# Patient Record
Sex: FEMALE | Race: BLACK OR AFRICAN AMERICAN | NOT HISPANIC OR LATINO | Employment: OTHER | URBAN - METROPOLITAN AREA
[De-identification: names, ages, dates, MRNs, and addresses within clinical notes are randomized per-mention and may not be internally consistent; named-entity substitution may affect disease eponyms.]

---

## 2017-01-16 DIAGNOSIS — M54.50 CHRONIC BILATERAL LOW BACK PAIN WITHOUT SCIATICA: ICD-10-CM

## 2017-01-16 DIAGNOSIS — G89.29 CHRONIC BILATERAL LOW BACK PAIN WITHOUT SCIATICA: ICD-10-CM

## 2017-01-16 RX ORDER — HYDROCODONE BITARTRATE AND ACETAMINOPHEN 7.5; 325 MG/1; MG/1
1 TABLET ORAL 2 TIMES DAILY PRN
Qty: 30 TABLET | Refills: 0 | Status: SHIPPED | OUTPATIENT
Start: 2017-01-16 | End: 2017-02-09 | Stop reason: SDUPTHER

## 2017-01-16 NOTE — TELEPHONE ENCOUNTER
----- Message from Melia Jara sent at 1/16/2017 11:17 AM CST -----  Contact: self/[phone]  Refill:  hydrocodone-acetaminophen 7.5-325mg (NORCO) 7.5-325 mg per tablet    Thank you.

## 2017-02-06 DIAGNOSIS — L25.9 CONTACT DERMATITIS, UNSPECIFIED CONTACT DERMATITIS TYPE, UNSPECIFIED TRIGGER: ICD-10-CM

## 2017-02-07 RX ORDER — HYDROXYZINE HYDROCHLORIDE 25 MG/1
TABLET, FILM COATED ORAL
Qty: 30 TABLET | Refills: 1 | Status: SHIPPED | OUTPATIENT
Start: 2017-02-07 | End: 2017-04-03 | Stop reason: SDUPTHER

## 2017-02-07 RX ORDER — LOSARTAN POTASSIUM 100 MG/1
TABLET ORAL
Qty: 90 TABLET | Refills: 0 | Status: SHIPPED | OUTPATIENT
Start: 2017-02-07 | End: 2017-05-26 | Stop reason: SDUPTHER

## 2017-02-09 ENCOUNTER — OFFICE VISIT (OUTPATIENT)
Dept: FAMILY MEDICINE | Facility: CLINIC | Age: 72
End: 2017-02-09
Payer: MEDICARE

## 2017-02-09 VITALS
WEIGHT: 215.38 LBS | TEMPERATURE: 98 F | BODY MASS INDEX: 35.88 KG/M2 | SYSTOLIC BLOOD PRESSURE: 138 MMHG | HEIGHT: 65 IN | HEART RATE: 68 BPM | DIASTOLIC BLOOD PRESSURE: 86 MMHG | RESPIRATION RATE: 17 BRPM | OXYGEN SATURATION: 97 %

## 2017-02-09 DIAGNOSIS — H60.90 OTITIS EXTERNA, UNSPECIFIED CHRONICITY, UNSPECIFIED LATERALITY, UNSPECIFIED TYPE: Primary | ICD-10-CM

## 2017-02-09 DIAGNOSIS — M25.552 LEFT HIP PAIN: ICD-10-CM

## 2017-02-09 DIAGNOSIS — G89.29 CHRONIC BILATERAL LOW BACK PAIN WITHOUT SCIATICA: ICD-10-CM

## 2017-02-09 DIAGNOSIS — K21.9 GASTROESOPHAGEAL REFLUX DISEASE, ESOPHAGITIS PRESENCE NOT SPECIFIED: ICD-10-CM

## 2017-02-09 DIAGNOSIS — I10 ESSENTIAL HYPERTENSION: ICD-10-CM

## 2017-02-09 DIAGNOSIS — M54.50 CHRONIC BILATERAL LOW BACK PAIN WITHOUT SCIATICA: ICD-10-CM

## 2017-02-09 PROCEDURE — 1157F ADVNC CARE PLAN IN RCRD: CPT | Mod: S$GLB,,, | Performed by: NURSE PRACTITIONER

## 2017-02-09 PROCEDURE — 1125F AMNT PAIN NOTED PAIN PRSNT: CPT | Mod: S$GLB,,, | Performed by: NURSE PRACTITIONER

## 2017-02-09 PROCEDURE — 3075F SYST BP GE 130 - 139MM HG: CPT | Mod: S$GLB,,, | Performed by: NURSE PRACTITIONER

## 2017-02-09 PROCEDURE — 99999 PR PBB SHADOW E&M-EST. PATIENT-LVL III: CPT | Mod: PBBFAC,,, | Performed by: NURSE PRACTITIONER

## 2017-02-09 PROCEDURE — 3079F DIAST BP 80-89 MM HG: CPT | Mod: S$GLB,,, | Performed by: NURSE PRACTITIONER

## 2017-02-09 PROCEDURE — 99214 OFFICE O/P EST MOD 30 MIN: CPT | Mod: S$GLB,,, | Performed by: NURSE PRACTITIONER

## 2017-02-09 PROCEDURE — 1160F RVW MEDS BY RX/DR IN RCRD: CPT | Mod: S$GLB,,, | Performed by: NURSE PRACTITIONER

## 2017-02-09 PROCEDURE — 99499 UNLISTED E&M SERVICE: CPT | Mod: S$PBB,,, | Performed by: NURSE PRACTITIONER

## 2017-02-09 PROCEDURE — 1159F MED LIST DOCD IN RCRD: CPT | Mod: S$GLB,,, | Performed by: NURSE PRACTITIONER

## 2017-02-09 RX ORDER — PANTOPRAZOLE SODIUM 40 MG/1
40 TABLET, DELAYED RELEASE ORAL DAILY
Qty: 90 TABLET | Refills: 1 | Status: SHIPPED | OUTPATIENT
Start: 2017-02-09 | End: 2017-09-27 | Stop reason: SDUPTHER

## 2017-02-09 RX ORDER — HYDROCODONE BITARTRATE AND ACETAMINOPHEN 7.5; 325 MG/1; MG/1
1 TABLET ORAL 2 TIMES DAILY PRN
Qty: 30 TABLET | Refills: 0 | Status: SHIPPED | OUTPATIENT
Start: 2017-02-09 | End: 2017-03-03 | Stop reason: SDUPTHER

## 2017-02-09 RX ORDER — NEOMYCIN SULFATE, POLYMYXIN B SULFATE AND HYDROCORTISONE 10; 3.5; 1 MG/ML; MG/ML; [USP'U]/ML
3 SUSPENSION/ DROPS AURICULAR (OTIC) 4 TIMES DAILY
Qty: 8 ML | Refills: 0 | Status: SHIPPED | OUTPATIENT
Start: 2017-02-09 | End: 2017-02-13

## 2017-02-09 RX ORDER — NIFEDIPINE 90 MG/1
TABLET, EXTENDED RELEASE ORAL
COMMUNITY
Start: 2017-01-31 | End: 2017-02-09 | Stop reason: CLARIF

## 2017-02-09 NOTE — PATIENT INSTRUCTIONS
Follow up in 6 months, sooner if necessary  ER for new worse or concerning symptoms    Living with Rheumatoid Arthritis    Rheumatoid arthritis (RA) is a chronic disease, but it doesn't have to keep you from being active. It is an autoimmune disease and your immune system attacks the lining of your joints. You can help control RA with exercise and a healthy lifestyle. Be sure to see your healthcare provider for scheduled checkups and lab work. At some point, you may be referred to a rheumatologist (a healthcare provider who specializes in arthritis and related diseases).  Make exercise part of your life  Gentle exercise can help lessen your pain. Keep the following in mind:  · Choose exercises that improve joint motion and make your muscles stronger. Your healthcare provider or a physical therapist may suggest a few.  · Most people should exercise for at least 30 minutes a day on most days of the week. This can be broken up into shorter periods throughout the day.  · Try walking, riding a bike, or doing exercises in a warm pool. Look for programs in your community for people with arthritis.   · Dont push yourself too hard at first. Slowly build up over time.  · Make sure you warm up for 5 to 10 minutes each time you exercise. Stretching and flexibility exercises are often helpful.   · If pain and stiffness increase, don't exercise as hard or as long.  Watch your weight  If you weigh more than you should, your weight-bearing joints are under extra pressure. This makes your symptoms worse. To reduce pain and stiffness, try shedding a few of those extra pounds. The tips below may help:  · Start a weight-loss program with the help of your healthcare provider.  · Ask your friends and family for support.  · Join a weight-loss group.  Learn ways to cope  Most people with long-term conditions find it a challenge to deal with the emotions that often go along with their conditions. With rheumatoid arthritis, there is also  pain.   · Work with your healthcare provider on ways to lessen pain. Medicines, use of heat and cold, and other methods are available.  · Learn to relax. Although it may not be easy, it does help lessen stress, anxiety, and pain. Simple deep-breathing exercises, meditation, and yoga are examples of relaxation techniques.  · Depression is common with long-term conditions. If you feel depressed, make sure you talk with your healthcare provider. Again, treatments, like medicine and counseling, are available.  Try to make your day easier  There are things you can do every day to protect your joints:  · Learn to balance rest with activity. Even on days when you have few symptoms, rest is still important.  · Ask friends and family members for help. Help with simple things can make a big difference for you. For example, you might ask someone to change a light bulb, or take out your weekly garbage.  · Use assistive devices, which are special tools that reduce strain and protect joints. For example:  ¨ Long-handled reachers or grabbers for reaching high and low  ¨ Jar-openers, two-handled cups, and button --all of these devices help to protect your fingers, hands, and wrists  ¨ Large  for pencils, pens, kitchen and garden tools  The Arthritis Foundation has many additional suggestions about protecting your joints. Go to their website at http://www.arthritis.org.  Use mobility and other aids  People with arthritis and other problems affecting the joints often use mobility aids, to help with walking. For example, they may use canes or walkers. They may also use splints or braces to support joints. Talk with your healthcare provider or therapist about these aids. For instance, you might benefit from:  · Use a cane to ease knee or hip pain and help prevent falls  · Splints for your wrists or other joints  · A brace to support a weak knee joint  Date Last Reviewed: 2/14/2016  © 9426-6322 [employer]  [address] [place] [address]. All rights reserved. This information is not intended as a substitute for professional medical care. Always follow your healthcare professional's instructions.

## 2017-02-09 NOTE — MR AVS SNAPSHOT
[facility]  [address]  [place] [address]  Phone: [phone]                  Silvia Capellan   2017 3:40 PM   Office Visit    Description:  Female : 1945   Provider:  Hilda Saleem NPArmandoC   Department:  [facility]           Reason for Visit     Hypertension     Follow-up           Diagnoses this Visit        Comments    Otitis externa, unspecified chronicity, unspecified laterality, unspecified type    -  Primary     Essential hypertension         Chronic bilateral low back pain without sciatica         Gastroesophageal reflux disease, esophagitis presence not specified         Left hip pain                To Do List           Future Appointments        Provider Department Dept Phone    2017 3:40 PM Hilda Saleem NP-C [facility] [phone]      Goals (5 Years of Data)     None       These Medications        Disp Refills Start End    neomycin-polymyxin-hydrocortisone (CORTISPORIN) 3.5-10,000-1 mg/mL-unit/mL-% otic suspension 8 mL 0 2017    Place 3 drops into the right ear 4 (four) times daily. - Right Ear    Pharmacy: [facility]/pharmacy #[id number] - [place], [place] - [address] [facility] Ph #: [phone]       pantoprazole (PROTONIX) 40 MG tablet 90 tablet 1 2017    Take 1 tablet (40 mg total) by mouth once daily. - Oral    Pharmacy: [facility]/pharmacy #[id number] - [place] - [address] [facility] Ph #: [phone]       hydrocodone-acetaminophen 7.5-325mg (NORCO) 7.5-325 mg per tablet 30 tablet 0 2017     Take 1 tablet by mouth 2 (two) times daily as needed for Pain. - Oral    Pharmacy: [facility]/pharmacy #[id number] - [place], [place] - [address] [facility] Ph #: [phone]         [facility]     [facility] On Call Nurse Care Line -  Assistance  Registered nurses in the [facility] provide clinical advisement, health education, appointment booking, and other advisory services.  Call for this free service  at [phone].             Medications           Message regarding Medications     Verify the changes and/or additions to your medication regime listed below are the same as discussed with your clinician today.  If any of these changes or additions are incorrect, please notify your healthcare provider.        START taking these NEW medications        Refills    neomycin-polymyxin-hydrocortisone (CORTISPORIN) 3.5-10,000-1 mg/mL-unit/mL-% otic suspension 0    Sig: Place 3 drops into the right ear 4 (four) times daily.    Class: Normal    Route: Right Ear    pantoprazole (PROTONIX) 40 MG tablet 1    Sig: Take 1 tablet (40 mg total) by mouth once daily.    Class: Normal    Route: Oral      STOP taking these medications     nifedipine (PROCARDIA-XL) 90 MG (OSM) TR24     omeprazole (PRILOSEC) 20 MG capsule Take 40 mg by mouth once daily.           Verify that the below list of medications is an accurate representation of the medications you are currently taking.  If none reported, the list may be blank. If incorrect, please contact your healthcare provider. Carry this list with you in case of emergency.           Current Medications     escitalopram oxalate (LEXAPRO) 10 MG tablet Take 1 tablet (10 mg total) by mouth once daily.    hydrochlorothiazide (HYDRODIURIL) 25 MG tablet Take 1 tablet (25 mg total) by mouth once daily.    hydrocodone-acetaminophen 7.5-325mg (NORCO) 7.5-325 mg per tablet Take 1 tablet by mouth 2 (two) times daily as needed for Pain.    hydroxychloroquine (PLAQUENIL) 200 mg tablet Take by mouth once daily.     hydrOXYzine HCl (ATARAX) 25 MG tablet TAKE 1 TABLET (25 MG TOTAL) BY MOUTH 3 (THREE) TIMES DAILY AS NEEDED FOR ITCHING.    losartan (COZAAR) 100 MG tablet TAKE 1 TABLET DAILY    methotrexate 2.5 MG Tab     tizanidine (ZANAFLEX) 4 MG tablet TAKE 1/2 TO 1 TABLET BY MOUTH EVERY 8 HOURS    levocetirizine (XYZAL) 5 MG tablet Take 1 tablet (5 mg total) by mouth every evening.     "neomycin-polymyxin-hydrocortisone (CORTISPORIN) 3.5-10,000-1 mg/mL-unit/mL-% otic suspension Place 3 drops into the right ear 4 (four) times daily.    pantoprazole (PROTONIX) 40 MG tablet Take 1 tablet (40 mg total) by mouth once daily.    polyethylene glycol (COLYTE) 240-22.72-6.72 -5.84 gram SolR Take 1,000 mLs (1 L total) by mouth as directed.    predniSONE (DELTASONE) 20 MG tablet 2 tabs daily for 2 days then 1 tab daily for 2 days then one half tab for 1 days    triamcinolone acetonide 0.1% (KENALOG) 0.1 % ointment Apply topically 2 (two) times daily.           Clinical Reference Information           Your Vitals Were     BP Pulse Temp Resp Height Weight    138/86 (BP Location: Right arm, Patient Position: Sitting, BP Method: Manual) 68 97.6 °F (36.4 °C) (Oral) 17 5' 4.5" (1.638 m) 97.7 kg (215 lb 6.2 oz)    SpO2 BMI             97% 36.4 kg/m2         Blood Pressure          Most Recent Value    BP  138/86      Allergies as of 2/9/2017     Latex, Natural Rubber    Codeine      Immunizations Administered on Date of Encounter - 2/9/2017     None      [facility] Sign-Up     Activating your [facility] account is as easy as 1-2-3!     1) Visit [email or web address], select Sign Up Now, enter this activation code and your date of birth, then select Next.  [email or web address]  Expires: 3/26/2017 10:25 AM      2) Create a username and password to use when you visit [facility] in the future and select a security question in case you lose your password and select Next.    3) Enter your e-mail address and click Sign Up!    Additional Information  If you have questions, please e-mail [email or web address] or call [phone] to talk to our [facility] staff. Remember, [facility] is NOT to be used for urgent needs. For medical emergencies, dial 911.         Instructions    Follow up in 6 months, sooner if necessary  ER for new worse or concerning symptoms    Living with Rheumatoid Arthritis    Rheumatoid arthritis (RA) is a chronic " disease, but it doesn't have to keep you from being active. It is an autoimmune disease and your immune system attacks the lining of your joints. You can help control RA with exercise and a healthy lifestyle. Be sure to see your healthcare provider for scheduled checkups and lab work. At some point, you may be referred to a rheumatologist (a healthcare provider who specializes in arthritis and related diseases).  Make exercise part of your life  Gentle exercise can help lessen your pain. Keep the following in mind:  · Choose exercises that improve joint motion and make your muscles stronger. Your healthcare provider or a physical therapist may suggest a few.  · Most people should exercise for at least 30 minutes a day on most days of the week. This can be broken up into shorter periods throughout the day.  · Try walking, riding a bike, or doing exercises in a warm pool. Look for programs in your community for people with arthritis.   · Dont push yourself too hard at first. Slowly build up over time.  · Make sure you warm up for 5 to 10 minutes each time you exercise. Stretching and flexibility exercises are often helpful.   · If pain and stiffness increase, don't exercise as hard or as long.  Watch your weight  If you weigh more than you should, your weight-bearing joints are under extra pressure. This makes your symptoms worse. To reduce pain and stiffness, try shedding a few of those extra pounds. The tips below may help:  · Start a weight-loss program with the help of your healthcare provider.  · Ask your friends and family for support.  · Join a weight-loss group.  Learn ways to cope  Most people with long-term conditions find it a challenge to deal with the emotions that often go along with their conditions. With rheumatoid arthritis, there is also pain.   · Work with your healthcare provider on ways to lessen pain. Medicines, use of heat and cold, and other methods are available.  · Learn to relax. Although  it may not be easy, it does help lessen stress, anxiety, and pain. Simple deep-breathing exercises, meditation, and yoga are examples of relaxation techniques.  · Depression is common with long-term conditions. If you feel depressed, make sure you talk with your healthcare provider. Again, treatments, like medicine and counseling, are available.  Try to make your day easier  There are things you can do every day to protect your joints:  · Learn to balance rest with activity. Even on days when you have few symptoms, rest is still important.  · Ask friends and family members for help. Help with simple things can make a big difference for you. For example, you might ask someone to change a light bulb, or take out your weekly garbage.  · Use assistive devices, which are special tools that reduce strain and protect joints. For example:  ¨ Long-handled reachers or grabbers for reaching high and low  ¨ Jar-openers, two-handled cups, and button --all of these devices help to protect your fingers, hands, and wrists  ¨ Large  for pencils, pens, kitchen and garden tools  The Arthritis Foundation has many additional suggestions about protecting your joints. Go to their website at http://www.arthritis.org.  Use mobility and other aids  People with arthritis and other problems affecting the joints often use mobility aids, to help with walking. For example, they may use canes or walkers. They may also use splints or braces to support joints. Talk with your healthcare provider or therapist about these aids. For instance, you might benefit from:  · Use a cane to ease knee or hip pain and help prevent falls  · Splints for your wrists or other joints  · A brace to support a weak knee joint  Date Last Reviewed: 2/14/2016  © 6511-3580 [employer]. [address] [place] [address], [place] [address]. All rights reserved. This information is not intended as a substitute for professional medical care. Always follow your  healthcare professional's instructions.             Language Assistance Services     ATTENTION: Language assistance services are available, free of charge. Please call [phone].      ATENCIÓN: Si habla vicky, tiene a vallejo disposición servicios gratuitos de asistencia lingüística. Llame al [phone].     CHÚ Ý: N?u b?n nói Ti?ng Vi?t, có các d?ch v? h? tr? ngôn ng? mi?n phí dành cho b?n. G?i s? [phone].         [facility] complies with applicable Federal civil rights laws and does not discriminate on the basis of race, color, national origin, age, disability, or sex.

## 2017-02-09 NOTE — PROGRESS NOTES
Subjective:       Patient ID: Silvia Capellan is a 71 y.o. female.    Chief Complaint: Hypertension and Follow-up    HPI Ms Capellan is here for a f/u for her chronic medical conditions, she c/o L hip and ankle pain, it began about 2 months ago, no specific incident.  She's taken hydrocodone with relief.  She reports that NSAIDs and gabapentin irritates her stomach.    She also reports that her L ear itches, she also has a h/o allergic rhinitis.  She also reports that her Nexium is not working as well as in the past  She stays active with 3 grandkids under the age of 10  Review of Systems   Constitutional: Negative for fever.   Respiratory: Negative.    Cardiovascular: Negative.    Musculoskeletal: Positive for arthralgias.       Objective:      Physical Exam   Constitutional: She is oriented to person, place, and time. She appears well-developed and well-nourished. She does not appear ill. No distress.   HENT:   Right Ear: A middle ear effusion is present.   Left Ear: A middle ear effusion is present.   R ear canal reddened after removing moderate amount of cerumen   Cardiovascular: Normal rate, regular rhythm and normal heart sounds.  Exam reveals no friction rub.    No murmur heard.  Pulmonary/Chest: Effort normal and breath sounds normal. No respiratory distress. She has no decreased breath sounds. She has no wheezes. She has no rhonchi. She has no rales.   Musculoskeletal: Normal range of motion. She exhibits no edema.   Neurological: She is alert and oriented to person, place, and time.   Skin: Skin is warm and dry. No erythema.   Psychiatric: She has a normal mood and affect. Her behavior is normal.   Vitals reviewed.      Assessment:       1. Otitis externa, unspecified chronicity, unspecified laterality, unspecified type    2. Essential hypertension    3. Chronic bilateral low back pain without sciatica    4. Gastroesophageal reflux disease, esophagitis presence not specified    5. Left hip pain         Plan:       Otitis externa, unspecified chronicity, unspecified laterality, unspecified type  -     neomycin-polymyxin-hydrocortisone (CORTISPORIN) 3.5-10,000-1 mg/mL-unit/mL-% otic suspension; Place 3 drops into the right ear 4 (four) times daily.  Dispense: 8 mL; Refill: 0    Essential hypertension    Chronic bilateral low back pain without sciatica  -     hydrocodone-acetaminophen 7.5-325mg (NORCO) 7.5-325 mg per tablet; Take 1 tablet by mouth 2 (two) times daily as needed for Pain.  Dispense: 30 tablet; Refill: 0    Gastroesophageal reflux disease, esophagitis presence not specified  -     pantoprazole (PROTONIX) 40 MG tablet; Take 1 tablet (40 mg total) by mouth once daily.  Dispense: 90 tablet; Refill: 1    Left hip pain  -     hydrocodone-acetaminophen 7.5-325mg (NORCO) 7.5-325 mg per tablet; Take 1 tablet by mouth 2 (two) times daily as needed for Pain.  Dispense: 30 tablet; Refill: 0    We have discussed that I will refill her hydrocodone today, she should discuss this further with her rheumatology as hydrocodone is not a long term medication.  She has signed a release so that I can get her labs from her [facility] rhematologist.  F/u in 6 months, sooner if necessary    Verbalized understanding

## 2017-02-13 ENCOUNTER — TELEPHONE (OUTPATIENT)
Dept: FAMILY MEDICINE | Facility: CLINIC | Age: 72
End: 2017-02-13

## 2017-02-13 ENCOUNTER — OFFICE VISIT (OUTPATIENT)
Dept: FAMILY MEDICINE | Facility: CLINIC | Age: 72
End: 2017-02-13
Payer: MEDICARE

## 2017-02-13 VITALS
TEMPERATURE: 99 F | WEIGHT: 217.63 LBS | HEIGHT: 65 IN | SYSTOLIC BLOOD PRESSURE: 136 MMHG | BODY MASS INDEX: 36.26 KG/M2 | DIASTOLIC BLOOD PRESSURE: 82 MMHG | HEART RATE: 81 BPM | RESPIRATION RATE: 17 BRPM | OXYGEN SATURATION: 95 %

## 2017-02-13 DIAGNOSIS — H60.90 OTITIS EXTERNA, UNSPECIFIED CHRONICITY, UNSPECIFIED LATERALITY, UNSPECIFIED TYPE: Primary | ICD-10-CM

## 2017-02-13 DIAGNOSIS — L03.811 CELLULITIS OF HEAD EXCEPT FACE: ICD-10-CM

## 2017-02-13 PROCEDURE — 99999 PR PBB SHADOW E&M-EST. PATIENT-LVL III: CPT | Mod: PBBFAC,,, | Performed by: NURSE PRACTITIONER

## 2017-02-13 PROCEDURE — 99213 OFFICE O/P EST LOW 20 MIN: CPT | Mod: S$GLB,,, | Performed by: NURSE PRACTITIONER

## 2017-02-13 PROCEDURE — 1159F MED LIST DOCD IN RCRD: CPT | Mod: S$GLB,,, | Performed by: NURSE PRACTITIONER

## 2017-02-13 PROCEDURE — 3079F DIAST BP 80-89 MM HG: CPT | Mod: S$GLB,,, | Performed by: NURSE PRACTITIONER

## 2017-02-13 PROCEDURE — 1160F RVW MEDS BY RX/DR IN RCRD: CPT | Mod: S$GLB,,, | Performed by: NURSE PRACTITIONER

## 2017-02-13 PROCEDURE — 1157F ADVNC CARE PLAN IN RCRD: CPT | Mod: S$GLB,,, | Performed by: NURSE PRACTITIONER

## 2017-02-13 PROCEDURE — 1125F AMNT PAIN NOTED PAIN PRSNT: CPT | Mod: S$GLB,,, | Performed by: NURSE PRACTITIONER

## 2017-02-13 PROCEDURE — 3075F SYST BP GE 130 - 139MM HG: CPT | Mod: S$GLB,,, | Performed by: NURSE PRACTITIONER

## 2017-02-13 RX ORDER — CIPROFLOXACIN AND DEXAMETHASONE 3; 1 MG/ML; MG/ML
4 SUSPENSION/ DROPS AURICULAR (OTIC) 2 TIMES DAILY
Qty: 7.5 ML | Refills: 0 | Status: SHIPPED | OUTPATIENT
Start: 2017-02-13 | End: 2017-02-23

## 2017-02-13 RX ORDER — SULFAMETHOXAZOLE AND TRIMETHOPRIM 800; 160 MG/1; MG/1
1 TABLET ORAL 2 TIMES DAILY
Qty: 20 TABLET | Refills: 0 | Status: SHIPPED | OUTPATIENT
Start: 2017-02-13 | End: 2017-02-23

## 2017-02-13 NOTE — TELEPHONE ENCOUNTER
----- Message from Marielle Garcia sent at 2/13/2017  8:39 AM CST -----  Contact: self  Patient calling to report an allergic reaction to allergineomycin-polymyxin-hydrocortisone HC , her ear is swollen, she has ear pain, also itching .       [phone]      LL

## 2017-02-13 NOTE — MR AVS SNAPSHOT
[facility]  [address] [facility]  [place] [address]  Phone: [phone]                  Silvia Capellan   2017 11:20 AM   Office Visit    Description:  Female : 1945   Provider:  Hilda Saleem, NP-C   Department:  [facility]           Reason for Visit     Otalgia           Diagnoses this Visit        Comments    Otitis externa, unspecified chronicity, unspecified laterality, unspecified type    -  Primary     Cellulitis of head except face                To Do List           Goals (5 Years of Data)     None       These Medications        Disp Refills Start End    ciprofloxacin-dexamethasone 0.3-0.1% (CIPRODEX) 0.3-0.1 % DrpS 7.5 mL 0 2017    Place 4 drops into the right ear 2 (two) times daily. - Right Ear    Pharmacy: [facility]/pharmacy #[id number] - [place] - [address] Ph #: [phone]       sulfamethoxazole-trimethoprim 800-160mg (BACTRIM DS) 800-160 mg Tab 20 tablet 0 2017    Take 1 tablet by mouth 2 (two) times daily. - Oral    Pharmacy: [facility]/pharmacy #[id number] - [place], [place] - [address] Ph #: [phone]         [facility] On Call     [facility] On Call Nurse Care Line -  Assistance  Registered nurses in the [facility] provide clinical advisement, health education, appointment booking, and other advisory services.  Call for this free service at [phone].             Medications           Message regarding Medications     Verify the changes and/or additions to your medication regime listed below are the same as discussed with your clinician today.  If any of these changes or additions are incorrect, please notify your healthcare provider.        START taking these NEW medications        Refills    ciprofloxacin-dexamethasone 0.3-0.1% (CIPRODEX) 0.3-0.1 % DrpS 0    Sig: Place 4 drops into the right ear 2 (two) times daily.    Class: Normal    Route: Right Ear    sulfamethoxazole-trimethoprim  800-160mg (BACTRIM DS) 800-160 mg Tab 0    Sig: Take 1 tablet by mouth 2 (two) times daily.    Class: Normal    Route: Oral      STOP taking these medications     neomycin-polymyxin-hydrocortisone (CORTISPORIN) 3.5-10,000-1 mg/mL-unit/mL-% otic suspension Place 3 drops into the right ear 4 (four) times daily.           Verify that the below list of medications is an accurate representation of the medications you are currently taking.  If none reported, the list may be blank. If incorrect, please contact your healthcare provider. Carry this list with you in case of emergency.           Current Medications     escitalopram oxalate (LEXAPRO) 10 MG tablet Take 1 tablet (10 mg total) by mouth once daily.    hydrochlorothiazide (HYDRODIURIL) 25 MG tablet Take 1 tablet (25 mg total) by mouth once daily.    hydroxychloroquine (PLAQUENIL) 200 mg tablet Take by mouth once daily.     hydrOXYzine HCl (ATARAX) 25 MG tablet TAKE 1 TABLET (25 MG TOTAL) BY MOUTH 3 (THREE) TIMES DAILY AS NEEDED FOR ITCHING.    losartan (COZAAR) 100 MG tablet TAKE 1 TABLET DAILY    methotrexate 2.5 MG Tab     pantoprazole (PROTONIX) 40 MG tablet Take 1 tablet (40 mg total) by mouth once daily.    predniSONE (DELTASONE) 20 MG tablet 2 tabs daily for 2 days then 1 tab daily for 2 days then one half tab for 1 days    tizanidine (ZANAFLEX) 4 MG tablet TAKE 1/2 TO 1 TABLET BY MOUTH EVERY 8 HOURS    ciprofloxacin-dexamethasone 0.3-0.1% (CIPRODEX) 0.3-0.1 % DrpS Place 4 drops into the right ear 2 (two) times daily.    hydrocodone-acetaminophen 7.5-325mg (NORCO) 7.5-325 mg per tablet Take 1 tablet by mouth 2 (two) times daily as needed for Pain.    levocetirizine (XYZAL) 5 MG tablet Take 1 tablet (5 mg total) by mouth every evening.    polyethylene glycol (COLYTE) 240-22.72-6.72 -5.84 gram SolR Take 1,000 mLs (1 L total) by mouth as directed.    sulfamethoxazole-trimethoprim 800-160mg (BACTRIM DS) 800-160 mg Tab Take 1 tablet by mouth 2 (two) times daily.     "triamcinolone acetonide 0.1% (KENALOG) 0.1 % ointment Apply topically 2 (two) times daily.           Clinical Reference Information           Your Vitals Were     BP Pulse Temp Resp Height Weight    136/82 (BP Location: Left arm, Patient Position: Sitting, BP Method: Manual) 81 98.9 °F (37.2 °C) (Oral) 17 5' 4.5" (1.638 m) 98.7 kg (217 lb 9.5 oz)    SpO2 BMI             95% 36.77 kg/m2         Blood Pressure          Most Recent Value    BP  136/82      Allergies as of 2/13/2017     Latex, Natural Rubber    Codeine      Immunizations Administered on Date of Encounter - 2/13/2017     None      [facility] Sign-Up     Activating your [facility] account is as easy as 1-2-3!     1) Visit [email or web address], select Sign Up Now, enter this activation code and your date of birth, then select Next.  [email or web address]  Expires: 3/26/2017 10:25 AM      2) Create a username and password to use when you visit [facility] in the future and select a security question in case you lose your password and select Next.    3) Enter your e-mail address and click Sign Up!    Additional Information  If you have questions, please e-mail [email or web address] or call [phone] to talk to our [facility] staff. Remember, [facility] is NOT to be used for urgent needs. For medical emergencies, dial 911.         Instructions    Followup if not improved  Go to ER for new worse or concerning symptoms    Discharge Instructions for Cellulitis  You have been diagnosed with cellulitis. This is an infection in the deepest layer of the skin. In some cases, the infection also affects the muscle. Cellulitis is caused by bacteria. The bacteria can enter the body through broken skin. This can happen with a cut, scratch, animal bite, or an insect bite that has been scratched. You may have been treated in the hospital with antibiotics and fluids. You will likely be given a prescription for antibiotics to take at home. This sheet will help you take care of yourself at " home.  Home care  When you are home:  · Take the prescribed antibiotic medicine you are given as directed until it is gone. Take it even if you feel better. It treats the infection and stops it from returning. Not taking all the medicine can make future infections hard to treat.  · Keep the infected area clean.  · When possible, raise the infected area above the level of your heart. This helps keep swelling down.  · Talk with your healthcare provider if you are in pain. Ask what kind of over-the-counter medicine you can take for pain.  · Apply clean bandages as advised.  · Take your temperature once a day for a week.  · Wash your hands often to prevent spreading the infection.  In the future, wash your hands before and after you touch cuts, scratches, or bandages. This will help prevent infection.   When to call your healthcare provider  Call your healthcare provider immediately if you have any of the following:  · Difficulty or pain when moving the joints above or below the infected area  · Discharge or pus draining from the area  · Fever of 100.4°F (38°C) or higher, or as directed by your healthcare provider  · Pain that gets worse in or around the infected   · Redness that gets worse in or around the infected area, particularly if the area of redness expands to a wider area  · Shaking chills  · Swelling of the infected area  · Vomiting   Date Last Reviewed: 8/1/2016  © 0456-0469 [employer]. [address] [place] [address]. All rights reserved. This information is not intended as a substitute for professional medical care. Always follow your healthcare professional's instructions.             Language Assistance Services     ATTENTION: Language assistance services are available, free of charge. Please call [phone].      ATENCIÓN: Si habla español, tiene a vallejo disposición servicios gratuitos de asistencia lingüística. Llame al [phone].     GAYATHRI Ý: N?u b?n nói Ti?ng Vi?t, có các  d?ch v? h? tr? ngôn ng? mi?n phí zanah cho b?n. G?i s? [phone].         [facility] complies with applicable Federal civil rights laws and does not discriminate on the basis of race, color, national origin, age, disability, or sex.

## 2017-02-13 NOTE — PATIENT INSTRUCTIONS
Followup if not improved  Go to ER for new worse or concerning symptoms    Discharge Instructions for Cellulitis  You have been diagnosed with cellulitis. This is an infection in the deepest layer of the skin. In some cases, the infection also affects the muscle. Cellulitis is caused by bacteria. The bacteria can enter the body through broken skin. This can happen with a cut, scratch, animal bite, or an insect bite that has been scratched. You may have been treated in the hospital with antibiotics and fluids. You will likely be given a prescription for antibiotics to take at home. This sheet will help you take care of yourself at home.  Home care  When you are home:  · Take the prescribed antibiotic medicine you are given as directed until it is gone. Take it even if you feel better. It treats the infection and stops it from returning. Not taking all the medicine can make future infections hard to treat.  · Keep the infected area clean.  · When possible, raise the infected area above the level of your heart. This helps keep swelling down.  · Talk with your healthcare provider if you are in pain. Ask what kind of over-the-counter medicine you can take for pain.  · Apply clean bandages as advised.  · Take your temperature once a day for a week.  · Wash your hands often to prevent spreading the infection.  In the future, wash your hands before and after you touch cuts, scratches, or bandages. This will help prevent infection.   When to call your healthcare provider  Call your healthcare provider immediately if you have any of the following:  · Difficulty or pain when moving the joints above or below the infected area  · Discharge or pus draining from the area  · Fever of 100.4°F (38°C) or higher, or as directed by your healthcare provider  · Pain that gets worse in or around the infected   · Redness that gets worse in or around the infected area, particularly if the area of redness expands to a wider area  · Shaking  chills  · Swelling of the infected area  · Vomiting   Date Last Reviewed: 8/1/2016  © 7997-0982 [employer]. [address] [place] [address], [place] [address]. All rights reserved. This information is not intended as a substitute for professional medical care. Always follow your healthcare professional's instructions.

## 2017-02-13 NOTE — PROGRESS NOTES
Subjective:       Patient ID: Silvia Capellan is a 71 y.o. female.    Chief Complaint: Otalgia (right with itching, redness and swelling )    HPI Ms Capellan is here for R ear pain, it is a 10/10, she was seen last week for otitis externa and prescribed cortisporin, she reports the pain began about 1 day after using drops, she takes hydrocodone for ra, but it has not helped her ear pain.  Denies any fevers  Review of Systems   Constitutional: Negative for fever.   HENT: Positive for ear pain.    Respiratory: Negative.    Cardiovascular: Negative.        Objective:      Physical Exam   Constitutional: She is oriented to person, place, and time. She appears well-developed and well-nourished. No distress.   HENT:   Right Ear: There is drainage, swelling and tenderness. No foreign bodies. A middle ear effusion is present. No decreased hearing is noted.   Cardiovascular: Normal rate.    Pulmonary/Chest: Effort normal.   Musculoskeletal: Normal range of motion. She exhibits no edema.   Neurological: She is alert and oriented to person, place, and time.   Skin: Skin is warm and dry.   Psychiatric: She has a normal mood and affect. Her behavior is normal.   Vitals reviewed.      Assessment:       1. Otitis externa, unspecified chronicity, unspecified laterality, unspecified type    2. Cellulitis of head except face        Plan:       Otitis externa, unspecified chronicity, unspecified laterality, unspecified type  -     ciprofloxacin-dexamethasone 0.3-0.1% (CIPRODEX) 0.3-0.1 % DrpS; Place 4 drops into the right ear 2 (two) times daily.  Dispense: 7.5 mL; Refill: 0    Cellulitis of head except face  -     sulfamethoxazole-trimethoprim 800-160mg (BACTRIM DS) 800-160 mg Tab; Take 1 tablet by mouth 2 (two) times daily.  Dispense: 20 tablet; Refill: 0    Looks like cellulitis, will treat with Bactrim p.o and switch to Ciprodex otic for otitis.  F/u if not improved    Verbalized understanding

## 2017-02-14 ENCOUNTER — TELEPHONE (OUTPATIENT)
Dept: FAMILY MEDICINE | Facility: CLINIC | Age: 72
End: 2017-02-14

## 2017-02-14 DIAGNOSIS — L03.811 CELLULITIS OF HEAD EXCEPT FACE: ICD-10-CM

## 2017-02-14 DIAGNOSIS — I10 HYPERTENSION, ESSENTIAL: Primary | ICD-10-CM

## 2017-02-14 DIAGNOSIS — H92.09 OTALGIA, UNSPECIFIED LATERALITY: ICD-10-CM

## 2017-02-14 NOTE — TELEPHONE ENCOUNTER
I have reviewed her labs from [facility], they are from September, I've ordered more labs for her to have drawn

## 2017-02-15 NOTE — TELEPHONE ENCOUNTER
Informed pt labs were reviewed by the NP but new labs are needed. Pt scheduled fasting labs for tomorrow.   ]  Pt states her ear is still painful and would like something for it she states the meds are not working.

## 2017-03-03 ENCOUNTER — OFFICE VISIT (OUTPATIENT)
Dept: FAMILY MEDICINE | Facility: CLINIC | Age: 72
End: 2017-03-03
Payer: MEDICARE

## 2017-03-03 VITALS
BODY MASS INDEX: 35.63 KG/M2 | DIASTOLIC BLOOD PRESSURE: 82 MMHG | OXYGEN SATURATION: 98 % | SYSTOLIC BLOOD PRESSURE: 138 MMHG | HEIGHT: 65 IN | RESPIRATION RATE: 16 BRPM | TEMPERATURE: 98 F | WEIGHT: 213.88 LBS | HEART RATE: 80 BPM

## 2017-03-03 DIAGNOSIS — M54.50 CHRONIC BILATERAL LOW BACK PAIN WITHOUT SCIATICA: ICD-10-CM

## 2017-03-03 DIAGNOSIS — M06.9 RHEUMATOID ARTHRITIS OF HAND, UNSPECIFIED LATERALITY, UNSPECIFIED RHEUMATOID FACTOR PRESENCE: Primary | ICD-10-CM

## 2017-03-03 DIAGNOSIS — G89.29 CHRONIC BILATERAL LOW BACK PAIN WITHOUT SCIATICA: ICD-10-CM

## 2017-03-03 DIAGNOSIS — M25.552 LEFT HIP PAIN: ICD-10-CM

## 2017-03-03 DIAGNOSIS — Z12.39 SCREENING FOR BREAST CANCER: ICD-10-CM

## 2017-03-03 DIAGNOSIS — F41.9 ANXIETY: ICD-10-CM

## 2017-03-03 DIAGNOSIS — G47.00 INSOMNIA, UNSPECIFIED TYPE: Primary | ICD-10-CM

## 2017-03-03 PROCEDURE — 1157F ADVNC CARE PLAN IN RCRD: CPT | Mod: S$GLB,,, | Performed by: NURSE PRACTITIONER

## 2017-03-03 PROCEDURE — 1125F AMNT PAIN NOTED PAIN PRSNT: CPT | Mod: S$GLB,,, | Performed by: NURSE PRACTITIONER

## 2017-03-03 PROCEDURE — 1159F MED LIST DOCD IN RCRD: CPT | Mod: S$GLB,,, | Performed by: NURSE PRACTITIONER

## 2017-03-03 PROCEDURE — 99214 OFFICE O/P EST MOD 30 MIN: CPT | Mod: 25,S$GLB,, | Performed by: NURSE PRACTITIONER

## 2017-03-03 PROCEDURE — 99999 PR PBB SHADOW E&M-EST. PATIENT-LVL V: CPT | Mod: PBBFAC,,, | Performed by: NURSE PRACTITIONER

## 2017-03-03 PROCEDURE — 96372 THER/PROPH/DIAG INJ SC/IM: CPT | Mod: S$GLB,,, | Performed by: NURSE PRACTITIONER

## 2017-03-03 PROCEDURE — 3079F DIAST BP 80-89 MM HG: CPT | Mod: S$GLB,,, | Performed by: NURSE PRACTITIONER

## 2017-03-03 PROCEDURE — 1160F RVW MEDS BY RX/DR IN RCRD: CPT | Mod: S$GLB,,, | Performed by: NURSE PRACTITIONER

## 2017-03-03 PROCEDURE — 3075F SYST BP GE 130 - 139MM HG: CPT | Mod: S$GLB,,, | Performed by: NURSE PRACTITIONER

## 2017-03-03 RX ORDER — TEMAZEPAM 30 MG/1
CAPSULE ORAL
Refills: 1 | COMMUNITY
Start: 2017-02-05 | End: 2017-03-03

## 2017-03-03 RX ORDER — KETOROLAC TROMETHAMINE 30 MG/ML
30 INJECTION, SOLUTION INTRAMUSCULAR; INTRAVENOUS ONCE
Status: COMPLETED | OUTPATIENT
Start: 2017-03-03 | End: 2017-03-03

## 2017-03-03 RX ORDER — HYDROCODONE BITARTRATE AND ACETAMINOPHEN 7.5; 325 MG/1; MG/1
1 TABLET ORAL 2 TIMES DAILY PRN
Qty: 30 TABLET | Refills: 0 | Status: SHIPPED | OUTPATIENT
Start: 2017-03-03 | End: 2017-04-03 | Stop reason: SDUPTHER

## 2017-03-03 RX ORDER — DEXAMETHASONE SODIUM PHOSPHATE 4 MG/ML
8 INJECTION, SOLUTION INTRA-ARTICULAR; INTRALESIONAL; INTRAMUSCULAR; INTRAVENOUS; SOFT TISSUE
Status: COMPLETED | OUTPATIENT
Start: 2017-03-03 | End: 2017-03-03

## 2017-03-03 RX ORDER — ESCITALOPRAM OXALATE 10 MG/1
10 TABLET ORAL DAILY
Qty: 90 TABLET | Refills: 1 | Status: SHIPPED | OUTPATIENT
Start: 2017-03-03 | End: 2017-09-27 | Stop reason: SDUPTHER

## 2017-03-03 RX ORDER — TEMAZEPAM 30 MG/1
30 CAPSULE ORAL NIGHTLY PRN
Qty: 90 CAPSULE | Refills: 0 | Status: SHIPPED | OUTPATIENT
Start: 2017-03-03 | End: 2017-04-03

## 2017-03-03 RX ORDER — TEMAZEPAM 30 MG/1
CAPSULE ORAL
Refills: 1 | Status: CANCELLED | OUTPATIENT
Start: 2017-03-03

## 2017-03-03 RX ORDER — HYDROCODONE BITARTRATE AND ACETAMINOPHEN 7.5; 325 MG/1; MG/1
1 TABLET ORAL 2 TIMES DAILY PRN
Qty: 30 TABLET | Refills: 0 | Status: CANCELLED | OUTPATIENT
Start: 2017-03-03

## 2017-03-03 RX ADMIN — KETOROLAC TROMETHAMINE 30 MG: 30 INJECTION, SOLUTION INTRAMUSCULAR; INTRAVENOUS at 08:03

## 2017-03-03 RX ADMIN — DEXAMETHASONE SODIUM PHOSPHATE 8 MG: 4 INJECTION, SOLUTION INTRA-ARTICULAR; INTRALESIONAL; INTRAMUSCULAR; INTRAVENOUS; SOFT TISSUE at 08:03

## 2017-03-03 NOTE — PROGRESS NOTES
"Subjective:       Patient ID: Silvia Capellan is a 71 y.o. female.    Chief Complaint: Medication Refill and Otalgia (right)    HPI Ms Capellan is here for a refill of her pain medication and R ear pain.  She has been seen for r ear pain about 2 weeks ago, she did not follow up with ENT.  She reports an "itching" in her ear, denies any hearing loss or fever.   She also has RA, which causes LBP, it radiates to her L buttock, she 's taken hydrocodone with good relief.  She has previously followed with rheum.  Review of Systems   Constitutional: Negative for fever.   HENT: Positive for ear pain.    Musculoskeletal: Positive for back pain.       Objective:      Physical Exam   Constitutional: She is oriented to person, place, and time. She appears well-developed and well-nourished. She does not appear ill. No distress.   HENT:   Right Ear: A middle ear effusion is present.   Left Ear: A middle ear effusion is present.   Nose: No mucosal edema or rhinorrhea. Right sinus exhibits no maxillary sinus tenderness and no frontal sinus tenderness. Left sinus exhibits no maxillary sinus tenderness and no frontal sinus tenderness.   Mouth/Throat: Uvula is midline, oropharynx is clear and moist and mucous membranes are normal.   Cardiovascular: Normal rate, regular rhythm and normal heart sounds.  Exam reveals no friction rub.    No murmur heard.  Pulmonary/Chest: Effort normal and breath sounds normal. No respiratory distress. She has no decreased breath sounds. She has no wheezes. She has no rhonchi. She has no rales.   Musculoskeletal: Normal range of motion. She exhibits no edema.        Lumbar back: She exhibits normal range of motion and no tenderness.   Neurological: She is alert and oriented to person, place, and time.   Skin: Skin is warm and dry. No erythema.   Psychiatric: She has a normal mood and affect. Her behavior is normal.   Vitals reviewed.      Assessment:       1. Insomnia, unspecified type    2. Anxiety  "   3. Chronic bilateral low back pain without sciatica    4. Left hip pain    5. Screening for breast cancer        Plan:       Insomnia, unspecified type  -     temazepam (RESTORIL) 30 mg capsule; Take 1 capsule (30 mg total) by mouth nightly as needed for Insomnia.  Dispense: 90 capsule; Refill: 0    Anxiety  -     escitalopram oxalate (LEXAPRO) 10 MG tablet; Take 1 tablet (10 mg total) by mouth once daily.  Dispense: 90 tablet; Refill: 1    Chronic bilateral low back pain without sciatica  -     ketorolac injection 30 mg; Inject 1 mL (30 mg total) into the muscle once.  -     dexamethasone injection 8 mg; Inject 2 mLs (8 mg total) into the muscle one time.    Left hip pain    Screening for breast cancer  -     Cancel: Mammo Digital Screening Bilat with CAD; Future; Expected date: 3/3/17  -     Mammo Digital Screening Bilat with Tomosynthesis_CAD; Future; Expected date: 3/3/17    Her ears look like allergies, she has xyzal, she was instructed to take daily.    I have also informed her that her hydrocodone will be filled today, however; once her PCP returns, they may want her to continue to f/u with rheumatology or pain management.  She verbalized understanding

## 2017-03-03 NOTE — PATIENT INSTRUCTIONS
Follow up in 3 months, sooner if necessary  Go to ER for new worse or concerning symptoms  Take flonase and levocitirizine every day    Causes of Nasal Allergies  Nasal allergies are most commonly caused by one or more of 4 kinds of allergens: pollen (which causes seasonal allergies), house-dust mites, mold, and animals. Other substances, called irritants, can bother the nose and make allergy symptoms worse.    Pollen  Plants reproduce by moving tiny grains of pollen from plant to plant. Some pollen is carried by bees, and some is blown by the wind. Its the wind-blown pollen that causes nasal allergies. The amount of pollen in the air varies from season to season.  House-dust mites  House-dust mites are tiny bugs too small to see. They can live in mattresses, blankets, stuffed toys, carpets, and curtains. The droppings of these mites are a common indoor cause of nasal allergies.  Mold  Mold loves dark, damp areas. It tends to grow in bathrooms, basements, refrigerators, and in the soil of houseplants. Mold reproduces by sending tiny grains called spores into the air. If these spores are breathed in, they can cause a nasal allergic reaction.  Animals  Pets, such as cats, dogs, birds, horses, and rabbits, are common causes of nasal allergies. Flakes of skin (dander), saliva left on fur when an animal cleans itself, urine in litter boxes and cages, and feathers can all cause nasal allergies.  Irritants make allergies worse  Although irritants dont cause nasal allergies, they can make allergy symptoms worse. Cigarette smoke, perfume, aerosol sprays, smoke from wood stoves or fireplaces, car exhaust, and strong odors are examples of irritants.   Date Last Reviewed: 9/1/2016  © 2845-9375 [employer]. [address] [place] [address], [place] [address]. All rights reserved. This information is not intended as a substitute for professional medical care. Always follow your healthcare professional's  instructions.

## 2017-03-03 NOTE — MR AVS SNAPSHOT
[facility]  [address]  [place] [address]  Phone: [phone]                  Silvia Capellan   3/3/2017 8:00 AM   Office Visit    Description:  Female : 1945   Provider:  Hilda Saleem, NP-C   Department:  [facility]           Reason for Visit     Medication Refill     Otalgia           Diagnoses this Visit        Comments    Insomnia, unspecified type    -  Primary     Anxiety         Chronic bilateral low back pain without sciatica         Left hip pain                To Do List           Goals (5 Years of Data)     None       These Medications        Disp Refills Start End    escitalopram oxalate (LEXAPRO) 10 MG tablet 90 tablet 1 3/3/2017 3/3/2018    Take 1 tablet (10 mg total) by mouth once daily. - Oral    Pharmacy: [facility]/pharmacy #[id number] - [place], [place] - [address] [facility] Ph #: [phone]       temazepam (RESTORIL) 30 mg capsule 90 capsule 0 3/3/2017 2017    Take 1 capsule (30 mg total) by mouth nightly as needed for Insomnia. - Oral    Pharmacy: [facility]/pharmacy #[id number] - [place], [place] - [address] [facility] Ph #: [phone]         [facility]     [facility] Care [facility] -  Assistance  Registered nurses in the [facility] provide clinical advisement, health education, appointment booking, and other advisory services.  Call for this free service at [phone].             Medications           Message regarding Medications     Verify the changes and/or additions to your medication regime listed below are the same as discussed with your clinician today.  If any of these changes or additions are incorrect, please notify your healthcare provider.        START taking these NEW medications        Refills    temazepam (RESTORIL) 30 mg capsule 0    Sig: Take 1 capsule (30 mg total) by mouth nightly as needed for Insomnia.    Class: Normal    Route: Oral      These medications were administered today        Dose Freq     ketorolac injection 30 mg 30 mg Once    Sig: Inject 1 mL (30 mg total) into the muscle once.    Class: Normal    Route: Intramuscular    dexamethasone injection 8 mg 8 mg Clinic/HOD 1 time    Sig: Inject 2 mLs (8 mg total) into the muscle one time.    Class: Normal    Route: Intramuscular           Verify that the below list of medications is an accurate representation of the medications you are currently taking.  If none reported, the list may be blank. If incorrect, please contact your healthcare provider. Carry this list with you in case of emergency.           Current Medications     escitalopram oxalate (LEXAPRO) 10 MG tablet Take 1 tablet (10 mg total) by mouth once daily.    hydrochlorothiazide (HYDRODIURIL) 25 MG tablet Take 1 tablet (25 mg total) by mouth once daily.    hydrocodone-acetaminophen 7.5-325mg (NORCO) 7.5-325 mg per tablet Take 1 tablet by mouth 2 (two) times daily as needed for Pain.    hydroxychloroquine (PLAQUENIL) 200 mg tablet Take by mouth once daily.     hydrOXYzine HCl (ATARAX) 25 MG tablet TAKE 1 TABLET (25 MG TOTAL) BY MOUTH 3 (THREE) TIMES DAILY AS NEEDED FOR ITCHING.    losartan (COZAAR) 100 MG tablet TAKE 1 TABLET DAILY    methotrexate 2.5 MG Tab     pantoprazole (PROTONIX) 40 MG tablet Take 1 tablet (40 mg total) by mouth once daily.    polyethylene glycol (COLYTE) 240-22.72-6.72 -5.84 gram SolR Take 1,000 mLs (1 L total) by mouth as directed.    predniSONE (DELTASONE) 20 MG tablet 2 tabs daily for 2 days then 1 tab daily for 2 days then one half tab for 1 days    tizanidine (ZANAFLEX) 4 MG tablet TAKE 1/2 TO 1 TABLET BY MOUTH EVERY 8 HOURS    levocetirizine (XYZAL) 5 MG tablet Take 1 tablet (5 mg total) by mouth every evening.    temazepam (RESTORIL) 30 mg capsule Take 1 capsule (30 mg total) by mouth nightly as needed for Insomnia.    triamcinolone acetonide 0.1% (KENALOG) 0.1 % ointment Apply topically 2 (two) times daily.           Clinical Reference Information          "  Your Vitals Were     BP Pulse Temp Resp Height Weight    138/82 (BP Location: Right arm, Patient Position: Sitting, BP Method: Manual) 80 97.5 °F (36.4 °C) (Oral) 16 5' 4.5" (1.638 m) 97 kg (213 lb 13.5 oz)    SpO2 BMI             98% 36.14 kg/m2         Blood Pressure          Most Recent Value    BP  138/82      Allergies as of 3/3/2017     Latex, Natural Rubber    Codeine    Cortisporin [Neomycin-bacitracin-poly-hc]      Immunizations Administered on Date of Encounter - 3/3/2017     None      [facility] Sign-Up     Activating your [facility] account is as easy as 1-2-3!     1) Visit [email or web address], select Sign Up Now, enter this activation code and your date of birth, then select Next.  [email or web address]  Expires: 3/26/2017 10:25 AM      2) Create a username and password to use when you visit [facility] in the future and select a security question in case you lose your password and select Next.    3) Enter your e-mail address and click Sign Up!    Additional Information  If you have questions, please e-mail [email or web address] or call [phone] to talk to our [facility] staff. Remember, [facility] is NOT to be used for urgent needs. For medical emergencies, dial 911.         Instructions    Follow up in 3 months, sooner if necessary  Go to ER for new worse or concerning symptoms  Take flonase and levocitirizine every day    Causes of Nasal Allergies  Nasal allergies are most commonly caused by one or more of 4 kinds of allergens: pollen (which causes seasonal allergies), house-dust mites, mold, and animals. Other substances, called irritants, can bother the nose and make allergy symptoms worse.    Pollen  Plants reproduce by moving tiny grains of pollen from plant to plant. Some pollen is carried by bees, and some is blown by the wind. Its the wind-blown pollen that causes nasal allergies. The amount of pollen in the air varies from season to season.  House-dust mites  House-dust mites are tiny bugs too small " to see. They can live in mattresses, blankets, stuffed toys, carpets, and curtains. The droppings of these mites are a common indoor cause of nasal allergies.  Mold  Mold loves dark, damp areas. It tends to grow in bathrooms, basements, refrigerators, and in the soil of houseplants. Mold reproduces by sending tiny grains called spores into the air. If these spores are breathed in, they can cause a nasal allergic reaction.  Animals  Pets, such as cats, dogs, birds, horses, and rabbits, are common causes of nasal allergies. Flakes of skin (dander), saliva left on fur when an animal cleans itself, urine in litter boxes and cages, and feathers can all cause nasal allergies.  Irritants make allergies worse  Although irritants dont cause nasal allergies, they can make allergy symptoms worse. Cigarette smoke, perfume, aerosol sprays, smoke from wood stoves or fireplaces, car exhaust, and strong odors are examples of irritants.   Date Last Reviewed: 9/1/2016  © 5088-6327 "[employer]". [address] [place] [address]. All rights reserved. This information is not intended as a substitute for professional medical care. Always follow your healthcare professional's instructions.             Language Assistance Services     ATTENTION: Language assistance services are available, free of charge. Please call [phone].      ATENCIÓN: Si habla vicky, tiene a vallejo disposición servicios gratuitos de asistencia lingüística. Llame al [phone].     CHÚ Ý: N?u b?n nói Ti?ng Vi?t, có các d?ch v? h? tr? ngôn ng? mi?n phí dành cho b?n. G?i s? [phone].         [facility] complies with applicable Federal civil rights laws and does not discriminate on the basis of race, color, national origin, age, disability, or sex.

## 2017-04-03 ENCOUNTER — HOSPITAL ENCOUNTER (OUTPATIENT)
Dept: RADIOLOGY | Facility: HOSPITAL | Age: 72
Discharge: HOME | End: 2017-04-03
Attending: NURSE PRACTITIONER
Payer: MEDICARE

## 2017-04-03 ENCOUNTER — OFFICE VISIT (OUTPATIENT)
Dept: FAMILY MEDICINE | Facility: CLINIC | Age: 72
End: 2017-04-03
Payer: MEDICARE

## 2017-04-03 VITALS
WEIGHT: 211.63 LBS | SYSTOLIC BLOOD PRESSURE: 154 MMHG | HEART RATE: 89 BPM | OXYGEN SATURATION: 98 % | HEIGHT: 65 IN | DIASTOLIC BLOOD PRESSURE: 88 MMHG | RESPIRATION RATE: 18 BRPM | TEMPERATURE: 98 F | BODY MASS INDEX: 35.26 KG/M2

## 2017-04-03 DIAGNOSIS — G89.29 CHRONIC BILATERAL LOW BACK PAIN WITHOUT SCIATICA: ICD-10-CM

## 2017-04-03 DIAGNOSIS — Z12.39 SCREENING FOR BREAST CANCER: ICD-10-CM

## 2017-04-03 DIAGNOSIS — M06.9 RHEUMATOID ARTHRITIS OF HAND, UNSPECIFIED LATERALITY, UNSPECIFIED RHEUMATOID FACTOR PRESENCE: ICD-10-CM

## 2017-04-03 DIAGNOSIS — M25.552 LEFT HIP PAIN: ICD-10-CM

## 2017-04-03 DIAGNOSIS — M06.9 RHEUMATOID ARTHRITIS, INVOLVING UNSPECIFIED SITE, UNSPECIFIED RHEUMATOID FACTOR PRESENCE: ICD-10-CM

## 2017-04-03 DIAGNOSIS — M54.50 CHRONIC BILATERAL LOW BACK PAIN WITHOUT SCIATICA: ICD-10-CM

## 2017-04-03 DIAGNOSIS — L25.9 CONTACT DERMATITIS, UNSPECIFIED CONTACT DERMATITIS TYPE, UNSPECIFIED TRIGGER: ICD-10-CM

## 2017-04-03 DIAGNOSIS — G47.00 INSOMNIA, UNSPECIFIED TYPE: ICD-10-CM

## 2017-04-03 DIAGNOSIS — B35.9 TINEA: Primary | ICD-10-CM

## 2017-04-03 DIAGNOSIS — L50.9 URTICARIA: ICD-10-CM

## 2017-04-03 PROCEDURE — 3079F DIAST BP 80-89 MM HG: CPT | Mod: S$GLB,,, | Performed by: NURSE PRACTITIONER

## 2017-04-03 PROCEDURE — 1125F AMNT PAIN NOTED PAIN PRSNT: CPT | Mod: S$GLB,,, | Performed by: NURSE PRACTITIONER

## 2017-04-03 PROCEDURE — 99999 PR PBB SHADOW E&M-EST. PATIENT-LVL IV: CPT | Mod: PBBFAC,,, | Performed by: NURSE PRACTITIONER

## 2017-04-03 PROCEDURE — 99499 UNLISTED E&M SERVICE: CPT | Mod: S$PBB,,, | Performed by: NURSE PRACTITIONER

## 2017-04-03 PROCEDURE — 99214 OFFICE O/P EST MOD 30 MIN: CPT | Mod: S$GLB,,, | Performed by: NURSE PRACTITIONER

## 2017-04-03 PROCEDURE — 77067 SCR MAMMO BI INCL CAD: CPT | Mod: 26,,, | Performed by: RADIOLOGY

## 2017-04-03 PROCEDURE — 1159F MED LIST DOCD IN RCRD: CPT | Mod: S$GLB,,, | Performed by: NURSE PRACTITIONER

## 2017-04-03 PROCEDURE — 1157F ADVNC CARE PLAN IN RCRD: CPT | Mod: S$GLB,,, | Performed by: NURSE PRACTITIONER

## 2017-04-03 PROCEDURE — 3077F SYST BP >= 140 MM HG: CPT | Mod: S$GLB,,, | Performed by: NURSE PRACTITIONER

## 2017-04-03 PROCEDURE — 1160F RVW MEDS BY RX/DR IN RCRD: CPT | Mod: S$GLB,,, | Performed by: NURSE PRACTITIONER

## 2017-04-03 PROCEDURE — 77063 BREAST TOMOSYNTHESIS BI: CPT | Mod: 26,,, | Performed by: RADIOLOGY

## 2017-04-03 RX ORDER — HYDROXYZINE HYDROCHLORIDE 25 MG/1
TABLET, FILM COATED ORAL
Qty: 30 TABLET | Refills: 1 | Status: SHIPPED | OUTPATIENT
Start: 2017-04-03 | End: 2017-06-09 | Stop reason: SDUPTHER

## 2017-04-03 RX ORDER — KETOCONAZOLE 20 MG/ML
SHAMPOO, SUSPENSION TOPICAL DAILY
Qty: 120 ML | Refills: 1 | Status: ON HOLD | OUTPATIENT
Start: 2017-04-03 | End: 2017-11-14 | Stop reason: CLARIF

## 2017-04-03 RX ORDER — CLOTRIMAZOLE 1 %
CREAM (GRAM) TOPICAL 2 TIMES DAILY
Qty: 15 G | Refills: 1 | Status: SHIPPED | OUTPATIENT
Start: 2017-04-03 | End: 2018-01-24

## 2017-04-03 RX ORDER — TRAZODONE HYDROCHLORIDE 100 MG/1
100 TABLET ORAL NIGHTLY
Qty: 30 TABLET | Refills: 11 | Status: SHIPPED | OUTPATIENT
Start: 2017-04-03 | End: 2017-06-09 | Stop reason: SDUPTHER

## 2017-04-03 RX ORDER — HYDROCODONE BITARTRATE AND ACETAMINOPHEN 7.5; 325 MG/1; MG/1
1 TABLET ORAL
Qty: 30 TABLET | Refills: 0 | Status: SHIPPED | OUTPATIENT
Start: 2017-04-03 | End: 2017-05-04 | Stop reason: SDUPTHER

## 2017-04-03 RX ORDER — LEVOCETIRIZINE DIHYDROCHLORIDE 5 MG/1
5 TABLET, FILM COATED ORAL NIGHTLY
Qty: 30 TABLET | Refills: 1 | Status: SHIPPED | OUTPATIENT
Start: 2017-04-03 | End: 2017-06-01 | Stop reason: SDUPTHER

## 2017-04-03 NOTE — PROGRESS NOTES
Subjective:       Patient ID: Silvia Capellan is a 71 y.o. female.    Chief Complaint: Rash    HPI  Ms Capellan is here for a 1 month h/o rash to her back, legs and arms.  It itches, she denies any new soaps, detergents or other products.  She has not attempted any treatment.  Denies any fever.  She also reports the Restoril does not work.  She would like a referral to rheum, she currently sees one at [facility] and is unable to get in.  Review of Systems   Constitutional: Negative for fever.   Cardiovascular: Negative.    Gastrointestinal: Negative.    Skin: Positive for rash.       Objective:      Physical Exam   Constitutional: She is oriented to person, place, and time. She appears well-developed and well-nourished. She does not appear ill. No distress.   Cardiovascular: Normal rate.    Pulmonary/Chest: Effort normal.   Musculoskeletal: Normal range of motion. She exhibits no edema.   Neurological: She is alert and oriented to person, place, and time.   Skin: Skin is warm and dry. No erythema.        Psychiatric: She has a normal mood and affect. Her behavior is normal.   Vitals reviewed.      Assessment:       1. Tinea    2. Urticaria    3. Contact dermatitis, unspecified contact dermatitis type, unspecified trigger    4. Insomnia, unspecified type    5. Rheumatoid arthritis, involving unspecified site, unspecified rheumatoid factor presence        Plan:       Tinea  -     ketoconazole (NIZORAL) 2 % shampoo; Apply topically once daily.  Dispense: 120 mL; Refill: 1  -     clotrimazole (LOTRIMIN) 1 % cream; Apply topically 2 (two) times daily.  Dispense: 15 g; Refill: 1    Urticaria  -     levocetirizine (XYZAL) 5 MG tablet; Take 1 tablet (5 mg total) by mouth every evening.  Dispense: 30 tablet; Refill: 1  -     hydrOXYzine HCl (ATARAX) 25 MG tablet; TAKE 1 TABLET (25 MG TOTAL) BY MOUTH 3 (THREE) TIMES DAILY AS NEEDED FOR ITCHING.  Dispense: 30 tablet; Refill: 1  Looks like ringworm, treat with ketoconazole and  cream.    Insomnia, unspecified type  -     trazodone (DESYREL) 100 MG tablet; Take 1 tablet (100 mg total) by mouth every evening.  Dispense: 30 tablet; Refill: 11  Instructed her to take a half tablet at first, if this does not work, take the whole tablet.    Rheumatoid arthritis, involving unspecified site, unspecified rheumatoid factor presence  -     Ambulatory referral to Rheumatology      F/u if not improved  ER for new worse or concernign symptoms

## 2017-04-03 NOTE — MR AVS SNAPSHOT
[facility]  [address]  [place] [address]  Phone: [phone]                  Silvia Capellan   4/3/2017 2:20 PM   Office Visit    Description:  Female : 1945   Provider:  Hilda Saleem, NP-C   Department:  [facility]           Reason for Visit     Rash           Diagnoses this Visit        Comments    Tinea    -  Primary     Urticaria         Contact dermatitis, unspecified contact dermatitis type, unspecified trigger         Insomnia, unspecified type         Rheumatoid arthritis, involving unspecified site, unspecified rheumatoid factor presence                To Do List           Goals (5 Years of Data)     None       These Medications        Disp Refills Start End    trazodone (DESYREL) 100 MG tablet 30 tablet 11 4/3/2017 4/3/2018    Take 1 tablet (100 mg total) by mouth every evening. - Oral    Pharmacy: [facility]/pharmacy #[address] [place], [place] [address] [address] Ph #: [phone]       ketoconazole (NIZORAL) 2 % shampoo 120 mL 1 4/3/2017     Apply topically once daily. - Topical (Top)    Pharmacy: [facility]/pharmacy [address] [place], [place] [address] [address] Ph #: [phone]       clotrimazole (LOTRIMIN) 1 % cream 15 g 1 4/3/2017     Apply topically 2 (two) times daily. - Topical (Top)    Pharmacy: [facility]/pharmacy [address] [place], [place] [address] [address] Ph #: [phone]       levocetirizine (XYZAL) 5 MG tablet 30 tablet 1 4/3/2017 4/3/2018    Take 1 tablet (5 mg total) by mouth every evening. - Oral    Pharmacy: [facility]/pharmacy [address] [place], [place] [address] [address] Ph #: [phone]       hydrOXYzine HCl (ATARAX) 25 MG tablet 30 tablet 1 4/3/2017     TAKE 1 TABLET (25 MG TOTAL) BY MOUTH 3 (THREE) TIMES DAILY AS NEEDED FOR ITCHING.    Pharmacy: [facility]/pharmacy #[id number] - [place], [address] Ph #: [phone]         [facility]     [facility] Care [facility] -  Assistance  Unless otherwise directed by your provider, please  contact [facility], our nurse care line that is available for 24/7 assistance.     Registered nurses in the [facility] provide: appointment scheduling, clinical advisement, health education, and other advisory services.  Call: [phone] (toll free)               Medications           Message regarding Medications     Verify the changes and/or additions to your medication regime listed below are the same as discussed with your clinician today.  If any of these changes or additions are incorrect, please notify your healthcare provider.        START taking these NEW medications        Refills    trazodone (DESYREL) 100 MG tablet 11    Sig: Take 1 tablet (100 mg total) by mouth every evening.    Class: Normal    Route: Oral    ketoconazole (NIZORAL) 2 % shampoo 1    Sig: Apply topically once daily.    Class: Normal    Route: Topical (Top)    clotrimazole (LOTRIMIN) 1 % cream 1    Sig: Apply topically 2 (two) times daily.    Class: Normal    Route: Topical (Top)      STOP taking these medications     predniSONE (DELTASONE) 20 MG tablet 2 tabs daily for 2 days then 1 tab daily for 2 days then one half tab for 1 days           Verify that the below list of medications is an accurate representation of the medications you are currently taking.  If none reported, the list may be blank. If incorrect, please contact your healthcare provider. Carry this list with you in case of emergency.           Current Medications     clotrimazole (LOTRIMIN) 1 % cream Apply topically 2 (two) times daily.    escitalopram oxalate (LEXAPRO) 10 MG tablet Take 1 tablet (10 mg total) by mouth once daily.    hydrochlorothiazide (HYDRODIURIL) 25 MG tablet Take 1 tablet (25 mg total) by mouth once daily.    hydrocodone-acetaminophen 7.5-325mg (NORCO) 7.5-325 mg per tablet Take 1 tablet by mouth every 24 hours as needed for Pain.    hydroxychloroquine (PLAQUENIL) 200 mg tablet Take by mouth once daily.     hydrOXYzine HCl  "(ATARAX) 25 MG tablet TAKE 1 TABLET (25 MG TOTAL) BY MOUTH 3 (THREE) TIMES DAILY AS NEEDED FOR ITCHING.    ketoconazole (NIZORAL) 2 % shampoo Apply topically once daily.    levocetirizine (XYZAL) 5 MG tablet Take 1 tablet (5 mg total) by mouth every evening.    losartan (COZAAR) 100 MG tablet TAKE 1 TABLET DAILY    methotrexate 2.5 MG Tab     pantoprazole (PROTONIX) 40 MG tablet Take 1 tablet (40 mg total) by mouth once daily.    polyethylene glycol (COLYTE) 240-22.72-6.72 -5.84 gram SolR Take 1,000 mLs (1 L total) by mouth as directed.    temazepam (RESTORIL) 30 mg capsule Take 1 capsule (30 mg total) by mouth nightly as needed for Insomnia.    tizanidine (ZANAFLEX) 4 MG tablet TAKE 1/2 TO 1 TABLET BY MOUTH EVERY 8 HOURS    trazodone (DESYREL) 100 MG tablet Take 1 tablet (100 mg total) by mouth every evening.    triamcinolone acetonide 0.1% (KENALOG) 0.1 % ointment Apply topically 2 (two) times daily.           Clinical Reference Information           Your Vitals Were     BP Pulse Temp Resp Height Weight    154/88 (BP Location: Left arm, Patient Position: Sitting, BP Method: Manual) 89 97.9 °F (36.6 °C) (Oral) 18 5' 4.5" (1.638 m) 96 kg (211 lb 10.3 oz)    SpO2 BMI             98% 35.77 kg/m2         Blood Pressure          Most Recent Value    BP  (!)  154/88      Allergies as of 4/3/2017     Latex, Natural Rubber    Codeine    Cortisporin [Neomycin-bacitracin-poly-hc]      Immunizations Administered on Date of Encounter - 4/3/2017     None      Orders Placed During Today's Visit      Normal Orders This Visit    Ambulatory referral to Rheumatology       [facility] Sign-Up     Activating your [facility] account is as easy as 1-2-3!     1) Visit [email or web address], select Sign Up Now, enter this activation code and your date of birth, then select Next.  [email or web address]  Expires: 5/18/2017  3:01 PM      2) Create a username and password to use when you visit [facility] in the future and select a security question in case " you lose your password and select Next.    3) Enter your e-mail address and click Sign Up!    Additional Information  If you have questions, please e-mail [email or web address] or call [phone] to talk to our [facility] staff. Remember, [facility] is NOT to be used for urgent needs. For medical emergencies, dial 911.         Instructions    Follow up with rheumatology  Go to ER for new worse or concerning symptoms    Fungal Skin Infection (Tinea)  A fungal infection is when too much fungus grows on or in the body. Fungus normally lives on the skin in small amounts and does not cause harm. But when too much grows on the skin, it causes an infection. This is also known as tinea. Fungal skin infections are common and not often serious.  The infection often starts as a small red area the size of a pea. The skin may turn dry and flaky. The area may itch. As the fungus grows, it spreads out in a red [place]. Because of how it looks, fungal skin infection is often called ringworm, but it is not caused by a worm. Fungal skin infections can occur on many parts of the body. They can grow on the head, chest, arms, or legs. They can occur on the buttocks. On the feet, fungal infection is known as athletes foot. It causes itchy, sometimes painful sores between the toes and the bottom or sides of the feet. In the groin, the rash is called jock itch.  People with weakened immune systems can get a fungal infection more easily. This includes people with diabetes or HIV, or who are being treated for cancer. In these cases, the fungal infection can spread and cause severe illness. Fungal infections are also more common in people who are obese.  In most cases, treatment is done with antifungal cream or ointment. If the infection is on your scalp, you may take oral medication. In some cases, a tiny piece of the skin (biopsy) may be taken. This is so it can be tested in a lab.  Common fungal infections are treated with creams on  the skin or oral medicine.  Home care  Follow all instructions when using antifungal cream or ointment on your skin. The health care provider may advise using cornstarch powder to keep your skin dry or petroleum jelly to provide a barrier.  General care:  · If you were prescribed an oral medicine, read the patient information. Talk with the health care provider about the risks and side effects.  · Let your skin dry completely after bathing. Carefully dry your feet and between your toes.  · Dress in loose cotton clothing.  · Dont scratch the affected area. This can delay healing and may spread the infection. It can also cause a bacterial infection.  · Keep your skin clean, but dont wash the skin too much. This can irritate your skin.  · Keep in mind that it may take a week before the fungus starts to go away. It can take 2 to 4 weeks to fully clear. To prevent it from coming back, use the medicine until the rash is all gone.  Follow-up care  Follow up with your health care provider if the rash does not get better after 10 days of treatment. Also follow up if the rash spreads to other parts of your body.  When to seek medical advice  Call your health care provider right away if any of these occur:  · Fever of 100.4°F (38°C) or higher  · Redness or swelling that gets worse  · Pain that gets worse  · Foul-smelling fluid leaking from the skin  Date Last Reviewed: 7/23/2014  © 3272-1615 [employer]. [address] [place] [address]. All rights reserved. This information is not intended as a substitute for professional medical care. Always follow your healthcare professional's instructions.             Language Assistance Services     ATTENTION: Language assistance services are available, free of charge. Please call [phone].      ATENCIÓN: Si aprilla vicky, tiene a vallejo disposición servicios gratuitos de asistencia lingüística. Llame al [phone].     [facility] Ý: N?u b?n nói Ti?ng Vi?t, có các  d?ch v? h? tr? ngôn ng? mi?n phí zanah cho b?n. G?i s? [phone].         [facility] complies with applicable Federal civil rights laws and does not discriminate on the basis of race, color, national origin, age, disability, or sex.

## 2017-04-03 NOTE — PATIENT INSTRUCTIONS
Follow up with rheumatology  Go to ER for new worse or concerning symptoms    Fungal Skin Infection (Tinea)  A fungal infection is when too much fungus grows on or in the body. Fungus normally lives on the skin in small amounts and does not cause harm. But when too much grows on the skin, it causes an infection. This is also known as tinea. Fungal skin infections are common and not often serious.  The infection often starts as a small red area the size of a pea. The skin may turn dry and flaky. The area may itch. As the fungus grows, it spreads out in a red [place]. Because of how it looks, fungal skin infection is often called ringworm, but it is not caused by a worm. Fungal skin infections can occur on many parts of the body. They can grow on the head, chest, arms, or legs. They can occur on the buttocks. On the feet, fungal infection is known as athletes foot. It causes itchy, sometimes painful sores between the toes and the bottom or sides of the feet. In the groin, the rash is called jock itch.  People with weakened immune systems can get a fungal infection more easily. This includes people with diabetes or HIV, or who are being treated for cancer. In these cases, the fungal infection can spread and cause severe illness. Fungal infections are also more common in people who are obese.  In most cases, treatment is done with antifungal cream or ointment. If the infection is on your scalp, you may take oral medication. In some cases, a tiny piece of the skin (biopsy) may be taken. This is so it can be tested in a lab.  Common fungal infections are treated with creams on the skin or oral medicine.  Home care  Follow all instructions when using antifungal cream or ointment on your skin. The health care provider may advise using cornstarch powder to keep your skin dry or petroleum jelly to provide a barrier.  General care:  · If you were prescribed an oral medicine, read the patient information. Talk with the  health care provider about the risks and side effects.  · Let your skin dry completely after bathing. Carefully dry your feet and between your toes.  · Dress in loose cotton clothing.  · Dont scratch the affected area. This can delay healing and may spread the infection. It can also cause a bacterial infection.  · Keep your skin clean, but dont wash the skin too much. This can irritate your skin.  · Keep in mind that it may take a week before the fungus starts to go away. It can take 2 to 4 weeks to fully clear. To prevent it from coming back, use the medicine until the rash is all gone.  Follow-up care  Follow up with your health care provider if the rash does not get better after 10 days of treatment. Also follow up if the rash spreads to other parts of your body.  When to seek medical advice  Call your health care provider right away if any of these occur:  · Fever of 100.4°F (38°C) or higher  · Redness or swelling that gets worse  · Pain that gets worse  · Foul-smelling fluid leaking from the skin  Date Last Reviewed: 7/23/2014  © 0562-1140 [employer]. [address] [place] [address]. All rights reserved. This information is not intended as a substitute for professional medical care. Always follow your healthcare professional's instructions.

## 2017-04-10 ENCOUNTER — OFFICE VISIT (OUTPATIENT)
Dept: FAMILY MEDICINE | Facility: CLINIC | Age: 72
End: 2017-04-10
Payer: MEDICARE

## 2017-04-10 VITALS
RESPIRATION RATE: 20 BRPM | BODY MASS INDEX: 36.43 KG/M2 | DIASTOLIC BLOOD PRESSURE: 88 MMHG | WEIGHT: 213.38 LBS | HEART RATE: 74 BPM | SYSTOLIC BLOOD PRESSURE: 138 MMHG | TEMPERATURE: 98 F | HEIGHT: 64 IN | OXYGEN SATURATION: 98 %

## 2017-04-10 DIAGNOSIS — L30.9 DERMATITIS: Primary | ICD-10-CM

## 2017-04-10 PROCEDURE — 1157F ADVNC CARE PLAN IN RCRD: CPT | Mod: S$GLB,,, | Performed by: FAMILY MEDICINE

## 2017-04-10 PROCEDURE — 1159F MED LIST DOCD IN RCRD: CPT | Mod: S$GLB,,, | Performed by: FAMILY MEDICINE

## 2017-04-10 PROCEDURE — 3079F DIAST BP 80-89 MM HG: CPT | Mod: S$GLB,,, | Performed by: FAMILY MEDICINE

## 2017-04-10 PROCEDURE — 3075F SYST BP GE 130 - 139MM HG: CPT | Mod: S$GLB,,, | Performed by: FAMILY MEDICINE

## 2017-04-10 PROCEDURE — 99214 OFFICE O/P EST MOD 30 MIN: CPT | Mod: 25,S$GLB,, | Performed by: FAMILY MEDICINE

## 2017-04-10 PROCEDURE — 99999 PR PBB SHADOW E&M-EST. PATIENT-LVL III: CPT | Mod: PBBFAC,,, | Performed by: FAMILY MEDICINE

## 2017-04-10 PROCEDURE — 1160F RVW MEDS BY RX/DR IN RCRD: CPT | Mod: S$GLB,,, | Performed by: FAMILY MEDICINE

## 2017-04-10 PROCEDURE — 1125F AMNT PAIN NOTED PAIN PRSNT: CPT | Mod: S$GLB,,, | Performed by: FAMILY MEDICINE

## 2017-04-10 PROCEDURE — 96372 THER/PROPH/DIAG INJ SC/IM: CPT | Mod: S$GLB,,, | Performed by: FAMILY MEDICINE

## 2017-04-10 PROCEDURE — 99499 UNLISTED E&M SERVICE: CPT | Mod: S$PBB,,, | Performed by: FAMILY MEDICINE

## 2017-04-10 RX ORDER — MOMETASONE FUROATE 1 MG/G
CREAM TOPICAL DAILY
Qty: 50 G | Refills: 0 | Status: SHIPPED | OUTPATIENT
Start: 2017-04-10 | End: 2017-04-20

## 2017-04-10 RX ORDER — DEXAMETHASONE SODIUM PHOSPHATE 4 MG/ML
8 INJECTION, SOLUTION INTRA-ARTICULAR; INTRALESIONAL; INTRAMUSCULAR; INTRAVENOUS; SOFT TISSUE
Status: COMPLETED | OUTPATIENT
Start: 2017-04-10 | End: 2017-04-10

## 2017-04-10 RX ORDER — PREDNISONE 10 MG/1
40 TABLET ORAL DAILY
Qty: 20 TABLET | Refills: 0 | Status: SHIPPED | OUTPATIENT
Start: 2017-04-10 | End: 2017-04-15

## 2017-04-10 RX ADMIN — DEXAMETHASONE SODIUM PHOSPHATE 8 MG: 4 INJECTION, SOLUTION INTRA-ARTICULAR; INTRALESIONAL; INTRAMUSCULAR; INTRAVENOUS; SOFT TISSUE at 10:04

## 2017-04-10 NOTE — MR AVS SNAPSHOT
[facility]  [address]  [place] [address]  Phone: [phone]                  Silvia Capellan   4/10/2017 9:00 AM   Office Visit    Description:  Female : 1945   Provider:  Lydia Butler MD   Department:  [facility]           Reason for Visit     Rash           Diagnoses this Visit        Comments    Dermatitis    -  Primary            To Do List           Future Appointments        Provider Department Dept Phone    2017 11:00 AM Irma Cho MD [facility] - Rheumatology [phone]      Goals (5 Years of Data)     None       These Medications        Disp Refills Start End    mometasone 0.1% (ELOCON) 0.1 % cream 50 g 0 4/10/2017 2017    Apply topically once daily. - Topical (Top)    Pharmacy: [facility] #[id number] - [place], [place] - [address] Ph #: [phone]       predniSONE (DELTASONE) 10 MG tablet 20 tablet 0 4/10/2017 4/15/2017    Take 4 tablets (40 mg total) by mouth once daily. - Oral    Pharmacy: [facility] #[id number] - [place], [place] - [address] Ph #: [phone]         [facility] On [facility]     [facility] -  Assistance  Unless otherwise directed by your provider, please contact [facility], our nurse care line that is available for 24/ assistance.     Registered nurses in the [facility] provide: appointment scheduling, clinical advisement, health education, and other advisory services.  Call: [phone] (toll free)               Medications           Message regarding Medications     Verify the changes and/or additions to your medication regime listed below are the same as discussed with your clinician today.  If any of these changes or additions are incorrect, please notify your healthcare provider.        START taking these NEW medications        Refills    mometasone 0.1% (ELOCON) 0.1 % cream 0    Sig: Apply topically once daily.    Class: Normal    Route:  Topical (Top)    predniSONE (DELTASONE) 10 MG tablet 0    Sig: Take 4 tablets (40 mg total) by mouth once daily.    Class: Normal    Route: Oral      These medications were administered today        Dose Freq    dexamethasone injection 8 mg 8 mg Clinic/HOD 1 time    Sig: Inject 2 mLs (8 mg total) into the muscle one time.    Class: Normal    Route: Intramuscular           Verify that the below list of medications is an accurate representation of the medications you are currently taking.  If none reported, the list may be blank. If incorrect, please contact your healthcare provider. Carry this list with you in case of emergency.           Current Medications     clotrimazole (LOTRIMIN) 1 % cream Apply topically 2 (two) times daily.    escitalopram oxalate (LEXAPRO) 10 MG tablet Take 1 tablet (10 mg total) by mouth once daily.    hydrochlorothiazide (HYDRODIURIL) 25 MG tablet Take 1 tablet (25 mg total) by mouth once daily.    hydrocodone-acetaminophen 7.5-325mg (NORCO) 7.5-325 mg per tablet Take 1 tablet by mouth every 24 hours as needed for Pain.    hydroxychloroquine (PLAQUENIL) 200 mg tablet Take by mouth once daily.     hydrOXYzine HCl (ATARAX) 25 MG tablet TAKE 1 TABLET (25 MG TOTAL) BY MOUTH 3 (THREE) TIMES DAILY AS NEEDED FOR ITCHING.    ketoconazole (NIZORAL) 2 % shampoo Apply topically once daily.    levocetirizine (XYZAL) 5 MG tablet Take 1 tablet (5 mg total) by mouth every evening.    losartan (COZAAR) 100 MG tablet TAKE 1 TABLET DAILY    methotrexate 2.5 MG Tab     pantoprazole (PROTONIX) 40 MG tablet Take 1 tablet (40 mg total) by mouth once daily.    polyethylene glycol (COLYTE) 240-22.72-6.72 -5.84 gram SolR Take 1,000 mLs (1 L total) by mouth as directed.    tizanidine (ZANAFLEX) 4 MG tablet TAKE 1/2 TO 1 TABLET BY MOUTH EVERY 8 HOURS    trazodone (DESYREL) 100 MG tablet Take 1 tablet (100 mg total) by mouth every evening.    mometasone 0.1% (ELOCON) 0.1 % cream Apply topically once daily.     "predniSONE (DELTASONE) 10 MG tablet Take 4 tablets (40 mg total) by mouth once daily.    triamcinolone acetonide 0.1% (KENALOG) 0.1 % ointment Apply topically 2 (two) times daily.           Clinical Reference Information           Your Vitals Were     BP Pulse Temp Resp Height Weight    138/88 (BP Location: Left arm, Patient Position: Sitting, BP Method: Manual) 74 98.1 °F (36.7 °C) (Oral) 20 5' 4" (1.626 m) 96.8 kg (213 lb 6.5 oz)    SpO2 BMI             98% 36.63 kg/m2         Blood Pressure          Most Recent Value    BP  138/88      Allergies as of 4/10/2017     Latex, Natural Rubber    Codeine    Cortisporin [Neomycin-bacitracin-poly-hc]      Immunizations Administered on Date of Encounter - 4/10/2017     None      [facility] Sign-Up     Activating your [facility] account is as easy as 1-2-3!     1) Visit [email or web address], select Sign Up Now, enter this activation code and your date of birth, then select Next.  [email or web address]  Expires: 5/18/2017  3:01 PM      2) Create a username and password to use when you visit [facility] in the future and select a security question in case you lose your password and select Next.    3) Enter your e-mail address and click Sign Up!    Additional Information  If you have questions, please e-mail [email or web address] or call [phone] to talk to our [facility] staff. Remember, [facility] is NOT to be used for urgent needs. For medical emergencies, dial 911.         Language Assistance Services     ATTENTION: Language assistance services are available, free of charge. Please call [phone].      ATENCIÓN: Si aprilla vicky, tiene a vallejo disposición servicios gratuitos de asistencia lingüística. Llame al [phone].     CHÚ Ý: N?u b?n nói Ti?ng Vi?t, có các d?ch v? h? tr? ngôn ng? mi?n phí dành cho b?n. G?i s? [phone].         [facility] complies with applicable Federal civil rights laws and does not discriminate on the basis of race, color, " national origin, age, disability, or sex.

## 2017-04-10 NOTE — PROGRESS NOTES
"Routine Office Visit    Patient Name: Silvia Capellan    : 1945  MRN: [id number]    Subjective:  Silvia is a 71 y.o. female who presents today for:    1.  Lesions - Itchy patches on legs and arms - "it is getting really bad."  She cleaned with peroxide yesterday.  She came into the doctor last week, and was given antifungal cream and shampoo. She states that her symptoms have gotten worse and that "the burning and itching is really bad."  She has RA and is on MTX and plaquenil.  She has not tried any steroid creams. She is not on any anti-histamines.  She hasn't had this happen to her before.  She doesn't have history of chronic skin conditions.  She denies f/c/n/v/d.     Past Medical History  Past Medical History:   Diagnosis Date    GERD (gastroesophageal reflux disease)     HTN (hypertension)     Rheumatoid arthritis     Rheumatoid arthritis        Past Surgical History  Past Surgical History:   Procedure Laterality Date    APPENDECTOMY      HYSTERECTOMY      SKIN BIOPSY      TOTAL KNEE ARTHROPLASTY      right       Family History  Family History   Problem Relation Age of Onset    Hypertension      Kidney disease         Social History  Social History     Social History    Marital status:      Spouse name: N/A    Number of children: N/A    Years of education: N/A     Occupational History    Not on file.     Social History Main Topics    Smoking status: Former Smoker    Smokeless tobacco: Not on file    Alcohol use Yes      Comment: Occasionally    Drug use: No    Sexual activity: Not Currently     Other Topics Concern    Not on file     Social History Narrative       Current Medications  Current Outpatient Prescriptions on File Prior to Visit   Medication Sig Dispense Refill    clotrimazole (LOTRIMIN) 1 % cream Apply topically 2 (two) times daily. 15 g 1    escitalopram oxalate (LEXAPRO) 10 MG tablet Take 1 tablet (10 mg total) by mouth once daily. 90 tablet 1    " "hydrochlorothiazide (HYDRODIURIL) 25 MG tablet Take 1 tablet (25 mg total) by mouth once daily. 5 tablet 0    hydrocodone-acetaminophen 7.5-325mg (NORCO) 7.5-325 mg per tablet Take 1 tablet by mouth every 24 hours as needed for Pain. 30 tablet 0    hydroxychloroquine (PLAQUENIL) 200 mg tablet Take by mouth once daily.       hydrOXYzine HCl (ATARAX) 25 MG tablet TAKE 1 TABLET (25 MG TOTAL) BY MOUTH 3 (THREE) TIMES DAILY AS NEEDED FOR ITCHING. 30 tablet 1    ketoconazole (NIZORAL) 2 % shampoo Apply topically once daily. 120 mL 1    levocetirizine (XYZAL) 5 MG tablet Take 1 tablet (5 mg total) by mouth every evening. 30 tablet 1    losartan (COZAAR) 100 MG tablet TAKE 1 TABLET DAILY 90 tablet 0    methotrexate 2.5 MG Tab       pantoprazole (PROTONIX) 40 MG tablet Take 1 tablet (40 mg total) by mouth once daily. 90 tablet 1    polyethylene glycol (COLYTE) 240-22.72-6.72 -5.84 gram SolR Take 1,000 mLs (1 L total) by mouth as directed. 1 Bottle 0    tizanidine (ZANAFLEX) 4 MG tablet TAKE 1/2 TO 1 TABLET BY MOUTH EVERY 8 HOURS 90 tablet 1    trazodone (DESYREL) 100 MG tablet Take 1 tablet (100 mg total) by mouth every evening. 30 tablet 11    triamcinolone acetonide 0.1% (KENALOG) 0.1 % ointment Apply topically 2 (two) times daily. 60 g 0     No current facility-administered medications on file prior to visit.        Allergies   Review of patient's allergies indicates:   Allergen Reactions    Latex, natural rubber     Codeine     Cortisporin [neomycin-bacitracin-poly-hc]        Review of Systems (Pertinent positives)  Constititutional: Weight loss, excess fatigue, chills, fever, night sweats, weakness, loss of appetite  Skin: Rash, itching, lesions, color changes    /88 (BP Location: Left arm, Patient Position: Sitting, BP Method: Manual)  Pulse 74  Temp 98.1 °F (36.7 °C) (Oral)   Resp 20  Ht 5' 4" (1.626 m)  Wt 96.8 kg (213 lb 6.5 oz)  SpO2 98%  BMI 36.63 kg/m2    GENERAL APPEARANCE: in no " apparent distress and well developed and well nourished  RESPIRATORY: appears well, vitals normal, no respiratory distress, acyanotic, normal RR, chest clear, no wheezing, crepitations, rhonchi, normal symmetric air entry  HEART: regular rate and rhythm, S1, S2 normal, no murmur, click, rub or gallop.    NEUROLOGIC: normal without focal findings, CN II-XII are intact.    Extremities: warm/well perfused.  No abnormal hair patterns.  No clubbing, cyanosis or edema.    SKIN: +very dry, tight skin in lower extremities b/l.  +multiple hyperpigmented, dry, scaly patches 1-1.5in diameter in size on lower extremities, R arm, and R scapular region.  +evidence of excoriation.    PSYCH: Alert, oriented x 3, thought content appropriate, speech normal, pleasant and cooperative, good eye contact, well groomed, recall good, concentration/judgement good and apparently average intelligence.    Assessment/Plan:  Silvia Capellan is a 71 y.o. female who presents today for :    Silvia was seen today for rash.    Diagnoses and all orders for this visit:    Dermatitis  -     mometasone 0.1% (ELOCON) 0.1 % cream; Apply topically once daily.  -     predniSONE (DELTASONE) 10 MG tablet; Take 4 tablets (40 mg total) by mouth once daily.  -     dexamethasone injection 8 mg; Inject 2 mLs (8 mg total) into the muscle one time.  -     Higher risk due to RA.  Went over side effects of steroid injection and tablet, patient states that if they help she would like to try them.    -     If not improved will have her see dermatologist    F/u 2 weeks - dermatitis

## 2017-04-20 ENCOUNTER — OFFICE VISIT (OUTPATIENT)
Dept: FAMILY MEDICINE | Facility: CLINIC | Age: 72
End: 2017-04-20
Payer: MEDICARE

## 2017-04-20 VITALS
RESPIRATION RATE: 20 BRPM | BODY MASS INDEX: 36.89 KG/M2 | HEIGHT: 64 IN | OXYGEN SATURATION: 97 % | TEMPERATURE: 98 F | HEART RATE: 75 BPM | WEIGHT: 216.06 LBS | DIASTOLIC BLOOD PRESSURE: 84 MMHG | SYSTOLIC BLOOD PRESSURE: 122 MMHG

## 2017-04-20 DIAGNOSIS — J41.0 SIMPLE CHRONIC BRONCHITIS: Primary | ICD-10-CM

## 2017-04-20 DIAGNOSIS — M62.838 NECK MUSCLE SPASM: ICD-10-CM

## 2017-04-20 PROCEDURE — 3074F SYST BP LT 130 MM HG: CPT | Mod: S$GLB,,, | Performed by: FAMILY MEDICINE

## 2017-04-20 PROCEDURE — 99214 OFFICE O/P EST MOD 30 MIN: CPT | Mod: S$GLB,,, | Performed by: FAMILY MEDICINE

## 2017-04-20 PROCEDURE — 99999 PR PBB SHADOW E&M-EST. PATIENT-LVL III: CPT | Mod: PBBFAC,,, | Performed by: FAMILY MEDICINE

## 2017-04-20 PROCEDURE — 1160F RVW MEDS BY RX/DR IN RCRD: CPT | Mod: S$GLB,,, | Performed by: FAMILY MEDICINE

## 2017-04-20 PROCEDURE — 1125F AMNT PAIN NOTED PAIN PRSNT: CPT | Mod: S$GLB,,, | Performed by: FAMILY MEDICINE

## 2017-04-20 PROCEDURE — 99499 UNLISTED E&M SERVICE: CPT | Mod: S$PBB,,, | Performed by: FAMILY MEDICINE

## 2017-04-20 PROCEDURE — 1159F MED LIST DOCD IN RCRD: CPT | Mod: S$GLB,,, | Performed by: FAMILY MEDICINE

## 2017-04-20 PROCEDURE — 3079F DIAST BP 80-89 MM HG: CPT | Mod: S$GLB,,, | Performed by: FAMILY MEDICINE

## 2017-04-20 RX ORDER — AZITHROMYCIN 250 MG/1
TABLET, FILM COATED ORAL
Qty: 6 TABLET | Refills: 0 | Status: SHIPPED | OUTPATIENT
Start: 2017-04-20 | End: 2017-04-25

## 2017-04-20 RX ORDER — CYCLOBENZAPRINE HCL 5 MG
5 TABLET ORAL NIGHTLY
Qty: 10 TABLET | Refills: 0 | Status: SHIPPED | OUTPATIENT
Start: 2017-04-20 | End: 2017-04-30

## 2017-04-20 NOTE — PROGRESS NOTES
Routine Office Visit    Patient Name: Silvia Capellan    : 1945  MRN: [id number]    Subjective:  Silvia is a 71 y.o. female who presents today for:    1. Headache and neck pain  Patient presenting today with headache and neck pain for over 7 days.  She states that the headache is more pressure in around her eyes.  She suffers with chronic allergies, but does not take anything for fear that it will increase her blood pressure.  She has not had any numbness or tingling. There has been no slurred speech or weakness.  She has also been having neck pain and shoulder pain.  No fevers, chills, or neck stiffness.  She has been taking NSAIDS and norco for the pain and no improvement.     Past Medical History  Past Medical History:   Diagnosis Date    GERD (gastroesophageal reflux disease)     HTN (hypertension)     Rheumatoid arthritis     Rheumatoid arthritis        Past Surgical History  Past Surgical History:   Procedure Laterality Date    APPENDECTOMY      HYSTERECTOMY      SKIN BIOPSY      TOTAL KNEE ARTHROPLASTY      right       Family History  Family History   Problem Relation Age of Onset    Hypertension      Kidney disease         Social History  Social History     Social History    Marital status:      Spouse name: N/A    Number of children: N/A    Years of education: N/A     Occupational History    Not on file.     Social History Main Topics    Smoking status: Former Smoker    Smokeless tobacco: Not on file    Alcohol use Yes      Comment: Occasionally    Drug use: No    Sexual activity: Not Currently     Other Topics Concern    Not on file     Social History Narrative       Current Medications  Current Outpatient Prescriptions on File Prior to Visit   Medication Sig Dispense Refill    clotrimazole (LOTRIMIN) 1 % cream Apply topically 2 (two) times daily. 15 g 1    escitalopram oxalate (LEXAPRO) 10 MG tablet Take 1 tablet (10 mg total) by mouth once daily. 90 tablet 1     hydrochlorothiazide (HYDRODIURIL) 25 MG tablet Take 1 tablet (25 mg total) by mouth once daily. 5 tablet 0    hydrocodone-acetaminophen 7.5-325mg (NORCO) 7.5-325 mg per tablet Take 1 tablet by mouth every 24 hours as needed for Pain. 30 tablet 0    hydroxychloroquine (PLAQUENIL) 200 mg tablet Take by mouth once daily.       hydrOXYzine HCl (ATARAX) 25 MG tablet TAKE 1 TABLET (25 MG TOTAL) BY MOUTH 3 (THREE) TIMES DAILY AS NEEDED FOR ITCHING. 30 tablet 1    ketoconazole (NIZORAL) 2 % shampoo Apply topically once daily. 120 mL 1    levocetirizine (XYZAL) 5 MG tablet Take 1 tablet (5 mg total) by mouth every evening. 30 tablet 1    losartan (COZAAR) 100 MG tablet TAKE 1 TABLET DAILY 90 tablet 0    methotrexate 2.5 MG Tab       pantoprazole (PROTONIX) 40 MG tablet Take 1 tablet (40 mg total) by mouth once daily. 90 tablet 1    trazodone (DESYREL) 100 MG tablet Take 1 tablet (100 mg total) by mouth every evening. 30 tablet 11    [DISCONTINUED] tizanidine (ZANAFLEX) 4 MG tablet TAKE 1/2 TO 1 TABLET BY MOUTH EVERY 8 HOURS 90 tablet 1    triamcinolone acetonide 0.1% (KENALOG) 0.1 % ointment Apply topically 2 (two) times daily. 60 g 0    [DISCONTINUED] mometasone 0.1% (ELOCON) 0.1 % cream Apply topically once daily. 50 g 0    [DISCONTINUED] polyethylene glycol (COLYTE) 240-22.72-6.72 -5.84 gram SolR Take 1,000 mLs (1 L total) by mouth as directed. 1 Bottle 0     No current facility-administered medications on file prior to visit.        Allergies   Review of patient's allergies indicates:   Allergen Reactions    Latex, natural rubber     Codeine     Cortisporin [neomycin-bacitracin-poly-hc]        Review of Systems (Pertinent positives)  Review of Systems   Constitutional: Negative.    Eyes: Negative.    Respiratory: Negative.    Cardiovascular: Negative.    Gastrointestinal: Negative.    Genitourinary: Negative.    Musculoskeletal: Positive for myalgias and neck pain.   Skin: Negative.    Neurological:  "Positive for headaches.         /84 (BP Location: Right arm, Patient Position: Sitting, BP Method: Manual)  Pulse 75  Temp 98.3 °F (36.8 °C) (Oral)   Resp 20  Ht 5' 4" (1.626 m)  Wt 98 kg (216 lb 0.8 oz)  SpO2 97%  BMI 37.09 kg/m2    GENERAL APPEARANCE: in no apparent distress and well developed and well nourished  HEENT: PERRL, EOMI, Sclera clear, anicteric, Oropharynx clear, no lesions, Neck supple with midline trachea; pain on palpation of frontal sinues  NECK: normal, supple, no adenopathy, thyroid normal in size  RESPIRATORY: appears well, vitals normal, no respiratory distress, acyanotic, normal RR, chest clear, no wheezing, crepitations, rhonchi, normal symmetric air entry  HEART: regular rate and rhythm, S1, S2 normal, no murmur, click, rub or gallop.    ABDOMEN: abdomen is soft without tenderness, no masses, no hernias, no organomegaly, no rebound, no guarding. Suprapubic tenderness absent. No CVA tenderness.  MS:  Pain no palpation of posterior neck and bilateral trapezius muscles  SKIN: no rashes, no wounds, no other lesions  PSYCH: Alert, oriented x 3, thought content appropriate, speech normal, pleasant and cooperative, good eye contact, well groomed    Assessment/Plan:  Silvia Capellan is a 71 y.o. female who presents today for :    Silvia was seen today for headache.    Diagnoses and all orders for this visit:    Simple chronic bronchitis  -     azithromycin (Z-NAVIN) 250 MG tablet; Take 2 tablets by mouth on day 1; Take 1 tablet by mouth on days 2-5    Neck muscle spasm  -     cyclobenzaprine (FLEXERIL) 5 MG tablet; Take 1 tablet (5 mg total) by mouth nightly.      1.  Zpak sent to pharmacy for bronchitis/sinusitis  2.  Flexeril for neck spasms  3.  Follow up as needed  4.  Flexeril only for night use as she has been having trouble sleeping      Eugenio Hawthorne MD    "

## 2017-04-20 NOTE — MR AVS SNAPSHOT
[facility]  [address]  [place] [address]  Phone: [phone]                  Silvia Capellan   2017 11:15 AM   Office Visit    Description:  Female : 1945   Provider:  Eugenio Hawthorne MD   Department:  [facility]           Reason for Visit     Headache           Diagnoses this Visit        Comments    Simple chronic bronchitis    -  Primary     Neck muscle spasm                To Do List           Future Appointments        Provider Department Dept Phone    2017 11:00 AM Irma Cho MD [facility] - Rheumatology [phone]      Goals (5 Years of Data)     None      Follow-Up and Disposition     Return if symptoms worsen or fail to improve.       These Medications        Disp Refills Start End    azithromycin (Z-NAVIN) 250 MG tablet 6 tablet 0 2017    Take 2 tablets by mouth on day 1; Take 1 tablet by mouth on days 2-5    Pharmacy: [facility]/pharmacy #[id number] - [place] - [address] Ph #: [phone]       cyclobenzaprine (FLEXERIL) 5 MG tablet 10 tablet 0 2017    Take 1 tablet (5 mg total) by mouth nightly. - Oral    Pharmacy: [facility] #[id number] - [place] - [address] Ph #: [phone]         [facility]     [facility] - 24/ Assistance  Unless otherwise directed by your provider, please contact [facility], our nurse care line that is available for 24/ assistance.     Registered nurses in the [facility] provide: appointment scheduling, clinical advisement, health education, and other advisory services.  Call: [phone] (toll free)               Medications           Message regarding Medications     Verify the changes and/or additions to your medication regime listed below are the same as discussed with your clinician today.  If any of these changes or additions are incorrect, please notify your healthcare provider.        START taking  these NEW medications        Refills    azithromycin (Z-NAVIN) 250 MG tablet 0    Sig: Take 2 tablets by mouth on day 1; Take 1 tablet by mouth on days 2-5    Class: Normal    cyclobenzaprine (FLEXERIL) 5 MG tablet 0    Sig: Take 1 tablet (5 mg total) by mouth nightly.    Class: Normal    Route: Oral      STOP taking these medications     polyethylene glycol (COLYTE) 240-22.72-6.72 -5.84 gram SolR Take 1,000 mLs (1 L total) by mouth as directed.    mometasone 0.1% (ELOCON) 0.1 % cream Apply topically once daily.    tizanidine (ZANAFLEX) 4 MG tablet TAKE 1/2 TO 1 TABLET BY MOUTH EVERY 8 HOURS           Verify that the below list of medications is an accurate representation of the medications you are currently taking.  If none reported, the list may be blank. If incorrect, please contact your healthcare provider. Carry this list with you in case of emergency.           Current Medications     clotrimazole (LOTRIMIN) 1 % cream Apply topically 2 (two) times daily.    escitalopram oxalate (LEXAPRO) 10 MG tablet Take 1 tablet (10 mg total) by mouth once daily.    hydrochlorothiazide (HYDRODIURIL) 25 MG tablet Take 1 tablet (25 mg total) by mouth once daily.    hydrocodone-acetaminophen 7.5-325mg (NORCO) 7.5-325 mg per tablet Take 1 tablet by mouth every 24 hours as needed for Pain.    hydroxychloroquine (PLAQUENIL) 200 mg tablet Take by mouth once daily.     hydrOXYzine HCl (ATARAX) 25 MG tablet TAKE 1 TABLET (25 MG TOTAL) BY MOUTH 3 (THREE) TIMES DAILY AS NEEDED FOR ITCHING.    ketoconazole (NIZORAL) 2 % shampoo Apply topically once daily.    levocetirizine (XYZAL) 5 MG tablet Take 1 tablet (5 mg total) by mouth every evening.    losartan (COZAAR) 100 MG tablet TAKE 1 TABLET DAILY    methotrexate 2.5 MG Tab     pantoprazole (PROTONIX) 40 MG tablet Take 1 tablet (40 mg total) by mouth once daily.    trazodone (DESYREL) 100 MG tablet Take 1 tablet (100 mg total) by mouth every evening.    azithromycin (Z-NAVIN) 250 MG tablet  "Take 2 tablets by mouth on day 1; Take 1 tablet by mouth on days 2-5    cyclobenzaprine (FLEXERIL) 5 MG tablet Take 1 tablet (5 mg total) by mouth nightly.    triamcinolone acetonide 0.1% (KENALOG) 0.1 % ointment Apply topically 2 (two) times daily.           Clinical Reference Information           Your Vitals Were     BP Pulse Temp Resp Height Weight    122/84 (BP Location: Right arm, Patient Position: Sitting, BP Method: Manual) 75 98.3 °F (36.8 °C) (Oral) 20 5' 4" (1.626 m) 98 kg (216 lb 0.8 oz)    SpO2 BMI             97% 37.09 kg/m2         Blood Pressure          Most Recent Value    BP  122/84      Allergies as of 4/20/2017     Latex, Natural Rubber    Codeine    Cortisporin [Neomycin-bacitracin-poly-hc]      Immunizations Administered on Date of Encounter - 4/20/2017     None      [facility] Sign-Up     Activating your [facility] account is as easy as 1-2-3!     1) Visit [email or web address], select Sign Up Now, enter this activation code and your date of birth, then select Next.  [email or web address]  Expires: 5/18/2017  3:01 PM      2) Create a username and password to use when you visit [facility] in the future and select a security question in case you lose your password and select Next.    3) Enter your e-mail address and click Sign Up!    Additional Information  If you have questions, please e-mail [email or web address] or call [phone] to talk to our [facility] staff. Remember, [facility] is NOT to be used for urgent needs. For medical emergencies, dial 911.         Language Assistance Services     ATTENTION: Language assistance services are available, free of charge. Please call [phone].      ATENCIÓN: Si habla español, tiene a vallejo disposición servicios gratuitos de asistencia lingüística. Llame al [phone].     CHÚ Ý: N?u b?n nói Ti?ng Vi?t, có các d?ch v? h? tr? ngôn ng? mi?n phí dành cho b?n. G?i s? [phone].         [facility] complies with applicable Federal civil " rights laws and does not discriminate on the basis of race, color, national origin, age, disability, or sex.

## 2017-05-04 DIAGNOSIS — M06.9 RHEUMATOID ARTHRITIS OF HAND, UNSPECIFIED LATERALITY, UNSPECIFIED RHEUMATOID FACTOR PRESENCE: ICD-10-CM

## 2017-05-04 DIAGNOSIS — M79.89 LEG SWELLING: ICD-10-CM

## 2017-05-04 DIAGNOSIS — M54.50 CHRONIC BILATERAL LOW BACK PAIN WITHOUT SCIATICA: ICD-10-CM

## 2017-05-04 DIAGNOSIS — M25.552 LEFT HIP PAIN: ICD-10-CM

## 2017-05-04 DIAGNOSIS — G89.29 CHRONIC BILATERAL LOW BACK PAIN WITHOUT SCIATICA: ICD-10-CM

## 2017-05-04 RX ORDER — HYDROCODONE BITARTRATE AND ACETAMINOPHEN 7.5; 325 MG/1; MG/1
1 TABLET ORAL
Qty: 30 TABLET | Refills: 0 | Status: SHIPPED | OUTPATIENT
Start: 2017-05-04 | End: 2017-08-01

## 2017-05-04 RX ORDER — HYDROCHLOROTHIAZIDE 25 MG/1
25 TABLET ORAL DAILY
Qty: 90 TABLET | Refills: 0 | Status: SHIPPED | OUTPATIENT
Start: 2017-05-04 | End: 2017-06-09 | Stop reason: SDUPTHER

## 2017-05-04 NOTE — TELEPHONE ENCOUNTER
LOV for medication refill 3-3-17    Xyzal last filled 4-3-2017 with 1 additional refill   Trazadone filled 4-3-17 with 11 additional refills       No answer at [phone]

## 2017-05-04 NOTE — TELEPHONE ENCOUNTER
----- Message from Rafaela Yuen sent at 5/4/2017  8:52 AM CDT -----  Contact: self  Pt needs the following refilled levocetirizine (XYZAL) 5 MG tablet, hydrocodone-acetaminophen 7.5-325mg (NORCO) 7.5-325 mg per tablet, hydrochlorothiazide (HYDRODIURIL) 25 MG tablet, trazodone (DESYREL) 100 MG tablet. Please call [phone].thanks

## 2017-05-26 RX ORDER — LOSARTAN POTASSIUM 100 MG/1
100 TABLET ORAL DAILY
Qty: 90 TABLET | Refills: 1 | Status: SHIPPED | OUTPATIENT
Start: 2017-05-26 | End: 2018-01-24 | Stop reason: SDUPTHER

## 2017-06-01 DIAGNOSIS — L50.9 URTICARIA: ICD-10-CM

## 2017-06-01 RX ORDER — LEVOCETIRIZINE DIHYDROCHLORIDE 5 MG/1
5 TABLET, FILM COATED ORAL NIGHTLY
Qty: 30 TABLET | Refills: 5 | Status: SHIPPED | OUTPATIENT
Start: 2017-06-01 | End: 2017-12-20 | Stop reason: SDUPTHER

## 2017-06-08 ENCOUNTER — HOSPITAL ENCOUNTER (EMERGENCY)
Facility: OTHER | Age: 72
Discharge: HOME | End: 2017-06-08
Attending: EMERGENCY MEDICINE
Payer: MEDICARE

## 2017-06-08 VITALS
TEMPERATURE: 98 F | OXYGEN SATURATION: 97 % | DIASTOLIC BLOOD PRESSURE: 84 MMHG | BODY MASS INDEX: 34.53 KG/M2 | HEIGHT: 67 IN | WEIGHT: 220 LBS | RESPIRATION RATE: 18 BRPM | SYSTOLIC BLOOD PRESSURE: 145 MMHG | HEART RATE: 85 BPM

## 2017-06-08 DIAGNOSIS — I10 HYPERTENSION: ICD-10-CM

## 2017-06-08 DIAGNOSIS — S01.81XA FACIAL LACERATION, INITIAL ENCOUNTER: ICD-10-CM
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
## 2017-06-08 DIAGNOSIS — S02.30XA ORBITAL FLOOR (BLOW-OUT) CLOSED FRACTURE: ICD-10-CM

## 2017-06-08 DIAGNOSIS — J90 PLEURAL EFFUSION ON LEFT: ICD-10-CM

## 2017-06-08 PROCEDURE — 12011 RPR F/E/E/N/L/M 2.5 CM/<: CPT

## 2017-06-08 PROCEDURE — 25000003 PHARM REV CODE 250: Performed by: EMERGENCY MEDICINE

## 2017-06-08 PROCEDURE — 25000003 PHARM REV CODE 250

## 2017-06-08 PROCEDURE — 99284 EMERGENCY DEPT VISIT MOD MDM: CPT | Mod: 25

## 2017-06-08 RX ORDER — METHYLPREDNISOLONE 4 MG/1
TABLET ORAL
Qty: 1 PACKAGE | Refills: 0 | Status: SHIPPED | OUTPATIENT
Start: 2017-06-08 | End: 2017-06-20 | Stop reason: ALTCHOICE

## 2017-06-08 RX ORDER — LIDOCAINE HYDROCHLORIDE 10 MG/ML
INJECTION INFILTRATION; PERINEURAL
Status: COMPLETED
Start: 2017-06-08 | End: 2017-06-08

## 2017-06-08 RX ORDER — OXYCODONE AND ACETAMINOPHEN 5; 325 MG/1; MG/1
1 TABLET ORAL
Status: COMPLETED | OUTPATIENT
Start: 2017-06-08 | End: 2017-06-08

## 2017-06-08 RX ORDER — HYDROCODONE BITARTRATE AND ACETAMINOPHEN 5; 325 MG/1; MG/1
1 TABLET ORAL
Status: COMPLETED | OUTPATIENT
Start: 2017-06-08 | End: 2017-06-08

## 2017-06-08 RX ORDER — DOCUSATE SODIUM 100 MG/1
100 CAPSULE, LIQUID FILLED ORAL 3 TIMES DAILY PRN
Qty: 30 CAPSULE | Refills: 0 | Status: ON HOLD | OUTPATIENT
Start: 2017-06-08 | End: 2017-11-14 | Stop reason: CLARIF

## 2017-06-08 RX ORDER — LIDOCAINE HYDROCHLORIDE 10 MG/ML
5 INJECTION INFILTRATION; PERINEURAL
Status: COMPLETED | OUTPATIENT
Start: 2017-06-08 | End: 2017-06-08

## 2017-06-08 RX ORDER — HYDROCODONE BITARTRATE AND ACETAMINOPHEN 5; 325 MG/1; MG/1
1 TABLET ORAL EVERY 6 HOURS PRN
Qty: 15 TABLET | Refills: 0 | Status: SHIPPED | OUTPATIENT
Start: 2017-06-08 | End: 2017-06-20 | Stop reason: SDUPTHER

## 2017-06-08 RX ORDER — ONDANSETRON 4 MG/1
4 TABLET, ORALLY DISINTEGRATING ORAL
Status: COMPLETED | OUTPATIENT
Start: 2017-06-08 | End: 2017-06-08

## 2017-06-08 RX ORDER — OXYMETAZOLINE HCL 0.05 %
1 SPRAY, NON-AEROSOL (ML) NASAL 2 TIMES DAILY
Qty: 15 ML | Refills: 0 | Status: SHIPPED | OUTPATIENT
Start: 2017-06-08 | End: 2017-06-11

## 2017-06-08 RX ORDER — PROPARACAINE HYDROCHLORIDE 5 MG/ML
SOLUTION/ DROPS OPHTHALMIC
Status: COMPLETED
Start: 2017-06-08 | End: 2017-06-08

## 2017-06-08 RX ORDER — AMOXICILLIN AND CLAVULANATE POTASSIUM 875; 125 MG/1; MG/1
1 TABLET, FILM COATED ORAL 2 TIMES DAILY
Qty: 14 TABLET | Refills: 0 | Status: SHIPPED | OUTPATIENT
Start: 2017-06-08 | End: 2017-06-15

## 2017-06-08 RX ADMIN — OXYCODONE AND ACETAMINOPHEN 1 TABLET: 5; 325 TABLET ORAL at 07:06

## 2017-06-08 RX ADMIN — LIDOCAINE HYDROCHLORIDE 5 ML: 10 INJECTION, SOLUTION INFILTRATION; PERINEURAL at 06:06

## 2017-06-08 RX ADMIN — PROPARACAINE HYDROCHLORIDE: 5 SOLUTION/ DROPS OPHTHALMIC at 06:06

## 2017-06-08 RX ADMIN — FLUORESCEIN SODIUM 1 STRIP: 1 STRIP OPHTHALMIC at 06:06

## 2017-06-08 RX ADMIN — ONDANSETRON 4 MG: 4 TABLET, ORALLY DISINTEGRATING ORAL at 07:06

## 2017-06-08 RX ADMIN — LIDOCAINE HYDROCHLORIDE 200 MG: 10 INJECTION, SOLUTION INFILTRATION; PERINEURAL at 06:06

## 2017-06-08 RX ADMIN — HYDROCODONE BITARTRATE AND ACETAMINOPHEN 1 TABLET: 5; 325 TABLET ORAL at 04:06

## 2017-06-09 ENCOUNTER — OFFICE VISIT (OUTPATIENT)
Dept: FAMILY MEDICINE | Facility: CLINIC | Age: 72
End: 2017-06-09
Payer: MEDICARE

## 2017-06-09 VITALS
RESPIRATION RATE: 17 BRPM | WEIGHT: 213.63 LBS | SYSTOLIC BLOOD PRESSURE: 138 MMHG | HEART RATE: 85 BPM | TEMPERATURE: 98 F | HEIGHT: 64 IN | OXYGEN SATURATION: 97 % | BODY MASS INDEX: 36.47 KG/M2 | DIASTOLIC BLOOD PRESSURE: 72 MMHG

## 2017-06-09 DIAGNOSIS — R91.8 LUNG MASS: ICD-10-CM

## 2017-06-09 DIAGNOSIS — L50.9 URTICARIA: ICD-10-CM

## 2017-06-09 DIAGNOSIS — M79.89 LEG SWELLING: ICD-10-CM

## 2017-06-09 DIAGNOSIS — J90 PLEURAL EFFUSION, LEFT: Primary | ICD-10-CM
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
## 2017-06-09 DIAGNOSIS — G47.00 INSOMNIA, UNSPECIFIED TYPE: ICD-10-CM

## 2017-06-09 PROCEDURE — 1125F AMNT PAIN NOTED PAIN PRSNT: CPT | Mod: S$GLB,,, | Performed by: INTERNAL MEDICINE

## 2017-06-09 PROCEDURE — 99214 OFFICE O/P EST MOD 30 MIN: CPT | Mod: S$GLB,,, | Performed by: INTERNAL MEDICINE

## 2017-06-09 PROCEDURE — 99999 PR PBB SHADOW E&M-EST. PATIENT-LVL III: CPT | Mod: PBBFAC,,, | Performed by: INTERNAL MEDICINE

## 2017-06-09 PROCEDURE — 1159F MED LIST DOCD IN RCRD: CPT | Mod: S$GLB,,, | Performed by: INTERNAL MEDICINE

## 2017-06-09 RX ORDER — TRAZODONE HYDROCHLORIDE 100 MG/1
100 TABLET ORAL NIGHTLY
Qty: 30 TABLET | Refills: 2 | Status: SHIPPED | OUTPATIENT
Start: 2017-06-09 | End: 2017-09-21 | Stop reason: SDUPTHER

## 2017-06-09 RX ORDER — HYDROCHLOROTHIAZIDE 25 MG/1
25 TABLET ORAL DAILY
Qty: 90 TABLET | Refills: 1 | Status: SHIPPED | OUTPATIENT
Start: 2017-06-09 | End: 2017-09-07 | Stop reason: SDUPTHER

## 2017-06-09 RX ORDER — HYDROXYZINE HYDROCHLORIDE 25 MG/1
TABLET, FILM COATED ORAL
Qty: 30 TABLET | Refills: 1 | Status: SHIPPED | OUTPATIENT
Start: 2017-06-09 | End: 2018-01-16 | Stop reason: SDUPTHER

## 2017-06-09 NOTE — ED PROVIDER NOTES
Encounter Date: 6/8/2017       History     Chief Complaint   Patient presents with    Laceration     laceration to right eye, report trip and fell at home, bleeding controlled, need a tetanus      Review of patient's allergies indicates:   Allergen Reactions    Codeine Hallucinations    Latex, natural rubber Rash     HPI  No past medical history on file.  No past surgical history on file.  No family history on file.  Social History   Substance Use Topics    Smoking status: Not on file    Smokeless tobacco: Not on file    Alcohol use Not on file     Review of Systems    Physical Exam     Initial Vitals [06/08/17 1509]   BP Pulse Resp Temp SpO2   (!) 143/86 81 18 97.5 °F (36.4 °C) 96 %     Physical Exam    Nursing note and vitals reviewed.  Constitutional: She appears well-developed and well-nourished. She is not diaphoretic. No distress.   HENT:   Head: Normocephalic.   Mouth/Throat: Oropharynx is clear and moist. No oropharyngeal exudate.   Eyes: EOM are normal. Pupils are equal, round, and reactive to light.   L eye normal. R eye w mild lateral chemosis, ~80% subconjunctival hemorrhage, NO proptosis. Positive echymosis and periorbital swelling diffusely w abrasion to R upper cheek. tonopen pressures 17-22 bilaterally. Fluorescein negative.    Neck: Normal range of motion. Neck supple.   Cardiovascular: Normal rate, regular rhythm and normal heart sounds.   No murmur heard.  Pulmonary/Chest: No stridor.   Abdominal: Soft. She exhibits no distension. There is no tenderness.   Musculoskeletal: Normal range of motion. She exhibits no edema or tenderness.   Lymphadenopathy:     She has no cervical adenopathy.   Neurological: She is oriented to person, place, and time. She has normal strength. No cranial nerve deficit or sensory deficit.   Skin: Skin is dry. Capillary refill takes less than 2 seconds.   2cm laceration to outer L lateral eyelid   Psychiatric: She has a normal mood and affect. Her behavior is normal.  Thought content normal.         ED Course   Lac Repair  Date/Time: 6/8/2017 8:07 PM  Performed by: LYNNETTE WAY  Authorized by: LYNNETTE WAY   Body area: head/neck  Location details: right eyelid  Laceration length: 2 cm  Foreign bodies: no foreign bodies  Tendon involvement: none  Nerve involvement: none  Vascular damage: no  Anesthesia: local infiltration    Anesthesia:  Anesthesia: local infiltration  Local Anesthetic: lidocaine 1% without epinephrine   Patient sedated: no  Preparation: Patient was prepped and draped in the usual sterile fashion.  Irrigation solution: saline  Irrigation method: syringe  Amount of cleaning: standard  Debridement: none  Degree of undermining: none  Skin closure: 6-0 nylon  Number of sutures: 3  Technique: simple  Approximation: close  Approximation difficulty: simple  Dressing: dressing applied  Patient tolerance: Patient tolerated the procedure well with no immediate complications        Labs Reviewed - No data to display          Medical Decision Making:   Initial Assessment:   71-year-old female presents to the emergency department complaining of right facial pain and laceration to the right face after falling and suffering blunt trauma from hitting her face against the floor prior to presentation to the emergency department.  Clinical Tests:   Radiological Study: Ordered and Reviewed  ED Management:  CT reveals orbital blow out fx. Discussed extensively with pt and daughter. Transfer center called, spoke w Dr. Judge, discussed pt clinical picture extensively, he reports pt can be followed up in clinic in the next few days. Wants po abx, afrin, medrol dosepak. All discussed extensively with pt and family. No nose blowing. Vision remains unchanged from her baseline after ~5 hrs in er. She is stable for d/c. Questions addressed. We discussed worrisome signs that should prompt need to return to the er should they occur.       Discharge temporarily held. CT cspine  reveals pleural effusion and possible aortic calcifications and radiology has recommended chest imaging for further appreciation of chest. Chest xray pending. Dr. Saleem will follow and provide final disposition.   Patient accepted by me (Dr. Saleem) from Dr. Monsivais at 1900 hrs. chest x-ray revealed moderate left-sided pleural effusion with a large 9 cm rounded opacification near the aortic knob.  The patient was given results of the chest x-ray and also the recommendation of a CT of the chest with contrast to further distinguish the identity of the opacification.  Patient states she understands the findings from the chest x-ray and the need for CT of the chest, but she is very tired and wants to go home at this time.  She requests an outpatient order for CT of the chest with contrast.  Order was written and given to the patient prior to discharge.  She states she will go in the morning to have CT of the chest performed.                   ED Course     Clinical Impression:   The primary diagnosis is facial laceration to the right face.  Secondary diagnoses are right orbital blowout fracture, left pleural effusion        Bradford Saleem MD  06/08/17 0037

## 2017-06-09 NOTE — DISCHARGE INSTRUCTIONS
Rest. Return for any new or acute problems or concerns. Call [phone] to arrange follow up with Dr. Judge or one of his partners asap.

## 2017-06-09 NOTE — PROGRESS NOTES
Subjective:       Patient ID: Silvia Capellan is a 71 y.o. female.    Chief Complaint: Fall (patient fell yesterday in kitchen about 2-3 p.m. hitting face on corner of table) and Eye Injury (right )    She presents for follow-up after an ER visit yesterday status post fall at home.  She believes she may attempt over a box.  She does have a fracture of the bones around her right thigh.  She was given the name of an eye doctor to see in the emergency room.  Otherwise she did have a chest x-ray and CT scans emergency room which showed fluid in her left lungs and possible mass.  She is scheduled for a CT of the chest in 3 days.  Otherwise she's felt very anxious since her fall.      Review of Systems   Constitutional: Negative for fever.   Eyes: Positive for pain.   Respiratory: Positive for shortness of breath.    Cardiovascular: Negative for chest pain.   Musculoskeletal: Positive for arthralgias.   Psychiatric/Behavioral: The patient is nervous/anxious.        Objective:      Physical Exam   Constitutional: She is oriented to person, place, and time. She appears well-developed and well-nourished. No distress.   HENT:   Head: Normocephalic and atraumatic.   Right Ear: Tympanic membrane, external ear and ear canal normal.   Left Ear: Tympanic membrane, external ear and ear canal normal.   Mouth/Throat: Oropharynx is clear and moist. No oropharyngeal exudate.   Eyes: Conjunctivae and EOM are normal. Pupils are equal, round, and reactive to light. No scleral icterus.   Neck: Neck supple.   Cardiovascular: Normal rate, regular rhythm and normal heart sounds.  Exam reveals no gallop and no friction rub.    No murmur heard.  Pulmonary/Chest: Effort normal and breath sounds normal. No stridor. No respiratory distress. She has no wheezes. She has no rales.   Lymphadenopathy:     She has no cervical adenopathy.   Neurological: She is alert and oriented to person, place, and time.   Skin: Skin is warm and dry. No rash  noted.   Psychiatric: She has a normal mood and affect.   Vitals reviewed.      Assessment:       1. Pleural effusion, left    2. Leg swelling    3. Urticaria    4. Insomnia, unspecified type    5. Fall, subsequent encounter    6. Orbital fracture, with routine healing, subsequent encounter    7. Lung mass        Plan:       Silvia was seen today for fall and eye injury.    Diagnoses and all orders for this visit:    Pleural effusion, left - noted on chest x-ray and CT scan during evaluation for fall in the emergency room.  She is scheduled for CT chest in 3 days.  We'll follow-up    Leg swelling  -     hydrochlorothiazide (HYDRODIURIL) 25 MG tablet; Take 1 tablet (25 mg total) by mouth once daily.    Urticaria  -     hydrOXYzine HCl (ATARAX) 25 MG tablet; TAKE 1 TABLET (25 MG TOTAL) BY MOUTH 3 (THREE) TIMES DAILY AS NEEDED FOR ITCHING.    Insomnia, unspecified type  -     trazodone (DESYREL) 100 MG tablet; Take 1 tablet (100 mg total) by mouth every evening.    Fall, subsequent encounter - no syncopal episodes or loss of consciousness    Orbital fracture, with routine healing, subsequent encounter - she will be following up with ophthalmology.  I did review her CT scanned    Lung mass - noted on chest x-ray during evaluation for fall in the emergency room.  She is scheduled for CT chest in 3 days.  We'll follow-up.  She is a former smoker.  She has had a recent mammogram which was normal.       follow-up after imaging is reviewed.

## 2017-06-12 ENCOUNTER — HOSPITAL ENCOUNTER (OUTPATIENT)
Dept: RADIOLOGY | Facility: HOSPITAL | Age: 72
Discharge: HOME | End: 2017-06-12
Attending: INTERNAL MEDICINE
Payer: MEDICARE

## 2017-06-12 ENCOUNTER — TELEPHONE (OUTPATIENT)
Dept: FAMILY MEDICINE | Facility: CLINIC | Age: 72
End: 2017-06-12

## 2017-06-12 DIAGNOSIS — J90 PLEURAL EFFUSION: Primary | ICD-10-CM

## 2017-06-12 DIAGNOSIS — J90 PLEURAL EFFUSION, LEFT: ICD-10-CM

## 2017-06-12 DIAGNOSIS — R91.1 LUNG NODULE: ICD-10-CM

## 2017-06-12 PROCEDURE — 71260 CT THORAX DX C+: CPT | Mod: 26,,, | Performed by: RADIOLOGY

## 2017-06-12 PROCEDURE — 25500020 PHARM REV CODE 255: Performed by: INTERNAL MEDICINE

## 2017-06-12 PROCEDURE — 71260 CT THORAX DX C+: CPT | Mod: TC

## 2017-06-12 RX ADMIN — IOHEXOL 75 ML: 350 INJECTION, SOLUTION INTRAVENOUS at 10:06

## 2017-06-12 NOTE — TELEPHONE ENCOUNTER
Spoke with the patient concerning her CT scan results and concern for malignancy.  She voiced understanding.  Placing referral to pulmonary for further work of effusion, lung nodule and possible metastatic liver lesion.

## 2017-06-12 NOTE — TELEPHONE ENCOUNTER
----- Message from Jaye Rosas sent at 6/12/2017 12:58 PM CDT -----  Contact: Self  Pt calling regarding her not receiving her fluid pills. Please call [phone]

## 2017-06-14 ENCOUNTER — TELEPHONE (OUTPATIENT)
Dept: FAMILY MEDICINE | Facility: CLINIC | Age: 72
End: 2017-06-14

## 2017-06-14 NOTE — TELEPHONE ENCOUNTER
The patient has been scheduled at [facility] with Dr. Das office on 6/16/17, patient has been aware of appt date and time

## 2017-06-16 ENCOUNTER — TELEPHONE (OUTPATIENT)
Dept: OPHTHALMOLOGY | Facility: CLINIC | Age: 72
End: 2017-06-16

## 2017-06-19 ENCOUNTER — TELEPHONE (OUTPATIENT)
Dept: OPHTHALMOLOGY | Facility: CLINIC | Age: 72
End: 2017-06-19

## 2017-06-20 ENCOUNTER — OFFICE VISIT (OUTPATIENT)
Dept: FAMILY MEDICINE | Facility: CLINIC | Age: 72
End: 2017-06-20
Payer: MEDICARE

## 2017-06-20 VITALS
RESPIRATION RATE: 17 BRPM | HEIGHT: 64 IN | OXYGEN SATURATION: 95 % | DIASTOLIC BLOOD PRESSURE: 82 MMHG | BODY MASS INDEX: 36.17 KG/M2 | TEMPERATURE: 98 F | HEART RATE: 85 BPM | SYSTOLIC BLOOD PRESSURE: 144 MMHG | WEIGHT: 211.88 LBS

## 2017-06-20 DIAGNOSIS — R91.1 PULMONARY NODULE, LEFT: ICD-10-CM

## 2017-06-20 DIAGNOSIS — S02.30XA ORBITAL FLOOR (BLOW-OUT) CLOSED FRACTURE: ICD-10-CM

## 2017-06-20 DIAGNOSIS — J90 PLEURAL EFFUSION ON LEFT: Primary | ICD-10-CM

## 2017-06-20 DIAGNOSIS — Z48.02 VISIT FOR SUTURE REMOVAL: ICD-10-CM

## 2017-06-20 PROCEDURE — 99213 OFFICE O/P EST LOW 20 MIN: CPT | Mod: S$GLB,,, | Performed by: INTERNAL MEDICINE

## 2017-06-20 PROCEDURE — 1126F AMNT PAIN NOTED NONE PRSNT: CPT | Mod: S$GLB,,, | Performed by: INTERNAL MEDICINE

## 2017-06-20 PROCEDURE — 99999 PR PBB SHADOW E&M-EST. PATIENT-LVL III: CPT | Mod: PBBFAC,,, | Performed by: INTERNAL MEDICINE

## 2017-06-20 PROCEDURE — 1159F MED LIST DOCD IN RCRD: CPT | Mod: S$GLB,,, | Performed by: INTERNAL MEDICINE

## 2017-06-20 RX ORDER — HYDROCODONE BITARTRATE AND ACETAMINOPHEN 5; 325 MG/1; MG/1
1 TABLET ORAL EVERY 6 HOURS PRN
Qty: 60 TABLET | Refills: 0 | Status: SHIPPED | OUTPATIENT
Start: 2017-06-20 | End: 2017-08-09 | Stop reason: SDUPTHER

## 2017-06-20 NOTE — PROGRESS NOTES
Subjective:       Patient ID: Silvia Capellan is a 71 y.o. female.    Chief Complaint: Suture / Staple Removal and Medication Refill    She presents today for suture removal.  She is still having some discomfort.  She has establish with pulmonary, Dr. Das and is scheduled to have a thoracentesis in 2 days.  She however would like to see a physician within the [facility] system.      Review of Systems   Respiratory: Negative for shortness of breath.    Musculoskeletal: Positive for arthralgias.       Objective:      Physical Exam   Constitutional: She is oriented to person, place, and time. She appears well-developed and well-nourished. No distress.   HENT:   Head: Normocephalic. Head is with laceration.   3 sutures adjacent to right lateral eye with eschar   Eyes: Conjunctivae are normal. No scleral icterus.   Neurological: She is alert and oriented to person, place, and time.   Skin: Skin is warm and dry.   Psychiatric: She has a normal mood and affect.   Vitals reviewed.      Assessment:       1. Pleural effusion on left    2. Pulmonary nodule, left    3. Orbital floor (blow-out) closed fracture    4. Visit for suture removal        Plan:       Silvia was seen today for suture / staple removal and medication refill.    Diagnoses and all orders for this visit:    Pleural effusion on left - she has been evaluated by Dr. Das but would like to establish here at [facility].  I have placed another referral.  -     Ambulatory referral to Pulmonology    Pulmonary nodule, left  -     Ambulatory referral to Pulmonology    Orbital floor (blow-out) closed fracture  -     hydrocodone-acetaminophen 5-325mg (NORCO) 5-325 mg per tablet; Take 1 tablet by mouth every 6 (six) hours as needed for Pain.    Visit for suture removal - 3 sutures removal after removal of eschar with petroleum.         F/u prn

## 2017-06-26 ENCOUNTER — OFFICE VISIT (OUTPATIENT)
Dept: OPHTHALMOLOGY | Facility: CLINIC | Age: 72
End: 2017-06-26
Payer: MEDICARE

## 2017-06-26 DIAGNOSIS — S02.31XD CLOSED FRACTURE OF RIGHT ORBITAL FLOOR WITH ROUTINE HEALING: Primary | ICD-10-CM

## 2017-06-26 PROCEDURE — 99999 PR PBB SHADOW E&M-EST. PATIENT-LVL III: CPT | Mod: PBBFAC,,, | Performed by: OPHTHALMOLOGY

## 2017-06-26 PROCEDURE — 92004 COMPRE OPH EXAM NEW PT 1/>: CPT | Mod: S$GLB,,, | Performed by: OPHTHALMOLOGY

## 2017-06-26 NOTE — PROGRESS NOTES
HPI     Patient here for ED follow OD closed Orbital blowout x 2 weeks ago.  Pt states OD still having pain mainly at bedtime.  Bump on lower corner of the OD which is tender and sore. OD seeing flashes   and floaters.  8 on pain scale.   No eye drops.    I have personally interviewed the patient, reviewed the history and   examined the patient and agree with the technician's exam.    Last edited by Uzair Fragoso MD on 6/26/2017 11:35 AM. (History)            Assessment /Plan     For exam results, see Encounter Report.    Closed fracture of right orbital floor with routine healing      Fracture healing as expected. Residual suture removed.   Given warning signs of retinal detachment:  Increased floating spots  Increased flashes  Part of vision become obscured  Repeat exam in one month, dilate right eye on arrival.

## 2017-06-26 NOTE — PATIENT INSTRUCTIONS
Given warning signs of retinal detachment:  Increased floating spots  Increased flashes  Part of vision become obscured  Repeat exam in one month, dilate right eye on arrival.

## 2017-07-01 ENCOUNTER — HOSPITAL ENCOUNTER (EMERGENCY)
Facility: OTHER | Age: 72
Discharge: HOME | End: 2017-07-01
Attending: EMERGENCY MEDICINE
Payer: MEDICARE

## 2017-07-01 VITALS
OXYGEN SATURATION: 98 % | SYSTOLIC BLOOD PRESSURE: 138 MMHG | RESPIRATION RATE: 18 BRPM | WEIGHT: 211 LBS | HEART RATE: 94 BPM | TEMPERATURE: 98 F | BODY MASS INDEX: 36.02 KG/M2 | DIASTOLIC BLOOD PRESSURE: 90 MMHG | HEIGHT: 64 IN

## 2017-07-01 DIAGNOSIS — T78.40XA ALLERGIC REACTION, INITIAL ENCOUNTER: Primary | ICD-10-CM

## 2017-07-01 PROCEDURE — 25000003 PHARM REV CODE 250: Performed by: EMERGENCY MEDICINE

## 2017-07-01 PROCEDURE — 63600175 PHARM REV CODE 636 W HCPCS: Performed by: EMERGENCY MEDICINE

## 2017-07-01 PROCEDURE — 96372 THER/PROPH/DIAG INJ SC/IM: CPT

## 2017-07-01 PROCEDURE — 99283 EMERGENCY DEPT VISIT LOW MDM: CPT | Mod: 25

## 2017-07-01 RX ORDER — DIPHENHYDRAMINE HYDROCHLORIDE 50 MG/ML
25 INJECTION INTRAMUSCULAR; INTRAVENOUS
Status: COMPLETED | OUTPATIENT
Start: 2017-07-01 | End: 2017-07-01

## 2017-07-01 RX ORDER — DIPHENHYDRAMINE HCL 25 MG
25 CAPSULE ORAL EVERY 6 HOURS PRN
Qty: 30 CAPSULE | Refills: 0 | Status: SHIPPED | OUTPATIENT
Start: 2017-07-01 | End: 2018-01-24

## 2017-07-01 RX ORDER — FAMOTIDINE 20 MG/1
20 TABLET, FILM COATED ORAL 2 TIMES DAILY
Qty: 20 TABLET | Refills: 0 | Status: SHIPPED | OUTPATIENT
Start: 2017-07-01 | End: 2018-01-24

## 2017-07-01 RX ORDER — METHYLPREDNISOLONE SOD SUCC 125 MG
125 VIAL (EA) INJECTION
Status: COMPLETED | OUTPATIENT
Start: 2017-07-01 | End: 2017-07-01

## 2017-07-01 RX ORDER — PREDNISONE 20 MG/1
40 TABLET ORAL DAILY
Qty: 10 TABLET | Refills: 0 | Status: SHIPPED | OUTPATIENT
Start: 2017-07-01 | End: 2017-07-06

## 2017-07-01 RX ORDER — FAMOTIDINE 20 MG/1
20 TABLET, FILM COATED ORAL
Status: COMPLETED | OUTPATIENT
Start: 2017-07-01 | End: 2017-07-01

## 2017-07-01 RX ADMIN — FAMOTIDINE 20 MG: 20 TABLET, FILM COATED ORAL at 06:07

## 2017-07-01 RX ADMIN — DIPHENHYDRAMINE HYDROCHLORIDE 25 MG: 50 INJECTION, SOLUTION INTRAMUSCULAR; INTRAVENOUS at 06:07

## 2017-07-01 RX ADMIN — METHYLPREDNISOLONE SODIUM SUCCINATE 125 MG: 125 INJECTION, POWDER, FOR SOLUTION INTRAMUSCULAR; INTRAVENOUS at 06:07

## 2017-07-01 NOTE — ED PROVIDER NOTES
Encounter Date: 7/1/2017       History     Chief Complaint   Patient presents with    Eye Pain     + eye pain and redness with mild swelling. patient was seen x 3 weeks ago for a fall and facial fracture to the right eye. patient had sutures placed and removed and now complains pain at the site     Silvia Capellan is a 71 y.o. female who presents to the Emergency Department with allergic reaction.  Pt reports redness pain and swelling to right side of face and around right eye.  Patient states she applied an unknown ointment to her laceration on the right side of her face today and it started getting red and swollen now it is horribly itchy.  Patient thinks it was Neosporin ointment but is not sure      The history is provided by the patient.   Allergic Reaction   The primary symptoms are  rash. The primary symptoms do not include wheezing, shortness of breath, nausea, vomiting, palpitations or angioedema. The current episode started today. The problem has been gradually worsening. This is a new problem.   The rash began today. The rash appears on the face. The rash is associated with itching. The rash is not associated with blisters or weeping.   The onset of the reaction was associated with a new medication. Significant symptoms also include itching.     Review of patient's allergies indicates:   Allergen Reactions    Latex, natural rubber     Codeine     Codeine Hallucinations    Cortisporin [neomycin-bacitracin-poly-hc]     Latex, natural rubber Rash     Past Medical History:   Diagnosis Date    GERD (gastroesophageal reflux disease)     HTN (hypertension)     Rheumatoid arthritis     Rheumatoid arthritis      Past Surgical History:   Procedure Laterality Date    APPENDECTOMY      HYSTERECTOMY      SKIN BIOPSY      TOTAL KNEE ARTHROPLASTY      right     Family History   Problem Relation Age of Onset    Hypertension      Kidney disease       Social History   Substance Use Topics    Smoking  status: Former Smoker    Smokeless tobacco: Never Used    Alcohol use Yes      Comment: Occasionally     Review of Systems   Constitutional: Negative for fever.   HENT: Negative for sore throat.    Respiratory: Negative for shortness of breath and wheezing.    Cardiovascular: Negative for chest pain and palpitations.   Gastrointestinal: Negative for nausea and vomiting.   Genitourinary: Negative for dysuria.   Musculoskeletal: Negative for back pain.   Skin: Positive for itching and rash.   Neurological: Negative for weakness.   Hematological: Does not bruise/bleed easily.   All other systems reviewed and are negative.      Physical Exam     Initial Vitals [07/01/17 1658]   BP Pulse Resp Temp SpO2   (!) 138/90 94 18 97.9 °F (36.6 °C) 98 %      MAP       106         Physical Exam    Nursing note and vitals reviewed.  Constitutional: She appears well-developed and well-nourished.   HENT:   Head: Normocephalic and atraumatic. Head is with right periorbital erythema. Head is without Barrera's sign.       Right Ear: External ear normal.   Left Ear: External ear normal.   Nose: Nose normal.   Mouth/Throat: Oropharynx is clear and moist.   Eyes: EOM are normal. Pupils are equal, round, and reactive to light. Right conjunctiva is injected. Left conjunctiva is not injected. Left conjunctiva has no hemorrhage. No scleral icterus.   Erythema to upper and lower eyelids as well as right side of face   Neck: Normal range of motion. Neck supple.   Cardiovascular: Normal rate, regular rhythm, normal heart sounds and intact distal pulses. Exam reveals no gallop and no friction rub.    No murmur heard.  Pulmonary/Chest: Breath sounds normal. No respiratory distress. She has no wheezes. She has no rhonchi. She has no rales. She exhibits no tenderness.   Abdominal: Soft. There is no tenderness. There is no rebound and no guarding.   Musculoskeletal: Normal range of motion.   Neurological: She is alert and oriented to person, place,  and time. She has normal strength.   Skin: Skin is warm and dry. There is erythema.   Psychiatric: She has a normal mood and affect.         ED Course   Procedures  Labs Reviewed - No data to display                            ED Course       Medical decision making   Chief complaint: ALLERGIC reaction  Differential diagnosis: Cellulitis, ALLERGIC reaction, contact dermatitis.  Treatment in the ED Physical Exam, Benadryl, Pepcid, and Solu-Medrol  Patient reports feeling much better after medication.    Discussed outpatient treatment plan.    Fill and take prescriptions as directed.  Return to the ED if symptoms worsen or do not resolve.   Answered questions and discussed discharge plan.    Patient feels much better and is ready for discharge.  Follow up with PCP in 1 days.    Clinical Impression:   The encounter diagnosis was Allergic reaction, initial encounter.                           Marleny Sheppard DO  07/01/17 7065

## 2017-07-02 NOTE — ED NOTES
Pt identifiers checked and correct.    LOC: The patient is awake, alert and aware of environment with an appropriate affect, the patient is oriented x 3 and speaking appropriately.   APPEARANCE: Patient resting comfortably and in no acute distress, patient is clean and well groomed, patient's clothing is properly fastened.   SKIN: The skin is warm and dry, color consistent with ethnicity, patient has normal skin turgor and moist mucus membranes, skin intact, no breakdown or bruising noted. R lower eye lid swelling   MUSCULOSKELETAL: Patient moving all extremities spontaneously, no obvious swelling or deformities noted.   RESPIRATORY: Airway is open and patent, respirations are spontaneous, patient has a normal effort and rate, no accessory muscle use noted.   CARDIAC: Patient has a normal rate and regular rhythm, no periphreal edema noted, capillary refill < 3 seconds.   ABDOMEN: Soft and non tender to palpation, no distention noted, active bowel sounds present in all four quadrants.   NEUROLOGIC: PERRL, 3 mm bilaterally, eyes open spontaneously, behavior appropriate to situation, follows commands, facial expression symmetrical, bilateral hand grasp equal and even, purposeful motor response noted, normal sensation in all extremities when touched with a finger.

## 2017-07-05 DIAGNOSIS — J90 PLEURAL EFFUSION: Primary | ICD-10-CM

## 2017-07-12 ENCOUNTER — OFFICE VISIT (OUTPATIENT)
Dept: PULMONOLOGY | Facility: CLINIC | Age: 72
End: 2017-07-12
Payer: MEDICARE

## 2017-07-12 ENCOUNTER — HOSPITAL ENCOUNTER (OUTPATIENT)
Dept: RADIOLOGY | Facility: HOSPITAL | Age: 72
Discharge: HOME | End: 2017-07-12
Attending: INTERNAL MEDICINE
Payer: MEDICARE

## 2017-07-12 VITALS
SYSTOLIC BLOOD PRESSURE: 139 MMHG | OXYGEN SATURATION: 98 % | WEIGHT: 216.69 LBS | BODY MASS INDEX: 34.01 KG/M2 | DIASTOLIC BLOOD PRESSURE: 83 MMHG | HEART RATE: 87 BPM | HEIGHT: 67 IN

## 2017-07-12 DIAGNOSIS — J90 PLEURAL EFFUSION: Primary | ICD-10-CM

## 2017-07-12 DIAGNOSIS — J90 PLEURAL EFFUSION ON LEFT: Primary | ICD-10-CM

## 2017-07-12 DIAGNOSIS — J90 PLEURAL EFFUSION: ICD-10-CM

## 2017-07-12 DIAGNOSIS — R91.1 PULMONARY NODULE, LEFT: ICD-10-CM

## 2017-07-12 PROCEDURE — 71020 XR CHEST PA AND LATERAL: CPT | Mod: 26,,, | Performed by: RADIOLOGY

## 2017-07-12 PROCEDURE — 1159F MED LIST DOCD IN RCRD: CPT | Mod: S$GLB,,, | Performed by: INTERNAL MEDICINE

## 2017-07-12 PROCEDURE — 99999 PR PBB SHADOW E&M-EST. PATIENT-LVL III: CPT | Mod: PBBFAC,,, | Performed by: INTERNAL MEDICINE

## 2017-07-12 PROCEDURE — 99204 OFFICE O/P NEW MOD 45 MIN: CPT | Mod: S$GLB,,, | Performed by: INTERNAL MEDICINE

## 2017-07-12 NOTE — PROGRESS NOTES
Subjective:       Patient ID: Silvia Capellan is a 71 y.o. female.    Chief Complaint: Pulmonary Nodules    Ms. Capellan is a 71-year-old woman with GERD, HTN, and RA who presents for evaluation of a pleural effusion. Her effusion was found incidentally during workup for a fall on 6/8 at which time CT max-face revealed a partially visualized pleural effusion. Follow-up CXR re-demonstrated the left-sided effusion. Further investigation with CT chest on 6/12 showed the left-sided effusion, along with a 1.2 cm RUL nodule, mediastinal adenopathy, and a 1.3 cm liver hypodensity, all concerning for malignancy. She denies any recent fever, chills, or night sweats. She denied any weight loss, and has actually been gaining weight which she attributes to stress eating after hearing about her imaging findings. Her two main reports include mild right pedal edema which resolved with HCTZ prescribed by her primary care physician and mild intermittent dyspnea on exertion, occurring several times a week while playing with her grandchildren. The shortness of breath resolves within 30 seconds to 2 minutes of rest and does not limit her activities.       Review of Systems   Constitutional: Positive for weight gain. Negative for fever and chills.   HENT: Negative for rhinorrhea, sinus pressure and congestion.    Eyes: Negative for redness and itching.   Respiratory: Positive for shortness of breath. Negative for cough, sputum production, chest tightness and wheezing.    Cardiovascular: Negative for chest pain and leg swelling.   Genitourinary: Negative for difficulty urinating and hematuria.   Endocrine: Negative for cold intolerance, heat intolerance and polyuria.    Musculoskeletal: Negative for arthralgias and myalgias.   Skin: Negative for rash.   Gastrointestinal: Negative for nausea, vomiting, abdominal pain and abdominal distention.   Neurological: Negative for light-headedness and headaches.   Hematological: Does not  bruise/bleed easily and no excessive bruising.   Psychiatric/Behavioral: Negative for sleep disturbance. The patient is not nervous/anxious.          Past Medical History:   Diagnosis Date    GERD (gastroesophageal reflux disease)     HTN (hypertension)     Rheumatoid arthritis     Rheumatoid arthritis(714.0)        Past Surgical History:   Procedure Laterality Date    APPENDECTOMY      HYSTERECTOMY      SKIN BIOPSY      TOTAL KNEE ARTHROPLASTY      right       Family History   Problem Relation Age of Onset    Hypertension      Kidney disease         Social History     Social History    Marital status:      Spouse name: N/A    Number of children: N/A    Years of education: N/A     Occupational History    Not on file.     Social History Main Topics    Smoking status: Former Smoker    Smokeless tobacco: Never Used    Alcohol use Yes      Comment: Occasionally    Drug use: No    Sexual activity: Not Currently     Other Topics Concern    Not on file     Social History Narrative    No narrative on file       Objective:      Physical Exam   Constitutional: She is oriented to person, place, and time. She appears well-developed and well-nourished. No distress.   HENT:   Head: Normocephalic.   Mouth/Throat: Oropharynx is clear and moist. Normal dentition.   Neck: Normal range of motion. Neck supple. No JVD present.   Cardiovascular: Normal rate and regular rhythm.  Exam reveals no gallop and no friction rub.    No murmur heard.  Pulmonary/Chest: Normal expansion and effort normal. No respiratory distress. She has decreased breath sounds (left lower lobe). She has no wheezes.   Abdominal: Soft. Bowel sounds are normal. She exhibits no distension. There is no hepatosplenomegaly. There is no tenderness.   Musculoskeletal: Normal range of motion. She exhibits no edema.   Lymphadenopathy: No supraclavicular adenopathy is present.     She has no cervical adenopathy.   Neurological: She is alert and  oriented to person, place, and time. No cranial nerve deficit.   Skin: Skin is warm and dry. No rash noted. No erythema.   Psychiatric: She has a normal mood and affect. Thought content normal.     Personal Diagnostic Review      CXR 7/12 - mediastinal nodules, left midlung opacity, left-sided pleural effusion  CT chest 6/12 - large left-sided pleural effusion, 1.2 cm MORRO pleural-based nodule, mediastinal adenopathy, and 1.3 cm hypodensity within liver.      Assessment:       1. Pleural effusion on left    2. Pulmonary nodule, left        Outpatient Encounter Prescriptions as of 7/12/2017   Medication Sig Dispense Refill    clotrimazole (LOTRIMIN) 1 % cream Apply topically 2 (two) times daily. 15 g 1    diphenhydrAMINE (BENADRYL) 25 mg capsule Take 1 each (25 mg total) by mouth every 6 (six) hours as needed for Itching or Allergies. 30 capsule 0    docusate sodium (COLACE) 100 MG capsule Take 1 capsule (100 mg total) by mouth 3 (three) times daily as needed for Constipation. 30 capsule 0    escitalopram oxalate (LEXAPRO) 10 MG tablet Take 1 tablet (10 mg total) by mouth once daily. 90 tablet 1    famotidine (PEPCID) 20 MG tablet Take 1 tablet (20 mg total) by mouth 2 (two) times daily. 20 tablet 0    hydrochlorothiazide (HYDRODIURIL) 25 MG tablet Take 1 tablet (25 mg total) by mouth once daily. 90 tablet 1    hydrocodone-acetaminophen 5-325mg (NORCO) 5-325 mg per tablet Take 1 tablet by mouth every 6 (six) hours as needed for Pain. 60 tablet 0    hydrocodone-acetaminophen 7.5-325mg (NORCO) 7.5-325 mg per tablet Take 1 tablet by mouth every 24 hours as needed for Pain. 30 tablet 0    hydroxychloroquine (PLAQUENIL) 200 mg tablet Take by mouth once daily.       hydrOXYzine HCl (ATARAX) 25 MG tablet TAKE 1 TABLET (25 MG TOTAL) BY MOUTH 3 (THREE) TIMES DAILY AS NEEDED FOR ITCHING. 30 tablet 1    ketoconazole (NIZORAL) 2 % shampoo Apply topically once daily. 120 mL 1    levocetirizine (XYZAL) 5 MG tablet Take  1 tablet (5 mg total) by mouth every evening. 30 tablet 5    losartan (COZAAR) 100 MG tablet Take 1 tablet (100 mg total) by mouth once daily. 90 tablet 1    methotrexate 2.5 MG Tab       pantoprazole (PROTONIX) 40 MG tablet Take 1 tablet (40 mg total) by mouth once daily. 90 tablet 1    trazodone (DESYREL) 100 MG tablet Take 1 tablet (100 mg total) by mouth every evening. 30 tablet 2    triamcinolone acetonide 0.1% (KENALOG) 0.1 % ointment Apply topically 2 (two) times daily. 60 g 0     No facility-administered encounter medications on file as of 7/12/2017.      Orders Placed This Encounter   Procedures    Aerobic culture     Standing Status:   Future     Standing Expiration Date:   9/10/2018    Culture, Anaerobic     Standing Status:   Future     Standing Expiration Date:   9/10/2018    Gram stain     Standing Status:   Future     Standing Expiration Date:   9/10/2018    AFB Culture & Smear     Standing Status:   Future     Standing Expiration Date:   9/10/2018    Fungus culture     Standing Status:   Future     Standing Expiration Date:   9/10/2018    WBC & Diff,Body Fluid Thoracentesis Fluid     Standing Status:   Future     Standing Expiration Date:   9/10/2018     Order Specific Question:   Specimen Source     Answer:   Thoracentesis Fluid    Ambulatory Referral to Interventional Radiology     Referral Priority:   Routine     Referral Type:   Consultation     Referral Reason:   Specialty Services Required     Requested Specialty:   Interventional Radiology     Number of Visits Requested:   1     Plan:       Silvia was seen today for pulmonary nodules.    Diagnoses and all orders for this visit:    Pleural effusion on left        -     Etiology likely malignant effusion. After obtaining consent, thoracentesis attempted today in clinic without success. The effusion appeared smaller than expected on ultrasound relative to prior CT imaging. Thoracentesis by IR requested with the following labs to  obtain diagnostic and prognostic data if malignant and to rule-out infectious process:  -     Aerobic culture; Future  -     Culture, Anaerobic; Future  -     Gram stain; Future  -     AFB Culture & Smear; Future  -     WBC & Diff,Body Fluid Thoracentesis Fluid; Future  -     Cytology Specimen-Medical Cytology (Fluid/Wash/Brush); Future  -     Fungus culture; Future  -     Ambulatory Referral to Interventional Radiology    Pulmonary nodule, left        -     Etiology likely malignant, see workup above    Patient seen and discussed with Dr. Pruitt, who helped formulate the above plan.

## 2017-07-12 NOTE — LETTER
July 12, 2017      Ronak Guillaume MD  [address]  [place] [address]           [facility]  [address]  [place] [address]  Phone: [phone]          Patient: Silvia Capellan   MR Number: [id number]   YOB: 1945   Date of Visit: 7/12/2017       Dear Dr. Ronak Guillaume:    Thank you for referring Silvia Capellan to me for evaluation. Attached you will find relevant portions of my assessment and plan of care.    If you have questions, please do not hesitate to call me. I look forward to following Silvia Capellan along with you.    Sincerely,    Evelia Pruitt MD    Enclosure  CC:  No Recipients    If you would like to receive this communication electronically, please contact [email or web address] or [phone] to request more information on [employer] access.    For providers and/or their staff who would like to refer a patient to [facility], please contact us through our one-stop-shop provider referral line, [facility], at [phone].    If you feel you have received this communication in error or would no longer like to receive these types of communications, please e-mail [email or web address]

## 2017-07-13 ENCOUNTER — TELEPHONE (OUTPATIENT)
Dept: PULMONOLOGY | Facility: CLINIC | Age: 72
End: 2017-07-13

## 2017-07-13 NOTE — TELEPHONE ENCOUNTER
----- Message from Kristen Harrison LPN sent at 7/12/2017  5:25 PM CDT -----  Call Yasmin to schedule.  ----- Message -----  From: Evelia Pruitt MD  Sent: 7/12/2017   3:44 PM  To: Kristen Harrison LPN    I ordered a ir thoracentesis on this patient- please schedule for next Wednesday or Thursday. Thanks, SJ

## 2017-07-18 DIAGNOSIS — J90 PLEURAL EFFUSION ON LEFT: Primary | ICD-10-CM

## 2017-07-19 ENCOUNTER — HOSPITAL ENCOUNTER (OUTPATIENT)
Dept: INTERVENTIONAL RADIOLOGY/VASCULAR | Facility: HOSPITAL | Age: 72
Discharge: HOME | End: 2017-07-19
Attending: INTERNAL MEDICINE
Payer: MEDICARE

## 2017-07-19 ENCOUNTER — HOSPITAL ENCOUNTER (OUTPATIENT)
Dept: RADIOLOGY | Facility: HOSPITAL | Age: 72
Discharge: HOME | End: 2017-07-19
Attending: INTERNAL MEDICINE
Payer: MEDICARE

## 2017-07-19 VITALS
OXYGEN SATURATION: 99 % | HEART RATE: 72 BPM | RESPIRATION RATE: 16 BRPM | DIASTOLIC BLOOD PRESSURE: 78 MMHG | SYSTOLIC BLOOD PRESSURE: 152 MMHG

## 2017-07-19 DIAGNOSIS — J90 PLEURAL EFFUSION ON LEFT: ICD-10-CM

## 2017-07-19 DIAGNOSIS — Z98.890 STATUS POST THORACENTESIS: ICD-10-CM

## 2017-07-19 LAB
APPEARANCE FLD: NORMAL
BODY FLD TYPE: NORMAL
COLOR FLD: NORMAL
EOSINOPHIL NFR FLD MANUAL: 2 %
GRAM STN SPEC: NORMAL
GRAM STN SPEC: NORMAL
LYMPHOCYTES NFR FLD MANUAL: 66 %
MESOTHL CELL NFR FLD MANUAL: 12 %
MONOS+MACROS NFR FLD MANUAL: 19 %
NEUTROPHILS NFR FLD MANUAL: 1 %
PATH INTERP FLD-IMP: NORMAL
WBC # FLD: 7530 /CU MM

## 2017-07-19 PROCEDURE — 87116 MYCOBACTERIA CULTURE: CPT

## 2017-07-19 PROCEDURE — 88112 CYTOPATH CELL ENHANCE TECH: CPT | Mod: 26,,, | Performed by: PATHOLOGY

## 2017-07-19 PROCEDURE — 71010 XR CHEST 1 VIEW: CPT | Mod: TC

## 2017-07-19 PROCEDURE — 71010 XR CHEST 1 VIEW: CPT | Mod: 26,,, | Performed by: RADIOLOGY

## 2017-07-19 PROCEDURE — 32555 ASPIRATE PLEURA W/ IMAGING: CPT | Mod: LT,,, | Performed by: RADIOLOGY

## 2017-07-19 PROCEDURE — 87205 SMEAR GRAM STAIN: CPT

## 2017-07-19 PROCEDURE — C1729 CATH, DRAINAGE: HCPCS

## 2017-07-19 PROCEDURE — 87015 SPECIMEN INFECT AGNT CONCNTJ: CPT

## 2017-07-19 PROCEDURE — 87070 CULTURE OTHR SPECIMN AEROBIC: CPT

## 2017-07-19 PROCEDURE — 87102 FUNGUS ISOLATION CULTURE: CPT

## 2017-07-19 PROCEDURE — 87075 CULTR BACTERIA EXCEPT BLOOD: CPT

## 2017-07-19 PROCEDURE — 88305 TISSUE EXAM BY PATHOLOGIST: CPT | Mod: 26,,, | Performed by: PATHOLOGY

## 2017-07-19 PROCEDURE — 88305 TISSUE EXAM BY PATHOLOGIST: CPT | Performed by: PATHOLOGY

## 2017-07-19 PROCEDURE — 89051 BODY FLUID CELL COUNT: CPT

## 2017-07-19 NOTE — PROCEDURES
Radiology Post-Procedure Note    Pre Op Diagnosis: Left pleural effusion  Post Op Diagnosis: Same    Procedure: Thoracentesis    Procedure performed by: Mynor Marvin MD    Written Informed Consent Obtained: Yes  Specimen Removed:  cc dark bloody fluid  Estimated Blood Loss: Minimal    Findings:   Successful US guided left Thoracentesis.  Patient tolerated procedure well.    Mynor Marvin MD  Department of Radiology  Pager: [phone]

## 2017-07-19 NOTE — PROGRESS NOTES
Procedure complete. Bandage CDI. Specimen to lab Chest x-ray oked per Dr. Marvin. Released to home with family and instructions.

## 2017-07-19 NOTE — H&P
Radiology History & Physical      SUBJECTIVE:     Chief Complaint: left pleural effusion    History of Present Illness:  Silvia Capellan is a 71 y.o. female with left pleural effusion who presents for left thoracentesis.  Past Medical History:   Diagnosis Date    GERD (gastroesophageal reflux disease)     HTN (hypertension)     Rheumatoid arthritis     Rheumatoid arthritis(714.0)      Past Surgical History:   Procedure Laterality Date    APPENDECTOMY      HYSTERECTOMY      SKIN BIOPSY      TOTAL KNEE ARTHROPLASTY      right       Home Meds:   Prior to Admission medications    Medication Sig Start Date End Date Taking? Authorizing Provider   clotrimazole (LOTRIMIN) 1 % cream Apply topically 2 (two) times daily. 4/3/17   SHAHIDA Browning   diphenhydrAMINE (BENADRYL) 25 mg capsule Take 1 each (25 mg total) by mouth every 6 (six) hours as needed for Itching or Allergies. 7/1/17   Marleny Sheppard DO   docusate sodium (COLACE) 100 MG capsule Take 1 capsule (100 mg total) by mouth 3 (three) times daily as needed for Constipation. 6/8/17   Giulia Monsivais MD   escitalopram oxalate (LEXAPRO) 10 MG tablet Take 1 tablet (10 mg total) by mouth once daily. 3/3/17 3/3/18  SHAHIDA Browning   famotidine (PEPCID) 20 MG tablet Take 1 tablet (20 mg total) by mouth 2 (two) times daily. 7/1/17 7/1/18  Marleny Sheppard DO   hydrochlorothiazide (HYDRODIURIL) 25 MG tablet Take 1 tablet (25 mg total) by mouth once daily. 6/9/17 6/9/18  Ronak Guillaume MD   hydrocodone-acetaminophen 5-325mg (NORCO) 5-325 mg per tablet Take 1 tablet by mouth every 6 (six) hours as needed for Pain. 6/20/17   Ronak Guillaume MD   hydrocodone-acetaminophen 7.5-325mg (NORCO) 7.5-325 mg per tablet Take 1 tablet by mouth every 24 hours as needed for Pain. 5/4/17   Ronak Guillaume MD   hydroxychloroquine (PLAQUENIL) 200 mg tablet Take by mouth once daily.  9/1/16   Historical Provider, MD   hydrOXYzine HCl (ATARAX) 25 MG tablet TAKE 1 TABLET (25  MG TOTAL) BY MOUTH 3 (THREE) TIMES DAILY AS NEEDED FOR ITCHING. 6/9/17   Ronak Guillaume MD   ketoconazole (NIZORAL) 2 % shampoo Apply topically once daily. 4/3/17   SHAHIDA Browning   levocetirizine (XYZAL) 5 MG tablet Take 1 tablet (5 mg total) by mouth every evening. 6/1/17 6/1/18  Ronak Guillaume MD   losartan (COZAAR) 100 MG tablet Take 1 tablet (100 mg total) by mouth once daily. 5/26/17   SHAHIDA Browning   methotrexate 2.5 MG Tab  9/1/16   Historical Provider, MD   pantoprazole (PROTONIX) 40 MG tablet Take 1 tablet (40 mg total) by mouth once daily. 2/9/17 2/9/18  SHAHIDA Browning   trazodone (DESYREL) 100 MG tablet Take 1 tablet (100 mg total) by mouth every evening. 6/9/17 6/9/18  Ronak Guillaume MD   triamcinolone acetonide 0.1% (KENALOG) 0.1 % ointment Apply topically 2 (two) times daily. 11/23/16 4/3/17  Ronak Guillaume MD     Anticoagulants/Antiplatelets: no anticoagulation    Allergies:   Review of patient's allergies indicates:   Allergen Reactions    Latex, natural rubber     Codeine     Codeine Hallucinations    Cortisporin [neomycin-bacitracin-poly-hc]     Latex, natural rubber Rash     Sedation History:  no adverse reactions    Review of Systems:   Hematological: no known coagulopathies  Respiratory: + shortness of breath  Cardiovascular: no chest pain  Gastrointestinal: no abdominal pain  Genito-Urinary: no dysuria  Musculoskeletal: negative  Neurological: no TIA or stroke symptoms         OBJECTIVE:     Vital Signs (Most Recent)       Physical Exam:  ASA: 3  Mallampati: na    General: no acute distress  Mental Status: alert and oriented to person, place and time  HEENT: normocephalic, atraumatic  Chest: unlabored breathing  Heart: regular heart rate  Abdomen: nondistended  Extremity: moves all extremities    Laboratory  Lab Results   Component Value Date    INR 1.0 07/19/2017       Lab Results   Component Value Date    WBC 4.61 07/19/2017    HGB 11.6 (L) 07/19/2017     HCT 37.0 07/19/2017    MCV 87 07/19/2017     07/19/2017      Lab Results   Component Value Date    GLU 99 06/12/2017     06/12/2017    K 3.4 (L) 06/12/2017     06/12/2017    CO2 30 (H) 06/12/2017    BUN 11 06/12/2017    CREATININE 0.8 06/12/2017    CALCIUM 9.7 06/12/2017    ALT 11 06/12/2017    AST 14 06/12/2017    ALBUMIN 3.2 (L) 06/12/2017    BILITOT 0.4 06/12/2017       ASSESSMENT/PLAN:     Sedation Plan: local only  Patient will undergo US guided left thoracentesis .    Quang Deshpande  Radiology PGY-4

## 2017-07-22 LAB — BACTERIA SPEC AEROBE CULT: NO GROWTH

## 2017-07-26 LAB — BACTERIA SPEC ANAEROBE CULT: NORMAL

## 2017-07-28 ENCOUNTER — TELEPHONE (OUTPATIENT)
Dept: PULMONOLOGY | Facility: CLINIC | Age: 72
End: 2017-07-28

## 2017-07-28 NOTE — TELEPHONE ENCOUNTER
Please let patient know that cytology showed some atypical cells but not diagnostic. I am conferring with other doctors on the best way to evaluate this mass.  Thanks,   SJ

## 2017-08-01 ENCOUNTER — HOSPITAL ENCOUNTER (OUTPATIENT)
Dept: RADIOLOGY | Facility: HOSPITAL | Age: 72
Discharge: HOME | End: 2017-08-01
Attending: INTERNAL MEDICINE
Payer: MEDICARE

## 2017-08-01 ENCOUNTER — INITIAL CONSULT (OUTPATIENT)
Dept: RHEUMATOLOGY | Facility: CLINIC | Age: 72
End: 2017-08-01
Payer: MEDICARE

## 2017-08-01 VITALS
HEIGHT: 67 IN | BODY MASS INDEX: 34.67 KG/M2 | HEART RATE: 81 BPM | WEIGHT: 220.88 LBS | SYSTOLIC BLOOD PRESSURE: 135 MMHG | DIASTOLIC BLOOD PRESSURE: 82 MMHG

## 2017-08-01 DIAGNOSIS — M13.0 POLYARTHRITIS: Primary | ICD-10-CM

## 2017-08-01 DIAGNOSIS — M47.816 LUMBAR SPONDYLOSIS: ICD-10-CM

## 2017-08-01 DIAGNOSIS — J90 PLEURAL EFFUSION ON LEFT: ICD-10-CM

## 2017-08-01 DIAGNOSIS — M13.0 POLYARTHRITIS: ICD-10-CM

## 2017-08-01 PROCEDURE — 77077 JOINT SURVEY SINGLE VIEW: CPT | Mod: TC

## 2017-08-01 PROCEDURE — 99999 PR PBB SHADOW E&M-EST. PATIENT-LVL III: CPT | Mod: PBBFAC,,, | Performed by: INTERNAL MEDICINE

## 2017-08-01 PROCEDURE — 1125F AMNT PAIN NOTED PAIN PRSNT: CPT | Mod: S$GLB,,, | Performed by: INTERNAL MEDICINE

## 2017-08-01 PROCEDURE — 1159F MED LIST DOCD IN RCRD: CPT | Mod: S$GLB,,, | Performed by: INTERNAL MEDICINE

## 2017-08-01 PROCEDURE — 77077 JOINT SURVEY SINGLE VIEW: CPT | Mod: 26,,, | Performed by: RADIOLOGY

## 2017-08-01 PROCEDURE — 99499 UNLISTED E&M SERVICE: CPT | Mod: S$PBB,,, | Performed by: INTERNAL MEDICINE

## 2017-08-01 PROCEDURE — 99205 OFFICE O/P NEW HI 60 MIN: CPT | Mod: S$GLB,,, | Performed by: INTERNAL MEDICINE

## 2017-08-01 RX ORDER — BACLOFEN 10 MG/1
10 TABLET ORAL 3 TIMES DAILY
Qty: 90 TABLET | Refills: 11 | Status: SHIPPED | OUTPATIENT
Start: 2017-08-01 | End: 2018-01-24

## 2017-08-01 ASSESSMENT — ROUTINE ASSESSMENT OF PATIENT INDEX DATA (RAPID3)
FATIGUE SCORE: 7
TOTAL RAPID3 SCORE: 7.33
AM STIFFNESS SCORE: 1, YES
PATIENT GLOBAL ASSESSMENT SCORE: 10
PAIN SCORE: 10
WHEN YOU AWAKENED IN THE MORNING OVER THE LAST WEEK, PLEASE INDICATE THE AMOUNT OF TIME IT TAKES UNTIL YOU ARE AS LIMBER AS YOU WILL BE FOR THE DAY: 2HR
PSYCHOLOGICAL DISTRESS SCORE: 9.9
MDHAQ FUNCTION SCORE: .6

## 2017-08-01 NOTE — LETTER
August 3, 2017      Hilda Saleem, NP-C  [address]  [place] [address]           [facility]  [address]  [place] [address]  Phone: [phone]  Fax: [phone]          Patient: Silvia Capellan   MR Number: [id number]   YOB: 1945   Date of Visit: 8/1/2017       Dear Hilda Saleem:    Thank you for referring Silvia Capellan to me for evaluation. Attached you will find relevant portions of my assessment and plan of care.    If you have questions, please do not hesitate to call me. I look forward to following Silvia Capellan along with you.    Sincerely,    Guru Antunez MD    Enclosure  CC:  No Recipients    If you would like to receive this communication electronically, please contact [email or web address] or [phone] to request more information on [employer] Link access.    For providers and/or their staff who would like to refer a patient to [facility], please contact us through our one-stop-shop provider referral line, [facility], at [phone].    If you feel you have received this communication in error or would no longer like to receive these types of communications, please e-mail [email or web address]

## 2017-08-03 NOTE — PROGRESS NOTES
History of present illness: 71-year-old female who is complaining of low back pain.  She states the pain started 12 years ago.  It is been worse for the past year.  The pain is been constant.  It is worse with prolonged sitting.  She has some pain with activity.  It does wake her up at night.  The pain is also bad in the morning.  The pain occasionally radiates into the leg.  She has been taking hydrocodone but it does not help.  Heat has given her some relief.  She has not tried topical medications.    In addition she was diagnosed as having rheumatoid arthritis 5 years ago.  She is uncertain how the diagnosis was made.  She had seen Dr. Raymond, originally at [facility] and then more recently at [facility].  She had been on Plaquenil and methotrexate.  She has not taken them for several months.  She has had no recent joint swelling.  She does complain of 2 hours of morning stiffness.  She has had some swelling in her MCPs.  She has had some pain in the neck.  She is status post right total knee replacement.    She has had no unexplained fevers.  She complains of occipital headaches.  She complains of a milia-like rash on her legs.  She has no conjunctivitis, oral ulcers, dry eye or mouth, Raynaud's phenomena.  She developed a recent pleural effusion.  This was found after an injury.  She apparently has a pulmonary nodule as well.  She has no chronic or bloody diarrhea, vaginal discharge or ulcers, numbness or tingling.    Review of systems:  Gen.: Weight has increased 20 pounds  GI: No abdominal pain or peptic ulcer disease.  No liver problems.  : No kidney or bladder problems    Physical examination:  Skin: No rashes  ENT: Adequate tears and saliva  Chest: Clear to auscultation and percussion  Cardiac: No murmurs, gallops, rubs  Abdomen: No organomegaly or masses.  No tenderness to palpation  Extremities: Shoulders, elbows, wrists, small joints of the hands are unremarkable.  Lumbar spine has tenderness to palpation and  decreased range of motion.  She has postsurgical changes in the knees.  Ankles and feet are unremarkable.  Radiology: X-ray of the back reveals degenerative changes    Assessment:  1.  Chronic low back pain due to osteoarthritis  2.  Rheumatoid arthritis by history.  I cannot confirm the diagnosis at this time  3.  Left pleural effusion    Plans:  1.  I told her to make a new appointment with back and spine clinic since she has not been seen there for a year  2.  I placed her on baclofen 100 mg 3 times daily  3.  Laboratory studies and x-rays are obtained  4.  Return to see me in 2 months

## 2017-08-07 ENCOUNTER — TELEPHONE (OUTPATIENT)
Dept: RHEUMATOLOGY | Facility: CLINIC | Age: 72
End: 2017-08-07

## 2017-08-07 NOTE — TELEPHONE ENCOUNTER
"Verbalized to pt. Per Dr. Antunez "One of the inflammation test is elevated, the other is normal.  Blood tests for rheumatoid arthritis are all negative.  The x-ray showed no evidence of damage from rheumatoid arthritis but a lot of osteoarthritis.  Continue the same medications for now. ". Pt. Verbalized understanding.  "

## 2017-08-08 ENCOUNTER — OFFICE VISIT (OUTPATIENT)
Dept: OPHTHALMOLOGY | Facility: CLINIC | Age: 72
End: 2017-08-08
Payer: MEDICARE

## 2017-08-08 DIAGNOSIS — S02.31XD CLOSED FRACTURE OF RIGHT ORBITAL FLOOR WITH ROUTINE HEALING: Primary | ICD-10-CM

## 2017-08-08 PROCEDURE — 99999 PR PBB SHADOW E&M-EST. PATIENT-LVL III: CPT | Mod: PBBFAC,,, | Performed by: OPHTHALMOLOGY

## 2017-08-08 PROCEDURE — 92012 INTRM OPH EXAM EST PATIENT: CPT | Mod: S$GLB,,, | Performed by: OPHTHALMOLOGY

## 2017-08-08 NOTE — PROGRESS NOTES
HPI     Follow-up    Additional comments: Closed fracture of right orbital floor            Comments   DLS:06/26/2017 F[place]zia  Patient here for 1 month follow up for Closed fracture of right orbital   floor OD.  Pt states OD still blurry and tender to touch.  10 on pain scale which started last pm.    I have personally interviewed the patient, reviewed the history and   examined the patient and agree with the technician's exam.       Last edited by Uzair Fragoso MD on 8/8/2017 10:10 AM. (History)            Assessment /Plan     For exam results, see Encounter Report.    Closed fracture of right orbital floor with routine healing      Ms. Capellan has healed well. Her right eye is intact. She will return as needed.

## 2017-08-09 DIAGNOSIS — S02.30XA ORBITAL FLOOR (BLOW-OUT) CLOSED FRACTURE: ICD-10-CM

## 2017-08-09 RX ORDER — HYDROCODONE BITARTRATE AND ACETAMINOPHEN 5; 325 MG/1; MG/1
1 TABLET ORAL EVERY 6 HOURS PRN
Qty: 60 TABLET | Refills: 0 | Status: SHIPPED | OUTPATIENT
Start: 2017-08-09 | End: 2017-11-08 | Stop reason: SDUPTHER

## 2017-08-22 ENCOUNTER — TELEPHONE (OUTPATIENT)
Dept: PULMONOLOGY | Facility: CLINIC | Age: 72
End: 2017-08-22

## 2017-08-22 NOTE — TELEPHONE ENCOUNTER
"Your last message to the patient says that you were conferring "conferring with other doctors on the best way to evaluate this mass."  Does patient need to follow with you or are we referring her elsewhere?  "

## 2017-08-22 NOTE — TELEPHONE ENCOUNTER
----- Message from Clary Kauffman sent at 8/22/2017 10:08 AM CDT -----  Contact: Self   Pt is requesting a call back in regards to rescheduling her upcoming appt to a sooner date. Pt stated she cannot wait until November to see the doctor and would like to be contacted as soon as possible       Pt can be contacted at [phone]

## 2017-08-23 DIAGNOSIS — R91.1 PULMONARY NODULE: Primary | ICD-10-CM

## 2017-08-23 LAB — FUNGUS SPEC CULT: NORMAL

## 2017-08-28 ENCOUNTER — TELEPHONE (OUTPATIENT)
Dept: PULMONOLOGY | Facility: CLINIC | Age: 72
End: 2017-08-28

## 2017-08-28 ENCOUNTER — HOSPITAL ENCOUNTER (OUTPATIENT)
Facility: HOSPITAL | Age: 72
Discharge: HOME | End: 2017-08-28
Attending: INTERNAL MEDICINE | Admitting: INTERNAL MEDICINE
Payer: MEDICARE

## 2017-08-28 VITALS
RESPIRATION RATE: 20 BRPM | BODY MASS INDEX: 34.53 KG/M2 | HEIGHT: 67 IN | WEIGHT: 220 LBS | DIASTOLIC BLOOD PRESSURE: 70 MMHG | HEART RATE: 80 BPM | SYSTOLIC BLOOD PRESSURE: 115 MMHG | TEMPERATURE: 98 F | OXYGEN SATURATION: 96 %

## 2017-08-28 DIAGNOSIS — R91.1 PULMONARY NODULE, LEFT: Primary | ICD-10-CM

## 2017-08-28 DIAGNOSIS — R91.1 PULMONARY NODULE: ICD-10-CM

## 2017-08-28 PROCEDURE — 99153 MOD SED SAME PHYS/QHP EA: CPT | Performed by: INTERNAL MEDICINE

## 2017-08-28 PROCEDURE — 31652 BRONCH EBUS SAMPLNG 1/2 NODE: CPT | Mod: ,,, | Performed by: INTERNAL MEDICINE

## 2017-08-28 PROCEDURE — 88173 CYTOPATH EVAL FNA REPORT: CPT | Mod: 26,,, | Performed by: PATHOLOGY

## 2017-08-28 PROCEDURE — 88305 TISSUE EXAM BY PATHOLOGIST: CPT | Mod: 26,,, | Performed by: PATHOLOGY

## 2017-08-28 PROCEDURE — 88342 IMHCHEM/IMCYTCHM 1ST ANTB: CPT | Mod: 26,,, | Performed by: PATHOLOGY

## 2017-08-28 PROCEDURE — 99152 MOD SED SAME PHYS/QHP 5/>YRS: CPT | Performed by: INTERNAL MEDICINE

## 2017-08-28 PROCEDURE — 88341 IMHCHEM/IMCYTCHM EA ADD ANTB: CPT | Mod: 26,,, | Performed by: PATHOLOGY

## 2017-08-28 PROCEDURE — 88172 CYTP DX EVAL FNA 1ST EA SITE: CPT | Performed by: PATHOLOGY

## 2017-08-28 PROCEDURE — 88172 CYTP DX EVAL FNA 1ST EA SITE: CPT | Mod: 26,,, | Performed by: PATHOLOGY

## 2017-08-28 PROCEDURE — 63600175 PHARM REV CODE 636 W HCPCS: Performed by: INTERNAL MEDICINE

## 2017-08-28 PROCEDURE — 25000003 PHARM REV CODE 250: Performed by: INTERNAL MEDICINE

## 2017-08-28 PROCEDURE — 88305 TISSUE EXAM BY PATHOLOGIST: CPT | Performed by: PATHOLOGY

## 2017-08-28 PROCEDURE — 31652 BRONCH EBUS SAMPLNG 1/2 NODE: CPT | Performed by: INTERNAL MEDICINE

## 2017-08-28 RX ORDER — FENTANYL CITRATE 50 UG/ML
INJECTION, SOLUTION INTRAMUSCULAR; INTRAVENOUS CODE/TRAUMA/SEDATION MEDICATION
Status: COMPLETED | OUTPATIENT
Start: 2017-08-28 | End: 2017-08-28

## 2017-08-28 RX ORDER — MIDAZOLAM HYDROCHLORIDE 5 MG/ML
INJECTION INTRAMUSCULAR; INTRAVENOUS CODE/TRAUMA/SEDATION MEDICATION
Status: COMPLETED | OUTPATIENT
Start: 2017-08-28 | End: 2017-08-28

## 2017-08-28 RX ORDER — LIDOCAINE HYDROCHLORIDE 20 MG/ML
INJECTION, SOLUTION INFILTRATION; PERINEURAL CODE/TRAUMA/SEDATION MEDICATION
Status: COMPLETED | OUTPATIENT
Start: 2017-08-28 | End: 2017-08-28

## 2017-08-28 RX ORDER — LIDOCAINE HYDROCHLORIDE 10 MG/ML
INJECTION INFILTRATION; PERINEURAL CODE/TRAUMA/SEDATION MEDICATION
Status: COMPLETED | OUTPATIENT
Start: 2017-08-28 | End: 2017-08-28

## 2017-08-28 RX ADMIN — FENTANYL CITRATE 50 MCG: 50 INJECTION, SOLUTION INTRAMUSCULAR; INTRAVENOUS at 10:08

## 2017-08-28 RX ADMIN — LIDOCAINE HYDROCHLORIDE 8 ML: 10 INJECTION, SOLUTION INFILTRATION; PERINEURAL at 10:08

## 2017-08-28 RX ADMIN — BENZOCAINE, BUTAMBEN, AND TETRACAINE HYDROCHLORIDE 1 SPRAY: .028; .004; .004 AEROSOL, SPRAY TOPICAL at 10:08

## 2017-08-28 RX ADMIN — MIDAZOLAM 2 MG: 5 INJECTION INTRAMUSCULAR; INTRAVENOUS at 10:08

## 2017-08-28 RX ADMIN — LIDOCAINE HYDROCHLORIDE 6 ML: 20 INJECTION, SOLUTION INFILTRATION; PERINEURAL at 10:08

## 2017-08-28 RX ADMIN — FENTANYL CITRATE 25 MCG: 50 INJECTION, SOLUTION INTRAMUSCULAR; INTRAVENOUS at 10:08

## 2017-08-28 RX ADMIN — MIDAZOLAM 1 MG: 5 INJECTION INTRAMUSCULAR; INTRAVENOUS at 10:08

## 2017-08-28 NOTE — DISCHARGE INSTRUCTIONS
Discharge Instructions for Bronchoscopy    ACTIVITY LEVEL:  If you received sedation or an anesthetic, you may feel sleepy for several hours. Do not drive, operate machinery, make critical decisions, or perform activities that require coordination or balance until tomorrow morning. Please have a responsible person stay with you for at least two (2) hours after you leave the hospital.     DIET:  Do not eat or drink anything until 11:25AM 8/28/2017. Once you can drink clear liquids without coughing, you can resume your regular diet.     WHAT you may expect over the next 24 hours:  · You may experience a low grade fever.  · You may cough up streaks of blood.  · Take Tylenol as directed for comfort/fever.    Additional Instructions:  · Do not take Aspirin, ibuprofen, naproxen, or any medications containing these items for 1 days after the bronchoscopy.  If you normally take Coumadin or aspirin, you may restart on as instructed by your doctor  COME TO THE EMERGENCY DEPARTMENT IF:  · You cough up more than one (1) tablespoon of blood.  · You have fever over 101F (38.4C) for more than one evening.  · You experience shortness of breath that is of new onset, or that is increased from your usual baseline.  · You experience chest pain.  · You have chills.    RETURN APPOINTMENT:  Follow up as directed.   Comments:  FOR EMERGENCIES:    If any unusual problems or difficulties occur, contact   or the resident at [phone] or at the Clinic office, [phone]

## 2017-08-28 NOTE — SEDATION DOCUMENTATION
Verbal report given to DOSC to include documentation charted in procedural sedation documentation.  Patient to be NPO 1 hour post procedure and place in PO tolerance at 1125  Holly Rollins RN   Moderate concious sedation was performed and cardiorespiratory functions were monitored the entire procedure by Holly Rollins RN.  Sedation began at 1011  and concluded at 1040.

## 2017-08-28 NOTE — TELEPHONE ENCOUNTER
08/28/17 405pm, Called patient to confirm bronchoscopy scheduled for Monday, no answer on cell. Home number is disconnected.  Called again 8/28/17 640am no answer.

## 2017-08-28 NOTE — SUBJECTIVE & OBJECTIVE
Past Medical History:   Diagnosis Date    GERD (gastroesophageal reflux disease)     HTN (hypertension)     Rheumatoid arthritis     Rheumatoid arthritis(714.0)        Past Surgical History:   Procedure Laterality Date    APPENDECTOMY      HYSTERECTOMY      SKIN BIOPSY      TOTAL KNEE ARTHROPLASTY      right       Review of patient's allergies indicates:   Allergen Reactions    Latex, natural rubber     Codeine     Codeine Hallucinations    Cortisporin [neomycin-bacitracin-poly-hc]     Latex, natural rubber Rash       Family History     Problem Relation (Age of Onset)    Hypertension     Kidney disease         Social History Main Topics    Smoking status: Former Smoker    Smokeless tobacco: Never Used    Alcohol use Yes      Comment: Occasionally    Drug use: No    Sexual activity: Not Currently         Review of Systems   Constitutional: Negative.    HENT: Negative.    Eyes: Negative.    Respiratory: Negative.    Cardiovascular: Negative.    Gastrointestinal: Negative.    Endocrine: Negative.    Genitourinary: Negative.    Musculoskeletal: Negative.    Skin: Negative.    Allergic/Immunologic: Negative.    Neurological: Negative.    Hematological: Negative.    Psychiatric/Behavioral: Negative.      Objective:     Vital Signs (Most Recent):  Temp: 97.6 °F (36.4 °C) (08/28/17 0836)  Pulse: 72 (08/28/17 0836)  Resp: 16 (08/28/17 0836)  BP: 133/76 (08/28/17 0836)  SpO2: 99 % (08/28/17 0836) Vital Signs (24h Range):  Temp:  [97.6 °F (36.4 °C)] 97.6 °F (36.4 °C)  Pulse:  [72] 72  Resp:  [16] 16  SpO2:  [99 %] 99 %  BP: (133)/(76) 133/76     Weight: 99.8 kg (220 lb)  Body mass index is 34.46 kg/m².    No intake or output data in the 24 hours ending 08/28/17 0955    Physical Exam   HENT:   Head: Normocephalic.   Neck: Normal range of motion.   Cardiovascular: Normal rate and regular rhythm.    Pulmonary/Chest: Effort normal and breath sounds normal.   Abdominal: Soft. Bowel sounds are normal.    Musculoskeletal: Normal range of motion.   Skin: Skin is warm.   Psychiatric: Her behavior is normal.       Vents:       Lines/Drains/Airways     Peripheral Intravenous Line                 Peripheral IV - Single Lumen 08/28/17 0842 Right Hand less than 1 day                Significant Labs:    CBC/Anemia Profile:  No results for input(s): WBC, HGB, HCT, PLT, MCV, RDW, IRON, FERRITIN, RETIC, FOLATE, [id number], OCCULTBLOOD in the last 48 hours.     Chemistries:  No results for input(s): NA, K, CL, CO2, BUN, CREATININE, CALCIUM, ALBUMIN, PROT, BILITOT, ALKPHOS, ALT, AST, GLUCOSE, MG, PHOS in the last 48 hours.    All pertinent labs within the past 24 hours have been reviewed.    Significant Imaging:   I have reviewed and interpreted all pertinent imaging results/findings within the past 24 hours.

## 2017-08-28 NOTE — SEDATION DOCUMENTATION
H and P updated-yes, patient arrived to bronch suite, placed on cardiac monitor  Anesthesia Plan:  conscious sedation   ASA verified-yes  Airway exam performed-yes  Personal or Family history of anesthesia complications-No  Consent signed and witnessed, Holly Rollins RN

## 2017-08-28 NOTE — DISCHARGE SUMMARY
[facility]  Pulmonology  Discharge Summary      Patient Name: Silvia Capellan  MRN: [id number]  Admission Date: 8/28/2017  Hospital Length of Stay: 0 days  Discharge Date and Time:  08/28/2017 10:37 AM  Attending Physician: Lizbeth Still MD  Discharging Provider: Rajan Munoz MD  Primary Care Provider: Ronak Guillaume MD    HPI:  73 y/o female who presents to the Pulmonary lab for a Bronchoscopy/EBUS evaluation after an abnormal mediastinal adenopathy + 1.2 cm MORRO (cytology from thoracentesis unremarkable).      ASA 3 Mallampati 3    Procedure(s) (LRB):  BRONCHOSCOPY (N/A)    Indwelling Lines/Drains at Time of Discharge:   Lines/Drains/Airways          No matching active lines, drains, or airways          Hospital Course:    Patient tolerated the bronchoscopy/EBUS well.  Subcarinal lymph node needle aspiration biopsies sent      Significant Labs:  All pertinent labs within the past 24 hours have been reviewed.    Significant Imaging:  I have reviewed and interpreted all pertinent imaging results/findings within the past 24 hours.    Pending Diagnostic Studies:     None        Final Active Diagnoses:    Diagnosis Date Noted POA    Pulmonary nodule [R91.1] 08/28/2017 Yes      Problems Resolved During this Admission:    Diagnosis Date Noted Date Resolved POA       Discharged Condition: good    Disposition:     Follow Up:  Follow-up Information     Evelia Pruitt MD. Call in 1 week.    Specialty:  Pulmonary Disease  Contact information:  [address] [place] [id number]  [phone]                 Patient Instructions:     Diet general     Diet general       Medications:  Reconciled Home Medications:   Current Discharge Medication List      CONTINUE these medications which have NOT CHANGED    Details   baclofen (LIORESAL) 10 MG tablet Take 1 tablet (10 mg total) by mouth 3 (three) times daily.  Qty: 90 tablet, Refills: 11      clotrimazole (LOTRIMIN) 1 % cream Apply topically 2 (two)  times daily.  Qty: 15 g, Refills: 1    Associated Diagnoses: Tinea      escitalopram oxalate (LEXAPRO) 10 MG tablet Take 1 tablet (10 mg total) by mouth once daily.  Qty: 90 tablet, Refills: 1    Associated Diagnoses: Anxiety      famotidine (PEPCID) 20 MG tablet Take 1 tablet (20 mg total) by mouth 2 (two) times daily.  Qty: 20 tablet, Refills: 0      hydrochlorothiazide (HYDRODIURIL) 25 MG tablet Take 1 tablet (25 mg total) by mouth once daily.  Qty: 90 tablet, Refills: 1    Associated Diagnoses: Leg swelling      hydrOXYzine HCl (ATARAX) 25 MG tablet TAKE 1 TABLET (25 MG TOTAL) BY MOUTH 3 (THREE) TIMES DAILY AS NEEDED FOR ITCHING.  Qty: 30 tablet, Refills: 1    Associated Diagnoses: Urticaria      levocetirizine (XYZAL) 5 MG tablet Take 1 tablet (5 mg total) by mouth every evening.  Qty: 30 tablet, Refills: 5    Associated Diagnoses: Urticaria      losartan (COZAAR) 100 MG tablet Take 1 tablet (100 mg total) by mouth once daily.  Qty: 90 tablet, Refills: 1    Comments: NEEDS REFILLS      trazodone (DESYREL) 100 MG tablet Take 1 tablet (100 mg total) by mouth every evening.  Qty: 30 tablet, Refills: 2    Associated Diagnoses: Insomnia, unspecified type      diphenhydrAMINE (BENADRYL) 25 mg capsule Take 1 each (25 mg total) by mouth every 6 (six) hours as needed for Itching or Allergies.  Qty: 30 capsule, Refills: 0      docusate sodium (COLACE) 100 MG capsule Take 1 capsule (100 mg total) by mouth 3 (three) times daily as needed for Constipation.  Qty: 30 capsule, Refills: 0      hydrocodone-acetaminophen 5-325mg (NORCO) 5-325 mg per tablet Take 1 tablet by mouth every 6 (six) hours as needed for Pain.  Qty: 60 tablet, Refills: 0    Associated Diagnoses: Orbital floor (blow-out) closed fracture      hydroxychloroquine (PLAQUENIL) 200 mg tablet Take by mouth once daily.       ketoconazole (NIZORAL) 2 % shampoo Apply topically once daily.  Qty: 120 mL, Refills: 1    Associated Diagnoses: Tinea      methotrexate 2.5  MG Tab       pantoprazole (PROTONIX) 40 MG tablet Take 1 tablet (40 mg total) by mouth once daily.  Qty: 90 tablet, Refills: 1    Associated Diagnoses: Gastroesophageal reflux disease, esophagitis presence not specified      triamcinolone acetonide 0.1% (KENALOG) 0.1 % ointment Apply topically 2 (two) times daily.  Qty: 60 g, Refills: 0    Associated Diagnoses: Contact dermatitis, unspecified contact dermatitis type, unspecified trigger             Rajan Munoz MD  Pulmonology  [facility]

## 2017-08-28 NOTE — HPI
71 y/o female who presents to the Pulmonary lab for a Bronchoscopy/EBUS evaluation after an abnormal mediastinal adenopathy + 1.2 cm MORRO (cytology from thoracentesis unremarkable).      ASA 3 Mallampati 3

## 2017-08-31 NOTE — SUBJECTIVE & OBJECTIVE
Past Medical History:   Diagnosis Date    GERD (gastroesophageal reflux disease)     HTN (hypertension)     Rheumatoid arthritis     Rheumatoid arthritis(714.0)        Past Surgical History:   Procedure Laterality Date    APPENDECTOMY      HYSTERECTOMY      SKIN BIOPSY      TOTAL KNEE ARTHROPLASTY      right       Review of patient's allergies indicates:   Allergen Reactions    Latex, natural rubber     Codeine     Codeine Hallucinations    Cortisporin [neomycin-bacitracin-poly-hc]     Latex, natural rubber Rash       Family History     Problem Relation (Age of Onset)    Hypertension     Kidney disease         Social History Main Topics    Smoking status: Former Smoker    Smokeless tobacco: Never Used    Alcohol use Yes      Comment: Occasionally    Drug use: No    Sexual activity: Not Currently         Review of Systems  Objective:     Vital Signs (Most Recent):  Temp: 97.6 °F (36.4 °C) (08/28/17 1146)  Pulse: 80 (08/28/17 1146)  Resp: 20 (08/28/17 1146)  BP: 115/70 (08/28/17 1146)  SpO2: 96 % (08/28/17 1146) Vital Signs (24h Range):        Weight: 99.8 kg (220 lb)  Body mass index is 34.46 kg/m².    No intake or output data in the 24 hours ending 08/31/17 1000    Physical Exam   Constitutional: She appears well-developed and well-nourished.   HENT:   Head: Normocephalic and atraumatic.   Eyes: Pupils are equal, round, and reactive to light.   Cardiovascular: Normal rate and regular rhythm.    Pulmonary/Chest: Effort normal and breath sounds normal. She has no wheezes.   Abdominal: Soft.   Vitals reviewed.      Vents:       Lines/Drains/Airways          No matching active lines, drains, or airways          Significant Labs:    CBC/Anemia Profile:  No results for input(s): WBC, HGB, HCT, PLT, MCV, RDW, IRON, FERRITIN, RETIC, FOLATE, [id number], OCCULTBLOOD in the last 48 hours.     Chemistries:  No results for input(s): NA, K, CL, CO2, BUN, CREATININE, CALCIUM, ALBUMIN, PROT, BILITOT, ALKPHOS, ALT,  AST, GLUCOSE, MG, PHOS in the last 48 hours.        Significant Imaging:   I have reviewed all pertinent imaging results/findings within the past 24 hours.

## 2017-08-31 NOTE — H&P
[facility]  Pulmonology  H&P    Patient Name: Silvia Capellan  MRN: [id number]  Admission Date: 8/28/2017  Code Status: No Order  Primary Care Provider: Ronak Guillaume MD   Principal Problem: <principal problem not specified>    Subjective:     HPI:  71 y/o female who presents to the Pulmonary lab for a Bronchoscopy/EBUS evaluation after an abnormal mediastinal adenopathy + 1.2 cm MORRO (cytology from thoracentesis unremarkable).      ASA 3 Mallampati 3    Past Medical History:   Diagnosis Date    GERD (gastroesophageal reflux disease)     HTN (hypertension)     Rheumatoid arthritis     Rheumatoid arthritis(714.0)        Past Surgical History:   Procedure Laterality Date    APPENDECTOMY      HYSTERECTOMY      SKIN BIOPSY      TOTAL KNEE ARTHROPLASTY      right       Review of patient's allergies indicates:   Allergen Reactions    Latex, natural rubber     Codeine     Codeine Hallucinations    Cortisporin [neomycin-bacitracin-poly-hc]     Latex, natural rubber Rash       Family History     Problem Relation (Age of Onset)    Hypertension     Kidney disease         Social History Main Topics    Smoking status: Former Smoker    Smokeless tobacco: Never Used    Alcohol use Yes      Comment: Occasionally    Drug use: No    Sexual activity: Not Currently         Review of Systems  Objective:     Vital Signs (Most Recent):  Temp: 97.6 °F (36.4 °C) (08/28/17 1146)  Pulse: 80 (08/28/17 1146)  Resp: 20 (08/28/17 1146)  BP: 115/70 (08/28/17 1146)  SpO2: 96 % (08/28/17 1146) Vital Signs (24h Range):        Weight: 99.8 kg (220 lb)  Body mass index is 34.46 kg/m².    No intake or output data in the 24 hours ending 08/31/17 1000    Physical Exam   Constitutional: She appears well-developed and well-nourished.   HENT:   Head: Normocephalic and atraumatic.   Eyes: Pupils are equal, round, and reactive to light.   Cardiovascular: Normal rate and regular rhythm.    Pulmonary/Chest: Effort normal  and breath sounds normal. She has no wheezes.   Abdominal: Soft.   Vitals reviewed.      Vents:       Lines/Drains/Airways          No matching active lines, drains, or airways          Significant Labs:    CBC/Anemia Profile:  No results for input(s): WBC, HGB, HCT, PLT, MCV, RDW, IRON, FERRITIN, RETIC, FOLATE, [id number], OCCULTBLOOD in the last 48 hours.     Chemistries:  No results for input(s): NA, K, CL, CO2, BUN, CREATININE, CALCIUM, ALBUMIN, PROT, BILITOT, ALKPHOS, ALT, AST, GLUCOSE, MG, PHOS in the last 48 hours.        Significant Imaging:   I have reviewed all pertinent imaging results/findings within the past 24 hours.    Assessment/Plan:     Pulmonary nodule    Bronchoscopy today for diagnosis.        Pleural effusion on left    Thoracentesis negative for malignancy.  Bronchoscopy today            I have explained the risks, benefits and alternatives of the procedure in detail.  The patient voices understanding and all questions have been answered.  The patient agrees to proceed as planned.     Lizbeth Still MD  Pulmonology  [facility]

## 2017-08-31 NOTE — HOSPITAL COURSE
Diagnostic and staging bronchoscopy to be performed.  I have explained the risks, benefits and alternatives of the procedure in detail.  The patient voices understanding and all questions have been answered.  The patient agrees to proceed as planned.

## 2017-09-01 ENCOUNTER — TELEPHONE (OUTPATIENT)
Dept: PULMONOLOGY | Facility: CLINIC | Age: 72
End: 2017-09-01

## 2017-09-01 DIAGNOSIS — R91.1 PULMONARY NODULE: Primary | ICD-10-CM

## 2017-09-01 DIAGNOSIS — C34.92 SQUAMOUS CELL LUNG CANCER, LEFT: ICD-10-CM

## 2017-09-01 NOTE — TELEPHONE ENCOUNTER
Called patient. biopsy results reviewed. Questions answered.   Plan for pet and referral to oncology

## 2017-09-05 ENCOUNTER — TELEPHONE (OUTPATIENT)
Dept: HEMATOLOGY/ONCOLOGY | Facility: CLINIC | Age: 72
End: 2017-09-05

## 2017-09-07 ENCOUNTER — HOSPITAL ENCOUNTER (OUTPATIENT)
Dept: RADIOLOGY | Facility: HOSPITAL | Age: 72
Discharge: HOME | End: 2017-09-07
Attending: INTERNAL MEDICINE
Payer: MEDICARE

## 2017-09-07 DIAGNOSIS — C34.92 SQUAMOUS CELL LUNG CANCER, LEFT: ICD-10-CM

## 2017-09-07 DIAGNOSIS — R91.1 PULMONARY NODULE: ICD-10-CM

## 2017-09-07 DIAGNOSIS — M79.89 LEG SWELLING: ICD-10-CM

## 2017-09-07 PROCEDURE — A9552 F18 FDG: HCPCS

## 2017-09-07 PROCEDURE — 78815 PET IMAGE W/CT SKULL-THIGH: CPT | Mod: 26,PI,, | Performed by: RADIOLOGY

## 2017-09-07 RX ORDER — HYDROCHLOROTHIAZIDE 25 MG/1
25 TABLET ORAL DAILY
Qty: 90 TABLET | Refills: 1 | Status: SHIPPED | OUTPATIENT
Start: 2017-09-07 | End: 2018-01-24 | Stop reason: SDUPTHER

## 2017-09-07 NOTE — TELEPHONE ENCOUNTER
----- Message from Nani Solorzano sent at 9/7/2017  1:28 PM CDT -----  Contact: self  Refill : hydrochlorothiazide . Patient can be reached at [phone].      Thanks,

## 2017-09-08 ENCOUNTER — TELEPHONE (OUTPATIENT)
Dept: HEMATOLOGY/ONCOLOGY | Facility: CLINIC | Age: 72
End: 2017-09-08

## 2017-09-08 NOTE — TELEPHONE ENCOUNTER
Called and spoke with patient in regards to her upcoming appointment on 9/11/17, Her appointment was incorrectly booked to allow for enough time for her to meet with Dr Wong her appt was rescheduled for the same day at 2:20, patient was agreeable to the change in time.

## 2017-09-08 NOTE — PROGRESS NOTES
CC: Squamous cell carcinoma    HPI:  is a 71 y/o female who underwent bronchoscopy/EBUS evaluation on 8/31/17 for abnormal mediastinal adenopathy  and a 1.2 cm MORRO mass.    Bronchoscopy findings:    The oropharynx appears normal. The larynx appears normal. The vocal cords appear normal. The subglottic space is normal. The trachea is of normal caliber. The otf is sharp. The tracheobronchial tree was examined to at least the first subsegmental level. Bronchial mucosa and anatomy are normal; there are no endobronchial lesions, and no secretions.    Lymph Nodes: Lymph node sizing was performed via endobronchial ultrasound for suspected lung cancer. Sampling by transbronchial needle aspiration was also performed using an Olympus EBUS-TBNA 22  gauge needle in the subcarinal mediastinum (level 7) and sent for routine cytology.       - The 7 (subcarinal) node was 30 mm by EBUS. Three samples with the needle were obtained. The patient's condition was unchanged after the intervention.    Review of Systems   Constitutional: Positive for malaise/fatigue. Negative for chills, fever and weight loss.   HENT: Negative for congestion, hearing loss and nosebleeds.    Eyes: Negative for blurred vision and double vision.   Respiratory: Positive for shortness of breath. Negative for cough, hemoptysis, sputum production and wheezing.    Cardiovascular: Negative for chest pain, palpitations, leg swelling and PND.   Gastrointestinal: Positive for abdominal pain. Negative for constipation, diarrhea, heartburn and nausea.   Musculoskeletal: Negative for back pain and myalgias.   Skin: Negative for rash.   Neurological: Negative for dizziness, tingling, tremors, sensory change and speech change.   Endo/Heme/Allergies: Does not bruise/bleed easily.   Psychiatric/Behavioral: Negative for depression.       Past Medical History:   Diagnosis Date    GERD (gastroesophageal reflux disease)     HTN (hypertension)     Rheumatoid  arthritis     Rheumatoid arthritis(714.0)          Past Surgical History:   Procedure Laterality Date    APPENDECTOMY      HYSTERECTOMY      SKIN BIOPSY      TOTAL KNEE ARTHROPLASTY      right       Family History   Problem Relation Age of Onset    Hypertension      Kidney disease           Social History     Social History    Marital status:      Spouse name: N/A    Number of children: N/A    Years of education: N/A     Occupational History    Not on file.     Social History Main Topics    Smoking status: Former Smoker    Smokeless tobacco: Never Used    Alcohol use Yes      Comment: Occasionally    Drug use: No    Sexual activity: Not Currently     Review of patient's allergies indicates:   Allergen Reactions    Latex, natural rubber     Codeine     Codeine Hallucinations    Cortisporin [neomycin-bacitracin-poly-hc]     Latex, natural rubber Rash       Vitals:    09/11/17 1437   BP: 131/72   Pulse: 80   Resp: 16   Temp: 97.9 °F (36.6 °C)   Physical Exam   Constitutional: She appears well-developed and well-nourished.   HENT:   Head: Normocephalic and atraumatic.   Eyes: Pupils are equal, round, and reactive to light. No scleral icterus.   Neck: Normal range of motion.   Cardiovascular: Normal rate and regular rhythm.    Pulmonary/Chest: Effort normal and breath sounds normal. No respiratory distress.   Abdominal: Soft. Bowel sounds are normal. She exhibits no distension and no mass. There is no guarding.   Musculoskeletal: Normal range of motion. She exhibits no edema.   Lymphadenopathy:     She has no cervical adenopathy.   Neurological: She is alert. No cranial nerve deficit.   Skin: Skin is warm.   Psychiatric: She has a normal mood and affect.         Current Outpatient Prescriptions   Medication Sig    baclofen (LIORESAL) 10 MG tablet Take 1 tablet (10 mg total) by mouth 3 (three) times daily.    clotrimazole (LOTRIMIN) 1 % cream Apply topically 2 (two) times daily.     diphenhydrAMINE (BENADRYL) 25 mg capsule Take 1 each (25 mg total) by mouth every 6 (six) hours as needed for Itching or Allergies.    docusate sodium (COLACE) 100 MG capsule Take 1 capsule (100 mg total) by mouth 3 (three) times daily as needed for Constipation.    escitalopram oxalate (LEXAPRO) 10 MG tablet Take 1 tablet (10 mg total) by mouth once daily.    famotidine (PEPCID) 20 MG tablet Take 1 tablet (20 mg total) by mouth 2 (two) times daily.    hydrochlorothiazide (HYDRODIURIL) 25 MG tablet Take 1 tablet (25 mg total) by mouth once daily.    hydrocodone-acetaminophen 5-325mg (NORCO) 5-325 mg per tablet Take 1 tablet by mouth every 6 (six) hours as needed for Pain.    hydroxychloroquine (PLAQUENIL) 200 mg tablet Take by mouth once daily.     hydrOXYzine HCl (ATARAX) 25 MG tablet TAKE 1 TABLET (25 MG TOTAL) BY MOUTH 3 (THREE) TIMES DAILY AS NEEDED FOR ITCHING.    ketoconazole (NIZORAL) 2 % shampoo Apply topically once daily.    levocetirizine (XYZAL) 5 MG tablet Take 1 tablet (5 mg total) by mouth every evening.    losartan (COZAAR) 100 MG tablet Take 1 tablet (100 mg total) by mouth once daily.    methotrexate 2.5 MG Tab     pantoprazole (PROTONIX) 40 MG tablet Take 1 tablet (40 mg total) by mouth once daily.    trazodone (DESYREL) 100 MG tablet Take 1 tablet (100 mg total) by mouth every evening.    triamcinolone acetonide 0.1% (KENALOG) 0.1 % ointment Apply topically 2 (two) times daily.     No current facility-administered medications for this visit.        Imagin/12/17 CT chest with cont     1. Large left pleural effusion resulting in atelectasis of the left lower lobe and portions of left upper lobe  2. 1.2 cm left upper lobe pleural based pulmonary nodule  3. Mediastinal adenopathy as described above with diffuse thickening of the wall of the distal esophagus. Findings are suspicious for neoplastic process. Recommend bronchoscopy biopsy and possible endoscopy for further  evaluation.  4. 1.3 cm hypodensity within the dome of the liver. Metastatic focus cannot be entirely excluded.    9/7/17 PET CT      There is mildly increased tracer uptake within the patient's left pleural effusion and overlying the mediastinal adenopathy, max SUV 2.7.    Transsmission CT images show continued pleural effusion and circumferential esophageal thickening. Transmission CT images also show non-avid bilateral groin adenopathy likely reactive in nature.     Impression       There is mildly increased tracer uptake within the patient's left pleural effusion and mediastinal adenopathy. While these findings suggest reactive etiology some low grade malignancies, such as bronchioloalveolar carcinoma, may show this level of uptake on PET scan.       Pathology:    1/17/14 colonoscopy    FINAL PATHOLOGIC DIAGNOSIS    1. Colon biopsy, ascending colon-tubular adenoma.  2. Colon biopsy, transverse-tubular adenoma.  3. 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 The stalk and base of the polyp are well represented and are free of atypia.  4. Colon biopsy, sigmoid- mild glandular hyperplasia consistent with a benign hyperplastic polyp.      7/19/17 PLEURAL FLUID (CYTOLOGY AND CELL BLOCK):    -Groups of atypical cells present, malignancy cannot be excluded.  Note: While these cells could be reactive mesothelial cells, the level of atypia could be seen in malignancy. A cell block was prepared but the atypical cells are not present in the cell block for further characterization. Correlate  clinically. Repeat tap/biopsy might be helpful if clinically indicated.    8/28/17 EBUS FNA     FNA of subcarinal lymph node: Positive for malignant cells  Small clusters of malignant epithelial cells, favor squamous carcinoma Tumor cells are positive for CK 5/6 and CK 7. Immunostains for p63 and TTF-1 are  negative.    Assessment:   is a 73 y/o female who underwent bronchoscopy/EBUS evaluation on 8/31/17 for abnormal mediastinal adenopathy  and a 1.2 cm MORRO mass as well as pericardial adenopathy.FNA of subcarinal lymph node was positive for malignant cells.  There were small clusters of malignant epithelial cells, favoring squamous carcinoma. Site of origin is not clear. PET CT on 9/7/17, which I personally reviewed today, did not reveal any lesion in head and neck region.  Esophageal thickening has been noted on CT done on 6/12/17. She will be referred for EGD, as well as for ENT evaluation for possible upper airway endoscopy to r/o head and neck occult primary.  She will have MRI brain, port placement. She will take Alprazolam 0.5mg 1 hr prior to MRI brain.     I explained to her that if primary site is unclear, treatment will be directed at metastatic SCC of unknown primary.    2. Chest and abdominal pain: She has long h/o body aches. She takes Hydrocodone/APAP. She is on not on any DAMARDs for her RA at this time. She will start extended release Morphine 15mg q12hr for her pain.     Plan:    1. Port placement  2. MRI brain  3. GI, ENT evaluations  4. Morphine 15mg SR q 12hr  5. CBC, CMP, LDH today  6. F/U in 1 wk

## 2017-09-11 ENCOUNTER — INITIAL CONSULT (OUTPATIENT)
Dept: HEMATOLOGY/ONCOLOGY | Facility: CLINIC | Age: 72
End: 2017-09-11
Payer: MEDICARE

## 2017-09-11 ENCOUNTER — LAB VISIT (OUTPATIENT)
Dept: LAB | Facility: HOSPITAL | Age: 72
End: 2017-09-11
Attending: INTERNAL MEDICINE
Payer: MEDICARE

## 2017-09-11 VITALS
DIASTOLIC BLOOD PRESSURE: 72 MMHG | WEIGHT: 218.94 LBS | SYSTOLIC BLOOD PRESSURE: 131 MMHG | RESPIRATION RATE: 16 BRPM | BODY MASS INDEX: 34.36 KG/M2 | TEMPERATURE: 98 F | HEIGHT: 67 IN | HEART RATE: 80 BPM

## 2017-09-11 DIAGNOSIS — C44.92 SQUAMOUS CELL CARCINOMA OF UNKNOWN ORIGIN: ICD-10-CM

## 2017-09-11 DIAGNOSIS — R91.1 PULMONARY NODULE: Primary | ICD-10-CM

## 2017-09-11 DIAGNOSIS — F41.1 ANXIETY STATE: ICD-10-CM

## 2017-09-11 LAB
ALBUMIN SERPL BCP-MCNC: 3.1 G/DL
ALP SERPL-CCNC: 90 U/L
ALT SERPL W/O P-5'-P-CCNC: 11 U/L
ANION GAP SERPL CALC-SCNC: 9 MMOL/L
AST SERPL-CCNC: 16 U/L
BASOPHILS # BLD AUTO: 0.02 K/UL
BASOPHILS NFR BLD: 0.4 %
BILIRUB SERPL-MCNC: 0.3 MG/DL
BUN SERPL-MCNC: 12 MG/DL
CALCIUM SERPL-MCNC: 9.5 MG/DL
CHLORIDE SERPL-SCNC: 101 MMOL/L
CO2 SERPL-SCNC: 30 MMOL/L
CREAT SERPL-MCNC: 0.8 MG/DL
DIFFERENTIAL METHOD: ABNORMAL
EOSINOPHIL # BLD AUTO: 0.1 K/UL
EOSINOPHIL NFR BLD: 2.5 %
ERYTHROCYTE [DISTWIDTH] IN BLOOD BY AUTOMATED COUNT: 12.8 %
EST. GFR  (AFRICAN AMERICAN): >60 ML/MIN/1.73 M^2
EST. GFR  (NON AFRICAN AMERICAN): >60 ML/MIN/1.73 M^2
GLUCOSE SERPL-MCNC: 96 MG/DL
HCT VFR BLD AUTO: 38.9 %
HGB BLD-MCNC: 12.3 G/DL
LDH SERPL L TO P-CCNC: 254 U/L
LYMPHOCYTES # BLD AUTO: 2 K/UL
LYMPHOCYTES NFR BLD: 42.6 %
MCH RBC QN AUTO: 27.1 PG
MCHC RBC AUTO-ENTMCNC: 31.6 G/DL
MCV RBC AUTO: 86 FL
MONOCYTES # BLD AUTO: 0.5 K/UL
MONOCYTES NFR BLD: 10.3 %
NEUTROPHILS # BLD AUTO: 2.1 K/UL
NEUTROPHILS NFR BLD: 44 %
PLATELET # BLD AUTO: 226 K/UL
PMV BLD AUTO: 9.7 FL
POTASSIUM SERPL-SCNC: 4.2 MMOL/L
PROT SERPL-MCNC: 8.2 G/DL
RBC # BLD AUTO: 4.54 M/UL
SODIUM SERPL-SCNC: 140 MMOL/L
WBC # BLD AUTO: 4.74 K/UL

## 2017-09-11 PROCEDURE — 85025 COMPLETE CBC W/AUTO DIFF WBC: CPT

## 2017-09-11 PROCEDURE — 36415 COLL VENOUS BLD VENIPUNCTURE: CPT

## 2017-09-11 PROCEDURE — 99205 OFFICE O/P NEW HI 60 MIN: CPT | Mod: S$GLB,,, | Performed by: INTERNAL MEDICINE

## 2017-09-11 PROCEDURE — 1159F MED LIST DOCD IN RCRD: CPT | Mod: S$GLB,,, | Performed by: INTERNAL MEDICINE

## 2017-09-11 PROCEDURE — 3075F SYST BP GE 130 - 139MM HG: CPT | Mod: S$GLB,,, | Performed by: INTERNAL MEDICINE

## 2017-09-11 PROCEDURE — 99999 PR PBB SHADOW E&M-EST. PATIENT-LVL V: CPT | Mod: PBBFAC,,, | Performed by: INTERNAL MEDICINE

## 2017-09-11 PROCEDURE — 83615 LACTATE (LD) (LDH) ENZYME: CPT

## 2017-09-11 PROCEDURE — 3078F DIAST BP <80 MM HG: CPT | Mod: S$GLB,,, | Performed by: INTERNAL MEDICINE

## 2017-09-11 PROCEDURE — 1125F AMNT PAIN NOTED PAIN PRSNT: CPT | Mod: S$GLB,,, | Performed by: INTERNAL MEDICINE

## 2017-09-11 PROCEDURE — 80053 COMPREHEN METABOLIC PANEL: CPT

## 2017-09-11 PROCEDURE — 99499 UNLISTED E&M SERVICE: CPT | Mod: S$PBB,,, | Performed by: INTERNAL MEDICINE

## 2017-09-11 PROCEDURE — 3008F BODY MASS INDEX DOCD: CPT | Mod: S$GLB,,, | Performed by: INTERNAL MEDICINE

## 2017-09-11 RX ORDER — ALPRAZOLAM 0.5 MG/1
0.5 TABLET ORAL ONCE
Qty: 2 TABLET | Refills: 0 | Status: SHIPPED | OUTPATIENT
Start: 2017-09-11 | End: 2018-01-24 | Stop reason: DRUGHIGH

## 2017-09-11 RX ORDER — MORPHINE SULFATE 15 MG/1
15 TABLET, FILM COATED, EXTENDED RELEASE ORAL 2 TIMES DAILY
Qty: 60 TABLET | Refills: 0 | Status: SHIPPED | OUTPATIENT
Start: 2017-09-11 | End: 2017-10-13 | Stop reason: SDUPTHER

## 2017-09-11 NOTE — Clinical Note
-MRI brain with cont --GI for EGD to r/o esophageal cancer --ENT for upper airway evaluation--? Occult primary --Port placement

## 2017-09-11 NOTE — LETTER
September 11, 2017      Evelia Pruitt MD  [address]  [facility] [address]           [facility]  [address]  [facility] [address]  Phone: [phone]          Patient: Silvia Capellan   MR Number: [id number]   YOB: 1945   Date of Visit: 9/11/2017       Dear Dr. Evelia Pruitt:    Thank you for referring Silvia Capellan to me for evaluation. Attached you will find relevant portions of my assessment and plan of care.    If you have questions, please do not hesitate to call me. I look forward to following Silvia Capellan along with you.    Sincerely,    Shayne Wong MD    Enclosure  CC:  No Recipients    If you would like to receive this communication electronically, please contact [email or web address] or [phone] to request more information on [employer] Link access.    For providers and/or their staff who would like to refer a patient to [facility], please contact us through our one-stop-shop provider referral line, [facility], at [phone].    If you feel you have received this communication in error or would no longer like to receive these types of communications, please e-mail [email or web address]

## 2017-09-12 ENCOUNTER — TELEPHONE (OUTPATIENT)
Dept: HEMATOLOGY/ONCOLOGY | Facility: CLINIC | Age: 72
End: 2017-09-12

## 2017-09-12 NOTE — TELEPHONE ENCOUNTER
----- Message from Andrea Braun sent at 9/12/2017  9:20 AM CDT -----  CIERA Lewis sent a IM to Yasmin asking her to schedule port, the vm box was full. Thanks   ----- Message -----  From: Bambi Real MA  Sent: 9/12/2017   7:08 AM  To: Andrea Kade    Patient is not on any blood thinners per her [facility] chart    Thanks  Bambi  ----- Message -----  From: Bambi Real MA  Sent: 9/11/2017   3:34 PM  To: Bambi Real MA    MRI brain with cont   --GI for EGD to r/o esophageal cancer   --ENT for upper airway evaluation--? Occult primary   --Port placement

## 2017-09-13 ENCOUNTER — DOCUMENTATION ONLY (OUTPATIENT)
Dept: CARDIOTHORACIC SURGERY | Facility: CLINIC | Age: 72
End: 2017-09-13

## 2017-09-13 NOTE — PATIENT CARE CONFERENCE
[facility]      THORACIC MULTIDISCIPLINARY TUMOR BOARD  PATIENT REVIEW FORM  ________________________________________________________________________    CLINIC #: [id number]  DATE: 09/13/2017    TUMOR SITE:   NSCLC    PRESENTER:   Dr. Vieira    PATIENT SUMMARY:   73 y/o with a CT chest on 6/12/17 noting:   1. Large left pleural effusion resulting in atelectasis of the left lower lobe and portions of left upper lobe  2. 1.2 cm left upper lobe pleural based pulmonary nodule  3. Mediastinal adenopathy as described above with diffuse thickening of the wall of the distal esophagus. Findings are suspicious for neoplastic process. Recommend bronchoscopy biopsy and possible endoscopy for further evaluation.  4. 1.3 cm hypodensity within the dome of the liver. Metastatic focus cannot be entirely excluded.    Bronchoscopy/EBUS on 8/31/17 pathology revealed:  Positive for malignant cells. Small clusters of malignant epithelial cells, favor squamous carcinoma. Tumor cells are positive for CK 5/6 and CK 7. Immunostains for p63 and TTF-1 are negative.  Note: If it is possible to obtain additional tissue we may be able to make a more specific diagnosis and perform molecular studies.    PET on 9/7/17 revealed There is mildly increased tracer uptake within the patient's left pleural effusion and mediastinal adenopathy. While these findings suggest reactive etiology some low grade malignancies, such as bronchioloalveolar carcinoma, may show this level of uptake on PET scan.    BOARD RECOMMENDATIONS:   Obtain EUS.    CONSULT NEEDED:     [] Surgery    [] Hem/Onc    [] Rad/Onc    [] Dietary                 [] Social Service    [] Psychology       [] Pulmonology    Clinical Stage: Tumor  Node(s)  Metastasis   Pathologic Stage: Tumor  Node(s)  Metastasis   CK 5/6: Positive  CK 7: Positive  P63: Negative  TTF-1: Negative.    GROUP STAGE:     [] O    [] 1A    [] IB    [] IIA    [] IIB     [] IIIA     [] IIIB     [] IIIC    []  IV                               [] Local recurrence     [] Regional recurrence     [] Distant recurrence                   [x] NSCLC     [] SCLC     Tumor type: Squamous    Unstageable:      [] Yes     [] No  Metastatic site(s):          [x] [facility] Treatment Guidelines reviewed and care planned is consistent with guidelines.         (i.e., NCCN, NCI, PD, ACO, AUA, etc.)    PRESENTATION AT CANCER CONFERENCE:         [] Prospective    [] Retrospective     [] Follow-Up          [] Eligible for clinical trial

## 2017-09-15 ENCOUNTER — TELEPHONE (OUTPATIENT)
Dept: HEMATOLOGY/ONCOLOGY | Facility: CLINIC | Age: 72
End: 2017-09-15

## 2017-09-15 DIAGNOSIS — C44.92 SQUAMOUS CELL CARCINOMA OF UNKNOWN ORIGIN: Primary | ICD-10-CM
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
## 2017-09-18 DIAGNOSIS — L25.9 CONTACT DERMATITIS, UNSPECIFIED CONTACT DERMATITIS TYPE, UNSPECIFIED TRIGGER: ICD-10-CM

## 2017-09-18 RX ORDER — TRIAMCINOLONE ACETONIDE 1 MG/G
OINTMENT TOPICAL 2 TIMES DAILY
Qty: 60 G | Refills: 1 | Status: ON HOLD | OUTPATIENT
Start: 2017-09-18 | End: 2017-11-14 | Stop reason: CLARIF

## 2017-09-20 LAB
ACID FAST MOD KINY STN SPEC: NORMAL
MYCOBACTERIUM SPEC QL CULT: NORMAL

## 2017-09-21 DIAGNOSIS — C44.92 SQUAMOUS CELL CARCINOMA OF UNKNOWN ORIGIN: Primary | ICD-10-CM

## 2017-09-21 DIAGNOSIS — G47.00 INSOMNIA, UNSPECIFIED TYPE: ICD-10-CM

## 2017-09-21 RX ORDER — TRAZODONE HYDROCHLORIDE 100 MG/1
100 TABLET ORAL NIGHTLY
Qty: 30 TABLET | Refills: 2 | Status: SHIPPED | OUTPATIENT
Start: 2017-09-21 | End: 2018-01-24

## 2017-09-27 DIAGNOSIS — K21.9 GASTROESOPHAGEAL REFLUX DISEASE, ESOPHAGITIS PRESENCE NOT SPECIFIED: ICD-10-CM

## 2017-09-27 DIAGNOSIS — F41.9 ANXIETY: ICD-10-CM

## 2017-09-27 RX ORDER — ESCITALOPRAM OXALATE 10 MG/1
10 TABLET ORAL DAILY
Qty: 90 TABLET | Refills: 1 | Status: SHIPPED | OUTPATIENT
Start: 2017-09-27 | End: 2018-01-01

## 2017-09-27 RX ORDER — PANTOPRAZOLE SODIUM 40 MG/1
40 TABLET, DELAYED RELEASE ORAL DAILY
Qty: 90 TABLET | Refills: 1 | Status: SHIPPED | OUTPATIENT
Start: 2017-09-27 | End: 2018-04-18 | Stop reason: SDUPTHER

## 2017-10-02 ENCOUNTER — OFFICE VISIT (OUTPATIENT)
Dept: RHEUMATOLOGY | Facility: CLINIC | Age: 72
End: 2017-10-02
Payer: MEDICARE

## 2017-10-02 VITALS
HEART RATE: 75 BPM | BODY MASS INDEX: 34.06 KG/M2 | RESPIRATION RATE: 18 BRPM | SYSTOLIC BLOOD PRESSURE: 121 MMHG | DIASTOLIC BLOOD PRESSURE: 78 MMHG | WEIGHT: 217 LBS | HEIGHT: 67 IN

## 2017-10-02 DIAGNOSIS — M15.9 PRIMARY OSTEOARTHRITIS INVOLVING MULTIPLE JOINTS: Primary | ICD-10-CM

## 2017-10-02 PROCEDURE — 99999 PR PBB SHADOW E&M-EST. PATIENT-LVL III: CPT | Mod: PBBFAC,,, | Performed by: INTERNAL MEDICINE

## 2017-10-02 PROCEDURE — 99213 OFFICE O/P EST LOW 20 MIN: CPT | Mod: S$GLB,,, | Performed by: INTERNAL MEDICINE

## 2017-10-02 ASSESSMENT — ROUTINE ASSESSMENT OF PATIENT INDEX DATA (RAPID3)
FATIGUE SCORE: 0
PSYCHOLOGICAL DISTRESS SCORE: 3.3
AM STIFFNESS SCORE: 1, YES
PAIN SCORE: 8.5
WHEN YOU AWAKENED IN THE MORNING OVER THE LAST WEEK, PLEASE INDICATE THE AMOUNT OF TIME IT TAKES UNTIL YOU ARE AS LIMBER AS YOU WILL BE FOR THE DAY: 15MIN
MDHAQ FUNCTION SCORE: 0

## 2017-10-03 NOTE — PROGRESS NOTES
History of present illness: 72-year-old female I saw initially in August.  She presented with a 12 year history of back problems.  5 years ago she developed a polyarthritis.  She was originally diagnosed as having rheumatoid arthritis.  She had been treated in the past with methotrexate and Plaquenil.  She was not on these medications when I saw her.  She had had a previous right total hip replacement.  At the time of her examination she had no evidence of an inflammatory arthritis.  I ordered laboratory studies and x-rays.  I placed her on baclofen for muscle stiffness.  I had referred her to back and spine clinic, where she had been seen previously.  She comes back now for follow-up.    Since her last visit she has had several more falls.  Her last fall was on Saturday.  She developed abrasions on the right arm but no other injuries.  She had further evaluation of the pleural effusion and pulmonary nodule.  She was diagnosed as having metastatic squamous cell carcinoma, uncertain primary.  She is still undergoing workup.  She was placed on morphine for her pain.  This has been helping.  Her main complaint at this time is stiffness.  This is especially bad in the back.  She has no radicular pain.  She had no response to baclofen but she is tolerating it.  She has had no joint swelling.    Physical examination:  Musculoskeletal: She has bony hypertrophy of the PIPs bilaterally.  She has no synovitis in the hands.  Elbows and shoulders are unremarkable.  She has bony hypertrophy of the knees but no effusions.  Ankles and feet are unremarkable.  Laboratory:  She has a negative rheumatoid factor and CCP antibody.  Her ADIEL is +1:160, nucleolar pattern with negative ADIEL panel.  Radiology: X-ray show evidence of osteoarthritis but no erosive disease    Assessment: I cannot confirm the diagnosis of rheumatoid arthritis at this time.  Her underlying problem seems to be osteoarthritis.  The ADIEL test is most likely a false  positive test.    Plans:  1.  Increase baclofen to 20 mg 3 times daily.  If this does not help, I will try a different muscle relaxant  2.  Return to see me in 6 months

## 2017-10-05 ENCOUNTER — TELEPHONE (OUTPATIENT)
Dept: HEMATOLOGY/ONCOLOGY | Facility: CLINIC | Age: 72
End: 2017-10-05

## 2017-10-05 NOTE — TELEPHONE ENCOUNTER
----- Message from Symone Flores sent at 10/5/2017  8:01 AM CDT -----  Contact: Pt  Pt can not make it for her appt this morning. She needs to reschedule it    Pt call back number [phone]

## 2017-10-06 ENCOUNTER — OFFICE VISIT (OUTPATIENT)
Dept: GASTROENTEROLOGY | Facility: CLINIC | Age: 72
End: 2017-10-06
Payer: MEDICARE

## 2017-10-06 VITALS
HEART RATE: 85 BPM | SYSTOLIC BLOOD PRESSURE: 123 MMHG | BODY MASS INDEX: 33.53 KG/M2 | WEIGHT: 213.63 LBS | DIASTOLIC BLOOD PRESSURE: 80 MMHG | HEIGHT: 67 IN

## 2017-10-06 DIAGNOSIS — R94.8 ABNORMAL GASTROINTESTINAL PET SCAN: Primary | ICD-10-CM

## 2017-10-06 DIAGNOSIS — K21.9 GASTROESOPHAGEAL REFLUX DISEASE, ESOPHAGITIS PRESENCE NOT SPECIFIED: ICD-10-CM

## 2017-10-06 DIAGNOSIS — Z86.010 HISTORY OF COLON POLYPS: ICD-10-CM

## 2017-10-06 PROCEDURE — 99204 OFFICE O/P NEW MOD 45 MIN: CPT | Mod: S$GLB,,, | Performed by: NURSE PRACTITIONER

## 2017-10-06 PROCEDURE — 99499 UNLISTED E&M SERVICE: CPT | Mod: S$PBB,,, | Performed by: NURSE PRACTITIONER

## 2017-10-06 PROCEDURE — 99999 PR PBB SHADOW E&M-EST. PATIENT-LVL III: CPT | Mod: PBBFAC,,, | Performed by: NURSE PRACTITIONER

## 2017-10-06 NOTE — PROGRESS NOTES
[facility] Note    Reason for Visit:  The primary encounter diagnosis was Abnormal gastrointestinal PET scan. Diagnoses of Gastroesophageal reflux disease, esophagitis presence not specified and History of colon polyps were also pertinent to this visit.    PCP:   Ronak Guillaume   [address] [facility] [address] [facility] [address]    Referring MD:  Shayne Wong Md  [address] [place] [address]    HPI:  This is a 72 y.o. female here for evaluation of esophageal thickening. Ms. Capellan is new to the clinic.     Recent PET scan 9/7/17 displays mediastinal adenopathy with diffuse thickening of the wall of the distal esophagus. Findings are suspicious for neoplastic process. Recommend bronchoscopy biopsy and possible endoscopy for further evaluation. Pt reports no prior hx of EGD.     Hx of Gerd x years. Taking Pepcid 20 mg daily. Reflux symptoms are pyrosis and bitter taste in mouth occurring once a week with food triggers of acidic foods and coffee. Denies regurgitation, dysphagia/odynophagia, globus sensation. NSAID usage- none.     Having a normal formed stool daily. Denies abdominal pain, blood in stool, and black tarry stool.     ROS:  Constitutional: No fevers, no chills, No unintentional weight loss, no fatigue   ENT: No allergies  CV: No chest pain, no palpitations, no perif. edema  Pulm: No cough, No shortness of breath, no wheezes, no sputum  Ophtho: No vision changes  GI: see HPI; also no nausea, no vomiting, no change in appetite  Derm: No rash  Heme: No lymphadenopathy, No bruising  MSK: No arthritis, no muscle pain, no muscle weakness  : No dysuria, No hematuria  Endo: No hot or cold intolerance  Neuro: No syncope, No seizure     Medical History:  has a past medical history of GERD (gastroesophageal reflux disease); HTN (hypertension); Rheumatoid arthritis; and Rheumatoid arthritis(714.0).    Surgical History:  has a past surgical history that includes Appendectomy;  Hysterectomy; Total knee arthroplasty; Skin biopsy; and Colonoscopy.    Family History: family history is not on file..     Social History:  reports that she has quit smoking. She has never used smokeless tobacco. She reports that she drinks alcohol. She reports that she does not use drugs.    Review of patient's allergies indicates:   Allergen Reactions    Latex, natural rubber     Codeine     Codeine Hallucinations    Cortisporin [neomycin-bacitracin-poly-hc]     Latex, natural rubber Rash     Current Outpatient Prescriptions   Medication Sig    alprazolam (XANAX) 0.5 MG tablet Take 1 tablet (0.5 mg total) by mouth once. 1hr prior to MRI    baclofen (LIORESAL) 10 MG tablet Take 1 tablet (10 mg total) by mouth 3 (three) times daily.    clotrimazole (LOTRIMIN) 1 % cream Apply topically 2 (two) times daily.    diphenhydrAMINE (BENADRYL) 25 mg capsule Take 1 each (25 mg total) by mouth every 6 (six) hours as needed for Itching or Allergies.    docusate sodium (COLACE) 100 MG capsule Take 1 capsule (100 mg total) by mouth 3 (three) times daily as needed for Constipation.    escitalopram oxalate (LEXAPRO) 10 MG tablet Take 1 tablet (10 mg total) by mouth once daily.    famotidine (PEPCID) 20 MG tablet Take 1 tablet (20 mg total) by mouth 2 (two) times daily.    hydrochlorothiazide (HYDRODIURIL) 25 MG tablet Take 1 tablet (25 mg total) by mouth once daily.    hydrocodone-acetaminophen 5-325mg (NORCO) 5-325 mg per tablet Take 1 tablet by mouth every 6 (six) hours as needed for Pain.    hydroxychloroquine (PLAQUENIL) 200 mg tablet Take by mouth once daily.     hydrOXYzine HCl (ATARAX) 25 MG tablet TAKE 1 TABLET (25 MG TOTAL) BY MOUTH 3 (THREE) TIMES DAILY AS NEEDED FOR ITCHING.    ketoconazole (NIZORAL) 2 % shampoo Apply topically once daily.    levocetirizine (XYZAL) 5 MG tablet Take 1 tablet (5 mg total) by mouth every evening.    losartan (COZAAR) 100 MG tablet Take 1 tablet (100 mg total) by mouth  "once daily.    methotrexate 2.5 MG Tab     morphine (MS CONTIN) 15 MG 12 hr tablet Take 1 tablet (15 mg total) by mouth 2 (two) times daily.    pantoprazole (PROTONIX) 40 MG tablet Take 1 tablet (40 mg total) by mouth once daily.    trazodone (DESYREL) 100 MG tablet Take 1 tablet (100 mg total) by mouth every evening.    triamcinolone acetonide 0.1% (KENALOG) 0.1 % ointment Apply topically 2 (two) times daily.     No current facility-administered medications for this visit.      Objective Findings:    Vital Signs:  /80   Pulse 85   Ht 5' 7" (1.702 m)   Wt 96.9 kg (213 lb 10 oz)   BMI 33.46 kg/m²   Body mass index is 33.46 kg/m².    Physical Exam:  General Appearance: Well appearing in no acute distress  Head: Normocephalic, without obvious abnormality  Eyes: No scleral icterus, EOMI  ENT: Neck supple, Lips, mucosa, and tongue normal; teeth and gums normal  Lungs: CTA bilaterally in anterior and posterior fields, no wheezes, no crackles.  Heart: Regular rate and rhythm, no murmurs heard  Abdomen: Soft, non tender, non distended with positive bowel sounds in all four quadrants.  Extremities: No clubbing, cyanosis or edema  Skin: No rash to exposed areas  Neurologic: AAOx4    Labs:  Lab Results   Component Value Date    WBC 4.74 09/11/2017    HGB 12.3 09/11/2017    HCT 38.9 09/11/2017     09/11/2017    CHOL 216 (H) 04/03/2017    TRIG 50 04/03/2017    HDL 87 (H) 04/03/2017    ALT 11 09/11/2017    AST 16 09/11/2017     09/11/2017    K 4.2 09/11/2017     09/11/2017    CREATININE 0.8 09/11/2017    BUN 12 09/11/2017    CO2 30 (H) 09/11/2017    INR 1.0 07/19/2017     Imaging:   PET scan 9/7/17- displays mediastinal adenopathy with diffuse thickening of the wall of the distal esophagus. Findings are suspicious for neoplastic process. Recommend bronchoscopy biopsy and possible endoscopy for further evaluation.    Endoscopy:    EGD- none    Colonoscopy- 1/2014- Non-bleeding internal " hemorrhoids. Mild diverticulosis in the sigmoid colon. There was no evidence of diverticular bleeding. Ascending colon polp removed bx- tubular adenoma. Transverse colon polyp removed bx- tubular adenoma. 20 mm sigmoid colon polyp removed bx- Tubulovillous adenoma. Sigmoid polyps x 3 removed bx hyperplastic. Repeat in 3 years.     Assessment:  1. Abnormal gastrointestinal PET scan    2. Gastroesophageal reflux disease, esophagitis presence not specified    3. History of colon polyps      Recommendations:  1. & 2. Schedule EGD to evaluate esophageal thickening.   3. Schedule colonoscopy due to repeat this year with history of colon polyps.      Follow up after EGD and Colonoscopy.     Order summary:  Orders Placed This Encounter    Case request GI: ESOPHAGOGASTRODUODENOSCOPY (EGD), COLONOSCOPY     Thank you so much for allowing me to participate in the care of Silvia Vargas, JACQUES, FNP-C

## 2017-10-06 NOTE — LETTER
October 6, 2017      Shayne Wong MD  [address] [facility] [address]           [facility] - Gastroenterology  [address]  [facility] [address]  Phone: [phone]  Fax: [phone]          Patient: Silvia Capellan   MR Number: [id number]   YOB: 1945   Date of Visit: 10/6/2017       Dear Dr. Shayne Wong:    Thank you for referring Silvia Capellan to me for evaluation. Attached you will find relevant portions of my assessment and plan of care.    If you have questions, please do not hesitate to call me. I look forward to following Silvia Capellan along with you.    Sincerely,    Clary Vargas, NP    Enclosure  CC:  No Recipients    If you would like to receive this communication electronically, please contact [email or web address] or [phone] to request more information on [employer] Link access.    For providers and/or their staff who would like to refer a patient to [facility], please contact us through our one-stop-shop provider referral line, [facility], at [phone].    If you feel you have received this communication in error or would no longer like to receive these types of communications, please e-mail [email or web address]

## 2017-10-10 DIAGNOSIS — Z12.11 SPECIAL SCREENING FOR MALIGNANT NEOPLASMS, COLON: Primary | ICD-10-CM

## 2017-10-10 RX ORDER — POLYETHYLENE GLYCOL 3350, SODIUM SULFATE ANHYDROUS, SODIUM BICARBONATE, SODIUM CHLORIDE, POTASSIUM CHLORIDE 236; 22.74; 6.74; 5.86; 2.97 G/4L; G/4L; G/4L; G/4L; G/4L
4 POWDER, FOR SOLUTION ORAL ONCE
Qty: 4000 ML | Refills: 0 | Status: SHIPPED | OUTPATIENT
Start: 2017-10-10 | End: 2017-10-10

## 2017-10-13 ENCOUNTER — TELEPHONE (OUTPATIENT)
Dept: HEMATOLOGY/ONCOLOGY | Facility: CLINIC | Age: 72
End: 2017-10-13

## 2017-10-13 DIAGNOSIS — C44.92 SQUAMOUS CELL CARCINOMA OF UNKNOWN ORIGIN: ICD-10-CM

## 2017-10-13 RX ORDER — MORPHINE SULFATE 15 MG/1
15 TABLET, FILM COATED, EXTENDED RELEASE ORAL 2 TIMES DAILY
Qty: 60 TABLET | Refills: 0 | Status: SHIPPED | OUTPATIENT
Start: 2017-10-13 | End: 2017-11-17 | Stop reason: SDUPTHER

## 2017-10-13 NOTE — TELEPHONE ENCOUNTER
----- Message from Shayne Wong MD sent at 10/13/2017 12:13 PM CDT -----  Contact: Pt   Tuesday..she has to start treatment asap. thanks  ----- Message -----  From: Bambi Real MA  Sent: 10/13/2017  11:23 AM  To: Shayne Wong MD        ----- Message -----  From: Andrea Braun  Sent: 10/13/2017  11:09 AM  To: Marvin Bella Staff    [facility], can you please ask Dr. Wong if and when to schedule Mrs. Capellan per her request. Thanks   ----- Message -----  From: Rudy Cleaning  Sent: 10/13/2017  10:44 AM  To: Andrea Braun    Pt will like to schedule check appt w/ Dr. Wong before lab appts    Contact::[phone]

## 2017-10-13 NOTE — TELEPHONE ENCOUNTER
----- Message from Rudy Cleaning sent at 10/13/2017 10:41 AM CDT -----  Contact: Pt   Refill on morphine (MS CONTIN) 15 MG 12 hr tablet    Pharmacy Contact::[phone]  Contact::[phone]

## 2017-10-19 ENCOUNTER — TELEPHONE (OUTPATIENT)
Dept: HEMATOLOGY/ONCOLOGY | Facility: CLINIC | Age: 72
End: 2017-10-19

## 2017-10-19 NOTE — TELEPHONE ENCOUNTER
----- Message from Symone Flores sent at 10/19/2017  8:50 AM CDT -----  Contact: Pt  Pt calling to reschedule her appt with     Pt call back number [phone]

## 2017-10-30 ENCOUNTER — HOSPITAL ENCOUNTER (OUTPATIENT)
Facility: HOSPITAL | Age: 72
Discharge: HOME | End: 2017-10-30
Attending: INTERNAL MEDICINE | Admitting: INTERNAL MEDICINE
Payer: MEDICARE

## 2017-10-30 ENCOUNTER — ANESTHESIA EVENT (OUTPATIENT)
Dept: ENDOSCOPY | Facility: HOSPITAL | Age: 72
End: 2017-10-30
Payer: MEDICARE

## 2017-10-30 ENCOUNTER — ANESTHESIA (OUTPATIENT)
Dept: ENDOSCOPY | Facility: HOSPITAL | Age: 72
End: 2017-10-30
Payer: MEDICARE

## 2017-10-30 VITALS
WEIGHT: 210 LBS | BODY MASS INDEX: 32.96 KG/M2 | RESPIRATION RATE: 18 BRPM | HEIGHT: 67 IN | TEMPERATURE: 99 F | SYSTOLIC BLOOD PRESSURE: 138 MMHG | HEART RATE: 94 BPM | DIASTOLIC BLOOD PRESSURE: 74 MMHG | OXYGEN SATURATION: 100 %

## 2017-10-30 VITALS — RESPIRATION RATE: 21 BRPM

## 2017-10-30 DIAGNOSIS — Z86.010 HISTORY OF COLONIC POLYPS: Primary | ICD-10-CM

## 2017-10-30 PROCEDURE — 25000003 PHARM REV CODE 250: Performed by: INTERNAL MEDICINE

## 2017-10-30 PROCEDURE — 45385 COLONOSCOPY W/LESION REMOVAL: CPT | Mod: PT,,, | Performed by: INTERNAL MEDICINE

## 2017-10-30 PROCEDURE — 63600175 PHARM REV CODE 636 W HCPCS: Performed by: NURSE ANESTHETIST, CERTIFIED REGISTERED

## 2017-10-30 PROCEDURE — 37000009 HC ANESTHESIA EA ADD 15 MINS: Performed by: INTERNAL MEDICINE

## 2017-10-30 PROCEDURE — 88305 TISSUE EXAM BY PATHOLOGIST: CPT | Mod: 26,,, | Performed by: PATHOLOGY

## 2017-10-30 PROCEDURE — 25000003 PHARM REV CODE 250: Performed by: NURSE ANESTHETIST, CERTIFIED REGISTERED

## 2017-10-30 PROCEDURE — D9220A PRA ANESTHESIA: Mod: PT,ANES,, | Performed by: ANESTHESIOLOGY

## 2017-10-30 PROCEDURE — 43235 EGD DIAGNOSTIC BRUSH WASH: CPT | Performed by: INTERNAL MEDICINE

## 2017-10-30 PROCEDURE — D9220A PRA ANESTHESIA: Mod: PT,CRNA,, | Performed by: NURSE ANESTHETIST, CERTIFIED REGISTERED

## 2017-10-30 PROCEDURE — 43235 EGD DIAGNOSTIC BRUSH WASH: CPT | Mod: 51,,, | Performed by: INTERNAL MEDICINE

## 2017-10-30 PROCEDURE — 88305 TISSUE EXAM BY PATHOLOGIST: CPT | Performed by: PATHOLOGY

## 2017-10-30 PROCEDURE — 27201089 HC SNARE, DISP (ANY): Performed by: INTERNAL MEDICINE

## 2017-10-30 PROCEDURE — 37000008 HC ANESTHESIA 1ST 15 MINUTES: Performed by: INTERNAL MEDICINE

## 2017-10-30 PROCEDURE — 45385 COLONOSCOPY W/LESION REMOVAL: CPT | Performed by: INTERNAL MEDICINE

## 2017-10-30 RX ORDER — PROPOFOL 10 MG/ML
VIAL (ML) INTRAVENOUS
Status: DISCONTINUED | OUTPATIENT
Start: 2017-10-30 | End: 2017-10-30

## 2017-10-30 RX ORDER — LIDOCAINE HCL/PF 100 MG/5ML
SYRINGE (ML) INTRAVENOUS
Status: DISCONTINUED | OUTPATIENT
Start: 2017-10-30 | End: 2017-10-30

## 2017-10-30 RX ORDER — SODIUM CHLORIDE 9 MG/ML
INJECTION, SOLUTION INTRAVENOUS CONTINUOUS
Status: DISCONTINUED | OUTPATIENT
Start: 2017-10-30 | End: 2017-10-30 | Stop reason: HOSPADM

## 2017-10-30 RX ORDER — PROPOFOL 10 MG/ML
VIAL (ML) INTRAVENOUS CONTINUOUS PRN
Status: DISCONTINUED | OUTPATIENT
Start: 2017-10-30 | End: 2017-10-30

## 2017-10-30 RX ORDER — GLYCOPYRROLATE 0.2 MG/ML
INJECTION INTRAMUSCULAR; INTRAVENOUS
Status: DISCONTINUED | OUTPATIENT
Start: 2017-10-30 | End: 2017-10-30

## 2017-10-30 RX ADMIN — LIDOCAINE HYDROCHLORIDE 80 MG: 20 INJECTION, SOLUTION INTRAVENOUS at 12:10

## 2017-10-30 RX ADMIN — PROPOFOL 20 MG: 10 INJECTION, EMULSION INTRAVENOUS at 12:10

## 2017-10-30 RX ADMIN — PROPOFOL 30 MG: 10 INJECTION, EMULSION INTRAVENOUS at 12:10

## 2017-10-30 RX ADMIN — PROPOFOL 70 MG: 10 INJECTION, EMULSION INTRAVENOUS at 12:10

## 2017-10-30 RX ADMIN — SODIUM CHLORIDE: 0.9 INJECTION, SOLUTION INTRAVENOUS at 10:10

## 2017-10-30 RX ADMIN — PROPOFOL 150 MCG/KG/MIN: 10 INJECTION, EMULSION INTRAVENOUS at 12:10

## 2017-10-30 RX ADMIN — SODIUM CHLORIDE: 0.9 INJECTION, SOLUTION INTRAVENOUS at 12:10

## 2017-10-30 RX ADMIN — GLYCOPYRROLATE 0.2 MG: 0.2 INJECTION, SOLUTION INTRAMUSCULAR; INTRAVENOUS at 11:10

## 2017-10-30 NOTE — PATIENT INSTRUCTIONS
Discharge Summary/Instructions after an Endoscopic Procedure  Patient Name: Silvia Capellan  Patient MRN: [id number]  Patient YOB: 1945 Monday, October 30, 2017  Quentin Coppola MD  RESTRICTIONS:  During your procedure today, you received medications for sedation.  These   medications may affect your judgment, balance and coordination.  Therefore,   for 24 hours, you have the following restrictions:   - DO NOT drive a car, operate machinery, make legal/financial decisions,   sign important papers or drink alcohol.    ACTIVITY:  The following day: return to full activity including work, except no heavy   lifting, straining or running for 3 days if polyps were removed.  DIET:  Eat and drink normally unless instructed otherwise.     TREATMENT FOR COMMON SIDE EFFECTS:  - Mild abdominal pain, belching, bloating or excessive gas: rest, eat   lightly and use a heating pad.  - Sore Throat: treat with throat lozenges and/or gargle with warm salt   water.  SYMPTOMS TO WATCH FOR AND REPORT TO YOUR PHYSICIAN:  1. Abdominal pain or bloating, other than gas cramps.  2. Chest pain.  3. Back pain.  4. Chills or fever occurring within 24 hours after the procedure.  5. Rectal bleeding, which would show as bright red, maroon, or black stools.   (A tablespoon of blood from the rectum is not serious, especially if   hemorrhoids are present.)  6. Vomiting.  7. Weakness or dizziness.  8. Because air was used during the procedure, expelling large amounts of air   from your rectum or belching is normal.  9. If a bowel prep was taken, you may not have a bowel movement for 1-3   days.  This is normal.  GO DIRECTLY TO THE NEAREST EMERGENCY ROOM IF YOU HAVE ANY OF THE FOLLOWING:      Difficulty breathing  Chills and/or fever over 101 F   Persistent vomiting and/or vomiting blood   Severe abdominal pain   Severe chest pain   Black, tarry stools   Bleeding- more than one tablespoon  Your doctor recommends these additional  instructions:  If any biopsies were taken, your doctors clinic will contact you in 1 to 2   weeks with any results.  You are being discharged to home.   Follow an antireflux regimen.  This includes:       - Do not lie down for at least 3 to 4 hours after meals.        - Raise the head of the bed 4 to 6 inches.        - Decrease excess weight.        - Avoid citrus juices and other acidic foods, alcohol, chocolate, mints,   coffee and other caffeinated beverages, carbonated beverages, fatty and   fried foods.        - Avoid tight-fitting clothing.        - Avoid cigarettes and other tobacco products.   The findings and recommendations have been discussed with you.   Return to your nurse practitioner.  For questions, problems or results please call your physician - Quentin Coppola MD at Work:  [phone].  [facility], EMERGENCY ROOM PHONE NUMBER: [phone]  IF A COMPLICATION OR EMERGENCY SITUATION ARISES AND YOU ARE UNABLE TO REACH   YOUR PHYSICIAN - GO DIRECTLY TO THE EMERGENCY ROOM.  Quentin Coppola MD  10/30/2017 12:13:01 PM  This report has been verified and signed electronically.

## 2017-10-30 NOTE — ANESTHESIA POSTPROCEDURE EVALUATION
"Anesthesia Post Evaluation    Patient: Silvia Capellan    Procedure(s) Performed: Procedure(s) (LRB):  ESOPHAGOGASTRODUODENOSCOPY (EGD) (N/A)  COLONOSCOPY (N/A)    Final Anesthesia Type: general  Patient location during evaluation: GI PACU  Patient participation: Yes- Able to Participate  Level of consciousness: awake and alert  Post-procedure vital signs: reviewed and stable  Pain management: adequate  Airway patency: patent  PONV status at discharge: No PONV  Anesthetic complications: no      Cardiovascular status: stable  Respiratory status: unassisted and spontaneous ventilation  Hydration status: euvolemic  Follow-up not needed.        Visit Vitals  /74 (BP Location: Right arm, Patient Position: Lying)   Pulse 94   Temp 37.1 °C (98.7 °F)   Resp 18   Ht 5' 7" (1.702 m)   Wt 95.3 kg (210 lb)   SpO2 100%   Breastfeeding? No   BMI 32.89 kg/m²       Pain/Matt Score: Pain Assessment Performed: Yes (10/30/2017  1:10 PM)  Presence of Pain: denies (10/30/2017  1:10 PM)  Pain Rating Prior to Med Admin: 0 (10/30/2017  1:10 PM)  Matt Score: 10 (10/30/2017  1:10 PM)      "

## 2017-10-30 NOTE — TRANSFER OF CARE
"Anesthesia Transfer of Care Note    Patient: Silvia Capellan    Procedure(s) Performed: Procedure(s) (LRB):  ESOPHAGOGASTRODUODENOSCOPY (EGD) (N/A)  COLONOSCOPY (N/A)    Patient location: PACU    Anesthesia Type: general    Transport from OR: Transported from OR on room air with adequate spontaneous ventilation    Post pain: adequate analgesia    Post assessment: no apparent anesthetic complications and tolerated procedure well    Post vital signs: stable    Level of consciousness: awake and alert    Nausea/Vomiting: no nausea/vomiting    Complications: none    Transfer of care protocol was followed      Last vitals:   Visit Vitals  BP (!) 154/79   Pulse 83   Temp 37.1 °C (98.7 °F)   Resp 15   Ht 5' 7" (1.702 m)   Wt 95.3 kg (210 lb)   SpO2 98%   Breastfeeding? No   BMI 32.89 kg/m²     "

## 2017-10-30 NOTE — PATIENT INSTRUCTIONS
Discharge Summary/Instructions after an Endoscopic Procedure  Patient Name: Silvia Capellan  Patient MRN: [id number]  Patient YOB: 1945 Monday, October 30, 2017  Quentin Coppola MD  RESTRICTIONS:  During your procedure today, you received medications for sedation.  These   medications may affect your judgment, balance and coordination.  Therefore,   for 24 hours, you have the following restrictions:   - DO NOT drive a car, operate machinery, make legal/financial decisions,   sign important papers or drink alcohol.    ACTIVITY:  The following day: return to full activity including work, except no heavy   lifting, straining or running for 3 days if polyps were removed.  DIET:  Eat and drink normally unless instructed otherwise.     TREATMENT FOR COMMON SIDE EFFECTS:  - Mild abdominal pain, belching, bloating or excessive gas: rest, eat   lightly and use a heating pad.  - Sore Throat: treat with throat lozenges and/or gargle with warm salt   water.  SYMPTOMS TO WATCH FOR AND REPORT TO YOUR PHYSICIAN:  1. Abdominal pain or bloating, other than gas cramps.  2. Chest pain.  3. Back pain.  4. Chills or fever occurring within 24 hours after the procedure.  5. Rectal bleeding, which would show as bright red, maroon, or black stools.   (A tablespoon of blood from the rectum is not serious, especially if   hemorrhoids are present.)  6. Vomiting.  7. Weakness or dizziness.  8. Because air was used during the procedure, expelling large amounts of air   from your rectum or belching is normal.  9. If a bowel prep was taken, you may not have a bowel movement for 1-3   days.  This is normal.  GO DIRECTLY TO THE NEAREST EMERGENCY ROOM IF YOU HAVE ANY OF THE FOLLOWING:      Difficulty breathing  Chills and/or fever over 101 F   Persistent vomiting and/or vomiting blood   Severe abdominal pain   Severe chest pain   Black, tarry stools   Bleeding- more than one tablespoon  Your doctor recommends these additional  instructions:  If any biopsies were taken, your doctors clinic will contact you in 1 to 2   weeks with any results.  You are being discharged to home.   We are waiting for your pathology results.   Telephone your endoscopist for pathology results in two weeks.   Your physician has recommended a repeat colonoscopy in three years for   surveillance.  For questions, problems or results please call your physician - Quentin Coppola MD at Work:  [phone].  [facility], EMERGENCY ROOM PHONE NUMBER: [phone]  IF A COMPLICATION OR EMERGENCY SITUATION ARISES AND YOU ARE UNABLE TO REACH   YOUR PHYSICIAN - GO DIRECTLY TO THE EMERGENCY ROOM.  Quentin Coppola MD  10/30/2017 12:49:52 PM  This report has been verified and signed electronically.

## 2017-10-30 NOTE — ANESTHESIA RELEASE NOTE
"Anesthesia Release from PACU Note    Patient: Silvia Capellan    Procedure(s) Performed: Procedure(s) (LRB):  ESOPHAGOGASTRODUODENOSCOPY (EGD) (N/A)  COLONOSCOPY (N/A)    Anesthesia type: general    Post pain: Adequate analgesia    Post assessment: no apparent anesthetic complications, tolerated procedure well and no evidence of recall    Last Vitals:   Visit Vitals  /74 (BP Location: Right arm, Patient Position: Lying)   Pulse 94   Temp 37.1 °C (98.7 °F)   Resp 18   Ht 5' 7" (1.702 m)   Wt 95.3 kg (210 lb)   SpO2 100%   Breastfeeding? No   BMI 32.89 kg/m²       Post vital signs: stable    Level of consciousness: awake, alert  and oriented    Nausea/Vomiting: no nausea/no vomiting    Complications: none    Airway Patency: patent    Respiratory: unassisted    Cardiovascular: stable and blood pressure at baseline    Hydration: euvolemic  "

## 2017-10-30 NOTE — H&P
[facility]  History & Physical    Subjective:      Chief Complaint/Reason for Admission:     EGD and colonsocopy    Silvia Capellan is a 72 y.o. female.    Past Medical History:   Diagnosis Date    GERD (gastroesophageal reflux disease)     HTN (hypertension)     Rheumatoid arthritis     Rheumatoid arthritis(714.0)      Past Surgical History:   Procedure Laterality Date    APPENDECTOMY      COLONOSCOPY      HYSTERECTOMY      SKIN BIOPSY      TOTAL KNEE ARTHROPLASTY      right     Family History   Problem Relation Age of Onset    Hypertension      Kidney disease      Colon polyps Neg Hx     Colon cancer Neg Hx     Esophageal cancer Neg Hx     Irritable bowel syndrome Neg Hx     Inflammatory bowel disease Neg Hx     Stomach cancer Neg Hx      Social History   Substance Use Topics    Smoking status: Former Smoker    Smokeless tobacco: Never Used    Alcohol use Yes      Comment: Occasionally       PTA Medications   Medication Sig    baclofen (LIORESAL) 10 MG tablet Take 1 tablet (10 mg total) by mouth 3 (three) times daily.    clotrimazole (LOTRIMIN) 1 % cream Apply topically 2 (two) times daily.    diphenhydrAMINE (BENADRYL) 25 mg capsule Take 1 each (25 mg total) by mouth every 6 (six) hours as needed for Itching or Allergies.    docusate sodium (COLACE) 100 MG capsule Take 1 capsule (100 mg total) by mouth 3 (three) times daily as needed for Constipation.    escitalopram oxalate (LEXAPRO) 10 MG tablet Take 1 tablet (10 mg total) by mouth once daily.    famotidine (PEPCID) 20 MG tablet Take 1 tablet (20 mg total) by mouth 2 (two) times daily.    hydrochlorothiazide (HYDRODIURIL) 25 MG tablet Take 1 tablet (25 mg total) by mouth once daily.    hydrocodone-acetaminophen 5-325mg (NORCO) 5-325 mg per tablet Take 1 tablet by mouth every 6 (six) hours as needed for Pain.    hydroxychloroquine (PLAQUENIL) 200 mg tablet Take by mouth once daily.     hydrOXYzine HCl  (ATARAX) 25 MG tablet TAKE 1 TABLET (25 MG TOTAL) BY MOUTH 3 (THREE) TIMES DAILY AS NEEDED FOR ITCHING.    ketoconazole (NIZORAL) 2 % shampoo Apply topically once daily.    levocetirizine (XYZAL) 5 MG tablet Take 1 tablet (5 mg total) by mouth every evening.    losartan (COZAAR) 100 MG tablet Take 1 tablet (100 mg total) by mouth once daily.    methotrexate 2.5 MG Tab     morphine (MS CONTIN) 15 MG 12 hr tablet Take 1 tablet (15 mg total) by mouth 2 (two) times daily.    pantoprazole (PROTONIX) 40 MG tablet Take 1 tablet (40 mg total) by mouth once daily.    trazodone (DESYREL) 100 MG tablet Take 1 tablet (100 mg total) by mouth every evening.    alprazolam (XANAX) 0.5 MG tablet Take 1 tablet (0.5 mg total) by mouth once. 1hr prior to MRI    triamcinolone acetonide 0.1% (KENALOG) 0.1 % ointment Apply topically 2 (two) times daily.     Review of patient's allergies indicates:   Allergen Reactions    Latex, natural rubber     Codeine Hallucinations    Cortisporin [neomycin-bacitracin-poly-hc]     Latex, natural rubber Rash        Review of Systems   Constitutional: Negative for chills, fever and weight loss.   Respiratory: Negative for shortness of breath and wheezing.    Cardiovascular: Negative for chest pain.   Gastrointestinal: Positive for heartburn. Negative for abdominal pain, blood in stool, constipation, diarrhea, melena, nausea and vomiting.       Objective:      Vital Signs (Most Recent)  Temp: 98.7 °F (37.1 °C) (10/30/17 1035)  Pulse: 83 (10/30/17 1035)  Resp: 15 (10/30/17 1035)  BP: (!) 154/79 (10/30/17 1035)  SpO2: 98 % (10/30/17 1035)    Vital Signs Range (Last 24H):  Temp:  [98.7 °F (37.1 °C)]   Pulse:  [83]   Resp:  [15-57]   BP: (154)/(79)   SpO2:  [98 %]     Physical Exam   Constitutional: She appears well-developed and well-nourished.   Cardiovascular: Normal rate.    Pulmonary/Chest: Effort normal and breath sounds normal.   Abdominal: Soft. Bowel sounds are normal.   Skin: Skin is  warm and dry.   Psychiatric: She has a normal mood and affect. Her behavior is normal. Judgment and thought content normal.           Assessment:      Active Hospital Problems    Diagnosis  POA    History of colonic polyps [Z86.010]  Not Applicable      Resolved Hospital Problems    Diagnosis Date Resolved POA   No resolved problems to display.       Plan:    Direct access EGD for GERD and Surveillance colonoscopy for TVA with HGD. No family history of CRC or Hereditary CRC syndromes.

## 2017-10-30 NOTE — PLAN OF CARE
Discharge instructions discussed with patient and patients family. Both verbalized understanding and had no questions.

## 2017-10-30 NOTE — ANESTHESIA PREPROCEDURE EVALUATION
10/30/2017  Pre-operative evaluation for Procedure(s) (LRB):  ESOPHAGOGASTRODUODENOSCOPY (EGD) (N/A)  COLONOSCOPY (N/A)    Silvia Capellan is a 72 y.o. female     Patient Active Problem List   Diagnosis    HTN (hypertension)    Rheumatoid arthritis    Allergic rhinitis, cause unspecified    Insomnia, unspecified    Unspecified essential hypertension    Anxiety state    Depressive disorder, not elsewhere classified    Reflux esophagitis    Overactive bladder    Obesity (BMI 30-39.9)    Fall, accidental    Closed fracture of right orbital floor with routine healing    Facial laceration    Hypertension    Pleural effusion on left    Pulmonary nodule, left    Pulmonary nodule    Squamous cell carcinoma of unknown origin       Review of patient's allergies indicates:   Allergen Reactions    Latex, natural rubber     Codeine     Codeine Hallucinations    Cortisporin [neomycin-bacitracin-poly-hc]     Latex, natural rubber Rash       No current facility-administered medications on file prior to encounter.      Current Outpatient Prescriptions on File Prior to Encounter   Medication Sig Dispense Refill    alprazolam (XANAX) 0.5 MG tablet Take 1 tablet (0.5 mg total) by mouth once. 1hr prior to MRI 2 tablet 0    baclofen (LIORESAL) 10 MG tablet Take 1 tablet (10 mg total) by mouth 3 (three) times daily. 90 tablet 11    clotrimazole (LOTRIMIN) 1 % cream Apply topically 2 (two) times daily. 15 g 1    diphenhydrAMINE (BENADRYL) 25 mg capsule Take 1 each (25 mg total) by mouth every 6 (six) hours as needed for Itching or Allergies. 30 capsule 0    docusate sodium (COLACE) 100 MG capsule Take 1 capsule (100 mg total) by mouth 3 (three) times daily as needed for Constipation. 30 capsule 0    escitalopram oxalate (LEXAPRO) 10 MG tablet Take 1 tablet (10 mg total) by mouth once daily. 90 tablet  1    famotidine (PEPCID) 20 MG tablet Take 1 tablet (20 mg total) by mouth 2 (two) times daily. 20 tablet 0    hydrochlorothiazide (HYDRODIURIL) 25 MG tablet Take 1 tablet (25 mg total) by mouth once daily. 90 tablet 1    hydrocodone-acetaminophen 5-325mg (NORCO) 5-325 mg per tablet Take 1 tablet by mouth every 6 (six) hours as needed for Pain. 60 tablet 0    hydroxychloroquine (PLAQUENIL) 200 mg tablet Take by mouth once daily.       hydrOXYzine HCl (ATARAX) 25 MG tablet TAKE 1 TABLET (25 MG TOTAL) BY MOUTH 3 (THREE) TIMES DAILY AS NEEDED FOR ITCHING. 30 tablet 1    ketoconazole (NIZORAL) 2 % shampoo Apply topically once daily. 120 mL 1    levocetirizine (XYZAL) 5 MG tablet Take 1 tablet (5 mg total) by mouth every evening. 30 tablet 5    losartan (COZAAR) 100 MG tablet Take 1 tablet (100 mg total) by mouth once daily. 90 tablet 1    methotrexate 2.5 MG Tab       pantoprazole (PROTONIX) 40 MG tablet Take 1 tablet (40 mg total) by mouth once daily. 90 tablet 1    trazodone (DESYREL) 100 MG tablet Take 1 tablet (100 mg total) by mouth every evening. 30 tablet 2    triamcinolone acetonide 0.1% (KENALOG) 0.1 % ointment Apply topically 2 (two) times daily. 60 g 1       Past Surgical History:   Procedure Laterality Date    APPENDECTOMY      COLONOSCOPY      HYSTERECTOMY      SKIN BIOPSY      TOTAL KNEE ARTHROPLASTY      right       Social History     Social History    Marital status:      Spouse name: N/A    Number of children: N/A    Years of education: N/A     Occupational History    Not on file.     Social History Main Topics    Smoking status: Former Smoker    Smokeless tobacco: Never Used    Alcohol use Yes      Comment: Occasionally    Drug use: No    Sexual activity: Not Currently     Other Topics Concern    Not on file     Social History Narrative    No narrative on file         Vital Signs Range (Last 24H):         CBC: No results for input(s): WBC, RBC, HGB, HCT, PLT, MCV,  MCH, MCHC in the last 72 hours.    CMP: No results for input(s): NA, K, CL, CO2, BUN, CREATININE, GLU, MG, PHOS, CALCIUM, ALBUMIN, PROT, ALKPHOS, ALT, AST, BILITOT in the last 72 hours.    INR  No results for input(s): INR, PROTIME, APTT in the last 72 hours.    Invalid input(s): PT        Diagnostic Studies:      EKD Echo:        Anesthesia Evaluation    I have reviewed the Patient Summary Reports.     I have reviewed the Medications.     Review of Systems  Anesthesia Hx:  No problems with previous Anesthesia  History of prior surgery of interest to airway management or planning: Denies Family Hx of Anesthesia complications.   Denies Personal Hx of Anesthesia complications.   Cardiovascular:   Exercise tolerance: poor Hypertension SAMPSON    Pulmonary:   Shortness of breath    Renal/:  Renal/ Normal     Hepatic/GI:   GERD, well controlled    Neurological:   Neuromuscular Disease,    Endocrine:  Endocrine Normal    Psych:   Psychiatric History          Physical Exam  General:  Obesity    Airway/Jaw/Neck:  Airway Findings: Mouth Opening: Normal Tongue: Normal  General Airway Assessment: Adult  Mallampati: III  TM Distance: Normal, at least 6 cm  Jaw/Neck Findings:  Neck ROM: Normal ROM      Dental:  Dental Findings: In tact   Chest/Lungs:  Chest/Lungs Findings: Clear to auscultation, Normal Respiratory Rate     Heart/Vascular:  Heart Findings: Rate: Normal  Rhythm: Regular Rhythm  Sounds: Normal        Mental Status:  Mental Status Findings:  Cooperative, Alert and Oriented         Anesthesia Plan  Type of Anesthesia, risks & benefits discussed:  Anesthesia Type:  general  Patient's Preference:   Intra-op Monitoring Plan: standard ASA monitors  Intra-op Monitoring Plan Comments:   Post Op Pain Control Plan: multimodal analgesia  Post Op Pain Control Plan Comments:   Induction:   IV  Beta Blocker:  Patient is not currently on a Beta-Blocker (No further documentation required).       Informed Consent: Patient  understands risks and agrees with Anesthesia plan.  Questions answered. Anesthesia consent signed with patient.  ASA Score: 3     Day of Surgery Review of History & Physical:    H&P update referred to the surgeon.         Ready For Surgery From Anesthesia Perspective.

## 2017-11-01 ENCOUNTER — TELEPHONE (OUTPATIENT)
Dept: GASTROENTEROLOGY | Facility: CLINIC | Age: 72
End: 2017-11-01

## 2017-11-01 NOTE — TELEPHONE ENCOUNTER
----- Message from Clary Vargas NP sent at 11/1/2017 11:33 AM CDT -----  Please schedule follow up in 2 weeks.     Clary

## 2017-11-06 ENCOUNTER — TELEPHONE (OUTPATIENT)
Dept: GASTROENTEROLOGY | Facility: CLINIC | Age: 72
End: 2017-11-06

## 2017-11-06 ENCOUNTER — TELEPHONE (OUTPATIENT)
Dept: HEMATOLOGY/ONCOLOGY | Facility: CLINIC | Age: 72
End: 2017-11-06

## 2017-11-06 NOTE — TELEPHONE ENCOUNTER
Spoke with Ms. Capellan discussed EGD and Colonoscopy. Questions answered at this time. Will follow up in 2 weeks.     ARMANDO Landin

## 2017-11-07 ENCOUNTER — TELEPHONE (OUTPATIENT)
Dept: GASTROENTEROLOGY | Facility: CLINIC | Age: 72
End: 2017-11-07

## 2017-11-07 NOTE — TELEPHONE ENCOUNTER
----- Message from Quentin Coppola MD sent at 11/5/2017  3:49 PM CST -----  Sarah - please tell Silvia that her Colonoscopy polyp was benign but an adenoma and recommend her next surveillance colonoscopy in 3-years.    SPECIMEN  1) Cecum polyp, 3 mm.  FINAL PATHOLOGIC DIAGNOSIS  COLON, CECUM POLYP 3 MM (BIOPSY):  - Tubular adenoma  Diagnosed by: Komal Roach M.D.

## 2017-11-07 NOTE — PROGRESS NOTES
CC: Squamous cell carcinoma     HPI:  is a 73 y/o female who underwent bronchoscopy/EBUS evaluation on 8/31/17 for abnormal mediastinal adenopathy  and a 1.2 cm MORRO mass.     Bronchoscopy findings:    The oropharynx appears normal. The larynx appears normal. The vocal cords appear normal. The subglottic space is normal. The trachea is of normal caliber. The otf is sharp. The tracheobronchial tree was examined to at least the first subsegmental level. Bronchial mucosa and anatomy are normal; there are no endobronchial lesions, and no secretions.    Lymph Nodes: Lymph node sizing was performed via endobronchial ultrasound for suspected lung cancer. Sampling by transbronchial needle aspiration was also performed using an Olympus EBUS-TBNA 22  gauge needle in the subcarinal mediastinum (level 7) and sent for routine cytology.       - The 7 (subcarinal) node was 30 mm by EBUS. Three samples with the needle were obtained. The patient's condition was unchanged after the intervention.    Review of Systems   Constitutional: Negative for fever, malaise/fatigue and weight loss.   HENT: Negative for congestion, hearing loss and nosebleeds.    Eyes: Negative for blurred vision and double vision.   Respiratory: Positive for shortness of breath. Negative for cough, hemoptysis, sputum production and wheezing.    Cardiovascular: Positive for chest pain and leg swelling.   Gastrointestinal: Negative for blood in stool, constipation, heartburn, nausea and vomiting.   Genitourinary: Negative for dysuria, frequency and hematuria.   Musculoskeletal: Negative for myalgias and neck pain.   Skin: Negative for rash.   Neurological: Negative for dizziness, tingling, sensory change, focal weakness and headaches.   Endo/Heme/Allergies: Does not bruise/bleed easily.   Psychiatric/Behavioral: Positive for depression. Negative for suicidal ideas. The patient is nervous/anxious.        Current Outpatient Prescriptions   Medication Sig     alprazolam (XANAX) 0.5 MG tablet Take 1 tablet (0.5 mg total) by mouth once. 1hr prior to MRI    baclofen (LIORESAL) 10 MG tablet Take 1 tablet (10 mg total) by mouth 3 (three) times daily.    clotrimazole (LOTRIMIN) 1 % cream Apply topically 2 (two) times daily.    diphenhydrAMINE (BENADRYL) 25 mg capsule Take 1 each (25 mg total) by mouth every 6 (six) hours as needed for Itching or Allergies.    docusate sodium (COLACE) 100 MG capsule Take 1 capsule (100 mg total) by mouth 3 (three) times daily as needed for Constipation.    escitalopram oxalate (LEXAPRO) 10 MG tablet Take 1 tablet (10 mg total) by mouth once daily.    famotidine (PEPCID) 20 MG tablet Take 1 tablet (20 mg total) by mouth 2 (two) times daily.    hydrochlorothiazide (HYDRODIURIL) 25 MG tablet Take 1 tablet (25 mg total) by mouth once daily.    hydrocodone-acetaminophen 5-325mg (NORCO) 5-325 mg per tablet Take 1 tablet by mouth every 6 (six) hours as needed for Pain.    hydroxychloroquine (PLAQUENIL) 200 mg tablet Take by mouth once daily.     hydrOXYzine HCl (ATARAX) 25 MG tablet TAKE 1 TABLET (25 MG TOTAL) BY MOUTH 3 (THREE) TIMES DAILY AS NEEDED FOR ITCHING.    ketoconazole (NIZORAL) 2 % shampoo Apply topically once daily.    levocetirizine (XYZAL) 5 MG tablet Take 1 tablet (5 mg total) by mouth every evening.    losartan (COZAAR) 100 MG tablet Take 1 tablet (100 mg total) by mouth once daily.    methotrexate 2.5 MG Tab     morphine (MS CONTIN) 15 MG 12 hr tablet Take 1 tablet (15 mg total) by mouth 2 (two) times daily.    pantoprazole (PROTONIX) 40 MG tablet Take 1 tablet (40 mg total) by mouth once daily.    trazodone (DESYREL) 100 MG tablet Take 1 tablet (100 mg total) by mouth every evening.    triamcinolone acetonide 0.1% (KENALOG) 0.1 % ointment Apply topically 2 (two) times daily.     No current facility-administered medications for this visit.        Vitals:    11/08/17 0949   BP: (!) 140/72   Pulse: 80   Resp: 18    Temp: 98 °F (36.7 °C)         Physical Exam   Constitutional: She is oriented to person, place, and time. She appears well-developed and well-nourished. No distress.   HENT:   Head: Normocephalic.   Mouth/Throat: No oropharyngeal exudate.   Eyes: Pupils are equal, round, and reactive to light. No scleral icterus.   Neck: Normal range of motion.   Cardiovascular: Normal rate and regular rhythm.    Pulmonary/Chest: Effort normal.   She has diminished breath sounds in left infrascapular area   Abdominal: Soft. Bowel sounds are normal. She exhibits no distension. There is no tenderness. There is no rebound and no guarding.   Musculoskeletal: She exhibits edema.   Lymphadenopathy:     She has no cervical adenopathy.   Neurological: She is alert and oriented to person, place, and time. No cranial nerve deficit.   Skin: Skin is warm. No erythema.   Psychiatric: She has a normal mood and affect.       Component      Latest Ref Rng & Units 9/11/2017   WBC      3.90 - 12.70 K/uL 4.74   RBC      4.00 - 5.40 M/uL 4.54   Hemoglobin      12.0 - 16.0 g/dL 12.3   Hematocrit      37.0 - 48.5 % 38.9   MCV      82 - 98 fL 86   MCH      27.0 - 31.0 pg 27.1   MCHC      32.0 - 36.0 g/dL 31.6 (L)   RDW      11.5 - 14.5 % 12.8   Platelets      150 - 350 K/uL 226   MPV      9.2 - 12.9 fL 9.7   Gran #      1.8 - 7.7 K/uL 2.1   Lymph #      1.0 - 4.8 K/uL 2.0   Mono #      0.3 - 1.0 K/uL 0.5   Eos #      0.0 - 0.5 K/uL 0.1   Baso #      0.00 - 0.20 K/uL 0.02   Gran%      38.0 - 73.0 % 44.0   Lymph%      18.0 - 48.0 % 42.6   Mono%      4.0 - 15.0 % 10.3   Eosinophil%      0.0 - 8.0 % 2.5   Basophil%      0.0 - 1.9 % 0.4   Differential Method       Automated   Sodium      136 - 145 mmol/L 140   Potassium      3.5 - 5.1 mmol/L 4.2   Chloride      95 - 110 mmol/L 101   CO2      23 - 29 mmol/L 30 (H)   Glucose      70 - 110 mg/dL 96   BUN, Bld      8 - 23 mg/dL 12   Creatinine      0.5 - 1.4 mg/dL 0.8   Calcium      8.7 - 10.5 mg/dL 9.5   Total  Protein      6.0 - 8.4 g/dL 8.2   Albumin      3.5 - 5.2 g/dL 3.1 (L)   Total Bilirubin      0.1 - 1.0 mg/dL 0.3   Alkaline Phosphatase      55 - 135 U/L 90   AST      10 - 40 U/L 16   ALT      10 - 44 U/L 11   Anion Gap      8 - 16 mmol/L 9   eGFR if African American      >60 mL/min/1.73 m:2 >60.0   eGFR if non African American      >60 mL/min/1.73 m:2 >60.0   LD      110 - 260 U/L 254        Imagin/12/17 CT chest with cont      1. Large left pleural effusion resulting in atelectasis of the left lower lobe and portions of left upper lobe  2. 1.2 cm left upper lobe pleural based pulmonary nodule  3. Mediastinal adenopathy as described above with diffuse thickening of the wall of the distal esophagus. Findings are suspicious for neoplastic process. Recommend bronchoscopy biopsy and possible endoscopy for further evaluation.  4. 1.3 cm hypodensity within the dome of the liver. Metastatic focus cannot be entirely excluded.     17 PET CT       There is mildly increased tracer uptake within the patient's left pleural effusion and overlying the mediastinal adenopathy, max SUV 2.7.    Transmission CT images show continued pleural effusion and circumferential esophageal thickening. Transmission CT images also show non-avid bilateral groin adenopathy likely reactive in nature.      Impression        There is mildly increased tracer uptake within the patient's left pleural effusion and mediastinal adenopathy. While these findings suggest reactive etiology some low grade malignancies, such as bronchioloalveolar carcinoma, may show this level of uptake on PET scan.           10/30/17 EGD    The examined duodenum was normal.The entire examined stomach was normal.The cardia and gastric fundus were normal on retroflexion. A 1 cm hiatal hernia was found. The proximal extent of the gastric folds (end of tubular esophagus) was 38 cm from the incisors. The hiatal narrowing was 39 cm from the incisors. The Z-line was 38  cm from the incisors. Z-line was sharp. No esophagitis and no columnar esophagus and no lesions seen with NBI or white light.    Impression:                                  - Normal examined duodenum.                        - Normal stomach.                        - 1 cm hiatal hernia.                        - No specimens collected.    10/30/17 colonoscopy :    Cecal polyp ( 3mm) identified  Histopathology: Tubular adenoma       Pathology:     1/17/14 colonoscopy     FINAL PATHOLOGIC DIAGNOSIS     1. Colon biopsy, ascending colon-tubular adenoma.  2. Colon biopsy, transverse-tubular adenoma.  3. 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 The stalk and base of the polyp are well represented and are free of atypia.  4. Colon biopsy, sigmoid- mild glandular hyperplasia consistent with a benign hyperplastic polyp.        7/19/17 PLEURAL FLUID (CYTOLOGY AND CELL BLOCK):     -Groups of atypical cells present, malignancy cannot be excluded.  Note: While these cells could be reactive mesothelial cells, the level of atypia could be seen in malignancy. A cell block was prepared but the atypical cells are not present in the cell block for further characterization. Correlate  clinically. Repeat tap/biopsy might be helpful if clinically indicated.     8/28/17 EBUS FNA     FNA of subcarinal lymph node: Positive for malignant cells  Small clusters of malignant epithelial cells, favor squamous carcinoma Tumor cells are positive for CK 5/6 and CK 7. Immunostains for p63 and TTF-1 are negative.     Assessment:    1. is a 73 y/o female who underwent bronchoscopy/EBUS evaluation on 8/31/17 for abnormal mediastinal adenopathy  and a 1.2 cm MORRO mass as well as pericardial adenopathy.FNA of subcarinal lymph node was positive for malignant cells.  There were small clusters of malignant epithelial cells,  favoring squamous carcinoma. Site of origin is not clear. PET CT done on 9/7/17 did not reveal any lesion in head and neck region.  It does not appear that there is adequate tissue to run molecular testing like p16, PD L1.   Esophageal thickening was noted on CT done on 6/12/17. EGD on 10/30/17 did not reveal any suspicious lesions in esophagus/stomach. ENT evaluation still pending.   MRI brain still pending.I explained to her that if primary site is unclear, treatment will be directed at metastatic SCC of unknown primary.   Her treatment has been delayed as she cancelled several appointments due to some issues at home. I emphasized the need to have the pending studies ( MRI, port, ENT evaluation) done, as well as have CT chest/abdomen repeated as her last imaging was over 2 months ago.        2. Chest and abdominal pain: She has long h/o body aches. She takes Hydrocodone/APAP. She is on not on any DAMARDs for her RA at this time. She is on extended release Morphine 15mg q12hr for her pain. It has provided her with only partial pain relief.    3. Anxiety and depression: She is on Trazodone and Xanax    4. Tubular adenoma: Noted in a cecal polyp on colonoscopy on 10/30/17.        Distress Screening Results: Psychosocial Distress screening score of Distress Score: 2 noted and reviewed. No intervention indicated.

## 2017-11-08 ENCOUNTER — TELEPHONE (OUTPATIENT)
Dept: HEMATOLOGY/ONCOLOGY | Facility: CLINIC | Age: 72
End: 2017-11-08

## 2017-11-08 ENCOUNTER — OFFICE VISIT (OUTPATIENT)
Dept: HEMATOLOGY/ONCOLOGY | Facility: CLINIC | Age: 72
End: 2017-11-08
Payer: MEDICARE

## 2017-11-08 ENCOUNTER — TELEPHONE (OUTPATIENT)
Dept: GASTROENTEROLOGY | Facility: CLINIC | Age: 72
End: 2017-11-08

## 2017-11-08 VITALS
DIASTOLIC BLOOD PRESSURE: 72 MMHG | BODY MASS INDEX: 32.87 KG/M2 | SYSTOLIC BLOOD PRESSURE: 140 MMHG | RESPIRATION RATE: 18 BRPM | OXYGEN SATURATION: 98 % | HEART RATE: 80 BPM | WEIGHT: 209.44 LBS | TEMPERATURE: 98 F | HEIGHT: 67 IN

## 2017-11-08 DIAGNOSIS — R91.1 PULMONARY NODULE: ICD-10-CM

## 2017-11-08 DIAGNOSIS — J90 PLEURAL EFFUSION ON LEFT: ICD-10-CM

## 2017-11-08 DIAGNOSIS — C44.92 SQUAMOUS CELL CARCINOMA OF UNKNOWN ORIGIN: Primary | ICD-10-CM

## 2017-11-08 PROCEDURE — 99999 PR PBB SHADOW E&M-EST. PATIENT-LVL III: CPT | Mod: PBBFAC,,, | Performed by: INTERNAL MEDICINE

## 2017-11-08 PROCEDURE — 88342 IMHCHEM/IMCYTCHM 1ST ANTB: CPT | Performed by: PATHOLOGY

## 2017-11-08 PROCEDURE — 99499 UNLISTED E&M SERVICE: CPT | Mod: S$PBB,,, | Performed by: INTERNAL MEDICINE

## 2017-11-08 PROCEDURE — 88342 IMHCHEM/IMCYTCHM 1ST ANTB: CPT | Mod: 26,,, | Performed by: PATHOLOGY

## 2017-11-08 PROCEDURE — 99214 OFFICE O/P EST MOD 30 MIN: CPT | Mod: S$GLB,,, | Performed by: INTERNAL MEDICINE

## 2017-11-08 RX ORDER — HYDROCODONE BITARTRATE AND ACETAMINOPHEN 5; 325 MG/1; MG/1
1 TABLET ORAL EVERY 6 HOURS PRN
Qty: 90 TABLET | Refills: 0 | Status: SHIPPED | OUTPATIENT
Start: 2017-11-08 | End: 2018-01-16 | Stop reason: SDUPTHER

## 2017-11-08 NOTE — TELEPHONE ENCOUNTER
----- Message from Shayne Wong MD sent at 11/8/2017  9:53 AM CST -----  -ct chest, abdomen, pelvis this wk  --MRI brain this wk  --port placement this wk  -labs today  --f/u 1 wk

## 2017-11-08 NOTE — Clinical Note
-ct chest, abdomen, pelvis this wk --MRI brain this wk --port placement this wk -labs today --f/u 1 wk

## 2017-11-09 ENCOUNTER — TELEPHONE (OUTPATIENT)
Dept: GASTROENTEROLOGY | Facility: CLINIC | Age: 72
End: 2017-11-09

## 2017-11-09 ENCOUNTER — HOSPITAL ENCOUNTER (OUTPATIENT)
Dept: RADIOLOGY | Facility: HOSPITAL | Age: 72
Discharge: HOME | End: 2017-11-09
Attending: INTERNAL MEDICINE
Payer: MEDICARE

## 2017-11-09 ENCOUNTER — TELEPHONE (OUTPATIENT)
Dept: HEMATOLOGY/ONCOLOGY | Facility: CLINIC | Age: 72
End: 2017-11-09

## 2017-11-09 DIAGNOSIS — C44.92 SQUAMOUS CELL CARCINOMA OF UNKNOWN ORIGIN: ICD-10-CM

## 2017-11-09 NOTE — TELEPHONE ENCOUNTER
----- Message from Miguelina Hazel sent at 11/9/2017  1:46 PM CST -----  Contact: Pt  Pt would like to be called back regarding her MRI appointment on tomorrow.  Pt wants to know if she can get medication to be sedated.      Please call pt at [phone].        Thanks!

## 2017-11-09 NOTE — TELEPHONE ENCOUNTER
----- Message from Shayne Wong MD sent at 11/9/2017 11:28 AM CST -----  Contact: Lizabeth with [facility]  Lung /lymph node biopsy, not blood.   ----- Message -----  From: Bambi Real MA  Sent: 11/9/2017  10:44 AM  To: Shayne Wong MD        ----- Message -----  From: Symone Flores  Sent: 11/9/2017  10:38 AM  To: [facility] Staff    Lizabeth calling regarding lab orders. She needs to know if its tissue or blood being tested    Lizabeth call back number [phone]

## 2017-11-10 ENCOUNTER — TELEPHONE (OUTPATIENT)
Dept: HEMATOLOGY/ONCOLOGY | Facility: CLINIC | Age: 72
End: 2017-11-10

## 2017-11-10 DIAGNOSIS — C44.92 SQUAMOUS CELL CARCINOMA OF UNKNOWN ORIGIN: Primary | ICD-10-CM

## 2017-11-13 DIAGNOSIS — C44.92 SQUAMOUS CELL CARCINOMA OF UNKNOWN ORIGIN: Primary | ICD-10-CM

## 2017-11-14 ENCOUNTER — HOSPITAL ENCOUNTER (OUTPATIENT)
Facility: HOSPITAL | Age: 72
Discharge: HOME | End: 2017-11-14
Attending: INTERNAL MEDICINE | Admitting: RADIOLOGY
Payer: MEDICARE

## 2017-11-14 ENCOUNTER — SURGERY (OUTPATIENT)
Age: 72
End: 2017-11-14

## 2017-11-14 VITALS
WEIGHT: 209 LBS | DIASTOLIC BLOOD PRESSURE: 77 MMHG | BODY MASS INDEX: 32.8 KG/M2 | RESPIRATION RATE: 18 BRPM | SYSTOLIC BLOOD PRESSURE: 138 MMHG | HEIGHT: 67 IN | HEART RATE: 82 BPM | OXYGEN SATURATION: 96 % | TEMPERATURE: 98 F

## 2017-11-14 DIAGNOSIS — C44.92 SQUAMOUS CELL CARCINOMA OF UNKNOWN ORIGIN: ICD-10-CM

## 2017-11-14 PROCEDURE — 63600175 PHARM REV CODE 636 W HCPCS: Performed by: FAMILY MEDICINE

## 2017-11-14 PROCEDURE — 63600175 PHARM REV CODE 636 W HCPCS: Performed by: INTERNAL MEDICINE

## 2017-11-14 PROCEDURE — 63600175 PHARM REV CODE 636 W HCPCS: Performed by: RADIOLOGY

## 2017-11-14 PROCEDURE — 25000003 PHARM REV CODE 250: Performed by: INTERNAL MEDICINE

## 2017-11-14 RX ORDER — HYDROCODONE BITARTRATE AND ACETAMINOPHEN 5; 325 MG/1; MG/1
1 TABLET ORAL EVERY 4 HOURS PRN
Status: CANCELLED | OUTPATIENT
Start: 2017-11-14

## 2017-11-14 RX ORDER — LIDOCAINE HYDROCHLORIDE 20 MG/ML
20 INJECTION, SOLUTION INFILTRATION; PERINEURAL ONCE
Status: COMPLETED | OUTPATIENT
Start: 2017-11-14 | End: 2017-11-14

## 2017-11-14 RX ORDER — MIDAZOLAM HYDROCHLORIDE 1 MG/ML
INJECTION INTRAMUSCULAR; INTRAVENOUS CODE/TRAUMA/SEDATION MEDICATION
Status: COMPLETED | OUTPATIENT
Start: 2017-11-14 | End: 2017-11-14

## 2017-11-14 RX ORDER — MIDAZOLAM HYDROCHLORIDE 1 MG/ML
1 INJECTION INTRAMUSCULAR; INTRAVENOUS
Status: DISCONTINUED | OUTPATIENT
Start: 2017-11-14 | End: 2017-11-14 | Stop reason: HOSPADM

## 2017-11-14 RX ORDER — CEFAZOLIN SODIUM 1 G/50ML
1 SOLUTION INTRAVENOUS
Status: COMPLETED | OUTPATIENT
Start: 2017-11-14 | End: 2017-11-14

## 2017-11-14 RX ORDER — FENTANYL CITRATE 50 UG/ML
50 INJECTION, SOLUTION INTRAMUSCULAR; INTRAVENOUS
Status: DISCONTINUED | OUTPATIENT
Start: 2017-11-14 | End: 2017-11-14 | Stop reason: HOSPADM

## 2017-11-14 RX ORDER — HEPARIN 100 UNIT/ML
5 SYRINGE INTRAVENOUS ONCE
Status: COMPLETED | OUTPATIENT
Start: 2017-11-14 | End: 2017-11-14

## 2017-11-14 RX ORDER — FENTANYL CITRATE 50 UG/ML
INJECTION, SOLUTION INTRAMUSCULAR; INTRAVENOUS CODE/TRAUMA/SEDATION MEDICATION
Status: COMPLETED | OUTPATIENT
Start: 2017-11-14 | End: 2017-11-14

## 2017-11-14 RX ORDER — HEPARIN SODIUM 200 [USP'U]/100ML
500 INJECTION, SOLUTION INTRAVENOUS CONTINUOUS
Status: DISCONTINUED | OUTPATIENT
Start: 2017-11-14 | End: 2017-11-14 | Stop reason: HOSPADM

## 2017-11-14 RX ORDER — SODIUM CHLORIDE 9 MG/ML
500 INJECTION, SOLUTION INTRAVENOUS ONCE
Status: DISCONTINUED | OUTPATIENT
Start: 2017-11-14 | End: 2017-11-14 | Stop reason: HOSPADM

## 2017-11-14 RX ADMIN — MIDAZOLAM HYDROCHLORIDE 0.5 MG: 1 INJECTION, SOLUTION INTRAMUSCULAR; INTRAVENOUS at 09:11

## 2017-11-14 RX ADMIN — LIDOCAINE HYDROCHLORIDE 20 ML: 20 INJECTION, SOLUTION INFILTRATION; PERINEURAL at 10:11

## 2017-11-14 RX ADMIN — MIDAZOLAM HYDROCHLORIDE 1 MG: 1 INJECTION, SOLUTION INTRAMUSCULAR; INTRAVENOUS at 09:11

## 2017-11-14 RX ADMIN — SODIUM BICARBONATE 2.5 MEQ: 0.2 INJECTION, SOLUTION INTRAVENOUS at 10:11

## 2017-11-14 RX ADMIN — CEFAZOLIN SODIUM 1 G: 1 SOLUTION INTRAVENOUS at 09:11

## 2017-11-14 RX ADMIN — FENTANYL CITRATE 50 MCG: 50 INJECTION, SOLUTION INTRAMUSCULAR; INTRAVENOUS at 09:11

## 2017-11-14 RX ADMIN — FENTANYL CITRATE 25 MCG: 50 INJECTION, SOLUTION INTRAMUSCULAR; INTRAVENOUS at 10:11

## 2017-11-14 RX ADMIN — HEPARIN SODIUM (PORCINE) LOCK FLUSH IV SOLN 100 UNIT/ML 500 UNITS: 100 SOLUTION at 10:11

## 2017-11-14 RX ADMIN — FENTANYL CITRATE 25 MCG: 50 INJECTION, SOLUTION INTRAMUSCULAR; INTRAVENOUS at 09:11

## 2017-11-14 NOTE — DISCHARGE SUMMARY
"Radiology Discharge Summary      Hospital Course: No complications    Admit Date: 11/14/2017  Discharge Date: 11/14/2017     Instructions Given to Patient: Yes  Diet: Resume prior diet  Activity: activity as tolerated    Description of Condition on Discharge: Stable  Vital Signs (Most Recent): Temp: 97.5 °F (36.4 °C) (11/14/17 1030)  Pulse: 82 (11/14/17 1130)  Resp: 18 (11/14/17 1130)  BP: 138/77 (11/14/17 1130)  SpO2: 96 % (11/14/17 1130)    Discharge Disposition: Home    Discharge Diagnosis: Squamous Cell carcinoma     Follow-up: with Heme-onc    Fam La MD ([facility])  Radiology PGY-4  [phone]          "

## 2017-11-14 NOTE — PROGRESS NOTES
Pt arrived to [facility], no acute distress noted. Dressing CDI. Report received from BLANCA Aiken. Will continue to monitor.

## 2017-11-14 NOTE — PLAN OF CARE
Pt states friend will come later to pick her up.    Pt resting comfortably.    Call light in reach.    No questions or concerns at this time..

## 2017-11-14 NOTE — H&P
Radiology History & Physical      SUBJECTIVE:     Chief Complaint: squamous cell carcinoma     History of Present Illness:  Silvia Capellan is a 72 y.o. female who presents for port placement.     Past Medical History:   Diagnosis Date    Encounter for blood transfusion     GERD (gastroesophageal reflux disease)     HTN (hypertension)     Rheumatoid arthritis     Rheumatoid arthritis(714.0)      Past Surgical History:   Procedure Laterality Date    APPENDECTOMY      COLONOSCOPY      COLONOSCOPY N/A 10/30/2017    Procedure: COLONOSCOPY;  Surgeon: Quentin Coppola MD;  Location: [address];  Service: Endoscopy;  Laterality: N/A;    HYSTERECTOMY      SKIN BIOPSY      TOTAL KNEE ARTHROPLASTY      right       Home Meds:   Prior to Admission medications    Medication Sig Start Date End Date Taking? Authorizing Provider   baclofen (LIORESAL) 10 MG tablet Take 1 tablet (10 mg total) by mouth 3 (three) times daily. 8/1/17 8/1/18 Yes Guru Antunez MD   clotrimazole (LOTRIMIN) 1 % cream Apply topically 2 (two) times daily. 4/3/17  Yes Hilda Saleem NP-C   escitalopram oxalate (LEXAPRO) 10 MG tablet Take 1 tablet (10 mg total) by mouth once daily. 9/27/17 9/27/18 Yes Ronak Guillaume MD   famotidine (PEPCID) 20 MG tablet Take 1 tablet (20 mg total) by mouth 2 (two) times daily. 7/1/17 7/1/18 Yes Marleny Sheppard DO   hydrochlorothiazide (HYDRODIURIL) 25 MG tablet Take 1 tablet (25 mg total) by mouth once daily. 9/7/17 9/7/18 Yes Ronak Guillaume MD   hydrocodone-acetaminophen 5-325mg (NORCO) 5-325 mg per tablet Take 1 tablet by mouth every 6 (six) hours as needed for Pain. 11/8/17  Yes Shayne Wong MD   hydroxychloroquine (PLAQUENIL) 200 mg tablet Take by mouth once daily.  9/1/16  Yes Historical Provider, MD   hydrOXYzine HCl (ATARAX) 25 MG tablet TAKE 1 TABLET (25 MG TOTAL) BY MOUTH 3 (THREE) TIMES DAILY AS NEEDED FOR ITCHING. 6/9/17  Yes Ronak Guillaume MD   levocetirizine (XYZAL) 5 MG tablet  Take 1 tablet (5 mg total) by mouth every evening. 6/1/17 6/1/18 Yes Ronak Guillaume MD   losartan (COZAAR) 100 MG tablet Take 1 tablet (100 mg total) by mouth once daily. 5/26/17  Yes SHAHIDA Browning   methotrexate 2.5 MG Tab  9/1/16  Yes Historical Provider, MD   morphine (MS CONTIN) 15 MG 12 hr tablet Take 1 tablet (15 mg total) by mouth 2 (two) times daily. 10/13/17  Yes Shayne Wong MD   pantoprazole (PROTONIX) 40 MG tablet Take 1 tablet (40 mg total) by mouth once daily. 9/27/17 9/27/18 Yes Ronak Guillaume MD   trazodone (DESYREL) 100 MG tablet Take 1 tablet (100 mg total) by mouth every evening. 9/21/17 9/21/18 Yes SHAHIDA Browning   alprazolam (XANAX) 0.5 MG tablet Take 1 tablet (0.5 mg total) by mouth once. 1hr prior to MRI 9/11/17 9/11/17  Shayne Wong MD   diphenhydrAMINE (BENADRYL) 25 mg capsule Take 1 each (25 mg total) by mouth every 6 (six) hours as needed for Itching or Allergies. 7/1/17   Marleny Sheppard DO   docusate sodium (COLACE) 100 MG capsule Take 1 capsule (100 mg total) by mouth 3 (three) times daily as needed for Constipation. 6/8/17 11/14/17  Giulia Monsivais MD   ketoconazole (NIZORAL) 2 % shampoo Apply topically once daily. 4/3/17 11/14/17  SHAHIDA Browning   triamcinolone acetonide 0.1% (KENALOG) 0.1 % ointment Apply topically 2 (two) times daily. 9/18/17 11/14/17  Ronak Guillaume MD     Anticoagulants/Antiplatelets: no anticoagulation    Allergies:   Review of patient's allergies indicates:   Allergen Reactions    Latex, natural rubber     Codeine Hallucinations    Cortisporin [neomycin-bacitracin-poly-hc]     Latex, natural rubber Rash     Sedation History:  no adverse reactions    Review of Systems:   Hematological: no known coagulopathies  Respiratory: no shortness of breath  Cardiovascular: no chest pain  Gastrointestinal: no abdominal pain  Genito-Urinary: no dysuria  Musculoskeletal: negative  Neurological: no TIA or stroke symptoms         OBJECTIVE:      Vital Signs (Most Recent)  Temp: 97.6 °F (36.4 °C) (11/14/17 0826)  Pulse: 84 (11/14/17 0826)  Resp: 18 (11/14/17 0826)  BP: 134/82 (11/14/17 0833)  SpO2: 100 % (11/14/17 0826)    Physical Exam:  ASA: 3  Mallampati: 2    General: no acute distress  Mental Status: alert and oriented to person, place and time  HEENT: normocephalic, atraumatic  Chest: unlabored breathing  Heart: regular heart rate  Abdomen: nondistended  Extremity: moves all extremities    Laboratory  Lab Results   Component Value Date    INR 1.0 11/14/2017       Lab Results   Component Value Date    WBC 4.18 11/09/2017    HGB 10.9 (L) 11/09/2017    HCT 35.0 (L) 11/09/2017    MCV 87 11/09/2017     11/09/2017      Lab Results   Component Value Date     11/09/2017     11/09/2017    K 3.5 11/09/2017     11/09/2017    CO2 31 (H) 11/09/2017    BUN 7 (L) 11/09/2017    CREATININE 0.8 11/09/2017    CALCIUM 9.0 11/09/2017    ALT 14 11/09/2017    AST 15 11/09/2017    ALBUMIN 2.7 (L) 11/09/2017    BILITOT 0.3 11/09/2017       ASSESSMENT/PLAN:     Sedation Plan: moderate   Patient will undergo port placement.    Hamlet Harrison MD  Department of Radiology   PGY II  [phone]

## 2017-11-14 NOTE — DISCHARGE INSTRUCTIONS
[facility] [phone] (MON-FRI 8 AM- 5PM). Radiology Resident on call [phone].

## 2017-11-14 NOTE — PROCEDURES
"Radiology Post-Procedure Note    Pre Op Diagnosis: Squamous cell carcinoma    Post Op Diagnosis: Same    Procedure: PORT placement    Procedure performed by: Jose    Written Informed Consent Obtained: Yes    Specimen Removed: No    Estimated Blood Loss: Minimal    Findings:   Using realtime U/S guidance a 8 Fr port catheter was placed into the right IJ vein with tip of the catheter in the SVC.    Port is ready for use.     Fam La MD ([facility])  Radiology PGY-4  [phone]        "

## 2017-11-14 NOTE — PROGRESS NOTES
Recovery completed, pt tolerated well. No apparent distress noted. Dressing CDI. Discharge instructions reviewed and acknowledged. Pt discharged via wheelchair and private vehicle, accompanied by nurse.

## 2017-11-17 DIAGNOSIS — C44.92 SQUAMOUS CELL CARCINOMA OF UNKNOWN ORIGIN: ICD-10-CM

## 2017-11-17 RX ORDER — MORPHINE SULFATE 15 MG/1
15 TABLET, FILM COATED, EXTENDED RELEASE ORAL 2 TIMES DAILY
Qty: 60 TABLET | Refills: 0 | Status: SHIPPED | OUTPATIENT
Start: 2017-11-17 | End: 2017-12-18 | Stop reason: SDUPTHER

## 2017-11-17 NOTE — TELEPHONE ENCOUNTER
----- Message from Rudy Cleaning sent at 11/17/2017  8:59 AM CST -----  Contact: Pt   Refill on morphine sulfate     Pharmacy contact::[phone]

## 2017-11-27 ENCOUNTER — TELEPHONE (OUTPATIENT)
Dept: PULMONOLOGY | Facility: CLINIC | Age: 72
End: 2017-11-27

## 2017-11-27 NOTE — TELEPHONE ENCOUNTER
Patient has an appointment with you on 11/29. Does she need to be seen? I see where she is being followed in Hem/Onc so was just checking.

## 2017-12-08 ENCOUNTER — OFFICE VISIT (OUTPATIENT)
Dept: HEMATOLOGY/ONCOLOGY | Facility: CLINIC | Age: 72
End: 2017-12-08
Payer: MEDICARE

## 2017-12-08 ENCOUNTER — LAB VISIT (OUTPATIENT)
Dept: LAB | Facility: HOSPITAL | Age: 72
End: 2017-12-08
Attending: INTERNAL MEDICINE
Payer: MEDICARE

## 2017-12-08 VITALS
RESPIRATION RATE: 18 BRPM | BODY MASS INDEX: 31.97 KG/M2 | HEART RATE: 81 BPM | SYSTOLIC BLOOD PRESSURE: 144 MMHG | WEIGHT: 203.69 LBS | HEIGHT: 67 IN | DIASTOLIC BLOOD PRESSURE: 72 MMHG | OXYGEN SATURATION: 93 % | TEMPERATURE: 98 F

## 2017-12-08 DIAGNOSIS — C44.92 SQUAMOUS CELL CARCINOMA OF UNKNOWN ORIGIN: ICD-10-CM

## 2017-12-08 DIAGNOSIS — I10 ESSENTIAL HYPERTENSION: ICD-10-CM

## 2017-12-08 DIAGNOSIS — J90 PLEURAL EFFUSION ON LEFT: ICD-10-CM

## 2017-12-08 DIAGNOSIS — F41.1 ANXIETY STATE: Primary | ICD-10-CM

## 2017-12-08 LAB
ALBUMIN SERPL BCP-MCNC: 3 G/DL
ALP SERPL-CCNC: 99 U/L
ALT SERPL W/O P-5'-P-CCNC: 9 U/L
ANION GAP SERPL CALC-SCNC: 8 MMOL/L
AST SERPL-CCNC: 14 U/L
BASOPHILS # BLD AUTO: 0.03 K/UL
BASOPHILS NFR BLD: 0.7 %
BILIRUB SERPL-MCNC: 0.3 MG/DL
BUN SERPL-MCNC: 12 MG/DL
CALCIUM SERPL-MCNC: 9.5 MG/DL
CHLORIDE SERPL-SCNC: 102 MMOL/L
CO2 SERPL-SCNC: 32 MMOL/L
CREAT SERPL-MCNC: 0.8 MG/DL
DIFFERENTIAL METHOD: ABNORMAL
EOSINOPHIL # BLD AUTO: 0.4 K/UL
EOSINOPHIL NFR BLD: 7.8 %
ERYTHROCYTE [DISTWIDTH] IN BLOOD BY AUTOMATED COUNT: 13.2 %
EST. GFR  (AFRICAN AMERICAN): >60 ML/MIN/1.73 M^2
EST. GFR  (NON AFRICAN AMERICAN): >60 ML/MIN/1.73 M^2
GLUCOSE SERPL-MCNC: 109 MG/DL
HCT VFR BLD AUTO: 37 %
HGB BLD-MCNC: 11.3 G/DL
IMM GRANULOCYTES # BLD AUTO: 0.01 K/UL
IMM GRANULOCYTES NFR BLD AUTO: 0.2 %
LDH SERPL L TO P-CCNC: 211 U/L
LYMPHOCYTES # BLD AUTO: 1.5 K/UL
LYMPHOCYTES NFR BLD: 33.1 %
MCH RBC QN AUTO: 26.2 PG
MCHC RBC AUTO-ENTMCNC: 30.5 G/DL
MCV RBC AUTO: 86 FL
MONOCYTES # BLD AUTO: 0.6 K/UL
MONOCYTES NFR BLD: 12.5 %
NEUTROPHILS # BLD AUTO: 2 K/UL
NEUTROPHILS NFR BLD: 45.7 %
NRBC BLD-RTO: 0 /100 WBC
PLATELET # BLD AUTO: 245 K/UL
PMV BLD AUTO: 10.2 FL
POTASSIUM SERPL-SCNC: 3.8 MMOL/L
PROT SERPL-MCNC: 8.5 G/DL
RBC # BLD AUTO: 4.31 M/UL
SODIUM SERPL-SCNC: 142 MMOL/L
WBC # BLD AUTO: 4.47 K/UL

## 2017-12-08 PROCEDURE — 36415 COLL VENOUS BLD VENIPUNCTURE: CPT

## 2017-12-08 PROCEDURE — 99499 UNLISTED E&M SERVICE: CPT | Mod: S$PBB,,, | Performed by: INTERNAL MEDICINE

## 2017-12-08 PROCEDURE — 83615 LACTATE (LD) (LDH) ENZYME: CPT

## 2017-12-08 PROCEDURE — 99999 PR PBB SHADOW E&M-EST. PATIENT-LVL V: CPT | Mod: PBBFAC,,, | Performed by: INTERNAL MEDICINE

## 2017-12-08 PROCEDURE — 85025 COMPLETE CBC W/AUTO DIFF WBC: CPT

## 2017-12-08 PROCEDURE — 99214 OFFICE O/P EST MOD 30 MIN: CPT | Mod: S$GLB,,, | Performed by: INTERNAL MEDICINE

## 2017-12-08 PROCEDURE — 80053 COMPREHEN METABOLIC PANEL: CPT

## 2017-12-08 NOTE — Clinical Note
CT chest, abd, pelvis with cont on 12/11 CT head with cont on 12/11 or 12/12 Cbc, cmp, ldh today F/u in 1 [id number]

## 2017-12-08 NOTE — PROGRESS NOTES
CC: Squamous cell carcinoma     HPI:  is a 71 y/o female who underwent bronchoscopy/EBUS evaluation on 8/31/17 for abnormal mediastinal adenopathy  and a 1.2 cm MORRO mass.     Bronchoscopy findings:    The oropharynx appears normal. The larynx appears normal. The vocal cords appear normal. The subglottic space is normal. The trachea is of normal caliber. The otf is sharp. The tracheobronchial tree was examined to at least the first subsegmental level. Bronchial mucosa and anatomy are normal; there are no endobronchial lesions, and no secretions.    Lymph Nodes: Lymph node sizing was performed via endobronchial ultrasound for suspected lung cancer. Sampling by transbronchial needle aspiration was also performed using an Olympus EBUS-TBNA 22  gauge needle in the subcarinal mediastinum (level 7) and sent for routine cytology.       - The 7 (subcarinal) node was 30 mm by EBUS. Three samples with the needle were obtained. The patient's condition was unchanged after the intervention.    Interval history: She is here for a follow up visit      Review of Systems   Constitutional: Positive for malaise/fatigue and weight loss. Negative for fever.   HENT: Negative for congestion and nosebleeds.    Eyes: Negative for blurred vision and double vision.   Respiratory: Positive for cough and shortness of breath. Negative for wheezing.    Cardiovascular: Negative for chest pain, leg swelling and PND.   Gastrointestinal: Negative for blood in stool, constipation, diarrhea, heartburn and melena.   Genitourinary: Negative for dysuria, frequency and urgency.   Musculoskeletal: Negative for back pain, joint pain and myalgias.   Neurological: Negative for dizziness, sensory change, speech change, weakness and headaches.   Endo/Heme/Allergies: Does not bruise/bleed easily.   Psychiatric/Behavioral: Negative for depression. The patient is nervous/anxious.        Current Outpatient Prescriptions   Medication Sig    baclofen  (LIORESAL) 10 MG tablet Take 1 tablet (10 mg total) by mouth 3 (three) times daily.    clotrimazole (LOTRIMIN) 1 % cream Apply topically 2 (two) times daily.    diphenhydrAMINE (BENADRYL) 25 mg capsule Take 1 each (25 mg total) by mouth every 6 (six) hours as needed for Itching or Allergies.    escitalopram oxalate (LEXAPRO) 10 MG tablet Take 1 tablet (10 mg total) by mouth once daily.    famotidine (PEPCID) 20 MG tablet Take 1 tablet (20 mg total) by mouth 2 (two) times daily.    hydrochlorothiazide (HYDRODIURIL) 25 MG tablet Take 1 tablet (25 mg total) by mouth once daily.    hydrocodone-acetaminophen 5-325mg (NORCO) 5-325 mg per tablet Take 1 tablet by mouth every 6 (six) hours as needed for Pain.    hydroxychloroquine (PLAQUENIL) 200 mg tablet Take by mouth once daily.     hydrOXYzine HCl (ATARAX) 25 MG tablet TAKE 1 TABLET (25 MG TOTAL) BY MOUTH 3 (THREE) TIMES DAILY AS NEEDED FOR ITCHING.    levocetirizine (XYZAL) 5 MG tablet Take 1 tablet (5 mg total) by mouth every evening.    losartan (COZAAR) 100 MG tablet Take 1 tablet (100 mg total) by mouth once daily.    methotrexate 2.5 MG Tab     morphine (MS CONTIN) 15 MG 12 hr tablet Take 1 tablet (15 mg total) by mouth 2 (two) times daily.    pantoprazole (PROTONIX) 40 MG tablet Take 1 tablet (40 mg total) by mouth once daily.    trazodone (DESYREL) 100 MG tablet Take 1 tablet (100 mg total) by mouth every evening.    alprazolam (XANAX) 0.5 MG tablet Take 1 tablet (0.5 mg total) by mouth once. 1hr prior to MRI     No current facility-administered medications for this visit.      Vitals:    12/08/17 0809   BP: (!) 144/72   Pulse: 81   Resp: 18   Temp: 97.9 °F (36.6 °C)     Physical Exam   Constitutional: She is oriented to person, place, and time. She appears well-developed. No distress.   Eyes: Pupils are equal, round, and reactive to light.   Neck: Normal range of motion.   Cardiovascular: Normal rate and regular rhythm.    No murmur  heard.  Pulmonary/Chest: Effort normal. No respiratory distress. She has no wheezes. She has no rales.   She has a port in her right chest, not accessed   Abdominal: Soft. She exhibits no distension and no mass. There is no tenderness. There is no guarding.   Musculoskeletal: She exhibits no edema.   Lymphadenopathy:     She has no cervical adenopathy.   Neurological: She is alert and oriented to person, place, and time. No cranial nerve deficit.   Skin: Skin is warm. No erythema.   Psychiatric: She has a normal mood and affect.        Imagin/12/17 CT chest with cont      1. Large left pleural effusion resulting in atelectasis of the left lower lobe and portions of left upper lobe  2. 1.2 cm left upper lobe pleural based pulmonary nodule  3. Mediastinal adenopathy as described above with diffuse thickening of the wall of the distal esophagus. Findings are suspicious for neoplastic process. Recommend bronchoscopy biopsy and possible endoscopy for further evaluation.  4. 1.3 cm hypodensity within the dome of the liver. Metastatic focus cannot be entirely excluded.     17 PET CT       There is mildly increased tracer uptake within the patient's left pleural effusion and overlying the mediastinal adenopathy, max SUV 2.7.    Transmission CT images show continued pleural effusion and circumferential esophageal thickening. Transmission CT images also show non-avid bilateral groin adenopathy likely reactive in nature.      Impression        There is mildly increased tracer uptake within the patient's left pleural effusion and mediastinal adenopathy. While these findings suggest reactive etiology some low grade malignancies, such as bronchioloalveolar carcinoma, may show this level of uptake on PET scan.            10/30/17 EGD     The examined duodenum was normal.The entire examined stomach was normal.The cardia and gastric fundus were normal on retroflexion. A 1 cm hiatal hernia was found. The proximal  extent of the gastric folds (end of tubular esophagus) was 38 cm from the incisors. The hiatal narrowing was 39 cm from the incisors. The Z-line was 38 cm from the incisors. Z-line was sharp. No esophagitis and no columnar esophagus and no lesions seen with NBI or white light.    Impression:                                   - Normal examined duodenum.                        - Normal stomach.                        - 1 cm hiatal hernia.                        - No specimens collected.     10/30/17 colonoscopy :     Cecal polyp ( 3mm) identified  Histopathology: Tubular adenoma        Pathology:     1/17/14 colonoscopy     FINAL PATHOLOGIC DIAGNOSIS     1. Colon biopsy, ascending colon-tubular adenoma.  2. Colon biopsy, transverse-tubular adenoma.  3. 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 The stalk and base of the polyp are well represented and are free of atypia.  4. Colon biopsy, sigmoid- mild glandular hyperplasia consistent with a benign hyperplastic polyp.        7/19/17 PLEURAL FLUID (CYTOLOGY AND CELL BLOCK):     -Groups of atypical cells present, malignancy cannot be excluded.  Note: While these cells could be reactive mesothelial cells, the level of atypia could be seen in malignancy. A cell block was prepared but the atypical cells are not present in the cell block for further characterization. Correlate  clinically. Repeat tap/biopsy might be helpful if clinically indicated.     8/28/17 EBUS FNA     FNA of subcarinal lymph node: Positive for malignant cells  Small clusters of malignant epithelial cells, favor squamous carcinoma Tumor cells are positive for CK 5/6 and CK 7. Immunostains for p63 and TTF-1 are negative.     Assessment:     1. is a 73 y/o female who underwent bronchoscopy/EBUS evaluation on 8/31/17 for abnormal mediastinal adenopathy  and a 1.2 cm MORRO  mass as well as pericardial adenopathy.FNA of subcarinal lymph node was positive for malignant cells.  There were small clusters of malignant epithelial cells, favoring squamous carcinoma. Site of origin is not clear. PET CT done on 9/7/17 did not reveal any lesion in head and neck region.  It does not appear that there is adequate tissue to run molecular testing like p16, PD L1.   Esophageal thickening was noted on CT done on 6/12/17. EGD on 10/30/17 did not reveal any suspicious lesions in esophagus/stomach. ENT evaluation still pending.   MRI brain still pending.I explained to her that if primary site is unclear, treatment will be directed at metastatic SCC of unknown primary.   Her treatment has been delayed as she cancelled several appointments due to some issues at home. I emphasized the need to have the pending studies ( MRI, ENT evaluation) done, as well as have CT chest/abdomen repeated as her last imaging was over 2 months ago.  She said she cannot have MRI, even with anti-anxiety medication. She will try to have CT cherst, abdomen,pelvis and CT brain and have a follow up in 1 week.         2. Chest and abdominal pain: She has long h/o body aches. She takes Hydrocodone/APAP. She is on not on any DAMARDs for her RA at this time. She is on extended release Morphine 15mg q12hr for her pain. It has provided her with only partial pain relief.     3. Anxiety and depression: She is on Trazodone and Xanax     4. Tubular adenoma: Noted in a cecal polyp on colonoscopy on 10/30/17.              Distress Screening Results: Psychosocial Distress screening score of Distress Score: 0 noted and reviewed. No intervention indicated.

## 2017-12-18 DIAGNOSIS — C44.92 SQUAMOUS CELL CARCINOMA OF UNKNOWN ORIGIN: ICD-10-CM

## 2017-12-18 RX ORDER — MORPHINE SULFATE 15 MG/1
15 TABLET, FILM COATED, EXTENDED RELEASE ORAL 2 TIMES DAILY
Qty: 60 TABLET | Refills: 0 | Status: SHIPPED | OUTPATIENT
Start: 2017-12-18 | End: 2018-01-16 | Stop reason: SDUPTHER

## 2017-12-18 NOTE — TELEPHONE ENCOUNTER
----- Message from Symone Flores sent at 12/18/2017  9:08 AM CST -----  Contact: Pt  Pt calling requesting a refill on Morphine      Pt call back number [phone]

## 2017-12-20 DIAGNOSIS — L50.9 URTICARIA: ICD-10-CM

## 2017-12-20 RX ORDER — LEVOCETIRIZINE DIHYDROCHLORIDE 5 MG/1
5 TABLET, FILM COATED ORAL NIGHTLY
Qty: 30 TABLET | Refills: 5 | Status: SHIPPED | OUTPATIENT
Start: 2017-12-20 | End: 2018-01-24

## 2018-01-01 ENCOUNTER — ANESTHESIA EVENT (OUTPATIENT)
Dept: SURGERY | Facility: HOSPITAL | Age: 73
End: 2018-01-01
Payer: MEDICARE

## 2018-01-01 ENCOUNTER — OFFICE VISIT (OUTPATIENT)
Dept: ORTHOPEDICS | Facility: CLINIC | Age: 73
End: 2018-01-01
Payer: MEDICARE

## 2018-01-01 ENCOUNTER — TELEPHONE (OUTPATIENT)
Dept: ORTHOPEDICS | Facility: CLINIC | Age: 73
End: 2018-01-01

## 2018-01-01 ENCOUNTER — OFFICE VISIT (OUTPATIENT)
Dept: HEMATOLOGY/ONCOLOGY | Facility: CLINIC | Age: 73
End: 2018-01-01
Payer: MEDICARE

## 2018-01-01 ENCOUNTER — TELEPHONE (OUTPATIENT)
Dept: OPTOMETRY | Facility: CLINIC | Age: 73
End: 2018-01-01

## 2018-01-01 ENCOUNTER — HOSPITAL ENCOUNTER (EMERGENCY)
Facility: HOSPITAL | Age: 73
Discharge: HOME | End: 2018-12-06
Attending: EMERGENCY MEDICINE
Payer: MEDICARE

## 2018-01-01 ENCOUNTER — HOSPITAL ENCOUNTER (OUTPATIENT)
Dept: RADIOLOGY | Facility: HOSPITAL | Age: 73
Discharge: HOME | End: 2018-09-08
Attending: INTERNAL MEDICINE
Payer: MEDICARE

## 2018-01-01 ENCOUNTER — TELEPHONE (OUTPATIENT)
Dept: OPHTHALMOLOGY | Facility: CLINIC | Age: 73
End: 2018-01-01

## 2018-01-01 ENCOUNTER — ANESTHESIA (OUTPATIENT)
Dept: SURGERY | Facility: HOSPITAL | Age: 73
End: 2018-01-01
Payer: MEDICARE

## 2018-01-01 ENCOUNTER — OFFICE VISIT (OUTPATIENT)
Dept: FAMILY MEDICINE | Facility: CLINIC | Age: 73
End: 2018-01-01
Payer: MEDICARE

## 2018-01-01 ENCOUNTER — TELEPHONE (OUTPATIENT)
Dept: FAMILY MEDICINE | Facility: CLINIC | Age: 73
End: 2018-01-01

## 2018-01-01 ENCOUNTER — LAB VISIT (OUTPATIENT)
Dept: LAB | Facility: HOSPITAL | Age: 73
End: 2018-01-01
Attending: INTERNAL MEDICINE
Payer: MEDICARE

## 2018-01-01 ENCOUNTER — HOSPITAL ENCOUNTER (OUTPATIENT)
Facility: HOSPITAL | Age: 73
Discharge: HOME | End: 2018-11-08
Attending: OPHTHALMOLOGY | Admitting: OPHTHALMOLOGY
Payer: MEDICARE

## 2018-01-01 ENCOUNTER — HOSPITAL ENCOUNTER (EMERGENCY)
Facility: HOSPITAL | Age: 73
Discharge: HOME | End: 2018-10-07
Attending: EMERGENCY MEDICINE
Payer: MEDICARE

## 2018-01-01 ENCOUNTER — HOSPITAL ENCOUNTER (OUTPATIENT)
Dept: RADIOLOGY | Facility: HOSPITAL | Age: 73
Discharge: HOME | End: 2018-12-04
Attending: INTERNAL MEDICINE
Payer: MEDICARE

## 2018-01-01 ENCOUNTER — OFFICE VISIT (OUTPATIENT)
Dept: OPHTHALMOLOGY | Facility: CLINIC | Age: 73
End: 2018-01-01
Payer: MEDICARE

## 2018-01-01 ENCOUNTER — HOSPITAL ENCOUNTER (EMERGENCY)
Facility: HOSPITAL | Age: 73
Discharge: HOME | End: 2018-10-16
Attending: EMERGENCY MEDICINE
Payer: MEDICARE

## 2018-01-01 ENCOUNTER — TELEPHONE (OUTPATIENT)
Dept: HEMATOLOGY/ONCOLOGY | Facility: CLINIC | Age: 73
End: 2018-01-01

## 2018-01-01 ENCOUNTER — NURSE TRIAGE (OUTPATIENT)
Dept: ADMINISTRATIVE | Facility: CLINIC | Age: 73
End: 2018-01-01

## 2018-01-01 ENCOUNTER — TELEPHONE (OUTPATIENT)
Dept: ADMINISTRATIVE | Facility: HOSPITAL | Age: 73
End: 2018-01-01

## 2018-01-01 ENCOUNTER — APPOINTMENT (OUTPATIENT)
Dept: RADIOLOGY | Facility: HOSPITAL | Age: 73
End: 2018-01-01
Attending: ORTHOPAEDIC SURGERY
Payer: MEDICARE

## 2018-01-01 ENCOUNTER — HOSPITAL ENCOUNTER (OUTPATIENT)
Facility: HOSPITAL | Age: 73
Discharge: HOME | End: 2018-10-18
Attending: OPHTHALMOLOGY | Admitting: OPHTHALMOLOGY
Payer: MEDICARE

## 2018-01-01 VITALS
BODY MASS INDEX: 37.15 KG/M2 | RESPIRATION RATE: 18 BRPM | DIASTOLIC BLOOD PRESSURE: 76 MMHG | HEART RATE: 76 BPM | HEIGHT: 67 IN | OXYGEN SATURATION: 100 % | BODY MASS INDEX: 36.1 KG/M2 | SYSTOLIC BLOOD PRESSURE: 140 MMHG | HEART RATE: 73 BPM | WEIGHT: 230 LBS | RESPIRATION RATE: 16 BRPM | OXYGEN SATURATION: 98 % | TEMPERATURE: 98 F | DIASTOLIC BLOOD PRESSURE: 78 MMHG | TEMPERATURE: 98 F | HEIGHT: 67 IN | SYSTOLIC BLOOD PRESSURE: 134 MMHG

## 2018-01-01 VITALS
DIASTOLIC BLOOD PRESSURE: 82 MMHG | OXYGEN SATURATION: 97 % | TEMPERATURE: 98 F | HEART RATE: 81 BPM | HEIGHT: 67 IN | WEIGHT: 229.5 LBS | BODY MASS INDEX: 36.02 KG/M2 | SYSTOLIC BLOOD PRESSURE: 134 MMHG | RESPIRATION RATE: 20 BRPM

## 2018-01-01 VITALS
SYSTOLIC BLOOD PRESSURE: 123 MMHG | OXYGEN SATURATION: 100 % | HEART RATE: 74 BPM | RESPIRATION RATE: 18 BRPM | WEIGHT: 216.94 LBS | HEIGHT: 67 IN | BODY MASS INDEX: 34.05 KG/M2 | DIASTOLIC BLOOD PRESSURE: 66 MMHG | TEMPERATURE: 98 F

## 2018-01-01 VITALS
RESPIRATION RATE: 16 BRPM | HEART RATE: 88 BPM | WEIGHT: 230 LBS | DIASTOLIC BLOOD PRESSURE: 72 MMHG | TEMPERATURE: 99 F | OXYGEN SATURATION: 99 % | BODY MASS INDEX: 36.1 KG/M2 | HEIGHT: 67 IN | SYSTOLIC BLOOD PRESSURE: 148 MMHG

## 2018-01-01 VITALS
DIASTOLIC BLOOD PRESSURE: 94 MMHG | SYSTOLIC BLOOD PRESSURE: 145 MMHG | WEIGHT: 246.69 LBS | RESPIRATION RATE: 16 BRPM | BODY MASS INDEX: 38.72 KG/M2 | HEIGHT: 67 IN | OXYGEN SATURATION: 97 % | TEMPERATURE: 98 F | HEART RATE: 98 BPM

## 2018-01-01 VITALS
DIASTOLIC BLOOD PRESSURE: 80 MMHG | TEMPERATURE: 98 F | OXYGEN SATURATION: 99 % | WEIGHT: 220 LBS | SYSTOLIC BLOOD PRESSURE: 144 MMHG | SYSTOLIC BLOOD PRESSURE: 122 MMHG | HEART RATE: 80 BPM | HEIGHT: 67 IN | HEIGHT: 67 IN | BODY MASS INDEX: 33.49 KG/M2 | BODY MASS INDEX: 34.53 KG/M2 | DIASTOLIC BLOOD PRESSURE: 80 MMHG | WEIGHT: 213.38 LBS | HEART RATE: 100 BPM

## 2018-01-01 VITALS
OXYGEN SATURATION: 99 % | WEIGHT: 237 LBS | TEMPERATURE: 98 F | DIASTOLIC BLOOD PRESSURE: 72 MMHG | BODY MASS INDEX: 37.2 KG/M2 | SYSTOLIC BLOOD PRESSURE: 158 MMHG | HEIGHT: 67 IN | HEART RATE: 87 BPM | RESPIRATION RATE: 17 BRPM

## 2018-01-01 VITALS
OXYGEN SATURATION: 97 % | BODY MASS INDEX: 35.79 KG/M2 | RESPIRATION RATE: 16 BRPM | HEART RATE: 80 BPM | HEIGHT: 67 IN | WEIGHT: 228 LBS | SYSTOLIC BLOOD PRESSURE: 143 MMHG | DIASTOLIC BLOOD PRESSURE: 83 MMHG | TEMPERATURE: 98 F

## 2018-01-01 VITALS
TEMPERATURE: 98 F | BODY MASS INDEX: 37.23 KG/M2 | WEIGHT: 237.19 LBS | SYSTOLIC BLOOD PRESSURE: 135 MMHG | DIASTOLIC BLOOD PRESSURE: 63 MMHG | RESPIRATION RATE: 18 BRPM | OXYGEN SATURATION: 98 % | HEART RATE: 94 BPM | HEIGHT: 67 IN

## 2018-01-01 VITALS
SYSTOLIC BLOOD PRESSURE: 139 MMHG | HEIGHT: 67 IN | TEMPERATURE: 98 F | DIASTOLIC BLOOD PRESSURE: 82 MMHG | OXYGEN SATURATION: 100 % | RESPIRATION RATE: 18 BRPM | BODY MASS INDEX: 37.82 KG/M2 | WEIGHT: 240.94 LBS | HEART RATE: 82 BPM

## 2018-01-01 VITALS
HEART RATE: 98 BPM | SYSTOLIC BLOOD PRESSURE: 141 MMHG | DIASTOLIC BLOOD PRESSURE: 68 MMHG | OXYGEN SATURATION: 97 % | BODY MASS INDEX: 36.1 KG/M2 | HEIGHT: 67 IN | RESPIRATION RATE: 16 BRPM | TEMPERATURE: 98 F | WEIGHT: 230 LBS

## 2018-01-01 DIAGNOSIS — B97.89 VIRAL SINUSITIS: ICD-10-CM

## 2018-01-01 DIAGNOSIS — M54.50 BILATERAL LOW BACK PAIN WITHOUT SCIATICA, UNSPECIFIED CHRONICITY: Primary | ICD-10-CM

## 2018-01-01 DIAGNOSIS — H25.12 NUCLEAR SENILE CATARACT OF LEFT EYE: Primary | ICD-10-CM

## 2018-01-01 DIAGNOSIS — C44.92 SQUAMOUS CELL CARCINOMA OF UNKNOWN ORIGIN: ICD-10-CM

## 2018-01-01 DIAGNOSIS — I10 ESSENTIAL HYPERTENSION: ICD-10-CM

## 2018-01-01 DIAGNOSIS — M25.559 ARTHRALGIA OF HIP, UNSPECIFIED LATERALITY: ICD-10-CM

## 2018-01-01 DIAGNOSIS — C44.92 SQUAMOUS CELL CARCINOMA OF UNKNOWN ORIGIN: Primary | ICD-10-CM

## 2018-01-01 DIAGNOSIS — R60.0 FACIAL EDEMA: ICD-10-CM

## 2018-01-01 DIAGNOSIS — C34.92 SQUAMOUS CELL LUNG CANCER, LEFT: ICD-10-CM

## 2018-01-01 DIAGNOSIS — R63.5 EXCESSIVE WEIGHT GAIN: Primary | ICD-10-CM

## 2018-01-01 DIAGNOSIS — R06.02 SOB (SHORTNESS OF BREATH): ICD-10-CM

## 2018-01-01 DIAGNOSIS — D63.0 ANEMIA IN NEOPLASTIC DISEASE: ICD-10-CM

## 2018-01-01 DIAGNOSIS — R63.0 ANOREXIA: ICD-10-CM

## 2018-01-01 DIAGNOSIS — K21.9 GASTROESOPHAGEAL REFLUX DISEASE, ESOPHAGITIS PRESENCE NOT SPECIFIED: ICD-10-CM

## 2018-01-01 DIAGNOSIS — M48.00 CENTRAL STENOSIS OF SPINAL CANAL: ICD-10-CM

## 2018-01-01 DIAGNOSIS — R53.0 NEOPLASTIC MALIGNANT RELATED FATIGUE: Primary | ICD-10-CM

## 2018-01-01 DIAGNOSIS — H25.10 SENILE NUCLEAR SCLEROSIS: ICD-10-CM

## 2018-01-01 DIAGNOSIS — H25.12 NUCLEAR SCLEROTIC CATARACT OF LEFT EYE: ICD-10-CM

## 2018-01-01 DIAGNOSIS — F41.1 ANXIETY STATE: ICD-10-CM

## 2018-01-01 DIAGNOSIS — Z96.1 STATUS POST CATARACT EXTRACTION AND INSERTION OF INTRAOCULAR LENS, RIGHT: Primary | ICD-10-CM

## 2018-01-01 DIAGNOSIS — S39.012A STRAIN OF LUMBAR PARASPINAL MUSCLE, INITIAL ENCOUNTER: Primary | ICD-10-CM

## 2018-01-01 DIAGNOSIS — R63.4 WEIGHT LOSS, NON-INTENTIONAL: ICD-10-CM

## 2018-01-01 DIAGNOSIS — R73.9 STEROID-INDUCED HYPERGLYCEMIA: ICD-10-CM

## 2018-01-01 DIAGNOSIS — H25.813 MIXED TYPE AGE-RELATED CATARACT, BOTH EYES: Primary | ICD-10-CM

## 2018-01-01 DIAGNOSIS — Z96.1 STATUS POST CATARACT EXTRACTION AND INSERTION OF INTRAOCULAR LENS, RIGHT: ICD-10-CM

## 2018-01-01 DIAGNOSIS — H25.11 NUCLEAR SCLEROTIC CATARACT OF RIGHT EYE: Primary | ICD-10-CM

## 2018-01-01 DIAGNOSIS — Z98.42 STATUS POST CATARACT EXTRACTION AND INSERTION OF INTRAOCULAR LENS, LEFT: Primary | ICD-10-CM

## 2018-01-01 DIAGNOSIS — M43.17 SPONDYLOLISTHESIS AT L5-S1 LEVEL: ICD-10-CM

## 2018-01-01 DIAGNOSIS — M16.12 PRIMARY OSTEOARTHRITIS OF LEFT HIP: ICD-10-CM

## 2018-01-01 DIAGNOSIS — E66.9 OBESITY (BMI 30-39.9): Primary | ICD-10-CM

## 2018-01-01 DIAGNOSIS — M54.50 BILATERAL LOW BACK PAIN WITHOUT SCIATICA: ICD-10-CM

## 2018-01-01 DIAGNOSIS — G89.29 OTHER CHRONIC PAIN: ICD-10-CM

## 2018-01-01 DIAGNOSIS — Z98.41 STATUS POST CATARACT EXTRACTION AND INSERTION OF INTRAOCULAR LENS, RIGHT: Primary | ICD-10-CM

## 2018-01-01 DIAGNOSIS — M62.838 MUSCLE SPASM OF BOTH LOWER LEGS: ICD-10-CM

## 2018-01-01 DIAGNOSIS — G89.29 CHRONIC LEFT HIP PAIN: Primary | ICD-10-CM

## 2018-01-01 DIAGNOSIS — M48.00 CENTRAL STENOSIS OF SPINAL CANAL: Primary | ICD-10-CM

## 2018-01-01 DIAGNOSIS — R53.0 NEOPLASTIC MALIGNANT RELATED FATIGUE: ICD-10-CM

## 2018-01-01 DIAGNOSIS — D63.0 ANEMIA IN NEOPLASTIC DISEASE: Primary | ICD-10-CM

## 2018-01-01 DIAGNOSIS — Z98.41 STATUS POST CATARACT EXTRACTION AND INSERTION OF INTRAOCULAR LENS, RIGHT: ICD-10-CM

## 2018-01-01 DIAGNOSIS — J32.9 VIRAL SINUSITIS: ICD-10-CM

## 2018-01-01 DIAGNOSIS — I10 HYPERTENSION, UNSPECIFIED TYPE: ICD-10-CM

## 2018-01-01 DIAGNOSIS — C34.90 MALIGNANT NEOPLASM OF LUNG, UNSPECIFIED LATERALITY, UNSPECIFIED PART OF LUNG: Primary | ICD-10-CM

## 2018-01-01 DIAGNOSIS — D49.9 NEOPLASM: ICD-10-CM

## 2018-01-01 DIAGNOSIS — H25.12 NUCLEAR SCLEROTIC CATARACT OF LEFT EYE: Primary | ICD-10-CM

## 2018-01-01 DIAGNOSIS — Z13.1 SCREENING FOR DIABETES MELLITUS: ICD-10-CM

## 2018-01-01 DIAGNOSIS — C34.92 STAGE IV SQUAMOUS CELL CARCINOMA OF LEFT LUNG: Primary | ICD-10-CM

## 2018-01-01 DIAGNOSIS — M19.90 OSTEOARTHRITIS, UNSPECIFIED OSTEOARTHRITIS TYPE, UNSPECIFIED SITE: Primary | ICD-10-CM

## 2018-01-01 DIAGNOSIS — C34.90 MALIGNANT NEOPLASM OF LUNG, UNSPECIFIED LATERALITY, UNSPECIFIED PART OF LUNG: ICD-10-CM

## 2018-01-01 DIAGNOSIS — R60.9 EDEMA: ICD-10-CM

## 2018-01-01 DIAGNOSIS — J30.2 SEASONAL ALLERGIC RHINITIS, UNSPECIFIED TRIGGER: ICD-10-CM

## 2018-01-01 DIAGNOSIS — Z79.52 LONG TERM (CURRENT) USE OF SYSTEMIC STEROIDS: ICD-10-CM

## 2018-01-01 DIAGNOSIS — M25.559 ARTHRALGIA OF HIP, UNSPECIFIED LATERALITY: Primary | ICD-10-CM

## 2018-01-01 DIAGNOSIS — Z96.1 STATUS POST CATARACT EXTRACTION AND INSERTION OF INTRAOCULAR LENS, LEFT: Primary | ICD-10-CM

## 2018-01-01 DIAGNOSIS — M25.552 CHRONIC LEFT HIP PAIN: Primary | ICD-10-CM

## 2018-01-01 DIAGNOSIS — Z12.31 ENCOUNTER FOR SCREENING MAMMOGRAM FOR MALIGNANT NEOPLASM OF BREAST: Primary | ICD-10-CM

## 2018-01-01 DIAGNOSIS — C34.80 MALIGNANT NEOPLASM OF OVERLAPPING SITES OF LUNG, UNSPECIFIED LATERALITY: Primary | ICD-10-CM

## 2018-01-01 DIAGNOSIS — M43.17 SPONDYLOLISTHESIS AT L5-S1 LEVEL: Primary | ICD-10-CM

## 2018-01-01 DIAGNOSIS — M54.50 LOW BACK PAIN, NON-SPECIFIC: ICD-10-CM

## 2018-01-01 DIAGNOSIS — T38.0X5A STEROID-INDUCED HYPERGLYCEMIA: ICD-10-CM

## 2018-01-01 LAB
ABO + RH BLD: NORMAL
ALBUMIN SERPL BCP-MCNC: 2.9 G/DL
ALBUMIN SERPL BCP-MCNC: 3 G/DL
ALBUMIN SERPL BCP-MCNC: 3.1 G/DL
ALBUMIN SERPL-MCNC: 3.1 G/DL (ref 3.3–5.5)
ALP SERPL-CCNC: 72 U/L
ALP SERPL-CCNC: 72 U/L
ALP SERPL-CCNC: 76 U/L
ALP SERPL-CCNC: 80 U/L (ref 42–141)
ALT SERPL W/O P-5'-P-CCNC: 18 U/L
ALT SERPL W/O P-5'-P-CCNC: 18 U/L
ALT SERPL W/O P-5'-P-CCNC: 22 U/L
ANION GAP SERPL CALC-SCNC: 7 MMOL/L
ANION GAP SERPL CALC-SCNC: 9 MMOL/L
ANION GAP SERPL CALC-SCNC: 9 MMOL/L
AST SERPL-CCNC: 12 U/L
AST SERPL-CCNC: 14 U/L
AST SERPL-CCNC: 15 U/L
BASOPHILS # BLD AUTO: 0.01 K/UL
BASOPHILS # BLD AUTO: 0.01 K/UL
BASOPHILS # BLD AUTO: 0.02 K/UL
BASOPHILS # BLD AUTO: 0.02 K/UL
BASOPHILS NFR BLD: 0.1 %
BASOPHILS NFR BLD: 0.2 %
BASOPHILS NFR BLD: 0.4 %
BASOPHILS NFR BLD: 0.4 %
BILIRUB SERPL-MCNC: 0.2 MG/DL
BILIRUB SERPL-MCNC: 0.2 MG/DL
BILIRUB SERPL-MCNC: 0.4 MG/DL
BILIRUB SERPL-MCNC: 0.5 MG/DL (ref 0.2–1.6)
BILIRUB UR QL STRIP: NEGATIVE
BILIRUBIN, POC UA: NEGATIVE
BILIRUBIN, POC UA: NEGATIVE
BLD GP AB SCN CELLS X3 SERPL QL: NORMAL
BLOOD, POC UA: NEGATIVE
BLOOD, POC UA: NEGATIVE
BNP SERPL-MCNC: 45 PG/ML
BUN SERPL-MCNC: 17 MG/DL (ref 7–22)
BUN SERPL-MCNC: 21 MG/DL
BUN SERPL-MCNC: 22 MG/DL
BUN SERPL-MCNC: 29 MG/DL
CALCIUM SERPL-MCNC: 9.6 MG/DL
CALCIUM SERPL-MCNC: 9.7 MG/DL
CALCIUM SERPL-MCNC: 9.8 MG/DL
CALCIUM SERPL-MCNC: 9.8 MG/DL (ref 8–10.3)
CHLORIDE SERPL-SCNC: 101 MMOL/L
CHLORIDE SERPL-SCNC: 97 MMOL/L (ref 98–108)
CHLORIDE SERPL-SCNC: 99 MMOL/L
CHLORIDE SERPL-SCNC: 99 MMOL/L
CLARITY UR: CLEAR
CLARITY, POC UA: CLEAR
CLARITY, POC UA: CLEAR
CO2 SERPL-SCNC: 28 MMOL/L
CO2 SERPL-SCNC: 29 MMOL/L
CO2 SERPL-SCNC: 29 MMOL/L
COLOR UR: YELLOW
COLOR, POC UA: YELLOW
COLOR, POC UA: YELLOW
CREAT SERPL-MCNC: 0.8 MG/DL
CREAT SERPL-MCNC: 0.8 MG/DL (ref 0.5–1.4)
CREAT SERPL-MCNC: 0.9 MG/DL
CREAT SERPL-MCNC: 0.9 MG/DL (ref 0.6–1.2)
CREAT SERPL-MCNC: 1.2 MG/DL
DIFFERENTIAL METHOD: ABNORMAL
EOSINOPHIL # BLD AUTO: 0 K/UL
EOSINOPHIL # BLD AUTO: 0 K/UL
EOSINOPHIL # BLD AUTO: 0.1 K/UL
EOSINOPHIL # BLD AUTO: 0.1 K/UL
EOSINOPHIL NFR BLD: 0.2 %
EOSINOPHIL NFR BLD: 0.2 %
EOSINOPHIL NFR BLD: 1.5 %
EOSINOPHIL NFR BLD: 1.5 %
ERYTHROCYTE [DISTWIDTH] IN BLOOD BY AUTOMATED COUNT: 13.2 %
ERYTHROCYTE [DISTWIDTH] IN BLOOD BY AUTOMATED COUNT: 13.5 %
EST. GFR  (AFRICAN AMERICAN): 52 ML/MIN/1.73 M^2
EST. GFR  (AFRICAN AMERICAN): >60 ML/MIN/1.73 M^2
EST. GFR  (AFRICAN AMERICAN): >60 ML/MIN/1.73 M^2
EST. GFR  (NON AFRICAN AMERICAN): 45 ML/MIN/1.73 M^2
EST. GFR  (NON AFRICAN AMERICAN): >60 ML/MIN/1.73 M^2
EST. GFR  (NON AFRICAN AMERICAN): >60 ML/MIN/1.73 M^2
GLUCOSE SERPL-MCNC: 105 MG/DL
GLUCOSE SERPL-MCNC: 112 MG/DL (ref 73–118)
GLUCOSE SERPL-MCNC: 114 MG/DL
GLUCOSE SERPL-MCNC: 94 MG/DL
GLUCOSE UR QL STRIP: NEGATIVE
GLUCOSE, POC UA: NEGATIVE
GLUCOSE, POC UA: NEGATIVE
HCT VFR BLD AUTO: 34.5 %
HCT VFR BLD AUTO: 34.5 %
HCT VFR BLD AUTO: 34.8 %
HCT VFR BLD AUTO: 37.9 %
HGB BLD-MCNC: 10.7 G/DL
HGB BLD-MCNC: 10.7 G/DL
HGB BLD-MCNC: 11 G/DL
HGB BLD-MCNC: 11.4 G/DL
HGB UR QL STRIP: NEGATIVE
IMM GRANULOCYTES # BLD AUTO: 0.02 K/UL
IMM GRANULOCYTES # BLD AUTO: 0.02 K/UL
IMM GRANULOCYTES # BLD AUTO: 0.18 K/UL
IMM GRANULOCYTES NFR BLD AUTO: 0.4 %
IMM GRANULOCYTES NFR BLD AUTO: 0.4 %
IMM GRANULOCYTES NFR BLD AUTO: 1.8 %
KETONES UR QL STRIP: NEGATIVE
KETONES, POC UA: NEGATIVE
KETONES, POC UA: NEGATIVE
LEUKOCYTE EST, POC UA: NEGATIVE
LEUKOCYTE EST, POC UA: NEGATIVE
LEUKOCYTE ESTERASE UR QL STRIP: NEGATIVE
LIPASE SERPL-CCNC: 15 U/L
LYMPHOCYTES # BLD AUTO: 1.1 K/UL
LYMPHOCYTES # BLD AUTO: 1.6 K/UL
LYMPHOCYTES # BLD AUTO: 1.7 K/UL
LYMPHOCYTES # BLD AUTO: 1.7 K/UL
LYMPHOCYTES NFR BLD: 16.3 %
LYMPHOCYTES NFR BLD: 16.7 %
LYMPHOCYTES NFR BLD: 32.4 %
LYMPHOCYTES NFR BLD: 32.4 %
MCH RBC QN AUTO: 28.9 PG
MCH RBC QN AUTO: 28.9 PG
MCH RBC QN AUTO: 29.6 PG
MCH RBC QN AUTO: 30.1 PG
MCHC RBC AUTO-ENTMCNC: 30.1 G/DL
MCHC RBC AUTO-ENTMCNC: 31 G/DL
MCHC RBC AUTO-ENTMCNC: 31 G/DL
MCHC RBC AUTO-ENTMCNC: 31.6 G/DL
MCV RBC AUTO: 93 FL
MCV RBC AUTO: 93 FL
MCV RBC AUTO: 95 FL
MCV RBC AUTO: 98 FL
MONOCYTES # BLD AUTO: 0.6 K/UL
MONOCYTES # BLD AUTO: 0.6 K/UL
MONOCYTES # BLD AUTO: 0.7 K/UL
MONOCYTES # BLD AUTO: 0.7 K/UL
MONOCYTES NFR BLD: 10.3 %
MONOCYTES NFR BLD: 12.1 %
MONOCYTES NFR BLD: 12.1 %
MONOCYTES NFR BLD: 7.1 %
NEUTROPHILS # BLD AUTO: 2.8 K/UL
NEUTROPHILS # BLD AUTO: 2.8 K/UL
NEUTROPHILS # BLD AUTO: 4.7 K/UL
NEUTROPHILS # BLD AUTO: 7.3 K/UL
NEUTROPHILS NFR BLD: 53.2 %
NEUTROPHILS NFR BLD: 53.2 %
NEUTROPHILS NFR BLD: 72.6 %
NEUTROPHILS NFR BLD: 74.5 %
NITRITE UR QL STRIP: NEGATIVE
NITRITE, POC UA: NEGATIVE
NITRITE, POC UA: NEGATIVE
NRBC BLD-RTO: 0 /100 WBC
PH UR STRIP: 5.5 [PH]
PH UR STRIP: 6 [PH] (ref 5–8)
PH UR STRIP: 7.5 [PH]
PLATELET # BLD AUTO: 153 K/UL
PLATELET # BLD AUTO: 158 K/UL
PLATELET # BLD AUTO: 175 K/UL
PLATELET # BLD AUTO: 175 K/UL
PMV BLD AUTO: 10 FL
PMV BLD AUTO: 10.7 FL
POC ALT (SGPT): 23 U/L (ref 10–47)
POC AST (SGOT): 22 U/L (ref 11–38)
POC B-TYPE NATRIURETIC PEPTIDE: 116 PG/ML (ref 0–100)
POC CARDIAC TROPONIN I: 0 NG/ML
POC PTINR: 0.9 (ref 0.9–1.2)
POC PTWBT: 11 SEC (ref 9.7–14.3)
POC TCO2: 32 MMOL/L (ref 18–33)
POTASSIUM BLD-SCNC: 4.1 MMOL/L (ref 3.6–5.1)
POTASSIUM SERPL-SCNC: 3.1 MMOL/L
POTASSIUM SERPL-SCNC: 4.2 MMOL/L
POTASSIUM SERPL-SCNC: 4.8 MMOL/L
PROT SERPL-MCNC: 7.5 G/DL
PROT SERPL-MCNC: 7.5 G/DL
PROT SERPL-MCNC: 7.7 G/DL
PROT UR QL STRIP: NEGATIVE
PROTEIN, POC UA: NEGATIVE
PROTEIN, POC UA: NEGATIVE
PROTEIN, POC: 8.1 G/DL (ref 6.4–8.1)
RBC # BLD AUTO: 3.66 M/UL
RBC # BLD AUTO: 3.7 M/UL
RBC # BLD AUTO: 3.7 M/UL
RBC # BLD AUTO: 3.85 M/UL
SAMPLE: NORMAL
SODIUM BLD-SCNC: 140 MMOL/L (ref 128–145)
SODIUM SERPL-SCNC: 134 MMOL/L
SODIUM SERPL-SCNC: 137 MMOL/L
SODIUM SERPL-SCNC: 139 MMOL/L
SP GR UR STRIP: 1.02 (ref 1–1.03)
SPECIFIC GRAVITY, POC UA: 1.01
SPECIFIC GRAVITY, POC UA: 1.02
TROPONIN I SERPL DL<=0.01 NG/ML-MCNC: <0.006 NG/ML
URN SPEC COLLECT METH UR: NORMAL
UROBILINOGEN UR STRIP-ACNC: NEGATIVE EU/DL
UROBILINOGEN, POC UA: 0.2 E.U./DL
UROBILINOGEN, POC UA: 0.2 E.U./DL
WBC # BLD AUTO: 5.22 K/UL
WBC # BLD AUTO: 5.22 K/UL
WBC # BLD AUTO: 6.51 K/UL
WBC # BLD AUTO: 9.75 K/UL

## 2018-01-01 PROCEDURE — 3077F SYST BP >= 140 MM HG: CPT | Mod: CPTII,,, | Performed by: INTERNAL MEDICINE

## 2018-01-01 PROCEDURE — 80053 COMPREHEN METABOLIC PANEL: CPT

## 2018-01-01 PROCEDURE — 73502 X-RAY EXAM HIP UNI 2-3 VIEWS: CPT | Mod: TC,FY,PN,LT

## 2018-01-01 PROCEDURE — 99285 EMERGENCY DEPT VISIT HI MDM: CPT | Mod: 25

## 2018-01-01 PROCEDURE — 3079F DIAST BP 80-89 MM HG: CPT | Mod: CPTII,,, | Performed by: INTERNAL MEDICINE

## 2018-01-01 PROCEDURE — 99214 OFFICE O/P EST MOD 30 MIN: CPT | Mod: S$GLB,,, | Performed by: INTERNAL MEDICINE

## 2018-01-01 PROCEDURE — 93005 ELECTROCARDIOGRAM TRACING: CPT

## 2018-01-01 PROCEDURE — 25000003 PHARM REV CODE 250: Performed by: NURSE ANESTHETIST, CERTIFIED REGISTERED

## 2018-01-01 PROCEDURE — 3074F SYST BP LT 130 MM HG: CPT | Mod: CPTII,,, | Performed by: ORTHOPAEDIC SURGERY

## 2018-01-01 PROCEDURE — 37000009 HC ANESTHESIA EA ADD 15 MINS: Performed by: OPHTHALMOLOGY

## 2018-01-01 PROCEDURE — 66984 XCAPSL CTRC RMVL W/O ECP: CPT | Mod: 79,LT,, | Performed by: OPHTHALMOLOGY

## 2018-01-01 PROCEDURE — V2632 POST CHMBR INTRAOCULAR LENS: HCPCS | Performed by: OPHTHALMOLOGY

## 2018-01-01 PROCEDURE — D9220A PRA ANESTHESIA: Mod: ANES,,, | Performed by: ANESTHESIOLOGY

## 2018-01-01 PROCEDURE — 63600175 PHARM REV CODE 636 W HCPCS: Performed by: OPHTHALMOLOGY

## 2018-01-01 PROCEDURE — 36000707: Performed by: OPHTHALMOLOGY

## 2018-01-01 PROCEDURE — 3075F SYST BP GE 130 - 139MM HG: CPT | Mod: CPTII,,, | Performed by: FAMILY MEDICINE

## 2018-01-01 PROCEDURE — 85025 COMPLETE CBC W/AUTO DIFF WBC: CPT

## 2018-01-01 PROCEDURE — 99999 PR PBB SHADOW E&M-EST. PATIENT-LVL IV: CPT | Mod: PBBFAC,,, | Performed by: FAMILY MEDICINE

## 2018-01-01 PROCEDURE — 37000008 HC ANESTHESIA 1ST 15 MINUTES: Performed by: OPHTHALMOLOGY

## 2018-01-01 PROCEDURE — 36000706: Performed by: OPHTHALMOLOGY

## 2018-01-01 PROCEDURE — 99024 POSTOP FOLLOW-UP VISIT: CPT | Mod: ,,, | Performed by: OPHTHALMOLOGY

## 2018-01-01 PROCEDURE — 70491 CT SOFT TISSUE NECK W/DYE: CPT | Mod: 26,,, | Performed by: RADIOLOGY

## 2018-01-01 PROCEDURE — 25000003 PHARM REV CODE 250: Performed by: EMERGENCY MEDICINE

## 2018-01-01 PROCEDURE — 92025 CPTRIZED CORNEAL TOPOGRAPHY: CPT | Mod: S$GLB,,, | Performed by: OPHTHALMOLOGY

## 2018-01-01 PROCEDURE — 1101F PT FALLS ASSESS-DOCD LE1/YR: CPT | Mod: CPTII,S$GLB,, | Performed by: INTERNAL MEDICINE

## 2018-01-01 PROCEDURE — 1101F PT FALLS ASSESS-DOCD LE1/YR: CPT | Mod: CPTII,S$GLB,, | Performed by: FAMILY MEDICINE

## 2018-01-01 PROCEDURE — 3078F DIAST BP <80 MM HG: CPT | Mod: CPTII,,, | Performed by: INTERNAL MEDICINE

## 2018-01-01 PROCEDURE — 84484 ASSAY OF TROPONIN QUANT: CPT

## 2018-01-01 PROCEDURE — 99214 OFFICE O/P EST MOD 30 MIN: CPT | Mod: S$PBB,,, | Performed by: INTERNAL MEDICINE

## 2018-01-01 PROCEDURE — 99999 PR PBB SHADOW E&M-EST. PATIENT-LVL III: CPT | Mod: PBBFAC,,, | Performed by: OPHTHALMOLOGY

## 2018-01-01 PROCEDURE — 63600175 PHARM REV CODE 636 W HCPCS: Performed by: NURSE ANESTHETIST, CERTIFIED REGISTERED

## 2018-01-01 PROCEDURE — 93010 ELECTROCARDIOGRAM REPORT: CPT | Mod: ,,, | Performed by: INTERNAL MEDICINE

## 2018-01-01 PROCEDURE — 70491 CT SOFT TISSUE NECK W/DYE: CPT | Mod: TC

## 2018-01-01 PROCEDURE — 71260 CT THORAX DX C+: CPT | Mod: 26,,, | Performed by: RADIOLOGY

## 2018-01-01 PROCEDURE — 99024 POSTOP FOLLOW-UP VISIT: CPT | Mod: S$GLB,,, | Performed by: OPHTHALMOLOGY

## 2018-01-01 PROCEDURE — 3079F DIAST BP 80-89 MM HG: CPT | Mod: CPTII,S$GLB,, | Performed by: INTERNAL MEDICINE

## 2018-01-01 PROCEDURE — 82803 BLOOD GASES ANY COMBINATION: CPT

## 2018-01-01 PROCEDURE — 71000015 HC POSTOP RECOV 1ST HR: Performed by: OPHTHALMOLOGY

## 2018-01-01 PROCEDURE — 99214 OFFICE O/P EST MOD 30 MIN: CPT | Mod: PBBFAC,PN | Performed by: FAMILY MEDICINE

## 2018-01-01 PROCEDURE — 3078F DIAST BP <80 MM HG: CPT | Mod: CPTII,S$GLB,, | Performed by: FAMILY MEDICINE

## 2018-01-01 PROCEDURE — 74177 CT ABD & PELVIS W/CONTRAST: CPT | Mod: TC

## 2018-01-01 PROCEDURE — 25500020 PHARM REV CODE 255: Performed by: INTERNAL MEDICINE

## 2018-01-01 PROCEDURE — 99204 OFFICE O/P NEW MOD 45 MIN: CPT | Mod: S$PBB,,, | Performed by: ORTHOPAEDIC SURGERY

## 2018-01-01 PROCEDURE — 99999 PR PBB SHADOW E&M-EST. PATIENT-LVL V: CPT | Mod: PBBFAC,,, | Performed by: INTERNAL MEDICINE

## 2018-01-01 PROCEDURE — 25000003 PHARM REV CODE 250

## 2018-01-01 PROCEDURE — 25500020 PHARM REV CODE 255: Performed by: EMERGENCY MEDICINE

## 2018-01-01 PROCEDURE — 99214 OFFICE O/P EST MOD 30 MIN: CPT | Mod: S$GLB,,, | Performed by: FAMILY MEDICINE

## 2018-01-01 PROCEDURE — 99213 OFFICE O/P EST LOW 20 MIN: CPT | Mod: PBBFAC | Performed by: OPHTHALMOLOGY

## 2018-01-01 PROCEDURE — 99214 OFFICE O/P EST MOD 30 MIN: CPT | Mod: PBBFAC | Performed by: INTERNAL MEDICINE

## 2018-01-01 PROCEDURE — 99215 OFFICE O/P EST HI 40 MIN: CPT | Mod: PBBFAC,25,PN | Performed by: ORTHOPAEDIC SURGERY

## 2018-01-01 PROCEDURE — 99999 PR PBB SHADOW E&M-EST. PATIENT-LVL IV: CPT | Mod: PBBFAC,,, | Performed by: INTERNAL MEDICINE

## 2018-01-01 PROCEDURE — 25000003 PHARM REV CODE 250: Performed by: OPHTHALMOLOGY

## 2018-01-01 PROCEDURE — 99213 OFFICE O/P EST LOW 20 MIN: CPT | Mod: S$PBB,,, | Performed by: INTERNAL MEDICINE

## 2018-01-01 PROCEDURE — 99999 PR PBB SHADOW E&M-EST. PATIENT-LVL V: CPT | Mod: PBBFAC,,, | Performed by: FAMILY MEDICINE

## 2018-01-01 PROCEDURE — 27201423 OPTIME MED/SURG SUP & DEVICES STERILE SUPPLY: Performed by: OPHTHALMOLOGY

## 2018-01-01 PROCEDURE — 99999 PR PBB SHADOW E&M-EST. PATIENT-LVL V: CPT | Mod: PBBFAC,,, | Performed by: ORTHOPAEDIC SURGERY

## 2018-01-01 PROCEDURE — 73502 X-RAY EXAM HIP UNI 2-3 VIEWS: CPT | Mod: 26,LT,, | Performed by: RADIOLOGY

## 2018-01-01 PROCEDURE — 63600175 PHARM REV CODE 636 W HCPCS: Performed by: EMERGENCY MEDICINE

## 2018-01-01 PROCEDURE — 99283 EMERGENCY DEPT VISIT LOW MDM: CPT

## 2018-01-01 PROCEDURE — 74177 CT ABD & PELVIS W/CONTRAST: CPT | Mod: 26,,, | Performed by: RADIOLOGY

## 2018-01-01 PROCEDURE — 36415 COLL VENOUS BLD VENIPUNCTURE: CPT

## 2018-01-01 PROCEDURE — 83880 ASSAY OF NATRIURETIC PEPTIDE: CPT

## 2018-01-01 PROCEDURE — 66982 XCAPSL CTRC RMVL CPLX WO ECP: CPT | Mod: RT,,, | Performed by: OPHTHALMOLOGY

## 2018-01-01 PROCEDURE — 81003 URINALYSIS AUTO W/O SCOPE: CPT

## 2018-01-01 PROCEDURE — 96374 THER/PROPH/DIAG INJ IV PUSH: CPT

## 2018-01-01 PROCEDURE — 1101F PT FALLS ASSESS-DOCD LE1/YR: CPT | Mod: CPTII,,, | Performed by: ORTHOPAEDIC SURGERY

## 2018-01-01 PROCEDURE — 3075F SYST BP GE 130 - 139MM HG: CPT | Mod: CPTII,S$GLB,, | Performed by: FAMILY MEDICINE

## 2018-01-01 PROCEDURE — 99499 UNLISTED E&M SERVICE: CPT | Mod: S$GLB,,, | Performed by: INTERNAL MEDICINE

## 2018-01-01 PROCEDURE — 99214 OFFICE O/P EST MOD 30 MIN: CPT | Mod: S$PBB,,, | Performed by: FAMILY MEDICINE

## 2018-01-01 PROCEDURE — C9447 INJ, PHENYLEPHRINE KETOROLAC: HCPCS | Performed by: OPHTHALMOLOGY

## 2018-01-01 PROCEDURE — 83690 ASSAY OF LIPASE: CPT

## 2018-01-01 PROCEDURE — 99213 OFFICE O/P EST LOW 20 MIN: CPT | Mod: PBBFAC,PN | Performed by: INTERNAL MEDICINE

## 2018-01-01 PROCEDURE — 3079F DIAST BP 80-89 MM HG: CPT | Mod: CPTII,,, | Performed by: ORTHOPAEDIC SURGERY

## 2018-01-01 PROCEDURE — 3075F SYST BP GE 130 - 139MM HG: CPT | Mod: CPTII,S$GLB,, | Performed by: INTERNAL MEDICINE

## 2018-01-01 PROCEDURE — 3077F SYST BP >= 140 MM HG: CPT | Mod: CPTII,S$GLB,, | Performed by: FAMILY MEDICINE

## 2018-01-01 PROCEDURE — 3074F SYST BP LT 130 MM HG: CPT | Mod: CPTII,,, | Performed by: INTERNAL MEDICINE

## 2018-01-01 PROCEDURE — 1101F PT FALLS ASSESS-DOCD LE1/YR: CPT | Mod: CPTII,,, | Performed by: FAMILY MEDICINE

## 2018-01-01 PROCEDURE — D9220A PRA ANESTHESIA: Mod: CRNA,,, | Performed by: NURSE ANESTHETIST, CERTIFIED REGISTERED

## 2018-01-01 PROCEDURE — 92136 OPHTHALMIC BIOMETRY: CPT | Mod: 26,LT,S$GLB, | Performed by: OPHTHALMOLOGY

## 2018-01-01 PROCEDURE — 99999 PR PBB SHADOW E&M-EST. PATIENT-LVL III: CPT | Mod: PBBFAC,,, | Performed by: INTERNAL MEDICINE

## 2018-01-01 PROCEDURE — 86850 RBC ANTIBODY SCREEN: CPT

## 2018-01-01 PROCEDURE — 1101F PT FALLS ASSESS-DOCD LE1/YR: CPT | Mod: CPTII,,, | Performed by: INTERNAL MEDICINE

## 2018-01-01 PROCEDURE — 99284 EMERGENCY DEPT VISIT MOD MDM: CPT | Mod: 25

## 2018-01-01 PROCEDURE — 3078F DIAST BP <80 MM HG: CPT | Mod: CPTII,S$GLB,, | Performed by: INTERNAL MEDICINE

## 2018-01-01 PROCEDURE — 3079F DIAST BP 80-89 MM HG: CPT | Mod: CPTII,,, | Performed by: FAMILY MEDICINE

## 2018-01-01 DEVICE — LENS IOL ITEC PRELOAD 22.0D: Type: IMPLANTABLE DEVICE | Site: EYE | Status: FUNCTIONAL

## 2018-01-01 DEVICE — IMPLANTABLE DEVICE: Type: IMPLANTABLE DEVICE | Site: EYE | Status: FUNCTIONAL

## 2018-01-01 RX ORDER — PHENYLEPHRINE HYDROCHLORIDE 25 MG/ML
1 SOLUTION/ DROPS OPHTHALMIC
Status: DISCONTINUED | OUTPATIENT
Start: 2018-01-01 | End: 2018-01-01 | Stop reason: HOSPADM

## 2018-01-01 RX ORDER — MOXIFLOXACIN 5 MG/ML
1 SOLUTION/ DROPS OPHTHALMIC
Status: COMPLETED | OUTPATIENT
Start: 2018-01-01 | End: 2018-01-01

## 2018-01-01 RX ORDER — KETOROLAC TROMETHAMINE 5 MG/ML
1 SOLUTION OPHTHALMIC ONCE
Status: COMPLETED | OUTPATIENT
Start: 2018-01-01 | End: 2018-01-01

## 2018-01-01 RX ORDER — ACETAMINOPHEN 325 MG/1
650 TABLET ORAL EVERY 4 HOURS PRN
Status: DISCONTINUED | OUTPATIENT
Start: 2018-01-01 | End: 2018-01-01 | Stop reason: HOSPADM

## 2018-01-01 RX ORDER — OFLOXACIN 3 MG/ML
1 SOLUTION/ DROPS OPHTHALMIC 3 TIMES DAILY
COMMUNITY
End: 2019-01-01

## 2018-01-01 RX ORDER — EPINEPHRINE 1 MG/ML
INJECTION, SOLUTION INTRACARDIAC; INTRAMUSCULAR; INTRAVENOUS; SUBCUTANEOUS
Status: DISCONTINUED | OUTPATIENT
Start: 2018-01-01 | End: 2018-01-01 | Stop reason: HOSPADM

## 2018-01-01 RX ORDER — TIZANIDINE 2 MG/1
4 TABLET ORAL EVERY 8 HOURS PRN
Qty: 30 TABLET | Refills: 0 | Status: SHIPPED | OUTPATIENT
Start: 2018-01-01 | End: 2018-01-01

## 2018-01-01 RX ORDER — LIDOCAINE HYDROCHLORIDE 40 MG/ML
INJECTION, SOLUTION RETROBULBAR
Status: DISCONTINUED
Start: 2018-01-01 | End: 2018-01-01 | Stop reason: HOSPADM

## 2018-01-01 RX ORDER — LIDOCAINE HYDROCHLORIDE 10 MG/ML
INJECTION, SOLUTION EPIDURAL; INFILTRATION; INTRACAUDAL; PERINEURAL
Status: DISCONTINUED
Start: 2018-01-01 | End: 2018-01-01 | Stop reason: HOSPADM

## 2018-01-01 RX ORDER — FLUTICASONE PROPIONATE 50 MCG
2 SPRAY, SUSPENSION (ML) NASAL DAILY
Qty: 16 G | Refills: 0 | Status: SHIPPED | OUTPATIENT
Start: 2018-01-01 | End: 2018-01-01

## 2018-01-01 RX ORDER — TIZANIDINE 4 MG/1
4 TABLET ORAL EVERY 8 HOURS PRN
Qty: 90 TABLET | Refills: 0 | Status: SHIPPED | OUTPATIENT
Start: 2018-01-01 | End: 2019-01-01 | Stop reason: SDUPTHER

## 2018-01-01 RX ORDER — HYDROCODONE BITARTRATE AND ACETAMINOPHEN 5; 325 MG/1; MG/1
1 TABLET ORAL EVERY 12 HOURS PRN
Qty: 90 TABLET | Refills: 0 | Status: SHIPPED | OUTPATIENT
Start: 2018-01-01 | End: 2018-01-01 | Stop reason: ALTCHOICE

## 2018-01-01 RX ORDER — LORAZEPAM 0.5 MG/1
0.5 TABLET ORAL 2 TIMES DAILY
Qty: 30 TABLET | Refills: 0 | OUTPATIENT
Start: 2018-01-01 | End: 2018-01-01

## 2018-01-01 RX ORDER — FENTANYL CITRATE 50 UG/ML
INJECTION, SOLUTION INTRAMUSCULAR; INTRAVENOUS
Status: DISCONTINUED | OUTPATIENT
Start: 2018-01-01 | End: 2018-01-01

## 2018-01-01 RX ORDER — PROPARACAINE HYDROCHLORIDE 5 MG/ML
SOLUTION/ DROPS OPHTHALMIC
Status: COMPLETED
Start: 2018-01-01 | End: 2018-01-01

## 2018-01-01 RX ORDER — MOXIFLOXACIN 5 MG/ML
SOLUTION/ DROPS OPHTHALMIC
Status: DISCONTINUED | OUTPATIENT
Start: 2018-01-01 | End: 2018-01-01 | Stop reason: HOSPADM

## 2018-01-01 RX ORDER — DIAZEPAM 5 MG/1
10 TABLET ORAL
Status: COMPLETED | OUTPATIENT
Start: 2018-01-01 | End: 2018-01-01

## 2018-01-01 RX ORDER — PREDNISOLONE ACETATE 10 MG/ML
SUSPENSION/ DROPS OPHTHALMIC
Status: DISCONTINUED | OUTPATIENT
Start: 2018-01-01 | End: 2018-01-01 | Stop reason: HOSPADM

## 2018-01-01 RX ORDER — HYDROMORPHONE HYDROCHLORIDE 2 MG/ML
1 INJECTION, SOLUTION INTRAMUSCULAR; INTRAVENOUS; SUBCUTANEOUS
Status: COMPLETED | OUTPATIENT
Start: 2018-01-01 | End: 2018-01-01

## 2018-01-01 RX ORDER — PREDNISOLONE ACETATE 10 MG/ML
1 SUSPENSION/ DROPS OPHTHALMIC 3 TIMES DAILY
COMMUNITY
End: 2019-01-01

## 2018-01-01 RX ORDER — OXYCODONE AND ACETAMINOPHEN 10; 325 MG/1; MG/1
1 TABLET ORAL EVERY 8 HOURS PRN
Qty: 18 TABLET | Refills: 0 | Status: SHIPPED | OUTPATIENT
Start: 2018-01-01 | End: 2018-01-01 | Stop reason: SDUPTHER

## 2018-01-01 RX ORDER — KETOROLAC TROMETHAMINE 5 MG/ML
SOLUTION OPHTHALMIC
Status: COMPLETED
Start: 2018-01-01 | End: 2018-01-01

## 2018-01-01 RX ORDER — TRAMADOL HYDROCHLORIDE 50 MG/1
25 TABLET ORAL
Qty: 10 TABLET | Refills: 0 | Status: SHIPPED | OUTPATIENT
Start: 2018-01-01 | End: 2018-01-01

## 2018-01-01 RX ORDER — LORAZEPAM 0.5 MG/1
0.5 TABLET ORAL 2 TIMES DAILY
Qty: 30 TABLET | Refills: 0 | Status: SHIPPED | OUTPATIENT
Start: 2018-01-01 | End: 2018-01-01 | Stop reason: SDUPTHER

## 2018-01-01 RX ORDER — DIAZEPAM 10 MG/1
10 TABLET ORAL EVERY 8 HOURS PRN
Qty: 12 TABLET | Refills: 0 | Status: SHIPPED | OUTPATIENT
Start: 2018-01-01 | End: 2018-01-01

## 2018-01-01 RX ORDER — LIDOCAINE HYDROCHLORIDE 40 MG/ML
INJECTION, SOLUTION RETROBULBAR
Status: DISCONTINUED | OUTPATIENT
Start: 2018-01-01 | End: 2018-01-01 | Stop reason: HOSPADM

## 2018-01-01 RX ORDER — DEXAMETHASONE 2 MG/1
2 TABLET ORAL EVERY 12 HOURS
Qty: 120 TABLET | Refills: 0 | Status: SHIPPED | OUTPATIENT
Start: 2018-01-01 | End: 2019-01-01 | Stop reason: SDUPTHER

## 2018-01-01 RX ORDER — SODIUM CHLORIDE 9 MG/ML
INJECTION, SOLUTION INTRAVENOUS CONTINUOUS PRN
Status: DISCONTINUED | OUTPATIENT
Start: 2018-01-01 | End: 2018-01-01

## 2018-01-01 RX ORDER — HYDROCODONE BITARTRATE AND ACETAMINOPHEN 5; 325 MG/1; MG/1
1 TABLET ORAL 2 TIMES DAILY PRN
Qty: 60 TABLET | Refills: 0 | Status: SHIPPED | OUTPATIENT
Start: 2018-01-01 | End: 2018-01-01

## 2018-01-01 RX ORDER — PROMETHAZINE HYDROCHLORIDE AND DEXTROMETHORPHAN HYDROBROMIDE 6.25; 15 MG/5ML; MG/5ML
5 SYRUP ORAL 3 TIMES DAILY PRN
Qty: 200 ML | Refills: 0 | Status: SHIPPED | OUTPATIENT
Start: 2018-01-01 | End: 2018-01-01

## 2018-01-01 RX ORDER — PREDNISOLONE ACETATE 10 MG/ML
SUSPENSION/ DROPS OPHTHALMIC
Status: DISCONTINUED
Start: 2018-01-01 | End: 2018-01-01 | Stop reason: HOSPADM

## 2018-01-01 RX ORDER — SODIUM CHLORIDE 0.9 % (FLUSH) 0.9 %
3 SYRINGE (ML) INJECTION
Status: DISCONTINUED | OUTPATIENT
Start: 2018-01-01 | End: 2018-01-01 | Stop reason: HOSPADM

## 2018-01-01 RX ORDER — MIDAZOLAM HYDROCHLORIDE 1 MG/ML
INJECTION, SOLUTION INTRAMUSCULAR; INTRAVENOUS
Status: DISCONTINUED | OUTPATIENT
Start: 2018-01-01 | End: 2018-01-01

## 2018-01-01 RX ORDER — MOXIFLOXACIN 5 MG/ML
SOLUTION/ DROPS OPHTHALMIC
Status: COMPLETED
Start: 2018-01-01 | End: 2018-01-01

## 2018-01-01 RX ORDER — DIPHENHYDRAMINE HCL 25 MG
25 CAPSULE ORAL NIGHTLY PRN
Qty: 20 CAPSULE | Refills: 0 | Status: SHIPPED | OUTPATIENT
Start: 2018-01-01 | End: 2019-01-01 | Stop reason: SDUPTHER

## 2018-01-01 RX ORDER — LIDOCAINE HYDROCHLORIDE 10 MG/ML
INJECTION, SOLUTION EPIDURAL; INFILTRATION; INTRACAUDAL; PERINEURAL
Status: DISCONTINUED | OUTPATIENT
Start: 2018-01-01 | End: 2018-01-01 | Stop reason: HOSPADM

## 2018-01-01 RX ORDER — LEVOCETIRIZINE DIHYDROCHLORIDE 5 MG/1
5 TABLET, FILM COATED ORAL NIGHTLY
Qty: 30 TABLET | Refills: 11 | Status: ON HOLD | OUTPATIENT
Start: 2018-01-01 | End: 2019-01-01 | Stop reason: HOSPADM

## 2018-01-01 RX ORDER — PREDNISOLONE ACETATE 10 MG/ML
SUSPENSION/ DROPS OPHTHALMIC
Refills: 2 | COMMUNITY
Start: 2018-01-01 | End: 2018-01-01 | Stop reason: SDUPTHER

## 2018-01-01 RX ORDER — TRAMADOL HYDROCHLORIDE 50 MG/1
50 TABLET ORAL EVERY 12 HOURS PRN
Qty: 30 TABLET | Refills: 0 | Status: SHIPPED | OUTPATIENT
Start: 2018-01-01 | End: 2019-01-01

## 2018-01-01 RX ORDER — EPINEPHRINE 1 MG/ML
INJECTION, SOLUTION INTRACARDIAC; INTRAMUSCULAR; INTRAVENOUS; SUBCUTANEOUS
Status: DISCONTINUED
Start: 2018-01-01 | End: 2018-01-01 | Stop reason: HOSPADM

## 2018-01-01 RX ORDER — TIZANIDINE 4 MG/1
4 TABLET ORAL EVERY 8 HOURS PRN
Qty: 29 TABLET | Refills: 0 | Status: SHIPPED | OUTPATIENT
Start: 2018-01-01 | End: 2018-01-01

## 2018-01-01 RX ORDER — CYCLOBENZAPRINE HCL 5 MG
5 TABLET ORAL 3 TIMES DAILY PRN
Qty: 30 TABLET | Refills: 0 | Status: SHIPPED | OUTPATIENT
Start: 2018-01-01 | End: 2018-01-01

## 2018-01-01 RX ORDER — OXYCODONE AND ACETAMINOPHEN 10; 325 MG/1; MG/1
1 TABLET ORAL EVERY 8 HOURS PRN
Qty: 18 TABLET | Refills: 0 | Status: SHIPPED | OUTPATIENT
Start: 2018-01-01 | End: 2018-01-01 | Stop reason: ALTCHOICE

## 2018-01-01 RX ORDER — PANTOPRAZOLE SODIUM 40 MG/1
40 TABLET, DELAYED RELEASE ORAL DAILY
Qty: 90 TABLET | Refills: 1 | Status: ON HOLD | OUTPATIENT
Start: 2018-01-01 | End: 2019-01-01 | Stop reason: HOSPADM

## 2018-01-01 RX ORDER — PROPARACAINE HYDROCHLORIDE 5 MG/ML
1 SOLUTION/ DROPS OPHTHALMIC
Status: DISCONTINUED | OUTPATIENT
Start: 2018-01-01 | End: 2018-01-01 | Stop reason: HOSPADM

## 2018-01-01 RX ORDER — PHENYLEPHRINE HYDROCHLORIDE 25 MG/ML
SOLUTION/ DROPS OPHTHALMIC
Status: COMPLETED
Start: 2018-01-01 | End: 2018-01-01

## 2018-01-01 RX ORDER — HYDROCODONE BITARTRATE AND ACETAMINOPHEN 5; 325 MG/1; MG/1
1 TABLET ORAL EVERY 6 HOURS PRN
Qty: 90 TABLET | Refills: 0 | Status: SHIPPED | OUTPATIENT
Start: 2018-01-01 | End: 2019-01-01 | Stop reason: SDUPTHER

## 2018-01-01 RX ORDER — TROPICAMIDE 10 MG/ML
1 SOLUTION/ DROPS OPHTHALMIC
Status: DISCONTINUED | OUTPATIENT
Start: 2018-01-01 | End: 2018-01-01 | Stop reason: HOSPADM

## 2018-01-01 RX ORDER — GABAPENTIN 100 MG/1
100 CAPSULE ORAL NIGHTLY
Qty: 90 CAPSULE | Refills: 0 | Status: SHIPPED | OUTPATIENT
Start: 2018-01-01 | End: 2019-01-01 | Stop reason: SDUPTHER

## 2018-01-01 RX ORDER — FUROSEMIDE 40 MG/1
40 TABLET ORAL
Status: COMPLETED | OUTPATIENT
Start: 2018-01-01 | End: 2018-01-01

## 2018-01-01 RX ORDER — TROPICAMIDE 10 MG/ML
SOLUTION/ DROPS OPHTHALMIC
Status: COMPLETED
Start: 2018-01-01 | End: 2018-01-01

## 2018-01-01 RX ORDER — TIZANIDINE 4 MG/1
4 TABLET ORAL EVERY 8 HOURS PRN
Qty: 30 TABLET | Refills: 0 | Status: SHIPPED | OUTPATIENT
Start: 2018-01-01 | End: 2018-01-01 | Stop reason: SDUPTHER

## 2018-01-01 RX ORDER — LOSARTAN POTASSIUM 100 MG/1
100 TABLET ORAL DAILY
Qty: 90 TABLET | Refills: 1 | Status: SHIPPED | OUTPATIENT
Start: 2018-01-01 | End: 2019-01-01 | Stop reason: SDUPTHER

## 2018-01-01 RX ORDER — TIZANIDINE 4 MG/1
4 TABLET ORAL EVERY 8 HOURS PRN
Qty: 90 TABLET | Refills: 0 | Status: SHIPPED | OUTPATIENT
Start: 2018-01-01 | End: 2018-01-01 | Stop reason: SDUPTHER

## 2018-01-01 RX ORDER — FUROSEMIDE 20 MG/1
20 TABLET ORAL DAILY PRN
Qty: 90 TABLET | Refills: 0 | Status: SHIPPED | OUTPATIENT
Start: 2018-01-01 | End: 2019-01-01 | Stop reason: SDUPTHER

## 2018-01-01 RX ORDER — OFLOXACIN 3 MG/ML
SOLUTION/ DROPS OPHTHALMIC
Refills: 2 | COMMUNITY
Start: 2018-01-01 | End: 2018-01-01 | Stop reason: SDUPTHER

## 2018-01-01 RX ORDER — LORAZEPAM 0.5 MG/1
TABLET ORAL
Qty: 30 TABLET | Refills: 0 | Status: SHIPPED | OUTPATIENT
Start: 2018-01-01 | End: 2019-01-01 | Stop reason: SDUPTHER

## 2018-01-01 RX ADMIN — MOXIFLOXACIN 1 DROP: 5 SOLUTION/ DROPS OPHTHALMIC at 09:10

## 2018-01-01 RX ADMIN — IOHEXOL 15 ML: 350 INJECTION, SOLUTION INTRAVENOUS at 07:09

## 2018-01-01 RX ADMIN — MIDAZOLAM HYDROCHLORIDE 1 MG: 1 INJECTION, SOLUTION INTRAMUSCULAR; INTRAVENOUS at 09:10

## 2018-01-01 RX ADMIN — MIDAZOLAM HYDROCHLORIDE 2 MG: 1 INJECTION, SOLUTION INTRAMUSCULAR; INTRAVENOUS at 09:10

## 2018-01-01 RX ADMIN — MOXIFLOXACIN 1 DROP: 5 SOLUTION/ DROPS OPHTHALMIC at 06:11

## 2018-01-01 RX ADMIN — TROPICAMIDE 1 DROP: 10 SOLUTION/ DROPS OPHTHALMIC at 09:10

## 2018-01-01 RX ADMIN — PHENYLEPHRINE HYDROCHLORIDE 1 DROP: 25 SOLUTION/ DROPS OPHTHALMIC at 06:11

## 2018-01-01 RX ADMIN — PROPARACAINE HYDROCHLORIDE 1 DROP: 5 SOLUTION/ DROPS OPHTHALMIC at 09:10

## 2018-01-01 RX ADMIN — TROPICAMIDE 1 DROP: 10 SOLUTION/ DROPS OPHTHALMIC at 06:11

## 2018-01-01 RX ADMIN — KETOROLAC TROMETHAMINE 1 DROP: 5 SOLUTION OPHTHALMIC at 09:10

## 2018-01-01 RX ADMIN — MIDAZOLAM HYDROCHLORIDE 1 MG: 1 INJECTION, SOLUTION INTRAMUSCULAR; INTRAVENOUS at 07:11

## 2018-01-01 RX ADMIN — ACETAMINOPHEN 650 MG: 325 TABLET ORAL at 08:11

## 2018-01-01 RX ADMIN — HYDROMORPHONE HYDROCHLORIDE 1 MG: 2 INJECTION, SOLUTION INTRAMUSCULAR; INTRAVENOUS; SUBCUTANEOUS at 06:10

## 2018-01-01 RX ADMIN — FENTANYL CITRATE 25 MCG: 50 INJECTION, SOLUTION INTRAMUSCULAR; INTRAVENOUS at 09:10

## 2018-01-01 RX ADMIN — PHENYLEPHRINE HYDROCHLORIDE 1 DROP: 25 SOLUTION/ DROPS OPHTHALMIC at 09:10

## 2018-01-01 RX ADMIN — DIAZEPAM 10 MG: 5 TABLET ORAL at 01:10

## 2018-01-01 RX ADMIN — IOHEXOL 100 ML: 350 INJECTION, SOLUTION INTRAVENOUS at 03:12

## 2018-01-01 RX ADMIN — IOHEXOL 15 ML: 350 INJECTION, SOLUTION INTRAVENOUS at 08:09

## 2018-01-01 RX ADMIN — KETOROLAC TROMETHAMINE 1 DROP: 5 SOLUTION OPHTHALMIC at 06:11

## 2018-01-01 RX ADMIN — SODIUM CHLORIDE: 9 INJECTION, SOLUTION INTRAVENOUS at 09:10

## 2018-01-01 RX ADMIN — IOHEXOL 100 ML: 350 INJECTION, SOLUTION INTRAVENOUS at 05:12

## 2018-01-01 RX ADMIN — FENTANYL CITRATE 25 MCG: 50 INJECTION, SOLUTION INTRAMUSCULAR; INTRAVENOUS at 07:11

## 2018-01-01 RX ADMIN — PROPARACAINE HYDROCHLORIDE 1 DROP: 5 SOLUTION/ DROPS OPHTHALMIC at 06:11

## 2018-01-01 RX ADMIN — FENTANYL CITRATE 50 MCG: 50 INJECTION, SOLUTION INTRAMUSCULAR; INTRAVENOUS at 09:10

## 2018-01-01 RX ADMIN — FENTANYL CITRATE 25 MCG: 50 INJECTION, SOLUTION INTRAMUSCULAR; INTRAVENOUS at 08:11

## 2018-01-01 RX ADMIN — IOHEXOL 100 ML: 350 INJECTION, SOLUTION INTRAVENOUS at 09:09

## 2018-01-01 RX ADMIN — SODIUM CHLORIDE: 0.9 INJECTION, SOLUTION INTRAVENOUS at 06:11

## 2018-01-01 RX ADMIN — FUROSEMIDE 40 MG: 40 TABLET ORAL at 06:12

## 2018-01-16 DIAGNOSIS — L50.9 URTICARIA: ICD-10-CM

## 2018-01-16 DIAGNOSIS — C44.92 SQUAMOUS CELL CARCINOMA OF UNKNOWN ORIGIN: ICD-10-CM

## 2018-01-16 RX ORDER — MORPHINE SULFATE 15 MG/1
15 TABLET, FILM COATED, EXTENDED RELEASE ORAL 2 TIMES DAILY
Qty: 60 TABLET | Refills: 0 | Status: SHIPPED | OUTPATIENT
Start: 2018-01-16 | End: 2018-02-20 | Stop reason: SDUPTHER

## 2018-01-16 RX ORDER — HYDROXYZINE HYDROCHLORIDE 25 MG/1
TABLET, FILM COATED ORAL
Qty: 30 TABLET | Refills: 1 | Status: SHIPPED | OUTPATIENT
Start: 2018-01-16 | End: 2018-01-24

## 2018-01-16 RX ORDER — HYDROCODONE BITARTRATE AND ACETAMINOPHEN 5; 325 MG/1; MG/1
1 TABLET ORAL EVERY 6 HOURS PRN
Qty: 90 TABLET | Refills: 0 | Status: SHIPPED | OUTPATIENT
Start: 2018-01-16 | End: 2018-01-24

## 2018-01-16 NOTE — TELEPHONE ENCOUNTER
----- Message from Rudy Cleaning sent at 1/16/2018  9:05 AM CST -----  Contact: Pt  Refill on hydrocodone-acetaminophen 5-325mg (NORCO) 5-325 mg per tablet and morphine (MS CONTIN) 15 MG 12 hr tablet    [facility]/pharmacy #[id number]::[phone]

## 2018-01-23 ENCOUNTER — TELEPHONE (OUTPATIENT)
Dept: HEMATOLOGY/ONCOLOGY | Facility: CLINIC | Age: 73
End: 2018-01-23

## 2018-01-23 DIAGNOSIS — C44.92 SQUAMOUS CELL CARCINOMA OF UNKNOWN ORIGIN: Primary | ICD-10-CM

## 2018-01-23 NOTE — TELEPHONE ENCOUNTER
----- Message from Yomaira Escobar sent at 1/23/2018  3:04 PM CST -----  Patient will be coming in for a Ct scan patient will need lab orders for creatinine please. Thanks

## 2018-01-24 ENCOUNTER — OFFICE VISIT (OUTPATIENT)
Dept: FAMILY MEDICINE | Facility: CLINIC | Age: 73
End: 2018-01-24
Payer: MEDICARE

## 2018-01-24 VITALS
DIASTOLIC BLOOD PRESSURE: 78 MMHG | SYSTOLIC BLOOD PRESSURE: 128 MMHG | WEIGHT: 190.25 LBS | TEMPERATURE: 98 F | HEIGHT: 67 IN | OXYGEN SATURATION: 92 % | BODY MASS INDEX: 29.86 KG/M2 | HEART RATE: 75 BPM

## 2018-01-24 DIAGNOSIS — I10 ESSENTIAL HYPERTENSION: ICD-10-CM

## 2018-01-24 DIAGNOSIS — F41.1 ANXIETY STATE: Primary | ICD-10-CM

## 2018-01-24 DIAGNOSIS — M06.9 RHEUMATOID ARTHRITIS OF HAND, UNSPECIFIED LATERALITY, UNSPECIFIED RHEUMATOID FACTOR PRESENCE: ICD-10-CM

## 2018-01-24 DIAGNOSIS — F06.31 DEPRESSION DUE TO PHYSICAL ILLNESS: ICD-10-CM

## 2018-01-24 DIAGNOSIS — C44.92 SQUAMOUS CELL CARCINOMA OF UNKNOWN ORIGIN: ICD-10-CM

## 2018-01-24 PROCEDURE — 99999 PR PBB SHADOW E&M-EST. PATIENT-LVL IV: CPT | Mod: PBBFAC,,, | Performed by: FAMILY MEDICINE

## 2018-01-24 PROCEDURE — 99214 OFFICE O/P EST MOD 30 MIN: CPT | Mod: S$GLB,,, | Performed by: FAMILY MEDICINE

## 2018-01-24 RX ORDER — DIAZEPAM 2 MG/1
2 TABLET ORAL ONCE AS NEEDED
Qty: 3 TABLET | Refills: 0 | Status: SHIPPED | OUTPATIENT
Start: 2018-01-24 | End: 2018-03-07

## 2018-01-24 RX ORDER — LOSARTAN POTASSIUM 100 MG/1
100 TABLET ORAL DAILY
Qty: 90 TABLET | Refills: 1 | Status: SHIPPED | OUTPATIENT
Start: 2018-01-24 | End: 2018-01-01 | Stop reason: SDUPTHER

## 2018-01-24 RX ORDER — HYDROCHLOROTHIAZIDE 25 MG/1
25 TABLET ORAL DAILY
Qty: 90 TABLET | Refills: 1 | Status: SHIPPED | OUTPATIENT
Start: 2018-01-24 | End: 2018-02-28 | Stop reason: SDUPTHER

## 2018-01-24 NOTE — LETTER
January 29, 2018      Ronak Guillaume MD  [address]  [place] [address]           [facility]  [address] [address]  Phone: [phone]          Patient: Silvia Capellan   MR Number: [id number]   YOB: 1945   Date of Visit: 1/24/2018       Dear Dr. Ronak Guillaume:    Thank you for referring Silvia Capellan to me for evaluation. Attached you will find relevant portions of my assessment and plan of care.    If you have questions, please do not hesitate to call me. I look forward to following Silvia Capellan along with you.    Sincerely,    Ginette Rosado MD    Enclosure  CC:  No Recipients    If you would like to receive this communication electronically, please contact [email or web address] or [phone] to request more information on [facility] access.    For providers and/or their staff who would like to refer a patient to [facility], please contact us through our one-stop-shop provider referral line, [facility], at [phone].    If you feel you have received this communication in error or would no longer like to receive these types of communications, please e-mail [email or web address]

## 2018-01-24 NOTE — PROGRESS NOTES
"  Chief Complaint   Patient presents with    Establish Care       HPI    Silvia Capellan is 72 y.o. female. The primary encounter diagnosis was Anxiety state. Diagnoses of Squamous cell carcinoma of unknown origin, Depression due to physical illness, Essential hypertension, and Rheumatoid arthritis of hand, unspecified laterality, unspecified rheumatoid factor presence were also pertinent to this visit.    72 year old female with Squamous cell carcinoma, Depression with Anxiety, HTN, RA, and GERD comes to clinic to establish care.      Squamous Cell Carcinoma - new diagnosis.  Patient's support system is currently unaware of diagnosis.  She plans to obtain testing and treatment plan before discussing. She is currently with Heme/Onc on obtaining a diagnosis.    GERD - patient admits that its worsened by tomato products.  She also admits that it is worsened by anxiety and stress.  EGDs have been negative for ulcers.    Anxiety - currently related to impending MRI.  She expresses knowledge of the importance of the exam but has been unable to tolerate the procedure.  She requests pre-procedural sedation.    Depression - patient reports symptoms are well controlled. She has had no change in her function, interest or participation in regular activities.  Only reports anxiety related to cancer diagnosis.    HTN - compliant with all BP medications.  No regular self monitoring.  Only takes BP at home when "feeling bad."    RA - patient does not take methotrexate.  She reports regular follow up with Rheum who is aware of patient's desire to avoid medications at this time.  She reports intermittent severe joint pain in the hands but uses pain medication as needed.    Review of Systems   Constitutional: Negative for activity change.   HENT: Negative for congestion.    Respiratory: Negative for shortness of breath.    Cardiovascular: Negative for chest pain.   Gastrointestinal: Negative for abdominal pain.   Genitourinary: " Negative for dysuria.   Musculoskeletal: Negative for gait problem.   Skin: Negative for rash.   Neurological: Negative for dizziness.   Psychiatric/Behavioral: Negative for suicidal ideas.       Past Medical History:   Diagnosis Date    Encounter for blood transfusion     GERD (gastroesophageal reflux disease)     HTN (hypertension)     Rheumatoid arthritis     Rheumatoid arthritis(714.0)      Past Surgical History:   Procedure Laterality Date    APPENDECTOMY      COLONOSCOPY      COLONOSCOPY N/A 10/30/2017    Procedure: COLONOSCOPY;  Surgeon: Quentin Coppola MD;  Location: [address];  Service: Endoscopy;  Laterality: N/A;    HYSTERECTOMY      SKIN BIOPSY      TOTAL KNEE ARTHROPLASTY      right     Family History   Problem Relation Age of Onset    Hypertension      Kidney disease      Colon polyps Neg Hx     Colon cancer Neg Hx     Esophageal cancer Neg Hx     Irritable bowel syndrome Neg Hx     Inflammatory bowel disease Neg Hx     Stomach cancer Neg Hx       reports that she has quit smoking. She has never used smokeless tobacco. She reports that she drinks alcohol. She reports that she does not use drugs.  Review of patient's allergies indicates:   Allergen Reactions    Latex, natural rubber     Codeine Hallucinations    Cortisporin [neomycin-bacitracin-poly-hc]     Latex, natural rubber Rash         Current Outpatient Prescriptions:     escitalopram oxalate (LEXAPRO) 10 MG tablet, Take 1 tablet (10 mg total) by mouth once daily., Disp: 90 tablet, Rfl: 1    hydroCHLOROthiazide (HYDRODIURIL) 25 MG tablet, Take 1 tablet (25 mg total) by mouth once daily., Disp: 90 tablet, Rfl: 1    losartan (COZAAR) 100 MG tablet, Take 1 tablet (100 mg total) by mouth once daily., Disp: 90 tablet, Rfl: 1    morphine (MS CONTIN) 15 MG 12 hr tablet, Take 1 tablet (15 mg total) by mouth 2 (two) times daily., Disp: 60 tablet, Rfl: 0    pantoprazole (PROTONIX) 40 MG tablet, Take 1 tablet (40 mg  "total) by mouth once daily., Disp: 90 tablet, Rfl: 1    diazePAM (VALIUM) 2 MG tablet, Take 1 tablet (2 mg total) by mouth once as needed (pre-procedural)., Disp: 3 tablet, Rfl: 0    methotrexate 2.5 MG Tab, , Disp: , Rfl:         Blood pressure 128/78, pulse 75, temperature 97.6 °F (36.4 °C), temperature source Oral, height 5' 7" (1.702 m), weight 86.3 kg (190 lb 4.1 oz), SpO2 (!) 92 %.    Physical Exam   Constitutional: She is oriented to person, place, and time. Vital signs are normal. She appears well-developed.   HENT:   Right Ear: Hearing normal.   Left Ear: Hearing normal.   Mouth/Throat: Normal dentition.   Cardiovascular: Normal heart sounds and intact distal pulses.    Pulmonary/Chest: Effort normal and breath sounds normal.   Abdominal: Soft. Bowel sounds are normal. There is no tenderness.   Musculoskeletal:   Normal gait. No decreased ROM at all 4 major joints.   Neurological: She is oriented to person, place, and time. She has normal strength. No sensory deficit.   Skin: Skin is intact. No rash noted.   Psychiatric: She has a normal mood and affect. Her speech is normal.       Lab Visit on 12/08/2017   Component Date Value Ref Range Status    WBC 12/08/2017 4.47  3.90 - 12.70 K/uL Final    RBC 12/08/2017 4.31  4.00 - 5.40 M/uL Final    Hemoglobin 12/08/2017 11.3* 12.0 - 16.0 g/dL Final    Hematocrit 12/08/2017 37.0  37.0 - 48.5 % Final    MCV 12/08/2017 86  82 - 98 fL Final    MCH 12/08/2017 26.2* 27.0 - 31.0 pg Final    MCHC 12/08/2017 30.5* 32.0 - 36.0 g/dL Final    RDW 12/08/2017 13.2  11.5 - 14.5 % Final    Platelets 12/08/2017 245  150 - 350 K/uL Final    MPV 12/08/2017 10.2  9.2 - 12.9 fL Final    Immature Granulocytes 12/08/2017 0.2  0.0 - 0.5 % Final    Gran # (ANC) 12/08/2017 2.0  1.8 - 7.7 K/uL Final    Immature Grans (Abs) 12/08/2017 0.01  0.00 - 0.04 K/uL Final    Lymph # 12/08/2017 1.5  1.0 - 4.8 K/uL Final    Mono # 12/08/2017 0.6  0.3 - 1.0 K/uL Final    Eos # " 12/08/2017 0.4  0.0 - 0.5 K/uL Final    Baso # 12/08/2017 0.03  0.00 - 0.20 K/uL Final    nRBC 12/08/2017 0  0 /100 WBC Final    Gran% 12/08/2017 45.7  38.0 - 73.0 % Final    Lymph% 12/08/2017 33.1  18.0 - 48.0 % Final    Mono% 12/08/2017 12.5  4.0 - 15.0 % Final    Eosinophil% 12/08/2017 7.8  0.0 - 8.0 % Final    Basophil% 12/08/2017 0.7  0.0 - 1.9 % Final    Differential Method 12/08/2017 Automated   Final    Sodium 12/08/2017 142  136 - 145 mmol/L Final    Potassium 12/08/2017 3.8  3.5 - 5.1 mmol/L Final    Chloride 12/08/2017 102  95 - 110 mmol/L Final    CO2 12/08/2017 32* 23 - 29 mmol/L Final    Glucose 12/08/2017 109  70 - 110 mg/dL Final    BUN, Bld 12/08/2017 12  8 - 23 mg/dL Final    Creatinine 12/08/2017 0.8  0.5 - 1.4 mg/dL Final    Calcium 12/08/2017 9.5  8.7 - 10.5 mg/dL Final    Total Protein 12/08/2017 8.5* 6.0 - 8.4 g/dL Final    Albumin 12/08/2017 3.0* 3.5 - 5.2 g/dL Final    Total Bilirubin 12/08/2017 0.3  0.1 - 1.0 mg/dL Final    Alkaline Phosphatase 12/08/2017 99  55 - 135 U/L Final    AST 12/08/2017 14  10 - 40 U/L Final    ALT 12/08/2017 9* 10 - 44 U/L Final    Anion Gap 12/08/2017 8  8 - 16 mmol/L Final    eGFR if African American 12/08/2017 >60.0  >60 mL/min/1.73 m^2 Final    eGFR if non African American 12/08/2017 >60.0  >60 mL/min/1.73 m^2 Final    LD 12/08/2017 211  110 - 260 U/L Final   Lab Visit on 11/14/2017   Component Date Value Ref Range Status    Prothrombin Time 11/14/2017 10.4  9.0 - 12.5 sec Final    INR 11/14/2017 1.0  0.8 - 1.2 Final   Lab Visit on 11/09/2017   Component Date Value Ref Range Status    WBC 11/09/2017 4.18  3.90 - 12.70 K/uL Final    RBC 11/09/2017 4.04  4.00 - 5.40 M/uL Final    Hemoglobin 11/09/2017 10.9* 12.0 - 16.0 g/dL Final    Hematocrit 11/09/2017 35.0* 37.0 - 48.5 % Final    MCV 11/09/2017 87  82 - 98 fL Final    MCH 11/09/2017 27.0  27.0 - 31.0 pg Final    MCHC 11/09/2017 31.1* 32.0 - 36.0 g/dL Final    RDW 11/09/2017  12.3  11.5 - 14.5 % Final    Platelets 11/09/2017 255  150 - 350 K/uL Final    MPV 11/09/2017 10.3  9.2 - 12.9 fL Final    Gran # (ANC) 11/09/2017 2.6  1.8 - 7.7 K/uL Final    Lymph # 11/09/2017 1.0  1.0 - 4.8 K/uL Final    Mono # 11/09/2017 0.4  0.3 - 1.0 K/uL Final    Eos # 11/09/2017 0.2  0.0 - 0.5 K/uL Final    Baso # 11/09/2017 0.02  0.00 - 0.20 K/uL Final    Gran% 11/09/2017 62.7  38.0 - 73.0 % Final    Lymph% 11/09/2017 24.4  18.0 - 48.0 % Final    Mono% 11/09/2017 8.4  4.0 - 15.0 % Final    Eosinophil% 11/09/2017 3.8  0.0 - 8.0 % Final    Basophil% 11/09/2017 0.5  0.0 - 1.9 % Final    Differential Method 11/09/2017 Automated   Final    Sodium 11/09/2017 141  136 - 145 mmol/L Final    Potassium 11/09/2017 3.5  3.5 - 5.1 mmol/L Final    Chloride 11/09/2017 102  95 - 110 mmol/L Final    CO2 11/09/2017 31* 23 - 29 mmol/L Final    Glucose 11/09/2017 103  70 - 110 mg/dL Final    BUN, Bld 11/09/2017 7* 8 - 23 mg/dL Final    Creatinine 11/09/2017 0.8  0.5 - 1.4 mg/dL Final    Calcium 11/09/2017 9.0  8.7 - 10.5 mg/dL Final    Total Protein 11/09/2017 7.8  6.0 - 8.4 g/dL Final    Albumin 11/09/2017 2.7* 3.5 - 5.2 g/dL Final    Total Bilirubin 11/09/2017 0.3  0.1 - 1.0 mg/dL Final    Alkaline Phosphatase 11/09/2017 95  55 - 135 U/L Final    AST 11/09/2017 15  10 - 40 U/L Final    ALT 11/09/2017 14  10 - 44 U/L Final    Anion Gap 11/09/2017 8  8 - 16 mmol/L Final    eGFR if African American 11/09/2017 >60  >60 mL/min/1.73 m^2 Final    eGFR if non African American 11/09/2017 >60  >60 mL/min/1.73 m^2 Final   Lab Visit on 09/11/2017   Component Date Value Ref Range Status    WBC 09/11/2017 4.74  3.90 - 12.70 K/uL Final    RBC 09/11/2017 4.54  4.00 - 5.40 M/uL Final    Hemoglobin 09/11/2017 12.3  12.0 - 16.0 g/dL Final    Hematocrit 09/11/2017 38.9  37.0 - 48.5 % Final    MCV 09/11/2017 86  82 - 98 fL Final    MCH 09/11/2017 27.1  27.0 - 31.0 pg Final    MCHC 09/11/2017 31.6* 32.0 - 36.0  g/dL Final    RDW 09/11/2017 12.8  11.5 - 14.5 % Final    Platelets 09/11/2017 226  150 - 350 K/uL Final    MPV 09/11/2017 9.7  9.2 - 12.9 fL Final    Gran # (ANC) 09/11/2017 2.1  1.8 - 7.7 K/uL Final    Lymph # 09/11/2017 2.0  1.0 - 4.8 K/uL Final    Mono # 09/11/2017 0.5  0.3 - 1.0 K/uL Final    Eos # 09/11/2017 0.1  0.0 - 0.5 K/uL Final    Baso # 09/11/2017 0.02  0.00 - 0.20 K/uL Final    Gran% 09/11/2017 44.0  38.0 - 73.0 % Final    Lymph% 09/11/2017 42.6  18.0 - 48.0 % Final    Mono% 09/11/2017 10.3  4.0 - 15.0 % Final    Eosinophil% 09/11/2017 2.5  0.0 - 8.0 % Final    Basophil% 09/11/2017 0.4  0.0 - 1.9 % Final    Differential Method 09/11/2017 Automated   Final    Sodium 09/11/2017 140  136 - 145 mmol/L Final    Potassium 09/11/2017 4.2  3.5 - 5.1 mmol/L Final    Chloride 09/11/2017 101  95 - 110 mmol/L Final    CO2 09/11/2017 30* 23 - 29 mmol/L Final    Glucose 09/11/2017 96  70 - 110 mg/dL Final    BUN, Bld 09/11/2017 12  8 - 23 mg/dL Final    Creatinine 09/11/2017 0.8  0.5 - 1.4 mg/dL Final    Calcium 09/11/2017 9.5  8.7 - 10.5 mg/dL Final    Total Protein 09/11/2017 8.2  6.0 - 8.4 g/dL Final    Albumin 09/11/2017 3.1* 3.5 - 5.2 g/dL Final    Total Bilirubin 09/11/2017 0.3  0.1 - 1.0 mg/dL Final    Alkaline Phosphatase 09/11/2017 90  55 - 135 U/L Final    AST 09/11/2017 16  10 - 40 U/L Final    ALT 09/11/2017 11  10 - 44 U/L Final    Anion Gap 09/11/2017 9  8 - 16 mmol/L Final    eGFR if African American 09/11/2017 >60.0  >60 mL/min/1.73 m^2 Final    eGFR if non African American 09/11/2017 >60.0  >60 mL/min/1.73 m^2 Final    LD 09/11/2017 254  110 - 260 U/L Final   Lab Visit on 08/01/2017   Component Date Value Ref Range Status    Sed Rate 08/01/2017 57* 0 - 20 mm/Hr Final    CRP 08/01/2017 8.2  0.0 - 8.2 mg/L Final    Rheumatoid Factor 08/01/2017 <10.0  0.0 - 15.0 IU/mL Final    CCP Antibodies 08/01/2017 <0.5  <5.0 U/mL Final    ADIEL Screen 08/01/2017 Positive*  Negative <1:160 Final    ADIEL HEP-2 Titer 08/01/2017 Positive 1:160 Nucleolar    Final    Anti Sm Antibody 08/01/2017 0.50  0.00 - 19.99 EU Final    Anti-Sm Interpretation 08/01/2017 Negative  Negative Final    Anti-SSA Antibody 08/01/2017 0.00  0.00 - 19.99 EU Final    Anti-SSA Interpretation 08/01/2017 Negative  Negative Final    Anti-SSB Antibody 08/01/2017 0.00  0.00 - 19.99 EU Final    Anti-SSB Interpretation 08/01/2017 Negative  Negative Final    ds DNA Ab 08/01/2017 Negative 1:10  Negative 1:10 Final    Anti Sm/RNP Antibody 08/01/2017 1.25  0.00 - 19.99 EU Final    Anti-Sm/RNP Interpretation 08/01/2017 Negative  Negative Final   ]    ASSESSMENT:    1. Anxiety state    2. Squamous cell carcinoma of unknown origin    3. Depression due to physical illness    4. Essential hypertension    5. Rheumatoid arthritis of hand, unspecified laterality, unspecified rheumatoid factor presence        Silvia was seen today for establish care.    Diagnoses and all orders for this visit:    Anxiety state  -     diazePAM (VALIUM) 2 MG tablet; Take 1 tablet (2 mg total) by mouth once as needed (pre-procedural).  - Unstable. Not likely to be permanent.  Medication for procedure prescribed. Consider increase in Lexapro if symptoms continue.    Squamous cell carcinoma of unknown origin   -Unstable.  Obtain MRI and continue follow up with Heme/Onc.    Depression due to physical illness   -Stable. Continue Lexapro.  Consider increase if anxiety symptoms do not improve.    Essential hypertension  -     losartan (COZAAR) 100 MG tablet; Take 1 tablet (100 mg total) by mouth once daily.  -     hydroCHLOROthiazide (HYDRODIURIL) 25 MG tablet; Take 1 tablet (25 mg total) by mouth once daily.  - Stable. Medication refilled. Previous labs reviewed and normal Cr and eGFR noted.    Rheumatoid arthritis of hand, unspecified laterality, unspecified rheumatoid factor presence   -Stable.  Patient only interested in symptomatic treatment  at this time.  Aware of recommendations regarding Methotrexate.        FOLLOW UP: Follow-up in about 3 months (around 4/24/2018) for Physical with labs.

## 2018-01-24 NOTE — PATIENT INSTRUCTIONS
Magnetic Resonance Imaging (MRI)     You will be asked to hold very still during the scan.     Magnetic resonance imaging (MRI) is a test that lets your doctor see detailed pictures of the inside of your body. MRI combines the use of strong magnets and radio waves to form an MRI image.  How do I get ready for an MRI?  · Follow any directions you are given for not eating or drinking before the test.  · Ask your provider if you should stop taking any medicine before the test.  · Follow your normal daily routine unless your provider tells you otherwise.  · You'll be asked to remove your watch, jewelry, hearing aids, credit cards, pens, pocket knives, eyeglasses, and other metal objects.  · You may be asked to remove your makeup. Makeup may contain some metal.  · Most MRI tests take 30 to 60 minutes. Depending on the type of MRI you are having, the test may take longer. Give yourself extra time to check in.     MRI uses strong magnets. Metal is affected by magnets and can distort the image. The magnet used in MRI can cause metal objects in your body to move. If you have a metal implant, you may not be able to have an MRI unless the implant is certified as MRI safe. People with these implants should not have an MRI:  · Ear (cochlear) implants  · Certain clips used for brain aneurysms  · Certain metal coils put in blood vessels  · Most defibrillators  · Most pacemakers  Be sure to tell the radiologist or technologist if you:  · Have had any previous surgeries  · Have a pacemaker, surgical clips, metal plate or pins, an artificial joint, staples or screws, ear (cochlear) implants, or other implants  · Wear a medicated adhesive patch  · Have metal splinters in your body  · Have implanted nerve stimulators or drug-infusion ports  · Have tattoos or body piercings. Some tattoo inks contain metal.  · Work with metal  · Have braces. You must remove any dental work.  · Have a bullet or other metal in your body  Also tell the  radiologist or technologist if you:  · Are pregnant or think you may be  · Are afraid of small, enclosed spaces (claustrophobic)  · Are allergic to X-ray dye (contrast medium), iodine, shellfish, or any medicines  · Have other allergies  · Are breastfeeding  · Have a history of cancer  · Have any serious health problems. This includes kidney disease or a liver transplant. You may not be able to have the contrast material used for MRI.   What happens during an MRI?  · You may be asked to wear a hospital gown.  · You may be given earplugs to wear if you need them.  · You may be injected with a special dye (contrast) that improves the MRI image.   · Youll lie down on a platform that slides into the magnet.  What happens after an MRI?  · You can get back to normal activities right away. If you were given contrast, it will pass naturally through your body within a day. You may be told to drink more water or other fluids during this time.   · Your doctor will discuss the test results with you during a follow-up appointment or over the phone.  · Your next appointment is: __________________  Date Last Reviewed: 6/2/2015  © 6838-2738 [employer]. [address] [place] [address], [place] [address]. All rights reserved. This information is not intended as a substitute for professional medical care. Always follow your healthcare professional's instructions.

## 2018-01-26 ENCOUNTER — HOSPITAL ENCOUNTER (OUTPATIENT)
Dept: RADIOLOGY | Facility: HOSPITAL | Age: 73
Discharge: HOME | End: 2018-01-26
Attending: INTERNAL MEDICINE
Payer: MEDICARE

## 2018-01-26 DIAGNOSIS — C44.92 SQUAMOUS CELL CARCINOMA OF UNKNOWN ORIGIN: ICD-10-CM

## 2018-01-26 PROCEDURE — 71260 CT THORAX DX C+: CPT | Mod: TC

## 2018-01-26 PROCEDURE — 25500020 PHARM REV CODE 255: Performed by: INTERNAL MEDICINE

## 2018-01-26 PROCEDURE — 74177 CT ABD & PELVIS W/CONTRAST: CPT | Mod: 26,,, | Performed by: RADIOLOGY

## 2018-01-26 PROCEDURE — 71260 CT THORAX DX C+: CPT | Mod: 26,,, | Performed by: RADIOLOGY

## 2018-01-26 PROCEDURE — 70470 CT HEAD/BRAIN W/O & W/DYE: CPT | Mod: TC

## 2018-01-26 PROCEDURE — 74177 CT ABD & PELVIS W/CONTRAST: CPT | Mod: TC

## 2018-01-26 PROCEDURE — 70470 CT HEAD/BRAIN W/O & W/DYE: CPT | Mod: 26,,, | Performed by: RADIOLOGY

## 2018-01-26 RX ADMIN — IOHEXOL 15 ML: 300 INJECTION, SOLUTION INTRAVENOUS at 10:01

## 2018-01-26 RX ADMIN — IOHEXOL 100 ML: 350 INJECTION, SOLUTION INTRAVENOUS at 10:01

## 2018-01-31 ENCOUNTER — TELEPHONE (OUTPATIENT)
Dept: HEMATOLOGY/ONCOLOGY | Facility: CLINIC | Age: 73
End: 2018-01-31

## 2018-01-31 NOTE — TELEPHONE ENCOUNTER
----- Message from Shar Tanner sent at 1/31/2018  8:10 AM CST -----  Contact: pt  Pt would like a call back regarding rescheduling appointment to an earlier time Pt has transportation problem    Pt can be reached at [phone]

## 2018-02-02 ENCOUNTER — OFFICE VISIT (OUTPATIENT)
Dept: HEMATOLOGY/ONCOLOGY | Facility: CLINIC | Age: 73
End: 2018-02-02
Payer: MEDICARE

## 2018-02-02 VITALS
OXYGEN SATURATION: 98 % | WEIGHT: 189.81 LBS | TEMPERATURE: 99 F | HEART RATE: 88 BPM | RESPIRATION RATE: 18 BRPM | BODY MASS INDEX: 29.79 KG/M2 | SYSTOLIC BLOOD PRESSURE: 135 MMHG | DIASTOLIC BLOOD PRESSURE: 79 MMHG | HEIGHT: 67 IN

## 2018-02-02 DIAGNOSIS — F41.1 ANXIETY STATE: Primary | ICD-10-CM

## 2018-02-02 DIAGNOSIS — C44.92 SQUAMOUS CELL CARCINOMA OF UNKNOWN ORIGIN: ICD-10-CM

## 2018-02-02 DIAGNOSIS — R91.1 PULMONARY NODULE: ICD-10-CM

## 2018-02-02 DIAGNOSIS — J90 PLEURAL EFFUSION ON LEFT: ICD-10-CM

## 2018-02-02 PROCEDURE — 99999 PR PBB SHADOW E&M-EST. PATIENT-LVL IV: CPT | Mod: PBBFAC,,, | Performed by: INTERNAL MEDICINE

## 2018-02-02 PROCEDURE — 1126F AMNT PAIN NOTED NONE PRSNT: CPT | Mod: S$GLB,,, | Performed by: INTERNAL MEDICINE

## 2018-02-02 PROCEDURE — 1159F MED LIST DOCD IN RCRD: CPT | Mod: S$GLB,,, | Performed by: INTERNAL MEDICINE

## 2018-02-02 PROCEDURE — 99214 OFFICE O/P EST MOD 30 MIN: CPT | Mod: S$GLB,,, | Performed by: INTERNAL MEDICINE

## 2018-02-02 PROCEDURE — 3008F BODY MASS INDEX DOCD: CPT | Mod: S$GLB,,, | Performed by: INTERNAL MEDICINE

## 2018-02-02 PROCEDURE — 99499 UNLISTED E&M SERVICE: CPT | Mod: S$GLB,,, | Performed by: INTERNAL MEDICINE

## 2018-02-02 RX ORDER — LORAZEPAM 0.5 MG/1
0.5 TABLET ORAL 2 TIMES DAILY
Qty: 60 TABLET | Refills: 0 | Status: SHIPPED | OUTPATIENT
Start: 2018-02-02 | End: 2018-03-06 | Stop reason: SDUPTHER

## 2018-02-02 NOTE — Clinical Note
Right thoracentesis ( IR) -order put in Chemo- Carboplatin,Paclitaxel every 3 weeks with Neulasta OBI-asap F/u on day of chemo with cbc, cmp

## 2018-02-02 NOTE — PROGRESS NOTES
CC: Squamous cell carcinoma     HPI:  is a 71 y/o female who underwent bronchoscopy/EBUS evaluation on 8/31/17 for abnormal mediastinal adenopathy  and a 1.2 cm MORRO mass.     Bronchoscopy findings:    The oropharynx appears normal. The larynx appears normal. The vocal cords appear normal. The subglottic space is normal. The trachea is of normal caliber. The otf is sharp. The tracheobronchial tree was examined to at least the first subsegmental level. Bronchial mucosa and anatomy are normal; there are no endobronchial lesions, and no secretions.    Lymph Nodes: Lymph node sizing was performed via endobronchial ultrasound for suspected lung cancer. Sampling by transbronchial needle aspiration was also performed using an Olympus EBUS-TBNA 22  gauge needle in the subcarinal mediastinum (level 7) and sent for routine cytology.       - The 7 (subcarinal) node was 30 mm by EBUS. Three samples with the needle were obtained. The patient's condition was unchanged after the intervention.     Interval history: She is here for a follow up visit. She had CT head and CT abdomen and chest.     Review of Systems   Constitutional: Positive for malaise/fatigue and weight loss. Negative for fever.   HENT: Negative for congestion and nosebleeds.    Eyes: Negative for blurred vision and double vision.   Respiratory: Positive for cough, sputum production and shortness of breath. Negative for hemoptysis and wheezing.    Cardiovascular: Positive for palpitations. Negative for chest pain, leg swelling and PND.   Gastrointestinal: Negative for abdominal pain, constipation, diarrhea, heartburn and vomiting.   Genitourinary: Negative for dysuria and urgency.   Musculoskeletal: Negative for myalgias.   Skin: Negative for rash.   Neurological: Negative for dizziness, sensory change, speech change, weakness and headaches.   Endo/Heme/Allergies: Does not bruise/bleed easily.   Psychiatric/Behavioral: Negative for depression. The  patient is nervous/anxious.    All other systems reviewed and are negative.        Current Outpatient Prescriptions   Medication Sig    diazePAM (VALIUM) 2 MG tablet Take 1 tablet (2 mg total) by mouth once as needed (pre-procedural).    escitalopram oxalate (LEXAPRO) 10 MG tablet Take 1 tablet (10 mg total) by mouth once daily.    hydroCHLOROthiazide (HYDRODIURIL) 25 MG tablet Take 1 tablet (25 mg total) by mouth once daily.    losartan (COZAAR) 100 MG tablet Take 1 tablet (100 mg total) by mouth once daily.    methotrexate 2.5 MG Tab     morphine (MS CONTIN) 15 MG 12 hr tablet Take 1 tablet (15 mg total) by mouth 2 (two) times daily.    pantoprazole (PROTONIX) 40 MG tablet Take 1 tablet (40 mg total) by mouth once daily.     No current facility-administered medications for this visit.      Vitals:    02/02/18 0946   BP: 135/79   Pulse: 88   Resp: 18   Temp: 98.6 °F (37 °C)     Physical Exam   Constitutional: She is oriented to person, place, and time. She appears well-developed. No distress.   HENT:   Head: Normocephalic and atraumatic.   Mouth/Throat: No oropharyngeal exudate.   Eyes: Pupils are equal, round, and reactive to light. No scleral icterus.   Neck: Normal range of motion.   Cardiovascular: Normal rate, regular rhythm and normal heart sounds.    No murmur heard.  Pulmonary/Chest: Effort normal. No respiratory distress. She has wheezes. She has no rales.   Has diminished breath sounds in left infrascpular region   Abdominal: Soft. Bowel sounds are normal. She exhibits no distension. There is no tenderness. There is no guarding.   Lymphadenopathy:     She has no cervical adenopathy.   Neurological: She is alert and oriented to person, place, and time. No cranial nerve deficit.   Skin: Skin is warm. No erythema.   Has multiple hyperpigmented, verrucous lesions on face   Psychiatric: She has a normal mood and affect.       Imaging/path:        6/12/17 CT chest with cont      1. Large left pleural  effusion resulting in atelectasis of the left lower lobe and portions of left upper lobe  2. 1.2 cm left upper lobe pleural based pulmonary nodule  3. Mediastinal adenopathy as described above with diffuse thickening of the wall of the distal esophagus. Findings are suspicious for neoplastic process. Recommend bronchoscopy biopsy and possible endoscopy for further evaluation.  4. 1.3 cm hypodensity within the dome of the liver. Metastatic focus cannot be entirely excluded.     9/7/17 PET CT       There is mildly increased tracer uptake within the patient's left pleural effusion and overlying the mediastinal adenopathy, max SUV 2.7.    Transmission CT images show continued pleural effusion and circumferential esophageal thickening. Transmission CT images also show non-avid bilateral groin adenopathy likely reactive in nature.      Impression        There is mildly increased tracer uptake within the patient's left pleural effusion and mediastinal adenopathy. While these findings suggest reactive etiology some low grade malignancies, such as bronchioloalveolar carcinoma, may show this level of uptake on PET scan.            10/30/17 EGD     The examined duodenum was normal.The entire examined stomach was normal.The cardia and gastric fundus were normal on retroflexion. A 1 cm hiatal hernia was found. The proximal extent of the gastric folds (end of tubular esophagus) was 38 cm from the incisors. The hiatal narrowing was 39 cm from the incisors. The Z-line was 38 cm from the incisors. Z-line was sharp. No esophagitis and no columnar esophagus and no lesions seen with NBI or white light.    Impression:                                   - Normal examined duodenum.                        - Normal stomach.                        - 1 cm hiatal hernia.                        - No specimens collected.     10/30/17 colonoscopy :     Cecal polyp ( 3mm) identified  Histopathology: Tubular adenoma        Pathology:     1/17/14  colonoscopy     FINAL PATHOLOGIC DIAGNOSIS     1. Colon biopsy, ascending colon-tubular adenoma.  2. Colon biopsy, transverse-tubular adenoma.  3. 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 The stalk and base of the polyp are well represented and are free of atypia.  4. Colon biopsy, sigmoid- mild glandular hyperplasia consistent with a benign hyperplastic polyp.        7/19/17 PLEURAL FLUID (CYTOLOGY AND CELL BLOCK):     -Groups of atypical cells present, malignancy cannot be excluded.  Note: While these cells could be reactive mesothelial cells, the level of atypia could be seen in malignancy. A cell block was prepared but the atypical cells are not present in the cell block for further characterization. Correlate  clinically. Repeat tap/biopsy might be helpful if clinically indicated.     8/28/17 EBUS FNA     FNA of subcarinal lymph node: Positive for malignant cells  Small clusters of malignant epithelial cells, favor squamous carcinoma Tumor cells are positive for CK 5/6 and CK 7. Immunostains for p63 and TTF-1 are negative.    1/26/18 CT chest, abdomen,pelvis with cont        Comparison: June 12, 2017    Chest: Since prior exam there is been interval enlargement of the right pleural effusion. There is high density material seen within the right pleural effusion that was not visualized on prior exam.    There is a 5.4 cm enhancing mass seen at the left lung base that previously measured approximately 2.9 cm.    There is a subcarinal mass measuring 4.4 cm it previously measured 2.9 cm.    There is circumferential esophageal thickening adjacent to this mass.    There is volume loss in the right chest with mediastinal shift to the right more pronounced than what was seen on prior exam.    Patient is a right internal jugular Port-A-Cath.    Abdomen/pelvis: The liver, spleen, adrenal glands,  kidneys and pancreas show a normal contrasted appearance. There is no evidence bowel obstruction. There is no evidence of abdominal or pelvic lymphadenopathy. The osseous structures show degenerative change of the spine.   Impression      Since prior exam in the patient's subcarinal and left lung base masses have increased in size. In addition there is now high density material seen within the patient's left pleural effusion new from prior exam and concerning for hemorrhage possibly from the mass.       1/26/18 CT head with cont        Comparison: CT June 8, 2017    Technique: Contiguous axial images were acquired from vertex to skull base without contrast.    Findings: There is no evidence acute intracranial abnormality.  Specifically there is no evidence acute infarct, contusion, extra-axial fluid collection or midline shift.  The ventricles are normal in size and configuration.  The calvarium is intact.  The paranasal sinuses and mastoid air cells are clear.    There is no evidence of enhancing mass.   Impression      There is no evidence of enhancing mass within the cerebral parenchyma.     Component      Latest Ref Rng & Units 1/26/2018   Sodium      136 - 145 mmol/L 141   Potassium      3.5 - 5.1 mmol/L 3.5   Chloride      95 - 110 mmol/L 100   CO2      23 - 29 mmol/L 33 (H)   Glucose      70 - 110 mg/dL 103   BUN, Bld      8 - 23 mg/dL 17   Creatinine      0.5 - 1.4 mg/dL 1.0   Calcium      8.7 - 10.5 mg/dL 9.7   Total Protein      6.0 - 8.4 g/dL 8.2   Albumin      3.5 - 5.2 g/dL 3.0 (L)   Total Bilirubin      0.1 - 1.0 mg/dL 0.4   Alkaline Phosphatase      55 - 135 U/L 87   AST      10 - 40 U/L 13   ALT      10 - 44 U/L 10   Anion Gap      8 - 16 mmol/L 8   eGFR if African American      >60 mL/min/1.73 m:2 >60   eGFR if non African American      >60 mL/min/1.73 m:2 56 (A)     Assessment:     1. is a 73 y/o female who underwent bronchoscopy/EBUS evaluation on 8/31/17 for abnormal mediastinal  adenopathy  and a 1.2 cm MORRO mass as well as pericardial adenopathy.FNA of subcarinal lymph node was positive for malignant cells.  There were small clusters of malignant epithelial cells, favoring squamous carcinoma. Site of origin is not clear. PET CT done on 9/7/17 did not reveal any lesion in head and neck region.  There was adequate tissue to run PD L1.p16 was negative.    Esophageal thickening was noted on CT done on 6/12/17. EGD on 10/30/17 did not reveal any suspicious lesions in esophagus/stomach. ENT evaluation still pending.   MRI brain still pending.I explained to her that if primary site is unclear, she will be treated as metastatic SCC of unknown primary.   Her treatment has been delayed as she cancelled several appointments due to some issues at home. I emphasized the need to have the pending studies ( MRI, ENT evaluation) done, as well as have CT chest/abdomen repeated as her last imaging was over 2 months ago.  She said she cannot have MRI, even with anti-anxiety medication. CT chest from 1/26/18 showed increase in the sizes of subcarinal and left lung base masses compared to previous CT in June 2017 . High density material was seen within the patient's left pleural effusion. No intracranial lesions noted on CT head. I discussed these findings with the patient and personally reviewed the CT scans from January 2018.  I told her that she has likely metastatic SCC of unknown primary. She will have left thoracenetsis. She does have RA history. It is unclear if she can tolerate check point inhibitor therapy even if she has high PD1 expression on cytology/biopsy.  She will start Carboplatin/Paclitaxel every 3 weeks. I emphasized that the rationale of treatment is palliation and not cure and she understands this.  I discussed various adverse effects of the 2 medication:      Carboplatin:    >10%:   Central nervous system: Pain (23%)  Endocrine & metabolic: Hyponatremia (29% to 47%), hypomagnesemia (29% to  43%), hypocalcemia (22% to 31%), hypokalemia (20% to 28%)  Gastrointestinal: Vomiting (65% to 81%), abdominal pain (17%), nausea (without vomiting: 10% to 15%)  Hematologic & oncologic: Bone marrow depression (dose related and dose limiting; eric at ~21 days with single-agent therapy), anemia (71% to 90%; grades 3/4: 21%), leukopenia (85%; grades 3/4: 15% to 26%), neutropenia (67%; grades 3/4: 16% to 21%), thrombocytopenia (62%; grades 3/4: 25% to 35%)   Hepatic: Increased serum alkaline phosphatase (24% to 37%), increased serum AST (15% to 19%)  Hypersensitivity: Hypersensitivity (2% to 16%)  Neuromuscular & skeletal: Weakness (11%)  Renal: Decreased creatinine clearance (27%), increased blood urea nitrogen (14% to 22%)      Paclitaxel:    >10%:  Cardiovascular: Flushing (28%), ECG abnormality (14% to 23%), edema (21%), hypotension (4% to 12%)  Central nervous system: Peripheral neuropathy (42% to 70%; grades 3/4: ?7%)  Dermatologic: Alopecia (87%), skin rash (12%)  Gastrointestinal: Nausea (?52%), vomiting (?52%), diarrhea (38%), mucositis (17% to 35%), stomatitis (15%; most common at doses >390 mg/m2), abdominal pain (with intraperitoneal administration)  Hematologic & oncologic: Neutropenia (78% to 98%; grade 4: 14% to 75%; onset: 8 to 10 days; median eric: 11 days; recovery: 15 to 21 days), leukopenia (90%; grade 4: 17%), anemia (47% to 90%; grades 3/4: 2% to 16%), thrombocytopenia (4% to 20%; grades 3/4: 1% to 7%), hemorrhage (14%)  Hepatic: Increased serum alkaline phosphatase (22%), increased serum AST (19%)  Hypersensitivity: Hypersensitivity reaction (31% to 45%; grades 3/4: ?2%)  Infection: Infection (15% to 30%)  Local: Injection site reaction (erythema at injection site, skin discoloration at injection site, swelling at injection site, tenderness at injection site: 13%)  Neuromuscular & skeletal: Arthralgia (?60%), myalgia (?60%), weakness (17%)  Renal: Increased serum creatinine (observed in Kaposi  sarcoma patients only: 18% to 34%, severe: 5% to 7%)  1% to 10%:  Cardiovascular: Bradycardia (3%), tachycardia (2%), hypertension (1%), cardiac arrhythmia (1%), syncope (1%), venous thrombosis (1%)  Dermatologic: Changes in nails (2%)  Hematologic & oncologic: Febrile neutropenia (2%)  Hepatic: Increased serum bilirubin (7%)  Respiratory: Dyspnea (2%)        1% to 10%:   Central nervous system: Peripheral neuropathy (4% to 6%), neurotoxicity (5%)  Dermatologic: Alopecia (2% to 3%)  Gastrointestinal: Constipation (6%), diarrhea (6%), dysgeusia (1%), mucositis (?1%), stomatitis (?1%)  Hematologic & oncologic: Bleeding complications (5%), hemorrhage (5%)  Hepatic: Increased serum bilirubin (5%)  Infection: Infection (5%)  Ophthalmic: Visual disturbance (1%)  Otic: Ototoxicity (1%)  Renal: Increased serum creatinine (6% to 10%)      She consented to the treatment.     2. Chest and abdominal pain: She has long h/o body aches. She takes Hydrocodone/APAP. She is on not on any DAMARDs for her RA at this time. She is on extended release Morphine 15mg q12hr for her pain. It has provided her with only partial pain relief.     3. Anxiety and depression: She is on Trazodone. I prescribed Lorazepam today as she has chronic anxiety that has not subsided.       4. Tubular adenoma: Noted in a cecal polyp on colonoscopy on 10/30/17.     5. Left pleural effusion: She has worsening dyspnea. She will have throacentesis, cytology.

## 2018-02-06 DIAGNOSIS — C44.92 SQUAMOUS CELL CARCINOMA OF UNKNOWN ORIGIN: Primary | ICD-10-CM

## 2018-02-08 DIAGNOSIS — J90 PLEURAL EFFUSION ON LEFT: Primary | ICD-10-CM

## 2018-02-09 ENCOUNTER — HOSPITAL ENCOUNTER (OUTPATIENT)
Dept: INTERVENTIONAL RADIOLOGY/VASCULAR | Facility: HOSPITAL | Age: 73
Discharge: HOME | End: 2018-02-09
Attending: INTERNAL MEDICINE
Payer: MEDICARE

## 2018-02-09 ENCOUNTER — HOSPITAL ENCOUNTER (OUTPATIENT)
Dept: RADIOLOGY | Facility: HOSPITAL | Age: 73
Discharge: HOME | End: 2018-02-09
Attending: INTERNAL MEDICINE
Payer: MEDICARE

## 2018-02-09 VITALS
SYSTOLIC BLOOD PRESSURE: 115 MMHG | HEART RATE: 94 BPM | OXYGEN SATURATION: 98 % | RESPIRATION RATE: 18 BRPM | DIASTOLIC BLOOD PRESSURE: 68 MMHG

## 2018-02-09 DIAGNOSIS — Z98.890 S/P THORACENTESIS: ICD-10-CM

## 2018-02-09 DIAGNOSIS — J90 PLEURAL EFFUSION ON LEFT: ICD-10-CM

## 2018-02-09 PROCEDURE — 88341 IMHCHEM/IMCYTCHM EA ADD ANTB: CPT | Mod: 26,,, | Performed by: PATHOLOGY

## 2018-02-09 PROCEDURE — 88342 IMHCHEM/IMCYTCHM 1ST ANTB: CPT | Mod: 26,,, | Performed by: PATHOLOGY

## 2018-02-09 PROCEDURE — 71045 X-RAY EXAM CHEST 1 VIEW: CPT | Mod: 26,,, | Performed by: RADIOLOGY

## 2018-02-09 PROCEDURE — C1729 CATH, DRAINAGE: HCPCS

## 2018-02-09 PROCEDURE — 32555 ASPIRATE PLEURA W/ IMAGING: CPT | Mod: LT,,, | Performed by: RADIOLOGY

## 2018-02-09 PROCEDURE — 88112 CYTOPATH CELL ENHANCE TECH: CPT | Mod: 26,,, | Performed by: PATHOLOGY

## 2018-02-09 PROCEDURE — 71045 X-RAY EXAM CHEST 1 VIEW: CPT | Mod: TC,FY

## 2018-02-09 PROCEDURE — 88305 TISSUE EXAM BY PATHOLOGIST: CPT | Performed by: PATHOLOGY

## 2018-02-09 NOTE — H&P
Radiology History & Physical      SUBJECTIVE:     Chief Complaint: Left pleural effusion    History of Present Illness:  Silvia Capellan is a 72 y.o. female with metastatic squamous carcinoma of unknown primary with MORRO mass and large left pleural effusion  who presents for thoracentesis.  Of note, most recent imaging reveals hyperdense material within the effusion concerning for hemorrhage.    Past Medical History:   Diagnosis Date    Encounter for blood transfusion     GERD (gastroesophageal reflux disease)     HTN (hypertension)     Rheumatoid arthritis     Rheumatoid arthritis(714.0)      Past Surgical History:   Procedure Laterality Date    APPENDECTOMY      COLONOSCOPY      COLONOSCOPY N/A 10/30/2017    Procedure: COLONOSCOPY;  Surgeon: Quentin Coppola MD;  Location: [facility] ([address] [place] [address]);  Service: Endoscopy;  Laterality: N/A;    HYSTERECTOMY      SKIN BIOPSY      TOTAL KNEE ARTHROPLASTY      right       Home Meds:   Prior to Admission medications    Medication Sig Start Date End Date Taking? Authorizing Provider   diazePAM (VALIUM) 2 MG tablet Take 1 tablet (2 mg total) by mouth once as needed (pre-procedural). 1/24/18 1/24/18  Ginette Rosado MD   escitalopram oxalate (LEXAPRO) 10 MG tablet Take 1 tablet (10 mg total) by mouth once daily. 9/27/17 9/27/18  Ronak Guillaume MD   hydroCHLOROthiazide (HYDRODIURIL) 25 MG tablet Take 1 tablet (25 mg total) by mouth once daily. 1/24/18 1/24/19  Ginette Rosado MD   LORazepam (ATIVAN) 0.5 MG tablet Take 1 tablet (0.5 mg total) by mouth 2 (two) times daily. 2/2/18 2/2/19  Shayne Wong MD   losartan (COZAAR) 100 MG tablet Take 1 tablet (100 mg total) by mouth once daily. 1/24/18   Ginette Rosado MD   methotrexate 2.5 MG Tab  9/1/16   Historical Provider, MD   morphine (MS CONTIN) 15 MG 12 hr tablet Take 1 tablet (15 mg total) by mouth 2 (two) times daily. 1/16/18   Shayne Wong MD   pantoprazole (PROTONIX) 40 MG tablet Take 1 tablet (40 mg  total) by mouth once daily. 9/27/17 9/27/18  Ronak Guillaume MD     Anticoagulants/Antiplatelets: no anticoagulation    Allergies:   Review of patient's allergies indicates:   Allergen Reactions    Latex, natural rubber     Codeine Hallucinations    Cortisporin [neomycin-bacitracin-poly-hc]     Latex, natural rubber Rash     Sedation History:  no adverse reactions    Review of Systems:   Hematological: no known coagulopathies  Respiratory: +SAMPSON  Cardiovascular: no chest pain  Gastrointestinal: no abdominal pain  Genito-Urinary: no dysuria  Musculoskeletal: negative  Neurological: no TIA or stroke symptoms         OBJECTIVE:     Vital Signs (Most Recent)  Pulse: 91 (02/09/18 0840)  Resp: 18 (02/09/18 0840)  BP: 117/72 (02/09/18 0840)  SpO2: 99 % (02/09/18 0840)    Physical Exam:  ASA: N/A  Mallampati: N/A    General: no acute distress  Mental Status: alert and oriented to person, place and time  HEENT: normocephalic, atraumatic  Chest: unlabored breathing  Heart: regular heart rate  Abdomen: nondistended  Extremity: moves all extremities    Laboratory  Lab Results   Component Value Date    INR 1.1 02/09/2018       Lab Results   Component Value Date    WBC 5.40 02/09/2018    HGB 11.0 (L) 02/09/2018    HCT 35.4 (L) 02/09/2018    MCV 84 02/09/2018     02/09/2018      Lab Results   Component Value Date     01/26/2018     01/26/2018    K 3.5 01/26/2018     01/26/2018    CO2 33 (H) 01/26/2018    BUN 17 01/26/2018    CREATININE 1.0 01/26/2018    CALCIUM 9.7 01/26/2018    ALT 10 01/26/2018    AST 13 01/26/2018    ALBUMIN 3.0 (L) 01/26/2018    BILITOT 0.4 01/26/2018       ASSESSMENT/PLAN:     Sedation Plan: local anesthetic only  Patient will undergo US guided left thoracentesis.    Karla Tom MD  Resident  Department of Radiology  Pager: [phone]

## 2018-02-09 NOTE — DISCHARGE SUMMARY
Radiology Discharge Summary      Hospital Course: No complications    Admit Date: 2/9/2018  Discharge Date: 02/09/2018     Instructions Given to Patient: Yes  Diet: Resume prior diet  Activity: activity as tolerated    Description of Condition on Discharge: Stable  Vital Signs (Most Recent): Pulse: 94 (02/09/18 0940)  Resp: 18 (02/09/18 0940)  BP: 115/68 (02/09/18 0940)  SpO2: 98 % (02/09/18 0940)    Discharge Disposition: Home    Discharge Diagnosis: SCCA s/p left thoracentesis     Follow-up: with referring MD Abhi Welsh MD  Staff Radiologist  Department of Radiology  Pager: [phone]

## 2018-02-09 NOTE — PROGRESS NOTES
Per katt Cid to discharge patient.  Patient verbalized understanding of discharge instructions and given copy of AVS. Patient wheeled to waiting area by IR staff.

## 2018-02-09 NOTE — PROGRESS NOTES
Thoracentesis complete at this time. Patient tolerated well 1050cc removed from left lung. Dressing applied, clean dry and intact. Chest X-ray ordered. Will cont to monitor.

## 2018-02-09 NOTE — PROCEDURES
Radiology Post-Procedure Note    Pre Op Diagnosis: Left pleural effusion, SCCA of unknown origin, MORRO lung mass  Post Op Diagnosis: Same    Procedure: Left thoracentesis    Procedure performed by: Anitha JONES, Negro Moe    Written Informed Consent Obtained: Yes  Specimen Removed: YES serosanguinous fluid  Estimated Blood Loss: Minimal    Findings:   Successful left thoracentesis.      Patient tolerated procedure well.    Abhi Welsh MD  Staff Radiologist  Department of Radiology  Pager: [phone]

## 2018-02-14 ENCOUNTER — TELEPHONE (OUTPATIENT)
Dept: HEMATOLOGY/ONCOLOGY | Facility: CLINIC | Age: 73
End: 2018-02-14

## 2018-02-14 NOTE — TELEPHONE ENCOUNTER
----- Message from Milly Torres sent at 2/14/2018 10:57 AM CST -----  Contact: Pt   I am not really sure what to do about this. Donta had md yvonne and chemo today. She cancelled but Dr. Foley does not have anything available on Thursday or Friday and Dr. Wong is not here.   ----- Message -----  From: Clary Bruce RN  Sent: 2/14/2018   9:14 AM  To: Andrea Braun        ----- Message -----  From: Rudy Cleaning  Sent: 2/14/2018   8:20 AM  To: Alaina Ornelas Staff    Will like to reschedule Wednesday 2/14 appts to either Thursday or Friday of this week... morning time     Contact:[phone]

## 2018-02-14 NOTE — TELEPHONE ENCOUNTER
Spoke with patient.  Confirmed appointments for labs/MD tomorrow. Chemo on Friday.  Patient voiced understanding.

## 2018-02-14 NOTE — TELEPHONE ENCOUNTER
"Returned call to patient---  Patient notes she "thought my appointment was tomorrow."  Patient states she can come any day to be seen.  "i need to be seen before next Wednesday. y'all are going to kill me. Dr. Wong is going to kill me."  Nurse assured patient she would contact her back after speaking with management about who can see her soon for her chemo.  Patient voiced understanding.      Message routed to mike  "

## 2018-02-15 ENCOUNTER — LAB VISIT (OUTPATIENT)
Dept: LAB | Facility: HOSPITAL | Age: 73
End: 2018-02-15
Attending: INTERNAL MEDICINE
Payer: MEDICARE

## 2018-02-15 ENCOUNTER — OFFICE VISIT (OUTPATIENT)
Dept: HEMATOLOGY/ONCOLOGY | Facility: CLINIC | Age: 73
End: 2018-02-15
Payer: MEDICARE

## 2018-02-15 VITALS
DIASTOLIC BLOOD PRESSURE: 63 MMHG | TEMPERATURE: 98 F | RESPIRATION RATE: 20 BRPM | WEIGHT: 183.44 LBS | BODY MASS INDEX: 28.73 KG/M2 | HEART RATE: 80 BPM | OXYGEN SATURATION: 94 % | SYSTOLIC BLOOD PRESSURE: 142 MMHG

## 2018-02-15 DIAGNOSIS — F41.1 ANXIETY STATE: ICD-10-CM

## 2018-02-15 DIAGNOSIS — C34.92 SQUAMOUS CELL LUNG CANCER, LEFT: ICD-10-CM

## 2018-02-15 DIAGNOSIS — J90 PLEURAL EFFUSION ON LEFT: Primary | ICD-10-CM

## 2018-02-15 DIAGNOSIS — C44.92 SQUAMOUS CELL CARCINOMA OF UNKNOWN ORIGIN: ICD-10-CM

## 2018-02-15 DIAGNOSIS — F06.31 DEPRESSION DUE TO PHYSICAL ILLNESS: ICD-10-CM

## 2018-02-15 LAB
ALBUMIN SERPL BCP-MCNC: 2.7 G/DL
ALP SERPL-CCNC: 88 U/L
ALT SERPL W/O P-5'-P-CCNC: 8 U/L
ANION GAP SERPL CALC-SCNC: 10 MMOL/L
AST SERPL-CCNC: 13 U/L
BASOPHILS # BLD AUTO: 0.03 K/UL
BASOPHILS NFR BLD: 0.6 %
BILIRUB SERPL-MCNC: 0.3 MG/DL
BUN SERPL-MCNC: 16 MG/DL
CALCIUM SERPL-MCNC: 9.2 MG/DL
CHLORIDE SERPL-SCNC: 98 MMOL/L
CO2 SERPL-SCNC: 30 MMOL/L
CREAT SERPL-MCNC: 1.3 MG/DL
DIFFERENTIAL METHOD: ABNORMAL
EOSINOPHIL # BLD AUTO: 0.4 K/UL
EOSINOPHIL NFR BLD: 8 %
ERYTHROCYTE [DISTWIDTH] IN BLOOD BY AUTOMATED COUNT: 13.6 %
EST. GFR  (AFRICAN AMERICAN): 47.4 ML/MIN/1.73 M^2
EST. GFR  (NON AFRICAN AMERICAN): 41.1 ML/MIN/1.73 M^2
GLUCOSE SERPL-MCNC: 105 MG/DL
HCT VFR BLD AUTO: 34.1 %
HGB BLD-MCNC: 10.5 G/DL
IMM GRANULOCYTES # BLD AUTO: 0.01 K/UL
IMM GRANULOCYTES NFR BLD AUTO: 0.2 %
LYMPHOCYTES # BLD AUTO: 2 K/UL
LYMPHOCYTES NFR BLD: 38.8 %
MCH RBC QN AUTO: 26 PG
MCHC RBC AUTO-ENTMCNC: 30.8 G/DL
MCV RBC AUTO: 84 FL
MONOCYTES # BLD AUTO: 0.7 K/UL
MONOCYTES NFR BLD: 12.5 %
NEUTROPHILS # BLD AUTO: 2.1 K/UL
NEUTROPHILS NFR BLD: 39.9 %
NRBC BLD-RTO: 0 /100 WBC
PLATELET # BLD AUTO: 257 K/UL
PMV BLD AUTO: 10.2 FL
POTASSIUM SERPL-SCNC: 3.6 MMOL/L
PROT SERPL-MCNC: 8.1 G/DL
RBC # BLD AUTO: 4.04 M/UL
SODIUM SERPL-SCNC: 138 MMOL/L
WBC # BLD AUTO: 5.26 K/UL

## 2018-02-15 PROCEDURE — 85025 COMPLETE CBC W/AUTO DIFF WBC: CPT

## 2018-02-15 PROCEDURE — 99499 UNLISTED E&M SERVICE: CPT | Mod: S$GLB,,, | Performed by: INTERNAL MEDICINE

## 2018-02-15 PROCEDURE — 99214 OFFICE O/P EST MOD 30 MIN: CPT | Mod: S$GLB,,, | Performed by: INTERNAL MEDICINE

## 2018-02-15 PROCEDURE — 1159F MED LIST DOCD IN RCRD: CPT | Mod: S$GLB,,, | Performed by: INTERNAL MEDICINE

## 2018-02-15 PROCEDURE — 99999 PR PBB SHADOW E&M-EST. PATIENT-LVL III: CPT | Mod: PBBFAC,,, | Performed by: INTERNAL MEDICINE

## 2018-02-15 PROCEDURE — 3008F BODY MASS INDEX DOCD: CPT | Mod: S$GLB,,, | Performed by: INTERNAL MEDICINE

## 2018-02-15 PROCEDURE — 1125F AMNT PAIN NOTED PAIN PRSNT: CPT | Mod: S$GLB,,, | Performed by: INTERNAL MEDICINE

## 2018-02-15 PROCEDURE — 36415 COLL VENOUS BLD VENIPUNCTURE: CPT

## 2018-02-15 PROCEDURE — 80053 COMPREHEN METABOLIC PANEL: CPT

## 2018-02-15 RX ORDER — HEPARIN 100 UNIT/ML
500 SYRINGE INTRAVENOUS
Status: CANCELLED | OUTPATIENT
Start: 2018-02-15

## 2018-02-15 RX ORDER — FAMOTIDINE 10 MG/ML
20 INJECTION INTRAVENOUS
Status: CANCELLED | OUTPATIENT
Start: 2018-02-15

## 2018-02-15 RX ORDER — DIPHENHYDRAMINE HYDROCHLORIDE 50 MG/ML
50 INJECTION INTRAMUSCULAR; INTRAVENOUS ONCE AS NEEDED
Status: CANCELLED | OUTPATIENT
Start: 2018-02-15 | End: 2018-02-15

## 2018-02-15 RX ORDER — SODIUM CHLORIDE 0.9 % (FLUSH) 0.9 %
10 SYRINGE (ML) INJECTION
Status: CANCELLED | OUTPATIENT
Start: 2018-02-15

## 2018-02-15 RX ORDER — EPINEPHRINE 0.3 MG/.3ML
0.3 INJECTION SUBCUTANEOUS ONCE AS NEEDED
Status: CANCELLED | OUTPATIENT
Start: 2018-02-15 | End: 2018-02-15

## 2018-02-15 NOTE — PROGRESS NOTES
PATIENT: Silvia Capellan  MRN: [id number]  DATE: 2/15/2018      Diagnosis:   1. Pleural effusion on left    2. Squamous cell lung cancer, left    3. Anxiety state    4. Depression due to physical illness        Chief Complaint: Follow-up        Subjective:    Interval History: Ms. Capellan is a 72 y.o. female who returns for follow up for lung cancer.  She is a patient of Dr. Wong with plans to start on systemic chemotherapy tomorrow.  She recently underwent a thoracentesis with 1 L of fluid drained.  She states her breathing had improved following this.  She states that she stays active but is very anxious and suffer with bouts of depression.  She denies suicidal ideation.  Her pain is well controlled.  She has lost a significant amount of weight.  She is otherwise active and without other complaints.    Past Medical History:   Past Medical History:   Diagnosis Date    Encounter for blood transfusion     GERD (gastroesophageal reflux disease)     HTN (hypertension)     Rheumatoid arthritis     Rheumatoid arthritis(714.0)     Squamous cell lung cancer, left 2/15/2018       Past Surgical HIstory:   Past Surgical History:   Procedure Laterality Date    APPENDECTOMY      COLONOSCOPY      COLONOSCOPY N/A 10/30/2017    Procedure: COLONOSCOPY;  Surgeon: Quentin Coppola MD;  Location: [address];  Service: Endoscopy;  Laterality: N/A;    HYSTERECTOMY      SKIN BIOPSY      TOTAL KNEE ARTHROPLASTY      right       Family History:   Family History   Problem Relation Age of Onset    Hypertension      Kidney disease      Colon polyps Neg Hx     Colon cancer Neg Hx     Esophageal cancer Neg Hx     Irritable bowel syndrome Neg Hx     Inflammatory bowel disease Neg Hx     Stomach cancer Neg Hx        Social History:  reports that she has quit smoking. She has never used smokeless tobacco. She reports that she drinks alcohol. She reports that she does not use drugs.    Allergies:  Review of  patient's allergies indicates:   Allergen Reactions    Latex, natural rubber     Codeine Hallucinations    Cortisporin [neomycin-bacitracin-poly-hc]     Latex, natural rubber Rash       Medications:  Current Outpatient Prescriptions   Medication Sig Dispense Refill    escitalopram oxalate (LEXAPRO) 10 MG tablet Take 1 tablet (10 mg total) by mouth once daily. 90 tablet 1    hydroCHLOROthiazide (HYDRODIURIL) 25 MG tablet Take 1 tablet (25 mg total) by mouth once daily. 90 tablet 1    LORazepam (ATIVAN) 0.5 MG tablet Take 1 tablet (0.5 mg total) by mouth 2 (two) times daily. 60 tablet 0    losartan (COZAAR) 100 MG tablet Take 1 tablet (100 mg total) by mouth once daily. 90 tablet 1    methotrexate 2.5 MG Tab       morphine (MS CONTIN) 15 MG 12 hr tablet Take 1 tablet (15 mg total) by mouth 2 (two) times daily. 60 tablet 0    pantoprazole (PROTONIX) 40 MG tablet Take 1 tablet (40 mg total) by mouth once daily. 90 tablet 1    diazePAM (VALIUM) 2 MG tablet Take 1 tablet (2 mg total) by mouth once as needed (pre-procedural). 3 tablet 0     No current facility-administered medications for this visit.        Review of Systems   Constitutional: Positive for unexpected weight change. Negative for chills and fever.   HENT: Negative for congestion, hearing loss and nosebleeds.    Eyes: Negative for visual disturbance.   Respiratory: Positive for shortness of breath. Negative for cough.    Cardiovascular: Negative for chest pain and palpitations.   Gastrointestinal: Negative for abdominal pain, blood in stool, constipation, diarrhea, nausea and vomiting.   Genitourinary: Negative for dysuria.   Musculoskeletal: Negative for back pain and gait problem.   Skin: Negative for color change and rash.   Neurological: Negative for dizziness, weakness and headaches.   Hematological: Negative for adenopathy. Does not bruise/bleed easily.   Psychiatric/Behavioral: Negative for confusion. The patient is nervous/anxious.         ECOG Performance Status:   ECOG SCORE    1 - Restricted in strenuous activity-ambulatory and able to carry out work of a light nature         Objective:      Vitals:   Vitals:    02/15/18 0757   BP: (!) 142/63   BP Location: Left arm   Patient Position: Sitting   Pulse: 80   Resp: 20   Temp: 97.9 °F (36.6 °C)   TempSrc: Oral   SpO2: (!) 94%   Weight: 83.2 kg (183 lb 6.8 oz)     BMI: Body mass index is 28.73 kg/m².    Physical Exam   Constitutional: She is oriented to person, place, and time. She appears well-developed and well-nourished. No distress.   HENT:   Head: Normocephalic.   Mouth/Throat: No oropharyngeal exudate.   Eyes: EOM are normal. No scleral icterus.   Neck: Neck supple. No tracheal deviation present. No thyromegaly present.   Cardiovascular: Normal rate and regular rhythm.    Pulmonary/Chest: Effort normal. No respiratory distress. She has decreased breath sounds. She has no wheezes. She has no rales.   Abdominal: Soft. She exhibits no distension and no mass. There is no tenderness. There is no rebound and no guarding.   Musculoskeletal: Normal range of motion. She exhibits no edema.   Lymphadenopathy:     She has no cervical adenopathy.   Neurological: She is alert and oriented to person, place, and time. No cranial nerve deficit.   Skin: Skin is warm and dry.   Psychiatric: She has a normal mood and affect.       Laboratory Data:  Lab Visit on 02/15/2018   Component Date Value Ref Range Status    WBC 02/15/2018 5.26  3.90 - 12.70 K/uL Final    RBC 02/15/2018 4.04  4.00 - 5.40 M/uL Final    Hemoglobin 02/15/2018 10.5* 12.0 - 16.0 g/dL Final    Hematocrit 02/15/2018 34.1* 37.0 - 48.5 % Final    MCV 02/15/2018 84  82 - 98 fL Final    MCH 02/15/2018 26.0* 27.0 - 31.0 pg Final    MCHC 02/15/2018 30.8* 32.0 - 36.0 g/dL Final    RDW 02/15/2018 13.6  11.5 - 14.5 % Final    Platelets 02/15/2018 257  150 - 350 K/uL Final    MPV 02/15/2018 10.2  9.2 - 12.9 fL Final    Immature Granulocytes  02/15/2018 0.2  0.0 - 0.5 % Final    Gran # (ANC) 02/15/2018 2.1  1.8 - 7.7 K/uL Final    Immature Grans (Abs) 02/15/2018 0.01  0.00 - 0.04 K/uL Final    Comment: Mild elevation in immature granulocytes is non specific and   can be seen in a variety of conditions including stress response,   acute inflammation, trauma and pregnancy. Correlation with other   laboratory and clinical findings is essential.      Lymph # 02/15/2018 2.0  1.0 - 4.8 K/uL Final    Mono # 02/15/2018 0.7  0.3 - 1.0 K/uL Final    Eos # 02/15/2018 0.4  0.0 - 0.5 K/uL Final    Baso # 02/15/2018 0.03  0.00 - 0.20 K/uL Final    nRBC 02/15/2018 0  0 /100 WBC Final    Gran% 02/15/2018 39.9  38.0 - 73.0 % Final    Lymph% 02/15/2018 38.8  18.0 - 48.0 % Final    Mono% 02/15/2018 12.5  4.0 - 15.0 % Final    Eosinophil% 02/15/2018 8.0  0.0 - 8.0 % Final    Basophil% 02/15/2018 0.6  0.0 - 1.9 % Final    Differential Method 02/15/2018 Automated   Final    Sodium 02/15/2018 138  136 - 145 mmol/L Final    Potassium 02/15/2018 3.6  3.5 - 5.1 mmol/L Final    Chloride 02/15/2018 98  95 - 110 mmol/L Final    CO2 02/15/2018 30* 23 - 29 mmol/L Final    Glucose 02/15/2018 105  70 - 110 mg/dL Final    BUN, Bld 02/15/2018 16  8 - 23 mg/dL Final    Creatinine 02/15/2018 1.3  0.5 - 1.4 mg/dL Final    Calcium 02/15/2018 9.2  8.7 - 10.5 mg/dL Final    Total Protein 02/15/2018 8.1  6.0 - 8.4 g/dL Final    Albumin 02/15/2018 2.7* 3.5 - 5.2 g/dL Final    Total Bilirubin 02/15/2018 0.3  0.1 - 1.0 mg/dL Final    Comment: For infants and newborns, interpretation of results should be based  on gestational age, weight and in agreement with clinical  observations.  Premature Infant recommended reference ranges:  Up to 24 hours.............<8.0 mg/dL  Up to 48 hours............<12.0 mg/dL  3-5 days..................<15.0 mg/dL  6-29 days.................<15.0 mg/dL      Alkaline Phosphatase 02/15/2018 88  55 - 135 U/L Final    AST 02/15/2018 13  10 - 40  U/L Final    ALT 02/15/2018 8* 10 - 44 U/L Final    Anion Gap 02/15/2018 10  8 - 16 mmol/L Final    eGFR if  02/15/2018 47.4* >60 mL/min/1.73 m^2 Final    eGFR if non  02/15/2018 41.1* >60 mL/min/1.73 m^2 Final    Comment: Calculation used to obtain the estimated glomerular filtration  rate (eGFR) is the CKD-EPI equation.      Lab Visit on 02/09/2018   Component Date Value Ref Range Status    Prothrombin Time 02/09/2018 10.9  9.0 - 12.5 sec Final    INR 02/09/2018 1.1  0.8 - 1.2 Final    Comment: Coumadin Therapy:  2.0 - 3.0 for INR for all indicators except mechanical heart valves  and antiphospholipid syndromes which should use 2.5 - 3.5.      WBC 02/09/2018 5.40  3.90 - 12.70 K/uL Final    RBC 02/09/2018 4.22  4.00 - 5.40 M/uL Final    Hemoglobin 02/09/2018 11.0* 12.0 - 16.0 g/dL Final    Hematocrit 02/09/2018 35.4* 37.0 - 48.5 % Final    MCV 02/09/2018 84  82 - 98 fL Final    MCH 02/09/2018 26.1* 27.0 - 31.0 pg Final    MCHC 02/09/2018 31.1* 32.0 - 36.0 g/dL Final    RDW 02/09/2018 13.8  11.5 - 14.5 % Final    Platelets 02/09/2018 239  150 - 350 K/uL Final    MPV 02/09/2018 9.8  9.2 - 12.9 fL Final    Immature Granulocytes 02/09/2018 0.2  0.0 - 0.5 % Final    Gran # (ANC) 02/09/2018 3.1  1.8 - 7.7 K/uL Final    Immature Grans (Abs) 02/09/2018 0.01  0.00 - 0.04 K/uL Final    Comment: Mild elevation in immature granulocytes is non specific and   can be seen in a variety of conditions including stress response,   acute inflammation, trauma and pregnancy. Correlation with other   laboratory and clinical findings is essential.      Lymph # 02/09/2018 1.4  1.0 - 4.8 K/uL Final    Mono # 02/09/2018 0.5  0.3 - 1.0 K/uL Final    Eos # 02/09/2018 0.4  0.0 - 0.5 K/uL Final    Baso # 02/09/2018 0.02  0.00 - 0.20 K/uL Final    nRBC 02/09/2018 0  0 /100 WBC Final    Gran% 02/09/2018 57.1  38.0 - 73.0 % Final    Lymph% 02/09/2018 25.9  18.0 - 48.0 % Final    Mono%  02/09/2018 9.4  4.0 - 15.0 % Final    Eosinophil% 02/09/2018 7.0  0.0 - 8.0 % Final    Basophil% 02/09/2018 0.4  0.0 - 1.9 % Final    Differential Method 02/09/2018 Automated   Final     FINAL PATHOLOGIC DIAGNOSIS  LEFT LUNG, PLEURAL FLUID (CYTOLOGY):  - Scant groups of atypical cells, malignancy cannot be excluded.  Note: Scant atypical groups of cells are present. Differential diagnosis includes reactive atypia vs malignancy .  Immunohistochemistry for CK5/6 and p63 is non-contributory. Please clinically correlate and re-sample as indicated.  All stains have satisfactory positive and negative controls.    Imaging:   CT 1/26/18  CT chest, abdomen and pelvis    Comparison: June 12, 2017    Chest: Since prior exam there is been interval enlargement of the right pleural effusion. There is high density material seen within the right pleural effusion that was not visualized on prior exam.    There is a 5.4 cm enhancing mass seen at the left lung base that previously measured approximately 2.9 cm.    There is a subcarinal mass measuring 4.4 cm it previously measured 2.9 cm.    There is circumferential esophageal thickening adjacent to this mass.    There is volume loss in the right chest with mediastinal shift to the right more pronounced than what was seen on prior exam.    Patient is a right internal jugular Port-A-Cath.    Abdomen/pelvis: The liver, spleen, adrenal glands, kidneys and pancreas show a normal contrasted appearance. There is no evidence bowel obstruction. There is no evidence of abdominal or pelvic lymphadenopathy. The osseous structures show degenerative change of the spine.   Impression      Since prior exam in the patient's subcarinal and left lung base masses have increased in size. In addition there is now high density material seen within the patient's left pleural effusion new from prior exam and concerning for hemorrhage possibly from the mass.            Assessment:       1. Pleural effusion  on left    2. Squamous cell lung cancer, left    3. Anxiety state    4. Depression due to physical illness           Plan:     Mrs. Capellan will start on systemic chemotherapy tomorrow with carboplatin and paclitaxel for metastatic lung cancer.  She has not had mutational testing done and I will send a cell free DNA analysis today.  She will follow back up with Dr. Wong in 3 weeks.  She will report any new symptoms in the interim.  I have discussed the role of our oncology psychologist and offered her referral.  She will let us know if she is interested.  All questions were answered and she is agreeable with this plan.      Nelson Love DO, FACP  Hematology & Oncology  [address] [place] [address]  ph. [phone]  Fax. [phone]    25 minutes were spent in coordination of patient's care, record review and counseling.  More than 50% of the time was face-to-face.

## 2018-02-16 ENCOUNTER — INFUSION (OUTPATIENT)
Dept: INFUSION THERAPY | Facility: HOSPITAL | Age: 73
End: 2018-02-16
Attending: INTERNAL MEDICINE
Payer: MEDICARE

## 2018-02-16 VITALS — RESPIRATION RATE: 18 BRPM | DIASTOLIC BLOOD PRESSURE: 68 MMHG | HEART RATE: 87 BPM | SYSTOLIC BLOOD PRESSURE: 104 MMHG

## 2018-02-16 DIAGNOSIS — C44.92 SQUAMOUS CELL CARCINOMA OF UNKNOWN ORIGIN: Primary | ICD-10-CM

## 2018-02-16 PROCEDURE — 25000003 PHARM REV CODE 250: Performed by: INTERNAL MEDICINE

## 2018-02-16 PROCEDURE — 96417 CHEMO IV INFUS EACH ADDL SEQ: CPT

## 2018-02-16 PROCEDURE — 63600175 PHARM REV CODE 636 W HCPCS: Performed by: INTERNAL MEDICINE

## 2018-02-16 PROCEDURE — S0028 INJECTION, FAMOTIDINE, 20 MG: HCPCS | Performed by: INTERNAL MEDICINE

## 2018-02-16 PROCEDURE — 96367 TX/PROPH/DG ADDL SEQ IV INF: CPT

## 2018-02-16 PROCEDURE — 96413 CHEMO IV INFUSION 1 HR: CPT

## 2018-02-16 PROCEDURE — 96415 CHEMO IV INFUSION ADDL HR: CPT

## 2018-02-16 RX ORDER — HEPARIN 100 UNIT/ML
500 SYRINGE INTRAVENOUS
Status: DISCONTINUED | OUTPATIENT
Start: 2018-02-16 | End: 2018-02-16 | Stop reason: HOSPADM

## 2018-02-16 RX ORDER — DIPHENHYDRAMINE HYDROCHLORIDE 50 MG/ML
50 INJECTION INTRAMUSCULAR; INTRAVENOUS ONCE AS NEEDED
Status: DISCONTINUED | OUTPATIENT
Start: 2018-02-16 | End: 2018-02-16 | Stop reason: HOSPADM

## 2018-02-16 RX ORDER — SODIUM CHLORIDE 0.9 % (FLUSH) 0.9 %
10 SYRINGE (ML) INJECTION
Status: DISCONTINUED | OUTPATIENT
Start: 2018-02-16 | End: 2018-02-16 | Stop reason: HOSPADM

## 2018-02-16 RX ORDER — EPINEPHRINE 0.3 MG/.3ML
0.3 INJECTION SUBCUTANEOUS ONCE AS NEEDED
Status: DISCONTINUED | OUTPATIENT
Start: 2018-02-16 | End: 2018-02-16 | Stop reason: HOSPADM

## 2018-02-16 RX ORDER — FAMOTIDINE 10 MG/ML
20 INJECTION INTRAVENOUS
Status: COMPLETED | OUTPATIENT
Start: 2018-02-16 | End: 2018-02-16

## 2018-02-16 RX ADMIN — FAMOTIDINE 20 MG: 10 INJECTION INTRAVENOUS at 10:02

## 2018-02-16 RX ADMIN — PACLITAXEL 354 MG: 6 INJECTION, SOLUTION INTRAVENOUS at 10:02

## 2018-02-16 RX ADMIN — SODIUM CHLORIDE: 0.9 INJECTION, SOLUTION INTRAVENOUS at 10:02

## 2018-02-16 RX ADMIN — CARBOPLATIN 470 MG: 10 INJECTION, SOLUTION INTRAVENOUS at 01:02

## 2018-02-16 RX ADMIN — DEXAMETHASONE SODIUM PHOSPHATE: 4 INJECTION, SOLUTION INTRAMUSCULAR; INTRAVENOUS at 10:02

## 2018-02-16 RX ADMIN — HEPARIN 500 UNITS: 100 SYRINGE at 02:02

## 2018-02-16 RX ADMIN — DIPHENHYDRAMINE HYDROCHLORIDE 50 MG: 50 INJECTION, SOLUTION INTRAMUSCULAR; INTRAVENOUS at 10:02

## 2018-02-16 NOTE — PLAN OF CARE
Problem: Patient Care Overview (Adult)  Goal: Plan of Care Review  Outcome: Ongoing (interventions implemented as appropriate)  Pt tolerated T/C treatment well.  No s/s of reaction. Vitals stable, NAD.

## 2018-02-17 ENCOUNTER — INFUSION (OUTPATIENT)
Dept: INFUSION THERAPY | Facility: HOSPITAL | Age: 73
End: 2018-02-17
Attending: INTERNAL MEDICINE
Payer: MEDICARE

## 2018-02-17 DIAGNOSIS — C44.92 SQUAMOUS CELL CARCINOMA OF UNKNOWN ORIGIN: Primary | ICD-10-CM

## 2018-02-17 PROCEDURE — 96372 THER/PROPH/DIAG INJ SC/IM: CPT

## 2018-02-17 PROCEDURE — 63600175 PHARM REV CODE 636 W HCPCS: Mod: JG | Performed by: INTERNAL MEDICINE

## 2018-02-17 RX ADMIN — PEGFILGRASTIM 6 MG: 6 INJECTION SUBCUTANEOUS at 10:02

## 2018-02-17 NOTE — PATIENT INSTRUCTIONS
Pegfilgrastim injection  What is this medicine?  PEGFILGRASTIM (PEG peyton gra stim) is a long-acting granulocyte colony-stimulating factor that stimulates the growth of neutrophils, a type of white blood cell important in the body's fight against infection. It is used to reduce the incidence of fever and infection in patients with certain types of cancer who are receiving chemotherapy that affects the bone marrow, and to increase survival after being exposed to high doses of radiation.  How should I use this medicine?  This medicine is for injection under the skin. If you get this medicine at home, you will be taught how to prepare and give the pre-filled syringe or how to use the On-body Injector. Refer to the patient Instructions for Use for detailed instructions. Use exactly as directed. Take your medicine at regular intervals. Do not take your medicine more often than directed.  It is important that you put your used needles and syringes in a special sharps container. Do not put them in a trash can. If you do not have a sharps container, call your pharmacist or healthcare provider to get one.  Talk to your pediatrician regarding the use of this medicine in children. While this drug may be prescribed for selected conditions, precautions do apply.  What side effects may I notice from receiving this medicine?  Side effects that you should report to your doctor or health care professional as soon as possible:  · allergic reactions like skin rash, itching or hives, swelling of the face, lips, or tongue  · dizziness  · fever  · pain, redness, or irritation at site where injected  · pinpoint red spots on the skin  · red or dark-brown urine  · shortness of breath or breathing problems  · stomach or side pain, or pain at the shoulder  · swelling  · tiredness  · trouble passing urine or change in the amount of urine  Side effects that usually do not require medical attention (report to your doctor or health care  professional if they continue or are bothersome):  · bone pain  · muscle pain  What may interact with this medicine?  Interactions have not been studied.  Give your health care provider a list of all the medicines, herbs, non-prescription drugs, or dietary supplements you use. Also tell them if you smoke, drink alcohol, or use illegal drugs. Some items may interact with your medicine.  What if I miss a dose?  It is important not to miss your dose. Call your doctor or health care professional if you miss your dose. If you miss a dose due to an On-body Injector failure or leakage, a new dose should be administered as soon as possible using a single prefilled syringe for manual use.  Where should I keep my medicine?  Keep out of the reach of children.  Store pre-filled syringes in a refrigerator between 2 and 8 degrees C (36 and 46 degrees F). Do not freeze. Keep in carton to protect from light. Throw away this medicine if it is left out of the refrigerator for more than 48 hours. Throw away any unused medicine after the expiration date.  What should I tell my health care provider before I take this medicine?  They need to know if you have any of these conditions:  · kidney disease  · latex allergy  · ongoing radiation therapy  · sickle cell disease  · skin reactions to acrylic adhesives (On-Body Injector only)  · an unusual or allergic reaction to pegfilgrastim, filgrastim, other medicines, foods, dyes, or preservatives  · pregnant or trying to get pregnant  · breast-feeding  What should I watch for while using this medicine?  You may need blood work done while you are taking this medicine.  If you are going to need a MRI, CT scan, or other procedure, tell your doctor that you are using this medicine (On-Body Injector only).  NOTE:This sheet is a summary. It may not cover all possible information. If you have questions about this medicine, talk to your doctor, pharmacist, or health care provider. Copyright© 2017 [employer]        Discharge Instructions for Chemotherapy  Your healthcare provider prescribed a type of medicine therapy for you called chemotherapy. Healthcare providers prescribe chemotherapy for many different types of illnesses, including cancer. There are many types of chemotherapy. This sheet provides general guidelines on how you can take care of yourself after your chemotherapy.  Mouth care  Dont be discouraged if you get mouth sores, even if you are following all your healthcare providers instructions. Many people get mouth sores as a side effect of chemotherapy. Heres what you can do to prevent mouth sores:  · Keep your mouth clean. Brush your teeth with a soft-bristle toothbrush after every meal.  · Ask if you should use a toothpaste with fluoride, or a mixture of 1 teaspoon of salt in 8-ounces of water to brush your teeth.   · Use an oral swab or special soft toothbrush if your gums bleed during regular brushing.  · Don't use dental floss if it causes your gums to bleed.  · Use any mouthwashes given to you as directed.  · If you cant tolerate regular methods, use salt and baking soda to clean your mouth. Mix 1 teaspoon of salt and 1 teaspoon of baking soda into an 8-ounce glass of warm water. Swish and spit.  · If you wear dentures, you may be told to wear them only when you eat, ask your healthcare provider. Clean dentures twice a day and soak in antimicrobial solution when you aren't wearing them. Rinse your mouth after each meal.   · Watch your mouth and tongue for white patches. This may be a sign of a type of yeast infection (thrush), a common side effect of chemotherapy. Be sure to tell your healthcare provider about these patches. Medicine can be prescribed to treat it.  Other home care  Here's what else you can do:  · Try to exercise. Exercise keeps you strong and keeps your heart and lungs active. Walking and yoga are good types of exercise.   · Keep clean. During chemotherapy, your body  cant fight infection very well. Take short baths or showers.  ¨ Wash your hands before you eat and after going to the bathroom.  ¨ Use moisturizing soap. Chemotherapy can make your skin dry.  ¨ Apply moisturizing lotion several times a day to help relieve dry skin.  ¨ Dont take very hot or very cold showers or baths.  · Dont be surprised if your chemotherapy causes slight burns to your skin--usually on the hands and feet. Some medicines used in high doses cause this to happen. Ask for a special cream to help relieve the burn and protect your skin.  · Avoid people who are sick with illnesses and diseases you could catch, such as colds, flu, measles, or chicken pox as well as people who have recently had vaccinations for these illnesses.   · Let your healthcare provider know if your throat is sore. You may have an infection that needs treatment.  · Remember, many patients feel sick and lose their appetites during treatment. Eat small meals several times a day to keep your strength up:  ¨ Choose bland foods with little taste or smell if you are reacting strongly to food.  ¨ Be sure to cook all food thoroughly. This kills bacteria and helps you avoid infection.  ¨ Eat foods that are soft. Soft foods are less likely to cause stomach irritation.  ¨ Try to eat a variety of foods for a well-balanced diet. Drink plenty of fluids and eat foods with fiber to avoid constipation.      When to call your healthcare provider  Call your healthcare provider right away if you have any of the following:  · Unexplained bleeding  · Trouble concentrating  · Ongoing fatigue  · Shortness of breath, wheezing, trouble breathing, or bad cough  · Rapid, irregular heartbeat, or chest pain  · Dizziness, lightheadedness  · Constant feeling of being cold  · Hives or a cut or rash that swells, turns red, feels hot or painful, or begins to ooze  · Burning when you urinate  · Fever of 100.4°F (38°C) or higher, or chills   Date Last Reviewed:  5/1/2016  © 0766-5373 [employer] [address] [place] [address], [place] [address]. All rights reserved. This information is not intended as a substitute for professional medical care. Always follow your healthcare professional's instructions.

## 2018-02-17 NOTE — NURSING
Dr. Holguin informed of patient's allergy to Latex and  concern for Neulasta administration. Dr. Holguin approved Neulasta administration and gave instructions that patient should take 25mg of Benadryl once she arrived at home. Patient given instructions to take Benadryl 25mg as soon as she arrived home and monitor self- for signs and symptoms of allergic reaction and seek emergency medical care if she develops any symptoms. Patient verbalized understanding.

## 2018-02-20 DIAGNOSIS — C44.92 SQUAMOUS CELL CARCINOMA OF UNKNOWN ORIGIN: ICD-10-CM

## 2018-02-20 RX ORDER — MORPHINE SULFATE 15 MG/1
15 TABLET, FILM COATED, EXTENDED RELEASE ORAL 2 TIMES DAILY
Qty: 60 TABLET | Refills: 0 | Status: SHIPPED | OUTPATIENT
Start: 2018-02-20 | End: 2018-03-26 | Stop reason: SDUPTHER

## 2018-02-20 NOTE — TELEPHONE ENCOUNTER
----- Message from Symone Flores sent at 2/20/2018 10:00 AM CST -----  Contact: Pt  Pt calling requesting a refill on Morphine. Please send to [facility] [phone]        Pt call back number [phone]

## 2018-02-28 ENCOUNTER — TELEPHONE (OUTPATIENT)
Dept: FAMILY MEDICINE | Facility: CLINIC | Age: 73
End: 2018-02-28

## 2018-02-28 DIAGNOSIS — I10 ESSENTIAL HYPERTENSION: ICD-10-CM

## 2018-02-28 RX ORDER — HYDROCHLOROTHIAZIDE 25 MG/1
25 TABLET ORAL DAILY
Qty: 90 TABLET | Refills: 1 | Status: SHIPPED | OUTPATIENT
Start: 2018-02-28 | End: 2018-08-31 | Stop reason: SDUPTHER

## 2018-02-28 NOTE — TELEPHONE ENCOUNTER
----- Message from Krystin Méndez RN sent at 2/28/2018  1:29 PM CST -----  Contact: Pt   Pt needs a refill  Thanks  ----- Message -----  From: Rudy Cleaning  Sent: 2/28/2018   1:24 PM  To: [facility] Staff    Pt is requesting refill on hydroCHLOROthiazide (HYDRODIURIL) 25 MG tablet    [facility] contact::[phone]

## 2018-03-01 ENCOUNTER — TELEPHONE (OUTPATIENT)
Dept: HEMATOLOGY/ONCOLOGY | Facility: CLINIC | Age: 73
End: 2018-03-01

## 2018-03-01 NOTE — TELEPHONE ENCOUNTER
----- Message from Renetta Boateng sent at 3/1/2018  2:51 PM CST -----  Contact: Self [phone]  Pt is requesting a call back in response to a missed call from the nurse.  Pt is requesting a refill on the following:    hydrocodone-acetaminophen 5-325mg (NORCO) 5-325 mg per tablet (Discontinued) 90 tablet 0 1/16/2018 1/24/2018 No     Pharmacy Contact     Telephone Fax  [phone]    Pt may be reached at [phone].    Thank you.  LC  Spoke with pt, explained this medication was last ordered by [facility] and she would have to contact them, pt verbalized understanding  Krystin

## 2018-03-01 NOTE — TELEPHONE ENCOUNTER
----- Message from Rudy Cleaning sent at 3/1/2018  1:07 PM CST -----  Contact: Pt   Will like medication hydrocodone [facility]     Pt contact::[phone]   [facility] contact::[phone]   Left a message to have pt return my call regarding hydrocodone. This medication was last ordered by [facility].  Krystin

## 2018-03-02 NOTE — TELEPHONE ENCOUNTER
Please inform patient her medication request has been denied.  She is already taking MS Contin prescribed to her 1 week ago.  The requested medication is not compatible. I recommend extra strength tylenol or ibuprofen for breakthrough pain.

## 2018-03-06 DIAGNOSIS — F41.1 ANXIETY STATE: ICD-10-CM

## 2018-03-07 ENCOUNTER — OFFICE VISIT (OUTPATIENT)
Dept: HEMATOLOGY/ONCOLOGY | Facility: CLINIC | Age: 73
End: 2018-03-07
Payer: MEDICARE

## 2018-03-07 ENCOUNTER — INFUSION (OUTPATIENT)
Dept: INFUSION THERAPY | Facility: HOSPITAL | Age: 73
End: 2018-03-07
Attending: INTERNAL MEDICINE
Payer: MEDICARE

## 2018-03-07 VITALS
RESPIRATION RATE: 18 BRPM | SYSTOLIC BLOOD PRESSURE: 135 MMHG | HEART RATE: 76 BPM | TEMPERATURE: 98 F | DIASTOLIC BLOOD PRESSURE: 70 MMHG

## 2018-03-07 VITALS
WEIGHT: 178.56 LBS | DIASTOLIC BLOOD PRESSURE: 87 MMHG | RESPIRATION RATE: 18 BRPM | TEMPERATURE: 98 F | OXYGEN SATURATION: 94 % | HEIGHT: 67 IN | BODY MASS INDEX: 28.02 KG/M2 | SYSTOLIC BLOOD PRESSURE: 148 MMHG | HEART RATE: 84 BPM

## 2018-03-07 DIAGNOSIS — C34.92 SQUAMOUS CELL LUNG CANCER, LEFT: ICD-10-CM

## 2018-03-07 DIAGNOSIS — C44.92 SQUAMOUS CELL CARCINOMA OF UNKNOWN ORIGIN: Primary | ICD-10-CM

## 2018-03-07 DIAGNOSIS — J90 PLEURAL EFFUSION ON LEFT: Primary | ICD-10-CM

## 2018-03-07 DIAGNOSIS — R63.0 ANOREXIA: ICD-10-CM

## 2018-03-07 DIAGNOSIS — C44.92 SQUAMOUS CELL CARCINOMA OF UNKNOWN ORIGIN: ICD-10-CM

## 2018-03-07 PROCEDURE — 96413 CHEMO IV INFUSION 1 HR: CPT

## 2018-03-07 PROCEDURE — 96417 CHEMO IV INFUS EACH ADDL SEQ: CPT

## 2018-03-07 PROCEDURE — S0028 INJECTION, FAMOTIDINE, 20 MG: HCPCS | Performed by: INTERNAL MEDICINE

## 2018-03-07 PROCEDURE — 3077F SYST BP >= 140 MM HG: CPT | Mod: S$GLB,,, | Performed by: INTERNAL MEDICINE

## 2018-03-07 PROCEDURE — 25000003 PHARM REV CODE 250: Performed by: INTERNAL MEDICINE

## 2018-03-07 PROCEDURE — 63600175 PHARM REV CODE 636 W HCPCS: Performed by: INTERNAL MEDICINE

## 2018-03-07 PROCEDURE — 96367 TX/PROPH/DG ADDL SEQ IV INF: CPT

## 2018-03-07 PROCEDURE — 96361 HYDRATE IV INFUSION ADD-ON: CPT

## 2018-03-07 PROCEDURE — 96375 TX/PRO/DX INJ NEW DRUG ADDON: CPT

## 2018-03-07 PROCEDURE — 99499 UNLISTED E&M SERVICE: CPT | Mod: S$GLB,,, | Performed by: INTERNAL MEDICINE

## 2018-03-07 PROCEDURE — 99999 PR PBB SHADOW E&M-EST. PATIENT-LVL IV: CPT | Mod: PBBFAC,,, | Performed by: INTERNAL MEDICINE

## 2018-03-07 PROCEDURE — 96415 CHEMO IV INFUSION ADDL HR: CPT

## 2018-03-07 PROCEDURE — 3079F DIAST BP 80-89 MM HG: CPT | Mod: S$GLB,,, | Performed by: INTERNAL MEDICINE

## 2018-03-07 PROCEDURE — 99214 OFFICE O/P EST MOD 30 MIN: CPT | Mod: S$GLB,,, | Performed by: INTERNAL MEDICINE

## 2018-03-07 RX ORDER — DIPHENHYDRAMINE HYDROCHLORIDE 50 MG/ML
50 INJECTION INTRAMUSCULAR; INTRAVENOUS ONCE AS NEEDED
Status: CANCELLED | OUTPATIENT
Start: 2018-03-07 | End: 2018-03-07

## 2018-03-07 RX ORDER — SODIUM CHLORIDE 0.9 % (FLUSH) 0.9 %
10 SYRINGE (ML) INJECTION
Status: CANCELLED | OUTPATIENT
Start: 2018-03-07

## 2018-03-07 RX ORDER — EPINEPHRINE 0.3 MG/.3ML
0.3 INJECTION SUBCUTANEOUS ONCE AS NEEDED
Status: CANCELLED | OUTPATIENT
Start: 2018-03-07 | End: 2018-03-07

## 2018-03-07 RX ORDER — LORAZEPAM 0.5 MG/1
0.5 TABLET ORAL 2 TIMES DAILY
Qty: 60 TABLET | Refills: 0 | Status: SHIPPED | OUTPATIENT
Start: 2018-03-07 | End: 2018-05-17 | Stop reason: SDUPTHER

## 2018-03-07 RX ORDER — FAMOTIDINE 10 MG/ML
20 INJECTION INTRAVENOUS
Status: COMPLETED | OUTPATIENT
Start: 2018-03-07 | End: 2018-03-07

## 2018-03-07 RX ORDER — EPINEPHRINE 0.3 MG/.3ML
0.3 INJECTION SUBCUTANEOUS ONCE AS NEEDED
Status: DISCONTINUED | OUTPATIENT
Start: 2018-03-07 | End: 2018-03-07 | Stop reason: HOSPADM

## 2018-03-07 RX ORDER — SODIUM CHLORIDE 0.9 % (FLUSH) 0.9 %
10 SYRINGE (ML) INJECTION
Status: DISCONTINUED | OUTPATIENT
Start: 2018-03-07 | End: 2018-03-07 | Stop reason: HOSPADM

## 2018-03-07 RX ORDER — FAMOTIDINE 10 MG/ML
20 INJECTION INTRAVENOUS
Status: CANCELLED | OUTPATIENT
Start: 2018-03-07

## 2018-03-07 RX ORDER — CYPROHEPTADINE HYDROCHLORIDE 4 MG/1
4 TABLET ORAL 3 TIMES DAILY PRN
Qty: 90 TABLET | Refills: 2 | Status: SHIPPED | OUTPATIENT
Start: 2018-03-07 | End: 2018-01-01 | Stop reason: ALTCHOICE

## 2018-03-07 RX ORDER — HEPARIN 100 UNIT/ML
500 SYRINGE INTRAVENOUS
Status: DISCONTINUED | OUTPATIENT
Start: 2018-03-07 | End: 2018-03-07 | Stop reason: HOSPADM

## 2018-03-07 RX ORDER — HEPARIN 100 UNIT/ML
500 SYRINGE INTRAVENOUS
Status: CANCELLED | OUTPATIENT
Start: 2018-03-07

## 2018-03-07 RX ORDER — DIPHENHYDRAMINE HYDROCHLORIDE 50 MG/ML
50 INJECTION INTRAMUSCULAR; INTRAVENOUS ONCE AS NEEDED
Status: DISCONTINUED | OUTPATIENT
Start: 2018-03-07 | End: 2018-03-07 | Stop reason: HOSPADM

## 2018-03-07 RX ORDER — PROCHLORPERAZINE MALEATE 10 MG
10 TABLET ORAL EVERY 6 HOURS PRN
Qty: 60 TABLET | Refills: 1 | Status: SHIPPED | OUTPATIENT
Start: 2018-03-07 | End: 2018-08-17

## 2018-03-07 RX ADMIN — DIPHENHYDRAMINE HYDROCHLORIDE 50 MG: 50 INJECTION, SOLUTION INTRAMUSCULAR; INTRAVENOUS at 12:03

## 2018-03-07 RX ADMIN — HEPARIN 500 UNITS: 100 SYRINGE at 05:03

## 2018-03-07 RX ADMIN — DEXAMETHASONE SODIUM PHOSPHATE: 4 INJECTION, SOLUTION INTRAMUSCULAR; INTRAVENOUS at 12:03

## 2018-03-07 RX ADMIN — PACLITAXEL 354 MG: 6 INJECTION, SOLUTION INTRAVENOUS at 01:03

## 2018-03-07 RX ADMIN — FAMOTIDINE 20 MG: 10 INJECTION INTRAVENOUS at 12:03

## 2018-03-07 RX ADMIN — SODIUM CHLORIDE 670 MG: 9 INJECTION, SOLUTION INTRAVENOUS at 03:03

## 2018-03-07 RX ADMIN — SODIUM CHLORIDE: 0.9 INJECTION, SOLUTION INTRAVENOUS at 12:03

## 2018-03-07 NOTE — PROGRESS NOTES
HPI:  is a 73 y/o female who underwent bronchoscopy/EBUS evaluation on 8/31/17 for abnormal mediastinal adenopathy  and a 1.2 cm MORRO mass.     Bronchoscopy findings:    The oropharynx appears normal. The larynx appears normal. The vocal cords appear normal. The subglottic space is normal. The trachea is of normal caliber. The otf is sharp. The tracheobronchial tree was examined to at least the first subsegmental level. Bronchial mucosa and anatomy are normal; there are no endobronchial lesions, and no secretions.    Lymph Nodes: Lymph node sizing was performed via endobronchial ultrasound for suspected lung cancer. Sampling by transbronchial needle aspiration was also performed using an Olympus EBUS-TBNA 22  gauge needle in the subcarinal mediastinum (level 7) and sent for routine cytology.       - The 7 (subcarinal) node was 30 mm by EBUS. Three samples with the needle were obtained. The patient's condition was unchanged after the intervention.     Interval history: She is here for a follow up visit. She has started Caroplatin/pqaclitaxel. She had left thoracentesis on 2/9/18.      Review of Systems   Constitutional: Positive for malaise/fatigue and weight loss. Negative for fever.   HENT: Negative for congestion and nosebleeds.    Eyes: Negative for blurred vision and double vision.   Respiratory: Negative for cough, sputum production, shortness of breath and wheezing.    Cardiovascular: Negative for chest pain, palpitations, leg swelling and PND.   Gastrointestinal: Negative for abdominal pain, constipation, diarrhea, heartburn and vomiting.   Genitourinary: Negative for dysuria, frequency and hematuria.   Musculoskeletal: Negative for myalgias and neck pain.   Skin: Negative for rash.   Neurological: Negative for dizziness, sensory change, speech change, weakness and headaches.   Endo/Heme/Allergies: Does not bruise/bleed easily.   Psychiatric/Behavioral: Negative for depression. The patient is  nervous/anxious.        Current Outpatient Prescriptions   Medication Sig    escitalopram oxalate (LEXAPRO) 10 MG tablet Take 1 tablet (10 mg total) by mouth once daily.    hydroCHLOROthiazide (HYDRODIURIL) 25 MG tablet Take 1 tablet (25 mg total) by mouth once daily.    LORazepam (ATIVAN) 0.5 MG tablet Take 1 tablet (0.5 mg total) by mouth 2 (two) times daily.    losartan (COZAAR) 100 MG tablet Take 1 tablet (100 mg total) by mouth once daily.    methotrexate 2.5 MG Tab     morphine (MS CONTIN) 15 MG 12 hr tablet Take 1 tablet (15 mg total) by mouth 2 (two) times daily.    pantoprazole (PROTONIX) 40 MG tablet Take 1 tablet (40 mg total) by mouth once daily.     No current facility-administered medications for this visit.      Vitals:    03/07/18 1116   BP: (!) 148/87   Pulse: 84   Resp: 18   Temp: 98.1 °F (36.7 °C)         Physical Exam   Constitutional: She is oriented to person, place, and time. She appears well-developed. No distress.   HENT:   Head: Normocephalic and atraumatic.   Mouth/Throat: No oropharyngeal exudate.   Eyes: Pupils are equal, round, and reactive to light. No scleral icterus.   Neck: Normal range of motion.   Cardiovascular: Normal rate and normal heart sounds.    No murmur heard.  Pulmonary/Chest: Effort normal. She has no wheezes. She has no rales.   Has diminished breath sounds in left posterior lung fields   Abdominal: Soft. She exhibits no distension. There is no tenderness. There is no rebound.   Musculoskeletal: She exhibits no edema.   Lymphadenopathy:     She has no cervical adenopathy.   Neurological: She is oriented to person, place, and time. No cranial nerve deficit.   Skin: Skin is warm.   Psychiatric: She has a normal mood and affect.        6/12/17 CT chest with cont      1. Large left pleural effusion resulting in atelectasis of the left lower lobe and portions of left upper lobe  2. 1.2 cm left upper lobe pleural based pulmonary nodule  3. Mediastinal adenopathy as  described above with diffuse thickening of the wall of the distal esophagus. Findings are suspicious for neoplastic process. Recommend bronchoscopy biopsy and possible endoscopy for further evaluation.  4. 1.3 cm hypodensity within the dome of the liver. Metastatic focus cannot be entirely excluded.     9/7/17 PET CT       There is mildly increased tracer uptake within the patient's left pleural effusion and overlying the mediastinal adenopathy, max SUV 2.7.    Transmission CT images show continued pleural effusion and circumferential esophageal thickening. Transmission CT images also show non-avid bilateral groin adenopathy likely reactive in nature.      Impression        There is mildly increased tracer uptake within the patient's left pleural effusion and mediastinal adenopathy. While these findings suggest reactive etiology some low grade malignancies, such as bronchioloalveolar carcinoma, may show this level of uptake on PET scan.            10/30/17 EGD     The examined duodenum was normal.The entire examined stomach was normal.The cardia and gastric fundus were normal on retroflexion. A 1 cm hiatal hernia was found. The proximal extent of the gastric folds (end of tubular esophagus) was 38 cm from the incisors. The hiatal narrowing was 39 cm from the incisors. The Z-line was 38 cm from the incisors. Z-line was sharp. No esophagitis and no columnar esophagus and no lesions seen with NBI or white light.    Impression:                                   - Normal examined duodenum.                        - Normal stomach.                        - 1 cm hiatal hernia.                        - No specimens collected.     10/30/17 colonoscopy :     Cecal polyp ( 3mm) identified  Histopathology: Tubular adenoma        Pathology:     1/17/14 colonoscopy     FINAL PATHOLOGIC DIAGNOSIS     1. Colon biopsy, ascending colon-tubular adenoma.  2. Colon biopsy, transverse-tubular adenoma.  3. 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 The stalk and base of the polyp are well represented and are free of atypia.  4. Colon biopsy, sigmoid- mild glandular hyperplasia consistent with a benign hyperplastic polyp.        7/19/17 PLEURAL FLUID (CYTOLOGY AND CELL BLOCK):     -Groups of atypical cells present, malignancy cannot be excluded.  Note: While these cells could be reactive mesothelial cells, the level of atypia could be seen in malignancy. A cell block was prepared but the atypical cells are not present in the cell block for further characterization. Correlate  clinically. Repeat tap/biopsy might be helpful if clinically indicated.     8/28/17 EBUS FNA     FNA of subcarinal lymph node: Positive for malignant cells  Small clusters of malignant epithelial cells, favor squamous carcinoma Tumor cells are positive for CK 5/6 and CK 7. Immunostains for p63 and TTF-1 are negative.     1/26/18 CT chest, abdomen,pelvis with cont         Comparison: June 12, 2017    Chest: Since prior exam there is been interval enlargement of the right pleural effusion. There is high density material seen within the right pleural effusion that was not visualized on prior exam.    There is a 5.4 cm enhancing mass seen at the left lung base that previously measured approximately 2.9 cm.    There is a subcarinal mass measuring 4.4 cm it previously measured 2.9 cm.    There is circumferential esophageal thickening adjacent to this mass.    There is volume loss in the right chest with mediastinal shift to the right more pronounced than what was seen on prior exam.    Patient is a right internal jugular Port-A-Cath.    Abdomen/pelvis: The liver, spleen, adrenal glands, kidneys and pancreas show a normal contrasted appearance. There is no evidence bowel obstruction. There is no evidence of abdominal or pelvic lymphadenopathy. The osseous  structures show degenerative change of the spine.   Impression       Since prior exam in the patient's subcarinal and left lung base masses have increased in size. In addition there is now high density material seen within the patient's left pleural effusion new from prior exam and concerning for hemorrhage possibly from the mass.         1/26/18 CT head with cont          Comparison: CT June 8, 2017    Technique: Contiguous axial images were acquired from vertex to skull base without contrast.    Findings: There is no evidence acute intracranial abnormality.  Specifically there is no evidence acute infarct, contusion, extra-axial fluid collection or midline shift.  The ventricles are normal in size and configuration.  The calvarium is intact.  The paranasal sinuses and mastoid air cells are clear.    There is no evidence of enhancing mass.   Impression       There is no evidence of enhancing mass within the cerebral parenchyma.     Component      Latest Ref Rng & Units 3/7/2018   WBC      3.90 - 12.70 K/uL 4.70   RBC      4.00 - 5.40 M/uL 3.93 (L)   Hemoglobin      12.0 - 16.0 g/dL 10.3 (L)   Hematocrit      37.0 - 48.5 % 33.4 (L)   MCV      82 - 98 fL 85   MCH      27.0 - 31.0 pg 26.2 (L)   MCHC      32.0 - 36.0 g/dL 30.8 (L)   RDW      11.5 - 14.5 % 14.1   Platelets      150 - 350 K/uL 182   MPV      9.2 - 12.9 fL 10.5   Immature Granulocytes      0.0 - 0.5 % 0.4   Gran # (ANC)      1.8 - 7.7 K/uL 2.8   Immature Grans (Abs)      0.00 - 0.04 K/uL 0.02   Lymph #      1.0 - 4.8 K/uL 1.3   Mono #      0.3 - 1.0 K/uL 0.6   Eos #      0.0 - 0.5 K/uL 0.0   Baso #      0.00 - 0.20 K/uL 0.03   nRBC      0 /100 WBC 0   Gran%      38.0 - 73.0 % 58.7   Lymph%      18.0 - 48.0 % 26.8   Mono%      4.0 - 15.0 % 12.6   Eosinophil%      0.0 - 8.0 % 0.9   Basophil%      0.0 - 1.9 % 0.6   Differential Method       Automated   Sodium      136 - 145 mmol/L 140   Potassium      3.5 - 5.1 mmol/L 4.3   Chloride      95 - 110 mmol/L 101    CO2      23 - 29 mmol/L 30 (H)   Glucose      70 - 110 mg/dL 108   BUN, Bld      8 - 23 mg/dL 11   Creatinine      0.5 - 1.4 mg/dL 0.8   Calcium      8.7 - 10.5 mg/dL 9.9   Total Protein      6.0 - 8.4 g/dL 8.0   Albumin      3.5 - 5.2 g/dL 3.0 (L)   Total Bilirubin      0.1 - 1.0 mg/dL 0.4   Alkaline Phosphatase      55 - 135 U/L 96   AST      10 - 40 U/L 12   ALT      10 - 44 U/L 9 (L)   Anion Gap      8 - 16 mmol/L 9   eGFR if African American      >60 mL/min/1.73 m:2 >60.0   eGFR if non African American      >60 mL/min/1.73 m:2 >60.0       2/9/18 FINAL PATHOLOGIC DIAGNOSIS  LEFT LUNG, PLEURAL FLUID (CYTOLOGY):  - Scant groups of atypical cells, malignancy cannot be excluded. Note: Scant atypical groups of cells are present. Differential diagnosis includes reactive atypia vs malignancy .  Immunohistochemistry for CK5/6 and p63 is non-contributory. Please clinically correlate and re-sample as indicated.  All stains have satisfactory positive and negative controls.    Assessment:     1. is a 71 y/o female who underwent bronchoscopy/EBUS evaluation on 8/31/17 for abnormal mediastinal adenopathy  and a 1.2 cm MORRO mass as well as pericardial adenopathy.FNA of subcarinal lymph node was positive for malignant cells.  There were small clusters of malignant epithelial cells, favoring squamous carcinoma. Site of origin is not clear. PET CT done on 9/7/17 did not reveal any lesion in head and neck region.  There was inadequate tissue to run PD L1.p16 was negative.    Esophageal thickening was noted on CT done on 6/12/17. EGD on 10/30/17 did not reveal any suspicious lesions in esophagus/stomach. ENT evaluation still pending.   MRI brain still pending.I explained to her that if primary site is unclear, she will be treated as metastatic SCC of unknown primary.   Her treatment has been delayed as she cancelled several appointments due to some issues at home. I emphasized the need to have the pending studies ( MRI,  ENT evaluation) done, as well as have CT chest/abdomen repeated as her last imaging was over 2 months ago.  She said she cannot have MRI, even with anti-anxiety medication. CT chest from 1/26/18 showed increase in the sizes of subcarinal and left lung base masses compared to previous CT in June 2017 . High density material was seen within the patient's left pleural effusion. No intracranial lesions noted on CT head. I discussed these findings with the patient and personally reviewed the CT scans from January 2018.  I told her that she has likely metastatic SCC of unknown primary. She does have RA history. It is unclear if she can tolerate check point inhibitor therapy even if she has high PD1 expression on cytology/biopsy.  She has started Carboplatin/Paclitaxel every 3 weeks. I emphasized that the rationale of treatment is palliation and not cure and she understands this.     2. Pleural effusion: She had left sided thoracentesis ( 1050ml) on 2/9/18. Malignancy could not be excluded. No  Indication for pleurex catheter/reepat thoracentesis at this time.      3. Anorexia: Secondary to #1. Will start Cyproheptadine.       Plan:  1. C2 Carbo/Taxol today  2. CBC, CMP, f/u in 3 weeks       Distress Screening Results: Psychosocial Distress screening score of Distress Score: 2 noted and reviewed. No intervention indicated.

## 2018-03-07 NOTE — PLAN OF CARE
Problem: Chemotherapy Effects (Adult)  Goal: Signs and Symptoms of Listed Potential Problems Will be Absent, Minimized or Managed (Chemotherapy Effects)  Signs and symptoms of listed potential problems will be absent, minimized or managed by discharge/transition of care (reference Chemotherapy Effects (Adult) CPG).   Outcome: Ongoing (interventions implemented as appropriate)  Pt. Here today for infusion.  Vss. Port accessed, blood return present, infusing without difficulty. No questions at this time.

## 2018-03-09 ENCOUNTER — INFUSION (OUTPATIENT)
Dept: INFUSION THERAPY | Facility: HOSPITAL | Age: 73
End: 2018-03-09
Attending: INTERNAL MEDICINE
Payer: MEDICARE

## 2018-03-09 DIAGNOSIS — C34.92 SQUAMOUS CARCINOMA OF LUNG, LEFT: Primary | ICD-10-CM

## 2018-03-09 PROCEDURE — 96372 THER/PROPH/DIAG INJ SC/IM: CPT

## 2018-03-09 PROCEDURE — 63600175 PHARM REV CODE 636 W HCPCS: Mod: JG | Performed by: INTERNAL MEDICINE

## 2018-03-09 RX ADMIN — PEGFILGRASTIM 6 MG: 6 INJECTION SUBCUTANEOUS at 11:03

## 2018-03-12 ENCOUNTER — TELEPHONE (OUTPATIENT)
Dept: HEMATOLOGY/ONCOLOGY | Facility: CLINIC | Age: 73
End: 2018-03-12

## 2018-03-12 NOTE — TELEPHONE ENCOUNTER
----- Message from Krystin Méndez RN sent at 3/12/2018 10:56 AM CDT -----  Contact: Pt  Please call pt and reschedule  Thanks  ----- Message -----  From: Jessica Patrick  Sent: 3/12/2018  10:46 AM  To: Krystin Méndez RN    Pt called to reschedule appt on 3/28 and would like it to be on 3/29 or 3/30 and would like a morning appt   Callback#[phone]   Thank You  PARESH Patrick

## 2018-03-26 DIAGNOSIS — C44.92 SQUAMOUS CELL CARCINOMA OF UNKNOWN ORIGIN: ICD-10-CM

## 2018-03-26 RX ORDER — MORPHINE SULFATE 15 MG/1
15 TABLET, FILM COATED, EXTENDED RELEASE ORAL 2 TIMES DAILY
Qty: 60 TABLET | Refills: 0 | Status: SHIPPED | OUTPATIENT
Start: 2018-03-26 | End: 2018-08-17

## 2018-03-26 NOTE — TELEPHONE ENCOUNTER
----- Message from Symone Flores sent at 3/26/2018  9:58 AM CDT -----  Contact: Pt  Pt calling requesting a refill on Morphine and wants to know if she can get a rx for Hydrocodone         Pt call back number [phone]

## 2018-03-28 NOTE — PROGRESS NOTES
CC: Squamous cell carcinoma, unknown primary, on chemotherapy, here for follow up      Oncology History:    Diagnosis: Squamous cell carcinoma , primary site unknown    Molecular/genetic testing: p16 negative; PD L1 could not be run due to inadequate tissue   Stage:        Stage 4   Treatment: Carboplatin/paclitaxel      HPI:  is a 73 y/o female who underwent bronchoscopy/EBUS evaluation on 8/31/17 for abnormal mediastinal adenopathy  and a 1.2 cm MORRO mass.     Bronchoscopy findings:    The oropharynx appears normal. The larynx appears normal. The vocal cords appear normal. The subglottic space is normal. The trachea is of normal caliber. The otf is sharp. The tracheobronchial tree was examined to at least the first subsegmental level. Bronchial mucosa and anatomy are normal; there are no endobronchial lesions, and no secretions.    Lymph Nodes: Lymph node sizing was performed via endobronchial ultrasound for suspected lung cancer. Sampling by transbronchial needle aspiration was also performed using an Olympus EBUS-TBNA 22  gauge needle in the subcarinal mediastinum (level 7) and sent for routine cytology.       - The 7 (subcarinal) node was 30 mm by EBUS. Three samples with the needle were obtained. The patient's condition was unchanged after the intervention.     Her treatment got delayed as she cancelled several appointments due to some issues at home. She has started Caroplatin/paclitaxel palliatively. She had left thoracentesis on 2/9/18. There were atypical cells in the pleural fluid, highly suspicious for malignancy.    Interval history: She is here for a follow up visit. She has fatigue, nausea, dyspnea       Current Outpatient Prescriptions   Medication Sig    cyproheptadine (PERIACTIN) 4 mg tablet Take 1 tablet (4 mg total) by mouth 3 (three) times daily as needed.    escitalopram oxalate (LEXAPRO) 10 MG tablet Take 1 tablet (10 mg total) by mouth once daily.    hydroCHLOROthiazide  (HYDRODIURIL) 25 MG tablet Take 1 tablet (25 mg total) by mouth once daily.    LORazepam (ATIVAN) 0.5 MG tablet TAKE 1 TABLET (0.5 MG TOTAL) BY MOUTH 2 (TWO) TIMES DAILY.    losartan (COZAAR) 100 MG tablet Take 1 tablet (100 mg total) by mouth once daily.    methotrexate 2.5 MG Tab     morphine (MS CONTIN) 15 MG 12 hr tablet Take 1 tablet (15 mg total) by mouth 2 (two) times daily.    pantoprazole (PROTONIX) 40 MG tablet Take 1 tablet (40 mg total) by mouth once daily.    prochlorperazine (COMPAZINE) 10 MG tablet Take 1 tablet (10 mg total) by mouth every 6 (six) hours as needed.     No current facility-administered medications for this visit.      Review of Systems   Constitutional: Positive for malaise/fatigue. Negative for fever and weight loss.   HENT: Negative for nosebleeds.    Eyes: Negative for blurred vision and double vision.   Respiratory: Positive for shortness of breath. Negative for cough, hemoptysis and sputum production.    Cardiovascular: Negative for chest pain, palpitations, leg swelling and PND.   Gastrointestinal: Negative for abdominal pain, blood in stool, diarrhea, heartburn, nausea and vomiting.   Genitourinary: Positive for dysuria. Negative for frequency and urgency.   Musculoskeletal: Negative for myalgias.   Skin: Positive for itching. Negative for rash.   Neurological: Negative for dizziness, tingling, sensory change, speech change, weakness and headaches.   Endo/Heme/Allergies: Does not bruise/bleed easily.   Psychiatric/Behavioral: Negative for depression.       Vitals:    03/29/18 0950   BP: 119/72   Pulse: 88   Resp: 18   Temp: 97.9 °F (36.6 °C)     Physical Exam   Constitutional: She is oriented to person, place, and time. She appears well-developed and well-nourished. No distress.   HENT:   Head: Normocephalic and atraumatic.   Mouth/Throat: No oropharyngeal exudate.   Eyes: Pupils are equal, round, and reactive to light. No scleral icterus.   Neck: Normal range of motion.    Cardiovascular: Normal rate, regular rhythm and normal heart sounds.    No murmur heard.  Pulmonary/Chest: Effort normal. No respiratory distress. She has no wheezes. She has no rales.   She has diminished breath sounds in left hemithorax   Abdominal: Soft. Bowel sounds are normal. She exhibits no distension. There is no tenderness. There is no rebound and no guarding.   Musculoskeletal: Normal range of motion. She exhibits edema.   Lymphadenopathy:     She has no cervical adenopathy.   Neurological: She is alert and oriented to person, place, and time. No cranial nerve deficit.   Skin: Skin is warm. No erythema.   Psychiatric: She has a normal mood and affect.     6/12/17 CT chest with cont      1. Large left pleural effusion resulting in atelectasis of the left lower lobe and portions of left upper lobe  2. 1.2 cm left upper lobe pleural based pulmonary nodule  3. Mediastinal adenopathy as described above with diffuse thickening of the wall of the distal esophagus. Findings are suspicious for neoplastic process. Recommend bronchoscopy biopsy and possible endoscopy for further evaluation.  4. 1.3 cm hypodensity within the dome of the liver. Metastatic focus cannot be entirely excluded.     9/7/17 PET CT       There is mildly increased tracer uptake within the patient's left pleural effusion and overlying the mediastinal adenopathy, max SUV 2.7.    Transmission CT images show continued pleural effusion and circumferential esophageal thickening. Transmission CT images also show non-avid bilateral groin adenopathy likely reactive in nature.      Impression        There is mildly increased tracer uptake within the patient's left pleural effusion and mediastinal adenopathy. While these findings suggest reactive etiology some low grade malignancies, such as bronchioloalveolar carcinoma, may show this level of uptake on PET scan.            10/30/17 EGD     The examined duodenum was normal.The entire examined stomach  was normal.The cardia and gastric fundus were normal on retroflexion. A 1 cm hiatal hernia was found. The proximal extent of the gastric folds (end of tubular esophagus) was 38 cm from the incisors. The hiatal narrowing was 39 cm from the incisors. The Z-line was 38 cm from the incisors. Z-line was sharp. No esophagitis and no columnar esophagus and no lesions seen with NBI or white light.    Impression:                                   - Normal examined duodenum.                        - Normal stomach.                        - 1 cm hiatal hernia.                        - No specimens collected.     10/30/17 colonoscopy :     Cecal polyp ( 3mm) identified  Histopathology: Tubular adenoma        Pathology:     1/17/14 colonoscopy     FINAL PATHOLOGIC DIAGNOSIS     1. Colon biopsy, ascending colon-tubular adenoma.  2. Colon biopsy, transverse-tubular adenoma.  3. 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 The stalk and base of the polyp are well represented and are free of atypia.  4. Colon biopsy, sigmoid- mild glandular hyperplasia consistent with a benign hyperplastic polyp.        7/19/17 PLEURAL FLUID (CYTOLOGY AND CELL BLOCK):     -Groups of atypical cells present, malignancy cannot be excluded.  Note: While these cells could be reactive mesothelial cells, the level of atypia could be seen in malignancy. A cell block was prepared but the atypical cells are not present in the cell block for further characterization. Correlate  clinically. Repeat tap/biopsy might be helpful if clinically indicated.     8/28/17 EBUS FNA     FNA of subcarinal lymph node: Positive for malignant cells  Small clusters of malignant epithelial cells, favor squamous carcinoma Tumor cells are positive for CK 5/6 and CK 7. Immunostains for p63 and TTF-1 are negative.     1/26/18 CT chest, abdomen,pelvis with  cont         Comparison: June 12, 2017    Chest: Since prior exam there is been interval enlargement of the right pleural effusion. There is high density material seen within the right pleural effusion that was not visualized on prior exam.    There is a 5.4 cm enhancing mass seen at the left lung base that previously measured approximately 2.9 cm.    There is a subcarinal mass measuring 4.4 cm it previously measured 2.9 cm.    There is circumferential esophageal thickening adjacent to this mass.    There is volume loss in the right chest with mediastinal shift to the right more pronounced than what was seen on prior exam.    Patient is a right internal jugular Port-A-Cath.    Abdomen/pelvis: The liver, spleen, adrenal glands, kidneys and pancreas show a normal contrasted appearance. There is no evidence bowel obstruction. There is no evidence of abdominal or pelvic lymphadenopathy. The osseous structures show degenerative change of the spine.   Impression       Since prior exam in the patient's subcarinal and left lung base masses have increased in size. In addition there is now high density material seen within the patient's left pleural effusion new from prior exam and concerning for hemorrhage possibly from the mass.         1/26/18 CT head with cont          Comparison: CT June 8, 2017    Technique: Contiguous axial images were acquired from vertex to skull base without contrast.    Findings: There is no evidence acute intracranial abnormality.  Specifically there is no evidence acute infarct, contusion, extra-axial fluid collection or midline shift.  The ventricles are normal in size and configuration.  The calvarium is intact.  The paranasal sinuses and mastoid air cells are clear.    There is no evidence of enhancing mass.   Impression       There is no evidence of enhancing mass within the cerebral parenchyma     2/9/18 FINAL PATHOLOGIC DIAGNOSIS  LEFT LUNG, PLEURAL FLUID (CYTOLOGY):  - Scant groups of  atypical cells, malignancy cannot be excluded. Note: Scant atypical groups of cells are present. Differential diagnosis includes reactive atypia vs malignancy .  Immunohistochemistry for CK5/6 and p63 is non-contributory. Please clinically correlate and re-sample as indicated.  All stains have satisfactory positive and negative controls.     Component      Latest Ref Rng & Units 3/29/2018   WBC      3.90 - 12.70 K/uL 3.94   RBC      4.00 - 5.40 M/uL 3.16 (L)   Hemoglobin      12.0 - 16.0 g/dL 8.4 (L)   Hematocrit      37.0 - 48.5 % 27.8 (L)   MCV      82 - 98 fL 88   MCH      27.0 - 31.0 pg 26.6 (L)   MCHC      32.0 - 36.0 g/dL 30.2 (L)   RDW      11.5 - 14.5 % 15.9 (H)   Platelets      150 - 350 K/uL 159   MPV      9.2 - 12.9 fL 9.8   Immature Granulocytes      0.0 - 0.5 % 0.3   Gran # (ANC)      1.8 - 7.7 K/uL 2.7   Immature Grans (Abs)      0.00 - 0.04 K/uL 0.01   Lymph #      1.0 - 4.8 K/uL 0.8 (L)   Mono #      0.3 - 1.0 K/uL 0.4   Eos #      0.0 - 0.5 K/uL 0.0   Baso #      0.00 - 0.20 K/uL 0.01   nRBC      0 /100 WBC 0   Gran%      38.0 - 73.0 % 67.9   Lymph%      18.0 - 48.0 % 20.8   Mono%      4.0 - 15.0 % 9.9   Eosinophil%      0.0 - 8.0 % 0.8   Basophil%      0.0 - 1.9 % 0.3   Differential Method       Automated   Sodium      136 - 145 mmol/L 142   Potassium      3.5 - 5.1 mmol/L 3.5   Chloride      95 - 110 mmol/L 104   CO2      23 - 29 mmol/L 29   Glucose      70 - 110 mg/dL 103   BUN, Bld      8 - 23 mg/dL 8   Creatinine      0.5 - 1.4 mg/dL 0.8   Calcium      8.7 - 10.5 mg/dL 9.1   Total Protein      6.0 - 8.4 g/dL 7.5   Albumin      3.5 - 5.2 g/dL 2.6 (L)   Total Bilirubin      0.1 - 1.0 mg/dL 0.4   Alkaline Phosphatase      55 - 135 U/L 81   AST      10 - 40 U/L 11   ALT      10 - 44 U/L 6 (L)   Anion Gap      8 - 16 mmol/L 9   eGFR if African American      >60 mL/min/1.73 m:2 >60.0   eGFR if non African American      >60 mL/min/1.73 m:2 >60.0     Assessment:     1. is a 71 y/o female who  underwent bronchoscopy/EBUS evaluation on 8/31/17 for abnormal mediastinal adenopathy  and a 1.2 cm MORRO mass as well as pericardial adenopathy.FNA of subcarinal lymph node was positive for malignant cells.  There were small clusters of malignant epithelial cells, favoring squamous carcinoma. Site of origin is not clear. PET CT done on 9/7/17 did not reveal any lesion in head and neck region.  There was inadequate tissue to run PD L1.p16 was negative.    Esophageal thickening was noted on CT done on 6/12/17. EGD on 10/30/17 did not reveal any suspicious lesions in esophagus/stomach. ENT evaluation still pending.   I explained to her that if primary site is unclear, she will be treated as metastatic SCC of unknown primary.   Her treatment got delayed as she cancelled several appointments due to some issues at home. She said she could not have MRI due to claustrophobia, even with anti-anxiety medication. CT chest from 1/26/18 showed increase in the sizes of subcarinal and left lung base masses compared to previous CT in June 2017 . High density material was seen within the patient's left pleural effusion. No intracranial lesions noted on CT head. I discussed these findings with the patient and personally reviewed the CT scans from January 2018.  I told her that she has likely metastatic SCC of unknown primary. She does have RA history. It is unclear if she can tolerate check point inhibitor therapy even if she has high PD1 expression on cytology/biopsy.  She has started Carboplatin/Paclitaxel every 3 weeks. I emphasized that the rationale of treatment is palliation and not cure and she understands this.      2. Pleural effusion: She had left sided thoracentesis ( 1050ml) on 2/9/18. Malignancy could not be excluded. No  Indication for pleurex catheter/reepat thoracentesis at this time.       3. Anorexia: Secondary to #1. She is on Cyproheptadine.     4. Anemia: Normocytic, hypochromic. No overt bleeding. She will have  iron panel checked before next visit. PRBC for Hgb <7.    5. Pruritus: Etiology unclear. Benadryl has not helped her. She will hold Cyproheptadine for a few days ( although it is a H1 antagonist)    6. Dysuria: She has noticed dysuria starting 3/27. No hematuria. She will have UA and culture        Plan:  1. C3 Carbo/Taxol today  2. CBC, CMP, iron, ferritin, TIBC and f/u in 3 weeks  3. Use moisturizing lotion all over the body  4. Hold Periactin for 1 week; resume if pruritus has not improved  5. UA, culture today            Distress Screening Results: Psychosocial Distress screening score of Distress Score: 0 noted and reviewed. No intervention indicated.

## 2018-03-29 ENCOUNTER — OFFICE VISIT (OUTPATIENT)
Dept: HEMATOLOGY/ONCOLOGY | Facility: CLINIC | Age: 73
End: 2018-03-29
Payer: MEDICARE

## 2018-03-29 ENCOUNTER — INFUSION (OUTPATIENT)
Dept: INFUSION THERAPY | Facility: HOSPITAL | Age: 73
End: 2018-03-29
Attending: INTERNAL MEDICINE
Payer: MEDICARE

## 2018-03-29 VITALS
HEIGHT: 67 IN | HEART RATE: 88 BPM | WEIGHT: 177.5 LBS | SYSTOLIC BLOOD PRESSURE: 119 MMHG | TEMPERATURE: 98 F | BODY MASS INDEX: 27.86 KG/M2 | DIASTOLIC BLOOD PRESSURE: 72 MMHG | OXYGEN SATURATION: 100 % | RESPIRATION RATE: 18 BRPM

## 2018-03-29 VITALS — DIASTOLIC BLOOD PRESSURE: 78 MMHG | HEART RATE: 77 BPM | RESPIRATION RATE: 18 BRPM | SYSTOLIC BLOOD PRESSURE: 134 MMHG

## 2018-03-29 DIAGNOSIS — R63.0 ANOREXIA: ICD-10-CM

## 2018-03-29 DIAGNOSIS — C44.92 SQUAMOUS CELL CARCINOMA OF UNKNOWN ORIGIN: Primary | ICD-10-CM

## 2018-03-29 DIAGNOSIS — L29.9 PRURITUS: ICD-10-CM

## 2018-03-29 DIAGNOSIS — C34.92 SQUAMOUS CELL LUNG CANCER, LEFT: ICD-10-CM

## 2018-03-29 DIAGNOSIS — D63.0 ANEMIA IN NEOPLASTIC DISEASE: ICD-10-CM

## 2018-03-29 DIAGNOSIS — R30.0 DYSURIA: ICD-10-CM

## 2018-03-29 PROCEDURE — 99999 PR PBB SHADOW E&M-EST. PATIENT-LVL IV: CPT | Mod: PBBFAC,,, | Performed by: INTERNAL MEDICINE

## 2018-03-29 PROCEDURE — 96415 CHEMO IV INFUSION ADDL HR: CPT

## 2018-03-29 PROCEDURE — 63600175 PHARM REV CODE 636 W HCPCS: Performed by: INTERNAL MEDICINE

## 2018-03-29 PROCEDURE — 99499 UNLISTED E&M SERVICE: CPT | Mod: S$GLB,,, | Performed by: INTERNAL MEDICINE

## 2018-03-29 PROCEDURE — 96413 CHEMO IV INFUSION 1 HR: CPT

## 2018-03-29 PROCEDURE — 25000003 PHARM REV CODE 250: Performed by: INTERNAL MEDICINE

## 2018-03-29 PROCEDURE — S0028 INJECTION, FAMOTIDINE, 20 MG: HCPCS | Performed by: INTERNAL MEDICINE

## 2018-03-29 PROCEDURE — 96367 TX/PROPH/DG ADDL SEQ IV INF: CPT

## 2018-03-29 PROCEDURE — 96377 APPLICATON ON-BODY INJECTOR: CPT | Mod: 59

## 2018-03-29 PROCEDURE — 3078F DIAST BP <80 MM HG: CPT | Mod: CPTII,S$GLB,, | Performed by: INTERNAL MEDICINE

## 2018-03-29 PROCEDURE — 3074F SYST BP LT 130 MM HG: CPT | Mod: CPTII,S$GLB,, | Performed by: INTERNAL MEDICINE

## 2018-03-29 PROCEDURE — 99214 OFFICE O/P EST MOD 30 MIN: CPT | Mod: S$GLB,,, | Performed by: INTERNAL MEDICINE

## 2018-03-29 PROCEDURE — 96417 CHEMO IV INFUS EACH ADDL SEQ: CPT

## 2018-03-29 PROCEDURE — 96375 TX/PRO/DX INJ NEW DRUG ADDON: CPT

## 2018-03-29 RX ORDER — DIPHENHYDRAMINE HYDROCHLORIDE 50 MG/ML
50 INJECTION INTRAMUSCULAR; INTRAVENOUS ONCE AS NEEDED
Status: DISCONTINUED | OUTPATIENT
Start: 2018-03-29 | End: 2018-03-29 | Stop reason: HOSPADM

## 2018-03-29 RX ORDER — FAMOTIDINE 10 MG/ML
20 INJECTION INTRAVENOUS
Status: CANCELLED | OUTPATIENT
Start: 2018-03-29

## 2018-03-29 RX ORDER — SODIUM CHLORIDE 0.9 % (FLUSH) 0.9 %
10 SYRINGE (ML) INJECTION
Status: DISCONTINUED | OUTPATIENT
Start: 2018-03-29 | End: 2018-03-29 | Stop reason: HOSPADM

## 2018-03-29 RX ORDER — HEPARIN 100 UNIT/ML
500 SYRINGE INTRAVENOUS
Status: CANCELLED | OUTPATIENT
Start: 2018-03-29

## 2018-03-29 RX ORDER — EPINEPHRINE 0.3 MG/.3ML
0.3 INJECTION SUBCUTANEOUS ONCE AS NEEDED
Status: CANCELLED | OUTPATIENT
Start: 2018-03-29 | End: 2018-03-29

## 2018-03-29 RX ORDER — FAMOTIDINE 10 MG/ML
20 INJECTION INTRAVENOUS
Status: COMPLETED | OUTPATIENT
Start: 2018-03-29 | End: 2018-03-29

## 2018-03-29 RX ORDER — SODIUM CHLORIDE 0.9 % (FLUSH) 0.9 %
10 SYRINGE (ML) INJECTION
Status: CANCELLED | OUTPATIENT
Start: 2018-03-29

## 2018-03-29 RX ORDER — HEPARIN 100 UNIT/ML
500 SYRINGE INTRAVENOUS
Status: DISCONTINUED | OUTPATIENT
Start: 2018-03-29 | End: 2018-03-29 | Stop reason: HOSPADM

## 2018-03-29 RX ORDER — EPINEPHRINE 0.3 MG/.3ML
0.3 INJECTION SUBCUTANEOUS ONCE AS NEEDED
Status: DISCONTINUED | OUTPATIENT
Start: 2018-03-29 | End: 2018-03-29 | Stop reason: HOSPADM

## 2018-03-29 RX ORDER — DIPHENHYDRAMINE HYDROCHLORIDE 50 MG/ML
50 INJECTION INTRAMUSCULAR; INTRAVENOUS ONCE AS NEEDED
Status: CANCELLED | OUTPATIENT
Start: 2018-03-29 | End: 2018-03-29

## 2018-03-29 RX ADMIN — PEGFILGRASTIM 6 MG: KIT SUBCUTANEOUS at 04:03

## 2018-03-29 RX ADMIN — DIPHENHYDRAMINE HYDROCHLORIDE 50 MG: 50 INJECTION, SOLUTION INTRAMUSCULAR; INTRAVENOUS at 11:03

## 2018-03-29 RX ADMIN — SODIUM CHLORIDE: 9 INJECTION, SOLUTION INTRAVENOUS at 11:03

## 2018-03-29 RX ADMIN — CARBOPLATIN 670 MG: 10 INJECTION, SOLUTION INTRAVENOUS at 02:03

## 2018-03-29 RX ADMIN — DEXAMETHASONE SODIUM PHOSPHATE: 4 INJECTION, SOLUTION INTRAMUSCULAR; INTRAVENOUS at 11:03

## 2018-03-29 RX ADMIN — FAMOTIDINE 20 MG: 10 INJECTION, SOLUTION INTRAVENOUS at 12:03

## 2018-03-29 RX ADMIN — HEPARIN 500 UNITS: 100 SYRINGE at 04:03

## 2018-03-29 RX ADMIN — PACLITAXEL 354 MG: 6 INJECTION, SOLUTION INTRAVENOUS at 11:03

## 2018-04-18 DIAGNOSIS — K21.9 GASTROESOPHAGEAL REFLUX DISEASE, ESOPHAGITIS PRESENCE NOT SPECIFIED: ICD-10-CM

## 2018-04-18 RX ORDER — PANTOPRAZOLE SODIUM 40 MG/1
40 TABLET, DELAYED RELEASE ORAL DAILY
Qty: 90 TABLET | Refills: 1 | Status: SHIPPED | OUTPATIENT
Start: 2018-04-18 | End: 2018-01-01 | Stop reason: SDUPTHER

## 2018-04-19 ENCOUNTER — OFFICE VISIT (OUTPATIENT)
Dept: HEMATOLOGY/ONCOLOGY | Facility: CLINIC | Age: 73
End: 2018-04-19
Payer: MEDICARE

## 2018-04-19 ENCOUNTER — INFUSION (OUTPATIENT)
Dept: INFUSION THERAPY | Facility: HOSPITAL | Age: 73
End: 2018-04-19
Attending: INTERNAL MEDICINE
Payer: MEDICARE

## 2018-04-19 VITALS
DIASTOLIC BLOOD PRESSURE: 80 MMHG | HEART RATE: 80 BPM | OXYGEN SATURATION: 98 % | RESPIRATION RATE: 18 BRPM | TEMPERATURE: 99 F | SYSTOLIC BLOOD PRESSURE: 130 MMHG

## 2018-04-19 VITALS
BODY MASS INDEX: 27.06 KG/M2 | HEIGHT: 67 IN | SYSTOLIC BLOOD PRESSURE: 106 MMHG | DIASTOLIC BLOOD PRESSURE: 68 MMHG | OXYGEN SATURATION: 98 % | WEIGHT: 172.38 LBS | HEART RATE: 94 BPM | TEMPERATURE: 98 F | RESPIRATION RATE: 18 BRPM

## 2018-04-19 DIAGNOSIS — C44.92 SQUAMOUS CELL CARCINOMA OF UNKNOWN ORIGIN: Primary | ICD-10-CM

## 2018-04-19 DIAGNOSIS — R53.0 NEOPLASTIC MALIGNANT RELATED FATIGUE: ICD-10-CM

## 2018-04-19 DIAGNOSIS — C34.92 SQUAMOUS CELL LUNG CANCER, LEFT: Primary | ICD-10-CM

## 2018-04-19 DIAGNOSIS — D63.0 ANEMIA IN NEOPLASTIC DISEASE: ICD-10-CM

## 2018-04-19 DIAGNOSIS — R63.4 WEIGHT LOSS, NON-INTENTIONAL: ICD-10-CM

## 2018-04-19 DIAGNOSIS — R63.0 ANOREXIA: ICD-10-CM

## 2018-04-19 DIAGNOSIS — C44.92 SQUAMOUS CELL CARCINOMA OF UNKNOWN ORIGIN: ICD-10-CM

## 2018-04-19 PROCEDURE — 36430 TRANSFUSION BLD/BLD COMPNT: CPT

## 2018-04-19 PROCEDURE — 63600175 PHARM REV CODE 636 W HCPCS: Performed by: INTERNAL MEDICINE

## 2018-04-19 PROCEDURE — 25000003 PHARM REV CODE 250: Performed by: INTERNAL MEDICINE

## 2018-04-19 PROCEDURE — 96361 HYDRATE IV INFUSION ADD-ON: CPT

## 2018-04-19 PROCEDURE — S0028 INJECTION, FAMOTIDINE, 20 MG: HCPCS | Performed by: INTERNAL MEDICINE

## 2018-04-19 PROCEDURE — 86920 COMPATIBILITY TEST SPIN: CPT | Mod: 59

## 2018-04-19 PROCEDURE — 96417 CHEMO IV INFUS EACH ADDL SEQ: CPT

## 2018-04-19 PROCEDURE — 99999 PR PBB SHADOW E&M-EST. PATIENT-LVL IV: CPT | Mod: PBBFAC,,, | Performed by: INTERNAL MEDICINE

## 2018-04-19 PROCEDURE — P9021 RED BLOOD CELLS UNIT: HCPCS

## 2018-04-19 PROCEDURE — 96375 TX/PRO/DX INJ NEW DRUG ADDON: CPT

## 2018-04-19 PROCEDURE — 3078F DIAST BP <80 MM HG: CPT | Mod: CPTII,S$GLB,, | Performed by: INTERNAL MEDICINE

## 2018-04-19 PROCEDURE — 27201040 HC RC 50 FILTER

## 2018-04-19 PROCEDURE — 96413 CHEMO IV INFUSION 1 HR: CPT

## 2018-04-19 PROCEDURE — 99214 OFFICE O/P EST MOD 30 MIN: CPT | Mod: S$GLB,,, | Performed by: INTERNAL MEDICINE

## 2018-04-19 PROCEDURE — 96367 TX/PROPH/DG ADDL SEQ IV INF: CPT

## 2018-04-19 PROCEDURE — 99499 UNLISTED E&M SERVICE: CPT | Mod: S$GLB,,, | Performed by: INTERNAL MEDICINE

## 2018-04-19 PROCEDURE — 96415 CHEMO IV INFUSION ADDL HR: CPT

## 2018-04-19 PROCEDURE — 3074F SYST BP LT 130 MM HG: CPT | Mod: CPTII,S$GLB,, | Performed by: INTERNAL MEDICINE

## 2018-04-19 PROCEDURE — 96377 APPLICATON ON-BODY INJECTOR: CPT

## 2018-04-19 RX ORDER — HYDROCODONE BITARTRATE AND ACETAMINOPHEN 500; 5 MG/1; MG/1
TABLET ORAL ONCE
Status: CANCELLED | OUTPATIENT
Start: 2018-04-19 | End: 2018-04-19

## 2018-04-19 RX ORDER — SODIUM CHLORIDE 0.9 % (FLUSH) 0.9 %
10 SYRINGE (ML) INJECTION
Status: DISCONTINUED | OUTPATIENT
Start: 2018-04-19 | End: 2018-04-19 | Stop reason: HOSPADM

## 2018-04-19 RX ORDER — HYDROCODONE BITARTRATE AND ACETAMINOPHEN 500; 5 MG/1; MG/1
TABLET ORAL ONCE
Status: COMPLETED | OUTPATIENT
Start: 2018-04-19 | End: 2018-04-19

## 2018-04-19 RX ORDER — EPINEPHRINE 0.3 MG/.3ML
0.3 INJECTION SUBCUTANEOUS ONCE AS NEEDED
Status: DISCONTINUED | OUTPATIENT
Start: 2018-04-19 | End: 2018-04-19 | Stop reason: HOSPADM

## 2018-04-19 RX ORDER — POTASSIUM CHLORIDE 20 MEQ/1
40 TABLET, EXTENDED RELEASE ORAL DAILY
Qty: 30 TABLET | Refills: 11 | Status: SHIPPED | OUTPATIENT
Start: 2018-04-19 | End: 2018-08-17

## 2018-04-19 RX ORDER — DIPHENOXYLATE HYDROCHLORIDE AND ATROPINE SULFATE 2.5; .025 MG/1; MG/1
1 TABLET ORAL 4 TIMES DAILY PRN
Qty: 30 TABLET | Refills: 1 | Status: SHIPPED | OUTPATIENT
Start: 2018-04-19 | End: 2018-08-17

## 2018-04-19 RX ORDER — FAMOTIDINE 10 MG/ML
20 INJECTION INTRAVENOUS
Status: COMPLETED | OUTPATIENT
Start: 2018-04-19 | End: 2018-04-19

## 2018-04-19 RX ORDER — HEPARIN 100 UNIT/ML
500 SYRINGE INTRAVENOUS
Status: DISCONTINUED | OUTPATIENT
Start: 2018-04-19 | End: 2018-04-19 | Stop reason: HOSPADM

## 2018-04-19 RX ORDER — DEXAMETHASONE 2 MG/1
2 TABLET ORAL EVERY 12 HOURS
Qty: 120 TABLET | Refills: 0 | Status: SHIPPED | OUTPATIENT
Start: 2018-04-19 | End: 2018-01-01 | Stop reason: SDUPTHER

## 2018-04-19 RX ORDER — DIPHENHYDRAMINE HYDROCHLORIDE 50 MG/ML
50 INJECTION INTRAMUSCULAR; INTRAVENOUS ONCE AS NEEDED
Status: DISCONTINUED | OUTPATIENT
Start: 2018-04-19 | End: 2018-04-19 | Stop reason: HOSPADM

## 2018-04-19 RX ADMIN — PACLITAXEL 354 MG: 6 INJECTION, SOLUTION INTRAVENOUS at 12:04

## 2018-04-19 RX ADMIN — SODIUM CHLORIDE: 9 INJECTION, SOLUTION INTRAVENOUS at 04:04

## 2018-04-19 RX ADMIN — DEXAMETHASONE SODIUM PHOSPHATE: 4 INJECTION, SOLUTION INTRAMUSCULAR; INTRAVENOUS at 11:04

## 2018-04-19 RX ADMIN — DIPHENHYDRAMINE HYDROCHLORIDE 50 MG: 50 INJECTION, SOLUTION INTRAMUSCULAR; INTRAVENOUS at 11:04

## 2018-04-19 RX ADMIN — PEGFILGRASTIM 6 MG: KIT SUBCUTANEOUS at 03:04

## 2018-04-19 RX ADMIN — FAMOTIDINE 20 MG: 10 INJECTION INTRAVENOUS at 11:04

## 2018-04-19 RX ADMIN — CARBOPLATIN 670 MG: 10 INJECTION INTRAVENOUS at 03:04

## 2018-04-19 RX ADMIN — SODIUM CHLORIDE: 9 INJECTION, SOLUTION INTRAVENOUS at 12:04

## 2018-04-19 NOTE — PATIENT INSTRUCTIONS
-Start Magnesium oxide 400mg twice daily  --2 units PRBC (red cell) today  --chemotherapy today and in 3 weeks  --ct before next chemotherapy  --labs in 1 week, 3 weeks

## 2018-04-19 NOTE — Clinical Note
Cbc in 1 week Cbc, cmp, mag, f/u for chemo in 3 weeks Ct chest, abdomen, pelvis in 3 weeks Chemo today 2 units prbc today , Type and screen today  Good fetal movements.  US today for assessment of placenta: single fetus seen in cephalic presentation with a posterior, low lying placenta, 0.7 cm away from the internal os of the cervix. BRIGHT is within normal range.  No gross fetal abnormalities, free fluid or adnexal masses were seen at this time.  Cervix appears long and closed.  Pt states she has never had vaginal bleeding.  To continue pelvic rest precautions.  Repeat US in 1 month.  Voices understanding of results.  Tdap today.  Ok for labs on mychart.  Pt had 28 week labs today, A+, rhogam not indicated. RTC for limited US for placental location and ob visit in 1 month.

## 2018-04-19 NOTE — PROGRESS NOTES
CC: Squamous cell carcinoma, unknown primary, on chemotherapy, here for follow up and prior to cycle 4 chemotherapy         Oncology History:     Diagnosis: Squamous cell carcinoma , primary site unknown    Molecular/genetic testing: p16 negative; PD L1 could not be run due to inadequate tissue   Stage:        Stage 4   Treatment: Carboplatin/paclitaxel        HPI:  is a 73 y/o female who underwent bronchoscopy/EBUS evaluation on 8/31/17 for abnormal mediastinal adenopathy  and a 1.2 cm MORRO mass.     Bronchoscopy findings:    The oropharynx appears normal. The larynx appears normal. The vocal cords appear normal. The subglottic space is normal. The trachea is of normal caliber. The otf is sharp. The tracheobronchial tree was examined to at least the first subsegmental level. Bronchial mucosa and anatomy are normal; there are no endobronchial lesions, and no secretions.    Lymph Nodes: Lymph node sizing was performed via endobronchial ultrasound for suspected lung cancer. Sampling by transbronchial needle aspiration was also performed using an Olympus EBUS-TBNA 22  gauge needle in the subcarinal mediastinum (level 7) and sent for routine cytology.       - The 7 (subcarinal) node was 30 mm by EBUS. Three samples with the needle were obtained. The patient's condition was unchanged after the intervention.      Her treatment got delayed as she cancelled several appointments due to some issues at home. She has started Caroplatin/paclitaxel palliatively. She had left thoracentesis on 2/9/18. There were atypical cells in the pleural fluid, highly suspicious for malignancy.     Interval history: She is here for a follow up visit. She has fatigue, nausea, dyspnea        Review of Systems   Constitutional: Positive for malaise/fatigue and weight loss. Negative for fever.   HENT: Negative.    Eyes: Negative.    Respiratory: Negative.    Cardiovascular: Negative.    Gastrointestinal: Positive for nausea.    Genitourinary: Negative.    Musculoskeletal: Negative.    Neurological: Positive for weakness.   Psychiatric/Behavioral: Negative.        Vitals:    04/19/18 0925   BP: 106/68   Pulse: 94   Resp: 18   Temp: 98.2 °F (36.8 °C)     Physical Exam   Constitutional: She is oriented to person, place, and time. She appears well-developed. No distress.   HENT:   Head: Normocephalic and atraumatic.   Mouth/Throat: No oropharyngeal exudate.   Eyes: Pupils are equal, round, and reactive to light. No scleral icterus.   Neck: Normal range of motion.   Cardiovascular: Normal rate, regular rhythm and normal heart sounds.    No murmur heard.  Pulmonary/Chest: Effort normal and breath sounds normal. No respiratory distress. She has no wheezes.   Abdominal: Soft. She exhibits no distension. There is no tenderness. There is no rebound.   Musculoskeletal: She exhibits no edema.   Neurological: She is alert and oriented to person, place, and time. No cranial nerve deficit.   Skin: Skin is warm.       Current Outpatient Prescriptions   Medication Sig    cyproheptadine (PERIACTIN) 4 mg tablet Take 1 tablet (4 mg total) by mouth 3 (three) times daily as needed.    escitalopram oxalate (LEXAPRO) 10 MG tablet Take 1 tablet (10 mg total) by mouth once daily.    hydroCHLOROthiazide (HYDRODIURIL) 25 MG tablet Take 1 tablet (25 mg total) by mouth once daily.    LORazepam (ATIVAN) 0.5 MG tablet TAKE 1 TABLET (0.5 MG TOTAL) BY MOUTH 2 (TWO) TIMES DAILY.    losartan (COZAAR) 100 MG tablet Take 1 tablet (100 mg total) by mouth once daily.    methotrexate 2.5 MG Tab     morphine (MS CONTIN) 15 MG 12 hr tablet Take 1 tablet (15 mg total) by mouth 2 (two) times daily.    pantoprazole (PROTONIX) 40 MG tablet Take 1 tablet (40 mg total) by mouth once daily.    prochlorperazine (COMPAZINE) 10 MG tablet Take 1 tablet (10 mg total) by mouth every 6 (six) hours as needed.     No current facility-administered medications for this visit.         6/12/17 CT chest with cont      1. Large left pleural effusion resulting in atelectasis of the left lower lobe and portions of left upper lobe  2. 1.2 cm left upper lobe pleural based pulmonary nodule  3. Mediastinal adenopathy as described above with diffuse thickening of the wall of the distal esophagus. Findings are suspicious for neoplastic process. Recommend bronchoscopy biopsy and possible endoscopy for further evaluation.  4. 1.3 cm hypodensity within the dome of the liver. Metastatic focus cannot be entirely excluded.     9/7/17 PET CT       There is mildly increased tracer uptake within the patient's left pleural effusion and overlying the mediastinal adenopathy, max SUV 2.7.    Transmission CT images show continued pleural effusion and circumferential esophageal thickening. Transmission CT images also show non-avid bilateral groin adenopathy likely reactive in nature.      Impression        There is mildly increased tracer uptake within the patient's left pleural effusion and mediastinal adenopathy. While these findings suggest reactive etiology some low grade malignancies, such as bronchioloalveolar carcinoma, may show this level of uptake on PET scan.            10/30/17 EGD     The examined duodenum was normal.The entire examined stomach was normal.The cardia and gastric fundus were normal on retroflexion. A 1 cm hiatal hernia was found. The proximal extent of the gastric folds (end of tubular esophagus) was 38 cm from the incisors. The hiatal narrowing was 39 cm from the incisors. The Z-line was 38 cm from the incisors. Z-line was sharp. No esophagitis and no columnar esophagus and no lesions seen with NBI or white light.    Impression:                                   - Normal examined duodenum.                        - Normal stomach.                        - 1 cm hiatal hernia.                        - No specimens collected.     10/30/17 colonoscopy :     Cecal polyp ( 3mm)  identified  Histopathology: Tubular adenoma        Pathology:     1/17/14 colonoscopy     FINAL PATHOLOGIC DIAGNOSIS     1. Colon biopsy, ascending colon-tubular adenoma.  2. Colon biopsy, transverse-tubular adenoma.  3. 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 The stalk and base of the polyp are well represented and are free of atypia.  4. Colon biopsy, sigmoid- mild glandular hyperplasia consistent with a benign hyperplastic polyp.        7/19/17 PLEURAL FLUID (CYTOLOGY AND CELL BLOCK):     -Groups of atypical cells present, malignancy cannot be excluded.  Note: While these cells could be reactive mesothelial cells, the level of atypia could be seen in malignancy. A cell block was prepared but the atypical cells are not present in the cell block for further characterization. Correlate  clinically. Repeat tap/biopsy might be helpful if clinically indicated.     8/28/17 EBUS FNA     FNA of subcarinal lymph node: Positive for malignant cells  Small clusters of malignant epithelial cells, favor squamous carcinoma Tumor cells are positive for CK 5/6 and CK 7. Immunostains for p63 and TTF-1 are negative.     1/26/18 CT chest, abdomen,pelvis with cont         Comparison: June 12, 2017    Chest: Since prior exam there is been interval enlargement of the right pleural effusion. There is high density material seen within the right pleural effusion that was not visualized on prior exam.    There is a 5.4 cm enhancing mass seen at the left lung base that previously measured approximately 2.9 cm.    There is a subcarinal mass measuring 4.4 cm it previously measured 2.9 cm.    There is circumferential esophageal thickening adjacent to this mass.    There is volume loss in the right chest with mediastinal shift to the right more pronounced than what was seen on prior exam.    Patient is a right internal  jugular Port-A-Cath.    Abdomen/pelvis: The liver, spleen, adrenal glands, kidneys and pancreas show a normal contrasted appearance. There is no evidence bowel obstruction. There is no evidence of abdominal or pelvic lymphadenopathy. The osseous structures show degenerative change of the spine.   Impression       Since prior exam in the patient's subcarinal and left lung base masses have increased in size. In addition there is now high density material seen within the patient's left pleural effusion new from prior exam and concerning for hemorrhage possibly from the mass.         1/26/18 CT head with cont          Comparison: CT June 8, 2017    Technique: Contiguous axial images were acquired from vertex to skull base without contrast.    Findings: There is no evidence acute intracranial abnormality.  Specifically there is no evidence acute infarct, contusion, extra-axial fluid collection or midline shift.  The ventricles are normal in size and configuration.  The calvarium is intact.  The paranasal sinuses and mastoid air cells are clear.    There is no evidence of enhancing mass.   Impression       There is no evidence of enhancing mass within the cerebral parenchyma      2/9/18 FINAL PATHOLOGIC DIAGNOSIS  LEFT LUNG, PLEURAL FLUID (CYTOLOGY):  - Scant groups of atypical cells, malignancy cannot be excluded. Note: Scant atypical groups of cells are present. Differential diagnosis includes reactive atypia vs malignancy .  Immunohistochemistry for CK5/6 and p63 is non-contributory. Please clinically correlate and re-sample as indicated.  All stains have satisfactory positive and negative controls.      Component      Latest Ref Rng & Units 4/19/2018   WBC      3.90 - 12.70 K/uL 5.28   RBC      4.00 - 5.40 M/uL 2.49 (L)   Hemoglobin      12.0 - 16.0 g/dL 6.9 (L)   Hematocrit      37.0 - 48.5 % 22.5 (L)   MCV      82 - 98 fL 90   MCH      27.0 - 31.0 pg 27.7   MCHC      32.0 - 36.0 g/dL 30.7 (L)   RDW      11.5 - 14.5  % 16.8 (H)   Platelets      150 - 350 K/uL 196   MPV      9.2 - 12.9 fL 10.2   Immature Granulocytes      0.0 - 0.5 % 0.4   Gran # (ANC)      1.8 - 7.7 K/uL 3.5   Immature Grans (Abs)      0.00 - 0.04 K/uL 0.02   Lymph #      1.0 - 4.8 K/uL 1.0   Mono #      0.3 - 1.0 K/uL 0.8   Eos #      0.0 - 0.5 K/uL 0.0   Baso #      0.00 - 0.20 K/uL 0.02   nRBC      0 /100 WBC 0   Gran%      38.0 - 73.0 % 65.4   Lymph%      18.0 - 48.0 % 18.6   Mono%      4.0 - 15.0 % 15.0   Eosinophil%      0.0 - 8.0 % 0.2   Basophil%      0.0 - 1.9 % 0.4   Differential Method       Automated   Sodium      136 - 145 mmol/L 140   Potassium      3.5 - 5.1 mmol/L 3.0 (L)   Chloride      95 - 110 mmol/L 99   CO2      23 - 29 mmol/L 31 (H)   Glucose      70 - 110 mg/dL 114 (H)   BUN, Bld      8 - 23 mg/dL 9   Creatinine      0.5 - 1.4 mg/dL 0.8   Calcium      8.7 - 10.5 mg/dL 8.5 (L)   Total Protein      6.0 - 8.4 g/dL 7.9   Albumin      3.5 - 5.2 g/dL 2.4 (L)   Total Bilirubin      0.1 - 1.0 mg/dL 0.4   Alkaline Phosphatase      55 - 135 U/L 83   AST      10 - 40 U/L 10   ALT      10 - 44 U/L 6 (L)   Anion Gap      8 - 16 mmol/L 10   eGFR if African American      >60 mL/min/1.73 m:2 >60.0   eGFR if non African American      >60 mL/min/1.73 m:2 >60.0   Iron      30 - 160 ug/dL 21 (L)   Transferrin      200 - 375 mg/dL 116 (L)   TIBC      250 - 450 ug/dL 172 (L)   Saturated Iron      20 - 50 % 12 (L)   Magnesium      1.6 - 2.6 mg/dL 1.3 (L)     Assessment:     1. is a 73 y/o female who underwent bronchoscopy/EBUS evaluation on 8/31/17 for abnormal mediastinal adenopathy  and a 1.2 cm MORRO mass as well as pericardial adenopathy.FNA of subcarinal lymph node was positive for malignant cells.  There were small clusters of malignant epithelial cells, favoring squamous carcinoma. Site of origin is not clear. PET CT done on 9/7/17 did not reveal any lesion in head and neck region.  There was inadequate tissue to run PD L1.p16 was negative.     Esophageal thickening was noted on CT done on 6/12/17. EGD on 10/30/17 did not reveal any suspicious lesions in esophagus/stomach. ENT evaluation still pending.   I explained to her that if primary site is unclear, she will be treated as metastatic SCC of unknown primary.   Her treatment got delayed as she cancelled several appointments due to some issues at home. She said she could not have MRI due to claustrophobia, even with anti-anxiety medication. CT chest from 1/26/18 showed increase in the sizes of subcarinal and left lung base masses compared to previous CT in June 2017 . High density material was seen within the patient's left pleural effusion. No intracranial lesions noted on CT head. I discussed these findings with the patient and personally reviewed the CT scans from January 2018.  I told her that she has likely metastatic SCC of unknown primary. She does have RA history. It is unclear if she can tolerate check point inhibitor therapy even if she has high PD1 expression on cytology/biopsy.  She has started Carboplatin/Paclitaxel every 3 weeks. I have explained to her that rationale of treatment is palliation and not cure and she understands this.      2. Pleural effusion: She had left sided thoracentesis ( 1050ml) on 2/9/18. Malignancy could not be excluded. No  Indication for pleurex catheter/reepat thoracentesis at this time.       3. Anorexia: Secondary to #1. She is on Cyproheptadine.However, her appetite is still poor. She will start Decadron 2mg BID.       4. Anemia: Hgb 6.4 today. Normocytic, hypochromic. No overt bleeding. Iron panel on 4/19 shows iron deficiency checked before next visit. PRBC for Hgb <7.     5. Pruritus: Etiology unclear. Benadryl has not helped her. She will hold Cyproheptadine for a few days ( although it is a H1 antagonist)     6. Electrolytes: She has hypokalemia and hypomagnesimia: She will start oral potassium 40mEq daily. She is on HCTZ. She also had diarrhea in the  past 2 weeks, intermittently. She has hypomagnesemia. She will also start Magnesium supplements.     7. Diarrhea: Chemotherapy induced. Imodium is not helping her. Lomotil prescribed today.        Plan:  1. C4 Carbo/Taxol today  2. CBC, CMP, iron, ferritin, TIBC and f/u in 3 weeks  3. Continue Periactin and start Decadron for anorexia  4. Start oral potassium and Magnesium daily  5. Lomotil for chemotherapy induced diarrhea       Distress Screening Results: Psychosocial Distress screening score of Distress Score: 3 noted and reviewed. No intervention indicated.

## 2018-04-19 NOTE — PLAN OF CARE
Problem: Patient Care Overview  Goal: Discharge Needs Assessment  Outcome: Ongoing (interventions implemented as appropriate)  Pt tolerated 1u PRBC, IVF carbo taxol well, no infusion reaction, vitals remained stable, PAC flushed hep locked site covered with band aid, neulasta onbody applied questions answered to pt satisfaction, instructed to contact MD office with any questions or concerns pt verbalized understanding, leaves clinic in transport w/c NAD noted future appt reviewed avs given.

## 2018-04-23 LAB
BLD PROD TYP BPU: NORMAL
BLD PROD TYP BPU: NORMAL
BLOOD UNIT EXPIRATION DATE: NORMAL
BLOOD UNIT EXPIRATION DATE: NORMAL
BLOOD UNIT TYPE CODE: 5100
BLOOD UNIT TYPE CODE: 5100
BLOOD UNIT TYPE: NORMAL
BLOOD UNIT TYPE: NORMAL
CODING SYSTEM: NORMAL
CODING SYSTEM: NORMAL
DISPENSE STATUS: NORMAL
DISPENSE STATUS: NORMAL
TRANS ERYTHROCYTES VOL PATIENT: NORMAL ML
TRANS ERYTHROCYTES VOL PATIENT: NORMAL ML

## 2018-04-26 ENCOUNTER — LAB VISIT (OUTPATIENT)
Dept: LAB | Facility: HOSPITAL | Age: 73
End: 2018-04-26
Attending: INTERNAL MEDICINE
Payer: MEDICARE

## 2018-04-26 DIAGNOSIS — D63.0 ANEMIA IN NEOPLASTIC DISEASE: ICD-10-CM

## 2018-04-26 DIAGNOSIS — C34.92 SQUAMOUS CELL LUNG CANCER, LEFT: ICD-10-CM

## 2018-04-26 LAB
ABO + RH BLD: NORMAL
BASOPHILS # BLD AUTO: 0.02 K/UL
BASOPHILS NFR BLD: 0.3 %
BLD GP AB SCN CELLS X3 SERPL QL: NORMAL
DIFFERENTIAL METHOD: ABNORMAL
EOSINOPHIL # BLD AUTO: 0.1 K/UL
EOSINOPHIL NFR BLD: 1 %
ERYTHROCYTE [DISTWIDTH] IN BLOOD BY AUTOMATED COUNT: 16.8 %
HCT VFR BLD AUTO: 24.3 %
HGB BLD-MCNC: 7.5 G/DL
IMM GRANULOCYTES # BLD AUTO: 0.06 K/UL
IMM GRANULOCYTES NFR BLD AUTO: 0.8 %
LYMPHOCYTES # BLD AUTO: 1.3 K/UL
LYMPHOCYTES NFR BLD: 17.8 %
MCH RBC QN AUTO: 28.4 PG
MCHC RBC AUTO-ENTMCNC: 30.9 G/DL
MCV RBC AUTO: 92 FL
MONOCYTES # BLD AUTO: 0.7 K/UL
MONOCYTES NFR BLD: 9.5 %
NEUTROPHILS # BLD AUTO: 5.1 K/UL
NEUTROPHILS NFR BLD: 70.6 %
NRBC BLD-RTO: 0 /100 WBC
PLATELET # BLD AUTO: 172 K/UL
PMV BLD AUTO: 10.9 FL
RBC # BLD AUTO: 2.64 M/UL
WBC # BLD AUTO: 7.25 K/UL

## 2018-04-26 PROCEDURE — 86901 BLOOD TYPING SEROLOGIC RH(D): CPT

## 2018-04-26 PROCEDURE — 85025 COMPLETE CBC W/AUTO DIFF WBC: CPT

## 2018-04-26 PROCEDURE — 36415 COLL VENOUS BLD VENIPUNCTURE: CPT

## 2018-05-11 ENCOUNTER — TELEPHONE (OUTPATIENT)
Dept: HEMATOLOGY/ONCOLOGY | Facility: CLINIC | Age: 73
End: 2018-05-11

## 2018-05-11 NOTE — TELEPHONE ENCOUNTER
----- Message from Krystin Méndez RN sent at 5/11/2018  8:06 AM CDT -----  Contact: Pt  Please call pt and reschedule missed appts on yesterday.  Thanks  ----- Message -----  From: Jessica Patrick  Sent: 5/11/2018   7:59 AM  To: Krystin Méndez RN    Pt called to reschedule appt that was missed on 5/10 and pt is worried please give her a call   Will need appts for Infusion & Non Fasting Labs & Appt with Dr. Wong  Callback#[phone]  Thank You  PARESH Patrick

## 2018-05-11 NOTE — TELEPHONE ENCOUNTER
----- Message from Krystin Méndez RN sent at 5/11/2018  8:06 AM CDT -----  Contact: Pt  Please call pt and reschedule missed appts on yesterday.  Thanks  ----- Message -----  From: Jessica Patrick  Sent: 5/11/2018   7:59 AM  To: Krystin Méndez RN    Pt called to reschedule appt that was missed on 5/10 and pt is worried please give her a call   Will need appts for Infusion & Non Fasting Labs & Appt with Dr. Wong  Callback#[phone]  Thank You  PARESH Patrick

## 2018-05-14 PROBLEM — I31.39 PERICARDIAL EFFUSION: Status: ACTIVE | Noted: 2018-05-14

## 2018-05-15 ENCOUNTER — HOSPITAL ENCOUNTER (INPATIENT)
Facility: HOSPITAL | Age: 73
LOS: 1 days | Discharge: HOME | DRG: 315 | End: 2018-05-15
Attending: INTERNAL MEDICINE | Admitting: INTERNAL MEDICINE
Payer: MEDICARE

## 2018-05-15 VITALS
RESPIRATION RATE: 18 BRPM | BODY MASS INDEX: 25.42 KG/M2 | HEIGHT: 67 IN | WEIGHT: 161.94 LBS | SYSTOLIC BLOOD PRESSURE: 99 MMHG | DIASTOLIC BLOOD PRESSURE: 65 MMHG | HEART RATE: 104 BPM | OXYGEN SATURATION: 95 % | TEMPERATURE: 98 F

## 2018-05-15 DIAGNOSIS — I31.31 MALIGNANT PERICARDIAL EFFUSION: ICD-10-CM

## 2018-05-15 DIAGNOSIS — I31.39 PERICARDIAL EFFUSION: ICD-10-CM

## 2018-05-15 PROBLEM — T73.0XXS MALNUTRITION DUE TO STARVATION: Status: ACTIVE | Noted: 2018-05-15

## 2018-05-15 PROBLEM — E46 MALNUTRITION DUE TO STARVATION: Status: ACTIVE | Noted: 2018-05-15

## 2018-05-15 LAB
ABO + RH BLD: NORMAL
ALBUMIN SERPL BCP-MCNC: 2.5 G/DL
ALP SERPL-CCNC: 84 U/L
ALT SERPL W/O P-5'-P-CCNC: 6 U/L
ANION GAP SERPL CALC-SCNC: 9 MMOL/L
AORTIC VALVE REGURGITATION: ABNORMAL
AST SERPL-CCNC: 10 U/L
BASOPHILS # BLD AUTO: 0.01 K/UL
BASOPHILS NFR BLD: 0.1 %
BILIRUB SERPL-MCNC: 0.4 MG/DL
BLD GP AB SCN CELLS X3 SERPL QL: NORMAL
BLD PROD TYP BPU: NORMAL
BLOOD GROUP ANTIBODIES SERPL: NORMAL
BLOOD UNIT EXPIRATION DATE: NORMAL
BLOOD UNIT TYPE CODE: 5100
BLOOD UNIT TYPE: NORMAL
BUN SERPL-MCNC: 12 MG/DL
CALCIUM SERPL-MCNC: 9.2 MG/DL
CHLORIDE SERPL-SCNC: 100 MMOL/L
CO2 SERPL-SCNC: 28 MMOL/L
CODING SYSTEM: NORMAL
CREAT SERPL-MCNC: 0.8 MG/DL
DIFFERENTIAL METHOD: ABNORMAL
DISPENSE STATUS: NORMAL
EOSINOPHIL # BLD AUTO: 0.1 K/UL
EOSINOPHIL NFR BLD: 0.8 %
ERYTHROCYTE [DISTWIDTH] IN BLOOD BY AUTOMATED COUNT: 16.9 %
EST. GFR  (AFRICAN AMERICAN): >60 ML/MIN/1.73 M^2
EST. GFR  (NON AFRICAN AMERICAN): >60 ML/MIN/1.73 M^2
ESTIMATED PA SYSTOLIC PRESSURE: 46.94
GLOBAL PERICARDIAL EFFUSION: ABNORMAL
GLUCOSE SERPL-MCNC: 113 MG/DL
HCT VFR BLD AUTO: 20.7 %
HGB BLD-MCNC: 6.3 G/DL
IMM GRANULOCYTES # BLD AUTO: 0.03 K/UL
IMM GRANULOCYTES NFR BLD AUTO: 0.4 %
INR PPP: 1.2
LYMPHOCYTES # BLD AUTO: 1.2 K/UL
LYMPHOCYTES NFR BLD: 16.6 %
MAGNESIUM SERPL-MCNC: 1.4 MG/DL
MCH RBC QN AUTO: 28.9 PG
MCHC RBC AUTO-ENTMCNC: 30.4 G/DL
MCV RBC AUTO: 95 FL
MONOCYTES # BLD AUTO: 0.8 K/UL
MONOCYTES NFR BLD: 11.7 %
NEUTROPHILS # BLD AUTO: 5 K/UL
NEUTROPHILS NFR BLD: 70.4 %
NRBC BLD-RTO: 0 /100 WBC
NUM UNITS TRANS PACKED RBC: NORMAL
PLATELET # BLD AUTO: 100 K/UL
PMV BLD AUTO: 11 FL
POTASSIUM SERPL-SCNC: 4.3 MMOL/L
PROT SERPL-MCNC: 8.1 G/DL
PROTHROMBIN TIME: 12.4 SEC
RBC # BLD AUTO: 2.18 M/UL
RETIRED EF AND QEF - SEE NOTES: 65 (ref 55–65)
SODIUM SERPL-SCNC: 137 MMOL/L
TRICUSPID VALVE REGURGITATION: ABNORMAL
WBC # BLD AUTO: 7.12 K/UL

## 2018-05-15 PROCEDURE — 25000003 PHARM REV CODE 250: Performed by: INTERNAL MEDICINE

## 2018-05-15 PROCEDURE — 86644 CMV ANTIBODY: CPT

## 2018-05-15 PROCEDURE — 86870 RBC ANTIBODY IDENTIFICATION: CPT

## 2018-05-15 PROCEDURE — 93306 TTE W/DOPPLER COMPLETE: CPT

## 2018-05-15 PROCEDURE — 36430 TRANSFUSION BLD/BLD COMPNT: CPT

## 2018-05-15 PROCEDURE — 80053 COMPREHEN METABOLIC PANEL: CPT

## 2018-05-15 PROCEDURE — 99222 1ST HOSP IP/OBS MODERATE 55: CPT | Mod: AI,GC,, | Performed by: INTERNAL MEDICINE

## 2018-05-15 PROCEDURE — 93306 TTE W/DOPPLER COMPLETE: CPT | Mod: 26,,, | Performed by: INTERNAL MEDICINE

## 2018-05-15 PROCEDURE — 85610 PROTHROMBIN TIME: CPT

## 2018-05-15 PROCEDURE — 20600001 HC STEP DOWN PRIVATE ROOM

## 2018-05-15 PROCEDURE — 86922 COMPATIBILITY TEST ANTIGLOB: CPT

## 2018-05-15 PROCEDURE — 83735 ASSAY OF MAGNESIUM: CPT

## 2018-05-15 PROCEDURE — 36415 COLL VENOUS BLD VENIPUNCTURE: CPT

## 2018-05-15 PROCEDURE — P9016 RBC LEUKOCYTES REDUCED: HCPCS

## 2018-05-15 PROCEDURE — 86901 BLOOD TYPING SEROLOGIC RH(D): CPT

## 2018-05-15 PROCEDURE — 63600175 PHARM REV CODE 636 W HCPCS: Performed by: INTERNAL MEDICINE

## 2018-05-15 PROCEDURE — 85025 COMPLETE CBC W/AUTO DIFF WBC: CPT

## 2018-05-15 RX ORDER — PANTOPRAZOLE SODIUM 40 MG/1
40 TABLET, DELAYED RELEASE ORAL DAILY
Status: DISCONTINUED | OUTPATIENT
Start: 2018-05-15 | End: 2018-05-15 | Stop reason: HOSPADM

## 2018-05-15 RX ORDER — OXYCODONE HYDROCHLORIDE 5 MG/1
5 TABLET ORAL EVERY 6 HOURS PRN
Status: DISCONTINUED | OUTPATIENT
Start: 2018-05-15 | End: 2018-05-15 | Stop reason: HOSPADM

## 2018-05-15 RX ORDER — SODIUM CHLORIDE 0.9 % (FLUSH) 0.9 %
5 SYRINGE (ML) INJECTION
Status: DISCONTINUED | OUTPATIENT
Start: 2018-05-15 | End: 2018-05-15 | Stop reason: HOSPADM

## 2018-05-15 RX ORDER — CYPROHEPTADINE HYDROCHLORIDE 4 MG/1
4 TABLET ORAL 3 TIMES DAILY
Status: DISCONTINUED | OUTPATIENT
Start: 2018-05-15 | End: 2018-05-15 | Stop reason: HOSPADM

## 2018-05-15 RX ORDER — AMOXICILLIN 250 MG
1 CAPSULE ORAL 2 TIMES DAILY
Status: DISCONTINUED | OUTPATIENT
Start: 2018-05-15 | End: 2018-05-15 | Stop reason: HOSPADM

## 2018-05-15 RX ORDER — ONDANSETRON 8 MG/1
8 TABLET, ORALLY DISINTEGRATING ORAL EVERY 8 HOURS PRN
Status: DISCONTINUED | OUTPATIENT
Start: 2018-05-15 | End: 2018-05-15 | Stop reason: HOSPADM

## 2018-05-15 RX ORDER — DEXAMETHASONE 1 MG/1
2 TABLET ORAL EVERY 12 HOURS
Status: DISCONTINUED | OUTPATIENT
Start: 2018-05-15 | End: 2018-05-15 | Stop reason: HOSPADM

## 2018-05-15 RX ORDER — HYDROCHLOROTHIAZIDE 25 MG/1
25 TABLET ORAL DAILY
Status: DISCONTINUED | OUTPATIENT
Start: 2018-05-15 | End: 2018-05-15

## 2018-05-15 RX ORDER — MAGNESIUM SULFATE HEPTAHYDRATE 40 MG/ML
2 INJECTION, SOLUTION INTRAVENOUS ONCE
Status: COMPLETED | OUTPATIENT
Start: 2018-05-15 | End: 2018-05-15

## 2018-05-15 RX ORDER — HYDROCODONE BITARTRATE AND ACETAMINOPHEN 500; 5 MG/1; MG/1
TABLET ORAL
Status: DISCONTINUED | OUTPATIENT
Start: 2018-05-15 | End: 2018-05-15 | Stop reason: HOSPADM

## 2018-05-15 RX ORDER — ESCITALOPRAM OXALATE 10 MG/1
10 TABLET ORAL DAILY
Status: DISCONTINUED | OUTPATIENT
Start: 2018-05-15 | End: 2018-05-15 | Stop reason: HOSPADM

## 2018-05-15 RX ORDER — LORAZEPAM 0.5 MG/1
0.5 TABLET ORAL 2 TIMES DAILY
Status: DISCONTINUED | OUTPATIENT
Start: 2018-05-15 | End: 2018-05-15 | Stop reason: HOSPADM

## 2018-05-15 RX ORDER — OXYCODONE HYDROCHLORIDE 5 MG/1
10 TABLET ORAL EVERY 6 HOURS PRN
Status: DISCONTINUED | OUTPATIENT
Start: 2018-05-15 | End: 2018-05-15 | Stop reason: HOSPADM

## 2018-05-15 RX ORDER — LOSARTAN POTASSIUM 50 MG/1
100 TABLET ORAL DAILY
Status: DISCONTINUED | OUTPATIENT
Start: 2018-05-15 | End: 2018-05-15 | Stop reason: HOSPADM

## 2018-05-15 RX ORDER — MORPHINE SULFATE 15 MG/1
15 TABLET, FILM COATED, EXTENDED RELEASE ORAL 2 TIMES DAILY
Status: DISCONTINUED | OUTPATIENT
Start: 2018-05-15 | End: 2018-05-15 | Stop reason: HOSPADM

## 2018-05-15 RX ORDER — ACETAMINOPHEN 325 MG/1
650 TABLET ORAL ONCE
Status: COMPLETED | OUTPATIENT
Start: 2018-05-15 | End: 2018-05-15

## 2018-05-15 RX ORDER — RAMELTEON 8 MG/1
8 TABLET ORAL ONCE
Status: COMPLETED | OUTPATIENT
Start: 2018-05-15 | End: 2018-05-15

## 2018-05-15 RX ADMIN — ACETAMINOPHEN 650 MG: 325 TABLET ORAL at 01:05

## 2018-05-15 RX ADMIN — PANTOPRAZOLE SODIUM 40 MG: 40 TABLET, DELAYED RELEASE ORAL at 09:05

## 2018-05-15 RX ADMIN — STANDARDIZED SENNA CONCENTRATE AND DOCUSATE SODIUM 1 TABLET: 8.6; 5 TABLET, FILM COATED ORAL at 09:05

## 2018-05-15 RX ADMIN — ESCITALOPRAM OXALATE 10 MG: 10 TABLET ORAL at 09:05

## 2018-05-15 RX ADMIN — LORAZEPAM 0.5 MG: 0.5 TABLET ORAL at 09:05

## 2018-05-15 RX ADMIN — MAGNESIUM SULFATE IN WATER 2 G: 40 INJECTION, SOLUTION INTRAVENOUS at 05:05

## 2018-05-15 RX ADMIN — DEXAMETHASONE 2 MG: 1 TABLET ORAL at 09:05

## 2018-05-15 RX ADMIN — CYPROHEPTADINE HYDROCHLORIDE 4 MG: 4 TABLET ORAL at 09:05

## 2018-05-15 RX ADMIN — LOSARTAN POTASSIUM 100 MG: 50 TABLET, FILM COATED ORAL at 09:05

## 2018-05-15 RX ADMIN — RAMELTEON 8 MG: 8 TABLET, FILM COATED ORAL at 01:05

## 2018-05-15 NOTE — PLAN OF CARE
Problem: Patient Care Overview  Goal: Plan of Care Review  Outcome: Ongoing (interventions implemented as appropriate)  Plan of care reviewed with the patient upon admission. Pt transferred from [facility] for pericardial effusion. Cards consulted. Pt remains tachycardic. -110. She denies chest pain. Pt given tylenol for headache. She denies any other pain, n/v/d. She reports a poor appetite and weight loss at home. Dietary consulted. Fall precautions maintained. Pt remained free from falls and injury this shift. Bed locked in lowest position, side rails up x2, call light within reach. Instructed pt to call for assistance as needed. Pt verbalized understanding. She is currently up independently. No acute issues overnight. Will continue to monitor.

## 2018-05-15 NOTE — ASSESSMENT & PLAN NOTE
Malnutrition in the context of Chronic Illness/Injury    Related to (etiology):  Chemotherapy treatment/ decreased appetite/ altered taste    Signs and Symptoms (as evidenced by):  Energy Intake: <50% of estimated energy requirement for 8 mo  Body Fat Depletion: moderate depletion of orbitals, triceps and thoracic and lumbar region   Muscle Mass Depletion: moderate depletion of temples, clavicle region and scapular region   Weight Loss24% x 8 mo   Fluid Accumulation: mild and severe    Interventions/Recommendations (treatment strategy):  See RD recs    Nutrition Diagnosis Status:  New

## 2018-05-15 NOTE — HPI
Ms. Capellan is a pleasent 71 yo lady w/ stage 4 SCC of unknown primary s/p 4C palliative carboplatin & paxlitaxel transferred from [facility] for pericardial effusion.  This morning she developed a non-radiating, sharp, right-sided chest pain.  The pain spontaneously resolved, but came back intermittently during the day and was made worse with deep inspiration.  She denies any dyspnea, palpitations, light headedness, N/V, F/C but does feel more weak than usual.  She presented to the [facility] ER where she was initially suspected of having a PE.  She was given aspirin and had a CTA chest that was negative for PE but showed a pericardial effusion concerning for malignant pleural effusion.  Patient was transferred to [facility] for further evaluation with cardiolgy and possible pericardiocentesis.

## 2018-05-15 NOTE — ASSESSMENT & PLAN NOTE
DDx includes viral pericarditis vs malignant pericardial effusion.  Given her immunosuppressed state, it is possible that she may have a pericardial effusion 2/2 to viral infection, but will need pericardial fluid studies for further evaluation of this Dx.  Given her metastatic disease, highly suspect her current presentation is 2/2 to malignant pericardial effusion.  Given her extensive disease burden, spoke with patient at some length regarding code status and she states that she has two children who depend on her and wants to be full code for their sake.  No evidence of tamponade on physical exam.  -TTE w/ CFD pending  -Cardiology consult in am

## 2018-05-15 NOTE — ASSESSMENT & PLAN NOTE
Currently on palliative chemo as outpatient.  Patient c/o distal UE paresthesia that has developed since starting chemotherapy.  -Pain control w/ Morphine 12h 15 mg po BID  -Continue Dexamethasone 2 mg po BID

## 2018-05-15 NOTE — HOSPITAL COURSE
Ms. Capellan is a pleasent 71 yo lady w/ stage 4 SCC of unknown primary s/p 4C palliative carboplatin & paxlitaxel transferred from [facility] for pericardial effusion. In the hospital, she had a echo performed which showed minimal fluid collection. She was evaluated by cardiology who does not believe a pericardiocentesis is recommended at this time. Recommend follow up echo in 3-4 weeks outpatient. Also, per cardiology, the echo showed the appearance of likely malignant cells on the visceral pericardium. She is now asymptomatic and is safe for discharge.

## 2018-05-15 NOTE — HOSPITAL COURSE
Patient presented to the hospital with chest pain. Her troponin was normal and there were no EKG changed. However she was found to have a small pleural effusion. Cardiology was consulted and an echo was performed. The echo showed a small amount of pleural effusion with changes to the pleural lining concerning for malignancy spread. However the fluid amount was too small to be tapped. The patient's chest pain resolved in the hospital. She will need a repeat echo in 3-4 weeks. She is safe for discharge.

## 2018-05-15 NOTE — SUBJECTIVE & OBJECTIVE
Past Medical History:   Diagnosis Date    Encounter for blood transfusion     GERD (gastroesophageal reflux disease)     HTN (hypertension)     Rheumatoid arthritis     Rheumatoid arthritis(714.0)     Squamous cell lung cancer, left 2/15/2018       Past Surgical History:   Procedure Laterality Date    APPENDECTOMY      COLONOSCOPY      COLONOSCOPY N/A 10/30/2017    Procedure: COLONOSCOPY;  Surgeon: Quentin Coppola MD;  Location: [address]);  Service: Endoscopy;  Laterality: N/A;    HYSTERECTOMY      SKIN BIOPSY      TOTAL KNEE ARTHROPLASTY      right       Review of patient's allergies indicates:   Allergen Reactions    Latex, natural rubber     Codeine Hallucinations    Cortisporin [neomycin-bacitracin-poly-hc]     Latex, natural rubber Rash       Current Facility-Administered Medications on File Prior to Encounter   Medication    [COMPLETED] aspirin tablet 325 mg    [COMPLETED] omnipaque 350 iohexol 350 mg iodine/mL     Current Outpatient Prescriptions on File Prior to Encounter   Medication Sig    cyproheptadine (PERIACTIN) 4 mg tablet Take 1 tablet (4 mg total) by mouth 3 (three) times daily as needed.    dexamethasone (DECADRON) 2 MG tablet Take 1 tablet (2 mg total) by mouth every 12 (twelve) hours.    diphenoxylate-atropine 2.5-0.025 mg (LOMOTIL) 2.5-0.025 mg per tablet Take 1 tablet by mouth 4 (four) times daily as needed for Diarrhea.    escitalopram oxalate (LEXAPRO) 10 MG tablet Take 1 tablet (10 mg total) by mouth once daily.    hydroCHLOROthiazide (HYDRODIURIL) 25 MG tablet Take 1 tablet (25 mg total) by mouth once daily.    LORazepam (ATIVAN) 0.5 MG tablet TAKE 1 TABLET (0.5 MG TOTAL) BY MOUTH 2 (TWO) TIMES DAILY.    losartan (COZAAR) 100 MG tablet Take 1 tablet (100 mg total) by mouth once daily.    methotrexate 2.5 MG Tab     morphine (MS CONTIN) 15 MG 12 hr tablet Take 1 tablet (15 mg total) by mouth 2 (two) times daily.    pantoprazole (PROTONIX) 40 MG tablet  Take 1 tablet (40 mg total) by mouth once daily.    prochlorperazine (COMPAZINE) 10 MG tablet Take 1 tablet (10 mg total) by mouth every 6 (six) hours as needed.    potassium chloride SA (K-DUR,KLOR-CON) 20 MEQ tablet Take 2 tablets (40 mEq total) by mouth once daily.     Family History     Problem Relation (Age of Onset)    Hypertension     Kidney disease         Social History Main Topics    Smoking status: Former Smoker    Smokeless tobacco: Never Used    Alcohol use Yes      Comment: Occasionally    Drug use: No    Sexual activity: Not Currently     Review of Systems   Constitution: Negative for chills and fever.   HENT: Negative for stridor.    Cardiovascular: Positive for chest pain (pressure-like pain that has resolved). Negative for dyspnea on exertion, leg swelling, near-syncope and syncope.   Respiratory: Negative for cough, hemoptysis and shortness of breath.    Gastrointestinal: Negative for abdominal pain, constipation, diarrhea, hematemesis, hematochezia, nausea and vomiting.   Genitourinary: Negative for frequency and hematuria.   Neurological: Positive for light-headedness (began 5/14).   Psychiatric/Behavioral: Negative for altered mental status.     Objective:     Vital Signs (Most Recent):  Temp: 98.4 °F (36.9 °C) (05/15/18 0450)  Pulse: 100 (05/15/18 0450)  Resp: 20 (05/15/18 0450)  BP: 101/65 (05/15/18 0450)  SpO2: 95 % (05/15/18 0450) Vital Signs (24h Range):  Temp:  [97.8 °F (36.6 °C)-98.7 °F (37.1 °C)] 98.4 °F (36.9 °C)  Pulse:  [100-118] 100  Resp:  [17-22] 20  SpO2:  [92 %-98 %] 95 %  BP: (101-120)/(64-73) 101/65     Weight: 73.5 kg (161 lb 14.9 oz)  Body mass index is 25.36 kg/m².    SpO2: 95 %  O2 Device (Oxygen Therapy): room air      Intake/Output Summary (Last 24 hours) at 05/15/18 0708  Last data filed at 05/15/18 0100   Gross per 24 hour   Intake              180 ml   Output                0 ml   Net              180 ml       Lines/Drains/Airways     Central Venous Catheter  Line                 Port A Cath Single Lumen 11/14/17 1008 right internal jugular 181 days          Peripheral Intravenous Line                 Peripheral IV - Single Lumen 05/14/18 1618 Right Antecubital less than 1 day                Physical Exam   Constitutional: She is oriented to person, place, and time. She appears well-developed and well-nourished.   HENT:   Head: Normocephalic and atraumatic.   Eyes: Pupils are equal, round, and reactive to light. No scleral icterus.   Neck: No JVD present.   Mild JV elevation to mid-neck   without obvious JV distention    Cardiovascular: Exam reveals no friction rub.    No murmur heard.  Mildly tachycardic  S1, S2, S3.      Pulmonary/Chest: Effort normal. No stridor. She has no wheezes. She has no rales.   Decreased sounds b/l through entire lung fields  No BS in b/l LLs   Abdominal: Soft. There is no guarding.   Musculoskeletal: She exhibits no edema.   Neurological: She is alert and oriented to person, place, and time. No cranial nerve deficit.   Skin: Skin is warm and dry.   Nursing note and vitals reviewed.      Significant Labs:     Recent Results (from the past 24 hour(s))   POCT CMP    Collection Time: 05/14/18  4:37 PM   Result Value Ref Range    Albumin, POC 3.0 (L) 3.3 - 5.5 g/dL    Alkaline Phosphatase, POC 76 42 - 141 U/L    ALT (SGPT), POC 12 10 - 47 U/L    AST (SGOT), POC 20 11 - 38 U/L    POC BUN 12 7 - 22 mg/dL    Calcium, POC 9.3 8.0 - 10.3 mg/dL    POC Chloride 98 98 - 108 mmol/L    POC Creatinine 0.9 0.6 - 1.2 mg/dL    POC Glucose 121 (H) 73 - 118 mg/dL    POC Potassium 3.9 3.6 - 5.1 mmol/L    POC Sodium 138 128 - 145 mmol/L    Bilirubin 0.5 0.2 - 1.6 mg/dL    POC TCO2 27 18 - 33 mmol/L    Protein 9.0 (H) 6.4 - 8.1 g/dL   Troponin ISTAT    Collection Time: 05/14/18  4:41 PM   Result Value Ref Range    POC Cardiac Troponin I 0.01 <0.09 ng/mL    Sample NATALIYA    POCT URINALYSIS W/O SCOPE    Collection Time: 05/14/18  4:55 PM   Result Value Ref Range     Glucose, UA Negative (NG)     Bilirubin, UA 1+ (A)     Ketones, UA Negative (NG)     Spec Grav UA 1.015     Blood, UA Negative (NG)     PH, UA 7.0     Protein, UA 1+ (A)     Urobilinogen, UA 1.0 E.U./dL    Nitrite, UA Negative (NG)     Leukocytes, UA Negative (NG)     Color, UA Yellow     Clarity, UA Clear    POCT B-type natriuretic peptide (BNP)    Collection Time: 05/14/18  5:00 PM   Result Value Ref Range    POC B-Type Natriuretic Peptide 256 (H) 0.0 - 100.0 pg/mL   POCT D Dimer    Collection Time: 05/14/18  5:03 PM   Result Value Ref Range    POC D-DI >5000 (H) 0.0 - 450.0 ng/mL   CBC auto differential    Collection Time: 05/15/18  1:49 AM   Result Value Ref Range    WBC 7.12 3.90 - 12.70 K/uL    RBC 2.18 (L) 4.00 - 5.40 M/uL    Hemoglobin 6.3 (L) 12.0 - 16.0 g/dL    Hematocrit 20.7 (L) 37.0 - 48.5 %    MCV 95 82 - 98 fL    MCH 28.9 27.0 - 31.0 pg    MCHC 30.4 (L) 32.0 - 36.0 g/dL    RDW 16.9 (H) 11.5 - 14.5 %    Platelets 100 (L) 150 - 350 K/uL    MPV 11.0 9.2 - 12.9 fL    Immature Granulocytes 0.4 0.0 - 0.5 %    Gran # (ANC) 5.0 1.8 - 7.7 K/uL    Immature Grans (Abs) 0.03 0.00 - 0.04 K/uL    Lymph # 1.2 1.0 - 4.8 K/uL    Mono # 0.8 0.3 - 1.0 K/uL    Eos # 0.1 0.0 - 0.5 K/uL    Baso # 0.01 0.00 - 0.20 K/uL    nRBC 0 0 /100 WBC    Gran% 70.4 38.0 - 73.0 %    Lymph% 16.6 (L) 18.0 - 48.0 %    Mono% 11.7 4.0 - 15.0 %    Eosinophil% 0.8 0.0 - 8.0 %    Basophil% 0.1 0.0 - 1.9 %    Differential Method Automated    Comprehensive metabolic panel    Collection Time: 05/15/18  1:49 AM   Result Value Ref Range    Sodium 137 136 - 145 mmol/L    Potassium 4.3 3.5 - 5.1 mmol/L    Chloride 100 95 - 110 mmol/L    CO2 28 23 - 29 mmol/L    Glucose 113 (H) 70 - 110 mg/dL    BUN, Bld 12 8 - 23 mg/dL    Creatinine 0.8 0.5 - 1.4 mg/dL    Calcium 9.2 8.7 - 10.5 mg/dL    Total Protein 8.1 6.0 - 8.4 g/dL    Albumin 2.5 (L) 3.5 - 5.2 g/dL    Total Bilirubin 0.4 0.1 - 1.0 mg/dL    Alkaline Phosphatase 84 55 - 135 U/L    AST 10 10 - 40 U/L     ALT 6 (L) 10 - 44 U/L    Anion Gap 9 8 - 16 mmol/L    eGFR if African American >60.0 >60 mL/min/1.73 m^2    eGFR if non African American >60.0 >60 mL/min/1.73 m^2   Magnesium    Collection Time: 05/15/18  1:49 AM   Result Value Ref Range    Magnesium 1.4 (L) 1.6 - 2.6 mg/dL   Protime-INR    Collection Time: 05/15/18  1:49 AM   Result Value Ref Range    Prothrombin Time 12.4 9.0 - 12.5 sec    INR 1.2 0.8 - 1.2     Microbiology Results (last 7 days)     ** No results found for the last 168 hours. **            Significant Imaging:    CTA 5/14/18:  1. Interval development of moderate pericardial effusion with mild pericardial enhancement. Findings may reflect malignant pericardial effusion.  2. No evidence of PE.  3. Patient with known metastatic cancer. Relatively stable size mass in the right perihilar/subcarinal region. Interval reduced size of previously visualized multifocal left lung metastatic lesions and anterior mediastinal lymphadenopathy. Cardiac   phrenic lymph nodes have increased in size. Persistent abnormal prominent circumferential distal esophageal wall thickening versus surrounding soft tissue mass. Examination was performed in an emergency setting and not to serve as a restaging scan.  4. Moderate left and small right pleural effusions resulting in volume loss and compressive atelectasis within the lower lobes, left greater than right.      CXR 5/14/18:  Right-sided chest port is visualized in stable position.  Heart is enlarged.  Lungs are hypoinflated which accentuates pulmonary vascular markings, however there is suspected mild pulmonary edema and small bilateral effusions.  There is mild bibasilar atelectasis.  Marked interval reduction in size of previously visualized large left upper lobe lesion.  No evidence of pneumothorax.

## 2018-05-15 NOTE — PROGRESS NOTES
O2- Pt on room air sating %  Activity-Pt ambulates independently  Devices- Pt not being sent home on any devices.   Tolerating-Pt tolerating PO diet and medication.  Elimination-Pt voiding and having bowel movements independently.  Self Care- Pt able to do personal hygiene independently  Teaching- Pt instructed on when to take home meds.     Pt received one unit RBCs. Pt's peripheral IVs removed. Cath tipsintact. Pt tolerated well. VS are WDL. AVS and prescriptions given to pt. All questions answered. Pt verbalized understanding. Pt departed via wheelchair with family.

## 2018-05-15 NOTE — HPI
Ms. Capellan is a pleasent 71 yo lady w/ stage 4 SCC of unknown primary s/p 4C palliative carboplatin & paxlitaxel transferred from [facility] ER for pericardial effusion.  This morning she developed a non-radiating, sharp, right-sided chest pain.  The pain spontaneously resolved, but came back intermittently during the day and was made worse with deep inspiration.  She denies any dyspnea, palpitations, light headedness, N/V, F/C but does feel more weak than usual.  She presented to the [facility] ER where she was initially suspected of having a PE.  She was given aspirin and had a CTA chest that was negative for PE but showed a pericardial effusion concerning for malignant pleural effusion.  Patient was transferred to [facility] for further evaluation with cardiolgy and possible pericardiocentesis.    Oncology History:     Diagnosis: Squamous cell carcinoma , primary site unknown    Molecular/genetic testing: p16 negative; PD L1 could not be run due to inadequate tissue   Stage:        Stage 4   Treatment: Carboplatin/paclitaxel

## 2018-05-15 NOTE — H&P
[facility]  Hematology/Oncology  H&P    Patient Name: Silvia Capellan  MRN: [id number]  Admission Date: 5/15/2018  Code Status: Full Code   Attending Provider: Nelson Love DO, F*  Primary Care Physician: Ginette Rosado MD  Principal Problem:Pericardial effusion    Subjective:     HPI: Ms. Capellan is a pleasent 73 yo lady w/ stage 4 SCC of unknown primary s/p 4C palliative carboplatin & paxlitaxel transferred from [facility] ER for pericardial effusion.  This morning she developed a non-radiating, sharp, right-sided chest pain.  The pain spontaneously resolved, but came back intermittently during the day and was made worse with deep inspiration.  She denies any dyspnea, palpitations, light headedness, N/V, F/C but does feel more weak than usual.  She presented to the [facility] ER where she was initially suspected of having a PE.  She was given aspirin and had a CTA chest that was negative for PE but showed a pericardial effusion concerning for malignant pleural effusion.  Patient was transferred to [facility] for further evaluation with cardiolgy and possible pericardiocentesis.    Oncology History:     Diagnosis: Squamous cell carcinoma , primary site unknown    Molecular/genetic testing: p16 negative; PD L1 could not be run due to inadequate tissue   Stage:        Stage 4   Treatment: Carboplatin/paclitaxel     Oncology Treatment Plan:   OP GYN PACLITAXEL + CARBOPLATIN (AUC)    Medications:  Continuous Infusions:  Scheduled Meds:   cyproheptadine  4 mg Oral TID    dexamethasone  2 mg Oral Q12H    escitalopram oxalate  10 mg Oral Daily    hydroCHLOROthiazide  25 mg Oral Daily    LORazepam  0.5 mg Oral BID    losartan  100 mg Oral Daily    morphine  15 mg Oral BID    pantoprazole  40 mg Oral Daily    senna-docusate 8.6-50 mg  1 tablet Oral BID     PRN Meds:ondansetron, oxyCODONE, oxyCODONE, promethazine (PHENERGAN) IVPB, sodium chloride 0.9%      Review of patient's allergies indicates:   Allergen Reactions    Latex, natural rubber     Codeine Hallucinations    Cortisporin [neomycin-bacitracin-poly-hc]     Latex, natural rubber Rash        Past Medical History:   Diagnosis Date    Encounter for blood transfusion     GERD (gastroesophageal reflux disease)     HTN (hypertension)     Rheumatoid arthritis     Rheumatoid arthritis(714.0)     Squamous cell lung cancer, left 2/15/2018     Past Surgical History:   Procedure Laterality Date    APPENDECTOMY      COLONOSCOPY      COLONOSCOPY N/A 10/30/2017    Procedure: COLONOSCOPY;  Surgeon: Quentin Coppola MD;  Location: [address];  Service: Endoscopy;  Laterality: N/A;    HYSTERECTOMY      SKIN BIOPSY      TOTAL KNEE ARTHROPLASTY      right     Family History     Problem Relation (Age of Onset)    Hypertension     Kidney disease         Social History Main Topics    Smoking status: Former Smoker    Smokeless tobacco: Never Used    Alcohol use Yes      Comment: Occasionally    Drug use: No    Sexual activity: Not Currently       Review of Systems   Constitutional: Negative for chills and fever.   HENT: Negative for ear pain, hearing loss, mouth sores and sore throat.    Eyes: Negative for photophobia, pain and visual disturbance.   Respiratory: Negative for apnea, cough, chest tightness and shortness of breath.    Cardiovascular: Negative for chest pain and palpitations.   Gastrointestinal: Negative for abdominal distention, abdominal pain, anal bleeding, blood in stool, constipation, diarrhea, nausea, rectal pain and vomiting.   Endocrine: Negative for cold intolerance, heat intolerance, polydipsia and polyuria.   Genitourinary: Negative for dysuria and hematuria.   Musculoskeletal: Negative for arthralgias and myalgias.   Skin: Negative for rash and wound.   Neurological: Positive for headaches (Similar to previous tension headaches). Negative for dizziness, syncope and  numbness.     Objective:     Vital Signs (Most Recent):  Temp: 97.8 °F (36.6 °C) (05/15/18 0058)  Pulse: 105 (05/15/18 0058)  Resp: (!) 22 (05/15/18 0058)  BP: 120/68 (05/15/18 0058)  SpO2: 95 % (05/15/18 0058) Vital Signs (24h Range):  Temp:  [97.8 °F (36.6 °C)-98.7 °F (37.1 °C)] 97.8 °F (36.6 °C)  Pulse:  [105-118] 105  Resp:  [17-22] 22  SpO2:  [92 %-98 %] 95 %  BP: (101-120)/(64-73) 120/68     Weight: 73.5 kg (161 lb 14.9 oz)  Body mass index is 25.36 kg/m².  Body surface area is 1.86 meters squared.      Intake/Output Summary (Last 24 hours) at 05/15/18 0155  Last data filed at 05/15/18 0100   Gross per 24 hour   Intake              180 ml   Output                0 ml   Net              180 ml       Physical Exam   Constitutional: She is oriented to person, place, and time. She appears well-developed and well-nourished. No distress.   HENT:   Head: Normocephalic and atraumatic.   Mouth/Throat: Oropharynx is clear and moist.   Eyes: Conjunctivae and EOM are normal. Pupils are equal, round, and reactive to light.   Neck: Normal range of motion. Neck supple. No thyromegaly present.   Cardiovascular: Normal rate, regular rhythm, normal heart sounds and intact distal pulses.  Exam reveals no gallop and no friction rub.    No murmur heard.  Pulmonary/Chest: Effort normal. No respiratory distress. She has decreased breath sounds (BL lower lung fields). She has no wheezes. She has no rales. She exhibits no tenderness.   Abdominal: Soft. Bowel sounds are normal. She exhibits no distension. There is no tenderness.   Musculoskeletal: Normal range of motion. She exhibits no edema.   Neurological: She is alert and oriented to person, place, and time.   Skin: Skin is warm and dry. She is not diaphoretic.   Vitals reviewed.       Significant Labs:   Labs are all currently pending    Diagnostic Results:  CT: CTA chest with moderate sized measuring up to 2 cm deep; BL lower lung effusions    Assessment/Plan:     * Pericardial  effusion    DDx includes viral pericarditis vs malignant pericardial effusion.  Given her immunosuppressed state, it is possible that she may have a pericardial effusion 2/2 to viral infection, but will need pericardial fluid studies for further evaluation of this Dx.  Given her metastatic disease, highly suspect her current presentation is 2/2 to malignant pericardial effusion.  Given her extensive disease burden, spoke with patient at some length regarding code status and she states that she has two children who depend on her and wants to be full code for their sake.  No evidence of tamponade on physical exam.  -TTE w/ CFD pending  -Cardiology consult in am        Stage 4 Squamous cell carcinoma of unknown origin    Currently on palliative chemo as outpatient.  Patient c/o distal UE paresthesia that has developed since starting chemotherapy.  -Pain control w/ Morphine 12h 15 mg po BID  -Continue Dexamethasone 2 mg po BID        Rheumatoid arthritis    -On MTX 2.5 mg qt home, currently holding        Depression due to physical illness    -Continue home Ativan 0.5 mg po BID  -Continue Lexapro 10 mg po qd        HTN (hypertension)    -Continue home HCTZ 25 mg po qd  -Continue home losartan 100 mg po qd        Reflux esophagitis    -Continue home Protonix 40 mg po qd        Obesity (BMI 30-39.9)    -Patient NPO pending cardiology's availability for pericardiocentesis        Non-seasonal Rhinitis    -Continue home cyproheptadine 4 mg po TID            Rashard Johnston MD  Hematology/Oncology  [facility]      Attending Addendum:  The patient was seen, examined, and discussed on rounds with the team.  I agree with the assessment and plan as outlined for Silvia Capellan.  Patient admitted overnight after presenting with chest pain.  Found to have pleural effusion on CT.  Symptoms have resolved.  Have discussed with Cardiology and had echocardiogram done today.  They do not feel that she would  benefit from pericardiocentesis at this point.  Would recommend we repeat an echo as an outpatient as it this accumulates further may need pericardiocentesis and further systemic therapy if felt to be malignant.  Plan for discharge home today.    Nelson Love DO, FACP  Hematology & Oncology  [facility] [place] [address]  ph. [phone]  Fax. [phone]

## 2018-05-15 NOTE — CONSULTS
[facility]  Cardiology  Consult Note    Patient Name: Silvia Capellan  MRN: [id number]  Admission Date: 5/15/2018  Hospital Length of Stay: 0 days  Code Status: Full Code   Attending Provider: Nelson Love DO, F*   Consulting Provider: Sagar Danielson MD  Primary Care Physician: Ginette Rosado MD  Principal Problem:Pericardial effusion    Patient information was obtained from patient, past medical records and ER records.     Consults  Subjective:     CONSULT:  Moderate pericardial effusion on CTA    HPI:   Ms. Capellan is a pleasent 71 yo lady w/ stage 4 SCC of unknown primary s/p 4C palliative carboplatin & paxlitaxel transferred from [facility] ER for pericardial effusion.  This morning she developed a non-radiating, sharp, right-sided chest pain.  The pain spontaneously resolved, but came back intermittently during the day and was made worse with deep inspiration.  She denies any dyspnea, palpitations, light headedness, N/V, F/C but does feel more weak than usual.  She presented to the [facility] ER where she was initially suspected of having a PE.  She was given aspirin and had a CTA chest that was negative for PE but showed a pericardial effusion concerning for malignant pleural effusion.  Patient was transferred to [facility] for further evaluation with cardiolgy and possible pericardiocentesis.     Cardiology was consulted for assistance with the pericardial effusion. Patient is no longer reporting CP, however, she has been feeling light-headed for the past day. She reports no previous heart disease, MIs, or angina. She states that she has had chemo without radiation or surgery and was diagnosed with cancer 9ma. Patient denies any fevers, night sweats, n/v/d/c, abd pain, dysuria, hematuria or hematochezia, cough, wheezing, SOB, CP, leg swelling, HA, dizziness, weakness, at this time.      Past Medical History:   Diagnosis Date    Encounter for blood  transfusion     GERD (gastroesophageal reflux disease)     HTN (hypertension)     Rheumatoid arthritis     Rheumatoid arthritis(714.0)     Squamous cell lung cancer, left 2/15/2018       Past Surgical History:   Procedure Laterality Date    APPENDECTOMY      COLONOSCOPY      COLONOSCOPY N/A 10/30/2017    Procedure: COLONOSCOPY;  Surgeon: Quentin Coppola MD;  Location: [facility] ([address] [place] [address]);  Service: Endoscopy;  Laterality: N/A;    HYSTERECTOMY      SKIN BIOPSY      TOTAL KNEE ARTHROPLASTY      right       Review of patient's allergies indicates:   Allergen Reactions    Latex, natural rubber     Codeine Hallucinations    Cortisporin [neomycin-bacitracin-poly-hc]     Latex, natural rubber Rash       Current Facility-Administered Medications on File Prior to Encounter   Medication    [COMPLETED] aspirin tablet 325 mg    [COMPLETED] omnipaque 350 iohexol 350 mg iodine/mL     Current Outpatient Prescriptions on File Prior to Encounter   Medication Sig    cyproheptadine (PERIACTIN) 4 mg tablet Take 1 tablet (4 mg total) by mouth 3 (three) times daily as needed.    dexamethasone (DECADRON) 2 MG tablet Take 1 tablet (2 mg total) by mouth every 12 (twelve) hours.    diphenoxylate-atropine 2.5-0.025 mg (LOMOTIL) 2.5-0.025 mg per tablet Take 1 tablet by mouth 4 (four) times daily as needed for Diarrhea.    escitalopram oxalate (LEXAPRO) 10 MG tablet Take 1 tablet (10 mg total) by mouth once daily.    hydroCHLOROthiazide (HYDRODIURIL) 25 MG tablet Take 1 tablet (25 mg total) by mouth once daily.    LORazepam (ATIVAN) 0.5 MG tablet TAKE 1 TABLET (0.5 MG TOTAL) BY MOUTH 2 (TWO) TIMES DAILY.    losartan (COZAAR) 100 MG tablet Take 1 tablet (100 mg total) by mouth once daily.    methotrexate 2.5 MG Tab     morphine (MS CONTIN) 15 MG 12 hr tablet Take 1 tablet (15 mg total) by mouth 2 (two) times daily.    pantoprazole (PROTONIX) 40 MG tablet Take 1 tablet (40 mg total) by mouth once daily.     prochlorperazine (COMPAZINE) 10 MG tablet Take 1 tablet (10 mg total) by mouth every 6 (six) hours as needed.    potassium chloride SA (K-DUR,KLOR-CON) 20 MEQ tablet Take 2 tablets (40 mEq total) by mouth once daily.     Family History     Problem Relation (Age of Onset)    Hypertension     Kidney disease         Social History Main Topics    Smoking status: Former Smoker    Smokeless tobacco: Never Used    Alcohol use Yes      Comment: Occasionally    Drug use: No    Sexual activity: Not Currently     Review of Systems   Constitution: Negative for chills and fever.   HENT: Negative for stridor.    Cardiovascular: Positive for chest pain (pressure-like pain that has resolved). Negative for dyspnea on exertion, leg swelling, near-syncope and syncope.   Respiratory: Negative for cough, hemoptysis and shortness of breath.    Gastrointestinal: Negative for abdominal pain, constipation, diarrhea, hematemesis, hematochezia, nausea and vomiting.   Genitourinary: Negative for frequency and hematuria.   Neurological: Positive for light-headedness (began 5/14).   Psychiatric/Behavioral: Negative for altered mental status.     Objective:     Vital Signs (Most Recent):  Temp: 98.4 °F (36.9 °C) (05/15/18 0450)  Pulse: 100 (05/15/18 0450)  Resp: 20 (05/15/18 0450)  BP: 101/65 (05/15/18 0450)  SpO2: 95 % (05/15/18 0450) Vital Signs (24h Range):  Temp:  [97.8 °F (36.6 °C)-98.7 °F (37.1 °C)] 98.4 °F (36.9 °C)  Pulse:  [100-118] 100  Resp:  [17-22] 20  SpO2:  [92 %-98 %] 95 %  BP: (101-120)/(64-73) 101/65     Weight: 73.5 kg (161 lb 14.9 oz)  Body mass index is 25.36 kg/m².    SpO2: 95 %  O2 Device (Oxygen Therapy): room air      Intake/Output Summary (Last 24 hours) at 05/15/18 0771  Last data filed at 05/15/18 0100   Gross per 24 hour   Intake              180 ml   Output                0 ml   Net              180 ml       Lines/Drains/Airways     Central Venous Catheter Line                 Port A Cath Single Lumen 11/14/17  1008 right internal jugular 181 days          Peripheral Intravenous Line                 Peripheral IV - Single Lumen 05/14/18 1618 Right Antecubital less than 1 day                Physical Exam   Constitutional: She is oriented to person, place, and time. She appears well-developed and well-nourished.   HENT:   Head: Normocephalic and atraumatic.   Eyes: Pupils are equal, round, and reactive to light. No scleral icterus.   Neck: No JVD present.   Mild JV elevation to mid-neck with inspiration (kussmaul's sign)  without obvious JV distention    Cardiovascular: Exam reveals no friction rub.    No murmur heard.  Mildly tachycardic  S1, S2, S3.      Pulmonary/Chest: Effort normal. No stridor. She has no wheezes. She has no rales.   Decreased sounds b/l through entire lung fields  No BS in b/l LLs   Abdominal: Soft. There is no guarding.   Musculoskeletal: She exhibits no edema.   Neurological: She is alert and oriented to person, place, and time. No cranial nerve deficit.   Skin: Skin is warm and dry.   Nursing note and vitals reviewed.      Significant Labs:     Recent Results (from the past 24 hour(s))   POCT CMP    Collection Time: 05/14/18  4:37 PM   Result Value Ref Range    Albumin, POC 3.0 (L) 3.3 - 5.5 g/dL    Alkaline Phosphatase, POC 76 42 - 141 U/L    ALT (SGPT), POC 12 10 - 47 U/L    AST (SGOT), POC 20 11 - 38 U/L    POC BUN 12 7 - 22 mg/dL    Calcium, POC 9.3 8.0 - 10.3 mg/dL    POC Chloride 98 98 - 108 mmol/L    POC Creatinine 0.9 0.6 - 1.2 mg/dL    POC Glucose 121 (H) 73 - 118 mg/dL    POC Potassium 3.9 3.6 - 5.1 mmol/L    POC Sodium 138 128 - 145 mmol/L    Bilirubin 0.5 0.2 - 1.6 mg/dL    POC TCO2 27 18 - 33 mmol/L    Protein 9.0 (H) 6.4 - 8.1 g/dL   Troponin ISTAT    Collection Time: 05/14/18  4:41 PM   Result Value Ref Range    POC Cardiac Troponin I 0.01 <0.09 ng/mL    Sample NATALIYA    POCT URINALYSIS W/O SCOPE    Collection Time: 05/14/18  4:55 PM   Result Value Ref Range    Glucose, UA Negative (NG)      Bilirubin, UA 1+ (A)     Ketones, UA Negative (NG)     Spec Grav UA 1.015     Blood, UA Negative (NG)     PH, UA 7.0     Protein, UA 1+ (A)     Urobilinogen, UA 1.0 E.U./dL    Nitrite, UA Negative (NG)     Leukocytes, UA Negative (NG)     Color, UA Yellow     Clarity, UA Clear    POCT B-type natriuretic peptide (BNP)    Collection Time: 05/14/18  5:00 PM   Result Value Ref Range    POC B-Type Natriuretic Peptide 256 (H) 0.0 - 100.0 pg/mL   POCT D Dimer    Collection Time: 05/14/18  5:03 PM   Result Value Ref Range    POC D-DI >5000 (H) 0.0 - 450.0 ng/mL   CBC auto differential    Collection Time: 05/15/18  1:49 AM   Result Value Ref Range    WBC 7.12 3.90 - 12.70 K/uL    RBC 2.18 (L) 4.00 - 5.40 M/uL    Hemoglobin 6.3 (L) 12.0 - 16.0 g/dL    Hematocrit 20.7 (L) 37.0 - 48.5 %    MCV 95 82 - 98 fL    MCH 28.9 27.0 - 31.0 pg    MCHC 30.4 (L) 32.0 - 36.0 g/dL    RDW 16.9 (H) 11.5 - 14.5 %    Platelets 100 (L) 150 - 350 K/uL    MPV 11.0 9.2 - 12.9 fL    Immature Granulocytes 0.4 0.0 - 0.5 %    Gran # (ANC) 5.0 1.8 - 7.7 K/uL    Immature Grans (Abs) 0.03 0.00 - 0.04 K/uL    Lymph # 1.2 1.0 - 4.8 K/uL    Mono # 0.8 0.3 - 1.0 K/uL    Eos # 0.1 0.0 - 0.5 K/uL    Baso # 0.01 0.00 - 0.20 K/uL    nRBC 0 0 /100 WBC    Gran% 70.4 38.0 - 73.0 %    Lymph% 16.6 (L) 18.0 - 48.0 %    Mono% 11.7 4.0 - 15.0 %    Eosinophil% 0.8 0.0 - 8.0 %    Basophil% 0.1 0.0 - 1.9 %    Differential Method Automated    Comprehensive metabolic panel    Collection Time: 05/15/18  1:49 AM   Result Value Ref Range    Sodium 137 136 - 145 mmol/L    Potassium 4.3 3.5 - 5.1 mmol/L    Chloride 100 95 - 110 mmol/L    CO2 28 23 - 29 mmol/L    Glucose 113 (H) 70 - 110 mg/dL    BUN, Bld 12 8 - 23 mg/dL    Creatinine 0.8 0.5 - 1.4 mg/dL    Calcium 9.2 8.7 - 10.5 mg/dL    Total Protein 8.1 6.0 - 8.4 g/dL    Albumin 2.5 (L) 3.5 - 5.2 g/dL    Total Bilirubin 0.4 0.1 - 1.0 mg/dL    Alkaline Phosphatase 84 55 - 135 U/L    AST 10 10 - 40 U/L    ALT 6 (L) 10 - 44 U/L     Anion Gap 9 8 - 16 mmol/L    eGFR if African American >60.0 >60 mL/min/1.73 m^2    eGFR if non African American >60.0 >60 mL/min/1.73 m^2   Magnesium    Collection Time: 05/15/18  1:49 AM   Result Value Ref Range    Magnesium 1.4 (L) 1.6 - 2.6 mg/dL   Protime-INR    Collection Time: 05/15/18  1:49 AM   Result Value Ref Range    Prothrombin Time 12.4 9.0 - 12.5 sec    INR 1.2 0.8 - 1.2     Microbiology Results (last 7 days)     ** No results found for the last 168 hours. **            Significant Imaging:    CTA 5/14/18:  1. Interval development of moderate pericardial effusion with mild pericardial enhancement. Findings may reflect malignant pericardial effusion.  2. No evidence of PE.  3. Patient with known metastatic cancer. Relatively stable size mass in the right perihilar/subcarinal region. Interval reduced size of previously visualized multifocal left lung metastatic lesions and anterior mediastinal lymphadenopathy. Cardiac   phrenic lymph nodes have increased in size. Persistent abnormal prominent circumferential distal esophageal wall thickening versus surrounding soft tissue mass. Examination was performed in an emergency setting and not to serve as a restaging scan.  4. Moderate left and small right pleural effusions resulting in volume loss and compressive atelectasis within the lower lobes, left greater than right.      CXR 5/14/18:  Right-sided chest port is visualized in stable position.  Heart is enlarged.  Lungs are hypoinflated which accentuates pulmonary vascular markings, however there is suspected mild pulmonary edema and small bilateral effusions.  There is mild bibasilar atelectasis.  Marked interval reduction in size of previously visualized large left upper lobe lesion.  No evidence of pneumothorax.        Assessment and Plan:     * Pericardial effusion    72F with known stage 4 SCC of unknown primary discovered approx 9ma, s/p 4C palliative carboplatin & paxlitaxel . CTA revealed a  moderate amount of fluid in pericardial sac with mild pericardial enhancement that likely reflects a malignant pericardial effusion. Patient is immunocompromised, therefore, could be a viral pericarditis- will likely need diagnostic tap. Patient's CP has resolved, however, she is feeling light-headed. Negative pulsus paradoxus. She has mild JV elevation with deep inspiration (Kussmaul's sign), without obvious distention.     -Spoke to oncology, who requests pericardiocentesis for diagnostic purposes on Ms. Capellan.    -CT shows 2.5cm anterior effusion, echo pending but will be done first thing this AM.    -Will ask interventional cardiology for diagnostic pericardiocentesis.    -Please keep NPO for probable procedure  -Hold blood thinners for probable procedure            VTE Risk Mitigation         Ordered     Place sequential compression device  Until discontinued      05/15/18 0142     Reason for No Pharmacological VTE Prophylaxis  Once      05/15/18 0142     IP VTE HIGH RISK PATIENT  Once      05/15/18 0142          Thank you for your consult. I will sign off. Please contact us if you have any additional questions.    Sagar Danielson MD  Cardiology   [facility]

## 2018-05-15 NOTE — PLAN OF CARE
Notified by cardiology consult team who were approached by the oncology team requesting pericardiocentesis for diagnostic purposes on Ms. Capellan.  CT and echocardiogram were reviewed and discussed with Dr. Denney (echo staff) and Dr. Lima (interventional cardiology staff).      TTE (5/15/18):  The visceral pericardium is shaggy (suggestive of metastatic disease). 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  The pericardial effusion is small, partially organized and not amenable for pericardiocentesis.      Discussed with Dr. Lima and notified cardiology consult team.    Cintia Durham MD  Cardiology Fellow  Pager: [phone]  5/15/2018 11:29 AM

## 2018-05-15 NOTE — PROGRESS NOTES
Spoke to oncology, who requests pericardiocentesis for diagnostic purposes on Ms. Capellan.  I reviewed her CT and echocardiogram and discussed with Dr. Denney (echo staff) and Dr. Lima (interventional staff).  Her pericardial effusion is small, with the appearance of likely malignant cells on the visceral pericardium.  Do not think this is amenable to pericardiocentesis, and risks outweigh benefits of trying, so will d/c procedure.     I relayed the above to oncology resident. Cardiology will sign off.  Please do not hesitate to call [phone] with questions.      Stone tSrickland MD  Cardiology Fellow

## 2018-05-15 NOTE — ASSESSMENT & PLAN NOTE
72F with known stage 4 SCC of unknown primary discovered approx 9ma, s/p 4C palliative carboplatin & paxlitaxel. CTA revealed a moderate amount of fluid in pericardial sac with mild pericardial enhancement that likely reflects a malignant pericardial effusion. Patient is immunocompromised, therefore, could be a viral pericarditis- will likely need diagnostic tap. Patient's CP has resolved, however, she is feeling light-headed. Negative pulsus paradoxus. She has mild JV elevation, without distention.     -Spoke to oncology, who requests pericardiocentesis for diagnostic purposes on Ms. Capellan.    -CT shows 2.5cm anterior effusion, echo pending but will be done first thing this AM.    -Will ask interventional cardiology for diagnostic pericardiocentesis.    -Please keep NPO for probable procedure  -Hold blood thinners for probable procedure

## 2018-05-15 NOTE — SUBJECTIVE & OBJECTIVE
Oncology Treatment Plan:   OP GYN PACLITAXEL + CARBOPLATIN (AUC)    Medications:  Continuous Infusions:  Scheduled Meds:   cyproheptadine  4 mg Oral TID    dexamethasone  2 mg Oral Q12H    escitalopram oxalate  10 mg Oral Daily    hydroCHLOROthiazide  25 mg Oral Daily    LORazepam  0.5 mg Oral BID    losartan  100 mg Oral Daily    morphine  15 mg Oral BID    pantoprazole  40 mg Oral Daily    senna-docusate 8.6-50 mg  1 tablet Oral BID     PRN Meds:ondansetron, oxyCODONE, oxyCODONE, promethazine (PHENERGAN) IVPB, sodium chloride 0.9%     Review of patient's allergies indicates:   Allergen Reactions    Latex, natural rubber     Codeine Hallucinations    Cortisporin [neomycin-bacitracin-poly-hc]     Latex, natural rubber Rash        Past Medical History:   Diagnosis Date    Encounter for blood transfusion     GERD (gastroesophageal reflux disease)     HTN (hypertension)     Rheumatoid arthritis     Rheumatoid arthritis(714.0)     Squamous cell lung cancer, left 2/15/2018     Past Surgical History:   Procedure Laterality Date    APPENDECTOMY      COLONOSCOPY      COLONOSCOPY N/A 10/30/2017    Procedure: COLONOSCOPY;  Surgeon: Quentin Coppola MD;  Location: [address];  Service: Endoscopy;  Laterality: N/A;    HYSTERECTOMY      SKIN BIOPSY      TOTAL KNEE ARTHROPLASTY      right     Family History     Problem Relation (Age of Onset)    Hypertension     Kidney disease         Social History Main Topics    Smoking status: Former Smoker    Smokeless tobacco: Never Used    Alcohol use Yes      Comment: Occasionally    Drug use: No    Sexual activity: Not Currently       Review of Systems   Constitutional: Negative for chills and fever.   HENT: Negative for ear pain, hearing loss, mouth sores and sore throat.    Eyes: Negative for photophobia, pain and visual disturbance.   Respiratory: Negative for apnea, cough, chest tightness and shortness of breath.    Cardiovascular: Negative for  chest pain and palpitations.   Gastrointestinal: Negative for abdominal distention, abdominal pain, anal bleeding, blood in stool, constipation, diarrhea, nausea, rectal pain and vomiting.   Endocrine: Negative for cold intolerance, heat intolerance, polydipsia and polyuria.   Genitourinary: Negative for dysuria and hematuria.   Musculoskeletal: Negative for arthralgias and myalgias.   Skin: Negative for rash and wound.   Neurological: Positive for headaches (Similar to previous tension headaches). Negative for dizziness, syncope and numbness.     Objective:     Vital Signs (Most Recent):  Temp: 97.8 °F (36.6 °C) (05/15/18 0058)  Pulse: 105 (05/15/18 0058)  Resp: (!) 22 (05/15/18 0058)  BP: 120/68 (05/15/18 0058)  SpO2: 95 % (05/15/18 0058) Vital Signs (24h Range):  Temp:  [97.8 °F (36.6 °C)-98.7 °F (37.1 °C)] 97.8 °F (36.6 °C)  Pulse:  [105-118] 105  Resp:  [17-22] 22  SpO2:  [92 %-98 %] 95 %  BP: (101-120)/(64-73) 120/68     Weight: 73.5 kg (161 lb 14.9 oz)  Body mass index is 25.36 kg/m².  Body surface area is 1.86 meters squared.      Intake/Output Summary (Last 24 hours) at 05/15/18 0155  Last data filed at 05/15/18 0100   Gross per 24 hour   Intake              180 ml   Output                0 ml   Net              180 ml       Physical Exam   Constitutional: She is oriented to person, place, and time. She appears well-developed and well-nourished. No distress.   HENT:   Head: Normocephalic and atraumatic.   Mouth/Throat: Oropharynx is clear and moist.   Eyes: Conjunctivae and EOM are normal. Pupils are equal, round, and reactive to light.   Neck: Normal range of motion. Neck supple. No thyromegaly present.   Cardiovascular: Normal rate, regular rhythm, normal heart sounds and intact distal pulses.  Exam reveals no gallop and no friction rub.    No murmur heard.  Pulmonary/Chest: Effort normal. No respiratory distress. She has decreased breath sounds (BL lower lung fields). She has no wheezes. She has no  rales. She exhibits no tenderness.   Abdominal: Soft. Bowel sounds are normal. She exhibits no distension. There is no tenderness.   Musculoskeletal: Normal range of motion. She exhibits no edema.   Neurological: She is alert and oriented to person, place, and time.   Skin: Skin is warm and dry. She is not diaphoretic.   Vitals reviewed.       Significant Labs:   Labs are all currently pending    Diagnostic Results:  CT: CTA chest with moderate sized measuring up to 2 cm deep; BL lower lung effusions

## 2018-05-17 ENCOUNTER — OFFICE VISIT (OUTPATIENT)
Dept: HEMATOLOGY/ONCOLOGY | Facility: CLINIC | Age: 73
End: 2018-05-17
Payer: MEDICARE

## 2018-05-17 ENCOUNTER — LAB VISIT (OUTPATIENT)
Dept: LAB | Facility: HOSPITAL | Age: 73
End: 2018-05-17
Attending: INTERNAL MEDICINE
Payer: MEDICARE

## 2018-05-17 VITALS
BODY MASS INDEX: 25.53 KG/M2 | HEART RATE: 89 BPM | DIASTOLIC BLOOD PRESSURE: 76 MMHG | OXYGEN SATURATION: 99 % | SYSTOLIC BLOOD PRESSURE: 127 MMHG | TEMPERATURE: 98 F | WEIGHT: 162.69 LBS | HEIGHT: 67 IN | RESPIRATION RATE: 18 BRPM

## 2018-05-17 DIAGNOSIS — J90 PLEURAL EFFUSION ON LEFT: ICD-10-CM

## 2018-05-17 DIAGNOSIS — D63.0 ANEMIA IN NEOPLASTIC DISEASE: ICD-10-CM

## 2018-05-17 DIAGNOSIS — R53.0 NEOPLASTIC MALIGNANT RELATED FATIGUE: ICD-10-CM

## 2018-05-17 DIAGNOSIS — M06.9 RHEUMATOID ARTHRITIS OF HAND, UNSPECIFIED LATERALITY, UNSPECIFIED RHEUMATOID FACTOR PRESENCE: ICD-10-CM

## 2018-05-17 DIAGNOSIS — R63.0 ANOREXIA: Primary | ICD-10-CM

## 2018-05-17 DIAGNOSIS — C34.92 SQUAMOUS CELL LUNG CANCER, LEFT: ICD-10-CM

## 2018-05-17 DIAGNOSIS — C44.92 SQUAMOUS CELL CARCINOMA OF UNKNOWN ORIGIN: ICD-10-CM

## 2018-05-17 DIAGNOSIS — F41.1 ANXIETY STATE: ICD-10-CM

## 2018-05-17 DIAGNOSIS — I31.39 PERICARDIAL EFFUSION: ICD-10-CM

## 2018-05-17 LAB
ABO + RH BLD: NORMAL
ALBUMIN SERPL BCP-MCNC: 2.7 G/DL
ALP SERPL-CCNC: 89 U/L
ALT SERPL W/O P-5'-P-CCNC: 9 U/L
ANION GAP SERPL CALC-SCNC: 10 MMOL/L
AST SERPL-CCNC: 13 U/L
BASOPHILS # BLD AUTO: 0.01 K/UL
BASOPHILS NFR BLD: 0.2 %
BILIRUB SERPL-MCNC: 0.3 MG/DL
BLD GP AB SCN CELLS X3 SERPL QL: NORMAL
BUN SERPL-MCNC: 16 MG/DL
CALCIUM SERPL-MCNC: 9.6 MG/DL
CHLORIDE SERPL-SCNC: 100 MMOL/L
CO2 SERPL-SCNC: 27 MMOL/L
CREAT SERPL-MCNC: 0.9 MG/DL
DIFFERENTIAL METHOD: ABNORMAL
EOSINOPHIL # BLD AUTO: 0.1 K/UL
EOSINOPHIL NFR BLD: 1.8 %
ERYTHROCYTE [DISTWIDTH] IN BLOOD BY AUTOMATED COUNT: 16.8 %
EST. GFR  (AFRICAN AMERICAN): >60 ML/MIN/1.73 M^2
EST. GFR  (NON AFRICAN AMERICAN): >60 ML/MIN/1.73 M^2
GLUCOSE SERPL-MCNC: 112 MG/DL
HCT VFR BLD AUTO: 25.9 %
HGB BLD-MCNC: 7.8 G/DL
IMM GRANULOCYTES # BLD AUTO: 0.02 K/UL
IMM GRANULOCYTES NFR BLD AUTO: 0.4 %
LYMPHOCYTES # BLD AUTO: 1.4 K/UL
LYMPHOCYTES NFR BLD: 28.8 %
MAGNESIUM SERPL-MCNC: 1.6 MG/DL
MCH RBC QN AUTO: 28.3 PG
MCHC RBC AUTO-ENTMCNC: 30.1 G/DL
MCV RBC AUTO: 94 FL
MONOCYTES # BLD AUTO: 0.5 K/UL
MONOCYTES NFR BLD: 10.6 %
NEUTROPHILS # BLD AUTO: 2.9 K/UL
NEUTROPHILS NFR BLD: 58.2 %
NRBC BLD-RTO: 0 /100 WBC
PLATELET # BLD AUTO: 122 K/UL
PMV BLD AUTO: 10.6 FL
POTASSIUM SERPL-SCNC: 4.2 MMOL/L
PROT SERPL-MCNC: 9 G/DL
RBC # BLD AUTO: 2.76 M/UL
SODIUM SERPL-SCNC: 137 MMOL/L
WBC # BLD AUTO: 5 K/UL

## 2018-05-17 PROCEDURE — 99214 OFFICE O/P EST MOD 30 MIN: CPT | Mod: S$GLB,,, | Performed by: INTERNAL MEDICINE

## 2018-05-17 PROCEDURE — 80053 COMPREHEN METABOLIC PANEL: CPT

## 2018-05-17 PROCEDURE — 36415 COLL VENOUS BLD VENIPUNCTURE: CPT

## 2018-05-17 PROCEDURE — 99499 UNLISTED E&M SERVICE: CPT | Mod: S$GLB,,, | Performed by: INTERNAL MEDICINE

## 2018-05-17 PROCEDURE — 83735 ASSAY OF MAGNESIUM: CPT

## 2018-05-17 PROCEDURE — 3074F SYST BP LT 130 MM HG: CPT | Mod: CPTII,S$GLB,, | Performed by: INTERNAL MEDICINE

## 2018-05-17 PROCEDURE — 86850 RBC ANTIBODY SCREEN: CPT

## 2018-05-17 PROCEDURE — 99999 PR PBB SHADOW E&M-EST. PATIENT-LVL IV: CPT | Mod: PBBFAC,,, | Performed by: INTERNAL MEDICINE

## 2018-05-17 PROCEDURE — 85025 COMPLETE CBC W/AUTO DIFF WBC: CPT

## 2018-05-17 PROCEDURE — 3078F DIAST BP <80 MM HG: CPT | Mod: CPTII,S$GLB,, | Performed by: INTERNAL MEDICINE

## 2018-05-17 RX ORDER — HEPARIN 100 UNIT/ML
500 SYRINGE INTRAVENOUS
Status: CANCELLED | OUTPATIENT
Start: 2018-05-18

## 2018-05-17 RX ORDER — LORAZEPAM 0.5 MG/1
0.5 TABLET ORAL 2 TIMES DAILY
Qty: 60 TABLET | Refills: 0 | Status: SHIPPED | OUTPATIENT
Start: 2018-05-17 | End: 2018-06-26 | Stop reason: SDUPTHER

## 2018-05-17 RX ORDER — SODIUM CHLORIDE 0.9 % (FLUSH) 0.9 %
10 SYRINGE (ML) INJECTION
Status: CANCELLED | OUTPATIENT
Start: 2018-05-18

## 2018-05-17 RX ORDER — DIPHENHYDRAMINE HYDROCHLORIDE 50 MG/ML
50 INJECTION INTRAMUSCULAR; INTRAVENOUS ONCE AS NEEDED
Status: CANCELLED | OUTPATIENT
Start: 2018-05-18 | End: 2018-05-18

## 2018-05-17 RX ORDER — EPINEPHRINE 0.3 MG/.3ML
0.3 INJECTION SUBCUTANEOUS ONCE AS NEEDED
Status: CANCELLED | OUTPATIENT
Start: 2018-05-18 | End: 2018-05-18

## 2018-05-17 RX ORDER — FAMOTIDINE 10 MG/ML
20 INJECTION INTRAVENOUS
Status: CANCELLED | OUTPATIENT
Start: 2018-05-18

## 2018-05-17 NOTE — PROGRESS NOTES
CC: Squamous cell carcinoma, unknown primary, on chemotherapy, here for follow up and prior to cycle 4 chemotherapy         Oncology History:     Diagnosis: Squamous cell carcinoma , primary site unknown    Molecular/genetic testing: p16 negative; PD L1 could not be run due to inadequate tissue   Stage:        Stage 4   Treatment: Carboplatin/paclitaxel        HPI:  is a 73 y/o female who underwent bronchoscopy/EBUS evaluation on 8/31/17 for abnormal mediastinal adenopathy  and a 1.2 cm MORRO mass.     Bronchoscopy findings:    The oropharynx appears normal. The larynx appears normal. The vocal cords appear normal. The subglottic space is normal. The trachea is of normal caliber. The otf is sharp. The tracheobronchial tree was examined to at least the first subsegmental level. Bronchial mucosa and anatomy are normal; there are no endobronchial lesions, and no secretions.    Lymph Nodes: Lymph node sizing was performed via endobronchial ultrasound for suspected lung cancer. Sampling by transbronchial needle aspiration was also performed using an Olympus EBUS-TBNA 22  gauge needle in the subcarinal mediastinum (level 7) and sent for routine cytology.       - The 7 (subcarinal) node was 30 mm by EBUS. Three samples with the needle were obtained. The patient's condition was unchanged after the intervention.      Her treatment got delayed as she cancelled several appointments due to some issues at home. She has started Caroplatin/paclitaxel palliatively. She had left thoracentesis on 2/9/18. There were atypical cells in the pleural fluid, highly suspicious for malignancy.     Interval history: She is here for a follow up visit. She has fatigue, nausea, dyspnea. Her appetite has worsened. She was in the ER a few day ago for chest pain. She had CTA chest which did not show any PE.         Current Outpatient Prescriptions   Medication Sig    cyproheptadine (PERIACTIN) 4 mg tablet Take 1 tablet (4 mg total) by  mouth 3 (three) times daily as needed.    dexamethasone (DECADRON) 2 MG tablet Take 1 tablet (2 mg total) by mouth every 12 (twelve) hours.    diphenoxylate-atropine 2.5-0.025 mg (LOMOTIL) 2.5-0.025 mg per tablet Take 1 tablet by mouth 4 (four) times daily as needed for Diarrhea.    escitalopram oxalate (LEXAPRO) 10 MG tablet Take 1 tablet (10 mg total) by mouth once daily.    hydroCHLOROthiazide (HYDRODIURIL) 25 MG tablet Take 1 tablet (25 mg total) by mouth once daily.    LORazepam (ATIVAN) 0.5 MG tablet TAKE 1 TABLET (0.5 MG TOTAL) BY MOUTH 2 (TWO) TIMES DAILY.    losartan (COZAAR) 100 MG tablet Take 1 tablet (100 mg total) by mouth once daily.    methotrexate 2.5 MG Tab     morphine (MS CONTIN) 15 MG 12 hr tablet Take 1 tablet (15 mg total) by mouth 2 (two) times daily.    pantoprazole (PROTONIX) 40 MG tablet Take 1 tablet (40 mg total) by mouth once daily.    potassium chloride SA (K-DUR,KLOR-CON) 20 MEQ tablet Take 2 tablets (40 mEq total) by mouth once daily.    prochlorperazine (COMPAZINE) 10 MG tablet Take 1 tablet (10 mg total) by mouth every 6 (six) hours as needed.     No current facility-administered medications for this visit.        Vitals:    05/17/18 1413   BP: 127/76   Pulse: 89   Resp: 18   Temp: 97.7 °F (36.5 °C)     6/12/17 CT chest with cont      1. Large left pleural effusion resulting in atelectasis of the left lower lobe and portions of left upper lobe  2. 1.2 cm left upper lobe pleural based pulmonary nodule  3. Mediastinal adenopathy as described above with diffuse thickening of the wall of the distal esophagus. Findings are suspicious for neoplastic process. Recommend bronchoscopy biopsy and possible endoscopy for further evaluation.  4. 1.3 cm hypodensity within the dome of the liver. Metastatic focus cannot be entirely excluded.     9/7/17 PET CT       There is mildly increased tracer uptake within the patient's left pleural effusion and overlying the mediastinal adenopathy,  max SUV 2.7.    Transmission CT images show continued pleural effusion and circumferential esophageal thickening. Transmission CT images also show non-avid bilateral groin adenopathy likely reactive in nature.      Impression        There is mildly increased tracer uptake within the patient's left pleural effusion and mediastinal adenopathy. While these findings suggest reactive etiology some low grade malignancies, such as bronchioloalveolar carcinoma, may show this level of uptake on PET scan.            10/30/17 EGD     The examined duodenum was normal.The entire examined stomach was normal.The cardia and gastric fundus were normal on retroflexion. A 1 cm hiatal hernia was found. The proximal extent of the gastric folds (end of tubular esophagus) was 38 cm from the incisors. The hiatal narrowing was 39 cm from the incisors. The Z-line was 38 cm from the incisors. Z-line was sharp. No esophagitis and no columnar esophagus and no lesions seen with NBI or white light.    Impression:                                   - Normal examined duodenum.                        - Normal stomach.                        - 1 cm hiatal hernia.                        - No specimens collected.     10/30/17 colonoscopy :     Cecal polyp ( 3mm) identified  Histopathology: Tubular adenoma        Pathology:     1/17/14 colonoscopy     FINAL PATHOLOGIC DIAGNOSIS     1. Colon biopsy, ascending colon-tubular adenoma.  2. Colon biopsy, transverse-tubular adenoma.  3. 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 The stalk and base of the polyp are well represented and are free of atypia.  4. Colon biopsy, sigmoid- mild glandular hyperplasia consistent with a benign hyperplastic polyp.        7/19/17 PLEURAL FLUID (CYTOLOGY AND CELL BLOCK):     -Groups of atypical cells present, malignancy cannot be excluded.  Note: While  these cells could be reactive mesothelial cells, the level of atypia could be seen in malignancy. A cell block was prepared but the atypical cells are not present in the cell block for further characterization. Correlate  clinically. Repeat tap/biopsy might be helpful if clinically indicated.     8/28/17 EBUS FNA     FNA of subcarinal lymph node: Positive for malignant cells  Small clusters of malignant epithelial cells, favor squamous carcinoma Tumor cells are positive for CK 5/6 and CK 7. Immunostains for p63 and TTF-1 are negative.     1/26/18 CT chest, abdomen,pelvis with cont         Comparison: June 12, 2017    Chest: Since prior exam there is been interval enlargement of the right pleural effusion. There is high density material seen within the right pleural effusion that was not visualized on prior exam.    There is a 5.4 cm enhancing mass seen at the left lung base that previously measured approximately 2.9 cm.    There is a subcarinal mass measuring 4.4 cm it previously measured 2.9 cm.    There is circumferential esophageal thickening adjacent to this mass.    There is volume loss in the right chest with mediastinal shift to the right more pronounced than what was seen on prior exam.    Patient is a right internal jugular Port-A-Cath.    Abdomen/pelvis: The liver, spleen, adrenal glands, kidneys and pancreas show a normal contrasted appearance. There is no evidence bowel obstruction. There is no evidence of abdominal or pelvic lymphadenopathy. The osseous structures show degenerative change of the spine.   Impression       Since prior exam in the patient's subcarinal and left lung base masses have increased in size. In addition there is now high density material seen within the patient's left pleural effusion new from prior exam and concerning for hemorrhage possibly from the mass.         1/26/18 CT head with cont          Comparison: CT June 8, 2017    Technique: Contiguous axial images were acquired  from vertex to skull base without contrast.    Findings: There is no evidence acute intracranial abnormality.  Specifically there is no evidence acute infarct, contusion, extra-axial fluid collection or midline shift.  The ventricles are normal in size and configuration.  The calvarium is intact.  The paranasal sinuses and mastoid air cells are clear.    There is no evidence of enhancing mass.   Impression       There is no evidence of enhancing mass within the cerebral parenchyma      2/9/18 FINAL PATHOLOGIC DIAGNOSIS  LEFT LUNG, PLEURAL FLUID (CYTOLOGY):  - Scant groups of atypical cells, malignancy cannot be excluded. Note: Scant atypical groups of cells are present. Differential diagnosis includes reactive atypia vs malignancy .  Immunohistochemistry for CK5/6 and p63 is non-contributory. Please clinically correlate and re-sample as indicated.  All stains have satisfactory positive and negative controls.     5/14/18 CTA CHEST NON CORONARY      .    COMPARISON:  CT chest abdomen and pelvis from 01/26/2018.    FINDINGS:  Structures at the base of the neck are unremarkable.  Right-sided chest port is visualized with distal tip in the SVC.  Aorta is non-aneurysmal.  Heart is mildly enlarged with interval development of moderate pericardial effusion.  There is mild pericardial enhancement visualized.  The pulmonary arteries distribute normally. No intraluminal filling defects to suggest pulmonary thromboembolism to the level of the proximal segmental branches.    The trachea and bronchi are patent.  Moderate left and small right sided pleural effusions are visualized resulting in volume loss and compressive atelectasis within the lower lobes, left greater than right.  Grossly stable mass visualized in the right subcarinal/perihilar region.  There has been interval reduced size of multifocal left-sided pulmonary masses and anterior mediastinal lymphadenopathy.    Stable hepatic hypodensity is visualized within the  anterior aspect of the right hepatic lobe.  Prominent right cardiophrenic lymph nodes are seen which have increased in size.  Redemonstration of diffuse abnormal distal esophageal wall thickening versus adjacent soft tissue lesion.  No acute osseous abnormality identified.  Extrathoracic soft tissues are unremarkable.   Impression       1. Interval development of moderate pericardial effusion with mild pericardial enhancement.  Findings may reflect malignant pericardial effusion.  2. No evidence of PE.  3. Patient with known metastatic cancer.  Relatively stable size mass in the right perihilar/subcarinal region.  Interval reduced size of previously visualized multifocal left lung metastatic lesions and anterior mediastinal lymphadenopathy.  Cardiac phrenic lymph nodes have increased in size.  Persistent abnormal prominent circumferential distal esophageal wall thickening versus surrounding soft tissue mass.  Examination was performed in an emergency setting and not to serve as a restaging scan.  4. Moderate left and small right pleural effusions resulting in volume loss and compressive atelectasis within the lower lobes, left greater than right.       Component      Latest Ref Rng & Units 5/17/2018   WBC      3.90 - 12.70 K/uL 5.00   RBC      4.00 - 5.40 M/uL 2.76 (L)   Hemoglobin      12.0 - 16.0 g/dL 7.8 (L)   Hematocrit      37.0 - 48.5 % 25.9 (L)   MCV      82 - 98 fL 94   MCH      27.0 - 31.0 pg 28.3   MCHC      32.0 - 36.0 g/dL 30.1 (L)   RDW      11.5 - 14.5 % 16.8 (H)   Platelets      150 - 350 K/uL 122 (L)   MPV      9.2 - 12.9 fL 10.6   Immature Granulocytes      0.0 - 0.5 % 0.4   Gran # (ANC)      1.8 - 7.7 K/uL 2.9   Immature Grans (Abs)      0.00 - 0.04 K/uL 0.02   Lymph #      1.0 - 4.8 K/uL 1.4   Mono #      0.3 - 1.0 K/uL 0.5   Eos #      0.0 - 0.5 K/uL 0.1   Baso #      0.00 - 0.20 K/uL 0.01   nRBC      0 /100 WBC 0   Gran%      38.0 - 73.0 % 58.2   Lymph%      18.0 - 48.0 % 28.8   Mono%      4.0 -  15.0 % 10.6   Eosinophil%      0.0 - 8.0 % 1.8   Basophil%      0.0 - 1.9 % 0.2   Differential Method       Automated   Sodium      136 - 145 mmol/L 137   Potassium      3.5 - 5.1 mmol/L 4.2   Chloride      95 - 110 mmol/L 100   CO2      23 - 29 mmol/L 27   Glucose      70 - 110 mg/dL 112 (H)   BUN, Bld      8 - 23 mg/dL 16   Creatinine      0.5 - 1.4 mg/dL 0.9   Calcium      8.7 - 10.5 mg/dL 9.6   Total Protein      6.0 - 8.4 g/dL 9.0 (H)   Albumin      3.5 - 5.2 g/dL 2.7 (L)   Total Bilirubin      0.1 - 1.0 mg/dL 0.3   Alkaline Phosphatase      55 - 135 U/L 89   AST      10 - 40 U/L 13   ALT      10 - 44 U/L 9 (L)   Anion Gap      8 - 16 mmol/L 10   eGFR if African American      >60 mL/min/1.73 m:2 >60.0   eGFR if non African American      >60 mL/min/1.73 m:2 >60.0   Magnesium      1.6 - 2.6 mg/dL 1.6     Component      Latest Ref Rng & Units 4/19/2018   Iron      30 - 160 ug/dL 21 (L)   Transferrin      200 - 375 mg/dL 116 (L)   TIBC      250 - 450 ug/dL 172 (L)   Saturated Iron      20 - 50 % 12 (L)   Ferritin      20.0 - 300.0 ng/mL 1,822 (H)       Assessment:     1. is a 73 y/o female who underwent bronchoscopy/EBUS evaluation on 8/31/17 for abnormal mediastinal adenopathy  and a 1.2 cm MORRO mass as well as pericardial adenopathy.FNA of subcarinal lymph node was positive for malignant cells.  There were small clusters of malignant epithelial cells, favoring squamous carcinoma. Site of origin is not clear. PET CT done on 9/7/17 did not reveal any lesion in head and neck region.  There was inadequate tissue to run PD L1.p16 was negative.    Esophageal thickening was noted on CT done on 6/12/17. EGD on 10/30/17 did not reveal any suspicious lesions in esophagus/stomach. ENT evaluation still pending.   I explained to her that if primary site is unclear, she will be treated as metastatic SCC of unknown primary.   Her treatment got delayed as she cancelled several appointments due to some issues at  home. She said she could not have MRI due to claustrophobia, even with anti-anxiety medication. CT chest from 1/26/18 showed increase in the sizes of subcarinal and left lung base masses compared to previous CT in June 2017 . High density material was seen within the patient's left pleural effusion. No intracranial lesions noted on CT head. I discussed these findings with the patient and personally reviewed the CT scans from January 2018.  I told her that she has likely metastatic SCC of unknown primary. She does have RA history. It is unclear if she can tolerate check point inhibitor therapy even if she has high PD1 expression on cytology/biopsy.  She has started Carboplatin/Paclitaxel every 3 weeks. I have explained to her that rationale of treatment is palliation and not cure and she understands this.   She has finished 4 cycles. She did not get her CTs after C4 as planned. She will have PET CT ordered.      2. Pleural effusion: She had left sided thoracentesis ( 1050ml) on 2/9/18. Malignancy could not be excluded. No  Indication for pleurex catheter/reepat thoracentesis at this time.    CTA done on 5/14/18 again shows moderate effusion on the left , mild on right     3. Anorexia: Secondary to #1. She is on Cyproheptadine.However, her appetite is still poor. She will start Decadron 2mg BID.       4. Anemia: Hgb 7.8 today. Normocytic, hypochromic. No overt bleeding. Iron panel on 4/19 showed iron deficiency. She will receive PRBC for Hgb <7.     5. Diarrhea: Chemotherapy induced. Imodium is not helping her. She is also on Lomotil as needed.     6. Pericardial effusion: She was noted to have pericardial effusion on CTA chest done on 5/14/18. Possibly malignant. No symptoms/signs of tamponade.      Plan:  1. C5 Carbo/Taxol on 5/18. PET CT before C6  2. CBC, CMP, f/u in 3 weeks  3. Continue Periactin and Decadron for anorexia  4. Lomotil for chemotherapy induced diarrhea        Distress Screening Results:  Psychosocial Distress screening score of Distress Score: 0 noted and reviewed. No intervention indicated.

## 2018-05-17 NOTE — PROGRESS NOTES
Patient, Silvia Capellan (MRN #[id number]), presented with a recent Platelet count less than 150 K/uL consistent with the definition of thrombocytopenia (ICD10 - D69.6).    Platelets   Date Value Ref Range Status   05/17/2018 122 (L) 150 - 350 K/uL Final     The patient's thrombocytopenia was monitored, evaluated, addressed and/or treated. This addendum to the medical record is made on 05/17/2018.

## 2018-05-17 NOTE — DISCHARGE SUMMARY
[facility]  Hematology/Oncology  Discharge Summary      Patient Name: Silvia Capellan  MRN: [id number]  Admission Date: 5/15/2018  Hospital Length of Stay: 1 days  Discharge Date and Time:  05/17/2018 6:47 AM  Attending Physician: No att. providers found   Discharging Provider: Paul Livingston MD  Primary Care Provider: Ginette Rosado MD    HPI: Ms. Capellan is a pleasent 73 yo lady w/ stage 4 SCC of unknown primary s/p 4C palliative carboplatin & paxlitaxel transferred from [facility] ER for pericardial effusion.  This morning she developed a non-radiating, sharp, right-sided chest pain.  The pain spontaneously resolved, but came back intermittently during the day and was made worse with deep inspiration.  She denies any dyspnea, palpitations, light headedness, N/V, F/C but does feel more weak than usual.  She presented to the [facility] ER where she was initially suspected of having a PE.  She was given aspirin and had a CTA chest that was negative for PE but showed a pericardial effusion concerning for malignant pleural effusion.  Patient was transferred to [facility] for further evaluation with cardiolgy and possible pericardiocentesis.    Oncology History:     Diagnosis: Squamous cell carcinoma , primary site unknown    Molecular/genetic testing: p16 negative; PD L1 could not be run due to inadequate tissue   Stage:        Stage 4   Treatment: Carboplatin/paclitaxel    * No surgery found *     Hospital Course: Patient presented to the hospital with chest pain. Her troponin was normal and there were no EKG changed. However she was found to have a small pleural effusion. Cardiology was consulted and an echo was performed. The echo showed a small amount of pleural effusion with changes to the pleural lining concerning for malignancy spread. However the fluid amount was too small to be tapped. The patient's chest pain resolved in the hospital. She will need a  repeat echo in 3-4 weeks. She is safe for discharge.     Consults:     Significant Diagnostic Studies: Labs: CMP No results for input(s): NA, K, CL, CO2, GLU, BUN, CREATININE, CALCIUM, PROT, ALBUMIN, BILITOT, ALKPHOS, AST, ALT, ANIONGAP, ESTGFRAFRICA, EGFRNONAA in the last 48 hours. and CBC No results for input(s): WBC, HGB, HCT, PLT in the last 48 hours.    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    Specialty:  Hematology and Oncology  Why:  as scheduled by clinic  Contact information:  [address]  [facility] [address]  [phone]                 Patient Instructions:   No discharge procedures on file.  Medications:  Reconciled Home Medications:      Medication List      CONTINUE taking these medications    cyproheptadine 4 mg tablet  Commonly known as:  PERIACTIN  Take 1 tablet (4 mg total) by mouth 3 (three) times daily as needed.     dexamethasone 2 MG tablet  Commonly known as:  DECADRON  Take 1 tablet (2 mg total) by mouth every 12 (twelve) hours.     diphenoxylate-atropine 2.5-0.025 mg 2.5-0.025 mg per tablet  Commonly known as:  LOMOTIL  Take 1 tablet by mouth 4 (four) times daily as needed for Diarrhea.     escitalopram oxalate 10 MG tablet  Commonly known as:   LEXAPRO  Take 1 tablet (10 mg total) by mouth once daily.     hydroCHLOROthiazide 25 MG tablet  Commonly known as:  HYDRODIURIL  Take 1 tablet (25 mg total) by mouth once daily.     LORazepam 0.5 MG tablet  Commonly known as:  ATIVAN  TAKE 1 TABLET (0.5 MG TOTAL) BY MOUTH 2 (TWO) TIMES DAILY.     losartan 100 MG tablet  Commonly known as:  COZAAR  Take 1 tablet (100 mg total) by mouth once daily.     methotrexate 2.5 MG Tab     morphine 15 MG 12 hr tablet  Commonly known as:  MS CONTIN  Take 1 tablet (15 mg total) by mouth 2 (two) times daily.     pantoprazole 40 MG tablet  Commonly known as:  PROTONIX  Take 1 tablet (40 mg total) by mouth once daily.     potassium chloride SA 20 MEQ tablet  Commonly known as:  K-DUR,KLOR-CON  Take 2 tablets (40 mEq total) by mouth once daily.     prochlorperazine 10 MG tablet  Commonly known as:  COMPAZINE  Take 1 tablet (10 mg total) by mouth every 6 (six) hours as needed.          Paul Livingston MD  Hematology/Oncology  [facility]

## 2018-05-18 ENCOUNTER — INFUSION (OUTPATIENT)
Dept: INFUSION THERAPY | Facility: HOSPITAL | Age: 73
End: 2018-05-18
Attending: INTERNAL MEDICINE
Payer: MEDICARE

## 2018-05-18 VITALS
DIASTOLIC BLOOD PRESSURE: 74 MMHG | RESPIRATION RATE: 18 BRPM | HEART RATE: 82 BPM | TEMPERATURE: 98 F | SYSTOLIC BLOOD PRESSURE: 130 MMHG

## 2018-05-18 DIAGNOSIS — C44.92 SQUAMOUS CELL CARCINOMA OF UNKNOWN ORIGIN: Primary | ICD-10-CM

## 2018-05-18 PROCEDURE — 96377 APPLICATON ON-BODY INJECTOR: CPT

## 2018-05-18 PROCEDURE — 63600175 PHARM REV CODE 636 W HCPCS: Performed by: INTERNAL MEDICINE

## 2018-05-18 PROCEDURE — 96413 CHEMO IV INFUSION 1 HR: CPT

## 2018-05-18 PROCEDURE — 25000003 PHARM REV CODE 250: Performed by: INTERNAL MEDICINE

## 2018-05-18 PROCEDURE — 96415 CHEMO IV INFUSION ADDL HR: CPT

## 2018-05-18 PROCEDURE — 96417 CHEMO IV INFUS EACH ADDL SEQ: CPT

## 2018-05-18 PROCEDURE — 96367 TX/PROPH/DG ADDL SEQ IV INF: CPT

## 2018-05-18 PROCEDURE — A4216 STERILE WATER/SALINE, 10 ML: HCPCS | Performed by: INTERNAL MEDICINE

## 2018-05-18 PROCEDURE — S0028 INJECTION, FAMOTIDINE, 20 MG: HCPCS | Performed by: INTERNAL MEDICINE

## 2018-05-18 RX ORDER — EPINEPHRINE 0.3 MG/.3ML
0.3 INJECTION SUBCUTANEOUS ONCE AS NEEDED
Status: DISCONTINUED | OUTPATIENT
Start: 2018-05-18 | End: 2018-05-18 | Stop reason: HOSPADM

## 2018-05-18 RX ORDER — HEPARIN 100 UNIT/ML
500 SYRINGE INTRAVENOUS
Status: DISCONTINUED | OUTPATIENT
Start: 2018-05-18 | End: 2018-05-18 | Stop reason: HOSPADM

## 2018-05-18 RX ORDER — DIPHENHYDRAMINE HYDROCHLORIDE 50 MG/ML
50 INJECTION INTRAMUSCULAR; INTRAVENOUS ONCE AS NEEDED
Status: DISCONTINUED | OUTPATIENT
Start: 2018-05-18 | End: 2018-05-18 | Stop reason: HOSPADM

## 2018-05-18 RX ORDER — SODIUM CHLORIDE 0.9 % (FLUSH) 0.9 %
10 SYRINGE (ML) INJECTION
Status: DISCONTINUED | OUTPATIENT
Start: 2018-05-18 | End: 2018-05-18 | Stop reason: HOSPADM

## 2018-05-18 RX ORDER — FAMOTIDINE 10 MG/ML
20 INJECTION INTRAVENOUS
Status: COMPLETED | OUTPATIENT
Start: 2018-05-18 | End: 2018-05-18

## 2018-05-18 RX ADMIN — DIPHENHYDRAMINE HYDROCHLORIDE 50 MG: 50 INJECTION, SOLUTION INTRAMUSCULAR; INTRAVENOUS at 11:05

## 2018-05-18 RX ADMIN — SODIUM CHLORIDE: 9 INJECTION, SOLUTION INTRAVENOUS at 11:05

## 2018-05-18 RX ADMIN — PEGFILGRASTIM 6 MG: KIT SUBCUTANEOUS at 04:05

## 2018-05-18 RX ADMIN — CARBOPLATIN 610 MG: 10 INJECTION, SOLUTION INTRAVENOUS at 03:05

## 2018-05-18 RX ADMIN — DEXAMETHASONE SODIUM PHOSPHATE: 4 INJECTION, SOLUTION INTRAMUSCULAR; INTRAVENOUS at 11:05

## 2018-05-18 RX ADMIN — SODIUM CHLORIDE, PRESERVATIVE FREE 10 ML: 5 INJECTION INTRAVENOUS at 04:05

## 2018-05-18 RX ADMIN — FAMOTIDINE 20 MG: 10 INJECTION, SOLUTION INTRAVENOUS at 11:05

## 2018-05-18 RX ADMIN — PACLITAXEL 330 MG: 6 INJECTION, SOLUTION INTRAVENOUS at 12:05

## 2018-05-18 RX ADMIN — HEPARIN 500 UNITS: 100 SYRINGE at 04:05

## 2018-05-18 NOTE — PLAN OF CARE
Problem: Patient Care Overview  Goal: Individualization & Mutuality  Outcome: Ongoing (interventions implemented as appropriate)  1110-Labs , hx, and medications reviewed, patient was seen by MD earlier this week. Assessment completed. Discussed plan of care with patient. Patient in agreement. Chair reclined and warm blanket and snack offered.

## 2018-05-18 NOTE — PLAN OF CARE
Problem: Patient Care Overview  Goal: Plan of Care Review  3959-Patient tolerated treatment well. Discharged without complaints or S/S of adverse event. AVS given.  Instructed to call provider for any questions or concerns. Neulasta onbody device applied to patients abdomen, patient verbalized understanding monitoring and use of device.

## 2018-05-23 ENCOUNTER — TELEPHONE (OUTPATIENT)
Dept: HEMATOLOGY/ONCOLOGY | Facility: CLINIC | Age: 73
End: 2018-05-23

## 2018-05-23 NOTE — TELEPHONE ENCOUNTER
----- Message from Jessica Patrick sent at 5/23/2018 10:45 AM CDT -----  Contact: radha Weeks called from [facility] about pt medication cyproheptadine (PERIACTIN) 4 mg tablet states that it need a prior auth  Callback#[phone]  Thank You  PARESH Patrick

## 2018-05-25 ENCOUNTER — TELEPHONE (OUTPATIENT)
Dept: HEMATOLOGY/ONCOLOGY | Facility: CLINIC | Age: 73
End: 2018-05-25

## 2018-06-07 ENCOUNTER — HOSPITAL ENCOUNTER (OUTPATIENT)
Dept: RADIOLOGY | Facility: HOSPITAL | Age: 73
Discharge: HOME | End: 2018-06-07
Attending: INTERNAL MEDICINE
Payer: MEDICARE

## 2018-06-07 DIAGNOSIS — J90 PLEURAL EFFUSION ON LEFT: ICD-10-CM

## 2018-06-07 DIAGNOSIS — C44.92 SQUAMOUS CELL CARCINOMA OF UNKNOWN ORIGIN: ICD-10-CM

## 2018-06-07 DIAGNOSIS — I31.39 PERICARDIAL EFFUSION: ICD-10-CM

## 2018-06-07 DIAGNOSIS — C34.92 SQUAMOUS CELL LUNG CANCER, LEFT: ICD-10-CM

## 2018-06-07 DIAGNOSIS — R63.0 ANOREXIA: ICD-10-CM

## 2018-06-07 LAB — POCT GLUCOSE: 86 MG/DL (ref 70–110)

## 2018-06-07 PROCEDURE — A9552 F18 FDG: HCPCS

## 2018-06-07 PROCEDURE — 78815 PET IMAGE W/CT SKULL-THIGH: CPT | Mod: TC

## 2018-06-07 PROCEDURE — 78815 PET IMAGE W/CT SKULL-THIGH: CPT | Mod: 26,PS,, | Performed by: RADIOLOGY

## 2018-06-07 RX ORDER — FAMOTIDINE 10 MG/ML
20 INJECTION INTRAVENOUS
Status: CANCELLED | OUTPATIENT
Start: 2018-06-08

## 2018-06-07 RX ORDER — DIPHENHYDRAMINE HYDROCHLORIDE 50 MG/ML
50 INJECTION INTRAMUSCULAR; INTRAVENOUS ONCE AS NEEDED
Status: CANCELLED | OUTPATIENT
Start: 2018-06-08 | End: 2018-06-08

## 2018-06-07 RX ORDER — EPINEPHRINE 0.3 MG/.3ML
0.3 INJECTION SUBCUTANEOUS ONCE AS NEEDED
Status: CANCELLED | OUTPATIENT
Start: 2018-06-08 | End: 2018-06-08

## 2018-06-07 RX ORDER — SODIUM CHLORIDE 0.9 % (FLUSH) 0.9 %
10 SYRINGE (ML) INJECTION
Status: CANCELLED | OUTPATIENT
Start: 2018-06-08

## 2018-06-07 RX ORDER — HEPARIN 100 UNIT/ML
500 SYRINGE INTRAVENOUS
Status: CANCELLED | OUTPATIENT
Start: 2018-06-08

## 2018-06-08 ENCOUNTER — INFUSION (OUTPATIENT)
Dept: INFUSION THERAPY | Facility: HOSPITAL | Age: 73
End: 2018-06-08
Attending: INTERNAL MEDICINE
Payer: MEDICARE

## 2018-06-08 VITALS
HEIGHT: 67 IN | HEART RATE: 92 BPM | BODY MASS INDEX: 27.89 KG/M2 | WEIGHT: 177.69 LBS | TEMPERATURE: 99 F | RESPIRATION RATE: 18 BRPM | SYSTOLIC BLOOD PRESSURE: 119 MMHG | DIASTOLIC BLOOD PRESSURE: 65 MMHG

## 2018-06-08 DIAGNOSIS — C44.92 SQUAMOUS CELL CARCINOMA OF UNKNOWN ORIGIN: Primary | ICD-10-CM

## 2018-06-08 PROCEDURE — 96377 APPLICATON ON-BODY INJECTOR: CPT | Mod: 59

## 2018-06-08 PROCEDURE — 96417 CHEMO IV INFUS EACH ADDL SEQ: CPT

## 2018-06-08 PROCEDURE — 96413 CHEMO IV INFUSION 1 HR: CPT

## 2018-06-08 PROCEDURE — 63600175 PHARM REV CODE 636 W HCPCS: Mod: JG | Performed by: INTERNAL MEDICINE

## 2018-06-08 PROCEDURE — 96375 TX/PRO/DX INJ NEW DRUG ADDON: CPT

## 2018-06-08 PROCEDURE — A4216 STERILE WATER/SALINE, 10 ML: HCPCS | Performed by: INTERNAL MEDICINE

## 2018-06-08 PROCEDURE — S0028 INJECTION, FAMOTIDINE, 20 MG: HCPCS | Performed by: INTERNAL MEDICINE

## 2018-06-08 PROCEDURE — 96415 CHEMO IV INFUSION ADDL HR: CPT

## 2018-06-08 PROCEDURE — 25000003 PHARM REV CODE 250: Performed by: INTERNAL MEDICINE

## 2018-06-08 PROCEDURE — 96367 TX/PROPH/DG ADDL SEQ IV INF: CPT

## 2018-06-08 RX ORDER — HEPARIN 100 UNIT/ML
500 SYRINGE INTRAVENOUS
Status: DISCONTINUED | OUTPATIENT
Start: 2018-06-08 | End: 2018-06-08 | Stop reason: HOSPADM

## 2018-06-08 RX ORDER — SODIUM CHLORIDE 0.9 % (FLUSH) 0.9 %
10 SYRINGE (ML) INJECTION
Status: DISCONTINUED | OUTPATIENT
Start: 2018-06-08 | End: 2018-06-08 | Stop reason: HOSPADM

## 2018-06-08 RX ORDER — DIPHENHYDRAMINE HYDROCHLORIDE 50 MG/ML
50 INJECTION INTRAMUSCULAR; INTRAVENOUS ONCE AS NEEDED
Status: DISCONTINUED | OUTPATIENT
Start: 2018-06-08 | End: 2018-06-08 | Stop reason: HOSPADM

## 2018-06-08 RX ORDER — FAMOTIDINE 10 MG/ML
20 INJECTION INTRAVENOUS
Status: COMPLETED | OUTPATIENT
Start: 2018-06-08 | End: 2018-06-08

## 2018-06-08 RX ORDER — EPINEPHRINE 0.3 MG/.3ML
0.3 INJECTION SUBCUTANEOUS ONCE AS NEEDED
Status: DISCONTINUED | OUTPATIENT
Start: 2018-06-08 | End: 2018-06-08 | Stop reason: HOSPADM

## 2018-06-08 RX ADMIN — DIPHENHYDRAMINE HYDROCHLORIDE 50 MG: 50 INJECTION, SOLUTION INTRAMUSCULAR; INTRAVENOUS at 10:06

## 2018-06-08 RX ADMIN — HEPARIN SODIUM (PORCINE) LOCK FLUSH IV SOLN 100 UNIT/ML 500 UNITS: 100 SOLUTION at 03:06

## 2018-06-08 RX ADMIN — CARBOPLATIN 660 MG: 10 INJECTION, SOLUTION INTRAVENOUS at 02:06

## 2018-06-08 RX ADMIN — PACLITAXEL 330 MG: 6 INJECTION, SOLUTION INTRAVENOUS at 11:06

## 2018-06-08 RX ADMIN — FAMOTIDINE 20 MG: 10 INJECTION, SOLUTION INTRAVENOUS at 10:06

## 2018-06-08 RX ADMIN — PALONOSETRON HYDROCHLORIDE: 0.25 INJECTION INTRAVENOUS at 10:06

## 2018-06-08 RX ADMIN — SODIUM CHLORIDE: 9 INJECTION, SOLUTION INTRAVENOUS at 10:06

## 2018-06-08 RX ADMIN — PEGFILGRASTIM 6 MG: KIT SUBCUTANEOUS at 03:06

## 2018-06-08 RX ADMIN — SODIUM CHLORIDE, PRESERVATIVE FREE 10 ML: 5 INJECTION INTRAVENOUS at 03:06

## 2018-06-08 NOTE — PLAN OF CARE
Problem: Patient Care Overview  Goal: Plan of Care Review  9016-Patient tolerated treatment well. Discharged without complaints or S/S of adverse event. AVS given.  Instructed to call provider for any questions or concerns. Patient went home with neulasta onbody, verbalized understanding monitoring and use of device.

## 2018-06-08 NOTE — PLAN OF CARE
Problem: Patient Care Overview  Goal: Individualization & Mutuality  Outcome: Ongoing (interventions implemented as appropriate)  1000-Labs , hx, and medications reviewed, labs reviewed and orders signed. Assessment completed. Discussed plan of care with patient. Patient in agreement. Chair reclined and warm blanket and snack offered.

## 2018-06-13 ENCOUNTER — OFFICE VISIT (OUTPATIENT)
Dept: HEMATOLOGY/ONCOLOGY | Facility: CLINIC | Age: 73
End: 2018-06-13
Payer: MEDICARE

## 2018-06-13 ENCOUNTER — LAB VISIT (OUTPATIENT)
Dept: LAB | Facility: HOSPITAL | Age: 73
End: 2018-06-13
Attending: INTERNAL MEDICINE
Payer: MEDICARE

## 2018-06-13 VITALS
RESPIRATION RATE: 18 BRPM | BODY MASS INDEX: 25.74 KG/M2 | HEIGHT: 67 IN | SYSTOLIC BLOOD PRESSURE: 128 MMHG | TEMPERATURE: 98 F | WEIGHT: 164 LBS | DIASTOLIC BLOOD PRESSURE: 69 MMHG | OXYGEN SATURATION: 100 % | HEART RATE: 79 BPM

## 2018-06-13 DIAGNOSIS — D63.0 ANEMIA IN NEOPLASTIC DISEASE: ICD-10-CM

## 2018-06-13 DIAGNOSIS — C44.92 SQUAMOUS CELL CARCINOMA OF UNKNOWN ORIGIN: ICD-10-CM

## 2018-06-13 DIAGNOSIS — C34.92 SQUAMOUS CELL LUNG CANCER, LEFT: ICD-10-CM

## 2018-06-13 DIAGNOSIS — R53.0 NEOPLASTIC MALIGNANT RELATED FATIGUE: ICD-10-CM

## 2018-06-13 DIAGNOSIS — R63.0 ANOREXIA: Primary | ICD-10-CM

## 2018-06-13 LAB
ALBUMIN SERPL BCP-MCNC: 3.1 G/DL
ALP SERPL-CCNC: 88 U/L
ALT SERPL W/O P-5'-P-CCNC: 15 U/L
ANION GAP SERPL CALC-SCNC: 6 MMOL/L
ANISOCYTOSIS BLD QL SMEAR: SLIGHT
AST SERPL-CCNC: 13 U/L
BASOPHILS # BLD AUTO: 0.05 K/UL
BASOPHILS NFR BLD: 0.5 %
BILIRUB SERPL-MCNC: 0.5 MG/DL
BUN SERPL-MCNC: 26 MG/DL
CALCIUM SERPL-MCNC: 9.1 MG/DL
CHLORIDE SERPL-SCNC: 104 MMOL/L
CO2 SERPL-SCNC: 26 MMOL/L
CREAT SERPL-MCNC: 0.8 MG/DL
DIFFERENTIAL METHOD: ABNORMAL
EOSINOPHIL # BLD AUTO: 0 K/UL
EOSINOPHIL NFR BLD: 0.1 %
ERYTHROCYTE [DISTWIDTH] IN BLOOD BY AUTOMATED COUNT: 20.2 %
EST. GFR  (AFRICAN AMERICAN): >60 ML/MIN/1.73 M^2
EST. GFR  (NON AFRICAN AMERICAN): >60 ML/MIN/1.73 M^2
GLUCOSE SERPL-MCNC: 107 MG/DL
HCT VFR BLD AUTO: 24 %
HGB BLD-MCNC: 7.4 G/DL
HYPOCHROMIA BLD QL SMEAR: ABNORMAL
IMM GRANULOCYTES # BLD AUTO: 0.22 K/UL
IMM GRANULOCYTES NFR BLD AUTO: 2.3 %
LYMPHOCYTES # BLD AUTO: 1.6 K/UL
LYMPHOCYTES NFR BLD: 16.8 %
MCH RBC QN AUTO: 30.6 PG
MCHC RBC AUTO-ENTMCNC: 30.8 G/DL
MCV RBC AUTO: 99 FL
MONOCYTES # BLD AUTO: 0.4 K/UL
MONOCYTES NFR BLD: 4.1 %
NEUTROPHILS # BLD AUTO: 7.2 K/UL
NEUTROPHILS NFR BLD: 76.2 %
NRBC BLD-RTO: 0 /100 WBC
OVALOCYTES BLD QL SMEAR: ABNORMAL
PLATELET # BLD AUTO: 75 K/UL
PMV BLD AUTO: 12.4 FL
POIKILOCYTOSIS BLD QL SMEAR: SLIGHT
POLYCHROMASIA BLD QL SMEAR: ABNORMAL
POTASSIUM SERPL-SCNC: 5 MMOL/L
PROT SERPL-MCNC: 7.5 G/DL
RBC # BLD AUTO: 2.42 M/UL
SODIUM SERPL-SCNC: 136 MMOL/L
WBC # BLD AUTO: 9.49 K/UL

## 2018-06-13 PROCEDURE — 99999 PR PBB SHADOW E&M-EST. PATIENT-LVL IV: CPT | Mod: PBBFAC,,, | Performed by: INTERNAL MEDICINE

## 2018-06-13 PROCEDURE — 85025 COMPLETE CBC W/AUTO DIFF WBC: CPT

## 2018-06-13 PROCEDURE — 99214 OFFICE O/P EST MOD 30 MIN: CPT | Mod: S$GLB,,, | Performed by: INTERNAL MEDICINE

## 2018-06-13 PROCEDURE — 3078F DIAST BP <80 MM HG: CPT | Mod: CPTII,S$GLB,, | Performed by: INTERNAL MEDICINE

## 2018-06-13 PROCEDURE — 36415 COLL VENOUS BLD VENIPUNCTURE: CPT

## 2018-06-13 PROCEDURE — 3074F SYST BP LT 130 MM HG: CPT | Mod: CPTII,S$GLB,, | Performed by: INTERNAL MEDICINE

## 2018-06-13 PROCEDURE — 99499 UNLISTED E&M SERVICE: CPT | Mod: S$GLB,,, | Performed by: INTERNAL MEDICINE

## 2018-06-13 PROCEDURE — 80053 COMPREHEN METABOLIC PANEL: CPT

## 2018-06-13 RX ORDER — OXYCODONE AND ACETAMINOPHEN 7.5; 325 MG/1; MG/1
1 TABLET ORAL EVERY 4 HOURS PRN
Qty: 20 TABLET | Refills: 0 | Status: SHIPPED | OUTPATIENT
Start: 2018-06-13 | End: 2018-08-31 | Stop reason: ALTCHOICE

## 2018-06-13 NOTE — PROGRESS NOTES
CC: Squamous cell carcinoma, unknown primary, on chemotherapy, here for follow up and prior to cycle 4 chemotherapy         Oncology History:     Diagnosis: Squamous cell carcinoma , primary site unknown    Molecular/genetic testing: p16 negative; PD L1 could not be run due to inadequate tissue   Stage:        Stage 4   Treatment: Carboplatin/paclitaxel        HPI:  is a 71 y/o female who underwent bronchoscopy/EBUS evaluation on 8/31/17 for abnormal mediastinal adenopathy  and a 1.2 cm MORRO mass.     Bronchoscopy findings:    The oropharynx appears normal. The larynx appears normal. The vocal cords appear normal. The subglottic space is normal. The trachea is of normal caliber. The tof is sharp. The tracheobronchial tree was examined to at least the first subsegmental level. Bronchial mucosa and anatomy are normal; there are no endobronchial lesions, and no secretions.    Lymph Nodes: Lymph node sizing was performed via endobronchial ultrasound for suspected lung cancer. Sampling by transbronchial needle aspiration was also performed using an Olympus EBUS-TBNA 22  gauge needle in the subcarinal mediastinum (level 7) and sent for routine cytology.       - The 7 (subcarinal) node was 30 mm by EBUS. Three samples with the needle were obtained. The patient's condition was unchanged after the intervention.      Her treatment got delayed as she cancelled several appointments due to some issues at home. She has started Caroplatin/paclitaxel palliatively. She had left thoracentesis on 2/9/18. There were atypical cells in the pleural fluid, highly suspicious for malignancy.     Interval history: She is here for a follow up visit. She has fatigue, nausea, dyspnea. She had diarrhea after her last chemotherapy. She has finished 6 cycles of Carbo/Pac      Review of Systems   Constitutional: Positive for malaise/fatigue and weight loss. Negative for fever.   HENT: Negative.    Eyes: Negative.    Respiratory:  Positive for cough and shortness of breath.    Cardiovascular: Negative.    Gastrointestinal: Positive for diarrhea.   Genitourinary: Negative.    Musculoskeletal: Negative.    Skin: Negative.    Neurological: Negative.    Psychiatric/Behavioral: Negative.      Current Outpatient Prescriptions   Medication Sig    cyproheptadine (PERIACTIN) 4 mg tablet Take 1 tablet (4 mg total) by mouth 3 (three) times daily as needed.    dexamethasone (DECADRON) 2 MG tablet Take 1 tablet (2 mg total) by mouth every 12 (twelve) hours.    diphenoxylate-atropine 2.5-0.025 mg (LOMOTIL) 2.5-0.025 mg per tablet Take 1 tablet by mouth 4 (four) times daily as needed for Diarrhea.    escitalopram oxalate (LEXAPRO) 10 MG tablet Take 1 tablet (10 mg total) by mouth once daily.    hydroCHLOROthiazide (HYDRODIURIL) 25 MG tablet Take 1 tablet (25 mg total) by mouth once daily.    LORazepam (ATIVAN) 0.5 MG tablet TAKE 1 TABLET (0.5 MG TOTAL) BY MOUTH 2 (TWO) TIMES DAILY.    losartan (COZAAR) 100 MG tablet Take 1 tablet (100 mg total) by mouth once daily.    methotrexate 2.5 MG Tab     morphine (MS CONTIN) 15 MG 12 hr tablet Take 1 tablet (15 mg total) by mouth 2 (two) times daily.    pantoprazole (PROTONIX) 40 MG tablet Take 1 tablet (40 mg total) by mouth once daily.    potassium chloride SA (K-DUR,KLOR-CON) 20 MEQ tablet Take 2 tablets (40 mEq total) by mouth once daily.    prochlorperazine (COMPAZINE) 10 MG tablet Take 1 tablet (10 mg total) by mouth every 6 (six) hours as needed.     No current facility-administered medications for this visit.          Vitals:    06/13/18 0922   BP: 128/69   Pulse: 79   Resp: 18   Temp: 97.8 °F (36.6 °C)     PS: ECOG 1    Physical Exam   Constitutional: She is oriented to person, place, and time. She appears well-developed.   HENT:   Head: Normocephalic and atraumatic.   Eyes: Pupils are equal, round, and reactive to light. No scleral icterus.   Neck: Normal range of motion.   Cardiovascular:  Normal rate and regular rhythm.    Pulmonary/Chest: Effort normal and breath sounds normal. No respiratory distress. She has no wheezes.   Abdominal: Soft. She exhibits no distension. There is no tenderness. There is no rebound.   Musculoskeletal: She exhibits no edema.   Lymphadenopathy:     She has no cervical adenopathy.   Neurological: She is alert and oriented to person, place, and time.   She is in a wheel chair   Skin: Skin is warm.   Psychiatric: She has a normal mood and affect.     Component      Latest Ref Rng & Units 6/13/2018   WBC      3.90 - 12.70 K/uL 9.49   RBC      4.00 - 5.40 M/uL 2.42 (L)   Hemoglobin      12.0 - 16.0 g/dL 7.4 (L)   Hematocrit      37.0 - 48.5 % 24.0 (L)   MCV      82 - 98 fL 99 (H)   MCH      27.0 - 31.0 pg 30.6   MCHC      32.0 - 36.0 g/dL 30.8 (L)   RDW      11.5 - 14.5 % 20.2 (H)   Platelets      150 - 350 K/uL 75 (L)   MPV      9.2 - 12.9 fL 12.4   Immature Granulocytes      0.0 - 0.5 % 2.3 (H)   Gran # (ANC)      1.8 - 7.7 K/uL 7.2   Immature Grans (Abs)      0.00 - 0.04 K/uL 0.22 (H)   Lymph #      1.0 - 4.8 K/uL 1.6   Mono #      0.3 - 1.0 K/uL 0.4   Eos #      0.0 - 0.5 K/uL 0.0   Baso #      0.00 - 0.20 K/uL 0.05   nRBC      0 /100 WBC 0   Gran%      38.0 - 73.0 % 76.2 (H)   Lymph%      18.0 - 48.0 % 16.8 (L)   Mono%      4.0 - 15.0 % 4.1   Eosinophil%      0.0 - 8.0 % 0.1   Basophil%      0.0 - 1.9 % 0.5   Aniso       Slight   Poik       Slight   Poly       Occasional   Hypo       Occasional   Ovalocytes       Occasional   Differential Method       Automated   Sodium      136 - 145 mmol/L 136   Potassium      3.5 - 5.1 mmol/L 5.0   Chloride      95 - 110 mmol/L 104   CO2      23 - 29 mmol/L 26   Glucose      70 - 110 mg/dL 107   BUN, Bld      8 - 23 mg/dL 26 (H)   Creatinine      0.5 - 1.4 mg/dL 0.8   Calcium      8.7 - 10.5 mg/dL 9.1   Total Protein      6.0 - 8.4 g/dL 7.5   Albumin      3.5 - 5.2 g/dL 3.1 (L)   Total Bilirubin      0.1 - 1.0 mg/dL 0.5   Alkaline  Phosphatase      55 - 135 U/L 88   AST      10 - 40 U/L 13   ALT      10 - 44 U/L 15   Anion Gap      8 - 16 mmol/L 6 (L)   eGFR if African American      >60 mL/min/1.73 m:2 >60.0   eGFR if non African American      >60 mL/min/1.73 m:2 >60.0       6/12/17 CT chest with cont      1. Large left pleural effusion resulting in atelectasis of the left lower lobe and portions of left upper lobe  2. 1.2 cm left upper lobe pleural based pulmonary nodule  3. Mediastinal adenopathy as described above with diffuse thickening of the wall of the distal esophagus. Findings are suspicious for neoplastic process. Recommend bronchoscopy biopsy and possible endoscopy for further evaluation.  4. 1.3 cm hypodensity within the dome of the liver. Metastatic focus cannot be entirely excluded.     9/7/17 PET CT       There is mildly increased tracer uptake within the patient's left pleural effusion and overlying the mediastinal adenopathy, max SUV 2.7.    Transmission CT images show continued pleural effusion and circumferential esophageal thickening. Transmission CT images also show non-avid bilateral groin adenopathy likely reactive in nature.      Impression        There is mildly increased tracer uptake within the patient's left pleural effusion and mediastinal adenopathy. While these findings suggest reactive etiology some low grade malignancies, such as bronchioloalveolar carcinoma, may show this level of uptake on PET scan.            10/30/17 EGD     The examined duodenum was normal.The entire examined stomach was normal.The cardia and gastric fundus were normal on retroflexion. A 1 cm hiatal hernia was found. The proximal extent of the gastric folds (end of tubular esophagus) was 38 cm from the incisors. The hiatal narrowing was 39 cm from the incisors. The Z-line was 38 cm from the incisors. Z-line was sharp. No esophagitis and no columnar esophagus and no lesions seen with NBI or white light.    Impression:                                    - Normal examined duodenum.                        - Normal stomach.                        - 1 cm hiatal hernia.                        - No specimens collected.     10/30/17 colonoscopy :     Cecal polyp ( 3mm) identified  Histopathology: Tubular adenoma        Pathology:     1/17/14 colonoscopy     FINAL PATHOLOGIC DIAGNOSIS     1. Colon biopsy, ascending colon-tubular adenoma.  2. Colon biopsy, transverse-tubular adenoma.  3. 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 The stalk and base of the polyp are well represented and are free of atypia.  4. Colon biopsy, sigmoid- mild glandular hyperplasia consistent with a benign hyperplastic polyp.        7/19/17 PLEURAL FLUID (CYTOLOGY AND CELL BLOCK):     -Groups of atypical cells present, malignancy cannot be excluded.  Note: While these cells could be reactive mesothelial cells, the level of atypia could be seen in malignancy. A cell block was prepared but the atypical cells are not present in the cell block for further characterization. Correlate  clinically. Repeat tap/biopsy might be helpful if clinically indicated.     8/28/17 EBUS FNA     FNA of subcarinal lymph node: Positive for malignant cells  Small clusters of malignant epithelial cells, favor squamous carcinoma Tumor cells are positive for CK 5/6 and CK 7. Immunostains for p63 and TTF-1 are negative.     1/26/18 CT chest, abdomen,pelvis with cont         Comparison: June 12, 2017    Chest: Since prior exam there is been interval enlargement of the right pleural effusion. There is high density material seen within the right pleural effusion that was not visualized on prior exam.    There is a 5.4 cm enhancing mass seen at the left lung base that previously measured approximately 2.9 cm.    There is a subcarinal mass measuring 4.4 cm it previously  measured 2.9 cm.    There is circumferential esophageal thickening adjacent to this mass.    There is volume loss in the right chest with mediastinal shift to the right more pronounced than what was seen on prior exam.    Patient is a right internal jugular Port-A-Cath.    Abdomen/pelvis: The liver, spleen, adrenal glands, kidneys and pancreas show a normal contrasted appearance. There is no evidence bowel obstruction. There is no evidence of abdominal or pelvic lymphadenopathy. The osseous structures show degenerative change of the spine.   Impression       Since prior exam in the patient's subcarinal and left lung base masses have increased in size. In addition there is now high density material seen within the patient's left pleural effusion new from prior exam and concerning for hemorrhage possibly from the mass.         1/26/18 CT head with cont          Comparison: CT June 8, 2017    Technique: Contiguous axial images were acquired from vertex to skull base without contrast.    Findings: There is no evidence acute intracranial abnormality.  Specifically there is no evidence acute infarct, contusion, extra-axial fluid collection or midline shift.  The ventricles are normal in size and configuration.  The calvarium is intact.  The paranasal sinuses and mastoid air cells are clear.    There is no evidence of enhancing mass.   Impression       There is no evidence of enhancing mass within the cerebral parenchyma      2/9/18 FINAL PATHOLOGIC DIAGNOSIS  LEFT LUNG, PLEURAL FLUID (CYTOLOGY):  - Scant groups of atypical cells, malignancy cannot be excluded. Note: Scant atypical groups of cells are present. Differential diagnosis includes reactive atypia vs malignancy .  Immunohistochemistry for CK5/6 and p63 is non-contributory. Please clinically correlate and re-sample as indicated.  All stains have satisfactory positive and negative controls.     5/14/18 CTA CHEST NON CORONARY       .    COMPARISON:  CT chest abdomen  and pelvis from 01/26/2018.    FINDINGS:  Structures at the base of the neck are unremarkable.  Right-sided chest port is visualized with distal tip in the SVC.  Aorta is non-aneurysmal.  Heart is mildly enlarged with interval development of moderate pericardial effusion.  There is mild pericardial enhancement visualized.  The pulmonary arteries distribute normally. No intraluminal filling defects to suggest pulmonary thromboembolism to the level of the proximal segmental branches.    The trachea and bronchi are patent.  Moderate left and small right sided pleural effusions are visualized resulting in volume loss and compressive atelectasis within the lower lobes, left greater than right.  Grossly stable mass visualized in the right subcarinal/perihilar region.  There has been interval reduced size of multifocal left-sided pulmonary masses and anterior mediastinal lymphadenopathy.    Stable hepatic hypodensity is visualized within the anterior aspect of the right hepatic lobe.  Prominent right cardiophrenic lymph nodes are seen which have increased in size.  Redemonstration of diffuse abnormal distal esophageal wall thickening versus adjacent soft tissue lesion.  No acute osseous abnormality identified.  Extrathoracic soft tissues are unremarkable.   Impression        1. Interval development of moderate pericardial effusion with mild pericardial enhancement.  Findings may reflect malignant pericardial effusion.  2. No evidence of PE.  3. Patient with known metastatic cancer.  Relatively stable size mass in the right perihilar/subcarinal region.  Interval reduced size of previously visualized multifocal left lung metastatic lesions and anterior mediastinal lymphadenopathy.  Cardiac phrenic lymph nodes have increased in size.  Persistent abnormal prominent circumferential distal esophageal wall thickening versus surrounding soft tissue mass.  Examination was performed in an emergency setting and not to serve as a  restaging scan.  4. Moderate left and small right pleural effusions resulting in volume loss and compressive atelectasis within the lower lobes, left greater than right.         6/7/18 PET CT     FINDINGS:  Patient was administered 12.23 millicuries of FDG intravenously.  Comparison is 09/07/2017.    There is physiologic intracranial, head, and neck activity.  Heart mediastinum are normal.  There is low-grade activity within the left pleural effusion.  There is physiologic liver, spleen, GI  and pelvic organ activity.  No bone lesions are seen.  There is left lower lobe retro cardiac atelectasis.   Impression       See above    Low-grade activity within a loculated left upper pleural effusion.  This could be benign/reactive.  Malignancy not excluded but it has improved since the prior study.           Assessment:     1. is a 73 y/o female who underwent bronchoscopy/EBUS evaluation on 8/31/17 for abnormal mediastinal adenopathy  and a 1.2 cm MORRO mass as well as pericardial adenopathy.FNA of subcarinal lymph node was positive for malignant cells.  There were small clusters of malignant epithelial cells, favoring squamous carcinoma. Site of origin is not clear. PET CT done on 9/7/17 did not reveal any lesion in head and neck region.  There was inadequate tissue to run PD L1.p16 was negative.    Esophageal thickening was noted on CT done on 6/12/17. EGD on 10/30/17 did not reveal any suspicious lesions in esophagus/stomach. ENT evaluation still pending.   I explained to her that if primary site is unclear, she will be treated as metastatic SCC of unknown primary.   Her treatment got delayed as she cancelled several appointments due to some issues at home. She said she could not have MRI due to claustrophobia, even with anti-anxiety medication. CT chest from 1/26/18 showed increase in the sizes of subcarinal and left lung base masses compared to previous CT in June 2017 . High density material was seen  within the patient's left pleural effusion. No intracranial lesions noted on CT head. I discussed these findings with the patient and personally reviewed the CT scans from January 2018.  I told her that she has likely metastatic SCC of unknown primary. She does have RA history. It is unclear if she can tolerate check point inhibitor therapy even if she has high PD1 expression on cytology/biopsy.  She has started Carboplatin/Paclitaxel every 3 weeks. I have explained to her that rationale of treatment is palliation and not cure and she understands this.   She has finished 4 cycles. She did not get her CTs after C4 as planned. PET CT before cycle 6, on 6/7/18 showed low-grade activity within the left pleural effusion.     2. Pleural effusion: She had left sided thoracentesis ( 1050ml) on 2/9/18. Malignancy could not be excluded. No  Indication for pleurex catheter/reepat thoracentesis at this time.    CTA done on 5/14/18 again shows moderate effusion on the left , mild on right     3. Anorexia: Secondary to #1. She is on Cyproheptadine.However, her appetite is still poor. She will start Decadron 2mg BID.       4. Anemia: Hgb 7.4 today. Normocytic, hypochromic. No overt bleeding. Iron panel on 4/19 showed iron deficiency. She will receive PRBC for Hgb <7.     5. Diarrhea: Chemotherapy induced. Imodium is not helping her. She is also on Lomotil as needed.      6. Pericardial effusion: She was noted to have pericardial effusion on CTA chest done on 5/14/18. Possibly malignant. No symptoms/signs of tamponade.      Plan:  1.CBC in 1 week, type and cross  2. Continue Periactin and Decadron for anorexia  3. Lomotil for chemotherapy induced diarrhea   4. She will have repeat imaging in 8 weeks

## 2018-06-20 ENCOUNTER — TELEPHONE (OUTPATIENT)
Dept: HEMATOLOGY/ONCOLOGY | Facility: CLINIC | Age: 73
End: 2018-06-20

## 2018-06-21 ENCOUNTER — INFUSION (OUTPATIENT)
Dept: INFUSION THERAPY | Facility: HOSPITAL | Age: 73
End: 2018-06-21
Attending: INTERNAL MEDICINE
Payer: MEDICARE

## 2018-06-21 VITALS
TEMPERATURE: 98 F | DIASTOLIC BLOOD PRESSURE: 73 MMHG | RESPIRATION RATE: 20 BRPM | SYSTOLIC BLOOD PRESSURE: 134 MMHG | HEART RATE: 79 BPM

## 2018-06-21 DIAGNOSIS — R19.7 DIARRHEA OF PRESUMED INFECTIOUS ORIGIN: ICD-10-CM

## 2018-06-21 DIAGNOSIS — D63.0 ANEMIA IN NEOPLASTIC DISEASE: Primary | ICD-10-CM

## 2018-06-21 LAB
BLD PROD TYP BPU: NORMAL
BLOOD UNIT EXPIRATION DATE: NORMAL
BLOOD UNIT TYPE CODE: 5100
BLOOD UNIT TYPE: NORMAL
CODING SYSTEM: NORMAL
DISPENSE STATUS: NORMAL
TRANS ERYTHROCYTES VOL PATIENT: NORMAL ML

## 2018-06-21 PROCEDURE — P9021 RED BLOOD CELLS UNIT: HCPCS

## 2018-06-21 PROCEDURE — 86922 COMPATIBILITY TEST ANTIGLOB: CPT

## 2018-06-21 PROCEDURE — 63600175 PHARM REV CODE 636 W HCPCS: Performed by: INTERNAL MEDICINE

## 2018-06-21 PROCEDURE — 25000003 PHARM REV CODE 250: Performed by: INTERNAL MEDICINE

## 2018-06-21 PROCEDURE — 36430 TRANSFUSION BLD/BLD COMPNT: CPT

## 2018-06-21 PROCEDURE — A4216 STERILE WATER/SALINE, 10 ML: HCPCS | Performed by: INTERNAL MEDICINE

## 2018-06-21 RX ORDER — HEPARIN 100 UNIT/ML
500 SYRINGE INTRAVENOUS
Status: COMPLETED | OUTPATIENT
Start: 2018-06-21 | End: 2018-06-21

## 2018-06-21 RX ORDER — HYDROCODONE BITARTRATE AND ACETAMINOPHEN 500; 5 MG/1; MG/1
TABLET ORAL ONCE
Status: CANCELLED | OUTPATIENT
Start: 2018-06-21 | End: 2018-06-21

## 2018-06-21 RX ORDER — SODIUM CHLORIDE 0.9 % (FLUSH) 0.9 %
10 SYRINGE (ML) INJECTION
Status: COMPLETED | OUTPATIENT
Start: 2018-06-21 | End: 2018-06-21

## 2018-06-21 RX ORDER — HYDROCODONE BITARTRATE AND ACETAMINOPHEN 500; 5 MG/1; MG/1
TABLET ORAL ONCE
Status: COMPLETED | OUTPATIENT
Start: 2018-06-21 | End: 2018-06-21

## 2018-06-21 RX ADMIN — SODIUM CHLORIDE: 0.9 INJECTION, SOLUTION INTRAVENOUS at 12:06

## 2018-06-21 RX ADMIN — SODIUM CHLORIDE, PRESERVATIVE FREE 10 ML: 5 INJECTION INTRAVENOUS at 02:06

## 2018-06-21 RX ADMIN — HEPARIN 500 UNITS: 100 SYRINGE at 02:06

## 2018-06-21 NOTE — PLAN OF CARE
Problem: Anemia (Adult)  Goal: Symptom Improvement  Patient will demonstrate the desired outcomes by discharge/transition of care.   Outcome: Ongoing (interventions implemented as appropriate)  Here for blood transfusion.

## 2018-06-21 NOTE — NURSING
Pt sent to 3rd floor per Dr. Wong's request.    1215:  Pt returned to unit to received blood transfusion.

## 2018-06-21 NOTE — PLAN OF CARE
Problem: Patient Care Overview  Goal: Plan of Care Review  Outcome: Ongoing (interventions implemented as appropriate)  Tolerated treatment well.  Advised to call MD for any problems or concerns.  AVS given.   RTC as scheduled.  NAD noted upon discharge.

## 2018-06-26 ENCOUNTER — OFFICE VISIT (OUTPATIENT)
Dept: FAMILY MEDICINE | Facility: CLINIC | Age: 73
End: 2018-06-26
Payer: MEDICARE

## 2018-06-26 VITALS
DIASTOLIC BLOOD PRESSURE: 70 MMHG | WEIGHT: 189.13 LBS | SYSTOLIC BLOOD PRESSURE: 130 MMHG | HEART RATE: 84 BPM | TEMPERATURE: 98 F | HEIGHT: 67 IN | RESPIRATION RATE: 17 BRPM | OXYGEN SATURATION: 98 % | BODY MASS INDEX: 29.69 KG/M2

## 2018-06-26 DIAGNOSIS — D63.0 ANEMIA IN NEOPLASTIC DISEASE: ICD-10-CM

## 2018-06-26 DIAGNOSIS — J44.9 CHRONIC OBSTRUCTIVE PULMONARY DISEASE, UNSPECIFIED COPD TYPE: ICD-10-CM

## 2018-06-26 DIAGNOSIS — T73.0XXS MALNUTRITION DUE TO STARVATION: ICD-10-CM

## 2018-06-26 DIAGNOSIS — R19.7 DIARRHEA, UNSPECIFIED TYPE: Primary | ICD-10-CM

## 2018-06-26 DIAGNOSIS — R14.2 BELCHING: ICD-10-CM

## 2018-06-26 DIAGNOSIS — E46 MALNUTRITION DUE TO STARVATION: ICD-10-CM

## 2018-06-26 DIAGNOSIS — F06.31 DEPRESSION DUE TO PHYSICAL ILLNESS: ICD-10-CM

## 2018-06-26 DIAGNOSIS — F41.1 ANXIETY STATE: ICD-10-CM

## 2018-06-26 DIAGNOSIS — C34.92 SQUAMOUS CELL LUNG CANCER, LEFT: ICD-10-CM

## 2018-06-26 PROCEDURE — 99999 PR PBB SHADOW E&M-EST. PATIENT-LVL III: CPT | Mod: PBBFAC,,, | Performed by: INTERNAL MEDICINE

## 2018-06-26 PROCEDURE — 99214 OFFICE O/P EST MOD 30 MIN: CPT | Mod: S$GLB,,, | Performed by: INTERNAL MEDICINE

## 2018-06-26 PROCEDURE — 99499 UNLISTED E&M SERVICE: CPT | Mod: S$GLB,,, | Performed by: INTERNAL MEDICINE

## 2018-06-26 PROCEDURE — 3075F SYST BP GE 130 - 139MM HG: CPT | Mod: CPTII,S$GLB,, | Performed by: INTERNAL MEDICINE

## 2018-06-26 PROCEDURE — 3078F DIAST BP <80 MM HG: CPT | Mod: CPTII,S$GLB,, | Performed by: INTERNAL MEDICINE

## 2018-06-26 RX ORDER — LORAZEPAM 0.5 MG/1
0.5 TABLET ORAL 2 TIMES DAILY
Qty: 30 TABLET | Refills: 0 | Status: SHIPPED | OUTPATIENT
Start: 2018-06-26 | End: 2018-08-31 | Stop reason: SDUPTHER

## 2018-06-26 RX ORDER — LOPERAMIDE HYDROCHLORIDE 2 MG/1
2 CAPSULE ORAL 4 TIMES DAILY PRN
Qty: 20 CAPSULE | Refills: 0 | Status: SHIPPED | OUTPATIENT
Start: 2018-06-26 | End: 2018-07-06

## 2018-06-26 RX ORDER — METOCLOPRAMIDE 5 MG/1
5 TABLET ORAL 2 TIMES DAILY PRN
Qty: 10 TABLET | Refills: 0 | Status: SHIPPED | OUTPATIENT
Start: 2018-06-26 | End: 2019-01-01

## 2018-06-26 NOTE — PROGRESS NOTES
HISTORY OF PRESENT ILLNESS:  Silvia Capellan is a 72 y.o. female who presents to the clinic today for Diarrhea and Nausea    Ms. Capellan has Squamous cell carcinoma, unknown primary, on chemotherapy Carboplatin/paclitaxel palliatively - being followed closely by Dr. Wong at [facility] palliatively.     Last infusion of chemo was 6/8/18.  On 6/21/18, received PRBC transfusion one unit.    One week ago, loose bowels started up to 4 BM/day.  No nausea/vomiting.  Tolerating PO intake - keeping down food and drinks and medications.  C/o stomach cramping began within 2 days after diarrhea began - throughout the day.  Described as a nagging pain 5-8/10.  No mucus or blood in stool. Is loose watery.  Took Pepto Bismol, Beano without relief.  Has Lomotil but also not helping.  No fever.  Has night sweats.    PAST MEDICAL HISTORY:  Past Medical History:   Diagnosis Date    Encounter for blood transfusion     GERD (gastroesophageal reflux disease)     HTN (hypertension)     Rheumatoid arthritis     Rheumatoid arthritis(714.0)     Squamous cell lung cancer, left 2/15/2018       PAST SURGICAL HISTORY:  Past Surgical History:   Procedure Laterality Date    APPENDECTOMY      COLONOSCOPY      COLONOSCOPY N/A 10/30/2017    Procedure: COLONOSCOPY;  Surgeon: Quentin Coppola MD;  Location: [address];  Service: Endoscopy;  Laterality: N/A;    HYSTERECTOMY      SKIN BIOPSY      TOTAL KNEE ARTHROPLASTY      right       SOCIAL HISTORY:  Social History     Social History    Marital status:      Spouse name: N/A    Number of children: N/A    Years of education: N/A     Occupational History    Not on file.     Social History Main Topics    Smoking status: Former Smoker    Smokeless tobacco: Never Used    Alcohol use Yes      Comment: Occasionally    Drug use: No    Sexual activity: Not Currently     Other Topics Concern    Not on file     Social History Narrative    No narrative on file        FAMILY HISTORY:  Family History   Problem Relation Age of Onset    Hypertension Unknown     Kidney disease Unknown     Colon polyps Neg Hx     Colon cancer Neg Hx     Esophageal cancer Neg Hx     Irritable bowel syndrome Neg Hx     Inflammatory bowel disease Neg Hx     Stomach cancer Neg Hx        ALLERGIES AND MEDICATIONS: updated and reviewed.  Review of patient's allergies indicates:   Allergen Reactions    Latex, natural rubber     Codeine Hallucinations    Cortisporin [neomycin-bacitracin-poly-hc]     Latex, natural rubber Rash     Medication List with Changes/Refills   Current Medications    CYPROHEPTADINE (PERIACTIN) 4 MG TABLET    Take 1 tablet (4 mg total) by mouth 3 (three) times daily as needed.    DEXAMETHASONE (DECADRON) 2 MG TABLET    Take 1 tablet (2 mg total) by mouth every 12 (twelve) hours.    DIPHENOXYLATE-ATROPINE 2.5-0.025 MG (LOMOTIL) 2.5-0.025 MG PER TABLET    Take 1 tablet by mouth 4 (four) times daily as needed for Diarrhea.    ESCITALOPRAM OXALATE (LEXAPRO) 10 MG TABLET    Take 1 tablet (10 mg total) by mouth once daily.    HYDROCHLOROTHIAZIDE (HYDRODIURIL) 25 MG TABLET    Take 1 tablet (25 mg total) by mouth once daily.    LORAZEPAM (ATIVAN) 0.5 MG TABLET    TAKE 1 TABLET (0.5 MG TOTAL) BY MOUTH 2 (TWO) TIMES DAILY.    LOSARTAN (COZAAR) 100 MG TABLET    Take 1 tablet (100 mg total) by mouth once daily.    METHOTREXATE 2.5 MG TAB        MORPHINE (MS CONTIN) 15 MG 12 HR TABLET    Take 1 tablet (15 mg total) by mouth 2 (two) times daily.    OXYCODONE-ACETAMINOPHEN (PERCOCET) 7.5-325 MG PER TABLET    Take 1 tablet by mouth every 4 (four) hours as needed for Pain.    PANTOPRAZOLE (PROTONIX) 40 MG TABLET    Take 1 tablet (40 mg total) by mouth once daily.    POTASSIUM CHLORIDE SA (K-DUR,KLOR-CON) 20 MEQ TABLET    Take 2 tablets (40 mEq total) by mouth once daily.    PROCHLORPERAZINE (COMPAZINE) 10 MG TABLET    Take 1 tablet (10 mg total) by mouth every 6 (six) hours as needed.           CARE TEAM:  Patient Care Team:  Ginette Rosado MD as PCP - General (Family Medicine)  Shayne Wong MD as Consulting Physician (Hematology and Oncology)  Guru Antunez MD as Consulting Physician (Rheumatology)         REVIEW OF SYSTEMS:  Review of Systems   Constitutional: Negative for chills and fever.   Respiratory: Negative for cough and shortness of breath.    Cardiovascular: Positive for leg swelling. Negative for chest pain and palpitations.   Gastrointestinal: Positive for abdominal pain and diarrhea. Negative for blood in stool, nausea and vomiting.   Genitourinary: Negative for dysuria and flank pain.   Musculoskeletal: Negative for back pain and myalgias.   Skin: Negative for rash and wound.   Neurological: Negative for seizures, syncope and headaches.   Psychiatric/Behavioral: Positive for sleep disturbance. The patient is nervous/anxious.      PHYSICAL EXAM:   Vitals:    06/26/18 0928   BP: 130/70   Pulse: 84   Resp: 17   Temp: 97.8 °F (36.6 °C)             Body mass index is 29.63 kg/m².     General appearance - alert, well appearing, and in no distress  Mental status - normal mood, behavior, speech, dress, motor activity, and thought processes  Eyes - pupils equal and reactive, extraocular eye movements intact, sclera anicteric  Chest - clear to auscultation, no wheezes, rales or rhonchi, symmetric air entry, no tachypnea, retractions or cyanosis  Heart - normal rate, regular rhythm, normal S1, S2, no murmurs, rubs, clicks or gallops  Abdomen - soft, nontender, nondistended, no masses or organomegaly  no rebound tenderness noted  no CVA tenderness  Extremities - pedal edema 1 + bilateral, intact peripheral pulses      ASSESSMENT AND PLAN:  1. Diarrhea, unspecified type  - Possible viral gastroenteritis.  Last chemo was in early June.  Advised to maintain oral hydration, BRAT diet.  Advised to report to ED for any severe worsening of symptoms.  - loperamide (IMODIUM) 2 mg capsule; Take 1  capsule (2 mg total) by mouth 4 (four) times daily as needed for Diarrhea.  Dispense: 20 capsule; Refill: 0  - Stool culture; Future  - Stool Exam-Ova,Cysts,Parasites; Future  - Clostridium difficile EIA; Future    2. Squamous cell lung cancer, left  - Continuing treatment with Dr. Wong at [facility], to follow up with him    3. Anemia in neoplastic disease  - S/P recent PRBC transfusion on 6/21/18    4. Depression due to physical illness  - Stable on Lexapro    5. Malnutrition due to starvation  - Improving    6. Chronic obstructive pulmonary disease, unspecified COPD type  - Stable, no acute issues    7. Anxiety state  - LORazepam (ATIVAN) 0.5 MG tablet; Take 1 tablet (0.5 mg total) by mouth 2 (two) times daily. for 30 doses  Dispense: 30 tablet; Refill: 0    8. Belching  - metoclopramide HCl (REGLAN) 5 MG tablet; Take 1 tablet (5 mg total) by mouth 2 (two) times daily as needed (belching, nausea).  Dispense: 10 tablet; Refill: 0       Follow up as needed.  Maintain close follow up with [facility]Onc (appt on 7/11/18 with Dr. Foley)

## 2018-07-01 ENCOUNTER — HOSPITAL ENCOUNTER (EMERGENCY)
Facility: HOSPITAL | Age: 73
Discharge: HOME | End: 2018-07-01
Attending: EMERGENCY MEDICINE
Payer: MEDICARE

## 2018-07-01 VITALS
OXYGEN SATURATION: 97 % | DIASTOLIC BLOOD PRESSURE: 99 MMHG | HEIGHT: 67 IN | HEART RATE: 88 BPM | BODY MASS INDEX: 29.66 KG/M2 | WEIGHT: 189 LBS | RESPIRATION RATE: 16 BRPM | SYSTOLIC BLOOD PRESSURE: 160 MMHG | TEMPERATURE: 98 F

## 2018-07-01 DIAGNOSIS — M79.89 LEFT LEG SWELLING: ICD-10-CM

## 2018-07-01 LAB
ALBUMIN SERPL-MCNC: 2.9 G/DL (ref 3.3–5.5)
ALP SERPL-CCNC: 74 U/L (ref 42–141)
BILIRUB SERPL-MCNC: 0.5 MG/DL (ref 0.2–1.6)
BILIRUBIN, POC UA: NEGATIVE
BLOOD, POC UA: ABNORMAL
BUN SERPL-MCNC: 13 MG/DL (ref 7–22)
CALCIUM SERPL-MCNC: 9 MG/DL (ref 8–10.3)
CHLORIDE SERPL-SCNC: 105 MMOL/L (ref 98–108)
CLARITY, POC UA: ABNORMAL
COLOR, POC UA: YELLOW
CREAT SERPL-MCNC: 0.6 MG/DL (ref 0.6–1.2)
GLUCOSE SERPL-MCNC: 122 MG/DL (ref 73–118)
GLUCOSE, POC UA: NEGATIVE
KETONES, POC UA: NEGATIVE
LEUKOCYTE EST, POC UA: NEGATIVE
NITRITE, POC UA: NEGATIVE
PH UR STRIP: 5.5 [PH]
POC ALT (SGPT): 18 U/L (ref 10–47)
POC AST (SGOT): 20 U/L (ref 11–38)
POC B-TYPE NATRIURETIC PEPTIDE: 281 PG/ML (ref 0–100)
POC TCO2: 26 MMOL/L (ref 18–33)
POTASSIUM BLD-SCNC: 3.8 MMOL/L (ref 3.6–5.1)
PROTEIN, POC UA: NEGATIVE
PROTEIN, POC: 7.4 G/DL (ref 6.4–8.1)
SODIUM BLD-SCNC: 140 MMOL/L (ref 128–145)
SPECIFIC GRAVITY, POC UA: 1.02
UROBILINOGEN, POC UA: 0.2 E.U./DL

## 2018-07-01 PROCEDURE — 99284 EMERGENCY DEPT VISIT MOD MDM: CPT | Mod: 25

## 2018-07-01 RX ORDER — SULFAMETHOXAZOLE AND TRIMETHOPRIM 800; 160 MG/1; MG/1
1 TABLET ORAL 2 TIMES DAILY
Qty: 20 TABLET | Refills: 0 | Status: SHIPPED | OUTPATIENT
Start: 2018-07-01 | End: 2018-07-06 | Stop reason: ALTCHOICE

## 2018-07-02 NOTE — ED PROVIDER NOTES
Encounter Date: 7/1/2018    SCRIBE #1 NOTE: I, Lori Toth, am scribing for, and in the presence of,  Dr. Murphy. I have scribed the entire note.       History     Chief Complaint   Patient presents with    Leg Swelling     Pt complaining of left foot and left lower leg swelling and right foot swelling X 1 week.     This is a 72 y.o female who presents with acute, left leg and left foot swelling for 1 week.  She has associated pain in that foot.She notes the pain as constant. She denies any trauma or falls . She also denies numbness or tingling. She describes her pain as aching in quality. She currently has lung cancer and  is not going through chemotherapy at this time. She has no history of any blood clots. She rates her pain as a 6/10.  Patient denies shortness of breath, chest pain, dizziness or syncope.      The history is provided by the patient.     Review of patient's allergies indicates:   Allergen Reactions    Latex, natural rubber     Codeine Hallucinations    Cortisporin [neomycin-bacitracin-poly-hc]     Latex, natural rubber Rash     Past Medical History:   Diagnosis Date    Encounter for blood transfusion     GERD (gastroesophageal reflux disease)     HTN (hypertension)     Rheumatoid arthritis     Rheumatoid arthritis(714.0)     Squamous cell lung cancer, left 2/15/2018     Past Surgical History:   Procedure Laterality Date    APPENDECTOMY      COLONOSCOPY      COLONOSCOPY N/A 10/30/2017    Procedure: COLONOSCOPY;  Surgeon: Quentin Coppola MD;  Location: [address]);  Service: Endoscopy;  Laterality: N/A;    HYSTERECTOMY      SKIN BIOPSY      TOTAL KNEE ARTHROPLASTY      right     Family History   Problem Relation Age of Onset    Hypertension Unknown     Kidney disease Unknown     Colon polyps Neg Hx     Colon cancer Neg Hx     Esophageal cancer Neg Hx     Irritable bowel syndrome Neg Hx     Inflammatory bowel disease Neg Hx     Stomach cancer Neg Hx      Social  History   Substance Use Topics    Smoking status: Former Smoker    Smokeless tobacco: Never Used    Alcohol use Yes      Comment: Occasionally     Review of Systems   Constitutional: Negative for chills and fever.   Respiratory: Negative for shortness of breath.    Cardiovascular: Positive for leg swelling. Negative for chest pain.   Gastrointestinal: Negative for constipation, diarrhea, nausea and vomiting.   Genitourinary: Negative for dysuria, enuresis and frequency.   Musculoskeletal: Positive for arthralgias (left foot), back pain (chronic) and joint swelling. Negative for gait problem.   Skin: Negative for rash and wound.   Neurological: Negative for dizziness, syncope, weakness, light-headedness and numbness.   All other systems reviewed and are negative.      Physical Exam     Initial Vitals [07/01/18 1925]   BP Pulse Resp Temp SpO2   (!) 155/91 92 16 98 °F (36.7 °C) 99 %      MAP       --         Physical Exam    Nursing note and vitals reviewed.  Constitutional: She appears well-developed and well-nourished. She is not diaphoretic. No distress.   HENT:   Head: Normocephalic and atraumatic.   Eyes: EOM are normal.   Neck: Normal range of motion.   Cardiovascular: Normal rate and regular rhythm. Exam reveals no gallop and no friction rub.    Murmur (systolic murmur 3/6) heard.  Pulses:       Dorsalis pedis pulses are 2+ on the right side, and 2+ on the left side.   Pulmonary/Chest: Breath sounds normal. No respiratory distress. She has no wheezes. She has no rhonchi. She has no rales. She exhibits no tenderness.   Abdominal: Bowel sounds are normal.   Musculoskeletal: Normal range of motion. She exhibits edema.        Left lower leg: She exhibits swelling and edema. She exhibits no bony tenderness.        Legs:  Neurological: She is alert and oriented to person, place, and time.   Skin: Skin is warm, dry and intact. Capillary refill takes less than 2 seconds. No rash and no abscess noted. There is  erythema.         ED Course   Procedures  Labs Reviewed   POCT URINALYSIS W/O SCOPE - Abnormal; Notable for the following:        Result Value    Glucose, UA Negative (*)     Bilirubin, UA Negative (*)     Ketones, UA Negative (*)     Blood, UA Trace-intact (*)     Protein, UA Negative (*)     Nitrite, UA Negative (*)     Leukocytes, UA Negative (*)     All other components within normal limits   POCT CMP - Abnormal; Notable for the following:     Albumin, POC 2.9 (*)     POC Creatinine 0.6 (*)     POC Glucose 122 (*)     All other components within normal limits   POCT B-TYPE NATRIURETIC PEPTIDE (BNP) - Abnormal; Notable for the following:     POC B-Type Natriuretic Peptide 281 (*)     All other components within normal limits   POCT CBC   POCT URINALYSIS W/O SCOPE   POCT CMP   POCT B-TYPE NATRIURETIC PEPTIDE (BNP)          Imaging Results          US Lower Extremity Veins Left (Final result)  Result time 07/01/18 21:21:16    Final result by David Dumont MD (07/01/18 21:21:16)                 Impression:      No evidence of deep venous thrombosis in the left lower extremity.      Electronically signed by: David Dumont MD  Date:    07/01/2018  Time:    21:21             Narrative:    EXAMINATION:  US LOWER EXTREMITY VEINS LEFT    CLINICAL HISTORY:  Other specified soft tissue disorders    TECHNIQUE:  Duplex and color flow Doppler evaluation and graded compression of the left lower extremity veins was performed.    COMPARISON:  None    FINDINGS:  Left thigh veins: The common femoral, femoral, popliteal, upper greater saphenous, and deep femoral veins are patent and free of thrombus. The veins are normally compressible and have normal phasic flow and augmentation response.    Left calf veins: The visualized calf veins are patent.    Miscellaneous: None                                 Medical Decision Making:   Clinical Tests:   Lab Tests: Reviewed and Ordered       <> Summary of Lab: White count 7.4 H&H 8.4 and 25.2  platelets 107 RDW 17.8 MCV 93.1  Radiological Study: Ordered and Reviewed  ED Management:  I will prescribed prophylactic antibiotics for possible early cellulitis.  Patient states she will begin using her compression stockings.  Advised patient to follow up with cardiology and her PCP for further workup.  Results for YISEL CARRERO (MRN [id number]) as of 7/1/2018 21:23   Ref. Range 6/7/2018 10:33 6/13/2018 08:38 6/21/2018 08:08   Hemoglobin Latest Ref Range: 12.0 - 16.0 g/dL 7.7 (L) 7.4 (L) 6.8 (L)   Hematocrit Latest Ref Range: 37.0 - 48.5 % 25.5 (L) 24.0 (L) 22.9 (L)     Echo results from 5/15/18  CONCLUSIONS     1 - Concentric remodeling.     2 - No wall motion abnormalities.     3 - Normal left ventricular systolic function (EF 60-65%).     4 - Biatrial enlargement.     5 - Indeterminate LV diastolic function.     6 - Normal right ventricular systolic function .     7 - Pulmonary hypertension. The estimated PA systolic pressure is 47 mmHg.     8 - Trivial aortic regurgitation.     9 - Mild tricuspid regurgitation.     10 - Small pericardial effusion ( see text).     11 - Intermediate central venous pressure.          Scribe Attestation:   Scribe #1: I performed the above scribed service and the documentation accurately describes the services I performed. I attest to the accuracy of the note.             Labs Reviewed  Admission on 07/01/2018, Discharged on 07/01/2018   Component Date Value Ref Range Status    Albumin, POC 07/01/2018 2.9* 3.3 - 5.5 g/dL Final    Alkaline Phosphatase, POC 07/01/2018 74  42 - 141 U/L Final    ALT (SGPT), POC 07/01/2018 18  10 - 47 U/L Final    AST (SGOT), POC 07/01/2018 20  11 - 38 U/L Final    POC BUN 07/01/2018 13  7 - 22 mg/dL Final    Calcium, POC 07/01/2018 9.0  8.0 - 10.3 mg/dL Final    POC Chloride 07/01/2018 105  98 - 108 mmol/L Final    POC Creatinine 07/01/2018 0.6* 0.6 - 1.2 mg/dL Final    POC Glucose 07/01/2018 122* 73 - 118 mg/dL Final    POC Potassium  07/01/2018 3.8  3.6 - 5.1 mmol/L Final    POC Sodium 07/01/2018 140  128 - 145 mmol/L Final    Bilirubin 07/01/2018 0.5  0.2 - 1.6 mg/dL Final    POC TCO2 07/01/2018 26  18 - 33 mmol/L Final    Protein 07/01/2018 7.4  6.4 - 8.1 g/dL Final    POC B-Type Natriuretic Peptide 07/01/2018 281* 0.0 - 100.0 pg/mL Final    Glucose, UA 07/01/2018 Negative*  Final    Bilirubin, UA 07/01/2018 Negative*  Final    Ketones, UA 07/01/2018 Negative*  Final    Spec Grav UA 07/01/2018 1.025   Final    Blood, UA 07/01/2018 Trace-intact*  Final    PH, UA 07/01/2018 5.5   Final    Protein, UA 07/01/2018 Negative*  Final    Urobilinogen, UA 07/01/2018 0.2  E.U./dL Final    Nitrite, UA 07/01/2018 Negative*  Final    Leukocytes, UA 07/01/2018 Negative*  Final    Color, UA 07/01/2018 Yellow   Final    Clarity, UA 07/01/2018 Cloudy   Final        Imaging Reviewed    Imaging Results          US Lower Extremity Veins Left (Final result)  Result time 07/01/18 21:21:16    Final result by David Dumont MD (07/01/18 21:21:16)                 Impression:      No evidence of deep venous thrombosis in the left lower extremity.      Electronically signed by: David Dumont MD  Date:    07/01/2018  Time:    21:21             Narrative:    EXAMINATION:  US LOWER EXTREMITY VEINS LEFT    CLINICAL HISTORY:  Other specified soft tissue disorders    TECHNIQUE:  Duplex and color flow Doppler evaluation and graded compression of the left lower extremity veins was performed.    COMPARISON:  None    FINDINGS:  Left thigh veins: The common femoral, femoral, popliteal, upper greater saphenous, and deep femoral veins are patent and free of thrombus. The veins are normally compressible and have normal phasic flow and augmentation response.    Left calf veins: The visualized calf veins are patent.    Miscellaneous: None                                Medications given in ED    Medications - No data to display    This document was produced by a lashawn  under my direction and in my presence. I agree with the content of the note and have made any necessary edits.     Lizzette Murphy MD         Note was created using voice recognition software. Note may have occasional typographical errors that may not have been identified and edited despite good noris initial review prior to signing.  Discharge Medications     Discharge Medication List as of 7/1/2018 10:26 PM      START taking these medications    Details   sulfamethoxazole-trimethoprim 800-160mg (BACTRIM DS) 800-160 mg Tab Take 1 tablet by mouth 2 (two) times daily. for 10 days, Starting Sun 7/1/2018, Until Wed 7/11/2018, Print         CONTINUE these medications which have NOT CHANGED    Details   cyproheptadine (PERIACTIN) 4 mg tablet Take 1 tablet (4 mg total) by mouth 3 (three) times daily as needed., Starting Wed 3/7/2018, Normal      dexamethasone (DECADRON) 2 MG tablet Take 1 tablet (2 mg total) by mouth every 12 (twelve) hours., Starting Thu 4/19/2018, Until Fri 4/19/2019, Normal      diphenoxylate-atropine 2.5-0.025 mg (LOMOTIL) 2.5-0.025 mg per tablet Take 1 tablet by mouth 4 (four) times daily as needed for Diarrhea., Starting Thu 4/19/2018, Until Fri 4/19/2019, Normal      escitalopram oxalate (LEXAPRO) 10 MG tablet Take 1 tablet (10 mg total) by mouth once daily., Starting Wed 9/27/2017, Until Thu 9/27/2018, Normal      hydroCHLOROthiazide (HYDRODIURIL) 25 MG tablet Take 1 tablet (25 mg total) by mouth once daily., Starting Wed 2/28/2018, Until Thu 2/28/2019, Normal      loperamide (IMODIUM) 2 mg capsule Take 1 capsule (2 mg total) by mouth 4 (four) times daily as needed for Diarrhea., Starting Tue 6/26/2018, Until Fri 7/6/2018, Normal      LORazepam (ATIVAN) 0.5 MG tablet Take 1 tablet (0.5 mg total) by mouth 2 (two) times daily. for 30 doses, Starting Tue 6/26/2018, Until Wed 7/11/2018, Normal      losartan (COZAAR) 100 MG tablet Take 1 tablet (100 mg total) by mouth once daily., Starting Wed  1/24/2018, Normal      methotrexate 2.5 MG Tab Starting 9/1/2016, Until Discontinued, Historical Med      metoclopramide HCl (REGLAN) 5 MG tablet Take 1 tablet (5 mg total) by mouth 2 (two) times daily as needed (belching, nausea)., Starting Tue 6/26/2018, Normal      morphine (MS CONTIN) 15 MG 12 hr tablet Take 1 tablet (15 mg total) by mouth 2 (two) times daily., Starting Mon 3/26/2018, Normal      oxyCODONE-acetaminophen (PERCOCET) 7.5-325 mg per tablet Take 1 tablet by mouth every 4 (four) hours as needed for Pain., Starting Wed 6/13/2018, Until Thu 6/13/2019, Normal      pantoprazole (PROTONIX) 40 MG tablet Take 1 tablet (40 mg total) by mouth once daily., Starting Wed 4/18/2018, Until Thu 4/18/2019, Normal      potassium chloride SA (K-DUR,KLOR-CON) 20 MEQ tablet Take 2 tablets (40 mEq total) by mouth once daily., Starting Thu 4/19/2018, Until Fri 4/19/2019, Normal      prochlorperazine (COMPAZINE) 10 MG tablet Take 1 tablet (10 mg total) by mouth every 6 (six) hours as needed., Starting Wed 3/7/2018, Until Thu 3/7/2019, Normal                   Patient discharged to home in stable condition with instructions to:   1. Please take all meds as prescribed.  2. Follow-up with your primary care doctor   3. Return precautions discussed and patient and/or family/caretaker understands to return to the emergency room for any concerns including worsening of your current symptoms, fever, chills, night sweats, worsening pain, chest pain, shortness of breath, nausea, vomiting, diarrhea, bleeding, headache, difficulty talking, visual disturbances, weakness, numbness or any other acute concerns       Clinical Impression:     1. Left leg swelling                               Lizzette Murphy MD  07/02/18 0002

## 2018-07-03 ENCOUNTER — PES CALL (OUTPATIENT)
Dept: ADMINISTRATIVE | Facility: CLINIC | Age: 73
End: 2018-07-03

## 2018-07-06 ENCOUNTER — OFFICE VISIT (OUTPATIENT)
Dept: FAMILY MEDICINE | Facility: CLINIC | Age: 73
End: 2018-07-06
Payer: MEDICARE

## 2018-07-06 VITALS
HEART RATE: 88 BPM | DIASTOLIC BLOOD PRESSURE: 72 MMHG | HEIGHT: 67 IN | TEMPERATURE: 98 F | BODY MASS INDEX: 30.24 KG/M2 | RESPIRATION RATE: 17 BRPM | SYSTOLIC BLOOD PRESSURE: 122 MMHG | WEIGHT: 192.69 LBS | OXYGEN SATURATION: 98 %

## 2018-07-06 DIAGNOSIS — E66.9 OBESITY (BMI 30-39.9): Primary | ICD-10-CM

## 2018-07-06 DIAGNOSIS — R22.43 LOCALIZED SWELLING OF BOTH LOWER LEGS: ICD-10-CM

## 2018-07-06 DIAGNOSIS — R63.4 WEIGHT LOSS, NON-INTENTIONAL: ICD-10-CM

## 2018-07-06 PROCEDURE — 99214 OFFICE O/P EST MOD 30 MIN: CPT | Mod: S$GLB,,, | Performed by: FAMILY MEDICINE

## 2018-07-06 PROCEDURE — 3078F DIAST BP <80 MM HG: CPT | Mod: CPTII,S$GLB,, | Performed by: FAMILY MEDICINE

## 2018-07-06 PROCEDURE — 99999 PR PBB SHADOW E&M-EST. PATIENT-LVL V: CPT | Mod: PBBFAC,,, | Performed by: FAMILY MEDICINE

## 2018-07-06 PROCEDURE — 3074F SYST BP LT 130 MM HG: CPT | Mod: CPTII,S$GLB,, | Performed by: FAMILY MEDICINE

## 2018-07-06 NOTE — PATIENT INSTRUCTIONS
Leg Swelling in Both Legs    Swelling of the feet, ankles, and legs is called edema. It is caused by excess fluid that has collected in the tissues. Extra fluid in the body settles in the lowest part because of gravity. This is why the legs and feet are most affected.  Some of the causes for edema include:  · Disease of the heart like congestive heart failure  · Standing or sitting for long periods of time  · Infection of the feet or legs  · Blood pooling in the veins of your legs (venous insufficiency)  · Dilated veins in your lower leg (varicose veins)  · Garters or other clothing that is tight on your legs. This will cause blood to pool in your legs because the clothing limits blood flow.  · Some medicines such as hormones like birth control pills, some blood pressure medicines like calcium channel blockers (amlodipine) and steroids, some antidepressants like MAO inhibitors and tricyclics  · Menstrual periods that cause you to retain fluids  · Many types of renal disease  · Liver failure or cirrhosis  · Pregnancy, some swelling is normal, but a sudden increase in leg swelling or weight gain can be a sign of a dangerous complication of pregnancy  · Poor nutrition  · Thyroid disease  Medical treatment will depend on what is causing the swelling in your legs. Your healthcare provider may prescribe water pills (diuretics) to get rid of the extra fluid.  Home care  Follow these guidelines when caring for yourself at home:  · Don't wear clothing like garters that is tight on your legs.  · Keep your legs up while lying or sitting.  · If infection, injury, or recent surgery is causing the swelling, stay off your legs as much as possible until symptoms get better.  · If your healthcare provider says that your leg swelling is caused by venous insufficiency or varicose veins, don't sit or  one place for long periods of time. Take breaks and walk about every few hours. Brisk walking is a good exercise. It helps  circulate the blood that has collected in your leg. Talk with your provider about using support stockings to stop daytime leg swelling.  · If your provider says that heart disease is causing your leg swelling, follow a low-salt diet to stop extra fluid from staying in your body. You may also need medicine.  Follow-up care  Follow up with your healthcare provider, or as advised.  When to seek medical advice  Call your healthcare provider right away if any of these occur:  · New shortness of breath or chest pain  · Shortness of breath or chest pain that gets worse  · Swelling in both legs or ankles that gets worse  · Swelling of the abdomen  · Redness, warmth, or swelling in one leg  · Fever of 100.4ºF (38ºC) or higher, or as directed by your healthcare provider  · Yellow color to your skin or eyes  · Rapid, unexplained weight gain  · Having to sleep upright or use an increased number of pillows  Date Last Reviewed: 3/31/2016  © 5389-9580 [employer], [employer]. [address] [place] [address], [place] [address]. All rights reserved. This information is not intended as a substitute for professional medical care. Always follow your healthcare professional's instructions.

## 2018-07-06 NOTE — PROGRESS NOTES
Chief Complaint   Patient presents with    Leg Swelling     both and left foot       HPI    Sivlia Capellan is 72 y.o. female. The primary encounter diagnosis was Obesity (BMI 30-39.9). Diagnoses of Weight loss, non-intentional and Localized swelling of both lower legs were also pertinent to this visit.    72 year old female with Obesity comes to clinic with complaint of persistent lower extremity edema.  Patient reports that she has previously experienced this but notes it has gotten worse over the last several months.    Patient admits to increased salt intake but attributes this to excessive hunger caused by medication for weight gain.  She also notes a history of varicose veins that have previously given her no issues.    Review of Systems   Constitutional: Positive for fatigue. Negative for activity change, chills, diaphoresis and fever.   Respiratory: Negative for shortness of breath.    Cardiovascular: Positive for leg swelling. Negative for chest pain and palpitations.   Musculoskeletal: Negative for arthralgias, back pain, gait problem, joint swelling and myalgias.   Psychiatric/Behavioral: Negative for suicidal ideas.           Current Outpatient Prescriptions:     cyproheptadine (PERIACTIN) 4 mg tablet, Take 1 tablet (4 mg total) by mouth 3 (three) times daily as needed., Disp: 90 tablet, Rfl: 2    dexamethasone (DECADRON) 2 MG tablet, Take 1 tablet (2 mg total) by mouth every 12 (twelve) hours., Disp: 120 tablet, Rfl: 0    diphenoxylate-atropine 2.5-0.025 mg (LOMOTIL) 2.5-0.025 mg per tablet, Take 1 tablet by mouth 4 (four) times daily as needed for Diarrhea., Disp: 30 tablet, Rfl: 1    escitalopram oxalate (LEXAPRO) 10 MG tablet, Take 1 tablet (10 mg total) by mouth once daily., Disp: 90 tablet, Rfl: 1    hydroCHLOROthiazide (HYDRODIURIL) 25 MG tablet, Take 1 tablet (25 mg total) by mouth once daily., Disp: 90 tablet, Rfl: 1    LORazepam (ATIVAN) 0.5 MG tablet, Take 1 tablet (0.5 mg total) by  "mouth 2 (two) times daily. for 30 doses, Disp: 30 tablet, Rfl: 0    losartan (COZAAR) 100 MG tablet, Take 1 tablet (100 mg total) by mouth once daily., Disp: 90 tablet, Rfl: 1    methotrexate 2.5 MG Tab, , Disp: , Rfl:     metoclopramide HCl (REGLAN) 5 MG tablet, Take 1 tablet (5 mg total) by mouth 2 (two) times daily as needed (belching, nausea)., Disp: 10 tablet, Rfl: 0    oxyCODONE-acetaminophen (PERCOCET) 7.5-325 mg per tablet, Take 1 tablet by mouth every 4 (four) hours as needed for Pain., Disp: 20 tablet, Rfl: 0    pantoprazole (PROTONIX) 40 MG tablet, Take 1 tablet (40 mg total) by mouth once daily., Disp: 90 tablet, Rfl: 1    potassium chloride SA (K-DUR,KLOR-CON) 20 MEQ tablet, Take 2 tablets (40 mEq total) by mouth once daily., Disp: 30 tablet, Rfl: 11    prochlorperazine (COMPAZINE) 10 MG tablet, Take 1 tablet (10 mg total) by mouth every 6 (six) hours as needed., Disp: 60 tablet, Rfl: 1    morphine (MS CONTIN) 15 MG 12 hr tablet, Take 1 tablet (15 mg total) by mouth 2 (two) times daily., Disp: 60 tablet, Rfl: 0      Blood pressure 122/72, pulse 88, temperature 98.1 °F (36.7 °C), temperature source Oral, resp. rate 17, height 5' 7" (1.702 m), weight 87.4 kg (192 lb 10.9 oz), SpO2 98 %.    Physical Exam   Constitutional: Vital signs are normal. She appears well-developed and well-nourished. She does not appear ill. No distress.   HENT:   Mouth/Throat: Normal dentition.   Neck: Trachea normal. No thyromegaly present.   Cardiovascular: Normal rate, regular rhythm and intact distal pulses.    No murmur heard.  Pulmonary/Chest: Effort normal. She has no decreased breath sounds. She has no wheezes. She exhibits no deformity.   Musculoskeletal:        Right lower leg: She exhibits edema. She exhibits no deformity.        Left lower leg: She exhibits edema.   1+ pitting edema of bilateral lower extremities    Neurological: She is not disoriented.   Skin: Skin is intact. Capillary refill takes less than 2 " seconds.   Psychiatric: Her speech is normal and behavior is normal. Her mood appears not anxious. She does not exhibit a depressed mood.       Admission on 07/01/2018, Discharged on 07/01/2018   Component Date Value Ref Range Status    Albumin, POC 07/01/2018 2.9* 3.3 - 5.5 g/dL Final    Alkaline Phosphatase, POC 07/01/2018 74  42 - 141 U/L Final    ALT (SGPT), POC 07/01/2018 18  10 - 47 U/L Final    AST (SGOT), POC 07/01/2018 20  11 - 38 U/L Final    POC BUN 07/01/2018 13  7 - 22 mg/dL Final    Calcium, POC 07/01/2018 9.0  8.0 - 10.3 mg/dL Final    POC Chloride 07/01/2018 105  98 - 108 mmol/L Final    POC Creatinine 07/01/2018 0.6* 0.6 - 1.2 mg/dL Final    POC Glucose 07/01/2018 122* 73 - 118 mg/dL Final    POC Potassium 07/01/2018 3.8  3.6 - 5.1 mmol/L Final    POC Sodium 07/01/2018 140  128 - 145 mmol/L Final    Bilirubin 07/01/2018 0.5  0.2 - 1.6 mg/dL Final    POC TCO2 07/01/2018 26  18 - 33 mmol/L Final    Protein 07/01/2018 7.4  6.4 - 8.1 g/dL Final    POC B-Type Natriuretic Peptide 07/01/2018 281* 0.0 - 100.0 pg/mL Final    Glucose, UA 07/01/2018 Negative*  Final    Bilirubin, UA 07/01/2018 Negative*  Final    Ketones, UA 07/01/2018 Negative*  Final    Spec Grav UA 07/01/2018 1.025   Final    Blood, UA 07/01/2018 Trace-intact*  Final    PH, UA 07/01/2018 5.5   Final    Protein, UA 07/01/2018 Negative*  Final    Urobilinogen, UA 07/01/2018 0.2  E.U./dL Final    Nitrite, UA 07/01/2018 Negative*  Final    Leukocytes, UA 07/01/2018 Negative*  Final    Color, UA 07/01/2018 Yellow   Final    Clarity, UA 07/01/2018 Cloudy   Final   Infusion on 06/21/2018   Component Date Value Ref Range Status    UNIT NUMBER 06/21/2018 [id number]   Final    PRODUCT CODE 06/21/2018 [id number]   Final    DISPENSE STATUS 06/21/2018 TRANSFUSED   Final    CODING SYSTEM 06/21/2018 [id number]   Final    Unit Blood Type Code 06/21/2018 5100   Final    Unit Blood Type 06/21/2018 O POS   Final    Unit  Expiration 06/21/2018 [id number]   Final   Lab Visit on 06/21/2018   Component Date Value Ref Range Status    Group & Rh 06/21/2018 O POS   Final    Indirect Herman 06/21/2018 NEG   Final    WBC 06/21/2018 8.50  3.90 - 12.70 K/uL Final    RBC 06/21/2018 2.21* 4.00 - 5.40 M/uL Final    Hemoglobin 06/21/2018 6.8* 12.0 - 16.0 g/dL Final    Hematocrit 06/21/2018 22.9* 37.0 - 48.5 % Final    MCV 06/21/2018 104* 82 - 98 fL Final    MCH 06/21/2018 30.8  27.0 - 31.0 pg Final    MCHC 06/21/2018 29.7* 32.0 - 36.0 g/dL Final    RDW 06/21/2018 21.6* 11.5 - 14.5 % Final    Platelets 06/21/2018 82* 150 - 350 K/uL Final    MPV 06/21/2018 12.3  9.2 - 12.9 fL Final    Immature Granulocytes 06/21/2018 1.6* 0.0 - 0.5 % Final    Gran # (ANC) 06/21/2018 5.0  1.8 - 7.7 K/uL Final    Immature Grans (Abs) 06/21/2018 0.14* 0.00 - 0.04 K/uL Final    Lymph # 06/21/2018 2.6  1.0 - 4.8 K/uL Final    Mono # 06/21/2018 0.8  0.3 - 1.0 K/uL Final    Eos # 06/21/2018 0.0  0.0 - 0.5 K/uL Final    Baso # 06/21/2018 0.00  0.00 - 0.20 K/uL Final    nRBC 06/21/2018 0  0 /100 WBC Final    Gran% 06/21/2018 58.7  38.0 - 73.0 % Final    Lymph% 06/21/2018 30.1  18.0 - 48.0 % Final    Mono% 06/21/2018 9.6  4.0 - 15.0 % Final    Eosinophil% 06/21/2018 0.0  0.0 - 8.0 % Final    Basophil% 06/21/2018 0.0  0.0 - 1.9 % Final    Platelet Estimate 06/21/2018 Decreased*  Final    Differential Method 06/21/2018 Automated   Final   Lab Visit on 06/13/2018   Component Date Value Ref Range Status    WBC 06/13/2018 9.49  3.90 - 12.70 K/uL Final    RBC 06/13/2018 2.42* 4.00 - 5.40 M/uL Final    Hemoglobin 06/13/2018 7.4* 12.0 - 16.0 g/dL Final    Hematocrit 06/13/2018 24.0* 37.0 - 48.5 % Final    MCV 06/13/2018 99* 82 - 98 fL Final    MCH 06/13/2018 30.6  27.0 - 31.0 pg Final    MCHC 06/13/2018 30.8* 32.0 - 36.0 g/dL Final    RDW 06/13/2018 20.2* 11.5 - 14.5 % Final    Platelets 06/13/2018 75* 150 - 350 K/uL Final    MPV 06/13/2018 12.4   9.2 - 12.9 fL Final    Immature Granulocytes 06/13/2018 2.3* 0.0 - 0.5 % Final    Gran # (ANC) 06/13/2018 7.2  1.8 - 7.7 K/uL Final    Immature Grans (Abs) 06/13/2018 0.22* 0.00 - 0.04 K/uL Final    Lymph # 06/13/2018 1.6  1.0 - 4.8 K/uL Final    Mono # 06/13/2018 0.4  0.3 - 1.0 K/uL Final    Eos # 06/13/2018 0.0  0.0 - 0.5 K/uL Final    Baso # 06/13/2018 0.05  0.00 - 0.20 K/uL Final    nRBC 06/13/2018 0  0 /100 WBC Final    Gran% 06/13/2018 76.2* 38.0 - 73.0 % Final    Lymph% 06/13/2018 16.8* 18.0 - 48.0 % Final    Mono% 06/13/2018 4.1  4.0 - 15.0 % Final    Eosinophil% 06/13/2018 0.1  0.0 - 8.0 % Final    Basophil% 06/13/2018 0.5  0.0 - 1.9 % Final    Aniso 06/13/2018 Slight   Final    Poik 06/13/2018 Slight   Final    Poly 06/13/2018 Occasional   Final    Hypo 06/13/2018 Occasional   Final    Ovalocytes 06/13/2018 Occasional   Final    Differential Method 06/13/2018 Automated   Final    Sodium 06/13/2018 136  136 - 145 mmol/L Final    Potassium 06/13/2018 5.0  3.5 - 5.1 mmol/L Final    Chloride 06/13/2018 104  95 - 110 mmol/L Final    CO2 06/13/2018 26  23 - 29 mmol/L Final    Glucose 06/13/2018 107  70 - 110 mg/dL Final    BUN, Bld 06/13/2018 26* 8 - 23 mg/dL Final    Creatinine 06/13/2018 0.8  0.5 - 1.4 mg/dL Final    Calcium 06/13/2018 9.1  8.7 - 10.5 mg/dL Final    Total Protein 06/13/2018 7.5  6.0 - 8.4 g/dL Final    Albumin 06/13/2018 3.1* 3.5 - 5.2 g/dL Final    Total Bilirubin 06/13/2018 0.5  0.1 - 1.0 mg/dL Final    Alkaline Phosphatase 06/13/2018 88  55 - 135 U/L Final    AST 06/13/2018 13  10 - 40 U/L Final    ALT 06/13/2018 15  10 - 44 U/L Final    Anion Gap 06/13/2018 6* 8 - 16 mmol/L Final    eGFR if African American 06/13/2018 >60.0  >60 mL/min/1.73 m^2 Final    eGFR if non African American 06/13/2018 >60.0  >60 mL/min/1.73 m^2 Final   Hospital Outpatient Visit on 06/07/2018   Component Date Value Ref Range Status    POCT Glucose 06/07/2018 86  70 - 110 mg/dL  Final   Lab Visit on 06/07/2018   Component Date Value Ref Range Status    WBC 06/07/2018 6.43  3.90 - 12.70 K/uL Final    RBC 06/07/2018 2.56* 4.00 - 5.40 M/uL Final    Hemoglobin 06/07/2018 7.7* 12.0 - 16.0 g/dL Final    Hematocrit 06/07/2018 25.5* 37.0 - 48.5 % Final    MCV 06/07/2018 100* 82 - 98 fL Final    MCH 06/07/2018 30.1  27.0 - 31.0 pg Final    MCHC 06/07/2018 30.2* 32.0 - 36.0 g/dL Final    RDW 06/07/2018 19.9* 11.5 - 14.5 % Final    Platelets 06/07/2018 82* 150 - 350 K/uL Final    MPV 06/07/2018 11.8  9.2 - 12.9 fL Final    Immature Granulocytes 06/07/2018 0.6* 0.0 - 0.5 % Final    Gran # (ANC) 06/07/2018 3.0  1.8 - 7.7 K/uL Final    Immature Grans (Abs) 06/07/2018 0.04  0.00 - 0.04 K/uL Final    Lymph # 06/07/2018 2.7  1.0 - 4.8 K/uL Final    Mono # 06/07/2018 0.7  0.3 - 1.0 K/uL Final    Eos # 06/07/2018 0.0  0.0 - 0.5 K/uL Final    Baso # 06/07/2018 0.01  0.00 - 0.20 K/uL Final    nRBC 06/07/2018 0  0 /100 WBC Final    Gran% 06/07/2018 46.1  38.0 - 73.0 % Final    Lymph% 06/07/2018 41.4  18.0 - 48.0 % Final    Mono% 06/07/2018 11.5  4.0 - 15.0 % Final    Eosinophil% 06/07/2018 0.2  0.0 - 8.0 % Final    Basophil% 06/07/2018 0.2  0.0 - 1.9 % Final    Differential Method 06/07/2018 Automated   Final    Sodium 06/07/2018 135* 136 - 145 mmol/L Final    Potassium 06/07/2018 4.9  3.5 - 5.1 mmol/L Final    Chloride 06/07/2018 102  95 - 110 mmol/L Final    CO2 06/07/2018 26  23 - 29 mmol/L Final    Glucose 06/07/2018 89  70 - 110 mg/dL Final    BUN, Bld 06/07/2018 23  8 - 23 mg/dL Final    Creatinine 06/07/2018 0.7  0.5 - 1.4 mg/dL Final    Calcium 06/07/2018 9.2  8.7 - 10.5 mg/dL Final    Total Protein 06/07/2018 7.3  6.0 - 8.4 g/dL Final    Albumin 06/07/2018 2.8* 3.5 - 5.2 g/dL Final    Total Bilirubin 06/07/2018 0.2  0.1 - 1.0 mg/dL Final    Alkaline Phosphatase 06/07/2018 76  55 - 135 U/L Final    AST 06/07/2018 14  10 - 40 U/L Final    ALT 06/07/2018 11  10 - 44  U/L Final    Anion Gap 06/07/2018 7* 8 - 16 mmol/L Final    eGFR if African American 06/07/2018 >60.0  >60 mL/min/1.73 m^2 Final    eGFR if non African American 06/07/2018 >60.0  >60 mL/min/1.73 m^2 Final    Magnesium 06/07/2018 1.2* 1.6 - 2.6 mg/dL Final   Lab Visit on 05/17/2018   Component Date Value Ref Range Status    WBC 05/17/2018 5.00  3.90 - 12.70 K/uL Final    RBC 05/17/2018 2.76* 4.00 - 5.40 M/uL Final    Hemoglobin 05/17/2018 7.8* 12.0 - 16.0 g/dL Final    Hematocrit 05/17/2018 25.9* 37.0 - 48.5 % Final    MCV 05/17/2018 94  82 - 98 fL Final    MCH 05/17/2018 28.3  27.0 - 31.0 pg Final    MCHC 05/17/2018 30.1* 32.0 - 36.0 g/dL Final    RDW 05/17/2018 16.8* 11.5 - 14.5 % Final    Platelets 05/17/2018 122* 150 - 350 K/uL Final    MPV 05/17/2018 10.6  9.2 - 12.9 fL Final    Immature Granulocytes 05/17/2018 0.4  0.0 - 0.5 % Final    Gran # (ANC) 05/17/2018 2.9  1.8 - 7.7 K/uL Final    Immature Grans (Abs) 05/17/2018 0.02  0.00 - 0.04 K/uL Final    Lymph # 05/17/2018 1.4  1.0 - 4.8 K/uL Final    Mono # 05/17/2018 0.5  0.3 - 1.0 K/uL Final    Eos # 05/17/2018 0.1  0.0 - 0.5 K/uL Final    Baso # 05/17/2018 0.01  0.00 - 0.20 K/uL Final    nRBC 05/17/2018 0  0 /100 WBC Final    Gran% 05/17/2018 58.2  38.0 - 73.0 % Final    Lymph% 05/17/2018 28.8  18.0 - 48.0 % Final    Mono% 05/17/2018 10.6  4.0 - 15.0 % Final    Eosinophil% 05/17/2018 1.8  0.0 - 8.0 % Final    Basophil% 05/17/2018 0.2  0.0 - 1.9 % Final    Differential Method 05/17/2018 Automated   Final    Sodium 05/17/2018 137  136 - 145 mmol/L Final    Potassium 05/17/2018 4.2  3.5 - 5.1 mmol/L Final    Chloride 05/17/2018 100  95 - 110 mmol/L Final    CO2 05/17/2018 27  23 - 29 mmol/L Final    Glucose 05/17/2018 112* 70 - 110 mg/dL Final    BUN, Bld 05/17/2018 16  8 - 23 mg/dL Final    Creatinine 05/17/2018 0.9  0.5 - 1.4 mg/dL Final    Calcium 05/17/2018 9.6  8.7 - 10.5 mg/dL Final    Total Protein 05/17/2018 9.0* 6.0 -  8.4 g/dL Final    Albumin 05/17/2018 2.7* 3.5 - 5.2 g/dL Final    Total Bilirubin 05/17/2018 0.3  0.1 - 1.0 mg/dL Final    Alkaline Phosphatase 05/17/2018 89  55 - 135 U/L Final    AST 05/17/2018 13  10 - 40 U/L Final    ALT 05/17/2018 9* 10 - 44 U/L Final    Anion Gap 05/17/2018 10  8 - 16 mmol/L Final    eGFR if African American 05/17/2018 >60.0  >60 mL/min/1.73 m^2 Final    eGFR if non African American 05/17/2018 >60.0  >60 mL/min/1.73 m^2 Final    Magnesium 05/17/2018 1.6  1.6 - 2.6 mg/dL Final    Group & Rh 05/17/2018 O POS   Final    Indirect Herman 05/17/2018 NEG   Final   Admission on 05/15/2018, Discharged on 05/15/2018   Component Date Value Ref Range Status    WBC 05/15/2018 7.12  3.90 - 12.70 K/uL Final    RBC 05/15/2018 2.18* 4.00 - 5.40 M/uL Final    Hemoglobin 05/15/2018 6.3* 12.0 - 16.0 g/dL Final    Hematocrit 05/15/2018 20.7* 37.0 - 48.5 % Final    MCV 05/15/2018 95  82 - 98 fL Final    MCH 05/15/2018 28.9  27.0 - 31.0 pg Final    MCHC 05/15/2018 30.4* 32.0 - 36.0 g/dL Final    RDW 05/15/2018 16.9* 11.5 - 14.5 % Final    Platelets 05/15/2018 100* 150 - 350 K/uL Final    MPV 05/15/2018 11.0  9.2 - 12.9 fL Final    Immature Granulocytes 05/15/2018 0.4  0.0 - 0.5 % Final    Gran # (ANC) 05/15/2018 5.0  1.8 - 7.7 K/uL Final    Immature Grans (Abs) 05/15/2018 0.03  0.00 - 0.04 K/uL Final    Lymph # 05/15/2018 1.2  1.0 - 4.8 K/uL Final    Mono # 05/15/2018 0.8  0.3 - 1.0 K/uL Final    Eos # 05/15/2018 0.1  0.0 - 0.5 K/uL Final    Baso # 05/15/2018 0.01  0.00 - 0.20 K/uL Final    nRBC 05/15/2018 0  0 /100 WBC Final    Gran% 05/15/2018 70.4  38.0 - 73.0 % Final    Lymph% 05/15/2018 16.6* 18.0 - 48.0 % Final    Mono% 05/15/2018 11.7  4.0 - 15.0 % Final    Eosinophil% 05/15/2018 0.8  0.0 - 8.0 % Final    Basophil% 05/15/2018 0.1  0.0 - 1.9 % Final    Differential Method 05/15/2018 Automated   Final    Sodium 05/15/2018 137  136 - 145 mmol/L Final    Potassium 05/15/2018  4.3  3.5 - 5.1 mmol/L Final    Chloride 05/15/2018 100  95 - 110 mmol/L Final    CO2 05/15/2018 28  23 - 29 mmol/L Final    Glucose 05/15/2018 113* 70 - 110 mg/dL Final    BUN, Bld 05/15/2018 12  8 - 23 mg/dL Final    Creatinine 05/15/2018 0.8  0.5 - 1.4 mg/dL Final    Calcium 05/15/2018 9.2  8.7 - 10.5 mg/dL Final    Total Protein 05/15/2018 8.1  6.0 - 8.4 g/dL Final    Albumin 05/15/2018 2.5* 3.5 - 5.2 g/dL Final    Total Bilirubin 05/15/2018 0.4  0.1 - 1.0 mg/dL Final    Alkaline Phosphatase 05/15/2018 84  55 - 135 U/L Final    AST 05/15/2018 10  10 - 40 U/L Final    ALT 05/15/2018 6* 10 - 44 U/L Final    Anion Gap 05/15/2018 9  8 - 16 mmol/L Final    eGFR if African American 05/15/2018 >60.0  >60 mL/min/1.73 m^2 Final    eGFR if non African American 05/15/2018 >60.0  >60 mL/min/1.73 m^2 Final    Magnesium 05/15/2018 1.4* 1.6 - 2.6 mg/dL Final    Prothrombin Time 05/15/2018 12.4  9.0 - 12.5 sec Final    INR 05/15/2018 1.2  0.8 - 1.2 Final    EF 05/15/2018 65  55 - 65 Final    Aortic Valve Regurgitation 05/15/2018 TRIVIAL   Final    Est. PA Systolic Pressure 05/15/2018 46.94*  Final    Pericardial Effusion 05/15/2018 SMALL*  Final    Tricuspid Valve Regurgitation 05/15/2018 MILD   Final    UNIT NUMBER 05/15/2018 [id number]   Final    PRODUCT CODE 05/15/2018 [id number]   Final    DISPENSE STATUS 05/15/2018 TRANSFUSED   Final    CODING SYSTEM 05/15/2018 [id number]   Final    Unit Blood Type Code 05/15/2018 5100   Final    Unit Blood Type 05/15/2018 O POS   Final    Unit Expiration 05/15/2018 [id number]   Final    Group & Rh 05/15/2018 O POS   Final    Indirect Herman 05/15/2018 POS   Final    Antibody Identification 05/15/2018 POS   Final   Admission on 05/14/2018, Discharged on 05/14/2018   Component Date Value Ref Range Status    Albumin, POC 05/14/2018 3.0* 3.3 - 5.5 g/dL Final    Alkaline Phosphatase, POC 05/14/2018 76  42 - 141 U/L Final    ALT (SGPT), POC 05/14/2018 12  10 -  47 U/L Final    AST (SGOT), POC 05/14/2018 20  11 - 38 U/L Final    POC BUN 05/14/2018 12  7 - 22 mg/dL Final    Calcium, POC 05/14/2018 9.3  8.0 - 10.3 mg/dL Final    POC Chloride 05/14/2018 98  98 - 108 mmol/L Final    POC Creatinine 05/14/2018 0.9  0.6 - 1.2 mg/dL Final    POC Glucose 05/14/2018 121* 73 - 118 mg/dL Final    POC Potassium 05/14/2018 3.9  3.6 - 5.1 mmol/L Final    POC Sodium 05/14/2018 138  128 - 145 mmol/L Final    Bilirubin 05/14/2018 0.5  0.2 - 1.6 mg/dL Final    POC TCO2 05/14/2018 27  18 - 33 mmol/L Final    Protein 05/14/2018 9.0* 6.4 - 8.1 g/dL Final    Glucose, UA 05/14/2018 Negative*  Final    Bilirubin, UA 05/14/2018 1+*  Final    Ketones, UA 05/14/2018 Negative*  Final    Spec Grav UA 05/14/2018 1.015   Final    Blood, UA 05/14/2018 Negative*  Final    PH, UA 05/14/2018 7.0   Final    Protein, UA 05/14/2018 1+*  Final    Urobilinogen, UA 05/14/2018 1.0  E.U./dL Final    Nitrite, UA 05/14/2018 Negative*  Final    Leukocytes, UA 05/14/2018 Negative*  Final    Color, UA 05/14/2018 Yellow   Final    Clarity, UA 05/14/2018 Clear   Final    POC Cardiac Troponin I 05/14/2018 0.01  <0.09 ng/mL Final    Sample 05/14/2018 NATALIYA   Final    POC B-Type Natriuretic Peptide 05/14/2018 256* 0.0 - 100.0 pg/mL Final    POC D-DI 05/14/2018 >5000* 0.0 - 450.0 ng/mL Final   Lab Visit on 04/26/2018   Component Date Value Ref Range Status    WBC 04/26/2018 7.25  3.90 - 12.70 K/uL Final    RBC 04/26/2018 2.64* 4.00 - 5.40 M/uL Final    Hemoglobin 04/26/2018 7.5* 12.0 - 16.0 g/dL Final    Hematocrit 04/26/2018 24.3* 37.0 - 48.5 % Final    MCV 04/26/2018 92  82 - 98 fL Final    MCH 04/26/2018 28.4  27.0 - 31.0 pg Final    MCHC 04/26/2018 30.9* 32.0 - 36.0 g/dL Final    RDW 04/26/2018 16.8* 11.5 - 14.5 % Final    Platelets 04/26/2018 172  150 - 350 K/uL Final    MPV 04/26/2018 10.9  9.2 - 12.9 fL Final    Immature Granulocytes 04/26/2018 0.8* 0.0 - 0.5 % Final    Gran # (ANC)  04/26/2018 5.1  1.8 - 7.7 K/uL Final    Immature Grans (Abs) 04/26/2018 0.06* 0.00 - 0.04 K/uL Final    Lymph # 04/26/2018 1.3  1.0 - 4.8 K/uL Final    Mono # 04/26/2018 0.7  0.3 - 1.0 K/uL Final    Eos # 04/26/2018 0.1  0.0 - 0.5 K/uL Final    Baso # 04/26/2018 0.02  0.00 - 0.20 K/uL Final    nRBC 04/26/2018 0  0 /100 WBC Final    Gran% 04/26/2018 70.6  38.0 - 73.0 % Final    Lymph% 04/26/2018 17.8* 18.0 - 48.0 % Final    Mono% 04/26/2018 9.5  4.0 - 15.0 % Final    Eosinophil% 04/26/2018 1.0  0.0 - 8.0 % Final    Basophil% 04/26/2018 0.3  0.0 - 1.9 % Final    Differential Method 04/26/2018 Automated   Final    Group & Rh 04/26/2018 O POS   Final    Indirect Herman 04/26/2018 NEG   Final   There may be more visits with results that are not included.   ]    Assessment:    1. Obesity (BMI 30-39.9)    2. Weight loss, non-intentional    3. Localized swelling of both lower legs          Silvia was seen today for leg swelling.    Diagnoses and all orders for this visit:    Obesity (BMI 30-39.9)   -Worsening.  Patient noted to have 30 pound weight gain within 1 month.   -Decrease or discontinue Periactin.    Weight loss, non-intentional   -Resolved. Patient has responded to Periactin with weight gain.   -Okay to decrease use to prevent further weight gain.    Localized swelling of both lower legs   -New problem.  Likely due to worsening obesity due to Periactin.  Patient instructed to discontinue medication.   -Monitor salt intake (potato chips) and keep legs elevated when seated.  Wear compression stockings for long periods of standing or sitting.         FOLLOW UP: Follow-up in about 4 weeks (around 8/3/2018), or if symptoms worsen or fail to improve.

## 2018-07-11 ENCOUNTER — TELEPHONE (OUTPATIENT)
Dept: HEMATOLOGY/ONCOLOGY | Facility: CLINIC | Age: 73
End: 2018-07-11

## 2018-07-11 NOTE — TELEPHONE ENCOUNTER
tried reaching out to pt on today to reschedule missed appointments, but no answer,not able to leave a message new appointments were schedule and mailed out.

## 2018-07-16 ENCOUNTER — OFFICE VISIT (OUTPATIENT)
Dept: CARDIOLOGY | Facility: CLINIC | Age: 73
End: 2018-07-16
Payer: MEDICARE

## 2018-07-16 VITALS
OXYGEN SATURATION: 99 % | HEART RATE: 67 BPM | DIASTOLIC BLOOD PRESSURE: 80 MMHG | BODY MASS INDEX: 30.97 KG/M2 | SYSTOLIC BLOOD PRESSURE: 150 MMHG | WEIGHT: 197.31 LBS | HEIGHT: 67 IN

## 2018-07-16 DIAGNOSIS — I10 ESSENTIAL HYPERTENSION: Primary | ICD-10-CM

## 2018-07-16 DIAGNOSIS — I31.39 PERICARDIAL EFFUSION: ICD-10-CM

## 2018-07-16 DIAGNOSIS — C44.92 SQUAMOUS CELL CARCINOMA OF UNKNOWN ORIGIN: ICD-10-CM

## 2018-07-16 PROCEDURE — 99204 OFFICE O/P NEW MOD 45 MIN: CPT | Mod: S$GLB,,, | Performed by: INTERNAL MEDICINE

## 2018-07-16 PROCEDURE — 99499 UNLISTED E&M SERVICE: CPT | Mod: S$GLB,,, | Performed by: INTERNAL MEDICINE

## 2018-07-16 PROCEDURE — 99999 PR PBB SHADOW E&M-EST. PATIENT-LVL IV: CPT | Mod: PBBFAC,,, | Performed by: INTERNAL MEDICINE

## 2018-07-16 PROCEDURE — 3077F SYST BP >= 140 MM HG: CPT | Mod: CPTII,S$GLB,, | Performed by: INTERNAL MEDICINE

## 2018-07-16 PROCEDURE — 3079F DIAST BP 80-89 MM HG: CPT | Mod: CPTII,S$GLB,, | Performed by: INTERNAL MEDICINE

## 2018-07-16 NOTE — PROGRESS NOTES
Subjective:    Patient ID:  Silvia Capellan is a 72 y.o. female who presents for follow-up of Hospital Follow Up      HPI     Previously seen by Dr Lima 5/15/18  Notified by cardiology consult team who were approached by the oncology team requesting pericardiocentesis for diagnostic purposes on Ms. Capellan.  CT and echocardiogram were reviewed and discussed with Dr. Denney (echo staff) and Dr. Lima (interventional cardiology staff).       TTE (5/15/18):  The visceral pericardium is shaggy (suggestive of metastatic disease). 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  The pericardial effusion is small, partially organized and not amenable for pericardiocentesis.      CTA chest 5/14/18  1. Interval development of moderate pericardial effusion with mild pericardial enhancement.  Findings may reflect malignant pericardial effusion.  2. No evidence of PE.  3. Patient with known metastatic cancer.  Relatively stable size mass in the right perihilar/subcarinal region.  Interval reduced size of previously visualized multifocal left lung metastatic lesions and anterior mediastinal lymphadenopathy.  Cardiac phrenic lymph nodes have increased in size.  Persistent abnormal prominent circumferential distal esophageal wall thickening versus surrounding soft tissue mass.  Examination was performed in an emergency setting and not to serve as a restaging scan.  4. Moderate left and small right pleural effusions resulting in volume loss and compressive atelectasis within the lower lobes, left greater than right.    Echo 5/15/18    1 - Concentric remodeling.     2 - No wall motion abnormalities.     3 - Normal left ventricular systolic function (EF 60-65%).     4 - Biatrial enlargement.     5 - Indeterminate LV diastolic function.     6 - Normal right ventricular systolic function .     7 - Pulmonary hypertension. The estimated PA  systolic pressure is 47 mmHg.     8 - Trivial aortic regurgitation.     9 - Mild tricuspid regurgitation.     10 - Small pericardial effusion ( see text).     11 - Intermediate central venous pressure.     LE venous US 7/1/18  No evidence of deep venous thrombosis in the left lower extremity.    Admitted 5/15/18  HPI: Ms. Capellan is a pleasent 73 yo lady w/ stage 4 SCC of unknown primary s/p 4C palliative carboplatin & paxlitaxel transferred from [facility] ER for pericardial effusion.  This morning she developed a non-radiating, sharp, right-sided chest pain.  The pain spontaneously resolved, but came back intermittently during the day and was made worse with deep inspiration.  She denies any dyspnea, palpitations, light headedness, N/V, F/C but does feel more weak than usual.  She presented to the [facility] ER where she was initially suspected of having a PE.  She was given aspirin and had a CTA chest that was negative for PE but showed a pericardial effusion concerning for malignant pleural effusion.  Patient was transferred to [facility] for further evaluation with cardiolgy and possible pericardiocentesis.    Hospital Course: Patient presented to the hospital with chest pain. Her troponin was normal and there were no EKG changed. However she was found to have a small pleural effusion. Cardiology was consulted and an echo was performed. The echo showed a small amount of pleural effusion with changes to the pleural lining concerning for malignancy spread. However the fluid amount was too small to be tapped. The patient's chest pain resolved in the hospital. She will need a repeat echo in 3-4 weeks. She is safe for discharge.     Went to the ER 7/1/18  This is a 72 y.o female who presents with acute, left leg and left foot swelling for 1 week.  She has associated pain in that foot.She notes the pain as constant. She denies any trauma or falls . She also denies numbness or tingling.  She describes her pain as aching in quality. She currently has lung cancer and  is not going through chemotherapy at this time. She has no history of any blood clots. She rates her pain as a 6/10.  Patient denies shortness of breath, chest pain, dizziness or syncope.    7/16/18 Still having LLE swelling  Denies CP or SOB       Review of Systems   Constitution: Negative for decreased appetite.   HENT: Negative for ear discharge.    Eyes: Negative for blurred vision.   Respiratory: Negative for hemoptysis.    Endocrine: Negative for polyphagia.   Hematologic/Lymphatic: Negative for adenopathy.   Skin: Negative for color change.   Musculoskeletal: Negative for joint swelling.   Neurological: Negative for brief paralysis.   Psychiatric/Behavioral: Negative for hallucinations.        Objective:    Physical Exam   Constitutional: She is oriented to person, place, and time. She appears well-developed and well-nourished.   HENT:   Head: Normocephalic and atraumatic.   Eyes: Conjunctivae are normal. Pupils are equal, round, and reactive to light.   Neck: Normal range of motion. Neck supple.   Cardiovascular: Normal rate, normal heart sounds and intact distal pulses.    Pulmonary/Chest: Effort normal and breath sounds normal.   Abdominal: Soft. Bowel sounds are normal.   Musculoskeletal: Normal range of motion. She exhibits edema.   Neurological: She is alert and oriented to person, place, and time.   Skin: Skin is warm and dry.         Assessment:       1. Essential hypertension    2. Pericardial effusion    3. Stage 4 Squamous cell carcinoma of unknown origin         Plan:       Repeat echo 3 months to monitor pericardial effusion  LLE edema but no DVT on US

## 2018-07-24 ENCOUNTER — OFFICE VISIT (OUTPATIENT)
Dept: HEMATOLOGY/ONCOLOGY | Facility: CLINIC | Age: 73
End: 2018-07-24
Payer: MEDICARE

## 2018-07-24 ENCOUNTER — LAB VISIT (OUTPATIENT)
Dept: LAB | Facility: HOSPITAL | Age: 73
End: 2018-07-24
Attending: INTERNAL MEDICINE
Payer: MEDICARE

## 2018-07-24 VITALS
DIASTOLIC BLOOD PRESSURE: 67 MMHG | BODY MASS INDEX: 31.49 KG/M2 | HEART RATE: 88 BPM | HEIGHT: 67 IN | SYSTOLIC BLOOD PRESSURE: 121 MMHG | RESPIRATION RATE: 18 BRPM | WEIGHT: 200.63 LBS | OXYGEN SATURATION: 100 % | TEMPERATURE: 98 F

## 2018-07-24 DIAGNOSIS — C44.92 SQUAMOUS CELL CARCINOMA OF UNKNOWN ORIGIN: ICD-10-CM

## 2018-07-24 DIAGNOSIS — D63.0 ANEMIA IN NEOPLASTIC DISEASE: ICD-10-CM

## 2018-07-24 DIAGNOSIS — G47.00 INSOMNIA, UNSPECIFIED TYPE: ICD-10-CM

## 2018-07-24 DIAGNOSIS — E66.9 OBESITY (BMI 30-39.9): ICD-10-CM

## 2018-07-24 DIAGNOSIS — R63.0 ANOREXIA: ICD-10-CM

## 2018-07-24 DIAGNOSIS — R53.0 NEOPLASTIC MALIGNANT RELATED FATIGUE: Primary | ICD-10-CM

## 2018-07-24 LAB
ABO + RH BLD: NORMAL
ALBUMIN SERPL BCP-MCNC: 2.9 G/DL
ALP SERPL-CCNC: 80 U/L
ALT SERPL W/O P-5'-P-CCNC: 9 U/L
ANION GAP SERPL CALC-SCNC: 6 MMOL/L
AST SERPL-CCNC: 11 U/L
BASOPHILS # BLD AUTO: 0.01 K/UL
BASOPHILS NFR BLD: 0.2 %
BILIRUB SERPL-MCNC: 0.2 MG/DL
BLD GP AB SCN CELLS X3 SERPL QL: NORMAL
BUN SERPL-MCNC: 21 MG/DL
CALCIUM SERPL-MCNC: 9.7 MG/DL
CHLORIDE SERPL-SCNC: 106 MMOL/L
CO2 SERPL-SCNC: 28 MMOL/L
CREAT SERPL-MCNC: 0.7 MG/DL
DIFFERENTIAL METHOD: ABNORMAL
EOSINOPHIL # BLD AUTO: 0.1 K/UL
EOSINOPHIL NFR BLD: 2.3 %
ERYTHROCYTE [DISTWIDTH] IN BLOOD BY AUTOMATED COUNT: 16.5 %
EST. GFR  (AFRICAN AMERICAN): >60 ML/MIN/1.73 M^2
EST. GFR  (NON AFRICAN AMERICAN): >60 ML/MIN/1.73 M^2
GLUCOSE SERPL-MCNC: 82 MG/DL
HCT VFR BLD AUTO: 29.8 %
HGB BLD-MCNC: 9.1 G/DL
IMM GRANULOCYTES # BLD AUTO: 0.04 K/UL
IMM GRANULOCYTES NFR BLD AUTO: 0.7 %
LYMPHOCYTES # BLD AUTO: 1.9 K/UL
LYMPHOCYTES NFR BLD: 31.3 %
MCH RBC QN AUTO: 31.3 PG
MCHC RBC AUTO-ENTMCNC: 30.5 G/DL
MCV RBC AUTO: 102 FL
MONOCYTES # BLD AUTO: 0.8 K/UL
MONOCYTES NFR BLD: 13.3 %
NEUTROPHILS # BLD AUTO: 3.1 K/UL
NEUTROPHILS NFR BLD: 52.2 %
NRBC BLD-RTO: 0 /100 WBC
PLATELET # BLD AUTO: 161 K/UL
PMV BLD AUTO: 9.6 FL
POTASSIUM SERPL-SCNC: 3.7 MMOL/L
PROT SERPL-MCNC: 7.6 G/DL
RBC # BLD AUTO: 2.91 M/UL
SODIUM SERPL-SCNC: 140 MMOL/L
WBC # BLD AUTO: 6 K/UL

## 2018-07-24 PROCEDURE — 99499 UNLISTED E&M SERVICE: CPT | Mod: S$GLB,,, | Performed by: INTERNAL MEDICINE

## 2018-07-24 PROCEDURE — 99214 OFFICE O/P EST MOD 30 MIN: CPT | Mod: S$GLB,,, | Performed by: INTERNAL MEDICINE

## 2018-07-24 PROCEDURE — 86901 BLOOD TYPING SEROLOGIC RH(D): CPT

## 2018-07-24 PROCEDURE — 3074F SYST BP LT 130 MM HG: CPT | Mod: CPTII,S$GLB,, | Performed by: INTERNAL MEDICINE

## 2018-07-24 PROCEDURE — 99999 PR PBB SHADOW E&M-EST. PATIENT-LVL IV: CPT | Mod: PBBFAC,,, | Performed by: INTERNAL MEDICINE

## 2018-07-24 PROCEDURE — 85025 COMPLETE CBC W/AUTO DIFF WBC: CPT

## 2018-07-24 PROCEDURE — 80053 COMPREHEN METABOLIC PANEL: CPT

## 2018-07-24 PROCEDURE — 3078F DIAST BP <80 MM HG: CPT | Mod: CPTII,S$GLB,, | Performed by: INTERNAL MEDICINE

## 2018-07-24 RX ORDER — TRAZODONE HYDROCHLORIDE 50 MG/1
50 TABLET ORAL NIGHTLY
Qty: 30 TABLET | Refills: 11 | Status: SHIPPED | OUTPATIENT
Start: 2018-07-24 | End: 2019-01-01 | Stop reason: ALTCHOICE

## 2018-07-24 NOTE — PROGRESS NOTES
CC: Squamous cell carcinoma, unknown primary, on chemotherapy, here for follow up         Oncology History:     Diagnosis: Squamous cell carcinoma , primary site unknown    Molecular/genetic testing: p16 negative; PD L1 could not be run due to inadequate tissue   Stage:        Stage 4   Treatment: Carboplatin/paclitaxel, s/p 6 cycles        HPI:  is a 71 y/o female who underwent bronchoscopy/EBUS evaluation on 8/31/17 for abnormal mediastinal adenopathy  and a 1.2 cm MORRO mass.     Bronchoscopy findings:    The oropharynx appears normal. The larynx appears normal. The vocal cords appear normal. The subglottic space is normal. The trachea is of normal caliber. The otf is sharp. The tracheobronchial tree was examined to at least the first subsegmental level. Bronchial mucosa and anatomy are normal; there are no endobronchial lesions, and no secretions.    Lymph Nodes: Lymph node sizing was performed via endobronchial ultrasound for suspected lung cancer. Sampling by transbronchial needle aspiration was also performed using an Olympus EBUS-TBNA 22  gauge needle in the subcarinal mediastinum (level 7) and sent for routine cytology.       - The 7 (subcarinal) node was 30 mm by EBUS. Three samples with the needle were obtained. The patient's condition was unchanged after the intervention.      Her treatment got delayed as she cancelled several appointments due to some issues at home. She started Caroplatin/paclitaxel palliatively. She had left thoracentesis on 2/9/18. There were atypical cells in the pleural fluid, highly suspicious for malignancy.     Interval history: She is here for a follow up visit. She has fatigue, dyspnea. She has finished 6 cycles of Carbo/Pac        Review of Systems   Constitutional: Positive for malaise/fatigue. Negative for chills, fever and weight loss.   HENT: Negative for congestion, ear discharge and nosebleeds.    Eyes: Negative for blurred vision, double vision and  photophobia.   Respiratory: Negative for cough, hemoptysis and sputum production.    Cardiovascular: Negative for chest pain, palpitations, orthopnea and leg swelling.   Gastrointestinal: Negative for abdominal pain, diarrhea, heartburn, nausea and vomiting.   Genitourinary: Negative for dysuria, frequency and urgency.   Skin: Negative for itching and rash.   Neurological: Positive for weakness. Negative for dizziness, tingling, tremors, sensory change and headaches.   Endo/Heme/Allergies: Does not bruise/bleed easily.   Psychiatric/Behavioral: Negative for depression.       Current Outpatient Prescriptions   Medication Sig    cyproheptadine (PERIACTIN) 4 mg tablet Take 1 tablet (4 mg total) by mouth 3 (three) times daily as needed.    dexamethasone (DECADRON) 2 MG tablet Take 1 tablet (2 mg total) by mouth every 12 (twelve) hours.    diphenoxylate-atropine 2.5-0.025 mg (LOMOTIL) 2.5-0.025 mg per tablet Take 1 tablet by mouth 4 (four) times daily as needed for Diarrhea.    escitalopram oxalate (LEXAPRO) 10 MG tablet Take 1 tablet (10 mg total) by mouth once daily.    hydroCHLOROthiazide (HYDRODIURIL) 25 MG tablet Take 1 tablet (25 mg total) by mouth once daily.    losartan (COZAAR) 100 MG tablet Take 1 tablet (100 mg total) by mouth once daily.    methotrexate 2.5 MG Tab     metoclopramide HCl (REGLAN) 5 MG tablet Take 1 tablet (5 mg total) by mouth 2 (two) times daily as needed (belching, nausea).    morphine (MS CONTIN) 15 MG 12 hr tablet Take 1 tablet (15 mg total) by mouth 2 (two) times daily.    oxyCODONE-acetaminophen (PERCOCET) 7.5-325 mg per tablet Take 1 tablet by mouth every 4 (four) hours as needed for Pain.    pantoprazole (PROTONIX) 40 MG tablet Take 1 tablet (40 mg total) by mouth once daily.    potassium chloride SA (K-DUR,KLOR-CON) 20 MEQ tablet Take 2 tablets (40 mEq total) by mouth once daily.    prochlorperazine (COMPAZINE) 10 MG tablet Take 1 tablet (10 mg total) by mouth every 6  (six) hours as needed.    LORazepam (ATIVAN) 0.5 MG tablet Take 1 tablet (0.5 mg total) by mouth 2 (two) times daily. for 30 doses     No current facility-administered medications for this visit.        Vitals:    07/24/18 1029   BP: 121/67   Pulse: 88   Resp: 18   Temp: 97.7 °F (36.5 °C)         Physical Exam   Constitutional: She is oriented to person, place, and time. She appears well-developed and well-nourished.   HENT:   Head: Normocephalic and atraumatic.   Mouth/Throat: No oropharyngeal exudate.   Eyes: Pupils are equal, round, and reactive to light.   Neck: Normal range of motion.   Cardiovascular: Normal rate and regular rhythm.    Pulmonary/Chest: Effort normal and breath sounds normal. No respiratory distress. She has no wheezes.   Abdominal: Soft. She exhibits no distension. There is no tenderness. There is no rebound.   Musculoskeletal: She exhibits no edema.   Lymphadenopathy:     She has no cervical adenopathy.   Neurological: She is alert and oriented to person, place, and time. No cranial nerve deficit.   Skin: Skin is warm.   Psychiatric: She has a normal mood and affect.       Component      Latest Ref Rng & Units 7/24/2018   WBC      3.90 - 12.70 K/uL 6.00   RBC      4.00 - 5.40 M/uL 2.91 (L)   Hemoglobin      12.0 - 16.0 g/dL 9.1 (L)   Hematocrit      37.0 - 48.5 % 29.8 (L)   MCV      82 - 98 fL 102 (H)   MCH      27.0 - 31.0 pg 31.3 (H)   MCHC      32.0 - 36.0 g/dL 30.5 (L)   RDW      11.5 - 14.5 % 16.5 (H)   Platelets      150 - 350 K/uL 161   MPV      9.2 - 12.9 fL 9.6   Immature Granulocytes      0.0 - 0.5 % 0.7 (H)   Gran # (ANC)      1.8 - 7.7 K/uL 3.1   Immature Grans (Abs)      0.00 - 0.04 K/uL 0.04   Lymph #      1.0 - 4.8 K/uL 1.9   Mono #      0.3 - 1.0 K/uL 0.8   Eos #      0.0 - 0.5 K/uL 0.1   Baso #      0.00 - 0.20 K/uL 0.01   nRBC      0 /100 WBC 0   Gran%      38.0 - 73.0 % 52.2   Lymph%      18.0 - 48.0 % 31.3   Mono%      4.0 - 15.0 % 13.3   Eosinophil%      0.0 - 8.0 % 2.3    Basophil%      0.0 - 1.9 % 0.2   Differential Method       Automated   Sodium      136 - 145 mmol/L 140   Potassium      3.5 - 5.1 mmol/L 3.7   Chloride      95 - 110 mmol/L 106   CO2      23 - 29 mmol/L 28   Glucose      70 - 110 mg/dL 82   BUN, Bld      8 - 23 mg/dL 21   Creatinine      0.5 - 1.4 mg/dL 0.7   Calcium      8.7 - 10.5 mg/dL 9.7   Total Protein      6.0 - 8.4 g/dL 7.6   Albumin      3.5 - 5.2 g/dL 2.9 (L)   Total Bilirubin      0.1 - 1.0 mg/dL 0.2   Alkaline Phosphatase      55 - 135 U/L 80   AST      10 - 40 U/L 11   ALT      10 - 44 U/L 9 (L)   Anion Gap      8 - 16 mmol/L 6 (L)   eGFR if African American      >60 mL/min/1.73 m:2 >60.0   eGFR if non African American      >60 mL/min/1.73 m:2 >60.0       6/12/17 CT chest with cont      1. Large left pleural effusion resulting in atelectasis of the left lower lobe and portions of left upper lobe  2. 1.2 cm left upper lobe pleural based pulmonary nodule  3. Mediastinal adenopathy as described above with diffuse thickening of the wall of the distal esophagus. Findings are suspicious for neoplastic process. Recommend bronchoscopy biopsy and possible endoscopy for further evaluation.  4. 1.3 cm hypodensity within the dome of the liver. Metastatic focus cannot be entirely excluded.     9/7/17 PET CT       There is mildly increased tracer uptake within the patient's left pleural effusion and overlying the mediastinal adenopathy, max SUV 2.7.    Transmission CT images show continued pleural effusion and circumferential esophageal thickening. Transmission CT images also show non-avid bilateral groin adenopathy likely reactive in nature.      Impression        There is mildly increased tracer uptake within the patient's left pleural effusion and mediastinal adenopathy. While these findings suggest reactive etiology some low grade malignancies, such as bronchioloalveolar carcinoma, may show this level of uptake on PET scan.            10/30/17 EGD     The  examined duodenum was normal.The entire examined stomach was normal.The cardia and gastric fundus were normal on retroflexion. A 1 cm hiatal hernia was found. The proximal extent of the gastric folds (end of tubular esophagus) was 38 cm from the incisors. The hiatal narrowing was 39 cm from the incisors. The Z-line was 38 cm from the incisors. Z-line was sharp. No esophagitis and no columnar esophagus and no lesions seen with NBI or white light.    Impression:                                   - Normal examined duodenum.                        - Normal stomach.                        - 1 cm hiatal hernia.                        - No specimens collected.     10/30/17 colonoscopy :     Cecal polyp ( 3mm) identified  Histopathology: Tubular adenoma        Pathology:     1/17/14 colonoscopy     FINAL PATHOLOGIC DIAGNOSIS     1. Colon biopsy, ascending colon-tubular adenoma.  2. Colon biopsy, transverse-tubular adenoma.  3. 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 The stalk and base of the polyp are well represented and are free of atypia.  4. Colon biopsy, sigmoid- mild glandular hyperplasia consistent with a benign hyperplastic polyp.        7/19/17 PLEURAL FLUID (CYTOLOGY AND CELL BLOCK):     -Groups of atypical cells present, malignancy cannot be excluded.  Note: While these cells could be reactive mesothelial cells, the level of atypia could be seen in malignancy. A cell block was prepared but the atypical cells are not present in the cell block for further characterization. Correlate  clinically. Repeat tap/biopsy might be helpful if clinically indicated.     8/28/17 EBUS FNA     FNA of subcarinal lymph node: Positive for malignant cells  Small clusters of malignant epithelial cells, favor squamous carcinoma Tumor cells are positive for CK 5/6 and CK 7. Immunostains for p63 and TTF-1 are  negative.     1/26/18 CT chest, abdomen,pelvis with cont         Comparison: June 12, 2017    Chest: Since prior exam there is been interval enlargement of the right pleural effusion. There is high density material seen within the right pleural effusion that was not visualized on prior exam.    There is a 5.4 cm enhancing mass seen at the left lung base that previously measured approximately 2.9 cm.    There is a subcarinal mass measuring 4.4 cm it previously measured 2.9 cm.    There is circumferential esophageal thickening adjacent to this mass.    There is volume loss in the right chest with mediastinal shift to the right more pronounced than what was seen on prior exam.    Patient is a right internal jugular Port-A-Cath.    Abdomen/pelvis: The liver, spleen, adrenal glands, kidneys and pancreas show a normal contrasted appearance. There is no evidence bowel obstruction. There is no evidence of abdominal or pelvic lymphadenopathy. The osseous structures show degenerative change of the spine.   Impression       Since prior exam in the patient's subcarinal and left lung base masses have increased in size. In addition there is now high density material seen within the patient's left pleural effusion new from prior exam and concerning for hemorrhage possibly from the mass.         1/26/18 CT head with cont          Comparison: CT June 8, 2017    Technique: Contiguous axial images were acquired from vertex to skull base without contrast.    Findings: There is no evidence acute intracranial abnormality.  Specifically there is no evidence acute infarct, contusion, extra-axial fluid collection or midline shift.  The ventricles are normal in size and configuration.  The calvarium is intact.  The paranasal sinuses and mastoid air cells are clear.    There is no evidence of enhancing mass.   Impression       There is no evidence of enhancing mass within the cerebral parenchyma      2/9/18 FINAL PATHOLOGIC DIAGNOSIS  LEFT  LUNG, PLEURAL FLUID (CYTOLOGY):  - Scant groups of atypical cells, malignancy cannot be excluded. Note: Scant atypical groups of cells are present. Differential diagnosis includes reactive atypia vs malignancy .  Immunohistochemistry for CK5/6 and p63 is non-contributory. Please clinically correlate and re-sample as indicated.  All stains have satisfactory positive and negative controls.     5/14/18 CTA CHEST NON CORONARY       .    COMPARISON:  CT chest abdomen and pelvis from 01/26/2018.    FINDINGS:  Structures at the base of the neck are unremarkable.  Right-sided chest port is visualized with distal tip in the SVC.  Aorta is non-aneurysmal.  Heart is mildly enlarged with interval development of moderate pericardial effusion.  There is mild pericardial enhancement visualized.  The pulmonary arteries distribute normally. No intraluminal filling defects to suggest pulmonary thromboembolism to the level of the proximal segmental branches.    The trachea and bronchi are patent.  Moderate left and small right sided pleural effusions are visualized resulting in volume loss and compressive atelectasis within the lower lobes, left greater than right.  Grossly stable mass visualized in the right subcarinal/perihilar region.  There has been interval reduced size of multifocal left-sided pulmonary masses and anterior mediastinal lymphadenopathy.    Stable hepatic hypodensity is visualized within the anterior aspect of the right hepatic lobe.  Prominent right cardiophrenic lymph nodes are seen which have increased in size.  Redemonstration of diffuse abnormal distal esophageal wall thickening versus adjacent soft tissue lesion.  No acute osseous abnormality identified.  Extrathoracic soft tissues are unremarkable.   Impression        1. Interval development of moderate pericardial effusion with mild pericardial enhancement.  Findings may reflect malignant pericardial effusion.  2. No evidence of PE.  3. Patient with known  metastatic cancer.  Relatively stable size mass in the right perihilar/subcarinal region.  Interval reduced size of previously visualized multifocal left lung metastatic lesions and anterior mediastinal lymphadenopathy.  Cardiac phrenic lymph nodes have increased in size.  Persistent abnormal prominent circumferential distal esophageal wall thickening versus surrounding soft tissue mass.  Examination was performed in an emergency setting and not to serve as a restaging scan.  4. Moderate left and small right pleural effusions resulting in volume loss and compressive atelectasis within the lower lobes, left greater than right.         6/7/18 PET CT      FINDINGS:  Patient was administered 12.23 millicuries of FDG intravenously.  Comparison is 09/07/2017.    There is physiologic intracranial, head, and neck activity.  Heart mediastinum are normal.  There is low-grade activity within the left pleural effusion.  There is physiologic liver, spleen, GI  and pelvic organ activity.  No bone lesions are seen.  There is left lower lobe retro cardiac atelectasis.   Impression        See above    Low-grade activity within a loculated left upper pleural effusion.  This could be benign/reactive.  Malignancy not excluded but it has improved since the prior study.               Assessment:     1. is a 73 y/o female who underwent bronchoscopy/EBUS evaluation on 8/31/17 for abnormal mediastinal adenopathy  and a 1.2 cm MORRO mass as well as pericardial adenopathy.FNA of subcarinal lymph node was positive for malignant cells.  There were small clusters of malignant epithelial cells, favoring squamous carcinoma. Site of origin is not clear. PET CT done on 9/7/17 did not reveal any lesion in head and neck region.  There was inadequate tissue to run PD L1.p16 was negative.    Esophageal thickening was noted on CT done on 6/12/17. EGD on 10/30/17 did not reveal any suspicious lesions in esophagus/stomach. ENT evaluation still  pending.   I explained to her that if primary site is unclear, she will be treated as metastatic SCC of unknown primary.   Her treatment got delayed as she cancelled several appointments due to some issues at home. She said she could not have MRI due to claustrophobia, even with anti-anxiety medication. CT chest from 1/26/18 showed increase in the sizes of subcarinal and left lung base masses compared to previous CT in June 2017 . High density material was seen within the patient's left pleural effusion. No intracranial lesions noted on CT head. I discussed these findings with the patient and personally reviewed the CT scans from January 2018.  I told her that she has likely metastatic SCC of unknown primary. She does have RA history. It is unclear if she can tolerate check point inhibitor therapy even if she has high PD1 expression on cytology/biopsy.  She started Carboplatin/Paclitaxel every 3 weeks. I had explained to her that rationale of treatment is palliation and not cure . She did not get her CTs after C4 as planned. PET CT before cycle 6, on 6/7/18 showed low-grade activity within the left pleural effusion.  She will have repeat imaging in about 6 weeks.        2. Pleural effusion: She had left sided thoracentesis ( 1050ml) on 2/9/18. Malignancy could not be excluded. No  Indication for pleurex catheter/reepat thoracentesis at this time.    CTA done on 5/14/18 again showed moderate effusion on the left , mild on right     3. Anorexia: Secondary to #1. She was on Cyproheptadine.However, her appetite was still poor. She was started on Decadron 2mg BID. It helped with her appetite. Howver, it caused her to gain weight as well as disturbed her sleep. She will stop Decadron now      4. Anemia: Hgb 9.1 today. Normocytic, hypochromic. No overt bleeding. Iron panel on 4/19 showed iron deficiency. She will receive PRBC for Hgb <7.     5. Diarrhea: Chemotherapy induced. Imodium did not help her.She was also on  Lomotil as needed. Diarrhea now resolved. She is now constipated.       6. Pericardial effusion: She was noted to have pericardial effusion on CTA chest done on 5/14/18. Possibly malignant. No symptoms/signs of tamponade.      Plan:  1. Continue Periactin. Hold Decadron, as she is eating well             Distress Screening Results: Psychosocial Distress screening score of Distress Score: 1 noted and reviewed. No intervention indicated.

## 2018-08-09 ENCOUNTER — OFFICE VISIT (OUTPATIENT)
Dept: OPHTHALMOLOGY | Facility: CLINIC | Age: 73
End: 2018-08-09
Payer: MEDICARE

## 2018-08-09 DIAGNOSIS — H25.813 MIXED TYPE AGE-RELATED CATARACT, BOTH EYES: ICD-10-CM

## 2018-08-09 PROCEDURE — 92014 COMPRE OPH EXAM EST PT 1/>: CPT | Mod: S$GLB,,, | Performed by: OPHTHALMOLOGY

## 2018-08-09 PROCEDURE — 99999 PR PBB SHADOW E&M-EST. PATIENT-LVL III: CPT | Mod: PBBFAC,,, | Performed by: OPHTHALMOLOGY

## 2018-08-09 NOTE — PROGRESS NOTES
HPI     DLS:08/08/2017 Fouzia  Patient here for annual check up OD closed fracture x year ago.  OD have a fb sensation and decrease vision.  No eye pain.    I have personally interviewed the patient, reviewed the history and   examined the patient and agree with the technician's exam.    Last edited by Uzair Fragoso MD on 8/9/2018  8:50 AM. (History)            Assessment /Plan     For exam results, see Encounter Report.    Mixed type age-related cataract, both eyes      Ms. Capellan would benefit significantly from cataract surgery in her right eye. I will arrange an appointment with Dr. Helton for further evaluation.

## 2018-08-17 ENCOUNTER — OFFICE VISIT (OUTPATIENT)
Dept: OPHTHALMOLOGY | Facility: CLINIC | Age: 73
End: 2018-08-17
Payer: MEDICARE

## 2018-08-17 DIAGNOSIS — H25.11 NUCLEAR SCLEROTIC CATARACT OF RIGHT EYE: Primary | ICD-10-CM

## 2018-08-17 DIAGNOSIS — H25.12 NUCLEAR SCLEROTIC CATARACT OF LEFT EYE: ICD-10-CM

## 2018-08-17 DIAGNOSIS — S02.31XD CLOSED FRACTURE OF RIGHT ORBITAL FLOOR WITH ROUTINE HEALING: ICD-10-CM

## 2018-08-17 PROCEDURE — 92136 OPHTHALMIC BIOMETRY: CPT | Mod: RT,S$GLB,, | Performed by: OPHTHALMOLOGY

## 2018-08-17 PROCEDURE — 92014 COMPRE OPH EXAM EST PT 1/>: CPT | Mod: S$GLB,,, | Performed by: OPHTHALMOLOGY

## 2018-08-17 PROCEDURE — 99999 PR PBB SHADOW E&M-EST. PATIENT-LVL III: CPT | Mod: PBBFAC,,, | Performed by: OPHTHALMOLOGY

## 2018-08-17 PROCEDURE — 92025 CPTRIZED CORNEAL TOPOGRAPHY: CPT | Mod: S$GLB,,, | Performed by: OPHTHALMOLOGY

## 2018-08-17 NOTE — PROGRESS NOTES
HPI     Referred by Dr Fragoso    No eye surgery /No eyedrops    Pt referred by Dr Fragoso for cataract eval.   Pt states blurry VA OD.  Pt   denies headaches, flashes, floaters or eye pain.    Last edited by Susan Keith MD on 8/17/2018  9:28 AM. (History)            Assessment /Plan     For exam results, see Encounter Report.    Nuclear sclerotic cataract of right eye  -     IOL Master - OD - Right Eye  -     Computerized corneal topography    Nuclear sclerotic cataract of left eye    Closed fracture of right orbital floor with routine healing      Visually significant nuclear sclerotic cataract   - Interfering with activities of daily living.  Pt desires cataract surgery for Va rehabilitation.   - R/B/A discussed and pt agrees to proceed with surgery.   - IOL options discussed according to patient's goals and concomitant ocular pathology; and pt content with monofocal lens.    - Target: plano.    pcboo 22.0 OD    (pcboo 22.0 OS)  *needed CTR for OD.    Pt desires to wear rx post sx

## 2018-08-31 ENCOUNTER — OFFICE VISIT (OUTPATIENT)
Dept: FAMILY MEDICINE | Facility: CLINIC | Age: 73
End: 2018-08-31
Payer: MEDICARE

## 2018-08-31 ENCOUNTER — APPOINTMENT (OUTPATIENT)
Dept: RADIOLOGY | Facility: HOSPITAL | Age: 73
End: 2018-08-31
Attending: FAMILY MEDICINE
Payer: MEDICARE

## 2018-08-31 VITALS
DIASTOLIC BLOOD PRESSURE: 80 MMHG | HEART RATE: 76 BPM | OXYGEN SATURATION: 96 % | BODY MASS INDEX: 32.49 KG/M2 | TEMPERATURE: 98 F | HEIGHT: 67 IN | RESPIRATION RATE: 18 BRPM | WEIGHT: 207 LBS | SYSTOLIC BLOOD PRESSURE: 142 MMHG

## 2018-08-31 DIAGNOSIS — I10 ESSENTIAL HYPERTENSION: ICD-10-CM

## 2018-08-31 DIAGNOSIS — J30.89 SEASONAL ALLERGIC RHINITIS DUE TO OTHER ALLERGIC TRIGGER: Primary | ICD-10-CM

## 2018-08-31 DIAGNOSIS — M25.552 LEFT HIP PAIN: ICD-10-CM

## 2018-08-31 DIAGNOSIS — F41.1 ANXIETY STATE: ICD-10-CM

## 2018-08-31 DIAGNOSIS — F13.20: ICD-10-CM

## 2018-08-31 PROCEDURE — 3077F SYST BP >= 140 MM HG: CPT | Mod: CPTII,S$GLB,, | Performed by: FAMILY MEDICINE

## 2018-08-31 PROCEDURE — 3079F DIAST BP 80-89 MM HG: CPT | Mod: CPTII,S$GLB,, | Performed by: FAMILY MEDICINE

## 2018-08-31 PROCEDURE — 99999 PR PBB SHADOW E&M-EST. PATIENT-LVL III: CPT | Mod: PBBFAC,,, | Performed by: FAMILY MEDICINE

## 2018-08-31 PROCEDURE — 73502 X-RAY EXAM HIP UNI 2-3 VIEWS: CPT | Mod: 26,LT,, | Performed by: RADIOLOGY

## 2018-08-31 PROCEDURE — 99499 UNLISTED E&M SERVICE: CPT | Mod: S$GLB,,, | Performed by: FAMILY MEDICINE

## 2018-08-31 PROCEDURE — 73502 X-RAY EXAM HIP UNI 2-3 VIEWS: CPT | Mod: TC,FY,PN,LT

## 2018-08-31 PROCEDURE — 99215 OFFICE O/P EST HI 40 MIN: CPT | Mod: S$GLB,,, | Performed by: FAMILY MEDICINE

## 2018-08-31 RX ORDER — LORAZEPAM 0.5 MG/1
0.5 TABLET ORAL 2 TIMES DAILY
Qty: 30 TABLET | Refills: 0 | Status: SHIPPED | OUTPATIENT
Start: 2018-08-31 | End: 2018-01-01 | Stop reason: SDUPTHER

## 2018-08-31 RX ORDER — DICLOFENAC SODIUM 50 MG/1
50 TABLET, DELAYED RELEASE ORAL 3 TIMES DAILY PRN
Qty: 30 TABLET | Refills: 1 | Status: SHIPPED | OUTPATIENT
Start: 2018-08-31 | End: 2019-01-01

## 2018-08-31 RX ORDER — HYDROCHLOROTHIAZIDE 25 MG/1
25 TABLET ORAL DAILY
Qty: 90 TABLET | Refills: 1 | Status: SHIPPED | OUTPATIENT
Start: 2018-08-31 | End: 2019-01-01

## 2018-08-31 RX ORDER — MONTELUKAST SODIUM 10 MG/1
10 TABLET ORAL NIGHTLY
Qty: 30 TABLET | Refills: 0 | Status: SHIPPED | OUTPATIENT
Start: 2018-08-31 | End: 2018-01-01

## 2018-08-31 NOTE — PROGRESS NOTES
Routine Office Visit    Patient Name: Silvia Capellan    : 1945  MRN: [id number]    Subjective:  Silvia is a 73 y.o. female who presents today for:    1. Allergies  Patient presenting today for allergy symptoms of sneezing and congestion after being in her garden.  She states that since she was in the garden, she has felt a lot of pressure in her forehead along with the nasal congestion and sneezing.  No watery eyes, no rash, no swelling, n/v/d.    2.  Anxiety  Patient presenting today for refill of Ativan.  She has been on this medication since being diagnosed with Stage 4 Squamous cell lung cancer.  She states she doesn't always take it, but lately has been having a lot of anxiety and taking her ativan more frequently.  She is following the prescription directions.  No mixing of ativan with alcohol    3.  Left hip pain  Patient is complaining of pain in the left hip that has been 8/10 and sharp for several days.  This is a recurring pain.  She has not had any swelling or redness.  No falls or other trauma to the hip.  She states she has been told in the past she has significant OA in the hip.    Past Medical History  Past Medical History:   Diagnosis Date    Encounter for blood transfusion     GERD (gastroesophageal reflux disease)     HTN (hypertension)     Rheumatoid arthritis     Rheumatoid arthritis(714.0)     Squamous cell lung cancer, left 2/15/2018       Past Surgical History  Past Surgical History:   Procedure Laterality Date    APPENDECTOMY      COLONOSCOPY      HYSTERECTOMY      SKIN BIOPSY      TOTAL KNEE ARTHROPLASTY      right       Family History  Family History   Problem Relation Age of Onset    Glaucoma Mother     Hypertension Unknown     Kidney disease Unknown     Colon polyps Neg Hx     Colon cancer Neg Hx     Esophageal cancer Neg Hx     Irritable bowel syndrome Neg Hx     Inflammatory bowel disease Neg Hx     Stomach cancer Neg Hx     Blindness Neg Hx      Macular degeneration Neg Hx     Retinal detachment Neg Hx        Social History  Social History     Socioeconomic History    Marital status:      Spouse name: Not on file    Number of children: Not on file    Years of education: Not on file    Highest education level: Not on file   Social Needs    Financial resource strain: Not on file    Food insecurity - worry: Not on file    Food insecurity - inability: Not on file    Transportation needs - medical: Not on file    Transportation needs - non-medical: Not on file   Occupational History    Not on file   Tobacco Use    Smoking status: Former Smoker    Smokeless tobacco: Never Used   Substance and Sexual Activity    Alcohol use: Yes     Comment: Occasionally    Drug use: No    Sexual activity: Not Currently   Other Topics Concern    Not on file   Social History Narrative    Not on file       Current Medications  Current Outpatient Medications on File Prior to Visit   Medication Sig Dispense Refill    cyproheptadine (PERIACTIN) 4 mg tablet Take 1 tablet (4 mg total) by mouth 3 (three) times daily as needed. 90 tablet 2    dexamethasone (DECADRON) 2 MG tablet Take 1 tablet (2 mg total) by mouth every 12 (twelve) hours. 120 tablet 0    escitalopram oxalate (LEXAPRO) 10 MG tablet Take 1 tablet (10 mg total) by mouth once daily. 90 tablet 1    losartan (COZAAR) 100 MG tablet Take 1 tablet (100 mg total) by mouth once daily. 90 tablet 1    methotrexate 2.5 MG Tab as needed.       metoclopramide HCl (REGLAN) 5 MG tablet Take 1 tablet (5 mg total) by mouth 2 (two) times daily as needed (belching, nausea). 10 tablet 0    pantoprazole (PROTONIX) 40 MG tablet Take 1 tablet (40 mg total) by mouth once daily. 90 tablet 1    traZODone (DESYREL) 50 MG tablet Take 1 tablet (50 mg total) by mouth every evening. (Patient taking differently: Take 50 mg by mouth nightly as needed. ) 30 tablet 11    [DISCONTINUED] hydroCHLOROthiazide (HYDRODIURIL) 25 MG  "tablet Take 1 tablet (25 mg total) by mouth once daily. 90 tablet 1    [DISCONTINUED] LORazepam (ATIVAN) 0.5 MG tablet Take 1 tablet (0.5 mg total) by mouth 2 (two) times daily. for 30 doses 30 tablet 0    [DISCONTINUED] oxyCODONE-acetaminophen (PERCOCET) 7.5-325 mg per tablet Take 1 tablet by mouth every 4 (four) hours as needed for Pain. 20 tablet 0     No current facility-administered medications on file prior to visit.        Allergies   Review of patient's allergies indicates:   Allergen Reactions    Latex, natural rubber     Codeine Hallucinations    Cortisporin [neomycin-bacitracin-poly-hc]     Latex, natural rubber Rash       Review of Systems (Pertinent positives)  Review of Systems   Constitutional: Negative.    HENT: Positive for congestion.    Eyes: Negative.    Respiratory: Negative.    Cardiovascular: Negative.    Gastrointestinal: Negative.    Musculoskeletal: Positive for joint pain.   Skin: Negative.          BP (!) 142/80 (BP Location: Right arm, Patient Position: Sitting, BP Method: X-Large (Manual))   Pulse 76   Temp 98.4 °F (36.9 °C) (Oral)   Resp 18   Ht 5' 7" (1.702 m)   Wt 93.9 kg (207 lb 0.2 oz)   SpO2 96%   BMI 32.42 kg/m²     GENERAL APPEARANCE: in no apparent distress and well developed and well nourished  HEENT: PERRLA, EOMI, Sclera clear, anicteric, Oropharynx clear, no lesions, Neck supple with midline trachea  NECK: normal, supple, no adenopathy, thyroid normal in size  RESPIRATORY: appears well, vitals normal, no respiratory distress, acyanotic, normal RR, chest clear, no wheezing, crepitations, rhonchi, normal symmetric air entry  HEART: regular rate and rhythm, S1, S2 normal, no murmur, click, rub or gallop.    ABDOMEN: abdomen is soft without tenderness, no masses, no hernias, no organomegaly, no rebound, no guarding. Suprapubic tenderness absent. No CVA tenderness.  Extremities: warm/well perfused.  No abnormal hair patterns.  No clubbing, cyanosis or edema.  no " muscular tenderness noted, pain on palpation of posterior hip, no pain in groin with movement  SKIN: no rashes, no wounds, no other lesions  PSYCH: Alert, oriented x 3, thought content appropriate, speech normal, pleasant and cooperative, good eye contact, well groomed, recall good, concentration/judgement good and apparently average intelligence.    Assessment/Plan:  Silvia Capellan is a 73 y.o. female who presents today for :    Silvia was seen today for medication refill.    Diagnoses and all orders for this visit:    Seasonal allergic rhinitis due to other allergic trigger  -     montelukast (SINGULAIR) 10 mg tablet; Take 1 tablet (10 mg total) by mouth every evening.    Essential hypertension  -     hydroCHLOROthiazide (HYDRODIURIL) 25 MG tablet; Take 1 tablet (25 mg total) by mouth once daily.    Left hip pain  -     diclofenac (VOLTAREN) 50 MG EC tablet; Take 1 tablet (50 mg total) by mouth 3 (three) times daily as needed.  -     X-Ray Hip 2 View Left; Future    Anxiety state  -     LORazepam (ATIVAN) 0.5 MG tablet; Take 1 tablet (0.5 mg total) by mouth 2 (two) times daily. for 30 doses    Episodic benzodiazepine dependence      1.  Refilled ativan and she is to take as directed.  Warned of potential worsening dependence on medication  2.  voltaren for arthritis  3.  Xray to be done today given history of metastatic disease  4.  Singular for allergies  5.  Call with any concerns  6.  Follow up with PCP as scheduled    Eugenio Hawthorne MD

## 2018-09-04 NOTE — TELEPHONE ENCOUNTER
----- Message from Eugenio Hawthorne MD sent at 9/4/2018 10:16 AM CDT -----  Please let patient know that xrays showed mild arthritis, but nothing severe.      Thanks,  Dr. Hawthorne

## 2018-09-05 NOTE — TELEPHONE ENCOUNTER
----- Message from Rudy Cleaning sent at 9/5/2018  8:41 AM CDT -----  Contact: Pt   Will like to reschedule today's 9.5.18 lab and appt with dr new     Contact::[phone]

## 2018-09-17 PROBLEM — M16.12 PRIMARY OSTEOARTHRITIS OF LEFT HIP: Status: ACTIVE | Noted: 2018-01-01

## 2018-09-17 PROBLEM — M19.90 OSTEOARTHRITIS: Status: ACTIVE | Noted: 2018-01-01

## 2018-09-17 NOTE — PROGRESS NOTES
HISTORY OF PRESENT ILLNESS:  Silvia Capellan is a 73 y.o. female who presents to the clinic today for Hip Pain (Left   3 days) and Spasms (Hands)    Muscle Spasms - In back, legs and hands intermittent.  Has taken potassium supplements in past for this but has hard time swallowing and they make her stomach upset.    Left hip pain -  Due to osteoarthritis.  Feels like Voltaren pills help somewhat.  Tried Goodies powder and had upset stomach.    Scratchy throat - no other URI symptoms.  Has seasonal allergies for which takes daily antihistamine.      PAST MEDICAL HISTORY:  Past Medical History:   Diagnosis Date    Encounter for blood transfusion     GERD (gastroesophageal reflux disease)     HTN (hypertension)     Rheumatoid arthritis     Rheumatoid arthritis(714.0)     Squamous cell lung cancer, left 2/15/2018       PAST SURGICAL HISTORY:  Past Surgical History:   Procedure Laterality Date    APPENDECTOMY      BRONCHOSCOPY N/A 8/28/2017    Performed by [facility] at [facility] OR 2ND FLR    COLONOSCOPY      COLONOSCOPY N/A 10/30/2017    Procedure: COLONOSCOPY;  Surgeon: Quentin Coppola MD;  Location: [facility] (4TH FLR);  Service: Endoscopy;  Laterality: N/A;    COLONOSCOPY N/A 10/30/2017    Performed by [facility] at [facility] (4TH FLR)    COLONOSCOPY N/A 1/17/2014    Per[facility]an [facility] at [facility]O    ESOPHAGOGASTRODUODENOSCOPY (EGD) N/A 10/30/2017    Performed by [facility] at [facility] (4TH FLR)    HYSTERECTOMY      INSERTION-PORT N/A 11/14/2017    Performed by [facility] OR 2ND FLR    SKIN BIOPSY      TOTAL KNEE ARTHROPLASTY      right       SOCIAL HISTORY:  Social History     Socioeconomic History    Marital status:      Spouse name: Not on file    Number of children: Not on file    Years of education: Not on file    Highest education level: Not on file   Social Needs    Financial resource strain: Not on file     Food insecurity - worry: Not on file    Food insecurity - inability: Not on file    Transportation needs - medical: Not on file    Transportation needs - non-medical: Not on file   Occupational History    Not on file   Tobacco Use    Smoking status: Former Smoker    Smokeless tobacco: Never Used   Substance and Sexual Activity    Alcohol use: Yes     Comment: Occasionally    Drug use: No    Sexual activity: Not Currently   Other Topics Concern    Not on file   Social History Narrative    Not on file       FAMILY HISTORY:  Family History   Problem Relation Age of Onset    Glaucoma Mother     Hypertension Unknown     Kidney disease Unknown     Colon polyps Neg Hx     Colon cancer Neg Hx     Esophageal cancer Neg Hx     Irritable bowel syndrome Neg Hx     Inflammatory bowel disease Neg Hx     Stomach cancer Neg Hx     Blindness Neg Hx     Macular degeneration Neg Hx     Retinal detachment Neg Hx        ALLERGIES AND MEDICATIONS: updated and reviewed.  Review of patient's allergies indicates:   Allergen Reactions    Latex, natural rubber     Codeine Hallucinations    Cortisporin [neomycin-bacitracin-poly-hc]     Latex, natural rubber Rash        Medication List           Accurate as of 9/17/18  2:40 PM. If you have any questions, ask your nurse or doctor.               CHANGE how you take these medications    traZODone 50 MG tablet  Commonly known as:  DESYREL  Take 1 tablet (50 mg total) by mouth every evening.  What changed:    · when to take this  · reasons to take this        CONTINUE taking these medications    cyproheptadine 4 mg tablet  Commonly known as:  PERIACTIN  Take 1 tablet (4 mg total) by mouth 3 (three) times daily as needed.     dexamethasone 2 MG tablet  Commonly known as:  DECADRON  Take 1 tablet (2 mg total) by mouth every 12 (twelve) hours.     diclofenac 50 MG EC tablet  Commonly known as:  VOLTAREN  Take 1 tablet (50 mg total) by mouth 3 (three) times daily as needed.    "  escitalopram oxalate 10 MG tablet  Commonly known as:  LEXAPRO  Take 1 tablet (10 mg total) by mouth once daily.     hydroCHLOROthiazide 25 MG tablet  Commonly known as:  HYDRODIURIL  Take 1 tablet (25 mg total) by mouth once daily.     LORazepam 0.5 MG tablet  Commonly known as:  ATIVAN  Take 1 tablet (0.5 mg total) by mouth 2 (two) times daily. for 30 doses     losartan 100 MG tablet  Commonly known as:  COZAAR  Take 1 tablet (100 mg total) by mouth once daily.     methotrexate 2.5 MG Tab     metoclopramide HCl 5 MG tablet  Commonly known as:  REGLAN  Take 1 tablet (5 mg total) by mouth 2 (two) times daily as needed (belching, nausea).     montelukast 10 mg tablet  Commonly known as:  SINGULAIR  Take 1 tablet (10 mg total) by mouth every evening.     pantoprazole 40 MG tablet  Commonly known as:  PROTONIX  Take 1 tablet (40 mg total) by mouth once daily.               CARE TEAM:  Patient Care Team:  Ginette Rosado MD as PCP - General (Family Medicine)  Shayne Wong MD as Consulting Physician (Hematology and Oncology)  Guru Antunez MD as Consulting Physician (Rheumatology)         REVIEW OF SYSTEMS:  Review of Systems   Constitutional: Negative for chills, fatigue and fever.   HENT: Positive for congestion and postnasal drip. Negative for ear discharge, ear pain and nosebleeds.    Respiratory: Negative for cough and shortness of breath.    Cardiovascular: Negative for chest pain and palpitations.   Gastrointestinal: Negative for abdominal pain, nausea and vomiting.   Genitourinary: Negative for dysuria and hematuria.   Musculoskeletal: Positive for arthralgias. Negative for back pain, gait problem and joint swelling.   Neurological: Negative for syncope and headaches.   All other systems reviewed and are negative.    PHYSICAL EXAM:   Vitals:    09/17/18 1418   BP: (!) 144/80   Pulse: 80   Temp: 97.7 °F (36.5 °C)     Weight: 96.8 kg (213 lb 6.4 oz)   Height: 5' 7" (170.2 cm)   Body mass index is 33.42 " kg/m².     General appearance - alert, well appearing, and in no distress  Mental status - normal mood, behavior, speech, dress, motor activity, and thought processes  Ears - bilateral TM's and external ear canals normal  Mouth - mucous membranes moist, pharynx normal without lesions  Chest - clear to auscultation, no wheezes, rales or rhonchi, symmetric air entry  Heart - normal rate and regular rhythm  Abdomen - soft, nontender, nondistended, no masses or organomegaly  Extremities - no pedal edema noted      ASSESSMENT AND PLAN:  1. Osteoarthritis, unspecified osteoarthritis type, unspecified site  - cyclobenzaprine (FLEXERIL) 5 MG tablet; Take 1 tablet (5 mg total) by mouth 3 (three) times daily as needed for Muscle spasms.  Dispense: 30 tablet; Refill: 0  - traMADol (ULTRAM) 50 mg tablet; Take 0.5 tablets (25 mg total) by mouth every 24 hours as needed for Pain (severe pain).  Dispense: 10 tablet; Refill: 0  - Ambulatory referral to Orthopedics    2. Seasonal allergic rhinitis, unspecified trigger  - levocetirizine (XYZAL) 5 MG tablet; Take 1 tablet (5 mg total) by mouth every evening.  Dispense: 30 tablet; Refill: 11       Follow up as needed.

## 2018-09-19 NOTE — PROGRESS NOTES
CC: Squamous cell carcinoma, unknown primary, on chemotherapy, here for follow up         Oncology History:     Diagnosis: Squamous cell carcinoma , primary site unknown    Molecular/genetic testing: p16 negative; PD L1 could not be run due to inadequate tissue   Stage:        Stage 4   Treatment: Carboplatin/paclitaxel, s/p 6 cycles        HPI:  is a 73 y/o female who underwent bronchoscopy/EBUS evaluation on 8/31/17 for abnormal mediastinal adenopathy  and a 1.2 cm MORRO mass.     Bronchoscopy findings:    The oropharynx appears normal. The larynx appears normal. The vocal cords appear normal. The subglottic space is normal. The trachea is of normal caliber. The otf is sharp. The tracheobronchial tree was examined to at least the first subsegmental level. Bronchial mucosa and anatomy are normal; there are no endobronchial lesions, and no secretions.    Lymph Nodes: Lymph node sizing was performed via endobronchial ultrasound for suspected lung cancer. Sampling by transbronchial needle aspiration was also performed using an Olympus EBUS-TBNA 22  gauge needle in the subcarinal mediastinum (level 7) and sent for routine cytology.       - The 7 (subcarinal) node was 30 mm by EBUS. Three samples with the needle were obtained. The patient's condition was unchanged after the intervention.      Her treatment got delayed as she cancelled several appointments due to some issues at home. She started Caroplatin/paclitaxel palliatively. She had left thoracentesis on 2/9/18. There were atypical cells in the pleural fluid, highly suspicious for malignancy.     Interval history: She is here for a follow up visit. She has fatigue, dyspnea. She has finished 6 cycles of Carbo/Pac     Review of Systems   Constitutional: Positive for malaise/fatigue. Negative for chills, fever and weight loss.   HENT: Negative for ear discharge, ear pain and nosebleeds.    Eyes: Negative for blurred vision, double vision and photophobia.    Respiratory: Positive for shortness of breath. Negative for cough, hemoptysis and sputum production.    Cardiovascular: Negative for chest pain, palpitations and orthopnea.   Gastrointestinal: Positive for abdominal pain. Negative for constipation, diarrhea, heartburn, nausea and vomiting.   Genitourinary: Negative for dysuria and urgency.   Musculoskeletal: Negative for myalgias.   Skin: Negative for rash.   Neurological: Negative for dizziness, tingling, tremors, sensory change, weakness and headaches.   Endo/Heme/Allergies: Does not bruise/bleed easily.   Psychiatric/Behavioral: Negative for depression and suicidal ideas.         Current Outpatient Medications   Medication Sig    cyclobenzaprine (FLEXERIL) 5 MG tablet Take 1 tablet (5 mg total) by mouth 3 (three) times daily as needed for Muscle spasms.    cyproheptadine (PERIACTIN) 4 mg tablet Take 1 tablet (4 mg total) by mouth 3 (three) times daily as needed.    dexamethasone (DECADRON) 2 MG tablet Take 1 tablet (2 mg total) by mouth every 12 (twelve) hours.    diclofenac (VOLTAREN) 50 MG EC tablet Take 1 tablet (50 mg total) by mouth 3 (three) times daily as needed.    escitalopram oxalate (LEXAPRO) 10 MG tablet Take 1 tablet (10 mg total) by mouth once daily.    hydroCHLOROthiazide (HYDRODIURIL) 25 MG tablet Take 1 tablet (25 mg total) by mouth once daily.    levocetirizine (XYZAL) 5 MG tablet Take 1 tablet (5 mg total) by mouth every evening.    losartan (COZAAR) 100 MG tablet Take 1 tablet (100 mg total) by mouth once daily.    methotrexate 2.5 MG Tab as needed.     metoclopramide HCl (REGLAN) 5 MG tablet Take 1 tablet (5 mg total) by mouth 2 (two) times daily as needed (belching, nausea).    montelukast (SINGULAIR) 10 mg tablet Take 1 tablet (10 mg total) by mouth every evening.    pantoprazole (PROTONIX) 40 MG tablet Take 1 tablet (40 mg total) by mouth once daily.    traMADol (ULTRAM) 50 mg tablet Take 0.5 tablets (25 mg total) by mouth  every 24 hours as needed for Pain (severe pain).    traZODone (DESYREL) 50 MG tablet Take 1 tablet (50 mg total) by mouth every evening. (Patient taking differently: Take 50 mg by mouth nightly as needed. )    LORazepam (ATIVAN) 0.5 MG tablet Take 1 tablet (0.5 mg total) by mouth 2 (two) times daily. for 30 doses     No current facility-administered medications for this visit.          Vitals:    09/19/18 1001   BP: 123/66   Pulse: 74   Resp: 18   Temp: 98 °F (36.7 °C)        Physical Exam   Constitutional: She is oriented to person, place, and time. She appears well-developed. No distress.   HENT:   Head: Normocephalic.   Eyes: Pupils are equal, round, and reactive to light. No scleral icterus.   Neck: Normal range of motion.   Cardiovascular: Normal rate and regular rhythm.   No murmur heard.  Pulmonary/Chest: Effort normal and breath sounds normal. No respiratory distress. She has no wheezes. She has no rales.   Abdominal: Soft. Bowel sounds are normal. She exhibits no distension. There is no tenderness. There is no rebound.   Musculoskeletal: She exhibits no edema.   Lymphadenopathy:     She has no cervical adenopathy.   Neurological: She is alert and oriented to person, place, and time. No cranial nerve deficit.   Skin: Skin is warm.   Psychiatric: She has a normal mood and affect.       Component      Latest Ref Rng & Units 9/19/2018   WBC      3.90 - 12.70 K/uL 9.75   RBC      4.00 - 5.40 M/uL 3.85 (L)   Hemoglobin      12.0 - 16.0 g/dL 11.4 (L)   Hematocrit      37.0 - 48.5 % 37.9   MCV      82 - 98 fL 98   MCH      27.0 - 31.0 pg 29.6   MCHC      32.0 - 36.0 g/dL 30.1 (L)   RDW      11.5 - 14.5 % 13.2   Platelets      150 - 350 K/uL 158   MPV      9.2 - 12.9 fL 10.0   Immature Granulocytes      0.0 - 0.5 % 1.8 (H)   Gran # (ANC)      1.8 - 7.7 K/uL 7.3   Immature Grans (Abs)      0.00 - 0.04 K/uL 0.18 (H)   Lymph #      1.0 - 4.8 K/uL 1.6   Mono #      0.3 - 1.0 K/uL 0.7   Eos #      0.0 - 0.5 K/uL 0.0    Baso #      0.00 - 0.20 K/uL 0.01   nRBC      0 /100 WBC 0   Gran%      38.0 - 73.0 % 74.5 (H)   Lymph%      18.0 - 48.0 % 16.3 (L)   Mono%      4.0 - 15.0 % 7.1   Eosinophil%      0.0 - 8.0 % 0.2   Basophil%      0.0 - 1.9 % 0.1   Differential Method       Automated   Sodium      136 - 145 mmol/L 134 (L)   Potassium      3.5 - 5.1 mmol/L 4.8   Chloride      95 - 110 mmol/L 99   CO2      23 - 29 mmol/L 28   Glucose      70 - 110 mg/dL 105   BUN, Bld      8 - 23 mg/dL 29 (H)   Creatinine      0.5 - 1.4 mg/dL 0.8   Calcium      8.7 - 10.5 mg/dL 9.6   Total Protein      6.0 - 8.4 g/dL 7.7   Albumin      3.5 - 5.2 g/dL 2.9 (L)   Total Bilirubin      0.1 - 1.0 mg/dL 0.2   Alkaline Phosphatase      55 - 135 U/L 72   AST      10 - 40 U/L 14   ALT      10 - 44 U/L 18   Anion Gap      8 - 16 mmol/L 7 (L)   eGFR if African American      >60 mL/min/1.73 m:2 >60.0   eGFR if non African American      >60 mL/min/1.73 m:2 >60.0        6/12/17 CT chest with cont      1. Large left pleural effusion resulting in atelectasis of the left lower lobe and portions of left upper lobe  2. 1.2 cm left upper lobe pleural based pulmonary nodule  3. Mediastinal adenopathy as described above with diffuse thickening of the wall of the distal esophagus. Findings are suspicious for neoplastic process. Recommend bronchoscopy biopsy and possible endoscopy for further evaluation.  4. 1.3 cm hypodensity within the dome of the liver. Metastatic focus cannot be entirely excluded.     9/7/17 PET CT       There is mildly increased tracer uptake within the patient's left pleural effusion and overlying the mediastinal adenopathy, max SUV 2.7.    Transmission CT images show continued pleural effusion and circumferential esophageal thickening. Transmission CT images also show non-avid bilateral groin adenopathy likely reactive in nature.      Impression        There is mildly increased tracer uptake within the patient's left pleural effusion and mediastinal  adenopathy. While these findings suggest reactive etiology some low grade malignancies, such as bronchioloalveolar carcinoma, may show this level of uptake on PET scan.            10/30/17 EGD     The examined duodenum was normal.The entire examined stomach was normal.The cardia and gastric fundus were normal on retroflexion. A 1 cm hiatal hernia was found. The proximal extent of the gastric folds (end of tubular esophagus) was 38 cm from the incisors. The hiatal narrowing was 39 cm from the incisors. The Z-line was 38 cm from the incisors. Z-line was sharp. No esophagitis and no columnar esophagus and no lesions seen with NBI or white light.    Impression:                                   - Normal examined duodenum.                        - Normal stomach.                        - 1 cm hiatal hernia.                        - No specimens collected.     10/30/17 colonoscopy :     Cecal polyp ( 3mm) identified  Histopathology: Tubular adenoma        Pathology:     1/17/14 colonoscopy     FINAL PATHOLOGIC DIAGNOSIS     1. Colon biopsy, ascending colon-tubular adenoma.  2. Colon biopsy, transverse-tubular adenoma.  3. 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 The stalk and base of the polyp are well represented and are free of atypia.  4. Colon biopsy, sigmoid- mild glandular hyperplasia consistent with a benign hyperplastic polyp.        7/19/17 PLEURAL FLUID (CYTOLOGY AND CELL BLOCK):     -Groups of atypical cells present, malignancy cannot be excluded.  Note: While these cells could be reactive mesothelial cells, the level of atypia could be seen in malignancy. A cell block was prepared but the atypical cells are not present in the cell block for further characterization. Correlate  clinically. Repeat tap/biopsy might be helpful if clinically indicated.     8/28/17 EBUS FNA     FNA of  subcarinal lymph node: Positive for malignant cells  Small clusters of malignant epithelial cells, favor squamous carcinoma Tumor cells are positive for CK 5/6 and CK 7. Immunostains for p63 and TTF-1 are negative.     1/26/18 CT chest, abdomen,pelvis with cont         Comparison: June 12, 2017    Chest: Since prior exam there is been interval enlargement of the right pleural effusion. There is high density material seen within the right pleural effusion that was not visualized on prior exam.    There is a 5.4 cm enhancing mass seen at the left lung base that previously measured approximately 2.9 cm.    There is a subcarinal mass measuring 4.4 cm it previously measured 2.9 cm.    There is circumferential esophageal thickening adjacent to this mass.    There is volume loss in the right chest with mediastinal shift to the right more pronounced than what was seen on prior exam.    Patient is a right internal jugular Port-A-Cath.    Abdomen/pelvis: The liver, spleen, adrenal glands, kidneys and pancreas show a normal contrasted appearance. There is no evidence bowel obstruction. There is no evidence of abdominal or pelvic lymphadenopathy. The osseous structures show degenerative change of the spine.   Impression       Since prior exam in the patient's subcarinal and left lung base masses have increased in size. In addition there is now high density material seen within the patient's left pleural effusion new from prior exam and concerning for hemorrhage possibly from the mass.         1/26/18 CT head with cont          Comparison: CT June 8, 2017    Technique: Contiguous axial images were acquired from vertex to skull base without contrast.    Findings: There is no evidence acute intracranial abnormality.  Specifically there is no evidence acute infarct, contusion, extra-axial fluid collection or midline shift.  The ventricles are normal in size and configuration.  The calvarium is intact.  The paranasal sinuses and  mastoid air cells are clear.    There is no evidence of enhancing mass.   Impression       There is no evidence of enhancing mass within the cerebral parenchyma      2/9/18 FINAL PATHOLOGIC DIAGNOSIS  LEFT LUNG, PLEURAL FLUID (CYTOLOGY):  - Scant groups of atypical cells, malignancy cannot be excluded. Note: Scant atypical groups of cells are present. Differential diagnosis includes reactive atypia vs malignancy .  Immunohistochemistry for CK5/6 and p63 is non-contributory. Please clinically correlate and re-sample as indicated.  All stains have satisfactory positive and negative controls.     5/14/18 CTA CHEST NON CORONARY       .    COMPARISON:  CT chest abdomen and pelvis from 01/26/2018.    FINDINGS:  Structures at the base of the neck are unremarkable.  Right-sided chest port is visualized with distal tip in the SVC.  Aorta is non-aneurysmal.  Heart is mildly enlarged with interval development of moderate pericardial effusion.  There is mild pericardial enhancement visualized.  The pulmonary arteries distribute normally. No intraluminal filling defects to suggest pulmonary thromboembolism to the level of the proximal segmental branches.    The trachea and bronchi are patent.  Moderate left and small right sided pleural effusions are visualized resulting in volume loss and compressive atelectasis within the lower lobes, left greater than right.  Grossly stable mass visualized in the right subcarinal/perihilar region.  There has been interval reduced size of multifocal left-sided pulmonary masses and anterior mediastinal lymphadenopathy.    Stable hepatic hypodensity is visualized within the anterior aspect of the right hepatic lobe.  Prominent right cardiophrenic lymph nodes are seen which have increased in size.  Redemonstration of diffuse abnormal distal esophageal wall thickening versus adjacent soft tissue lesion.  No acute osseous abnormality identified.  Extrathoracic soft tissues are unremarkable.    Impression        1. Interval development of moderate pericardial effusion with mild pericardial enhancement.  Findings may reflect malignant pericardial effusion.  2. No evidence of PE.  3. Patient with known metastatic cancer.  Relatively stable size mass in the right perihilar/subcarinal region.  Interval reduced size of previously visualized multifocal left lung metastatic lesions and anterior mediastinal lymphadenopathy.  Cardiac phrenic lymph nodes have increased in size.  Persistent abnormal prominent circumferential distal esophageal wall thickening versus surrounding soft tissue mass.  Examination was performed in an emergency setting and not to serve as a restaging scan.  4. Moderate left and small right pleural effusions resulting in volume loss and compressive atelectasis within the lower lobes, left greater than right.         6/7/18 PET CT      FINDINGS:  Patient was administered 12.23 millicuries of FDG intravenously.  Comparison is 09/07/2017.    There is physiologic intracranial, head, and neck activity.  Heart mediastinum are normal.  There is low-grade activity within the left pleural effusion.  There is physiologic liver, spleen, GI  and pelvic organ activity.  No bone lesions are seen.  There is left lower lobe retro cardiac atelectasis.   Impression        See above    Low-grade activity within a loculated left upper pleural effusion.  This could be benign/reactive.  Malignancy not excluded but it has improved since the prior study.         9/8/18 CT  COMPARISON:  New medicine PET-CT from 06/07/2018, CTA chest from 05/14/2018, and CT chest/abdomen/pelvis from 01/26/2018.    FINDINGS:  Thoracic soft tissues: Right-sided chest port in place with catheter tip terminating in the distal SVC.    Aorta: Normal in course and caliber, without significant atherosclerotic plaque. There are three branching vessels at the arch.    Heart: Mildly enlarged.  No pericardial effusion.    Flaca/Mediastinum:  Subcarinal soft tissue mass re-identified the measuring 5.7 x 3.7 cm x 6.2 cm, similar to prior CTA.  Previously described prominent cardiophrenic lymph nodes are now within normal limits for size.  Abundant soft tissues again seen surrounding the distal 3rd of the esophagus, findings are similar to prior study and may represent the extensive esophageal wall thickening versus extension of the mediastinal soft tissue mass.    Lungs: The interval decrease in size of known left malignant effusion with small amount of residual dependent complex fluid and mild adjacent atelectatic changes.  The there is continued nodular left pleural thickening.  Previously described mass at the medial left lung base re-identified measuring 4.3 x 2.2 cm, findings are not well evaluated on previous CTA due to surrounding pleural fluid but is decreased in size in comparison to most recent CT chest/abdomen/pelvis from 01/26/2018.  The no new lesions identified.  Right lung is grossly clear.  No new lung lesions identified.    Liver: Normal in size and attenuation.  Stable 1.2 cm hypoattenuating lesion in the dome of the liver consistent with simple cyst.  No new lesions identified.    Gallbladder: No calcified gallstones.    Bile Ducts: No evidence of dilated ducts.    Pancreas: No mass or peripancreatic fat stranding.    Spleen: Unremarkable.    Adrenals: Unremarkable.    Kidneys/ Ureters: Normal in size and location. Normal concentration of contrast. No hydronephrosis or nephrolithiasis. No ureteral dilatation.    Bladder: Incompletely distended.    Reproductive organs: Status post hysterectomy.    GI Tract/Mesentery: No evidence of bowel obstruction or inflammation.    Peritoneal Space: No ascites. No free air.    Retroperitoneum:  No significant adenopathy.    Abdominal wall:  Unremarkable.    Vasculature: No significant atherosclerosis or aneurysm.    Bones: Thoracic kyphosis. No acute fracture.Stable degenerative changes seen  throughout the spine.      Impression       Subcarinal soft tissue mass re-identified and stable in size as compared to the most recent CTA chest from 05/24/2018, lesion demonstrates mild interval decrease in size as compared to the CT chest/abdomen/pelvis from 01/26/2018.    Interval decrease in size of known left malignant pleural effusion.    Medial left lung base mass, findings are not well evaluated on previous CTA chest due to surrounding pleural fluid but has decreased in size in comparison to most recent CT chest/abdomen/pelvis from 01/26/2018.  Continued nodular left pleural thickening, overall less prominent on today's exam.    Abundant soft tissue again seen surrounding the distal 3rd of the esophagus, findings are similar to prior study and may represent the extensive esophageal wall thickening versus extension of mediastinal soft tissue mass.    Previously described prominent cardiophrenic lymph nodes are now within normal limits for size.  No new lymphadenopathy.    Additional stable findings as above.          Assessment:     1. is a 72 y/o female who underwent bronchoscopy/EBUS evaluation on 8/31/17 for abnormal mediastinal adenopathy  and a 1.2 cm MORRO mass as well as pericardial adenopathy.FNA of subcarinal lymph node was positive for malignant cells.  There were small clusters of malignant epithelial cells, favoring squamous carcinoma. Site of origin is not clear. PET CT done on 9/7/17 did not reveal any lesion in head and neck region.  There was inadequate tissue to run PD L1.p16 was negative.    Esophageal thickening was noted on CT done on 6/12/17. EGD on 10/30/17 did not reveal any suspicious lesions in esophagus/stomach. ENT evaluation still pending.   I explained to her that if primary site is unclear, she will be treated as metastatic SCC of unknown primary.   Her treatment got delayed as she cancelled several appointments due to some issues at home. She said she could not have  MRI due to claustrophobia, even with anti-anxiety medication. CT chest from 1/26/18 showed increase in the sizes of subcarinal and left lung base masses compared to previous CT in June 2017 . High density material was seen within the patient's left pleural effusion. No intracranial lesions noted on CT head. I discussed these findings with the patient and personally reviewed the CT scans from January 2018.  I told her that she has likely metastatic SCC of unknown primary. She does have RA history. It is unclear if she can tolerate check point inhibitor therapy even if she has high PD1 expression on cytology/biopsy.  She started Carboplatin/Paclitaxel every 3 weeks. I had explained to her that rationale of treatment is palliation and not cure . She did not get her CTs after C4 as planned. PET CT before cycle 6, on 6/7/18 showed low-grade activity within the left pleural effusion. CT done on 9/8/18 shows overall stable findings compared to her most recent imaging studies. Pleural effusion has resolved. She feels better, appetite has improved and her breathing has improved. Plan to re-image in 10 weeks.       2. Pleural effusion: She had left sided thoracentesis ( 1050ml) on 2/9/18. Malignancy could not be excluded. No  Indication for pleurex catheter/reepat thoracentesis at this time.    CTA done on 5/14/18 again showed moderate effusion on the left , mild on right. Now resolved.      3. Anorexia: Secondary to #1. She was on Cyproheptadine.However, her appetite was still poor. She was started on Decadron 2mg BID. It helped with her appetite. Howver, it caused her to gain weight as well as disturbed her sleep. She has stopped Decadron now      4. Anemia: Hgb 11.4 today. Normocytic, hypochromic. No overt bleeding. Iron panel on 4/19 showed iron deficiency.      5. Pericardial effusion: She was noted to have pericardial effusion on CTA chest done on 5/14/18. Possibly malignant. No symptoms/signs of tamponade. Now  resolved.                     Distress Screening Results: Psychosocial Distress screening score of Distress Score: 2 noted and reviewed. No intervention indicated.

## 2018-09-26 NOTE — TELEPHONE ENCOUNTER
Returned call to patient she is having back pain a 10 on the scale.  Scan was done nothing seen on scan but she expresses the she is having a hard time.  She went to her PCP and they gave her some muscle relaxers and they are not working.  Did explain she may need to go back and explain that they are not working.

## 2018-09-28 PROBLEM — M43.17 SPONDYLOLISTHESIS AT L5-S1 LEVEL: Status: ACTIVE | Noted: 2018-01-01

## 2018-09-28 NOTE — LETTER
September 28, 2018      Ganga Krause MD  [address]  [facility] [address]           [facility]  [address]  [place] [address]  Phone: [phone]          Patient: Silvia Capellan   MR Number: [id number]   YOB: 1945   Date of Visit: 9/28/2018       Dear Dr. Ganga Krause:    Thank you for referring Silvia Capellan to me for evaluation. Attached you will find relevant portions of my assessment and plan of care.    If you have questions, please do not hesitate to call me. I look forward to following Silvia Capellan along with you.    Sincerely,    Leda Winter MD    Enclosure  CC:  No Recipients    If you would like to receive this communication electronically, please contact [email or web address] or [phone] to request more information on [employer] Link access.    For providers and/or their staff who would like to refer a patient to [facility], please contact us through our one-stop-shop provider referral line, [facility], at [phone].    If you feel you have received this communication in error or would no longer like to receive these types of communications, please e-mail [email or web address]

## 2018-09-28 NOTE — H&P
CC: L hip pain     This patient was seen in consultation at the request of Ginette Rosado MD     HPI: Silvia Capellan is a 73 y.o. female who presents today complaining of left buttock and leg pain for 1 week. Onset of pain was not associated with injury or new activities.  The pain is constant and 10/10 at all times.  The pain is worse with standing and better with leaning over/sitting.  She states that she has a history of sciatica and this feels the same.  endorses  associated numbness, paresthesias and radiation of pain to the posterior aspect of the thigh and calf to the ankle. It does not radiate into the foot .  She has been treated in the past with prescription NSAIDs and flexeril which do not help the pain.  She is on decadron as part of treatment for squamous cell carcinoma.   The pain does interfere with activities of daily living .    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  Dr. Wong is her oncologist.    This is the extent of the patient's complaints at this time.     Review of Systems   Constitutional: Positive for malaise/fatigue. Negative for chills, fever and weight loss.   HENT: Negative.    Eyes: Positive for blurred vision.   Respiratory: Negative.    Cardiovascular: Negative.    Gastrointestinal: Negative.    Genitourinary: Negative.    Musculoskeletal: Positive for back pain.   Skin: Negative.    Neurological: Negative.    Endo/Heme/Allergies: Negative.    Psychiatric/Behavioral: Negative.          Review of patient's allergies indicates:   Allergen Reactions    Latex, natural rubber     Codeine Hallucinations    Cortisporin [neomycin-bacitracin-poly-hc]     Latex, natural rubber Rash       Current Outpatient Medications:     cyproheptadine (PERIACTIN) 4 mg tablet, Take 1 tablet (4 mg total) by mouth 3 (three) times daily as needed., Disp: 90 tablet, Rfl: 2    dexamethasone  (DECADRON) 2 MG tablet, TAKE 1 TABLET (2 MG TOTAL) BY MOUTH EVERY 12 (TWELVE) HOURS., Disp: 120 tablet, Rfl: 0    diclofenac (VOLTAREN) 50 MG EC tablet, Take 1 tablet (50 mg total) by mouth 3 (three) times daily as needed., Disp: 30 tablet, Rfl: 1    hydroCHLOROthiazide (HYDRODIURIL) 25 MG tablet, Take 1 tablet (25 mg total) by mouth once daily., Disp: 90 tablet, Rfl: 1    HYDROcodone-acetaminophen (NORCO) 5-325 mg per tablet, Take 1 tablet by mouth 2 (two) times daily as needed for Pain., Disp: 60 tablet, Rfl: 0    levocetirizine (XYZAL) 5 MG tablet, Take 1 tablet (5 mg total) by mouth every evening., Disp: 30 tablet, Rfl: 11    LORazepam (ATIVAN) 0.5 MG tablet, Take 1 tablet (0.5 mg total) by mouth 2 (two) times daily. for 30 doses, Disp: 30 tablet, Rfl: 0    losartan (COZAAR) 100 MG tablet, TAKE 1 TABLET (100 MG TOTAL) BY MOUTH ONCE DAILY., Disp: 90 tablet, Rfl: 1    methotrexate 2.5 MG Tab, as needed. , Disp: , Rfl:     metoclopramide HCl (REGLAN) 5 MG tablet, Take 1 tablet (5 mg total) by mouth 2 (two) times daily as needed (belching, nausea)., Disp: 10 tablet, Rfl: 0    montelukast (SINGULAIR) 10 mg tablet, Take 1 tablet (10 mg total) by mouth every evening., Disp: 30 tablet, Rfl: 0    pantoprazole (PROTONIX) 40 MG tablet, Take 1 tablet (40 mg total) by mouth once daily., Disp: 90 tablet, Rfl: 1    traZODone (DESYREL) 50 MG tablet, Take 1 tablet (50 mg total) by mouth every evening. (Patient taking differently: Take 50 mg by mouth nightly as needed. ), Disp: 30 tablet, Rfl: 11    escitalopram oxalate (LEXAPRO) 10 MG tablet, Take 1 tablet (10 mg total) by mouth once daily., Disp: 90 tablet, Rfl: 1  Past Medical History:   Diagnosis Date    Encounter for blood transfusion     GERD (gastroesophageal reflux disease)     HTN (hypertension)     Rheumatoid arthritis     Rheumatoid arthritis(714.0)     Squamous cell lung cancer, left 2/15/2018     Social History     Tobacco Use    Smoking status:  Former Smoker    Smokeless tobacco: Never Used   Substance Use Topics    Alcohol use: Yes     Comment: Occasionally    Drug use: No     Family History   Problem Relation Age of Onset    Glaucoma Mother     Hypertension Unknown     Kidney disease Unknown     Colon polyps Neg Hx     Colon cancer Neg Hx     Esophageal cancer Neg Hx     Irritable bowel syndrome Neg Hx     Inflammatory bowel disease Neg Hx     Stomach cancer Neg Hx     Blindness Neg Hx     Macular degeneration Neg Hx     Retinal detachment Neg Hx        Physical Exam:     Vitals:    09/28/18 1452   BP: 122/80   Pulse: 100         General: Weight: 99.8 kg (220 lb) Body mass index is 34.46 kg/m².  Patient is alert, awake and oriented to time, place and person. Mood and affect are appropriate.  Patient does not appear to be in any distress, denies any constitutional symptoms and appears stated age.   HEENT: Pupils are equal and round, sclera are not injected. External examination of ears and nose reveals no abnormalities. Cranial nerves II-X are grossly intact  Skin: no rashes, abrasions or open wounds on the affected extremity   Resp: No respiratory distress or audible wheezing   CV: 2+  pulses, all extremities warm and well perfused   Lumbar spine:  Limited forward flexion with pain, + pain with extension. Limited lateral bending and rotation without pain   Left Lower Extremity    5/5 HF, HS, Q, GS, EHL, FHL  4/5 TA but patient states this is due to a previous ankle surgery   Diminished light touch sensation in s1 distribution over foot. Otherwise sensation intact L3-L5  No clonus   Symmetric patellar and achilles reflexes      Imaging: AP pelvis and lateral hip: No degenerative changes of the hip or evidence of acute fracture. Visualization of the sacrum and lower lumbar spine limited in this study  2 views lumbar spine      Assessment: 73 y.o. female with S1 radiculopathy due to lumbar spondylolisthesis     Plan:   - physical therapy  referral  - No relief from spasm with flexeril, will try zanaflex  - Will defer any NSAIDs/steroids to oncologist  - Referral to pain management for consideration of ML  - Return to clinic in 3 months. Return sooner if symptoms worsen or fail to improve.    All questions were answered in detail. The patient is in full agreement with the treatment plan and will proceed accordingly.    A note notifying Ginette Rosado MD of my findings was sent via the electronic medical record

## 2018-10-01 NOTE — PROGRESS NOTES
Chief Complaint   Patient presents with    Facial Swelling     with swelling in feet. also c/o scratchy throat without pain    Spasms     back, hand and legs       HPI    Silvia Capellan is 73 y.o. female. The primary encounter diagnosis was Excessive weight gain. Diagnoses of Muscle spasm of both lower legs and Viral sinusitis were also pertinent to this visit.     73 year old female comes to clinic for multiple complaints for evaluation.  Patient reports sinus congestion and pain.  She reports her symptoms have been present for about 1 week.    She notes scratchiness in the throat and facial pain. She denies fever, cough, and wheezing but admits to mild shortness of breath.  She is currently on a steroid to take 2 times per day.  She has not tried any other medications.    Patient reports continued swelling of her feet and spasms of her extremities.  She reports that she still takes Periactin and feels that she is bloated.  She reports increased appetite and constant hunger.    Review of Systems   Constitutional: Positive for unexpected weight change. Negative for activity change, chills, diaphoresis and fatigue.   Respiratory: Negative for cough, chest tightness, shortness of breath and wheezing.    Cardiovascular: Positive for leg swelling. Negative for chest pain.   Musculoskeletal: Positive for back pain and myalgias. Negative for arthralgias, gait problem and joint swelling.   Psychiatric/Behavioral: Negative for suicidal ideas.           Current Outpatient Medications:     dexamethasone (DECADRON) 2 MG tablet, TAKE 1 TABLET (2 MG TOTAL) BY MOUTH EVERY 12 (TWELVE) HOURS., Disp: 120 tablet, Rfl: 0    diclofenac (VOLTAREN) 50 MG EC tablet, Take 1 tablet (50 mg total) by mouth 3 (three) times daily as needed., Disp: 30 tablet, Rfl: 1    hydroCHLOROthiazide (HYDRODIURIL) 25 MG tablet, Take 1 tablet (25 mg total) by mouth once daily., Disp: 90 tablet, Rfl: 1    HYDROcodone-acetaminophen (NORCO) 5-325 mg  "per tablet, Take 1 tablet by mouth 2 (two) times daily as needed for Pain., Disp: 60 tablet, Rfl: 0    levocetirizine (XYZAL) 5 MG tablet, Take 1 tablet (5 mg total) by mouth every evening., Disp: 30 tablet, Rfl: 11    LORazepam (ATIVAN) 0.5 MG tablet, Take 1 tablet (0.5 mg total) by mouth 2 (two) times daily. for 30 doses, Disp: 30 tablet, Rfl: 0    losartan (COZAAR) 100 MG tablet, TAKE 1 TABLET (100 MG TOTAL) BY MOUTH ONCE DAILY., Disp: 90 tablet, Rfl: 1    methotrexate 2.5 MG Tab, as needed. , Disp: , Rfl:     metoclopramide HCl (REGLAN) 5 MG tablet, Take 1 tablet (5 mg total) by mouth 2 (two) times daily as needed (belching, nausea)., Disp: 10 tablet, Rfl: 0    pantoprazole (PROTONIX) 40 MG tablet, Take 1 tablet (40 mg total) by mouth once daily., Disp: 90 tablet, Rfl: 1    tiZANidine (ZANAFLEX) 2 MG tablet, Take 2 tablets (4 mg total) by mouth every 8 (eight) hours as needed (spasm)., Disp: 30 tablet, Rfl: 0    traZODone (DESYREL) 50 MG tablet, Take 1 tablet (50 mg total) by mouth every evening. (Patient taking differently: Take 50 mg by mouth nightly as needed. ), Disp: 30 tablet, Rfl: 11    escitalopram oxalate (LEXAPRO) 10 MG tablet, Take 1 tablet (10 mg total) by mouth once daily., Disp: 90 tablet, Rfl: 1    fluticasone (FLONASE) 50 mcg/actuation nasal spray, 2 sprays (100 mcg total) by Each Nare route once daily. for 7 days, Disp: 16 g, Rfl: 0    furosemide (LASIX) 20 MG tablet, Take 1 tablet (20 mg total) by mouth daily as needed (for swelling of the lower extremities)., Disp: 90 tablet, Rfl: 0      Blood pressure 134/82, pulse 81, temperature 97.7 °F (36.5 °C), temperature source Oral, resp. rate 20, height 5' 7" (1.702 m), weight 104.1 kg (229 lb 8 oz), SpO2 97 %.    Physical Exam   Constitutional: Vital signs are normal. She appears well-developed and well-nourished. She does not appear ill. No distress.       Lab Visit on 09/19/2018   Component Date Value Ref Range Status    WBC 09/19/2018 " 9.75  3.90 - 12.70 K/uL Final    RBC 09/19/2018 3.85* 4.00 - 5.40 M/uL Final    Hemoglobin 09/19/2018 11.4* 12.0 - 16.0 g/dL Final    Hematocrit 09/19/2018 37.9  37.0 - 48.5 % Final    MCV 09/19/2018 98  82 - 98 fL Final    MCH 09/19/2018 29.6  27.0 - 31.0 pg Final    MCHC 09/19/2018 30.1* 32.0 - 36.0 g/dL Final    RDW 09/19/2018 13.2  11.5 - 14.5 % Final    Platelets 09/19/2018 158  150 - 350 K/uL Final    MPV 09/19/2018 10.0  9.2 - 12.9 fL Final    Immature Granulocytes 09/19/2018 1.8* 0.0 - 0.5 % Final    Gran # (ANC) 09/19/2018 7.3  1.8 - 7.7 K/uL Final    Immature Grans (Abs) 09/19/2018 0.18* 0.00 - 0.04 K/uL Final    Lymph # 09/19/2018 1.6  1.0 - 4.8 K/uL Final    Mono # 09/19/2018 0.7  0.3 - 1.0 K/uL Final    Eos # 09/19/2018 0.0  0.0 - 0.5 K/uL Final    Baso # 09/19/2018 0.01  0.00 - 0.20 K/uL Final    nRBC 09/19/2018 0  0 /100 WBC Final    Gran% 09/19/2018 74.5* 38.0 - 73.0 % Final    Lymph% 09/19/2018 16.3* 18.0 - 48.0 % Final    Mono% 09/19/2018 7.1  4.0 - 15.0 % Final    Eosinophil% 09/19/2018 0.2  0.0 - 8.0 % Final    Basophil% 09/19/2018 0.1  0.0 - 1.9 % Final    Differential Method 09/19/2018 Automated   Final    Sodium 09/19/2018 134* 136 - 145 mmol/L Final    Potassium 09/19/2018 4.8  3.5 - 5.1 mmol/L Final    Chloride 09/19/2018 99  95 - 110 mmol/L Final    CO2 09/19/2018 28  23 - 29 mmol/L Final    Glucose 09/19/2018 105  70 - 110 mg/dL Final    BUN, Bld 09/19/2018 29* 8 - 23 mg/dL Final    Creatinine 09/19/2018 0.8  0.5 - 1.4 mg/dL Final    Calcium 09/19/2018 9.6  8.7 - 10.5 mg/dL Final    Total Protein 09/19/2018 7.7  6.0 - 8.4 g/dL Final    Albumin 09/19/2018 2.9* 3.5 - 5.2 g/dL Final    Total Bilirubin 09/19/2018 0.2  0.1 - 1.0 mg/dL Final    Alkaline Phosphatase 09/19/2018 72  55 - 135 U/L Final    AST 09/19/2018 14  10 - 40 U/L Final    ALT 09/19/2018 18  10 - 44 U/L Final    Anion Gap 09/19/2018 7* 8 - 16 mmol/L Final    eGFR if   09/19/2018 >60.0  >60 mL/min/1.73 m^2 Final    eGFR if non African American 09/19/2018 >60.0  >60 mL/min/1.73 m^2 Final   Hospital Outpatient Visit on 09/08/2018   Component Date Value Ref Range Status    POC Creatinine 09/08/2018 0.8  0.5 - 1.4 mg/dL Final    Sample 09/08/2018 VENOUS   Final   Lab Visit on 07/24/2018   Component Date Value Ref Range Status    WBC 07/24/2018 6.00  3.90 - 12.70 K/uL Final    RBC 07/24/2018 2.91* 4.00 - 5.40 M/uL Final    Hemoglobin 07/24/2018 9.1* 12.0 - 16.0 g/dL Final    Hematocrit 07/24/2018 29.8* 37.0 - 48.5 % Final    MCV 07/24/2018 102* 82 - 98 fL Final    MCH 07/24/2018 31.3* 27.0 - 31.0 pg Final    MCHC 07/24/2018 30.5* 32.0 - 36.0 g/dL Final    RDW 07/24/2018 16.5* 11.5 - 14.5 % Final    Platelets 07/24/2018 161  150 - 350 K/uL Final    MPV 07/24/2018 9.6  9.2 - 12.9 fL Final    Immature Granulocytes 07/24/2018 0.7* 0.0 - 0.5 % Final    Gran # (ANC) 07/24/2018 3.1  1.8 - 7.7 K/uL Final    Immature Grans (Abs) 07/24/2018 0.04  0.00 - 0.04 K/uL Final    Lymph # 07/24/2018 1.9  1.0 - 4.8 K/uL Final    Mono # 07/24/2018 0.8  0.3 - 1.0 K/uL Final    Eos # 07/24/2018 0.1  0.0 - 0.5 K/uL Final    Baso # 07/24/2018 0.01  0.00 - 0.20 K/uL Final    nRBC 07/24/2018 0  0 /100 WBC Final    Gran% 07/24/2018 52.2  38.0 - 73.0 % Final    Lymph% 07/24/2018 31.3  18.0 - 48.0 % Final    Mono% 07/24/2018 13.3  4.0 - 15.0 % Final    Eosinophil% 07/24/2018 2.3  0.0 - 8.0 % Final    Basophil% 07/24/2018 0.2  0.0 - 1.9 % Final    Differential Method 07/24/2018 Automated   Final    Sodium 07/24/2018 140  136 - 145 mmol/L Final    Potassium 07/24/2018 3.7  3.5 - 5.1 mmol/L Final    Chloride 07/24/2018 106  95 - 110 mmol/L Final    CO2 07/24/2018 28  23 - 29 mmol/L Final    Glucose 07/24/2018 82  70 - 110 mg/dL Final    BUN, Bld 07/24/2018 21  8 - 23 mg/dL Final    Creatinine 07/24/2018 0.7  0.5 - 1.4 mg/dL Final    Calcium 07/24/2018 9.7  8.7 - 10.5 mg/dL Final     Total Protein 07/24/2018 7.6  6.0 - 8.4 g/dL Final    Albumin 07/24/2018 2.9* 3.5 - 5.2 g/dL Final    Total Bilirubin 07/24/2018 0.2  0.1 - 1.0 mg/dL Final    Alkaline Phosphatase 07/24/2018 80  55 - 135 U/L Final    AST 07/24/2018 11  10 - 40 U/L Final    ALT 07/24/2018 9* 10 - 44 U/L Final    Anion Gap 07/24/2018 6* 8 - 16 mmol/L Final    eGFR if African American 07/24/2018 >60.0  >60 mL/min/1.73 m^2 Final    eGFR if non African American 07/24/2018 >60.0  >60 mL/min/1.73 m^2 Final    Group & Rh 07/24/2018 O POS   Final    Indirect Herman 07/24/2018 NEG   Final   ]    Assessment:    1. Excessive weight gain    2. Muscle spasm of both lower legs    3. Viral sinusitis          Silvia was seen today for facial swelling and spasms.    Diagnoses and all orders for this visit:    Excessive weight gain  -     furosemide (LASIX) 20 MG tablet; Take 1 tablet (20 mg total) by mouth daily as needed (for swelling of the lower extremities).  - New problem.  Patient to discontinue Periactin.  Recommend no sodas, juice, only water, veggies, and fruit as cleanse requested by patient.  - Okay to use Lasix as needed for edema.  - Patient also counseled on side effects of long term use of steroids (Decadron)    Muscle spasm of both lower legs   -New problem. Likely due to body habitus.  Electrolytes and renal function reviewed with patient.   -Stretch program provided.    Viral sinusitis  -     fluticasone (FLONASE) 50 mcg/actuation nasal spray; 2 sprays (100 mcg total) by Each Nare route once daily. for 7 days  - New problem.  No bacterial in origin.  Treat with nasal steroid to reduce sinus pressure.          FOLLOW UP: Follow-up in about 2 months (around 12/1/2018) for General follow up.

## 2018-10-04 NOTE — TELEPHONE ENCOUNTER
Spoke to pt and confirmed surgery with Dr Keith on 10/18/18.  Reminded pt to get eyedrops and use them 2 days before surgery.  Also advised pt to have someone bring them to the surgery , stay with them and bring them home after the surgery .  Advised pt that someone from our office will call the day before surgery regarding the surgery time.  Pt understood.

## 2018-10-07 NOTE — ED PROVIDER NOTES
Encounter Date: 10/7/2018    SCRIBE #1 NOTE: I, Rosa Isela Arias, am scribing for, and in the presence of,  Dr. Pisano. I have scribed the following portions of the note - Other sections scribed: HPI, ROS, PE.       History     Chief Complaint   Patient presents with    Back Pain     pt c/o mid back pain x 2 week, pt states she is currently taking flexeril with no relief     This is a 73 y.o. female with a history of HTN and squamous cell cancer of the left lung who presents to the ED complaining of paraspinal thoracic back pain for two weeks. Denies fall or trauma. Describes a tightness to her back like she is having muscle spasms. She thought she may have pulled something, but cant remember an exact mechanism of injury. She has seen a doctor three times for the same back pain with x-rays and notes everything was normal. According to the patient, She had a PET scan two weeks ago without any metastasis of the cancer. She notes Flexeril and pain medication without improvement. Denies weakness or numbness or legs. Denies fever. She has been on steroids. She notes swelling to both her ankles.              The history is provided by the patient.     Review of patient's allergies indicates:   Allergen Reactions    Latex, natural rubber     Codeine Hallucinations    Cortisporin [neomycin-bacitracin-poly-hc]     Latex, natural rubber Rash     Past Medical History:   Diagnosis Date    Encounter for blood transfusion     GERD (gastroesophageal reflux disease)     HTN (hypertension)     Rheumatoid arthritis     Rheumatoid arthritis(714.0)     Squamous cell lung cancer, left 2/15/2018     Past Surgical History:   Procedure Laterality Date    APPENDECTOMY      BRONCHOSCOPY N/A 8/28/2017    [facility]    COLONOSCOPY      COLONOSCOPY N/A 10/30/2017    Procedure: COLONOSCOPY;  Surgeon: Quentin Coppola MD;  Location: [facility] ([facility]);  Service: Endoscopy;   Laterality: N/A;    COLONOSCOPY N/A 10/30/2017    Performed by Qu[facility]ppola [facility] at [facility] (4TH [facility])    COLONOSCOPY N/A 1/17/2014    [facility]    ESOPHAGOGASTRODUODENOSCOPY (EGD) N/A 10/30/2017    Performed by Qu[facility]ola [facility] at [facility] END[facility] (4TH [facility])    HYSTERECTOMY      INSERTION-PORT N/A 11/14/2017    Performed by [facility] Provider at [facility] OR [facility]    SKIN BIOPSY      TOTAL KNEE ARTHROPLASTY      right     Family History   Problem Relation Age of Onset    Glaucoma Mother     Hypertension Unknown     Kidney disease Unknown     Colon polyps Neg Hx     Colon cancer Neg Hx     Esophageal cancer Neg Hx     Irritable bowel syndrome Neg Hx     Inflammatory bowel disease Neg Hx     Stomach cancer Neg Hx     Blindness Neg Hx     Macular degeneration Neg Hx     Retinal detachment Neg Hx      Social History     Tobacco Use    Smoking status: Former Smoker    Smokeless tobacco: Never Used   Substance Use Topics    Alcohol use: Yes     Comment: Occasionally    Drug use: No     Review of Systems   Constitutional: Negative for chills and fever.   Respiratory: Negative for shortness of breath.    Musculoskeletal: Positive for back pain.             Neurological: Negative for weakness and numbness.       Physical Exam     Initial Vitals   BP Pulse Resp Temp SpO2   10/07/18 1246 10/07/18 1246 10/07/18 1246 10/07/18 1247 10/07/18 1246   (!) 154/70 91 18 98.6 °F (37 °C) 99 %      MAP       --                Physical Exam    Nursing note and vitals reviewed.  Constitutional: She appears well-developed. She is Obese .   HENT:   Head: Normocephalic and atraumatic.   Right Ear: External ear normal.   Left Ear: External ear normal.   Bartholomew facies.   Eyes: Conjunctivae are normal.   Neck: Normal range of motion. Neck supple.   Cardiovascular: Normal rate, regular rhythm, normal heart sounds and intact distal pulses. Exam reveals no gallop and no friction rub.    No  murmur heard.  Pulmonary/Chest: Breath sounds normal. No respiratory distress. She has no wheezes. She has no rhonchi. She has no rales.   Musculoskeletal: Normal range of motion. She exhibits edema (swelling to bilateral ankles) and tenderness.        Thoracic back: She exhibits tenderness. She exhibits normal range of motion, no bony tenderness and no deformity.        Back:    Left great toe and left lateral ankle erythema, non-tender.   Neurological: She is alert and oriented to person, place, and time. No sensory deficit.   Skin: Skin is warm and dry. There is erythema (Left great toe without tenderness).   Psychiatric: She has a normal mood and affect. Her behavior is normal.         ED Course   Procedures  Labs Reviewed   POCT URINALYSIS W/O SCOPE - Abnormal; Notable for the following components:       Result Value    Glucose, UA Negative (*)     Bilirubin, UA Negative (*)     Ketones, UA Negative (*)     Blood, UA Negative (*)     Protein, UA Negative (*)     Nitrite, UA Negative (*)     Leukocytes, UA Negative (*)     All other components within normal limits   POCT URINALYSIS W/O SCOPE          Imaging Results    None          Medical Decision Making:   Initial Assessment:   This is a 73 y.o. female presents with paraspinal thoracic back pain for two weeks. Denies fall or trauma. Describes a tightness to her back like she is having muscle spasms. She has seen a doctor three times for the same back pain with x-rays and notes everything was normal. She notes Flexeril and pain medication without improvement. Denies weakness or numbness or legs. Denies fever. She has been on steroids. She notes swelling to both her ankles.    On exam erythema noted to left great toe and left lateral ankle. Bilateral paraspinal thoracic tenderness. No decreased ROM or deformity. No gait disturbance.  Clinical Tests:   Lab Tests: Ordered  ED Management:  Will order POCT UA.  Urine shows no evidence of infection.  Patient will be  treated with Valium.  She will be discharged home on Valium and advised not to take Valium and Ativan together.  I reviewed the patient's lumbar spine x-ray from last week and showed no evidence of metastases            Scribe Attestation:   Scribe #1: I performed the above scribed service and the documentation accurately describes the services I performed. I attest to the accuracy of the note.       I, Dr. Maritza Pisano, personally performed the services described in this documentation. All medical record entries made by the scribe were at my direction and in my presence.  I have reviewed the chart and agree that the record reflects my personal performance and is accurate and complete. Maritza Pisano MD.  4:41 PM 10/07/2018             Clinical Impression:     1. Bilateral low back pain without sciatica, unspecified chronicity                                   Maritza Pisano MD  10/07/18 3077

## 2018-10-07 NOTE — DISCHARGE INSTRUCTIONS
Use heating pad to your back.  No heavy lifting.  Call your doctor tomorrow.  Do not take lorazepam and diazepam at the same time.

## 2018-10-16 NOTE — ED TRIAGE NOTES
Pt. Arrives to ED complaining of bilateral leg swelling for the last few weeks, left leg notably more swollen than right, reports hx of blood clot. Pt reports back pain as well, back pain started about 2 weeks rates lower back pain 10/10, denies any recent trauma or injury. Currently being treated for lung cancer, reports being off of chemotherapy. Last chemotherapy was in June, AAOX4 in NAD

## 2018-10-16 NOTE — ED PROVIDER NOTES
Encounter Date: 10/16/2018    SCRIBE #1 NOTE: I, Jefry Benitez, am scribing for, and in the presence of,  Maynor Nava MD. I have scribed the following portions of the note - Other sections scribed: HPI and ROS.       History     Chief Complaint   Patient presents with    Back Pain     and fluid retention for one week, denies trauma. Taking a pain pill and muscle relaxer as well as fluid pills x 2 and not helping    Leg Swelling     Bilat     CC: Back Pain    HPI: This is a 73 y.o. female PMHx HTN, rheumatoid arthritis who presents with lower back pain that is worse on the right side x2 weeks. Rates pain 10/10. Denies any falls or trauma. She has seen her PCP for the issue. She is taking muscle relaxers and hydrocodone with no relief. No prior episodes reported. States symptoms do not feel similar to sciatica. No dysuria, frequency, numbness, abdominal pain, incontinence, chest pain.     Patient additionally complains of swelling to her lower extremities x2 weeks. She has had swelling before. She has no pain or abdominal swelling. Attests to some SOB with exertion. No history of lung, kidney, liver or thyroid problems. She does have a history of blood clots but is not currently taking blood thinners. She has lung cancer but is not undergoing treatment at this time; last chemotherapy was in June of this year.    Allergic to codeine.       The history is provided by the patient. No  was used.     Review of patient's allergies indicates:   Allergen Reactions    Latex, natural rubber     Codeine Hallucinations    Cortisporin [neomycin-bacitracin-poly-hc]     Latex, natural rubber Rash     Past Medical History:   Diagnosis Date    Encounter for blood transfusion     GERD (gastroesophageal reflux disease)     HTN (hypertension)     Rheumatoid arthritis     Rheumatoid arthritis(714.0)     Squamous cell lung cancer, left 2/15/2018     Past Surgical History:   Procedure Laterality Date     APPENDECTOMY      BRONCHOSCOPY N/A 8/28/2017    Performed by [facility] Diagnostic Provider at [facility] OR 2ND FLR    COLONOSCOPY      COLONOSCOPY N/A 10/30/2017    Procedure: COLONOSCOPY;  Surgeon: Quentin Coppola MD;  Location: [facility] (4TH FLR);  Service: Endoscopy;  Laterality: N/A;    COLONOSCOPY N/A 10/30/2017    Performed by Quentin Coppola MD at [facility] ENDO (4TH FLR)    COLONOSCOPY N/A 1/17/2014    Performed by Feliciano Duran [facility] at [facility] ENDO    ESOPHAGOGASTRODUODENOSCOPY (EGD) N/A 10/30/2017    Performed by Quentin Coppola MD at [facility] ENDO (4TH FLR)    HYSTERECTOMY      INSERTION-PORT N/A 11/14/2017    Performed by [facility] Diagnostic Provider at [facility] OR 2ND FLR    SKIN BIOPSY      TOTAL KNEE ARTHROPLASTY      right     Family History   Problem Relation Age of Onset    Glaucoma Mother     Hypertension Unknown     Kidney disease Unknown     Colon polyps Neg Hx     Colon cancer Neg Hx     Esophageal cancer Neg Hx     Irritable bowel syndrome Neg Hx     Inflammatory bowel disease Neg Hx     Stomach cancer Neg Hx     Blindness Neg Hx     Macular degeneration Neg Hx     Retinal detachment Neg Hx      Social History     Tobacco Use    Smoking status: Former Smoker    Smokeless tobacco: Never Used   Substance Use Topics    Alcohol use: Yes     Comment: Occasionally    Drug use: No     Review of Systems   Constitutional: Negative for chills, diaphoresis and fever.   HENT: Negative for rhinorrhea and sore throat.    Respiratory: Positive for shortness of breath (mild, exertional). Negative for cough.    Cardiovascular: Negative for chest pain.   Gastrointestinal: Negative for abdominal pain, constipation, diarrhea, nausea and vomiting.   Genitourinary: Negative for dysuria.   Musculoskeletal: Positive for back pain.        (+) lower extremity swelling   Neurological: Negative for headaches.   All other systems reviewed and are negative.      Physical Exam     Initial Vitals [10/16/18 1746]   BP  Pulse Resp Temp SpO2   (!) 140/76 104 20 98.4 °F (36.9 °C) 100 %      MAP       --         Physical Exam    Nursing note and vitals reviewed.  Constitutional: She appears well-developed and well-nourished.   Eyes: EOM are normal. Pupils are equal, round, and reactive to light.   Neck: Normal range of motion. Neck supple. No thyromegaly present. No JVD present.   Cardiovascular: Normal rate, regular rhythm, normal heart sounds and intact distal pulses. Exam reveals no gallop and no friction rub.    No murmur heard.  Pulmonary/Chest: Breath sounds normal. No respiratory distress. She has no wheezes. She has no rhonchi. She has no rales.   Abdominal: Soft. Bowel sounds are normal. She exhibits no distension. There is no tenderness. There is no rebound and no guarding.   Musculoskeletal: Normal range of motion. She exhibits edema and tenderness.   2+ pitting edema and varicosities to bilateral lower extremities.  No evidence of infection.  No palpable cord.  No Homans sign.    Patient has muscle spasm and tenderness to the right lumbar musculature.  No midline tenderness. No midline pain.  Negative straight leg raise.   Neurological: She is alert and oriented to person, place, and time. She has normal strength. She displays normal reflexes. No sensory deficit.   Skin: Skin is warm and dry.         ED Course   Procedures  Labs Reviewed   CBC W/ AUTO DIFFERENTIAL - Abnormal; Notable for the following components:       Result Value    RBC 3.66 (*)     Hemoglobin 11.0 (*)     Hematocrit 34.8 (*)     MCHC 31.6 (*)     Lymph% 16.7 (*)     All other components within normal limits   COMPREHENSIVE METABOLIC PANEL - Abnormal; Notable for the following components:    Glucose 114 (*)     Albumin 3.0 (*)     eGFR if  52 (*)     eGFR if non  45 (*)     All other components within normal limits   URINALYSIS, REFLEX TO URINE CULTURE    Narrative:     Preferred Collection Type->Urine, Clean Catch    TROPONIN I   B-TYPE NATRIURETIC PEPTIDE   LIPASE          Imaging Results          US Lower Extremity Veins Bilateral (In process)                X-Ray Chest AP Portable (Final result)  Result time 10/16/18 19:00:31    Final result by Cyndy Montana MD (10/16/18 19:00:31)                 Impression:      No definite intrathoracic source for the patient's shortness of breath is identified on this study.  However significant technical limitations are present, discussed above.  CT might provide more sensitive assessment if clinical concern persists.      Electronically signed by: Cyndy Montana MD  Date:    10/16/2018  Time:    19:00             Narrative:    EXAMINATION:  XR CHEST AP PORTABLE    CLINICAL HISTORY:  SOB;    TECHNIQUE:  Single frontal view of the chest was performed.    COMPARISON:  CT of the chest, abdomen and pelvis: 09/08/2018.    PET/CT: 06/07/2018.    CTA of the chest: 05/14/2018.    Chest radiograph: 05/14/2018.    FINDINGS:  Vascular catheter projects over the right hemithorax.  Correlation with prior studies suggest that the tip of the device is in the SVC.    The patient has known metastatic carcinoma including mass in the right perihilar/subcarinal location.    Detail in the left mid and lower lung zones is obscured by x-ray beam attenuation and scatter in cardiac structures and generous overlying soft tissues.  Detail in the right lower lung zone is obscured by the chronically elevated right hemidiaphragm.    In the mid and upper lung zones, pulmonary vessels are ill-defined.  This may be due to x-ray beam attenuation and scatter in generous overlying soft tissues as well as long exposure time necessitated by bedside technique and large body habitus.  However interstitial pulmonary edema could present in a similar fashion.  I consider airways disease, viral pneumonia, pulmonary hemorrhage and community-acquired bacterial pneumonia less likely.                               CT Renal Stone  Study ABD Pelvis WO (Final result)  Result time 10/16/18 18:51:03    Final result by David Dumont MD (10/16/18 18:51:03)                 Impression:      No evidence of nephrolithiasis or obstructive uropathy.    Stable 6.4 x 3.3 cm subcarinal mass inseparable from the distal aspects of the esophagus.  Additional stable 4.3 cm mass in the left lung base.  The findings may represent the patient's known pulmonary malignancy.    Diverticulosis coli without evidence of acute diverticulitis.    Stable indeterminate 1.1 cm low-attenuation lesion within the right hepatic lobe.  Outpatient MRI of the abdomen using a hepatic protocol may be obtained for further evaluation.    Persistent left-sided pleural thickening.    Additional findings as above.      Electronically signed by: David Dumont MD  Date:    10/16/2018  Time:    18:51             Narrative:    EXAMINATION:  CT RENAL STONE STUDY ABD PELVIS WO    CLINICAL HISTORY:  right flank pain;    TECHNIQUE:  Axial CT scan of the abdomen and pelvis was obtained from the level of the hemidiaphragms to the pubic symphysis without intravenous contrast. Coronal and sagittal reformats were obtained. The study was performed using a renal stone protocol.  Therefore, no intravenous or oral IV contrast was administered.    COMPARISON:  CT scan of the abdomen and pelvis dated 09/08/2018.    FINDINGS:  There are no pleural effusions.  There is stable left-sided pleural thickening.  There is no evidence of a pneumothorax.  There is a persistent 6.4 x 3.3 cm subcarinal mass.  The lesion appears inseparable from the distal aspects of the esophagus.  There is also a partially imaged 4.3 x 2.4 cm mass in the left lung base.    The heart is unremarkable.  There is normal tapering of the abdominal aorta.  There are calcifications along the course of the abdominal aorta and its branch vessels.  There is no evidence of lymphadenopathy in the abdomen or pelvis.    There is thickening at the  gastroesophageal junction.  The distal portions of the stomach is unremarkable.  The duodenum is within normal limits.  The small bowel loops are unremarkable.  The appendix is not consistently identified.  There are no secondary findings of acute appendicitis.  There is extensive colonic diverticula without evidence of acute diverticulitis.    There is a 1.1 cm hypodensity in the right hepatic lobe.  This is too small for complete characterization.  The remainder of the hepatic parenchyma is unremarkable.  The gallbladder is within normal limits.  The biliary tree is unremarkable.  The spleen is within normal limits.  The pancreas is unremarkable.    The adrenal glands are within normal limits.  The kidneys are unremarkable.  There are no renal stones.  The ureters and urinary bladder are within normal limits.  The patient is status post hysterectomy.    There is no evidence of free fluid in the abdomen or pelvis.  There is no evidence of free air.  There is no evidence of pneumatosis.    The psoas margins are unremarkable.  The abdominal wall is within normal limits.  There are degenerative changes in the osseous structures.  There is no evidence of a fracture.                                patient states she is not sure why her legs swell.  Workup shows no evidence of heart failure, liver failure, kidney failure.  Patient does have a low albumin but no protein in her urine.  Ultrasound of the leg showed no evidence of DVT.  Urinalysis shows no blood or infection.  CT shows no other evidence for flank pain.  Likely venous insufficiency due to varicosities and musculoskeletal back pain. Stable for discharge. Patient already has hydrocodone and Valium for treatment of her back.  Follow up with her primary care doctors.  Recommend leg elevation and compression stockings.             Scribe Attestation:   Scribe #1: I performed the above scribed service and the documentation accurately describes the services I  performed. I attest to the accuracy of the note.    Attending Attestation:           Physician Attestation for Scribe:  Physician Attestation Statement for Scribe #1: I, Maynor Nava MD, reviewed documentation, as scribed by Jefry Benitez in my presence, and it is both accurate and complete.                    Clinical Impression:   Diagnoses of SOB (shortness of breath) and Edema were pertinent to this visit.                             Maynor Nava MD  10/16/18 1957

## 2018-10-16 NOTE — TELEPHONE ENCOUNTER
----- Message from Vesta Corrigan sent at 10/16/2018 11:33 AM CDT -----  Contact: Self/ [phone]  Pt requesting urgent appt tomorrow with Dr. Rosado. Says her legs are swollen with fluid. Please call to advise. Thank you.

## 2018-10-17 NOTE — PRE-PROCEDURE INSTRUCTIONS
PreOp Instructions given:   - Verbal medication information (what to hold and what to take)   - NPO guidelines   - Arrival place directions given;   - Bathing with antibacterial soap   - Don't wear any jewelry or bring any valuables AM of surgery   - No makeup or moisturizer to face   - No perfume/cologne, powder, lotions or aftershave   Pt. verbalized understanding.   Denies any  history of side effects or issues with anesthesia or sedation.    Pt aware that instructions apply to second eye surgery scheduled in ~2 weeks. Pt verbalized an understanding.

## 2018-10-17 NOTE — DISCHARGE INSTRUCTIONS
Elevate your legs above your heart when possible. Recommend obtaining and wearing compression socks. You can be fitted for them at a medical supply store. Recommend 15-20 mmHg to start. Follow up with your doctor and the orthopedic for your back pain.

## 2018-10-17 NOTE — H&P
History    Chief complaint:  Painless progressive vision loss    Present Ilness/Diagnosis: Nuclear sclerotic Cataract    Past Medical History:  has a past medical history of Encounter for blood transfusion, GERD (gastroesophageal reflux disease), HTN (hypertension), Rheumatoid arthritis, Rheumatoid arthritis(714.0), and Squamous cell lung cancer, left (2/15/2018).    Family History/Social History: refer to chart    Allergies:   Review of patient's allergies indicates:   Allergen Reactions    Latex, natural rubber     Codeine Hallucinations    Cortisporin [neomycin-bacitracin-poly-hc]     Latex, natural rubber Rash       Current Medications: No current facility-administered medications for this encounter.     Current Outpatient Medications:     dexamethasone (DECADRON) 2 MG tablet, TAKE 1 TABLET (2 MG TOTAL) BY MOUTH EVERY 12 (TWELVE) HOURS., Disp: 120 tablet, Rfl: 0    diazePAM (VALIUM) 10 MG Tab, Take 1 tablet (10 mg total) by mouth every 8 (eight) hours as needed (muscle spasm)., Disp: 12 tablet, Rfl: 0    diclofenac (VOLTAREN) 50 MG EC tablet, Take 1 tablet (50 mg total) by mouth 3 (three) times daily as needed., Disp: 30 tablet, Rfl: 1    escitalopram oxalate (LEXAPRO) 10 MG tablet, Take 1 tablet (10 mg total) by mouth once daily., Disp: 90 tablet, Rfl: 1    furosemide (LASIX) 20 MG tablet, Take 1 tablet (20 mg total) by mouth daily as needed (for swelling of the lower extremities)., Disp: 90 tablet, Rfl: 0    hydroCHLOROthiazide (HYDRODIURIL) 25 MG tablet, Take 1 tablet (25 mg total) by mouth once daily., Disp: 90 tablet, Rfl: 1    levocetirizine (XYZAL) 5 MG tablet, Take 1 tablet (5 mg total) by mouth every evening., Disp: 30 tablet, Rfl: 11    LORazepam (ATIVAN) 0.5 MG tablet, Take 1 tablet (0.5 mg total) by mouth 2 (two) times daily. for 30 doses, Disp: 30 tablet, Rfl: 0    losartan (COZAAR) 100 MG tablet, TAKE 1 TABLET (100 MG TOTAL) BY MOUTH ONCE DAILY., Disp: 90 tablet, Rfl: 1     methotrexate 2.5 MG Tab, as needed. , Disp: , Rfl:     metoclopramide HCl (REGLAN) 5 MG tablet, Take 1 tablet (5 mg total) by mouth 2 (two) times daily as needed (belching, nausea)., Disp: 10 tablet, Rfl: 0    oxyCODONE-acetaminophen (PERCOCET)  mg per tablet, Take 1 tablet by mouth every 8 (eight) hours as needed for Pain., Disp: 18 tablet, Rfl: 0    pantoprazole (PROTONIX) 40 MG tablet, Take 1 tablet (40 mg total) by mouth once daily., Disp: 90 tablet, Rfl: 1    tiZANidine (ZANAFLEX) 4 MG tablet, Take 1 tablet (4 mg total) by mouth every 8 (eight) hours as needed (Muscle spasm)., Disp: 29 tablet, Rfl: 0    traZODone (DESYREL) 50 MG tablet, Take 1 tablet (50 mg total) by mouth every evening. (Patient taking differently: Take 50 mg by mouth nightly as needed. ), Disp: 30 tablet, Rfl: 11    Physical Exam    BP: Vital signs stable  General: No apparent distress  HEENT: nuclear sclerotic cataract  Lungs: adequate respirations  Heart: + pulses  Abdomen: soft  Rectal/pelvic: deferred    Impression: Visually significant Cataract    Plan: Phacoemulsification with implantation of Intraocular lens

## 2018-10-18 PROBLEM — H25.10 SENILE NUCLEAR SCLEROSIS: Status: ACTIVE | Noted: 2018-01-01

## 2018-10-18 NOTE — OP NOTE
DATE OF PROCEDURE: 10/18/2018    SURGEON: SHAKA YOU MD    PREOPERATIVE DIAGNOSIS:  Senile nuclear sclerotic cataract right eye.     POSTOPERATIVE DIAGNOSIS: Senile nuclear sclerotic cataract right eye.     PROCEDURE PERFORMED:  1. Complex Phacoemulsification with placement of intraocular lens, right eye.  2. Placement of Capsular tension ring right eye.    IMPLANT:  PCBOO 22.0    ANESTHESIA:  Topical and MAC    COMPLICATIONS: none    ESTIMATED BLOOD LOSS: <1cc    SPECIMENS: none    INDICATIONS FOR PROCEDURE:  This patient presented to the clinic with decreased vision in the right eye and was found to have a cataract.  The risks, benefits, and alternatives were discussed and the patient agreed to proceed with phacoemulsification and implantation of a lens in the right eye.     PROCEDURE IN DETAIL:  The patient was met in the preop holding area.  Consent was confirmed to be signed.  The operative site was marked.  The patient was brought into the operating room by the anesthesia team and placed under monitored anesthesia care.  The right eye was prepped and draped in a sterile ophthalmic fashion.  A Raymond speculum was placed into the right eye.   A paracentesis site was made and 1% preservative-free lidocaine was injected into the anterior chamber.  Viscoelastic  material was injected into the anterior chamber.  A keratome blade was used to make a clear corneal incision.  A cystotome was used to initiate the continuous curvilinear capsulorrhexis which was completed with Utrata forceps.  BSS on a mojica cannula was used to perform hydrodissection.  The phacoemulsification tip was introduced into the eye and the nucleus was removed in a standard divide-and-conquer fashion.  Remaining cortical material was removed from the eye using irrigation-aspiration.  At this time it was evident that there was zonular laxity so a CTR was placed in the bag. The capsular bag was filled with viscoelastic material and the  intraocular lens was injected and positioned into place. Remaining viscoelastic material was removed from the eye using irrigation and aspiration.  The corneal wounds were hydrated.  The eye was filled to physiologic pressure. The wounds were found to be watertight. Drops of Vigamox and prednisilone were placed into the eye.  The eye was washed, dried, and shielded.  The patient tolerated the procedure well and knows to follow up with me tomorrow morning, sooner if needed.

## 2018-10-18 NOTE — DISCHARGE INSTRUCTIONS
Discharge Instructions for Cataract Surgery  A surgeon removed the cloudy lens in your eye and replaced it with a clear man-made lens. Be sure to have an adult family member or friend drive you home after surgery. Heres what you can expect following surgery and tips for a healthy recovery.  It is normal to have the following:  · Bruised or bloodshot eye for 7 days  · Itching and mild discomfort for several days  · Some fluid discharge  · Sensitivity to light  · Scratchy, sandlike feeling in the eye for 2 weeks  · Feeling tired, especially during the first 24 hours  Activity level  · Do not drive for 2 days or as instructed by your doctor.  · Do not drink alcohol for at least 24 hours.  · Avoid bending at the waist to  objects or lifting anything heavy for 2 days.  · Relax for the first 24 hours after surgery. Watching TV and reading are OK and wont harm your eye.  Eye protection  · Do not rub or press on your eye.  · Sleep on your back or on your unoperated side for 2 nights.  · If instructed, wear a bandage over your eye for 2 days and 2 nights.  · If instructed, wear a shield to protect your eye for 2 days and 2 nights.  Using eyedrops  You may be given special eyedrops or ointment. Here is one way to use eyedrops:  · Tilt your head back.  · Pull your bottom eyelid down.  · Squeeze one drop into your eye. Do not touch your eye with the bottle tip.  · Close your eyes for a few seconds.  · If you need more than one drop, wait a few minutes before adding the next one.   Call your doctor right away if you have any of the following:  · Bleeding or discharge from the eye  · Your vision suddenly becomes worse  · Pain medicine you are told to take does not ease your pain  · Nausea or vomiting  · Chills or fever over 100.4°F (39.1°C)   Date Last Reviewed: 5/30/2015  © 1304-4128 [employer] [address] [place] [address], [place] [address]. All rights reserved. This information is not intended as a  substitute for professional medical care. Always follow your healthcare professional's instructions.

## 2018-10-18 NOTE — ANESTHESIA PREPROCEDURE EVALUATION
10/18/2018  Silvia Capellan is a 73 y.o., female.    Past Medical History:   Diagnosis Date    Encounter for blood transfusion     GERD (gastroesophageal reflux disease)     HTN (hypertension)     Rheumatoid arthritis     Rheumatoid arthritis(714.0)     Squamous cell lung cancer, left 2/15/2018       Past Surgical History:   Procedure Laterality Date    APPENDECTOMY      BRONCHOSCOPY N/A 8/28/2017    Performed by [facility] at [facility] OR 2ND FLR    COLONOSCOPY      COLONOSCOPY N/A 10/30/2017    Procedure: COLONOSCOPY;  Surgeon: Quentin Coppola MD;  Location: [facility] (4TH FLR);  Service: Endoscopy;  Laterality: N/A;    COLONOSCOPY N/A 10/30/2017    Performed by Quentin Coppola MD at [facility] ENDO (4TH FLR)    COLONOSCOPY N/A 1/17/2014    Performed by Feliciano Duran MD at [facility] ENDO    ESOPHAGOGASTRODUODENOSCOPY (EGD) N/A 10/30/2017    Performed by Quentin Coppola MD at [facility] (4TH FLR)    FOOT SURGERY Left     HYSTERECTOMY      INSERTION-PORT N/A 11/14/2017    Performed by [facility] at [facility] OR 2ND FLR    SKIN BIOPSY      TOTAL KNEE ARTHROPLASTY      right         Anesthesia Evaluation    I have reviewed the Patient Summary Reports.     I have reviewed the Medications.     Review of Systems  Anesthesia Hx:  No problems with previous Anesthesia  History of prior surgery of interest to airway management or planning: Denies Family Hx of Anesthesia complications.   Denies Personal Hx of Anesthesia complications.   Cardiovascular:   Hypertension Denies MI.    Denies Angina.    Pulmonary:   Denies COPD.  Denies Asthma.  Denies Shortness of breath. H/o lung cancer  No SOB   Hepatic/GI:   GERD, well controlled    Musculoskeletal:   Arthritis     Psych:   depression          Physical Exam  General:  Well nourished    Airway/Jaw/Neck:  Airway Findings: Mouth  Opening: Normal Tongue: Normal  General Airway Assessment: Adult  Mallampati: II  TM Distance: Normal, at least 6 cm      Dental:  Dental Findings: Upper Dentures, Lower Dentures   Chest/Lungs:  Chest/Lungs Findings: Normal Respiratory Rate     Heart/Vascular:  Heart Findings: Rate: Normal        Mental Status:  Mental Status Findings:  Alert and Oriented         Anesthesia Plan  Type of Anesthesia, risks & benefits discussed:  Anesthesia Type:  MAC  Patient's Preference:   Intra-op Monitoring Plan: standard ASA monitors  Intra-op Monitoring Plan Comments:   Post Op Pain Control Plan: multimodal analgesia, IV/PO Opioids PRN and per primary service following discharge from PACU  Post Op Pain Control Plan Comments:   Induction:   IV  Beta Blocker:  Patient is not currently on a Beta-Blocker (No further documentation required).       Informed Consent: Patient understands risks and agrees with Anesthesia plan.  Questions answered. Anesthesia consent signed with patient.  ASA Score: 3     Day of Surgery Review of History & Physical:    H&P update referred to the surgeon.         Ready For Surgery From Anesthesia Perspective.

## 2018-10-18 NOTE — DISCHARGE SUMMARY
BRIEF DISCHARGE NOTE:    Reason for hospitalization -  Cataract surgery     Final Diagnosis - Visually significant Cataract    Procedures and treatment provided - Status post phacoemulsification with placement of intraocular lens     Diet - Advance to regular as tolerated    Activity - as tolerated    Disposition at the end of the case - Good.    Discharge: to home    The patient tolerated the procedure well and knows to follow up with me tomorrow morning in the eye clinic, sooner if needed.    Patient and family instructions (as appropriate) - Given to patient on discharge    Susan Keith MD

## 2018-10-18 NOTE — ANESTHESIA POSTPROCEDURE EVALUATION
"Anesthesia Post Evaluation    Patient: Silvia Capellan    Procedure(s) Performed: Procedure(s) (LRB):  PHACOEMULSIFICATION, CATARACT (Right)  INSERTION, IOL PROSTHESIS (Right)    Final Anesthesia Type: MAC  Patient location during evaluation: PACU  Patient participation: Yes- Able to Participate  Level of consciousness: awake and alert  Post-procedure vital signs: reviewed and stable  Pain management: adequate  Airway patency: patent  PONV status at discharge: No PONV  Anesthetic complications: no      Cardiovascular status: blood pressure returned to baseline  Respiratory status: unassisted, room air and spontaneous ventilation  Hydration status: euvolemic  Follow-up not needed.        Visit Vitals  BP (!) 141/67 (BP Location: Left arm, Patient Position: Lying)   Pulse 98   Temp 36.5 °C (97.7 °F) (Temporal)   Resp 16   Ht 5' 7" (1.702 m)   Wt 104.3 kg (230 lb)   SpO2 98%   Breastfeeding? No   BMI 36.02 kg/m²       Pain/Matt Score: Pain Assessment Performed: Yes (10/18/2018 10:08 AM)  Presence of Pain: denies (10/18/2018 10:08 AM)  Matt Score: 9 (10/18/2018 10:08 AM)        "

## 2018-10-18 NOTE — TRANSFER OF CARE
"Anesthesia Transfer of Care Note    Patient: Silvia Capellan    Procedure(s) Performed: Procedure(s) (LRB):  PHACOEMULSIFICATION, CATARACT (Right)  INSERTION, IOL PROSTHESIS (Right)    Patient location: PACU    Anesthesia Type: MAC    Transport from OR: Transported from OR on room air with adequate spontaneous ventilation    Post pain: adequate analgesia    Post assessment: no apparent anesthetic complications and tolerated procedure well    Post vital signs: stable    Level of consciousness: awake, alert and oriented    Nausea/Vomiting: no nausea/vomiting    Complications: none    Transfer of care protocol was followed      Last vitals:   Visit Vitals  BP (!) 141/68 (BP Location: Left arm, Patient Position: Lying)   Pulse 88   Temp 36.4 °C (97.6 °F) (Oral)   Resp 14   Ht 5' 7" (1.702 m)   Wt 104.3 kg (230 lb)   SpO2 98%   Breastfeeding? No   BMI 36.02 kg/m²     "

## 2018-10-19 NOTE — PROGRESS NOTES
HPI     Referred by Dr Fragoso   S/p phaco OD w/IOL 10/18/2018    Gtts: PF TID OD           Ocuflox TID OD    POD 1 phaco OD. Doing well. No complaints.    Last edited by Abad Garcia MA on 10/19/2018  2:46 PM. (History)            Assessment /Plan     For exam results, see Encounter Report.    Status post cataract extraction and insertion of intraocular lens, right    Nuclear sclerotic cataract of left eye      Slit Lamp Exam  L/L - normal  C/s - quiet  Cornea - clear  A/C - 1+ cell  Lens - PCIOL    POD #1 s/p phaco/IOL  - doing well  - continue the following drops:    vigamox or ocuflox TID x 1 wk then stop  Pred forte or durezol or dexamethasone TID x  4 wks  Ketorolac TID until runs out    Versus:    Combination drop - 1 drop TID x total of 1 month    Appropriate precautions and post op medications reviewed.  Patient instructed to call or come in if symptoms of redness, decreased vision, or pain are experienced.    -f/up 1-2wks, sooner PRN.       Cat OS - can sign consent next if ready to proceed.  Alcon to pencil in next avail.

## 2018-11-02 NOTE — PROGRESS NOTES
HPI     Referred by Dr Fragoso     S/p phaco OD w/IOL 10/18/2018    Gtts: PF TID OD           Ocuflox TID OD    2 week post op phaco OD. Doing well. No complaints.    Last edited by Susan Keith MD on 11/2/2018 11:57 AM. (History)            Assessment /Plan     For exam results, see Encounter Report.    Status post cataract extraction and insertion of intraocular lens, right    Nuclear sclerotic cataract of left eye  -     IOL Master - OU - Both Eyes  -     Computerized corneal topography      PO week #1 s/p phaco/IOL -    - doing well, no issues    Continue combo drops for a total of 1 month versus: d/c abx gtt, continue PF/ketorolocTID for total of 1 month    - f/up 3-4 wks for MRx, DFE        Visually significant nuclear sclerotic cataract   - Interfering with activities of daily living.  Pt desires cataract surgery for [facility] rehabilitation.   - R/B/A discussed and pt agrees to proceed with surgery.   - IOL options discussed according to patient's goals and concomitant ocular pathology; and pt content with monofocal lens.    - Target: plano.    (pcboo 22.0 OS)  *needed CTR for OD.    Pt desires to wear rx post sx

## 2018-11-05 NOTE — H&P
History    Chief complaint:  Painless progressive vision loss    Present Ilness/Diagnosis: Nuclear sclerotic Cataract    Past Medical History:  has a past medical history of Encounter for blood transfusion, GERD (gastroesophageal reflux disease), HTN (hypertension), Rheumatoid arthritis, Rheumatoid arthritis(714.0), and Squamous cell lung cancer, left (2/15/2018).    Family History/Social History: refer to chart    Allergies:   Review of patient's allergies indicates:   Allergen Reactions    Latex, natural rubber Rash    Codeine Hallucinations    Cortisporin [neomycin-bacitracin-poly-hc] Rash    Latex, natural rubber Rash       Current Medications: No current facility-administered medications for this encounter.     Current Outpatient Medications:     dexamethasone (DECADRON) 2 MG tablet, TAKE 1 TABLET (2 MG TOTAL) BY MOUTH EVERY 12 (TWELVE) HOURS., Disp: 120 tablet, Rfl: 0    diazePAM (VALIUM) 10 MG Tab, Take 1 tablet (10 mg total) by mouth every 8 (eight) hours as needed (muscle spasm)., Disp: 12 tablet, Rfl: 0    diclofenac (VOLTAREN) 50 MG EC tablet, Take 1 tablet (50 mg total) by mouth 3 (three) times daily as needed., Disp: 30 tablet, Rfl: 1    escitalopram oxalate (LEXAPRO) 10 MG tablet, Take 1 tablet (10 mg total) by mouth once daily., Disp: 90 tablet, Rfl: 1    furosemide (LASIX) 20 MG tablet, Take 1 tablet (20 mg total) by mouth daily as needed (for swelling of the lower extremities)., Disp: 90 tablet, Rfl: 0    hydroCHLOROthiazide (HYDRODIURIL) 25 MG tablet, Take 1 tablet (25 mg total) by mouth once daily., Disp: 90 tablet, Rfl: 1    levocetirizine (XYZAL) 5 MG tablet, Take 1 tablet (5 mg total) by mouth every evening., Disp: 30 tablet, Rfl: 11    LORazepam (ATIVAN) 0.5 MG tablet, Take 1 tablet (0.5 mg total) by mouth 2 (two) times daily. for 30 doses, Disp: 30 tablet, Rfl: 0    losartan (COZAAR) 100 MG tablet, TAKE 1 TABLET (100 MG TOTAL) BY MOUTH ONCE DAILY., Disp: 90 tablet, Rfl: 1     methotrexate 2.5 MG Tab, as needed. , Disp: , Rfl:     metoclopramide HCl (REGLAN) 5 MG tablet, Take 1 tablet (5 mg total) by mouth 2 (two) times daily as needed (belching, nausea)., Disp: 10 tablet, Rfl: 0    ofloxacin (OCUFLOX) 0.3 % ophthalmic solution, INSTILL 1 DROP INTO RIGHT EYE 3 TIMES A DAY, Disp: , Rfl: 2    oxyCODONE-acetaminophen (PERCOCET)  mg per tablet, Take 1 tablet by mouth every 8 (eight) hours as needed for Pain., Disp: 18 tablet, Rfl: 0    pantoprazole (PROTONIX) 40 MG tablet, Take 1 tablet (40 mg total) by mouth once daily., Disp: 90 tablet, Rfl: 1    prednisoLONE acetate (PRED FORTE) 1 % DrpS, INSTILL 1 DROP INTO RIGHT EYE 3 TIMES A DAY, Disp: , Rfl: 2    traZODone (DESYREL) 50 MG tablet, Take 1 tablet (50 mg total) by mouth every evening. (Patient taking differently: Take 50 mg by mouth nightly as needed. ), Disp: 30 tablet, Rfl: 11    Physical Exam    BP: Vital signs stable  General: No apparent distress  HEENT: nuclear sclerotic cataract  Lungs: adequate respirations  Heart: + pulses  Abdomen: soft  Rectal/pelvic: deferred    Impression: Visually significant Cataract    Plan: Phacoemulsification with implantation of Intraocular lens

## 2018-11-05 NOTE — TELEPHONE ENCOUNTER
Spoke to pt and confirmed surgery with Dr Keith on 11/8/18. Reminded pt to get eyedrops to use 2 days before surgery, to have someone bring her to the surgery, stay with her and drive her home afterwards, and that someone from our office will call her the day before surgery to give the arrival time for surgery . Pt understood.

## 2018-11-07 NOTE — TELEPHONE ENCOUNTER
Left voicemail on patient's keya. contact's phone, arrival time 6:00 am, no food or drink after midnight.

## 2018-11-08 NOTE — DISCHARGE INSTRUCTIONS
Discharge Instructions for Cataract Surgery  A surgeon removed the cloudy lens in your eye and replaced it with a clear man-made lens. Be sure to have an adult family member or friend drive you home after surgery. Heres what you can expect following surgery and tips for a healthy recovery.  It is normal to have the following:  · Bruised or bloodshot eye for 7 days  · Itching and mild discomfort for several days  · Some fluid discharge  · Sensitivity to light  · Scratchy, sandlike feeling in the eye for 2 weeks  · Feeling tired, especially during the first 24 hours  Activity level  · Do not drive for 2 days or as instructed by your doctor.  · Do not drink alcohol for at least 24 hours.  · Avoid bending at the waist to  objects or lifting anything heavy for 2 days.  · Relax for the first 24 hours after surgery. Watching TV and reading are OK and wont harm your eye.  Eye protection  · Do not rub or press on your eye.  · Sleep on your back or on your unoperated side for 2 nights.  · If instructed, wear a bandage over your eye for 2 days and 2 nights.  · If instructed, wear a shield to protect your eye for 2 days and 2 nights.  Using eyedrops  You may be given special eyedrops or ointment. Here is one way to use eyedrops:  · Tilt your head back.  · Pull your bottom eyelid down.  · Squeeze one drop into your eye. Do not touch your eye with the bottle tip.  · Close your eyes for a few seconds.  · If you need more than one drop, wait a few minutes before adding the next one.   Call your doctor right away if you have any of the following:  · Bleeding or discharge from the eye  · Your vision suddenly becomes worse  · Pain medicine you are told to take does not ease your pain  · Nausea or vomiting  · Chills or fever over 100.4°F (39.1°C)   Date Last Reviewed: 5/30/2015  © 5764-5342 [email or web address]. [address] [place] [address], [place] [address]. All rights reserved. This information is not intended as a  substitute for professional medical care. Always follow your healthcare professional's instructions.

## 2018-11-08 NOTE — TRANSFER OF CARE
"Anesthesia Transfer of Care Note    Patient: Silvia Capellan    Procedure(s) Performed: Procedure(s) (LRB):  PHACOEMULSIFICATION, CATARACT (Left)  INSERTION, IOL PROSTHESIS (Left)    Patient location: PACU    Anesthesia Type: MAC    Transport from OR: Transported from OR on room air with adequate spontaneous ventilation    Post pain: adequate analgesia    Post assessment: no apparent anesthetic complications and tolerated procedure well    Post vital signs: stable    Level of consciousness: awake, alert and oriented    Nausea/Vomiting: no nausea/vomiting    Complications: none    Transfer of care protocol was followed      Last vitals:   Visit Vitals  BP (!) 149/72 (BP Location: Left arm, Patient Position: Lying)   Pulse 72   Temp 36.5 °C (97.7 °F) (Oral)   Resp 18   Ht 5' 7" (1.702 m)   Wt 104.3 kg (230 lb)   SpO2 100%   Breastfeeding? No   BMI 36.02 kg/m²     "

## 2018-11-08 NOTE — ANESTHESIA PREPROCEDURE EVALUATION
11/08/2018  Silvia Capellan is a 73 y.o., female.    Anesthesia Evaluation    I have reviewed the Patient Summary Reports.     I have reviewed the Medications.     Review of Systems  Anesthesia Hx:  No problems with previous Anesthesia  History of prior surgery of interest to airway management or planning: Denies Family Hx of Anesthesia complications.   Denies Personal Hx of Anesthesia complications.   Cardiovascular:   Hypertension Denies MI.    Denies Angina.    Pulmonary:   Denies COPD.  Denies Asthma.  Denies Shortness of breath. H/o lung cancer  No SOB   Hepatic/GI:   GERD, well controlled    Musculoskeletal:   Arthritis     Psych:   depression          Physical Exam  General:  Well nourished    Airway/Jaw/Neck:  Airway Findings: Mouth Opening: Normal Tongue: Normal  General Airway Assessment: Adult  Mallampati: II  TM Distance: Normal, at least 6 cm      Dental:  Dental Findings: Upper Dentures, Lower Dentures   Chest/Lungs:  Chest/Lungs Findings: Normal Respiratory Rate     Heart/Vascular:  Heart Findings: Rate: Normal        Mental Status:  Mental Status Findings:  Alert and Oriented         Anesthesia Plan  Type of Anesthesia, risks & benefits discussed:  Anesthesia Type:  MAC  Patient's Preference:   Intra-op Monitoring Plan: standard ASA monitors  Intra-op Monitoring Plan Comments:   Post Op Pain Control Plan: multimodal analgesia, IV/PO Opioids PRN and per primary service following discharge from PACU  Post Op Pain Control Plan Comments:   Induction:   IV  Beta Blocker:  Patient is not currently on a Beta-Blocker (No further documentation required).       Informed Consent: Patient understands risks and agrees with Anesthesia plan.  Questions answered. Anesthesia consent signed with patient.  ASA Score: 3     Day of Surgery Review of History & Physical:    H&P update referred to the surgeon.          Ready For Surgery From Anesthesia Perspective.

## 2018-11-08 NOTE — ANESTHESIA POSTPROCEDURE EVALUATION
"Anesthesia Post Evaluation    Patient: Silvia Capellan    Procedure(s) Performed: Procedure(s) (LRB):  PHACOEMULSIFICATION, CATARACT (Left)  INSERTION, IOL PROSTHESIS (Left)    Final Anesthesia Type: MAC  Patient location during evaluation: PACU  Patient participation: Yes- Able to Participate  Level of consciousness: awake and alert and oriented  Post-procedure vital signs: reviewed and stable  Pain management: adequate  Airway patency: patent  PONV status at discharge: No PONV  Anesthetic complications: no      Cardiovascular status: blood pressure returned to baseline  Respiratory status: unassisted  Hydration status: euvolemic  Follow-up not needed.        Visit Vitals  BP (!) 140/76   Pulse 73   Temp 36.5 °C (97.7 °F) (Temporal)   Resp 18   Ht 5' 7" (1.702 m)   Wt 104.3 kg (230 lb)   SpO2 100%   Breastfeeding? No   BMI 36.02 kg/m²       Pain/Matt Score: Pain Assessment Performed: Yes (11/8/2018  8:40 AM)  Presence of Pain: denies (11/8/2018  8:40 AM)  Pain Rating Prior to Med Admin: 6 (11/8/2018  8:15 AM)  Matt Score: 10 (11/8/2018  8:40 AM)        "

## 2018-11-08 NOTE — OP NOTE
DATE OF PROCEDURE: 11/08/2018    SURGEON: SHAKA YOU MD    PREOPERATIVE DIAGNOSIS:  Senile nuclear sclerotic cataract left eye.     POSTOPERATIVE DIAGNOSIS: Senile nuclear sclerotic cataract left eye.     PROCEDURE PERFORMED:  Phacoemulsification with placement of intraocular lens, left eye.    IMPLANT:  PCBOO 22.0    ANESTHESIA:  Topical and MAC    COMPLICATIONS: none    ESTIMATED BLOOD LOSS: <1cc    SPECIMENS: none    INDICATIONS FOR PROCEDURE:  This patient presented to the clinic with decreased vision in the left eye and was found to have a cataract.  The risks, benefits, and alternatives were discussed and the patient agreed to proceed with phacoemulsification and implantation of a lens in the left eye.     PROCEDURE IN DETAIL:  The patient was met in the preop holding area.  Consent was confirmed to be signed.  The operative site was marked.  The patient was brought into the operating room by the anesthesia team and placed under monitored anesthesia care.  The left eye was prepped and draped in a sterile ophthalmic fashion.  A Raymond speculum was placed into the left eye.   A paracentesis site was made and 1% preservative-free lidocaine was injected into the anterior chamber.  Viscoelastic  material was injected into the anterior chamber.  A keratome blade was used to make a clear corneal incision.  A cystotome was used to initiate the continuous curvilinear capsulorrhexis which was completed with Utrata forceps.  BSS on a mojica cannula was used to perform hydrodissection.  The phacoemulsification tip was introduced into the eye and the nucleus was removed in a standard divide-and-conquer fashion.  Remaining cortical material was removed from the eye using irrigation-aspiration.  The capsular bag was filled with viscoelastic material and the intraocular lens was injected and positioned into place. Remaining viscoelastic material was removed from the eye using irrigation and aspiration.  The corneal  wounds were hydrated.  The eye was filled to physiologic pressure. The wounds were found to be watertight. Drops of Vigamox and prednisilone were placed into the eye.  The eye was washed, dried, and shielded.  The patient tolerated the procedure well and knows to follow up with me tomorrow morning, sooner if needed.

## 2018-11-08 NOTE — PLAN OF CARE
Problem: Patient Care Overview  Goal: Plan of Care Review  Outcome: Outcome(s) achieved Date Met: 11/08/18    Discharge instructions given to patient and daughter. Verbalized understanding. Patient stable, tolerating fluids. No complaints at this time. Patient adequate for discharge.

## 2018-11-09 NOTE — PROGRESS NOTES
HPI     Referred by Dr Fragoso     S/p phaco w/IOL OS 11/8/18  S/p phaco OD w/IOL 10/18/2018    Gtts: PF TID OS           Ocuflox TID OS    Pt here for 1 day post op OS.  Pt states pain OS (8 on scale).  Pt didn't   use eyedrops today because she was confused about which ones to use.     Last edited by Katie Mora MA on 11/9/2018  2:09 PM.   (History)            Assessment /Plan     For exam results, see Encounter Report.    Status post cataract extraction and insertion of intraocular lens, left    Status post cataract extraction and insertion of intraocular lens, right      Slit Lamp Exam  L/L - normal  C/s - quiet  Cornea - clear  A/C - 1+ cell  Lens - PCIOL    POD #1 s/p phaco/IOL  - doing well  - continue the following drops:    vigamox or ocuflox TID x 1 wk then stop  Pred forte or durezol or dexamethasone TID x  4 wks  Ketorolac TID until runs out    Versus:    Combination drop - 1 drop TID x total of 1 month    Appropriate precautions and post op medications reviewed.  Patient instructed to call or come in if symptoms of redness, decreased vision, or pain are experienced.    -f/up 4 wks, sooner PRN.  Arx/ DFE OU. Pt okay with readers.

## 2018-11-20 NOTE — TELEPHONE ENCOUNTER
----- Message from Jessica Patrick sent at 11/20/2018 10:07 AM CST -----  Contact: Pt  Rx Refill/Request     Is this a Refill or New Rx:  Refill  Rx Name and Strength:  oxyCODONE-acetaminophen (PERCOCET)  mg per tablet  Preferred Pharmacy with phone number: [facility]/pharmacy #[id number] - [place] - [address] [facility]   [phone] (Phone)  [phone] (Fax)  Communication Preference:Phone [phone]  Additional Information:

## 2018-11-28 PROBLEM — R60.0 FACIAL EDEMA: Status: ACTIVE | Noted: 2018-01-01

## 2018-11-28 NOTE — PROGRESS NOTES
Patient, Silvia Capellan (MRN #[id number]), presented with a recorded BMI of 37.15 kg/m^2 and a documented comorbidity(s):  - Hypertension  to which the severe obesity is a contributing factor. This is consistent with the definition of severe obesity (BMI 35.0-39.9) with comorbidity (ICD-10 E66.01, Z68.35). The patient's severe obesity was monitored, evaluated, addressed and/or treated. This addendum to the medical record is made on 11/28/2018.

## 2018-11-28 NOTE — PROGRESS NOTES
CC: Squamous cell carcinoma, unknown primary, on chemotherapy, here for follow up         Oncology History:     Diagnosis: Squamous cell carcinoma , primary site unknown    Molecular/genetic testing: p16 negative; PD L1 could not be run due to inadequate tissue   Stage:        Stage 4   Treatment: Carboplatin/paclitaxel x  6 cycles (2/16/18 - 6/8/18)        HPI:  is a 71 y/o female who underwent bronchoscopy/EBUS evaluation on 8/31/17 for abnormal mediastinal adenopathy  and a 1.2 cm MORRO mass.     Bronchoscopy findings:    The oropharynx appears normal. The larynx appears normal. The vocal cords appear normal. The subglottic space is normal. The trachea is of normal caliber. The otf is sharp. The tracheobronchial tree was examined to at least the first subsegmental level. Bronchial mucosa and anatomy are normal; there are no endobronchial lesions, and no secretions.    Lymph Nodes: Lymph node sizing was performed via endobronchial ultrasound for suspected lung cancer. Sampling by transbronchial needle aspiration was also performed using an Olympus EBUS-TBNA 22  gauge needle in the subcarinal mediastinum (level 7) and sent for routine cytology.       - The 7 (subcarinal) node was 30 mm by EBUS. Three samples with the needle were obtained. The patient's condition was unchanged after the intervention.      Her treatment got delayed as she cancelled several appointments due to some issues at home. She started Caroplatin/paclitaxel palliatively. She had left thoracentesis on 2/9/18. There were atypical cells in the pleural fluid, highly suspicious for malignancy.     Interval history: She is here for a follow up visit. She has fatigue, dyspnea. She has finished 6 cycles of Carbo/Paclitaxel, last treatment on 6/8/18. She had cataract surgery done recently.      Review of Systems   Constitutional: Positive for malaise/fatigue. Negative for fever and weight loss.   HENT: Negative for ear discharge, ear pain,  hearing loss, nosebleeds and tinnitus.    Eyes: Negative for blurred vision, double vision and photophobia.   Respiratory: Positive for shortness of breath. Negative for cough, hemoptysis and sputum production.    Cardiovascular: Negative for chest pain, palpitations and orthopnea.   Gastrointestinal: Negative for abdominal pain, blood in stool, constipation, heartburn, nausea and vomiting.   Genitourinary: Negative for dysuria, frequency and urgency.   Musculoskeletal: Negative for myalgias.   Neurological: Positive for weakness. Negative for dizziness, tingling, tremors and headaches.   Endo/Heme/Allergies: Does not bruise/bleed easily.       Current Outpatient Medications   Medication Sig    dexamethasone (DECADRON) 2 MG tablet TAKE 1 TABLET (2 MG TOTAL) BY MOUTH EVERY 12 (TWELVE) HOURS.    diazePAM (VALIUM) 10 MG Tab Take 1 tablet (10 mg total) by mouth every 8 (eight) hours as needed (muscle spasm).    diclofenac (VOLTAREN) 50 MG EC tablet Take 1 tablet (50 mg total) by mouth 3 (three) times daily as needed.    escitalopram oxalate (LEXAPRO) 10 MG tablet Take 1 tablet (10 mg total) by mouth once daily.    furosemide (LASIX) 20 MG tablet Take 1 tablet (20 mg total) by mouth daily as needed (for swelling of the lower extremities).    hydroCHLOROthiazide (HYDRODIURIL) 25 MG tablet Take 1 tablet (25 mg total) by mouth once daily.    HYDROcodone-acetaminophen (NORCO) 5-325 mg per tablet Take 1 tablet by mouth every 12 (twelve) hours as needed for Pain.    levocetirizine (XYZAL) 5 MG tablet Take 1 tablet (5 mg total) by mouth every evening.    LORazepam (ATIVAN) 0.5 MG tablet TAKE 1 TABLET (0.5 MG TOTAL) BY MOUTH 2 (TWO) TIMES DAILY. FOR 30 DOSES    losartan (COZAAR) 100 MG tablet TAKE 1 TABLET (100 MG TOTAL) BY MOUTH ONCE DAILY.    methotrexate 2.5 MG Tab as needed.     metoclopramide HCl (REGLAN) 5 MG tablet Take 1 tablet (5 mg total) by mouth 2 (two) times daily as needed (belching, nausea).     ofloxacin (OCUFLOX) 0.3 % ophthalmic solution INSTILL 1 DROP INTO RIGHT EYE 3 TIMES A DAY    ofloxacin (OCUFLOX) 0.3 % ophthalmic solution Place 1 drop into the left eye 3 (three) times daily.    oxyCODONE-acetaminophen (PERCOCET)  mg per tablet Take 1 tablet by mouth every 8 (eight) hours as needed for Pain.    pantoprazole (PROTONIX) 40 MG tablet TAKE 1 TABLET (40 MG TOTAL) BY MOUTH ONCE DAILY.    prednisoLONE acetate (PRED FORTE) 1 % DrpS INSTILL 1 DROP INTO RIGHT EYE 3 TIMES A DAY    prednisoLONE acetate (PRED FORTE) 1 % DrpS Place 1 drop into the left eye 3 (three) times daily.    traZODone (DESYREL) 50 MG tablet Take 1 tablet (50 mg total) by mouth every evening. (Patient taking differently: Take 50 mg by mouth nightly as needed. )     No current facility-administered medications for this visit.              Vitals:    11/28/18 0944   BP: 135/63   Pulse: 94   Resp: 18   Temp: 98.1 °F (36.7 °C)       Physical Exam   Constitutional: She is oriented to person, place, and time. She appears well-developed. No distress.   HENT:   Head: Normocephalic and atraumatic.   Mouth/Throat: No oropharyngeal exudate.   Her face looks edematous   Eyes: Pupils are equal, round, and reactive to light. No scleral icterus.   Neck: Normal range of motion.   Cardiovascular: Normal rate, regular rhythm and normal heart sounds.   No murmur heard.  Pulmonary/Chest: Effort normal and breath sounds normal. No respiratory distress. She has no wheezes.   Abdominal: Soft. Bowel sounds are normal. She exhibits no distension. There is no tenderness. There is no rebound.   Musculoskeletal: She exhibits edema.   Lymphadenopathy:     She has no cervical adenopathy.   Neurological: She is oriented to person, place, and time. No cranial nerve deficit.   Skin: Skin is warm.   Psychiatric: She has a normal mood and affect.         6/12/17 CT chest with cont      1. Large left pleural effusion resulting in atelectasis of the left lower  lobe and portions of left upper lobe  2. 1.2 cm left upper lobe pleural based pulmonary nodule  3. Mediastinal adenopathy as described above with diffuse thickening of the wall of the distal esophagus. Findings are suspicious for neoplastic process. Recommend bronchoscopy biopsy and possible endoscopy for further evaluation.  4. 1.3 cm hypodensity within the dome of the liver. Metastatic focus cannot be entirely excluded.     9/7/17 PET CT       There is mildly increased tracer uptake within the patient's left pleural effusion and overlying the mediastinal adenopathy, max SUV 2.7.    Transmission CT images show continued pleural effusion and circumferential esophageal thickening. Transmission CT images also show non-avid bilateral groin adenopathy likely reactive in nature.      Impression        There is mildly increased tracer uptake within the patient's left pleural effusion and mediastinal adenopathy. While these findings suggest reactive etiology some low grade malignancies, such as bronchioloalveolar carcinoma, may show this level of uptake on PET scan.            10/30/17 EGD     The examined duodenum was normal.The entire examined stomach was normal.The cardia and gastric fundus were normal on retroflexion. A 1 cm hiatal hernia was found. The proximal extent of the gastric folds (end of tubular esophagus) was 38 cm from the incisors. The hiatal narrowing was 39 cm from the incisors. The Z-line was 38 cm from the incisors. Z-line was sharp. No esophagitis and no columnar esophagus and no lesions seen with NBI or white light.    Impression:                                   - Normal examined duodenum.                        - Normal stomach.                        - 1 cm hiatal hernia.                        - No specimens collected.     10/30/17 colonoscopy :     Cecal polyp ( 3mm) identified  Histopathology: Tubular adenoma        Pathology:     1/17/14 colonoscopy     FINAL PATHOLOGIC DIAGNOSIS     1. Colon  biopsy, ascending colon-tubular adenoma.  2. Colon biopsy, transverse-tubular adenoma.  3. 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 The stalk and base of the polyp are well represented and are free of atypia.  4. Colon biopsy, sigmoid- mild glandular hyperplasia consistent with a benign hyperplastic polyp.        7/19/17 PLEURAL FLUID (CYTOLOGY AND CELL BLOCK):     -Groups of atypical cells present, malignancy cannot be excluded.  Note: While these cells could be reactive mesothelial cells, the level of atypia could be seen in malignancy. A cell block was prepared but the atypical cells are not present in the cell block for further characterization. Correlate  clinically. Repeat tap/biopsy might be helpful if clinically indicated.     8/28/17 EBUS FNA     FNA of subcarinal lymph node: Positive for malignant cells  Small clusters of malignant epithelial cells, favor squamous carcinoma Tumor cells are positive for CK 5/6 and CK 7. Immunostains for p63 and TTF-1 are negative.     1/26/18 CT chest, abdomen,pelvis with cont         Comparison: June 12, 2017    Chest: Since prior exam there is been interval enlargement of the right pleural effusion. There is high density material seen within the right pleural effusion that was not visualized on prior exam.    There is a 5.4 cm enhancing mass seen at the left lung base that previously measured approximately 2.9 cm.    There is a subcarinal mass measuring 4.4 cm it previously measured 2.9 cm.    There is circumferential esophageal thickening adjacent to this mass.    There is volume loss in the right chest with mediastinal shift to the right more pronounced than what was seen on prior exam.    Patient is a right internal jugular Port-A-Cath.    Abdomen/pelvis: The liver, spleen, adrenal glands, kidneys and pancreas show a normal contrasted  appearance. There is no evidence bowel obstruction. There is no evidence of abdominal or pelvic lymphadenopathy. The osseous structures show degenerative change of the spine.   Impression       Since prior exam in the patient's subcarinal and left lung base masses have increased in size. In addition there is now high density material seen within the patient's left pleural effusion new from prior exam and concerning for hemorrhage possibly from the mass.         1/26/18 CT head with cont          Comparison: CT June 8, 2017    Technique: Contiguous axial images were acquired from vertex to skull base without contrast.    Findings: There is no evidence acute intracranial abnormality.  Specifically there is no evidence acute infarct, contusion, extra-axial fluid collection or midline shift.  The ventricles are normal in size and configuration.  The calvarium is intact.  The paranasal sinuses and mastoid air cells are clear.    There is no evidence of enhancing mass.   Impression       There is no evidence of enhancing mass within the cerebral parenchyma      2/9/18 FINAL PATHOLOGIC DIAGNOSIS  LEFT LUNG, PLEURAL FLUID (CYTOLOGY):  - Scant groups of atypical cells, malignancy cannot be excluded. Note: Scant atypical groups of cells are present. Differential diagnosis includes reactive atypia vs malignancy .  Immunohistochemistry for CK5/6 and p63 is non-contributory. Please clinically correlate and re-sample as indicated.  All stains have satisfactory positive and negative controls.     5/14/18 CTA CHEST NON CORONARY       .    COMPARISON:  CT chest abdomen and pelvis from 01/26/2018.    FINDINGS:  Structures at the base of the neck are unremarkable.  Right-sided chest port is visualized with distal tip in the SVC.  Aorta is non-aneurysmal.  Heart is mildly enlarged with interval development of moderate pericardial effusion.  There is mild pericardial enhancement visualized.  The pulmonary arteries distribute normally.  No intraluminal filling defects to suggest pulmonary thromboembolism to the level of the proximal segmental branches.    The trachea and bronchi are patent.  Moderate left and small right sided pleural effusions are visualized resulting in volume loss and compressive atelectasis within the lower lobes, left greater than right.  Grossly stable mass visualized in the right subcarinal/perihilar region.  There has been interval reduced size of multifocal left-sided pulmonary masses and anterior mediastinal lymphadenopathy.    Stable hepatic hypodensity is visualized within the anterior aspect of the right hepatic lobe.  Prominent right cardiophrenic lymph nodes are seen which have increased in size.  Redemonstration of diffuse abnormal distal esophageal wall thickening versus adjacent soft tissue lesion.  No acute osseous abnormality identified.  Extrathoracic soft tissues are unremarkable.   Impression        1. Interval development of moderate pericardial effusion with mild pericardial enhancement.  Findings may reflect malignant pericardial effusion.  2. No evidence of PE.  3. Patient with known metastatic cancer.  Relatively stable size mass in the right perihilar/subcarinal region.  Interval reduced size of previously visualized multifocal left lung metastatic lesions and anterior mediastinal lymphadenopathy.  Cardiac phrenic lymph nodes have increased in size.  Persistent abnormal prominent circumferential distal esophageal wall thickening versus surrounding soft tissue mass.  Examination was performed in an emergency setting and not to serve as a restaging scan.  4. Moderate left and small right pleural effusions resulting in volume loss and compressive atelectasis within the lower lobes, left greater than right.         6/7/18 PET CT      FINDINGS:  Patient was administered 12.23 millicuries of FDG intravenously.  Comparison is 09/07/2017.    There is physiologic intracranial, head, and neck activity.  Heart  mediastinum are normal.  There is low-grade activity within the left pleural effusion.  There is physiologic liver, spleen, GI  and pelvic organ activity.  No bone lesions are seen.  There is left lower lobe retro cardiac atelectasis.   Impression        See above    Low-grade activity within a loculated left upper pleural effusion.  This could be benign/reactive.  Malignancy not excluded but it has improved since the prior study.         9/8/18 CT       COMPARISON:  [facility] medicine PET-CT from 06/07/2018, CTA chest from 05/14/2018, and CT chest/abdomen/pelvis from 01/26/2018.    FINDINGS:  Thoracic soft tissues: Right-sided chest port in place with catheter tip terminating in the distal SVC.    Aorta: Normal in course and caliber, without significant atherosclerotic plaque. There are three branching vessels at the arch.    Heart: Mildly enlarged.  No pericardial effusion.    Flaca/Mediastinum: Subcarinal soft tissue mass re-identified the measuring 5.7 x 3.7 cm x 6.2 cm, similar to prior CTA.  Previously described prominent cardiophrenic lymph nodes are now within normal limits for size.  Abundant soft tissues again seen surrounding the distal 3rd of the esophagus, findings are similar to prior study and may represent the extensive esophageal wall thickening versus extension of the mediastinal soft tissue mass.    Lungs: The interval decrease in size of known left malignant effusion with small amount of residual dependent complex fluid and mild adjacent atelectatic changes.  The there is continued nodular left pleural thickening.  Previously described mass at the medial left lung base re-identified measuring 4.3 x 2.2 cm, findings are not well evaluated on previous CTA due to surrounding pleural fluid but is decreased in size in comparison to most recent CT chest/abdomen/pelvis from 01/26/2018.  The no new lesions identified.  Right lung is grossly clear.  No new lung lesions identified.    Liver: Normal in size and  attenuation.  Stable 1.2 cm hypoattenuating lesion in the dome of the liver consistent with simple cyst.  No new lesions identified.    Gallbladder: No calcified gallstones.    Bile Ducts: No evidence of dilated ducts.    Pancreas: No mass or peripancreatic fat stranding.    Spleen: Unremarkable.    Adrenals: Unremarkable.    Kidneys/ Ureters: Normal in size and location. Normal concentration of contrast. No hydronephrosis or nephrolithiasis. No ureteral dilatation.    Bladder: Incompletely distended.    Reproductive organs: Status post hysterectomy.    GI Tract/Mesentery: No evidence of bowel obstruction or inflammation.    Peritoneal Space: No ascites. No free air.    Retroperitoneum:  No significant adenopathy.    Abdominal wall:  Unremarkable.    Vasculature: No significant atherosclerosis or aneurysm.    Bones: Thoracic kyphosis. No acute fracture.Stable degenerative changes seen throughout the spine.       Impression         Subcarinal soft tissue mass re-identified and stable in size as compared to the most recent CTA chest from 05/24/2018, lesion demonstrates mild interval decrease in size as compared to the CT chest/abdomen/pelvis from 01/26/2018.    Interval decrease in size of known left malignant pleural effusion.    Medial left lung base mass, findings are not well evaluated on previous CTA chest due to surrounding pleural fluid but has decreased in size in comparison to most recent CT chest/abdomen/pelvis from 01/26/2018.  Continued nodular left pleural thickening, overall less prominent on today's exam.    Abundant soft tissue again seen surrounding the distal 3rd of the esophagus, findings are similar to prior study and may represent the extensive esophageal wall thickening versus extension of mediastinal soft tissue mass.    Previously described prominent cardiophrenic lymph nodes are now within normal limits for size.  No new lymphadenopathy.    Additional stable findings as above.          Component      Latest Ref Rng & Units 11/28/2018   WBC      3.90 - 12.70 K/uL 7.35   RBC      4.00 - 5.40 M/uL 3.79 (L)   Hemoglobin      12.0 - 16.0 g/dL 10.9 (L)   Hematocrit      37.0 - 48.5 % 35.6 (L)   MCV      82 - 98 fL 94   MCH      27.0 - 31.0 pg 28.8   MCHC      32.0 - 36.0 g/dL 30.6 (L)   RDW      11.5 - 14.5 % 14.1   Platelets      150 - 350 K/uL 186   MPV      9.2 - 12.9 fL 9.6   Immature Granulocytes      0.0 - 0.5 % 1.1 (H)   Gran # (ANC)      1.8 - 7.7 K/uL 4.8   Immature Grans (Abs)      0.00 - 0.04 K/uL 0.08 (H)   Lymph #      1.0 - 4.8 K/uL 1.7   Mono #      0.3 - 1.0 K/uL 0.7   Eos #      0.0 - 0.5 K/uL 0.1   Baso #      0.00 - 0.20 K/uL 0.02   nRBC      0 /100 WBC 0   Gran%      38.0 - 73.0 % 65.1   Lymph%      18.0 - 48.0 % 23.7   Mono%      4.0 - 15.0 % 8.8   Eosinophil%      0.0 - 8.0 % 1.0   Basophil%      0.0 - 1.9 % 0.3   Differential Method       Automated   Sodium      136 - 145 mmol/L 141   Potassium      3.5 - 5.1 mmol/L 3.9   Chloride      95 - 110 mmol/L 102   CO2      23 - 29 mmol/L 30 (H)   Glucose      70 - 110 mg/dL 106   BUN, Bld      8 - 23 mg/dL 17   Creatinine      0.5 - 1.4 mg/dL 0.7   Calcium      8.7 - 10.5 mg/dL 9.4   Total Protein      6.0 - 8.4 g/dL 7.4   Albumin      3.5 - 5.2 g/dL 2.7 (L)   Total Bilirubin      0.1 - 1.0 mg/dL 0.3   Alkaline Phosphatase      55 - 135 U/L 82   AST      10 - 40 U/L 16   ALT      10 - 44 U/L 23   Anion Gap      8 - 16 mmol/L 9   eGFR if African American      >60 mL/min/1.73 m:2 >60.0   eGFR if non African American      >60 mL/min/1.73 m:2 >60.0        Assessment:     1. is a 72 y/o female who underwent bronchoscopy/EBUS evaluation on 8/31/17 for abnormal mediastinal adenopathy  and a 1.2 cm MORRO mass as well as pericardial adenopathy.FNA of subcarinal lymph node was positive for malignant cells.  There were small clusters of malignant epithelial cells, favoring squamous carcinoma. Site of origin is not clear. PET CT  done on 9/7/17 did not reveal any lesion in head and neck region.  There was inadequate tissue to run PD L1.p16 was negative.    Esophageal thickening was noted on CT done on 6/12/17. EGD on 10/30/17 did not reveal any suspicious lesions in esophagus/stomach. ENT evaluation still pending.   I explained to her that if primary site is unclear, she will be treated as metastatic SCC of unknown primary.   Her treatment got delayed as she cancelled several appointments due to some issues at home. She said she could not have MRI due to claustrophobia, even with anti-anxiety medication. CT chest from 1/26/18 showed increase in the sizes of subcarinal and left lung base masses compared to previous CT in June 2017 . High density material was seen within the patient's left pleural effusion. No intracranial lesions noted on CT head. I discussed these findings with the patient and personally reviewed the CT scans from January 2018.  I told her that she has likely metastatic SCC of unknown primary. She does have RA history. It is unclear if she can tolerate check point inhibitor therapy even if she has high PD1 expression on cytology/biopsy.  She started Carboplatin/Paclitaxel every 3 weeks. I had explained to her that rationale of treatment is palliation and not cure . She did not get her CTs after C4 as planned. PET CT before cycle 6, on 6/7/18 showed low-grade activity within the left pleural effusion.   CT done on 9/8/18 showed overall stable findings compared to her CTs from May 2018. Pleural effusion had resolved. She is still dyspneic, but her cough has resolved. Her performance status is better. She tolerates more activities now, than previously. She has facial puffiness. She had recent cataract surgery. She will have repeat CT in 1 weeks and have a follow up here.      2. Pleural effusion: She had left sided thoracentesis ( 1050ml) on 2/9/18. Malignancy could not be excluded. No  Indication for pleurex catheter/reepat  thoracentesis at this time.    CTA done on 5/14/18 again showed moderate effusion on the left , mild on right. Now resolved.      3. Anorexia: Secondary to #1. She was on Cyproheptadine.However, her appetite was still poor. She was started on Decadron 2mg BID. It helped with her appetite. Howver, it caused her to gain weight as well as disturbed her sleep. She has stopped Decadron now      4. Anemia: Hgb 10.9 today. Normocytic, hypochromic. No overt bleeding. Iron panel on 4/19 showed iron deficiency.      5. Pericardial effusion: She was noted to have pericardial effusion on CTA chest done on 5/14/18. Possibly malignant. No symptoms/signs of tamponade. Now resolved.                       Distress Screening Results: Psychosocial Distress screening score of Distress Score: 4 noted and reviewed. No intervention indicated.

## 2018-11-30 PROBLEM — G89.29 CHRONIC LEFT HIP PAIN: Status: ACTIVE | Noted: 2018-01-01

## 2018-11-30 PROBLEM — M48.00 CENTRAL STENOSIS OF SPINAL CANAL: Status: ACTIVE | Noted: 2018-01-01

## 2018-11-30 PROBLEM — M25.552 CHRONIC LEFT HIP PAIN: Status: ACTIVE | Noted: 2018-01-01

## 2018-11-30 NOTE — PROGRESS NOTES
Chief Complaint   Patient presents with    Hip Pain       HPI    Silvia Capellan is 73 y.o. female. The primary encounter diagnosis was Chronic left hip pain. Diagnoses of Central stenosis of spinal canal and Spondylolisthesis at L5-S1 level were also pertinent to this visit.    73-year-old female comes to clinic with severe left hip pain. Patient reports that this pain has become significantly worse since her last office visit.  She reports that her pain flared up over the last 3 days.  She denies injury but admits to increased periods of standing and walking.    She reports that the pain is severe, sudden, and shocking.  It involves the left hip and extends down the left leg.  Patient reports that Hydrocodone has not reduced the pain.      Review of Systems   Constitutional: Negative for activity change.   Respiratory: Negative for shortness of breath.    Cardiovascular: Negative for chest pain.   Musculoskeletal: Positive for arthralgias, gait problem and myalgias. Negative for back pain and joint swelling.   Neurological: Positive for numbness.   Psychiatric/Behavioral: Negative for suicidal ideas.           Current Outpatient Medications:     dexamethasone (DECADRON) 2 MG tablet, TAKE 1 TABLET (2 MG TOTAL) BY MOUTH EVERY 12 (TWELVE) HOURS., Disp: 120 tablet, Rfl: 0    diclofenac (VOLTAREN) 50 MG EC tablet, Take 1 tablet (50 mg total) by mouth 3 (three) times daily as needed., Disp: 30 tablet, Rfl: 1    diphenhydrAMINE (BENADRYL) 25 mg capsule, Take 1 each (25 mg total) by mouth nightly as needed for Insomnia., Disp: 20 capsule, Rfl: 0    escitalopram oxalate (LEXAPRO) 10 MG tablet, Take 1 tablet (10 mg total) by mouth once daily., Disp: 90 tablet, Rfl: 1    furosemide (LASIX) 20 MG tablet, Take 1 tablet (20 mg total) by mouth daily as needed (for swelling of the lower extremities)., Disp: 90 tablet, Rfl: 0    gabapentin (NEURONTIN) 100 MG capsule, Take 1 capsule (100 mg total) by mouth every  evening., Disp: 90 capsule, Rfl: 0    hydroCHLOROthiazide (HYDRODIURIL) 25 MG tablet, Take 1 tablet (25 mg total) by mouth once daily., Disp: 90 tablet, Rfl: 1    HYDROcodone-acetaminophen (NORCO) 5-325 mg per tablet, Take 1 tablet by mouth every 12 (twelve) hours as needed for Pain., Disp: 90 tablet, Rfl: 0    levocetirizine (XYZAL) 5 MG tablet, Take 1 tablet (5 mg total) by mouth every evening., Disp: 30 tablet, Rfl: 11    LORazepam (ATIVAN) 0.5 MG tablet, TAKE 1 TABLET BY MOUTH 2 TIMES DAILY., Disp: 30 tablet, Rfl: 0    losartan (COZAAR) 100 MG tablet, TAKE 1 TABLET (100 MG TOTAL) BY MOUTH ONCE DAILY., Disp: 90 tablet, Rfl: 1    methotrexate 2.5 MG Tab, as needed. , Disp: , Rfl:     metoclopramide HCl (REGLAN) 5 MG tablet, Take 1 tablet (5 mg total) by mouth 2 (two) times daily as needed (belching, nausea)., Disp: 10 tablet, Rfl: 0    ofloxacin (OCUFLOX) 0.3 % ophthalmic solution, INSTILL 1 DROP INTO RIGHT EYE 3 TIMES A DAY, Disp: , Rfl: 2    ofloxacin (OCUFLOX) 0.3 % ophthalmic solution, Place 1 drop into the left eye 3 (three) times daily., Disp: , Rfl:     oxyCODONE-acetaminophen (PERCOCET)  mg per tablet, Take 1 tablet by mouth every 8 (eight) hours as needed for Pain., Disp: 18 tablet, Rfl: 0    pantoprazole (PROTONIX) 40 MG tablet, TAKE 1 TABLET (40 MG TOTAL) BY MOUTH ONCE DAILY., Disp: 90 tablet, Rfl: 1    prednisoLONE acetate (PRED FORTE) 1 % DrpS, INSTILL 1 DROP INTO RIGHT EYE 3 TIMES A DAY, Disp: , Rfl: 2    prednisoLONE acetate (PRED FORTE) 1 % DrpS, Place 1 drop into the left eye 3 (three) times daily., Disp: , Rfl:     promethazine-dextromethorphan (PROMETHAZINE-DM) 6.25-15 mg/5 mL Syrp, Take 5 mLs by mouth 3 (three) times daily as needed (cougn and congestion)., Disp: 200 mL, Rfl: 0    tiZANidine (ZANAFLEX) 4 MG tablet, Take 1 tablet (4 mg total) by mouth every 8 (eight) hours as needed (muscle spasm and hip pain)., Disp: 90 tablet, Rfl: 0    traZODone (DESYREL) 50 MG tablet,  "Take 1 tablet (50 mg total) by mouth every evening. (Patient taking differently: Take 50 mg by mouth nightly as needed. ), Disp: 30 tablet, Rfl: 11      Blood pressure 134/78, pulse 76, temperature 97.9 °F (36.6 °C), temperature source Oral, resp. rate 16, height 5' 7" (1.702 m), SpO2 98 %.    Physical Exam   Constitutional: Vital signs are normal. She appears well-developed and well-nourished. She does not appear ill. She appears distressed (Secondary to pain).   Musculoskeletal:        Left hip: She exhibits decreased range of motion, decreased strength and tenderness. She exhibits no deformity.        Lumbar back: She exhibits decreased range of motion. She exhibits no tenderness, no swelling, no deformity, no pain and no spasm.        Left lower leg: She exhibits no tenderness, no bony tenderness and no deformity.   Patient in wheelchair.  Gait is severely antalgic.       Lab Visit on 11/28/2018   Component Date Value Ref Range Status    WBC 11/28/2018 7.35  3.90 - 12.70 K/uL Final    RBC 11/28/2018 3.79* 4.00 - 5.40 M/uL Final    Hemoglobin 11/28/2018 10.9* 12.0 - 16.0 g/dL Final    Hematocrit 11/28/2018 35.6* 37.0 - 48.5 % Final    MCV 11/28/2018 94  82 - 98 fL Final    MCH 11/28/2018 28.8  27.0 - 31.0 pg Final    MCHC 11/28/2018 30.6* 32.0 - 36.0 g/dL Final    RDW 11/28/2018 14.1  11.5 - 14.5 % Final    Platelets 11/28/2018 186  150 - 350 K/uL Final    MPV 11/28/2018 9.6  9.2 - 12.9 fL Final    Immature Granulocytes 11/28/2018 1.1* 0.0 - 0.5 % Final    Gran # (ANC) 11/28/2018 4.8  1.8 - 7.7 K/uL Final    Immature Grans (Abs) 11/28/2018 0.08* 0.00 - 0.04 K/uL Final    Lymph # 11/28/2018 1.7  1.0 - 4.8 K/uL Final    Mono # 11/28/2018 0.7  0.3 - 1.0 K/uL Final    Eos # 11/28/2018 0.1  0.0 - 0.5 K/uL Final    Baso # 11/28/2018 0.02  0.00 - 0.20 K/uL Final    nRBC 11/28/2018 0  0 /100 WBC Final    Gran% 11/28/2018 65.1  38.0 - 73.0 % Final    Lymph% 11/28/2018 23.7  18.0 - 48.0 % Final    " Mono% 11/28/2018 8.8  4.0 - 15.0 % Final    Eosinophil% 11/28/2018 1.0  0.0 - 8.0 % Final    Basophil% 11/28/2018 0.3  0.0 - 1.9 % Final    Differential Method 11/28/2018 Automated   Final    Sodium 11/28/2018 141  136 - 145 mmol/L Final    Potassium 11/28/2018 3.9  3.5 - 5.1 mmol/L Final    Chloride 11/28/2018 102  95 - 110 mmol/L Final    CO2 11/28/2018 30* 23 - 29 mmol/L Final    Glucose 11/28/2018 106  70 - 110 mg/dL Final    BUN, Bld 11/28/2018 17  8 - 23 mg/dL Final    Creatinine 11/28/2018 0.7  0.5 - 1.4 mg/dL Final    Calcium 11/28/2018 9.4  8.7 - 10.5 mg/dL Final    Total Protein 11/28/2018 7.4  6.0 - 8.4 g/dL Final    Albumin 11/28/2018 2.7* 3.5 - 5.2 g/dL Final    Total Bilirubin 11/28/2018 0.3  0.1 - 1.0 mg/dL Final    Alkaline Phosphatase 11/28/2018 82  55 - 135 U/L Final    AST 11/28/2018 16  10 - 40 U/L Final    ALT 11/28/2018 23  10 - 44 U/L Final    Anion Gap 11/28/2018 9  8 - 16 mmol/L Final    eGFR if African American 11/28/2018 >60.0  >60 mL/min/1.73 m^2 Final    eGFR if non African American 11/28/2018 >60.0  >60 mL/min/1.73 m^2 Final    TSH 11/28/2018 0.645  0.400 - 4.000 uIU/mL Final    Free T4 11/28/2018 1.36  0.71 - 1.51 ng/dL Final   Lab Visit on 11/02/2018   Component Date Value Ref Range Status    WBC 11/02/2018 5.22  3.90 - 12.70 K/uL Final    RBC 11/02/2018 3.70* 4.00 - 5.40 M/uL Final    Hemoglobin 11/02/2018 10.7* 12.0 - 16.0 g/dL Final    Hematocrit 11/02/2018 34.5* 37.0 - 48.5 % Final    MCV 11/02/2018 93  82 - 98 fL Final    MCH 11/02/2018 28.9  27.0 - 31.0 pg Final    MCHC 11/02/2018 31.0* 32.0 - 36.0 g/dL Final    RDW 11/02/2018 13.2  11.5 - 14.5 % Final    Platelets 11/02/2018 175  150 - 350 K/uL Final    MPV 11/02/2018 10.0  9.2 - 12.9 fL Final    Immature Granulocytes 11/02/2018 0.4  0.0 - 0.5 % Final    Gran # (ANC) 11/02/2018 2.8  1.8 - 7.7 K/uL Final    Immature Grans (Abs) 11/02/2018 0.02  0.00 - 0.04 K/uL Final    Lymph # 11/02/2018 1.7   1.0 - 4.8 K/uL Final    Mono # 11/02/2018 0.6  0.3 - 1.0 K/uL Final    Eos # 11/02/2018 0.1  0.0 - 0.5 K/uL Final    Baso # 11/02/2018 0.02  0.00 - 0.20 K/uL Final    nRBC 11/02/2018 0  0 /100 WBC Final    Gran% 11/02/2018 53.2  38.0 - 73.0 % Final    Lymph% 11/02/2018 32.4  18.0 - 48.0 % Final    Mono% 11/02/2018 12.1  4.0 - 15.0 % Final    Eosinophil% 11/02/2018 1.5  0.0 - 8.0 % Final    Basophil% 11/02/2018 0.4  0.0 - 1.9 % Final    Differential Method 11/02/2018 Automated   Final    Sodium 11/02/2018 139  136 - 145 mmol/L Final    Potassium 11/02/2018 3.1* 3.5 - 5.1 mmol/L Final    Chloride 11/02/2018 101  95 - 110 mmol/L Final    CO2 11/02/2018 29  23 - 29 mmol/L Final    Glucose 11/02/2018 94  70 - 110 mg/dL Final    BUN, Bld 11/02/2018 22  8 - 23 mg/dL Final    Creatinine 11/02/2018 0.9  0.5 - 1.4 mg/dL Final    Calcium 11/02/2018 9.7  8.7 - 10.5 mg/dL Final    Total Protein 11/02/2018 7.5  6.0 - 8.4 g/dL Final    Albumin 11/02/2018 3.1* 3.5 - 5.2 g/dL Final    Total Bilirubin 11/02/2018 0.4  0.1 - 1.0 mg/dL Final    Alkaline Phosphatase 11/02/2018 76  55 - 135 U/L Final    AST 11/02/2018 15  10 - 40 U/L Final    ALT 11/02/2018 18  10 - 44 U/L Final    Anion Gap 11/02/2018 9  8 - 16 mmol/L Final    eGFR if African American 11/02/2018 >60.0  >60 mL/min/1.73 m^2 Final    eGFR if non African American 11/02/2018 >60.0  >60 mL/min/1.73 m^2 Final    WBC 11/02/2018 5.22  3.90 - 12.70 K/uL Final    RBC 11/02/2018 3.70* 4.00 - 5.40 M/uL Final    Hemoglobin 11/02/2018 10.7* 12.0 - 16.0 g/dL Final    Hematocrit 11/02/2018 34.5* 37.0 - 48.5 % Final    MCV 11/02/2018 93  82 - 98 fL Final    MCH 11/02/2018 28.9  27.0 - 31.0 pg Final    MCHC 11/02/2018 31.0* 32.0 - 36.0 g/dL Final    RDW 11/02/2018 13.2  11.5 - 14.5 % Final    Platelets 11/02/2018 175  150 - 350 K/uL Final    MPV 11/02/2018 10.0  9.2 - 12.9 fL Final    Immature Granulocytes 11/02/2018 0.4  0.0 - 0.5 % Final    Gran #  (ANC) 11/02/2018 2.8  1.8 - 7.7 K/uL Final    Immature Grans (Abs) 11/02/2018 0.02  0.00 - 0.04 K/uL Final    Lymph # 11/02/2018 1.7  1.0 - 4.8 K/uL Final    Mono # 11/02/2018 0.6  0.3 - 1.0 K/uL Final    Eos # 11/02/2018 0.1  0.0 - 0.5 K/uL Final    Baso # 11/02/2018 0.02  0.00 - 0.20 K/uL Final    nRBC 11/02/2018 0  0 /100 WBC Final    Gran% 11/02/2018 53.2  38.0 - 73.0 % Final    Lymph% 11/02/2018 32.4  18.0 - 48.0 % Final    Mono% 11/02/2018 12.1  4.0 - 15.0 % Final    Eosinophil% 11/02/2018 1.5  0.0 - 8.0 % Final    Basophil% 11/02/2018 0.4  0.0 - 1.9 % Final    Differential Method 11/02/2018 Automated   Final    Group & Rh 11/02/2018 O POS   Final    Indirect Herman 11/02/2018 NEG   Final   Admission on 10/16/2018, Discharged on 10/16/2018   Component Date Value Ref Range Status    WBC 10/16/2018 6.51  3.90 - 12.70 K/uL Final    RBC 10/16/2018 3.66* 4.00 - 5.40 M/uL Final    Hemoglobin 10/16/2018 11.0* 12.0 - 16.0 g/dL Final    Hematocrit 10/16/2018 34.8* 37.0 - 48.5 % Final    MCV 10/16/2018 95  82 - 98 fL Final    MCH 10/16/2018 30.1  27.0 - 31.0 pg Final    MCHC 10/16/2018 31.6* 32.0 - 36.0 g/dL Final    RDW 10/16/2018 13.5  11.5 - 14.5 % Final    Platelets 10/16/2018 153  150 - 350 K/uL Final    MPV 10/16/2018 10.7  9.2 - 12.9 fL Final    Gran # (ANC) 10/16/2018 4.7  1.8 - 7.7 K/uL Final    Lymph # 10/16/2018 1.1  1.0 - 4.8 K/uL Final    Mono # 10/16/2018 0.7  0.3 - 1.0 K/uL Final    Eos # 10/16/2018 0.0  0.0 - 0.5 K/uL Final    Baso # 10/16/2018 0.01  0.00 - 0.20 K/uL Final    Gran% 10/16/2018 72.6  38.0 - 73.0 % Final    Lymph% 10/16/2018 16.7* 18.0 - 48.0 % Final    Mono% 10/16/2018 10.3  4.0 - 15.0 % Final    Eosinophil% 10/16/2018 0.2  0.0 - 8.0 % Final    Basophil% 10/16/2018 0.2  0.0 - 1.9 % Final    Differential Method 10/16/2018 Automated   Final    Sodium 10/16/2018 137  136 - 145 mmol/L Final    Potassium 10/16/2018 4.2  3.5 - 5.1 mmol/L Final    Chloride  10/16/2018 99  95 - 110 mmol/L Final    CO2 10/16/2018 29  23 - 29 mmol/L Final    Glucose 10/16/2018 114* 70 - 110 mg/dL Final    BUN, Bld 10/16/2018 21  8 - 23 mg/dL Final    Creatinine 10/16/2018 1.2  0.5 - 1.4 mg/dL Final    Calcium 10/16/2018 9.8  8.7 - 10.5 mg/dL Final    Total Protein 10/16/2018 7.5  6.0 - 8.4 g/dL Final    Albumin 10/16/2018 3.0* 3.5 - 5.2 g/dL Final    Total Bilirubin 10/16/2018 0.2  0.1 - 1.0 mg/dL Final    Alkaline Phosphatase 10/16/2018 72  55 - 135 U/L Final    AST 10/16/2018 12  10 - 40 U/L Final    ALT 10/16/2018 22  10 - 44 U/L Final    Anion Gap 10/16/2018 9  8 - 16 mmol/L Final    eGFR if  10/16/2018 52* >60 mL/min/1.73 m^2 Final    eGFR if non African American 10/16/2018 45* >60 mL/min/1.73 m^2 Final    Specimen UA 10/16/2018 Urine, Clean Catch   Final    Color, UA 10/16/2018 Yellow  Yellow, Straw, Kym Final    Appearance, UA 10/16/2018 Clear  Clear Final    pH, UA 10/16/2018 6.0  5.0 - 8.0 Final    Specific Gravity, UA 10/16/2018 1.020  1.005 - 1.030 Final    Protein, UA 10/16/2018 Negative  Negative Final    Glucose, UA 10/16/2018 Negative  Negative Final    Ketones, UA 10/16/2018 Negative  Negative Final    Bilirubin (UA) 10/16/2018 Negative  Negative Final    Occult Blood UA 10/16/2018 Negative  Negative Final    Nitrite, UA 10/16/2018 Negative  Negative Final    Urobilinogen, UA 10/16/2018 Negative  <2.0 EU/dL Final    Leukocytes, UA 10/16/2018 Negative  Negative Final    Troponin I 10/16/2018 <0.006  0.000 - 0.026 ng/mL Final    BNP 10/16/2018 45  0 - 99 pg/mL Final    Lipase 10/16/2018 15  4 - 60 U/L Final   Admission on 10/07/2018, Discharged on 10/07/2018   Component Date Value Ref Range Status    Glucose, UA 10/07/2018 Negative*  Final    Bilirubin, UA 10/07/2018 Negative*  Final    Ketones, UA 10/07/2018 Negative*  Final    Spec Grav UA 10/07/2018 1.015   Final    Blood, UA 10/07/2018 Negative*  Final    PH, UA  10/07/2018 5.5   Final    Protein, UA 10/07/2018 Negative*  Final    Urobilinogen, UA 10/07/2018 0.2  E.U./dL Final    Nitrite, UA 10/07/2018 Negative*  Final    Leukocytes, UA 10/07/2018 Negative*  Final    Color, UA 10/07/2018 Yellow   Final    Clarity, UA 10/07/2018 Clear   Final   Lab Visit on 09/19/2018   Component Date Value Ref Range Status    WBC 09/19/2018 9.75  3.90 - 12.70 K/uL Final    RBC 09/19/2018 3.85* 4.00 - 5.40 M/uL Final    Hemoglobin 09/19/2018 11.4* 12.0 - 16.0 g/dL Final    Hematocrit 09/19/2018 37.9  37.0 - 48.5 % Final    MCV 09/19/2018 98  82 - 98 fL Final    MCH 09/19/2018 29.6  27.0 - 31.0 pg Final    MCHC 09/19/2018 30.1* 32.0 - 36.0 g/dL Final    RDW 09/19/2018 13.2  11.5 - 14.5 % Final    Platelets 09/19/2018 158  150 - 350 K/uL Final    MPV 09/19/2018 10.0  9.2 - 12.9 fL Final    Immature Granulocytes 09/19/2018 1.8* 0.0 - 0.5 % Final    Gran # (ANC) 09/19/2018 7.3  1.8 - 7.7 K/uL Final    Immature Grans (Abs) 09/19/2018 0.18* 0.00 - 0.04 K/uL Final    Lymph # 09/19/2018 1.6  1.0 - 4.8 K/uL Final    Mono # 09/19/2018 0.7  0.3 - 1.0 K/uL Final    Eos # 09/19/2018 0.0  0.0 - 0.5 K/uL Final    Baso # 09/19/2018 0.01  0.00 - 0.20 K/uL Final    nRBC 09/19/2018 0  0 /100 WBC Final    Gran% 09/19/2018 74.5* 38.0 - 73.0 % Final    Lymph% 09/19/2018 16.3* 18.0 - 48.0 % Final    Mono% 09/19/2018 7.1  4.0 - 15.0 % Final    Eosinophil% 09/19/2018 0.2  0.0 - 8.0 % Final    Basophil% 09/19/2018 0.1  0.0 - 1.9 % Final    Differential Method 09/19/2018 Automated   Final    Sodium 09/19/2018 134* 136 - 145 mmol/L Final    Potassium 09/19/2018 4.8  3.5 - 5.1 mmol/L Final    Chloride 09/19/2018 99  95 - 110 mmol/L Final    CO2 09/19/2018 28  23 - 29 mmol/L Final    Glucose 09/19/2018 105  70 - 110 mg/dL Final    BUN, Bld 09/19/2018 29* 8 - 23 mg/dL Final    Creatinine 09/19/2018 0.8  0.5 - 1.4 mg/dL Final    Calcium 09/19/2018 9.6  8.7 - 10.5 mg/dL Final    Total  Protein 09/19/2018 7.7  6.0 - 8.4 g/dL Final    Albumin 09/19/2018 2.9* 3.5 - 5.2 g/dL Final    Total Bilirubin 09/19/2018 0.2  0.1 - 1.0 mg/dL Final    Alkaline Phosphatase 09/19/2018 72  55 - 135 U/L Final    AST 09/19/2018 14  10 - 40 U/L Final    ALT 09/19/2018 18  10 - 44 U/L Final    Anion Gap 09/19/2018 7* 8 - 16 mmol/L Final    eGFR if African American 09/19/2018 >60.0  >60 mL/min/1.73 m^2 Final    eGFR if non African American 09/19/2018 >60.0  >60 mL/min/1.73 m^2 Final   Hospital Outpatient Visit on 09/08/2018   Component Date Value Ref Range Status    POC Creatinine 09/08/2018 0.8  0.5 - 1.4 mg/dL Final    Sample 09/08/2018 VENOUS   Final   ]    Assessment:    1. Chronic left hip pain    2. Central stenosis of spinal canal    3. Spondylolisthesis at L5-S1 level          Silvia was seen today for hip pain.    Diagnoses and all orders for this visit:    Chronic left hip pain  -     Ambulatory Referral to Physical/Occupational Therapy  - worsening.  Patient instructed that she will need physical therapy to reduce this pain long term.  - see plan below for underlying etiology.    Central stenosis of spinal canal  -     gabapentin (NEURONTIN) 100 MG capsule; Take 1 capsule (100 mg total) by mouth every evening.  -     tiZANidine (ZANAFLEX) 4 MG tablet; Take 1 tablet (4 mg total) by mouth every 8 (eight) hours as needed (muscle spasm and hip pain).  -     Ambulatory Referral to Physical/Occupational Therapy  - new problem.  Previous MRI reviewed with patient.  Concern for worsening spinal stenosis.  - patient referred back to Dr. العراقي.  Gabapentin and Zanaflex prescribed today for pain reduction.    Spondylolisthesis at L5-S1 level  -     tiZANidine (ZANAFLEX) 4 MG tablet; Take 1 tablet (4 mg total) by mouth every 8 (eight) hours as needed (muscle spasm and hip pain).  - stable.  Imaging reviewed.  Muscle relaxer prescribed.    Other orders  -     Cancel: MRI Lumbar Spine Without Contrast;  Future          FOLLOW UP: Follow-up in about 6 weeks (around 1/11/2019) for Hip pain follow up.

## 2018-12-06 NOTE — TELEPHONE ENCOUNTER
Reason for Disposition   MODERATE difficulty breathing (e.g., speaks in phrases, SOB even at rest, pulse 100-120) of new onset or worse than normal    Protocols used: ST BREATHING DIFFICULTY-A-[place]    Ms. Capellan states she is experiencing a new onset of sob. Patient states sob is related to fluid retention. Advise Ms. Capellan to go to the ED per triage protocol.

## 2018-12-06 NOTE — ED PROVIDER NOTES
Encounter Date: 12/6/2018    SCRIBE #1 NOTE: I, Terry Campo, am scribing for, and in the presence of,  Dr. Sheppard. I have scribed the following portions of the note - Other sections scribed: HPI, ROS, PE .       History     Chief Complaint   Patient presents with    Shortness of Breath     Onset x 5 days ago.  Pt reports having a hx of Lung Ca unsure of stage.  Denies any n/v, fever, diarrhea, chills, or night sweats.  Pt reports taking lasix and HCTZ today     Back Pain     Pt reports that back radiates to left hip.  Rates pain of 10/10     This is a 73 y.o. female who presents to the ED with a complaint of SOB that began five days ago. Pt states this is not a new problem and reports a history of fluid in her lungs.  Her SOB began while walking. She denies home oxygen use. Pt reports a hx of lung cancer and is currently undergoing chemotherapy. Her last session was eight weeks ago.  Patient states she has on and off leg swelling that goes away shin when she takes her Lasix.  She denies fever, chills, CP, leg pain, coughing, abdominal pain, nausea, vomiting, or diarrhea. .  Pt denies taking blood thinners. She is currently taking lasix.  She denies a diet heavy in salt. Pt reports a recent surgery to her eyes in November. Pt reports back pain that radiates to the left hip.      The history is provided by the patient.     Review of patient's allergies indicates:   Allergen Reactions    Latex, natural rubber Rash    Codeine Hallucinations    Cortisporin [neomycin-bacitracin-poly-hc] Rash    Latex, natural rubber Rash     Past Medical History:   Diagnosis Date    Encounter for blood transfusion     GERD (gastroesophageal reflux disease)     HTN (hypertension)     Rheumatoid arthritis     Rheumatoid arthritis(714.0)     Squamous cell lung cancer, left 2/15/2018     Past Surgical History:   Procedure Laterality Date    APPENDECTOMY      BRONCHOSCOPY N/A 8/28/2017    Performed by [facility] Diagnostic Provider  at [facility] OR 2ND FLR    CATARACT EXTRACTION      COLONOSCOPY      COLONOSCOPY N/A 10/30/2017    Procedure: COLONOSCOPY;  Surgeon: Quentin Coppola MD;  Location: [facility] (4TH FLR);  Service: Endoscopy;  Laterality: N/A;    COLONOSCOPY N/A 10/30/2017    Performed by Quentin Coppola MD at [facility] ENDO (4TH FLR)    COLONOSCOPY N/A 1/17/2014    Performed by Feliciano Duran [facility] at [facility] ENDO    ESOPHAGOGASTRODUODENOSCOPY (EGD) N/A 10/30/2017    Performed by Quentin Coppola MD at [facility] ENDO (4TH FLR)    FOOT SURGERY Left     HYSTERECTOMY      INSERTION, IOL PROSTHESIS Left 11/8/2018    Performed by Susan Keith MD at [facility] OR 1ST FLR    INSERTION, IOL PROSTHESIS Right 10/18/2018    Performed by Susan Keith MD at [facility] OR 1ST FLR    INSERTION-PORT N/A 11/14/2017    Performed by [facility] Diagnostic Provider at [facility] OR 2ND FLR    INTRAOCULAR PROSTHESES INSERTION Right 10/18/2018    Procedure: INSERTION, IOL PROSTHESIS;  Surgeon: Susan Keith MD;  Location: [facility] OR [address] [place] [address];  Service: Ophthalmology;  Laterality: Right;    INTRAOCULAR PROSTHESES INSERTION Left 11/8/2018    Procedure: INSERTION, IOL PROSTHESIS;  Surgeon: Susan Keith MD;  Location: [facility] OR [address] [place] [address];  Service: Ophthalmology;  Laterality: Left;    PHACOEMULSIFICATION OF CATARACT Right 10/18/2018    Procedure: PHACOEMULSIFICATION, CATARACT;  Surgeon: Susan Keith MD;  Location: [facility] OR [address] [place] [address];  Service: Ophthalmology;  Laterality: Right;    PHACOEMULSIFICATION OF CATARACT Left 11/8/2018    Procedure: PHACOEMULSIFICATION, CATARACT;  Surgeon: Susan Keith MD;  Location: [facility] OR [address] [place] [address];  Service: Ophthalmology;  Laterality: Left;    PHACOEMULSIFICATION, CATARACT Left 11/8/2018    Performed by Susan Keith MD at [facility] OR [address] [place] [address]    PHACOEMULSIFICATION, CATARACT Right 10/18/2018    Performed by Susan Keith MD at [facility] OR [address] [place] [address]    SKIN BIOPSY      TOTAL KNEE ARTHROPLASTY      right     Family History   Problem  Relation Age of Onset    Glaucoma Mother     Hypertension Unknown     Kidney disease Unknown     Colon polyps Neg Hx     Colon cancer Neg Hx     Esophageal cancer Neg Hx     Irritable bowel syndrome Neg Hx     Inflammatory bowel disease Neg Hx     Stomach cancer Neg Hx     Blindness Neg Hx     Macular degeneration Neg Hx     Retinal detachment Neg Hx      Social History     Tobacco Use    Smoking status: Former Smoker    Smokeless tobacco: Never Used   Substance Use Topics    Alcohol use: Yes     Comment: Occasionally    Drug use: No     Review of Systems   Constitutional: Negative for chills and fever.   Respiratory: Positive for shortness of breath. Negative for cough.    Cardiovascular: Negative for chest pain.   Gastrointestinal: Negative for abdominal pain, diarrhea, nausea and vomiting.   Musculoskeletal: Positive for back pain. Negative for gait problem.   All other systems reviewed and are negative.      Physical Exam     Initial Vitals [12/06/18 1549]   BP Pulse Resp Temp SpO2   (!) 152/83 101 (!) 35 97.7 °F (36.5 °C) 100 %      MAP       --         Physical Exam    Nursing note and vitals reviewed.  Constitutional: She appears well-developed and well-nourished.   HENT:   Head: Normocephalic and atraumatic.   Right Ear: External ear normal.   Left Ear: External ear normal.   Nose: Nose normal.   Eyes: Conjunctivae are normal.   Neck: Normal range of motion. Neck supple.   Cardiovascular: Normal rate, regular rhythm, normal heart sounds and intact distal pulses. Exam reveals no gallop and no friction rub.    No murmur heard.  Pulmonary/Chest: No respiratory distress. She has decreased breath sounds (Bilaterally). She has no wheezes. She has no rhonchi. She has no rales. She exhibits no tenderness.       Musculoskeletal: Normal range of motion. She exhibits no edema or tenderness.        Right ankle: She exhibits normal range of motion and no swelling. No tenderness.        Left ankle: She  exhibits normal range of motion and no swelling. No tenderness.   Neurological: She is alert and oriented to person, place, and time.   Skin: Skin is warm and dry. Capillary refill takes less than 2 seconds. No rash noted.   Psychiatric: She has a normal mood and affect. Her behavior is normal.         ED Course   Procedures  Labs Reviewed   POCT URINALYSIS W/O SCOPE - Abnormal; Notable for the following components:       Result Value    Glucose, UA Negative (*)     Bilirubin, UA Negative (*)     Ketones, UA Negative (*)     Blood, UA Negative (*)     Protein, UA Negative (*)     Nitrite, UA Negative (*)     Leukocytes, UA Negative (*)     All other components within normal limits   POCT CMP - Abnormal; Notable for the following components:    Albumin, POC 3.1 (*)     POC Chloride 97 (*)     All other components within normal limits   POCT B-TYPE NATRIURETIC PEPTIDE (BNP) - Abnormal; Notable for the following components:    POC B-Type Natriuretic Peptide 116 (*)     All other components within normal limits   TROPONIN ISTAT   POCT CBC   POCT URINALYSIS W/O SCOPE   POCT CMP   POCT PROTIME-INR   POCT TROPONIN   POCT B-TYPE NATRIURETIC PEPTIDE (BNP)   ISTAT PROCEDURE     Troponin is 0.00  BNP 1 1 6  CMP sodium 140, potassium 4.1, bicarb 32, chloride 97, glucose 112, BUN 17 and creatinine 0.9  INR 0.9  A CNA BC white blood cell count 9, hemoglobin 12 hematocrit 37 and platelets 181.    EKG Readings: (Independently Interpreted)   Initial Reading: No STEMI. Previous EKG Date: When compared to prior EKG, 10/16/18, rate has decreased by 2 BPM today. Rhythm: Normal Sinus Rhythm. Heart Rate: 94. Axis: Left Axis Deviation. Other Impression: Abnormal EKG, QTC is normal at 475   With fusion complexes       Imaging Results          CTA Chest Non-Coronary (PE Study) (Final result)  Result time 12/06/18 18:01:19    Final result by Ronak Gallegos MD (12/06/18 18:01:19)                 Impression:      No evidence of PE.  No  significant change compared to recent CT restaging scan from 12/04/2018.      Electronically signed by: Ronak Gallegos MD  Date:    12/06/2018  Time:    18:01             Narrative:    EXAMINATION:  CTA CHEST NON CORONARY    CLINICAL HISTORY:  Dyspnea, cardiac origin suspected;sudden-onset shortness of breath rule out pulmonary embolus;    TECHNIQUE:  Low dose axial images, sagittal and coronal reformations were obtained from the thoracic inlet to the lung bases following the IV administration of 100 mL of Omnipaque 350.  Contrast timing was optimized to evaluate the pulmonary arteries.  MIP images were performed.    COMPARISON:  Recent CT neck chest abdomen and pelvis from 12/04/2018.    FINDINGS:  No evidence of PE.  Heart is normal in size without pericardial effusion.  Redemonstration of known subcarinal soft tissue mass and left pleural based mass.  Trace effusion is seen on the left.  Airways are patent.  Right lung is relatively clear.  No significant change in the appearance of the chest from recent CT.  Right-sided chest port is visualized.  No acute osseous abnormality identified.                               X-Ray Chest PA And Lateral (Final result)  Result time 12/06/18 16:11:32    Final result by Uzair Mcdowell MD (12/06/18 16:11:32)                 Impression:      Exam appears stable from prior CT, allowing for variation in modality.  No new confluent parenchymal opacity.      Electronically signed by: Uzair Mcdowell MD  Date:    12/06/2018  Time:    16:11             Narrative:    EXAMINATION:  XR CHEST PA AND LATERAL    CLINICAL HISTORY:  Chest Pain;    TECHNIQUE:  PA and lateral views of the chest were performed.    COMPARISON:  CXR 10/16/2018, CT CAP 12/04/2018    FINDINGS:  Port device over the right chest.The cardiomediastinal silhouette is mildly enlarged, similar to prior.  Retrocardiac opacity better delineated on recent CT exam.  Pulmonary vascularity appears within normal  limits.    Known left-sided pleural thickening as delineated on recent CT.  No new focal pulmonary parenchymal opacity.  No new large volume of pleural fluid or pneumothorax.    Osseous structures appear intact.                                 Medical Decision Making:   Clinical Tests:   Lab Tests: Ordered  Radiological Study: Ordered  Medical Tests: Ordered  Discussed labs, and imaging results.    Fill and take prescriptions as directed.  Return to the ED if symptoms worsen or do not resolve.   Answered questions and discussed discharge plan.    Patient feels much better and is ready for discharge.  Follow up with PCP in 1 days.            Scribe Attestation:   Scribe #1: I performed the above scribed service and the documentation accurately describes the services I performed. I attest to the accuracy of the note.     I, Dr. Marleny Sheppard, personally performed the services described in this documentation. This document was produced by a scribe under my direction and in my presence. All medical record entries made by the scribe were at my direction and in my presence.  I have reviewed the chart and agree that the record reflects my personal performance and is accurate and complete. Marleny Sheppard DO.     12/06/2018 6:20 PM             Clinical Impression:     1. 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 SOB (shortness of breath)    3. Other chronic pain                                   Marleny Sheppard DO  12/07/18 1835

## 2018-12-07 NOTE — TELEPHONE ENCOUNTER
----- Message from Vesta Lucero sent at 12/7/2018  1:15 PM CST -----  Contact: pt            Name of Who is Calling: pt      What is the request in detail: pt is requesting to be seen on Monday morning. Nothing available until 12/18/18. She went to hospital for shortness of breath and pain in hips. Doesn't want to be seen by anyone else. Call pt      Can the clinic reply by [facility]: no      What Number to Call Back if not in [facility]: [phone]
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
## 2018-12-13 NOTE — PROGRESS NOTES
CC: Squamous cell carcinoma, unknown primary, on chemotherapy, here for follow up         Oncology History:     Diagnosis: Squamous cell carcinoma , primary site unknown    Molecular/genetic testing: p16 negative; PD L1 could not be run due to inadequate tissue   Stage:        Stage 4   Treatment: Carboplatin/paclitaxel x  6 cycles (2/16/18 - 6/8/18)        HPI:  is a 73 y/o female who underwent bronchoscopy/EBUS evaluation on 8/31/17 for abnormal mediastinal adenopathy  and a 1.2 cm MORRO mass.     Bronchoscopy findings:    The oropharynx appears normal. The larynx appears normal. The vocal cords appear normal. The subglottic space is normal. The trachea is of normal caliber. The otf is sharp. The tracheobronchial tree was examined to at least the first subsegmental level. Bronchial mucosa and anatomy are normal; there are no endobronchial lesions, and no secretions.    Lymph Nodes: Lymph node sizing was performed via endobronchial ultrasound for suspected lung cancer. Sampling by transbronchial needle aspiration was also performed using an Olympus EBUS-TBNA 22  gauge needle in the subcarinal mediastinum (level 7) and sent for routine cytology.       - The 7 (subcarinal) node was 30 mm by EBUS. Three samples with the needle were obtained. The patient's condition was unchanged after the intervention.      Her treatment got delayed as she cancelled several appointments due to some issues at home. She started Caroplatin/paclitaxel palliatively. She had left thoracentesis on 2/9/18. There were atypical cells in the pleural fluid, highly suspicious for malignancy.     Interval history: She is here for a follow up visit. She has fatigue, dyspnea. She has finished 6 cycles of Carbo/Paclitaxel, last treatment on 6/8/18.          Review of Systems   Constitutional: Positive for malaise/fatigue. Negative for chills, fever and weight loss.   HENT: Negative for congestion, ear discharge and nosebleeds.    Eyes:  Negative for blurred vision, double vision, photophobia and pain.   Respiratory: Positive for shortness of breath. Negative for cough, hemoptysis and sputum production.    Cardiovascular: Negative for chest pain, palpitations, orthopnea and claudication.   Gastrointestinal: Negative for abdominal pain, diarrhea, heartburn, nausea and vomiting.   Genitourinary: Negative for dysuria, frequency and urgency.   Musculoskeletal: Negative for myalgias.   Skin: Negative for rash.   Neurological: Positive for weakness. Negative for dizziness, tremors, sensory change and headaches.   Endo/Heme/Allergies: Does not bruise/bleed easily.   Psychiatric/Behavioral: Negative for depression.         Vitals:    12/13/18 0957   BP: 139/82   Pulse: 82   Resp: 18   Temp: 97.5 °F (36.4 °C)           Current Outpatient Medications   Medication Sig    dexamethasone (DECADRON) 2 MG tablet TAKE 1 TABLET (2 MG TOTAL) BY MOUTH EVERY 12 (TWELVE) HOURS.    diclofenac (VOLTAREN) 50 MG EC tablet Take 1 tablet (50 mg total) by mouth 3 (three) times daily as needed.    diphenhydrAMINE (BENADRYL) 25 mg capsule Take 1 each (25 mg total) by mouth nightly as needed for Insomnia.    furosemide (LASIX) 20 MG tablet Take 1 tablet (20 mg total) by mouth daily as needed (for swelling of the lower extremities).    gabapentin (NEURONTIN) 100 MG capsule Take 1 capsule (100 mg total) by mouth every evening.    hydroCHLOROthiazide (HYDRODIURIL) 25 MG tablet Take 1 tablet (25 mg total) by mouth once daily.    HYDROcodone-acetaminophen (NORCO) 5-325 mg per tablet Take 1 tablet by mouth every 12 (twelve) hours as needed for Pain.    levocetirizine (XYZAL) 5 MG tablet Take 1 tablet (5 mg total) by mouth every evening.    LORazepam (ATIVAN) 0.5 MG tablet TAKE 1 TABLET BY MOUTH 2 TIMES DAILY.    losartan (COZAAR) 100 MG tablet TAKE 1 TABLET (100 MG TOTAL) BY MOUTH ONCE DAILY.    methotrexate 2.5 MG Tab as needed.     metoclopramide HCl (REGLAN) 5 MG tablet  Take 1 tablet (5 mg total) by mouth 2 (two) times daily as needed (belching, nausea).    ofloxacin (OCUFLOX) 0.3 % ophthalmic solution INSTILL 1 DROP INTO RIGHT EYE 3 TIMES A DAY    ofloxacin (OCUFLOX) 0.3 % ophthalmic solution Place 1 drop into the left eye 3 (three) times daily.    oxyCODONE-acetaminophen (PERCOCET)  mg per tablet Take 1 tablet by mouth every 8 (eight) hours as needed for Pain.    pantoprazole (PROTONIX) 40 MG tablet TAKE 1 TABLET (40 MG TOTAL) BY MOUTH ONCE DAILY.    prednisoLONE acetate (PRED FORTE) 1 % DrpS INSTILL 1 DROP INTO RIGHT EYE 3 TIMES A DAY    prednisoLONE acetate (PRED FORTE) 1 % DrpS Place 1 drop into the left eye 3 (three) times daily.    promethazine-dextromethorphan (PROMETHAZINE-DM) 6.25-15 mg/5 mL Syrp Take 5 mLs by mouth 3 (three) times daily as needed (cougn and congestion).    traZODone (DESYREL) 50 MG tablet Take 1 tablet (50 mg total) by mouth every evening. (Patient taking differently: Take 50 mg by mouth nightly as needed. )     No current facility-administered medications for this visit.        Vitals:    12/13/18 0957   BP: 139/82   Pulse: 82   Resp: 18   Temp: 97.5 °F (36.4 °C)     PS: ECOG1    Physical Exam   Constitutional: She is oriented to person, place, and time. She appears well-developed.   HENT:   Head: Normocephalic and atraumatic.   Mouth/Throat: No oropharyngeal exudate.   She has moon facies   Eyes: Pupils are equal, round, and reactive to light. No scleral icterus.   Neck: Normal range of motion.   Cardiovascular: Normal rate and regular rhythm.   No murmur heard.  Pulmonary/Chest: Effort normal and breath sounds normal. No respiratory distress. She has no wheezes.   Abdominal: Soft. Bowel sounds are normal. She exhibits no distension. There is no tenderness. There is no rebound.   Musculoskeletal: She exhibits no edema.   Lymphadenopathy:     She has no cervical adenopathy.   Neurological: She is alert and oriented to person, place, and  time. No cranial nerve deficit.   Skin: Skin is warm.   Psychiatric: She has a normal mood and affect.         Component      Latest Ref Rng & Units 12/6/2018 11/28/2018   WBC      3.90 - 12.70 K/uL  7.35   RBC      4.00 - 5.40 M/uL  3.79 (L)   Hemoglobin      12.0 - 16.0 g/dL  10.9 (L)   Hematocrit      37.0 - 48.5 %  35.6 (L)   MCV      82 - 98 fL  94   MCH      27.0 - 31.0 pg  28.8   MCHC      32.0 - 36.0 g/dL  30.6 (L)   RDW      11.5 - 14.5 %  14.1   Platelets      150 - 350 K/uL  186   MPV      9.2 - 12.9 fL  9.6   Immature Granulocytes      0.0 - 0.5 %  1.1 (H)   Gran # (ANC)      1.8 - 7.7 K/uL  4.8   Immature Grans (Abs)      0.00 - 0.04 K/uL  0.08 (H)   Lymph #      1.0 - 4.8 K/uL  1.7   Mono #      0.3 - 1.0 K/uL  0.7   Eos #      0.0 - 0.5 K/uL  0.1   Baso #      0.00 - 0.20 K/uL  0.02   nRBC      0 /100 WBC  0   Gran%      38.0 - 73.0 %  65.1   Lymph%      18.0 - 48.0 %  23.7   Mono%      4.0 - 15.0 %  8.8   Eosinophil%      0.0 - 8.0 %  1.0   Basophil%      0.0 - 1.9 %  0.3   Differential Method        Automated   Sodium      136 - 145 mmol/L  141   Potassium      3.5 - 5.1 mmol/L  3.9   Chloride      95 - 110 mmol/L  102   CO2      23 - 29 mmol/L  30 (H)   Glucose      70 - 110 mg/dL  106   BUN, Bld      8 - 23 mg/dL  17   Creatinine      0.5 - 1.4 mg/dL  0.7   Calcium      8.7 - 10.5 mg/dL  9.4   Total Protein      6.0 - 8.4 g/dL  7.4   Albumin      3.5 - 5.2 g/dL  2.7 (L)   Total Bilirubin      0.1 - 1.0 mg/dL  0.3   Alkaline Phosphatase      55 - 135 U/L  82   AST      10 - 40 U/L  16   ALT      10 - 44 U/L  23   Anion Gap      8 - 16 mmol/L  9   eGFR if African American      >60 mL/min/1.73 m:2  >60.0   eGFR if non African American      >60 mL/min/1.73 m:2  >60.0   POC PTWBT      9.7 - 14.3 sec 11.0    POC PTINR      0.9 - 1.2 0.9    Sample       UNK    TSH      0.400 - 4.000 uIU/mL  0.645   Free T4      0.71 - 1.51 ng/dL  1.36       6/12/17 CT chest with cont      1. Large left pleural effusion  resulting in atelectasis of the left lower lobe and portions of left upper lobe  2. 1.2 cm left upper lobe pleural based pulmonary nodule  3. Mediastinal adenopathy as described above with diffuse thickening of the wall of the distal esophagus. Findings are suspicious for neoplastic process. Recommend bronchoscopy biopsy and possible endoscopy for further evaluation.  4. 1.3 cm hypodensity within the dome of the liver. Metastatic focus cannot be entirely excluded.     9/7/17 PET CT       There is mildly increased tracer uptake within the patient's left pleural effusion and overlying the mediastinal adenopathy, max SUV 2.7.    Transmission CT images show continued pleural effusion and circumferential esophageal thickening. Transmission CT images also show non-avid bilateral groin adenopathy likely reactive in nature.      Impression        There is mildly increased tracer uptake within the patient's left pleural effusion and mediastinal adenopathy. While these findings suggest reactive etiology some low grade malignancies, such as bronchioloalveolar carcinoma, may show this level of uptake on PET scan.            10/30/17 EGD     The examined duodenum was normal.The entire examined stomach was normal.The cardia and gastric fundus were normal on retroflexion. A 1 cm hiatal hernia was found. The proximal extent of the gastric folds (end of tubular esophagus) was 38 cm from the incisors. The hiatal narrowing was 39 cm from the incisors. The Z-line was 38 cm from the incisors. Z-line was sharp. No esophagitis and no columnar esophagus and no lesions seen with NBI or white light.    Impression:                                   - Normal examined duodenum.                        - Normal stomach.                        - 1 cm hiatal hernia.                        - No specimens collected.     10/30/17 colonoscopy :     Cecal polyp ( 3mm) identified  Histopathology: Tubular adenoma        Pathology:     1/17/14  colonoscopy     FINAL PATHOLOGIC DIAGNOSIS     1. Colon biopsy, ascending colon-tubular adenoma.  2. Colon biopsy, transverse-tubular adenoma.  3. 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 The stalk and base of the polyp are well represented and are free of atypia.  4. Colon biopsy, sigmoid- mild glandular hyperplasia consistent with a benign hyperplastic polyp.        7/19/17 PLEURAL FLUID (CYTOLOGY AND CELL BLOCK):     -Groups of atypical cells present, malignancy cannot be excluded.  Note: While these cells could be reactive mesothelial cells, the level of atypia could be seen in malignancy. A cell block was prepared but the atypical cells are not present in the cell block for further characterization. Correlate  clinically. Repeat tap/biopsy might be helpful if clinically indicated.     8/28/17 EBUS FNA     FNA of subcarinal lymph node: Positive for malignant cells  Small clusters of malignant epithelial cells, favor squamous carcinoma Tumor cells are positive for CK 5/6 and CK 7. Immunostains for p63 and TTF-1 are negative.     1/26/18 CT chest, abdomen,pelvis with cont         Comparison: June 12, 2017    Chest: Since prior exam there is been interval enlargement of the right pleural effusion. There is high density material seen within the right pleural effusion that was not visualized on prior exam.    There is a 5.4 cm enhancing mass seen at the left lung base that previously measured approximately 2.9 cm.    There is a subcarinal mass measuring 4.4 cm it previously measured 2.9 cm.    There is circumferential esophageal thickening adjacent to this mass.    There is volume loss in the right chest with mediastinal shift to the right more pronounced than what was seen on prior exam.    Patient is a right internal jugular Port-A-Cath.    Abdomen/pelvis: The liver, spleen, adrenal glands,  kidneys and pancreas show a normal contrasted appearance. There is no evidence bowel obstruction. There is no evidence of abdominal or pelvic lymphadenopathy. The osseous structures show degenerative change of the spine.   Impression       Since prior exam in the patient's subcarinal and left lung base masses have increased in size. In addition there is now high density material seen within the patient's left pleural effusion new from prior exam and concerning for hemorrhage possibly from the mass.         1/26/18 CT head with cont          Comparison: CT June 8, 2017    Technique: Contiguous axial images were acquired from vertex to skull base without contrast.    Findings: There is no evidence acute intracranial abnormality.  Specifically there is no evidence acute infarct, contusion, extra-axial fluid collection or midline shift.  The ventricles are normal in size and configuration.  The calvarium is intact.  The paranasal sinuses and mastoid air cells are clear.    There is no evidence of enhancing mass.   Impression       There is no evidence of enhancing mass within the cerebral parenchyma      2/9/18 FINAL PATHOLOGIC DIAGNOSIS  LEFT LUNG, PLEURAL FLUID (CYTOLOGY):  - Scant groups of atypical cells, malignancy cannot be excluded. Note: Scant atypical groups of cells are present. Differential diagnosis includes reactive atypia vs malignancy .  Immunohistochemistry for CK5/6 and p63 is non-contributory. Please clinically correlate and re-sample as indicated.  All stains have satisfactory positive and negative controls.     5/14/18 CTA CHEST NON CORONARY       .    COMPARISON:  CT chest abdomen and pelvis from 01/26/2018.    FINDINGS:  Structures at the base of the neck are unremarkable.  Right-sided chest port is visualized with distal tip in the SVC.  Aorta is non-aneurysmal.  Heart is mildly enlarged with interval development of moderate pericardial effusion.  There is mild pericardial enhancement visualized.   The pulmonary arteries distribute normally. No intraluminal filling defects to suggest pulmonary thromboembolism to the level of the proximal segmental branches.    The trachea and bronchi are patent.  Moderate left and small right sided pleural effusions are visualized resulting in volume loss and compressive atelectasis within the lower lobes, left greater than right.  Grossly stable mass visualized in the right subcarinal/perihilar region.  There has been interval reduced size of multifocal left-sided pulmonary masses and anterior mediastinal lymphadenopathy.    Stable hepatic hypodensity is visualized within the anterior aspect of the right hepatic lobe.  Prominent right cardiophrenic lymph nodes are seen which have increased in size.  Redemonstration of diffuse abnormal distal esophageal wall thickening versus adjacent soft tissue lesion.  No acute osseous abnormality identified.  Extrathoracic soft tissues are unremarkable.   Impression        1. Interval development of moderate pericardial effusion with mild pericardial enhancement.  Findings may reflect malignant pericardial effusion.  2. No evidence of PE.  3. Patient with known metastatic cancer.  Relatively stable size mass in the right perihilar/subcarinal region.  Interval reduced size of previously visualized multifocal left lung metastatic lesions and anterior mediastinal lymphadenopathy.  Cardiac phrenic lymph nodes have increased in size.  Persistent abnormal prominent circumferential distal esophageal wall thickening versus surrounding soft tissue mass.  Examination was performed in an emergency setting and not to serve as a restaging scan.  4. Moderate left and small right pleural effusions resulting in volume loss and compressive atelectasis within the lower lobes, left greater than right.         6/7/18 PET CT      FINDINGS:  Patient was administered 12.23 millicuries of FDG intravenously.  Comparison is 09/07/2017.    There is physiologic  intracranial, head, and neck activity.  Heart mediastinum are normal.  There is low-grade activity within the left pleural effusion.  There is physiologic liver, spleen, GI  and pelvic organ activity.  No bone lesions are seen.  There is left lower lobe retro cardiac atelectasis.   Impression        See above    Low-grade activity within a loculated left upper pleural effusion.  This could be benign/reactive.  Malignancy not excluded but it has improved since the prior study.         9/8/18 CT          COMPARISON:  [facility] medicine PET-CT from 06/07/2018, CTA chest from 05/14/2018, and CT chest/abdomen/pelvis from 01/26/2018.    FINDINGS:  Thoracic soft tissues: Right-sided chest port in place with catheter tip terminating in the distal SVC.    Aorta: Normal in course and caliber, without significant atherosclerotic plaque. There are three branching vessels at the arch.    Heart: Mildly enlarged.  No pericardial effusion.    Flaca/Mediastinum: Subcarinal soft tissue mass re-identified the measuring 5.7 x 3.7 cm x 6.2 cm, similar to prior CTA.  Previously described prominent cardiophrenic lymph nodes are now within normal limits for size.  Abundant soft tissues again seen surrounding the distal 3rd of the esophagus, findings are similar to prior study and may represent the extensive esophageal wall thickening versus extension of the mediastinal soft tissue mass.    Lungs: The interval decrease in size of known left malignant effusion with small amount of residual dependent complex fluid and mild adjacent atelectatic changes.  The there is continued nodular left pleural thickening.  Previously described mass at the medial left lung base re-identified measuring 4.3 x 2.2 cm, findings are not well evaluated on previous CTA due to surrounding pleural fluid but is decreased in size in comparison to most recent CT chest/abdomen/pelvis from 01/26/2018.  The no new lesions identified.  Right lung is grossly clear.  No new lung  lesions identified.    Liver: Normal in size and attenuation.  Stable 1.2 cm hypoattenuating lesion in the dome of the liver consistent with simple cyst.  No new lesions identified.    Gallbladder: No calcified gallstones.    Bile Ducts: No evidence of dilated ducts.    Pancreas: No mass or peripancreatic fat stranding.    Spleen: Unremarkable.    Adrenals: Unremarkable.    Kidneys/ Ureters: Normal in size and location. Normal concentration of contrast. No hydronephrosis or nephrolithiasis. No ureteral dilatation.    Bladder: Incompletely distended.    Reproductive organs: Status post hysterectomy.    GI Tract/Mesentery: No evidence of bowel obstruction or inflammation.    Peritoneal Space: No ascites. No free air.    Retroperitoneum:  No significant adenopathy.    Abdominal wall:  Unremarkable.    Vasculature: No significant atherosclerosis or aneurysm.    Bones: Thoracic kyphosis. No acute fracture.Stable degenerative changes seen throughout the spine.       Impression         Subcarinal soft tissue mass re-identified and stable in size as compared to the most recent CTA chest from 05/24/2018, lesion demonstrates mild interval decrease in size as compared to the CT chest/abdomen/pelvis from 01/26/2018.    Interval decrease in size of known left malignant pleural effusion.    Medial left lung base mass, findings are not well evaluated on previous CTA chest due to surrounding pleural fluid but has decreased in size in comparison to most recent CT chest/abdomen/pelvis from 01/26/2018.  Continued nodular left pleural thickening, overall less prominent on today's exam.    Abundant soft tissue again seen surrounding the distal 3rd of the esophagus, findings are similar to prior study and may represent the extensive esophageal wall thickening versus extension of mediastinal soft tissue mass.    Previously described prominent cardiophrenic lymph nodes are now within normal limits for size.  No new  lymphadenopathy.    Additional stable findings as above.         12/4/18 CT neck, chest, abdomen, pelvis with cont  COMPARISON:  CTA 09/08/2018    FINDINGS:  Orbits, paranasal sinuses, and skull base: Postoperative changes of bilateral lens replacement.  No orbital masses.  Paranasal sinuses and mastoid air cells clear.  Visualized intracranial structures are unremarkable.    Nasopharynx: Normal.    Suprahyoid neck: Edentulous.  Other wise unremarkable oral cavity.  Normal oropharynx, parapharyngeal space, and retropharyngeal space.    Infrahyoid neck: Normal larynx, hypopharynx, and supraglottis.    Thyroid: Normal.    Cervical lymph nodes: No lymph node enlargement.    Vascular structures: Jugular veins appear patent.  Right internal jugular port catheter with tip terminating within the SVC.    Thoracic soft tissues: Port within the right anterior chest wall.    Aorta: Left-sided aortic arch.  No aneurysm.  There is mild calcific atherosclerosis of the descending thoracic aorta and abdominal aorta.  Aortic valve calcification.    Heart: Normal size.  No pericardial effusion.  Mild coronary artery atherosclerosis.    Pulmonary vasculature: Pulmonary arteries distribute normally.  There are four pulmonary veins.    Flaca/Mediastinum: Subcarinal mass with multiple calcifications.  This measures 4.4 cm in short axis, previously measuring 4.0 cm.  Extensive abnormal soft tissue attenuation along the esophagus potentially representing esophageal wall thickening or extension of this subcarinal mass--- this appears similar to prior examination.  No new mediastinal or hilar lymph node enlargement.    Airways: Patent.    Lungs/Pleura: There is a small volume of dependent left pleural fluid with scattered hyperattenuating material and nodular pleural thickening, similar to slightly decreased in volume from 09/08/2018.  There is a pleural based mass with calcifications abutting the pleura and posterior mediastinum along the  medial margin of the left hemidiaphragm.  This measures 4.3 cm in long axis appears unchanged in size from prior examination.  There is compressive atelectasis of the left lower lobe secondary to pleural fluid.  The lungs are otherwise well aerated and clear.    Esophagus: Abundant soft tissue along the distal 3rd of the esophagus as above.  Proximal esophagus is normal in caliber and course.    Liver: Normal in size and attenuation.  Stable cyst at the dome.    Gallbladder: No calcified stones or gallbladder wall thickening.    Bile Ducts: No dilatation.    Pancreas: No mass. No peripancreatic fat stranding.    Spleen: Unremarkable.    Adrenals: Unremarkable.    Kidneys/Ureters: Normal in size and enhancement.  Small hypodensity within the upper pole of the right kidney, too small to definitively characterize and likely a cyst.  No definite solid renal masses.  No hydroureteronephrosis.    Bladder: No wall thickening.    Reproductive organs: Status post hysterectomy.    GI Tract/Mesentery: No evidence of bowel obstruction or inflammation. Appendix is not identifiable but there is no infllammation in the expected region of the appendix.    Peritoneal Space: No ascites or free air.    Retroperitoneum: No lymph node enlargement.    Abdominal wall: Normal.    Vasculature: Brachiocephalic veins and SVC are patent.  Portal vein, portal venous branches, SMV, and splenic veins are patent..    Bones: No fracture or aggressive osseous lesion.  Multilevel degenerative changes of the cervical, thoracic, and lumbar spine.      Impression       1.  Stable left pleural based mass.  Stable small volume of left pleural fluid and nodule left pleural thickening compatible with malignant pleural effusion.    2.  Slight interval increase in size of subcarinal soft tissue mass.  Stable abundant soft tissue attenuation surrounding the distal 3rd of the esophagus.    RECIST SUMMARY:    Date of prior examination for comparison:  09/08/2018    Lesion 1: Subcarinal soft tissue mass.  Short axis measurement 4.4 cm cm. Series 2 image 42.  Prior measurement 4.0 cm.    Lesion 2: Left pleural base mass along the left hemidiaphragm.  4.3 cm. Series 2 image 66.  Prior measurement 4.3 cm.    Stable abutting soft tissue along the distal 3rd of the esophagus.    3.  No significant abnormality within the neck to account for this patient's facial edema.  No convincing evidence of SVC syndrome or jugular vein thrombus.         Assessment:     1. is a 72 y/o female who underwent bronchoscopy/EBUS evaluation on 8/31/17 for abnormal mediastinal adenopathy  and a 1.2 cm MORRO mass as well as pericardial adenopathy.FNA of subcarinal lymph node was positive for malignant cells.  There were small clusters of malignant epithelial cells, favoring squamous carcinoma. Site of origin is not clear. PET CT done on 9/7/17 did not reveal any lesion in head and neck region.  There was inadequate tissue to run PD L1.p16 was negative.    Esophageal thickening was noted on CT done on 6/12/17. EGD on 10/30/17 did not reveal any suspicious lesions in esophagus/stomach. ENT evaluation still pending.   I explained to her that if primary site is unclear, she will be treated as metastatic SCC of unknown primary.   Her treatment got delayed as she cancelled several appointments due to some issues at home. She said she could not have MRI due to claustrophobia, even with anti-anxiety medication. CT chest from 1/26/18 showed increase in the sizes of subcarinal and left lung base masses compared to previous CT in June 2017 . High density material was seen within the patient's left pleural effusion. No intracranial lesions noted on CT head. I discussed these findings with the patient and personally reviewed the CT scans from January 2018.  I told her that she has likely metastatic SCC of unknown primary. She does have RA history. It is unclear if she can tolerate check point  inhibitor therapy even if she has high PD1 expression on cytology/biopsy.  She started Carboplatin/Paclitaxel every 3 weeks. I had explained to her that rationale of treatment is palliation and not cure . She did not get her CTs after C4 as planned. PET CT before cycle 6, on 6/7/18 showed low-grade activity within the left pleural effusion.   CT done on 9/8/18 showed overall stable findings compared to her CTs from May 2018. Pleural effusion had resolved. She is still dyspneic, but her cough has resolved. Her performance status is better. She tolerates more activities now, than previously. She has facial puffiness.   No evidence of SVC syndrome/IJ thrombus noted on CT done on 12/4/18. Subcarinal soft tissue mass measures 4.4 cm cm, previously (in Sept 2018) measuring 4.0 cm. Left pleural base mass along the left hemidiaphragm measures 4.3 cm, unchanged from Sept 2018. No other lesions noted in rest of the lungs, abdomen, neck or pelvis. She will have biopsy of left pleural mass and evaluate for possible clinical trials/ targeted therapy.       2. Pleural effusion: She had left sided thoracentesis ( 1050ml) on 2/9/18. Malignancy could not be excluded. No  Indication for pleurex catheter/reepat thoracentesis at this time.    CTA done on 5/14/18 again showed moderate effusion on the left , mild on right. Now resolved.      3. Anorexia: Secondary to #1. She was on Cyproheptadine.However, her appetite was still poor. She was started on Decadron 2mg BID. It helped with her appetite. Howver, it caused her to gain weight as well as disturbed her sleep. She has stopped Decadron now      4. Anemia: Hgb 10.9 on 11/28/18. Normocytic, hypochromic. No overt bleeding. Iron panel on 4/19 showed iron deficiency.      5. Pericardial effusion: She was noted to have pericardial effusion on CTA chest done on 5/14/18. Possibly malignant. No symptoms/signs of tamponade. Now resolved.               Distress Screening Results: Psychosocial  Distress screening score of Distress Score: 4 noted and reviewed. No intervention indicated.

## 2018-12-18 PROBLEM — R63.4 WEIGHT LOSS, NON-INTENTIONAL: Status: RESOLVED | Noted: 2018-04-19 | Resolved: 2018-01-01

## 2018-12-18 PROBLEM — S01.81XA FACIAL LACERATION: Status: RESOLVED | Noted: 2017-06-08 | Resolved: 2018-01-01

## 2018-12-18 PROBLEM — E46 MALNUTRITION DUE TO STARVATION: Status: RESOLVED | Noted: 2018-05-15 | Resolved: 2018-01-01

## 2018-12-18 PROBLEM — T73.0XXS MALNUTRITION DUE TO STARVATION: Status: RESOLVED | Noted: 2018-05-15 | Resolved: 2018-01-01

## 2018-12-18 PROBLEM — R63.0 ANOREXIA: Status: RESOLVED | Noted: 2018-03-07 | Resolved: 2018-01-01

## 2018-12-18 NOTE — PROGRESS NOTES
Chief Complaint   Patient presents with    Back Pain       HPI    Silvia Capellan is 73 y.o. female. The encounter diagnosis was Central stenosis of spinal canal.    73 year old female comes to clinic for back pain follow up.  Patient reports that pain medications previously prescribed to her she is no longer taking.  She reports they do not improve her pain.  The pain is intermittent and can last for several days when it occurs.  The pain remains the same radiating to the left hip and down the left leg.    She reports when the pain is severe her mobility is severely decreased and she must use a wheelchair. She reports today her pain is less.    Review of Systems   Constitutional: Negative for activity change.   Respiratory: Negative for shortness of breath.    Cardiovascular: Negative for chest pain.   Musculoskeletal: Positive for arthralgias, back pain, gait problem and myalgias.   Neurological: Positive for weakness (left lower extremity). Negative for tremors and numbness.   Psychiatric/Behavioral: Negative for suicidal ideas.           Current Outpatient Medications:     diclofenac (VOLTAREN) 50 MG EC tablet, Take 1 tablet (50 mg total) by mouth 3 (three) times daily as needed., Disp: 30 tablet, Rfl: 1    diphenhydrAMINE (BENADRYL) 25 mg capsule, Take 1 each (25 mg total) by mouth nightly as needed for Insomnia., Disp: 20 capsule, Rfl: 0    furosemide (LASIX) 20 MG tablet, Take 1 tablet (20 mg total) by mouth daily as needed (for swelling of the lower extremities)., Disp: 90 tablet, Rfl: 0    gabapentin (NEURONTIN) 100 MG capsule, Take 1 capsule (100 mg total) by mouth every evening., Disp: 90 capsule, Rfl: 0    hydroCHLOROthiazide (HYDRODIURIL) 25 MG tablet, Take 1 tablet (25 mg total) by mouth once daily., Disp: 90 tablet, Rfl: 1    losartan (COZAAR) 100 MG tablet, TAKE 1 TABLET (100 MG TOTAL) BY MOUTH ONCE DAILY., Disp: 90 tablet, Rfl: 1    methotrexate 2.5 MG Tab, as needed. , Disp: , Rfl:      "metoclopramide HCl (REGLAN) 5 MG tablet, Take 1 tablet (5 mg total) by mouth 2 (two) times daily as needed (belching, nausea)., Disp: 10 tablet, Rfl: 0    pantoprazole (PROTONIX) 40 MG tablet, TAKE 1 TABLET (40 MG TOTAL) BY MOUTH ONCE DAILY., Disp: 90 tablet, Rfl: 1    dexamethasone (DECADRON) 2 MG tablet, TAKE 1 TABLET (2 MG TOTAL) BY MOUTH EVERY 12 (TWELVE) HOURS., Disp: 120 tablet, Rfl: 0    levocetirizine (XYZAL) 5 MG tablet, Take 1 tablet (5 mg total) by mouth every evening., Disp: 30 tablet, Rfl: 11    LORazepam (ATIVAN) 0.5 MG tablet, TAKE 1 TABLET BY MOUTH 2 TIMES DAILY., Disp: 30 tablet, Rfl: 0    ofloxacin (OCUFLOX) 0.3 % ophthalmic solution, Place 1 drop into the left eye 3 (three) times daily., Disp: , Rfl:     prednisoLONE acetate (PRED FORTE) 1 % DrpS, Place 1 drop into the left eye 3 (three) times daily., Disp: , Rfl:     tiZANidine (ZANAFLEX) 4 MG tablet, Take 1 tablet (4 mg total) by mouth every 8 (eight) hours as needed (back pain)., Disp: 30 tablet, Rfl: 0    traMADol (ULTRAM) 50 mg tablet, Take 1 tablet (50 mg total) by mouth every 12 (twelve) hours as needed for Pain., Disp: 30 tablet, Rfl: 0    traZODone (DESYREL) 50 MG tablet, Take 1 tablet (50 mg total) by mouth every evening. (Patient taking differently: Take 50 mg by mouth nightly as needed. ), Disp: 30 tablet, Rfl: 11      Blood pressure (!) 145/94, pulse 98, temperature 97.8 °F (36.6 °C), temperature source Oral, resp. rate 16, height 5' 7" (1.702 m), weight 111.9 kg (246 lb 11.1 oz), SpO2 97 %.    Physical Exam   Constitutional: Vital signs are normal. She appears well-developed and well-nourished. She does not appear ill. No distress.       Admission on 12/06/2018, Discharged on 12/06/2018   Component Date Value Ref Range Status    Albumin, POC 12/06/2018 3.1* 3.3 - 5.5 g/dL Final    Alkaline Phosphatase, POC 12/06/2018 80  42 - 141 U/L Final    ALT (SGPT), POC 12/06/2018 23  10 - 47 U/L Final    AST (SGOT), POC 12/06/2018 " 22  11 - 38 U/L Final    POC BUN 12/06/2018 17  7 - 22 mg/dL Final    Calcium, POC 12/06/2018 9.8  8.0 - 10.3 mg/dL Final    POC Chloride 12/06/2018 97* 98 - 108 mmol/L Final    POC Creatinine 12/06/2018 0.9  0.6 - 1.2 mg/dL Final    POC Glucose 12/06/2018 112  73 - 118 mg/dL Final    POC Potassium 12/06/2018 4.1  3.6 - 5.1 mmol/L Final    POC Sodium 12/06/2018 140  128 - 145 mmol/L Final    Bilirubin 12/06/2018 0.5  0.2 - 1.6 mg/dL Final    POC TCO2 12/06/2018 32  18 - 33 mmol/L Final    Protein 12/06/2018 8.1  6.4 - 8.1 g/dL Final    POC Cardiac Troponin I 12/06/2018 0.00  <0.09 ng/mL Final    Sample 12/06/2018 UNK   Final    POC B-Type Natriuretic Peptide 12/06/2018 116* 0.0 - 100.0 pg/mL Final    POC PTWBT 12/06/2018 11.0  9.7 - 14.3 sec Final    POC PTINR 12/06/2018 0.9  0.9 - 1.2 Final    Sample 12/06/2018 UNK   Final    Glucose, UA 12/06/2018 Negative*  Final    Bilirubin, UA 12/06/2018 Negative*  Final    Ketones, UA 12/06/2018 Negative*  Final    Spec Grav UA 12/06/2018 1.020   Final    Blood, UA 12/06/2018 Negative*  Final    PH, UA 12/06/2018 7.5   Final    Protein, UA 12/06/2018 Negative*  Final    Urobilinogen, UA 12/06/2018 0.2  E.U./dL Final    Nitrite, UA 12/06/2018 Negative*  Final    Leukocytes, UA 12/06/2018 Negative*  Final    Color, UA 12/06/2018 Yellow   Final    Clarity, UA 12/06/2018 Clear   Final   Lab Visit on 11/28/2018   Component Date Value Ref Range Status    WBC 11/28/2018 7.35  3.90 - 12.70 K/uL Final    RBC 11/28/2018 3.79* 4.00 - 5.40 M/uL Final    Hemoglobin 11/28/2018 10.9* 12.0 - 16.0 g/dL Final    Hematocrit 11/28/2018 35.6* 37.0 - 48.5 % Final    MCV 11/28/2018 94  82 - 98 fL Final    MCH 11/28/2018 28.8  27.0 - 31.0 pg Final    MCHC 11/28/2018 30.6* 32.0 - 36.0 g/dL Final    RDW 11/28/2018 14.1  11.5 - 14.5 % Final    Platelets 11/28/2018 186  150 - 350 K/uL Final    MPV 11/28/2018 9.6  9.2 - 12.9 fL Final    Immature Granulocytes  11/28/2018 1.1* 0.0 - 0.5 % Final    Gran # (ANC) 11/28/2018 4.8  1.8 - 7.7 K/uL Final    Immature Grans (Abs) 11/28/2018 0.08* 0.00 - 0.04 K/uL Final    Lymph # 11/28/2018 1.7  1.0 - 4.8 K/uL Final    Mono # 11/28/2018 0.7  0.3 - 1.0 K/uL Final    Eos # 11/28/2018 0.1  0.0 - 0.5 K/uL Final    Baso # 11/28/2018 0.02  0.00 - 0.20 K/uL Final    nRBC 11/28/2018 0  0 /100 WBC Final    Gran% 11/28/2018 65.1  38.0 - 73.0 % Final    Lymph% 11/28/2018 23.7  18.0 - 48.0 % Final    Mono% 11/28/2018 8.8  4.0 - 15.0 % Final    Eosinophil% 11/28/2018 1.0  0.0 - 8.0 % Final    Basophil% 11/28/2018 0.3  0.0 - 1.9 % Final    Differential Method 11/28/2018 Automated   Final    Sodium 11/28/2018 141  136 - 145 mmol/L Final    Potassium 11/28/2018 3.9  3.5 - 5.1 mmol/L Final    Chloride 11/28/2018 102  95 - 110 mmol/L Final    CO2 11/28/2018 30* 23 - 29 mmol/L Final    Glucose 11/28/2018 106  70 - 110 mg/dL Final    BUN, Bld 11/28/2018 17  8 - 23 mg/dL Final    Creatinine 11/28/2018 0.7  0.5 - 1.4 mg/dL Final    Calcium 11/28/2018 9.4  8.7 - 10.5 mg/dL Final    Total Protein 11/28/2018 7.4  6.0 - 8.4 g/dL Final    Albumin 11/28/2018 2.7* 3.5 - 5.2 g/dL Final    Total Bilirubin 11/28/2018 0.3  0.1 - 1.0 mg/dL Final    Alkaline Phosphatase 11/28/2018 82  55 - 135 U/L Final    AST 11/28/2018 16  10 - 40 U/L Final    ALT 11/28/2018 23  10 - 44 U/L Final    Anion Gap 11/28/2018 9  8 - 16 mmol/L Final    eGFR if African American 11/28/2018 >60.0  >60 mL/min/1.73 m^2 Final    eGFR if non African American 11/28/2018 >60.0  >60 mL/min/1.73 m^2 Final    TSH 11/28/2018 0.645  0.400 - 4.000 uIU/mL Final    Free T4 11/28/2018 1.36  0.71 - 1.51 ng/dL Final   Lab Visit on 11/02/2018   Component Date Value Ref Range Status    WBC 11/02/2018 5.22  3.90 - 12.70 K/uL Final    RBC 11/02/2018 3.70* 4.00 - 5.40 M/uL Final    Hemoglobin 11/02/2018 10.7* 12.0 - 16.0 g/dL Final    Hematocrit 11/02/2018 34.5* 37.0 - 48.5 %  Final    MCV 11/02/2018 93  82 - 98 fL Final    MCH 11/02/2018 28.9  27.0 - 31.0 pg Final    MCHC 11/02/2018 31.0* 32.0 - 36.0 g/dL Final    RDW 11/02/2018 13.2  11.5 - 14.5 % Final    Platelets 11/02/2018 175  150 - 350 K/uL Final    MPV 11/02/2018 10.0  9.2 - 12.9 fL Final    Immature Granulocytes 11/02/2018 0.4  0.0 - 0.5 % Final    Gran # (ANC) 11/02/2018 2.8  1.8 - 7.7 K/uL Final    Immature Grans (Abs) 11/02/2018 0.02  0.00 - 0.04 K/uL Final    Lymph # 11/02/2018 1.7  1.0 - 4.8 K/uL Final    Mono # 11/02/2018 0.6  0.3 - 1.0 K/uL Final    Eos # 11/02/2018 0.1  0.0 - 0.5 K/uL Final    Baso # 11/02/2018 0.02  0.00 - 0.20 K/uL Final    nRBC 11/02/2018 0  0 /100 WBC Final    Gran% 11/02/2018 53.2  38.0 - 73.0 % Final    Lymph% 11/02/2018 32.4  18.0 - 48.0 % Final    Mono% 11/02/2018 12.1  4.0 - 15.0 % Final    Eosinophil% 11/02/2018 1.5  0.0 - 8.0 % Final    Basophil% 11/02/2018 0.4  0.0 - 1.9 % Final    Differential Method 11/02/2018 Automated   Final    Sodium 11/02/2018 139  136 - 145 mmol/L Final    Potassium 11/02/2018 3.1* 3.5 - 5.1 mmol/L Final    Chloride 11/02/2018 101  95 - 110 mmol/L Final    CO2 11/02/2018 29  23 - 29 mmol/L Final    Glucose 11/02/2018 94  70 - 110 mg/dL Final    BUN, Bld 11/02/2018 22  8 - 23 mg/dL Final    Creatinine 11/02/2018 0.9  0.5 - 1.4 mg/dL Final    Calcium 11/02/2018 9.7  8.7 - 10.5 mg/dL Final    Total Protein 11/02/2018 7.5  6.0 - 8.4 g/dL Final    Albumin 11/02/2018 3.1* 3.5 - 5.2 g/dL Final    Total Bilirubin 11/02/2018 0.4  0.1 - 1.0 mg/dL Final    Alkaline Phosphatase 11/02/2018 76  55 - 135 U/L Final    AST 11/02/2018 15  10 - 40 U/L Final    ALT 11/02/2018 18  10 - 44 U/L Final    Anion Gap 11/02/2018 9  8 - 16 mmol/L Final    eGFR if African American 11/02/2018 >60.0  >60 mL/min/1.73 m^2 Final    eGFR if non African American 11/02/2018 >60.0  >60 mL/min/1.73 m^2 Final    WBC 11/02/2018 5.22  3.90 - 12.70 K/uL Final    RBC  11/02/2018 3.70* 4.00 - 5.40 M/uL Final    Hemoglobin 11/02/2018 10.7* 12.0 - 16.0 g/dL Final    Hematocrit 11/02/2018 34.5* 37.0 - 48.5 % Final    MCV 11/02/2018 93  82 - 98 fL Final    MCH 11/02/2018 28.9  27.0 - 31.0 pg Final    MCHC 11/02/2018 31.0* 32.0 - 36.0 g/dL Final    RDW 11/02/2018 13.2  11.5 - 14.5 % Final    Platelets 11/02/2018 175  150 - 350 K/uL Final    MPV 11/02/2018 10.0  9.2 - 12.9 fL Final    Immature Granulocytes 11/02/2018 0.4  0.0 - 0.5 % Final    Gran # (ANC) 11/02/2018 2.8  1.8 - 7.7 K/uL Final    Immature Grans (Abs) 11/02/2018 0.02  0.00 - 0.04 K/uL Final    Lymph # 11/02/2018 1.7  1.0 - 4.8 K/uL Final    Mono # 11/02/2018 0.6  0.3 - 1.0 K/uL Final    Eos # 11/02/2018 0.1  0.0 - 0.5 K/uL Final    Baso # 11/02/2018 0.02  0.00 - 0.20 K/uL Final    nRBC 11/02/2018 0  0 /100 WBC Final    Gran% 11/02/2018 53.2  38.0 - 73.0 % Final    Lymph% 11/02/2018 32.4  18.0 - 48.0 % Final    Mono% 11/02/2018 12.1  4.0 - 15.0 % Final    Eosinophil% 11/02/2018 1.5  0.0 - 8.0 % Final    Basophil% 11/02/2018 0.4  0.0 - 1.9 % Final    Differential Method 11/02/2018 Automated   Final    Group & Rh 11/02/2018 O POS   Final    Indirect Herman 11/02/2018 NEG   Final   Admission on 10/16/2018, Discharged on 10/16/2018   Component Date Value Ref Range Status    WBC 10/16/2018 6.51  3.90 - 12.70 K/uL Final    RBC 10/16/2018 3.66* 4.00 - 5.40 M/uL Final    Hemoglobin 10/16/2018 11.0* 12.0 - 16.0 g/dL Final    Hematocrit 10/16/2018 34.8* 37.0 - 48.5 % Final    MCV 10/16/2018 95  82 - 98 fL Final    MCH 10/16/2018 30.1  27.0 - 31.0 pg Final    MCHC 10/16/2018 31.6* 32.0 - 36.0 g/dL Final    RDW 10/16/2018 13.5  11.5 - 14.5 % Final    Platelets 10/16/2018 153  150 - 350 K/uL Final    MPV 10/16/2018 10.7  9.2 - 12.9 fL Final    Gran # (ANC) 10/16/2018 4.7  1.8 - 7.7 K/uL Final    Lymph # 10/16/2018 1.1  1.0 - 4.8 K/uL Final    Mono # 10/16/2018 0.7  0.3 - 1.0 K/uL Final    Eos #  10/16/2018 0.0  0.0 - 0.5 K/uL Final    Baso # 10/16/2018 0.01  0.00 - 0.20 K/uL Final    Gran% 10/16/2018 72.6  38.0 - 73.0 % Final    Lymph% 10/16/2018 16.7* 18.0 - 48.0 % Final    Mono% 10/16/2018 10.3  4.0 - 15.0 % Final    Eosinophil% 10/16/2018 0.2  0.0 - 8.0 % Final    Basophil% 10/16/2018 0.2  0.0 - 1.9 % Final    Differential Method 10/16/2018 Automated   Final    Sodium 10/16/2018 137  136 - 145 mmol/L Final    Potassium 10/16/2018 4.2  3.5 - 5.1 mmol/L Final    Chloride 10/16/2018 99  95 - 110 mmol/L Final    CO2 10/16/2018 29  23 - 29 mmol/L Final    Glucose 10/16/2018 114* 70 - 110 mg/dL Final    BUN, Bld 10/16/2018 21  8 - 23 mg/dL Final    Creatinine 10/16/2018 1.2  0.5 - 1.4 mg/dL Final    Calcium 10/16/2018 9.8  8.7 - 10.5 mg/dL Final    Total Protein 10/16/2018 7.5  6.0 - 8.4 g/dL Final    Albumin 10/16/2018 3.0* 3.5 - 5.2 g/dL Final    Total Bilirubin 10/16/2018 0.2  0.1 - 1.0 mg/dL Final    Alkaline Phosphatase 10/16/2018 72  55 - 135 U/L Final    AST 10/16/2018 12  10 - 40 U/L Final    ALT 10/16/2018 22  10 - 44 U/L Final    Anion Gap 10/16/2018 9  8 - 16 mmol/L Final    eGFR if  10/16/2018 52* >60 mL/min/1.73 m^2 Final    eGFR if non African American 10/16/2018 45* >60 mL/min/1.73 m^2 Final    Specimen UA 10/16/2018 Urine, Clean Catch   Final    Color, UA 10/16/2018 Yellow  Yellow, Straw, Kym Final    Appearance, UA 10/16/2018 Clear  Clear Final    pH, UA 10/16/2018 6.0  5.0 - 8.0 Final    Specific Gravity, UA 10/16/2018 1.020  1.005 - 1.030 Final    Protein, UA 10/16/2018 Negative  Negative Final    Glucose, UA 10/16/2018 Negative  Negative Final    Ketones, UA 10/16/2018 Negative  Negative Final    Bilirubin (UA) 10/16/2018 Negative  Negative Final    Occult Blood UA 10/16/2018 Negative  Negative Final    Nitrite, UA 10/16/2018 Negative  Negative Final    Urobilinogen, UA 10/16/2018 Negative  <2.0 EU/dL Final    Leukocytes, UA 10/16/2018  Negative  Negative Final    Troponin I 10/16/2018 <0.006  0.000 - 0.026 ng/mL Final    BNP 10/16/2018 45  0 - 99 pg/mL Final    Lipase 10/16/2018 15  4 - 60 U/L Final   Admission on 10/07/2018, Discharged on 10/07/2018   Component Date Value Ref Range Status    Glucose, UA 10/07/2018 Negative*  Final    Bilirubin, UA 10/07/2018 Negative*  Final    Ketones, UA 10/07/2018 Negative*  Final    Spec Grav UA 10/07/2018 1.015   Final    Blood, UA 10/07/2018 Negative*  Final    PH, UA 10/07/2018 5.5   Final    Protein, UA 10/07/2018 Negative*  Final    Urobilinogen, UA 10/07/2018 0.2  E.U./dL Final    Nitrite, UA 10/07/2018 Negative*  Final    Leukocytes, UA 10/07/2018 Negative*  Final    Color, UA 10/07/2018 Yellow   Final    Clarity, UA 10/07/2018 Clear   Final   Lab Visit on 09/19/2018   Component Date Value Ref Range Status    WBC 09/19/2018 9.75  3.90 - 12.70 K/uL Final    RBC 09/19/2018 3.85* 4.00 - 5.40 M/uL Final    Hemoglobin 09/19/2018 11.4* 12.0 - 16.0 g/dL Final    Hematocrit 09/19/2018 37.9  37.0 - 48.5 % Final    MCV 09/19/2018 98  82 - 98 fL Final    MCH 09/19/2018 29.6  27.0 - 31.0 pg Final    MCHC 09/19/2018 30.1* 32.0 - 36.0 g/dL Final    RDW 09/19/2018 13.2  11.5 - 14.5 % Final    Platelets 09/19/2018 158  150 - 350 K/uL Final    MPV 09/19/2018 10.0  9.2 - 12.9 fL Final    Immature Granulocytes 09/19/2018 1.8* 0.0 - 0.5 % Final    Gran # (ANC) 09/19/2018 7.3  1.8 - 7.7 K/uL Final    Immature Grans (Abs) 09/19/2018 0.18* 0.00 - 0.04 K/uL Final    Lymph # 09/19/2018 1.6  1.0 - 4.8 K/uL Final    Mono # 09/19/2018 0.7  0.3 - 1.0 K/uL Final    Eos # 09/19/2018 0.0  0.0 - 0.5 K/uL Final    Baso # 09/19/2018 0.01  0.00 - 0.20 K/uL Final    nRBC 09/19/2018 0  0 /100 WBC Final    Gran% 09/19/2018 74.5* 38.0 - 73.0 % Final    Lymph% 09/19/2018 16.3* 18.0 - 48.0 % Final    Mono% 09/19/2018 7.1  4.0 - 15.0 % Final    Eosinophil% 09/19/2018 0.2  0.0 - 8.0 % Final    Basophil%  09/19/2018 0.1  0.0 - 1.9 % Final    Differential Method 09/19/2018 Automated   Final    Sodium 09/19/2018 134* 136 - 145 mmol/L Final    Potassium 09/19/2018 4.8  3.5 - 5.1 mmol/L Final    Chloride 09/19/2018 99  95 - 110 mmol/L Final    CO2 09/19/2018 28  23 - 29 mmol/L Final    Glucose 09/19/2018 105  70 - 110 mg/dL Final    BUN, Bld 09/19/2018 29* 8 - 23 mg/dL Final    Creatinine 09/19/2018 0.8  0.5 - 1.4 mg/dL Final    Calcium 09/19/2018 9.6  8.7 - 10.5 mg/dL Final    Total Protein 09/19/2018 7.7  6.0 - 8.4 g/dL Final    Albumin 09/19/2018 2.9* 3.5 - 5.2 g/dL Final    Total Bilirubin 09/19/2018 0.2  0.1 - 1.0 mg/dL Final    Alkaline Phosphatase 09/19/2018 72  55 - 135 U/L Final    AST 09/19/2018 14  10 - 40 U/L Final    ALT 09/19/2018 18  10 - 44 U/L Final    Anion Gap 09/19/2018 7* 8 - 16 mmol/L Final    eGFR if African American 09/19/2018 >60.0  >60 mL/min/1.73 m^2 Final    eGFR if non African American 09/19/2018 >60.0  >60 mL/min/1.73 m^2 Final   ]    Assessment:    1. Central stenosis of spinal canal          Silvia was seen today for back pain.    Diagnoses and all orders for this visit:    Central stenosis of spinal canal  -     traMADol (ULTRAM) 50 mg tablet; Take 1 tablet (50 mg total) by mouth every 12 (twelve) hours as needed for Pain.  -     tiZANidine (ZANAFLEX) 4 MG tablet; Take 1 tablet (4 mg total) by mouth every 8 (eight) hours as needed (back pain).  -     Ambulatory Referral to Physical/Occupational Therapy  - Unchanged. Tramadol prescribed. Patient to discontinue use of Norco and Percocet.  Patient cautioned regarding taking these medications with Ativan.  - Zanaflex refilled.  - Patient scheduled with Dr. Crump for follow-up. PT referral placed.          FOLLOW UP: Follow-up in about 4 weeks (around 1/15/2019) for Follow up of back pain.

## 2018-12-18 NOTE — PATIENT INSTRUCTIONS
Common Spine and Disk Problems  The most common serious back problems happen when disks tear, bulge, or rupture. In such cases, an injured disk can no longer cushion the vertebrae and absorb shock. As a result, the rest of your spine may also weaken. This can lead to pain, stiffness, and other symptoms.     Torn annulus      Contained herniated disk      Extruded herniated disk   · Torn annulus. A sudden movement may cause a tiny tear in an annulus. Nearby ligaments may stretch.  · Contained herniated disk. As a disk wears out, the nucleus may bulge into the annulus and press on nerves.  · Extruded herniated disk. When a disk ruptures, its nucleus can squeeze out and irritate a nerve.     Arthritis      Instability      Spondylolisthesis   · Arthritis. As disks wear out over time, bone spurs form. These growths can irritate nerves and inflame facets.  · Instability. As a disk stretches, the vertebrae slip back and forth. This can put pressure on the annulus.  · Spondylolisthesis. Listhesis is a condition in which one vertebra has moved forward or backward, in relation to the one above or below it. This causes a crack (stress fracture) in the areas that link the vertebrae together. This may put pressure on the annulus, stretch the disk, and irritate nerves.  Date Last Reviewed: 10/18/2015  © 8383-4200 [employer]. [address] [place] [address], [place] [address]. All rights reserved. This information is not intended as a substitute for professional medical care. Always follow your healthcare professional's instructions.

## 2018-12-31 NOTE — TELEPHONE ENCOUNTER
----- Message from [place] sent at 12/31/2018  8:50 AM CST -----  Contact: Patient  Requesting a refill on RX Hydrocodone-Acetamin 5-325mg.      [facility]/pharmacy #[id number] - [place] - [address]  [address] [address]  Phone: [phone] Fax: [phone]      Contact:: [phone]

## 2019-01-01 ENCOUNTER — TELEPHONE (OUTPATIENT)
Dept: FAMILY MEDICINE | Facility: CLINIC | Age: 74
End: 2019-01-01

## 2019-01-01 ENCOUNTER — HOSPITAL ENCOUNTER (OUTPATIENT)
Dept: RADIOLOGY | Facility: HOSPITAL | Age: 74
Discharge: HOME | End: 2019-03-13
Attending: INTERNAL MEDICINE
Payer: MEDICARE

## 2019-01-01 ENCOUNTER — PATIENT OUTREACH (OUTPATIENT)
Dept: ADMINISTRATIVE | Facility: CLINIC | Age: 74
End: 2019-01-01

## 2019-01-01 ENCOUNTER — TELEPHONE (OUTPATIENT)
Dept: RADIATION ONCOLOGY | Facility: CLINIC | Age: 74
End: 2019-01-01

## 2019-01-01 ENCOUNTER — DOCUMENTATION ONLY (OUTPATIENT)
Dept: RADIATION ONCOLOGY | Facility: CLINIC | Age: 74
End: 2019-01-01

## 2019-01-01 ENCOUNTER — OFFICE VISIT (OUTPATIENT)
Dept: FAMILY MEDICINE | Facility: CLINIC | Age: 74
End: 2019-01-01
Payer: MEDICARE

## 2019-01-01 ENCOUNTER — LAB VISIT (OUTPATIENT)
Dept: LAB | Facility: HOSPITAL | Age: 74
End: 2019-01-01
Attending: INTERNAL MEDICINE
Payer: MEDICARE

## 2019-01-01 ENCOUNTER — TELEPHONE (OUTPATIENT)
Dept: HOME HEALTH SERVICES | Facility: HOSPITAL | Age: 74
End: 2019-01-01

## 2019-01-01 ENCOUNTER — HOSPITAL ENCOUNTER (OUTPATIENT)
Dept: CARDIOLOGY | Facility: HOSPITAL | Age: 74
Discharge: HOME | End: 2019-04-11
Attending: INTERNAL MEDICINE
Payer: MEDICARE

## 2019-01-01 ENCOUNTER — LAB VISIT (OUTPATIENT)
Dept: LAB | Facility: HOSPITAL | Age: 74
End: 2019-01-01
Payer: MEDICARE

## 2019-01-01 ENCOUNTER — HOSPITAL ENCOUNTER (EMERGENCY)
Facility: HOSPITAL | Age: 74
Discharge: HOME | End: 2019-07-08
Attending: EMERGENCY MEDICINE
Payer: MEDICARE

## 2019-01-01 ENCOUNTER — EXTERNAL HOME HEALTH (OUTPATIENT)
Dept: HOME HEALTH SERVICES | Facility: HOSPITAL | Age: 74
End: 2019-01-01
Payer: MEDICARE

## 2019-01-01 ENCOUNTER — HOSPITAL ENCOUNTER (OUTPATIENT)
Facility: HOSPITAL | Age: 74
Discharge: HOME | End: 2019-01-04
Attending: OPHTHALMOLOGY | Admitting: OPHTHALMOLOGY
Payer: MEDICARE

## 2019-01-01 ENCOUNTER — INFUSION (OUTPATIENT)
Dept: INFUSION THERAPY | Facility: HOSPITAL | Age: 74
End: 2019-01-01
Attending: INTERNAL MEDICINE
Payer: MEDICARE

## 2019-01-01 ENCOUNTER — HOSPITAL ENCOUNTER (EMERGENCY)
Facility: HOSPITAL | Age: 74
Discharge: HOME | End: 2019-06-03
Attending: EMERGENCY MEDICINE
Payer: MEDICARE

## 2019-01-01 ENCOUNTER — ANESTHESIA EVENT (OUTPATIENT)
Dept: ENDOSCOPY | Facility: HOSPITAL | Age: 74
DRG: 391 | End: 2019-01-01
Payer: MEDICARE

## 2019-01-01 ENCOUNTER — TELEPHONE (OUTPATIENT)
Dept: GASTROENTEROLOGY | Facility: CLINIC | Age: 74
End: 2019-01-01

## 2019-01-01 ENCOUNTER — HOSPITAL ENCOUNTER (INPATIENT)
Facility: HOSPITAL | Age: 74
LOS: 4 days | Discharge: HOME HEALTH | DRG: 391 | End: 2019-06-28
Attending: EMERGENCY MEDICINE | Admitting: INTERNAL MEDICINE
Payer: MEDICARE

## 2019-01-01 ENCOUNTER — HOSPITAL ENCOUNTER (EMERGENCY)
Facility: HOSPITAL | Age: 74
Discharge: HOME | End: 2019-05-13
Attending: EMERGENCY MEDICINE
Payer: MEDICARE

## 2019-01-01 ENCOUNTER — TELEPHONE (OUTPATIENT)
Dept: SPINE | Facility: CLINIC | Age: 74
End: 2019-01-01

## 2019-01-01 ENCOUNTER — OFFICE VISIT (OUTPATIENT)
Dept: OPTOMETRY | Facility: CLINIC | Age: 74
End: 2019-01-01
Payer: MEDICARE

## 2019-01-01 ENCOUNTER — PATIENT OUTREACH (OUTPATIENT)
Dept: ADMINISTRATIVE | Facility: HOSPITAL | Age: 74
End: 2019-01-01

## 2019-01-01 ENCOUNTER — OFFICE VISIT (OUTPATIENT)
Dept: HEMATOLOGY/ONCOLOGY | Facility: CLINIC | Age: 74
End: 2019-01-01
Payer: MEDICARE

## 2019-01-01 ENCOUNTER — HOSPITAL ENCOUNTER (OUTPATIENT)
Dept: RADIOLOGY | Facility: HOSPITAL | Age: 74
Discharge: HOME | End: 2019-05-31
Attending: INTERNAL MEDICINE
Payer: MEDICARE

## 2019-01-01 ENCOUNTER — OFFICE VISIT (OUTPATIENT)
Dept: CARDIOLOGY | Facility: CLINIC | Age: 74
End: 2019-01-01
Payer: MEDICARE

## 2019-01-01 ENCOUNTER — TELEPHONE (OUTPATIENT)
Dept: HEMATOLOGY/ONCOLOGY | Facility: CLINIC | Age: 74
End: 2019-01-01

## 2019-01-01 ENCOUNTER — HOSPITAL ENCOUNTER (INPATIENT)
Facility: HOSPITAL | Age: 74
LOS: 8 days | Discharge: HOME HEALTH | DRG: 947 | End: 2019-07-30
Attending: EMERGENCY MEDICINE | Admitting: EMERGENCY MEDICINE
Payer: MEDICARE

## 2019-01-01 ENCOUNTER — ANESTHESIA (OUTPATIENT)
Dept: ENDOSCOPY | Facility: HOSPITAL | Age: 74
DRG: 391 | End: 2019-01-01
Payer: MEDICARE

## 2019-01-01 ENCOUNTER — HOSPITAL ENCOUNTER (EMERGENCY)
Facility: HOSPITAL | Age: 74
Discharge: HOME | End: 2019-05-08
Attending: EMERGENCY MEDICINE
Payer: MEDICARE

## 2019-01-01 ENCOUNTER — HOSPITAL ENCOUNTER (EMERGENCY)
Facility: HOSPITAL | Age: 74
Discharge: HOME | End: 2019-04-22
Attending: EMERGENCY MEDICINE
Payer: MEDICARE

## 2019-01-01 ENCOUNTER — HOSPITAL ENCOUNTER (INPATIENT)
Facility: HOSPITAL | Age: 74
LOS: 3 days | Discharge: HOSPICE | DRG: 391 | End: 2019-08-08
Attending: EMERGENCY MEDICINE | Admitting: INTERNAL MEDICINE
Payer: MEDICARE

## 2019-01-01 ENCOUNTER — DOCUMENTATION ONLY (OUTPATIENT)
Dept: INFUSION THERAPY | Facility: HOSPITAL | Age: 74
End: 2019-01-01

## 2019-01-01 ENCOUNTER — HOSPITAL ENCOUNTER (OUTPATIENT)
Dept: RADIATION THERAPY | Facility: HOSPITAL | Age: 74
Discharge: HOME | End: 2019-07-01
Attending: RADIOLOGY
Payer: MEDICARE

## 2019-01-01 VITALS
OXYGEN SATURATION: 97 % | HEIGHT: 67 IN | RESPIRATION RATE: 36 BRPM | WEIGHT: 230 LBS | BODY MASS INDEX: 36.1 KG/M2 | SYSTOLIC BLOOD PRESSURE: 142 MMHG | TEMPERATURE: 98 F | HEART RATE: 88 BPM | DIASTOLIC BLOOD PRESSURE: 73 MMHG

## 2019-01-01 VITALS
HEART RATE: 80 BPM | SYSTOLIC BLOOD PRESSURE: 121 MMHG | WEIGHT: 234.13 LBS | TEMPERATURE: 98 F | HEIGHT: 67 IN | DIASTOLIC BLOOD PRESSURE: 59 MMHG | RESPIRATION RATE: 18 BRPM | OXYGEN SATURATION: 98 % | BODY MASS INDEX: 36.75 KG/M2

## 2019-01-01 VITALS
DIASTOLIC BLOOD PRESSURE: 63 MMHG | BODY MASS INDEX: 36.1 KG/M2 | RESPIRATION RATE: 18 BRPM | HEIGHT: 67 IN | HEART RATE: 88 BPM | SYSTOLIC BLOOD PRESSURE: 129 MMHG | TEMPERATURE: 99 F | WEIGHT: 230 LBS | OXYGEN SATURATION: 96 %

## 2019-01-01 VITALS
TEMPERATURE: 98 F | BODY MASS INDEX: 35.16 KG/M2 | WEIGHT: 216.5 LBS | RESPIRATION RATE: 18 BRPM | HEART RATE: 91 BPM | RESPIRATION RATE: 16 BRPM | DIASTOLIC BLOOD PRESSURE: 70 MMHG | OXYGEN SATURATION: 96 % | SYSTOLIC BLOOD PRESSURE: 123 MMHG | DIASTOLIC BLOOD PRESSURE: 74 MMHG | WEIGHT: 224 LBS | HEART RATE: 87 BPM | HEIGHT: 67 IN | TEMPERATURE: 98 F | OXYGEN SATURATION: 100 % | SYSTOLIC BLOOD PRESSURE: 108 MMHG | HEIGHT: 67 IN | BODY MASS INDEX: 33.98 KG/M2

## 2019-01-01 VITALS
OXYGEN SATURATION: 95 % | OXYGEN SATURATION: 96 % | RESPIRATION RATE: 18 BRPM | HEIGHT: 67 IN | SYSTOLIC BLOOD PRESSURE: 145 MMHG | WEIGHT: 210.13 LBS | RESPIRATION RATE: 18 BRPM | DIASTOLIC BLOOD PRESSURE: 74 MMHG | TEMPERATURE: 99 F | HEART RATE: 90 BPM | TEMPERATURE: 98 F | HEART RATE: 90 BPM | DIASTOLIC BLOOD PRESSURE: 72 MMHG | SYSTOLIC BLOOD PRESSURE: 136 MMHG | BODY MASS INDEX: 32.98 KG/M2

## 2019-01-01 VITALS
TEMPERATURE: 98 F | HEART RATE: 88 BPM | WEIGHT: 207.88 LBS | BODY MASS INDEX: 32.63 KG/M2 | HEIGHT: 67 IN | DIASTOLIC BLOOD PRESSURE: 69 MMHG | RESPIRATION RATE: 18 BRPM | OXYGEN SATURATION: 96 % | SYSTOLIC BLOOD PRESSURE: 130 MMHG

## 2019-01-01 VITALS
HEART RATE: 82 BPM | SYSTOLIC BLOOD PRESSURE: 146 MMHG | HEIGHT: 67 IN | WEIGHT: 246.25 LBS | OXYGEN SATURATION: 98 % | TEMPERATURE: 98 F | DIASTOLIC BLOOD PRESSURE: 70 MMHG | BODY MASS INDEX: 38.65 KG/M2

## 2019-01-01 VITALS
TEMPERATURE: 98 F | RESPIRATION RATE: 20 BRPM | HEIGHT: 67 IN | BODY MASS INDEX: 39.24 KG/M2 | OXYGEN SATURATION: 100 % | DIASTOLIC BLOOD PRESSURE: 75 MMHG | WEIGHT: 250 LBS | SYSTOLIC BLOOD PRESSURE: 128 MMHG | HEART RATE: 76 BPM

## 2019-01-01 VITALS
WEIGHT: 194 LBS | BODY MASS INDEX: 30.45 KG/M2 | HEART RATE: 97 BPM | OXYGEN SATURATION: 97 % | TEMPERATURE: 98 F | RESPIRATION RATE: 17 BRPM | DIASTOLIC BLOOD PRESSURE: 66 MMHG | HEIGHT: 67 IN | SYSTOLIC BLOOD PRESSURE: 112 MMHG

## 2019-01-01 VITALS
TEMPERATURE: 97 F | HEART RATE: 80 BPM | OXYGEN SATURATION: 100 % | WEIGHT: 247 LBS | RESPIRATION RATE: 18 BRPM | BODY MASS INDEX: 38.77 KG/M2 | DIASTOLIC BLOOD PRESSURE: 67 MMHG | SYSTOLIC BLOOD PRESSURE: 106 MMHG | HEIGHT: 67 IN

## 2019-01-01 VITALS
TEMPERATURE: 98 F | SYSTOLIC BLOOD PRESSURE: 106 MMHG | BODY MASS INDEX: 31.08 KG/M2 | RESPIRATION RATE: 18 BRPM | OXYGEN SATURATION: 100 % | WEIGHT: 198 LBS | HEART RATE: 98 BPM | DIASTOLIC BLOOD PRESSURE: 59 MMHG | HEIGHT: 67 IN

## 2019-01-01 VITALS
TEMPERATURE: 98 F | HEIGHT: 67 IN | SYSTOLIC BLOOD PRESSURE: 118 MMHG | DIASTOLIC BLOOD PRESSURE: 74 MMHG | OXYGEN SATURATION: 95 % | BODY MASS INDEX: 36.92 KG/M2 | HEART RATE: 96 BPM | RESPIRATION RATE: 16 BRPM | WEIGHT: 235.25 LBS

## 2019-01-01 VITALS
SYSTOLIC BLOOD PRESSURE: 131 MMHG | HEIGHT: 67 IN | RESPIRATION RATE: 16 BRPM | DIASTOLIC BLOOD PRESSURE: 83 MMHG | OXYGEN SATURATION: 96 % | BODY MASS INDEX: 35.29 KG/M2 | TEMPERATURE: 98 F | WEIGHT: 224.88 LBS | HEART RATE: 89 BPM

## 2019-01-01 VITALS
OXYGEN SATURATION: 95 % | DIASTOLIC BLOOD PRESSURE: 78 MMHG | SYSTOLIC BLOOD PRESSURE: 129 MMHG | WEIGHT: 180.75 LBS | RESPIRATION RATE: 18 BRPM | BODY MASS INDEX: 27.39 KG/M2 | TEMPERATURE: 98 F | HEART RATE: 102 BPM | HEIGHT: 68 IN

## 2019-01-01 VITALS
BODY MASS INDEX: 30.97 KG/M2 | WEIGHT: 197.31 LBS | OXYGEN SATURATION: 96 % | DIASTOLIC BLOOD PRESSURE: 66 MMHG | SYSTOLIC BLOOD PRESSURE: 109 MMHG | HEIGHT: 67 IN | TEMPERATURE: 98 F | RESPIRATION RATE: 18 BRPM | HEART RATE: 106 BPM

## 2019-01-01 VITALS
TEMPERATURE: 99 F | RESPIRATION RATE: 18 BRPM | OXYGEN SATURATION: 94 % | SYSTOLIC BLOOD PRESSURE: 131 MMHG | DIASTOLIC BLOOD PRESSURE: 67 MMHG | HEART RATE: 99 BPM

## 2019-01-01 VITALS
BODY MASS INDEX: 38.12 KG/M2 | SYSTOLIC BLOOD PRESSURE: 139 MMHG | TEMPERATURE: 98 F | OXYGEN SATURATION: 96 % | WEIGHT: 243.38 LBS | DIASTOLIC BLOOD PRESSURE: 80 MMHG | HEART RATE: 85 BPM

## 2019-01-01 VITALS
OXYGEN SATURATION: 98 % | BODY MASS INDEX: 37.72 KG/M2 | HEIGHT: 67 IN | SYSTOLIC BLOOD PRESSURE: 123 MMHG | DIASTOLIC BLOOD PRESSURE: 63 MMHG | HEART RATE: 88 BPM | WEIGHT: 240.31 LBS

## 2019-01-01 VITALS
HEIGHT: 67 IN | OXYGEN SATURATION: 98 % | DIASTOLIC BLOOD PRESSURE: 70 MMHG | HEART RATE: 96 BPM | TEMPERATURE: 98 F | BODY MASS INDEX: 34.53 KG/M2 | RESPIRATION RATE: 20 BRPM | SYSTOLIC BLOOD PRESSURE: 126 MMHG | WEIGHT: 220 LBS

## 2019-01-01 VITALS
HEART RATE: 77 BPM | OXYGEN SATURATION: 94 % | BODY MASS INDEX: 32.83 KG/M2 | WEIGHT: 197.06 LBS | TEMPERATURE: 98 F | SYSTOLIC BLOOD PRESSURE: 110 MMHG | HEIGHT: 65 IN | DIASTOLIC BLOOD PRESSURE: 55 MMHG | RESPIRATION RATE: 18 BRPM

## 2019-01-01 DIAGNOSIS — D63.0 ANEMIA IN NEOPLASTIC DISEASE: ICD-10-CM

## 2019-01-01 DIAGNOSIS — C34.92 SQUAMOUS CELL LUNG CANCER, LEFT: ICD-10-CM

## 2019-01-01 DIAGNOSIS — C44.92 SQUAMOUS CELL CARCINOMA OF UNKNOWN ORIGIN: ICD-10-CM

## 2019-01-01 DIAGNOSIS — K21.9 GASTROESOPHAGEAL REFLUX DISEASE, ESOPHAGITIS PRESENCE NOT SPECIFIED: ICD-10-CM

## 2019-01-01 DIAGNOSIS — R62.7 FAILURE TO THRIVE IN ADULT: ICD-10-CM

## 2019-01-01 DIAGNOSIS — K59.00 CONSTIPATION, UNSPECIFIED CONSTIPATION TYPE: ICD-10-CM

## 2019-01-01 DIAGNOSIS — M43.17 SPONDYLOLISTHESIS AT L5-S1 LEVEL: ICD-10-CM

## 2019-01-01 DIAGNOSIS — R11.10 VOMITING: ICD-10-CM

## 2019-01-01 DIAGNOSIS — E87.6 HYPOKALEMIA: ICD-10-CM

## 2019-01-01 DIAGNOSIS — M48.00 CENTRAL STENOSIS OF SPINAL CANAL: ICD-10-CM

## 2019-01-01 DIAGNOSIS — G89.29 CHRONIC LEFT HIP PAIN: Primary | ICD-10-CM

## 2019-01-01 DIAGNOSIS — M89.9 DISORDER OF BONE AND ARTICULAR CARTILAGE: ICD-10-CM

## 2019-01-01 DIAGNOSIS — J90 PLEURAL EFFUSION ON LEFT: ICD-10-CM

## 2019-01-01 DIAGNOSIS — S20.212A CHEST WALL CONTUSION, LEFT, INITIAL ENCOUNTER: Primary | ICD-10-CM

## 2019-01-01 DIAGNOSIS — C77.1 SECONDARY AND UNSPECIFIED MALIGNANT NEOPLASM OF INTRATHORACIC LYMPH NODES: Primary | ICD-10-CM

## 2019-01-01 DIAGNOSIS — J44.9 CHRONIC OBSTRUCTIVE PULMONARY DISEASE, UNSPECIFIED COPD TYPE: ICD-10-CM

## 2019-01-01 DIAGNOSIS — J96.02 ACUTE HYPERCAPNIC RESPIRATORY FAILURE: ICD-10-CM

## 2019-01-01 DIAGNOSIS — C77.1 SECONDARY AND UNSPECIFIED MALIGNANT NEOPLASM OF INTRATHORACIC LYMPH NODES: ICD-10-CM

## 2019-01-01 DIAGNOSIS — K21.00 REFLUX ESOPHAGITIS: ICD-10-CM

## 2019-01-01 DIAGNOSIS — C34.92 SQUAMOUS CELL LUNG CANCER, LEFT: Primary | ICD-10-CM

## 2019-01-01 DIAGNOSIS — M25.552 CHRONIC LEFT HIP PAIN: ICD-10-CM

## 2019-01-01 DIAGNOSIS — M06.9 RHEUMATOID ARTHRITIS OF HAND, UNSPECIFIED LATERALITY, UNSPECIFIED RHEUMATOID FACTOR PRESENCE: ICD-10-CM

## 2019-01-01 DIAGNOSIS — R10.13 ABDOMINAL DISCOMFORT, EPIGASTRIC: Primary | ICD-10-CM

## 2019-01-01 DIAGNOSIS — F41.1 ANXIETY STATE: ICD-10-CM

## 2019-01-01 DIAGNOSIS — R09.89 UPPER RESPIRATORY SYMPTOM: ICD-10-CM

## 2019-01-01 DIAGNOSIS — J96.01 ACUTE HYPOXEMIC RESPIRATORY FAILURE: ICD-10-CM

## 2019-01-01 DIAGNOSIS — Z96.1 PSEUDOPHAKIA: ICD-10-CM

## 2019-01-01 DIAGNOSIS — I10 ESSENTIAL HYPERTENSION: ICD-10-CM

## 2019-01-01 DIAGNOSIS — R00.0 TACHYCARDIA: ICD-10-CM

## 2019-01-01 DIAGNOSIS — D47.2 MGUS (MONOCLONAL GAMMOPATHY OF UNKNOWN SIGNIFICANCE): ICD-10-CM

## 2019-01-01 DIAGNOSIS — R53.1 WEAKNESS: ICD-10-CM

## 2019-01-01 DIAGNOSIS — R06.09 EXERTIONAL DYSPNEA: Primary | ICD-10-CM

## 2019-01-01 DIAGNOSIS — K56.41 FECAL IMPACTION: ICD-10-CM

## 2019-01-01 DIAGNOSIS — R06.02 SHORTNESS OF BREATH: ICD-10-CM

## 2019-01-01 DIAGNOSIS — F06.4 ANXIETY DISORDER DUE TO GENERAL MEDICAL CONDITION: Primary | ICD-10-CM

## 2019-01-01 DIAGNOSIS — R63.0 ANOREXIA: Primary | ICD-10-CM

## 2019-01-01 DIAGNOSIS — G47.00 INSOMNIA, UNSPECIFIED TYPE: ICD-10-CM

## 2019-01-01 DIAGNOSIS — R11.0 NAUSEA: ICD-10-CM

## 2019-01-01 DIAGNOSIS — R91.8 LUNG MASS: ICD-10-CM

## 2019-01-01 DIAGNOSIS — M94.9 DISORDER OF BONE AND ARTICULAR CARTILAGE: ICD-10-CM

## 2019-01-01 DIAGNOSIS — I27.20 PULMONARY HYPERTENSION: ICD-10-CM

## 2019-01-01 DIAGNOSIS — Z12.31 ENCOUNTER FOR SCREENING MAMMOGRAM FOR MALIGNANT NEOPLASM OF BREAST: ICD-10-CM

## 2019-01-01 DIAGNOSIS — M16.12 PRIMARY OSTEOARTHRITIS OF LEFT HIP: ICD-10-CM

## 2019-01-01 DIAGNOSIS — J91.0 MALIGNANT PLEURAL EFFUSION: ICD-10-CM

## 2019-01-01 DIAGNOSIS — Z23 NEED FOR STREPTOCOCCUS PNEUMONIAE VACCINATION: ICD-10-CM

## 2019-01-01 DIAGNOSIS — R94.4 DECREASED GFR: ICD-10-CM

## 2019-01-01 DIAGNOSIS — J90 PLEURAL EFFUSION: Primary | ICD-10-CM

## 2019-01-01 DIAGNOSIS — R11.2 NAUSEA AND VOMITING, INTRACTABILITY OF VOMITING NOT SPECIFIED, UNSPECIFIED VOMITING TYPE: Primary | ICD-10-CM

## 2019-01-01 DIAGNOSIS — J90 PLEURAL EFFUSION ON LEFT: Primary | ICD-10-CM

## 2019-01-01 DIAGNOSIS — R53.0 NEOPLASTIC MALIGNANT RELATED FATIGUE: Primary | ICD-10-CM

## 2019-01-01 DIAGNOSIS — R63.4 WEIGHT LOSS, NON-INTENTIONAL: ICD-10-CM

## 2019-01-01 DIAGNOSIS — G89.29 CHRONIC LEFT HIP PAIN: ICD-10-CM

## 2019-01-01 DIAGNOSIS — K22.2 ESOPHAGEAL OBSTRUCTION: ICD-10-CM

## 2019-01-01 DIAGNOSIS — E66.9 OBESITY (BMI 30-39.9): ICD-10-CM

## 2019-01-01 DIAGNOSIS — I10 HTN (HYPERTENSION): ICD-10-CM

## 2019-01-01 DIAGNOSIS — R07.9 CHEST PAIN: ICD-10-CM

## 2019-01-01 DIAGNOSIS — R13.10 ODYNOPHAGIA: ICD-10-CM

## 2019-01-01 DIAGNOSIS — Z71.89 ADVANCE CARE PLANNING: ICD-10-CM

## 2019-01-01 DIAGNOSIS — R94.4 DECREASED GFR: Primary | ICD-10-CM

## 2019-01-01 DIAGNOSIS — M48.00 CENTRAL STENOSIS OF SPINAL CANAL: Primary | ICD-10-CM

## 2019-01-01 DIAGNOSIS — R60.0 FACIAL EDEMA: ICD-10-CM

## 2019-01-01 DIAGNOSIS — C38.8: ICD-10-CM

## 2019-01-01 DIAGNOSIS — R53.0 NEOPLASTIC MALIGNANT RELATED FATIGUE: ICD-10-CM

## 2019-01-01 DIAGNOSIS — R63.0 ANOREXIA: ICD-10-CM

## 2019-01-01 DIAGNOSIS — K59.03 DRUG-INDUCED CONSTIPATION: Primary | ICD-10-CM
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
## 2019-01-01 DIAGNOSIS — Z71.89 GOALS OF CARE, COUNSELING/DISCUSSION: ICD-10-CM
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
## 2019-01-01 DIAGNOSIS — C34.02 MALIGNANT NEOPLASM OF HILUS OF LEFT LUNG: ICD-10-CM

## 2019-01-01 DIAGNOSIS — D69.6 THROMBOCYTOPENIA: ICD-10-CM

## 2019-01-01 DIAGNOSIS — R63.5 EXCESSIVE WEIGHT GAIN: ICD-10-CM

## 2019-01-01 DIAGNOSIS — R11.2 INTRACTABLE VOMITING WITH NAUSEA, UNSPECIFIED VOMITING TYPE: ICD-10-CM

## 2019-01-01 DIAGNOSIS — Z12.31 ENCOUNTER FOR SCREENING MAMMOGRAM FOR BREAST CANCER: ICD-10-CM

## 2019-01-01 DIAGNOSIS — I95.9 HYPOTENSION, UNSPECIFIED HYPOTENSION TYPE: Primary | ICD-10-CM

## 2019-01-01 DIAGNOSIS — J91.0 PLEURAL EFFUSION, MALIGNANT: ICD-10-CM

## 2019-01-01 DIAGNOSIS — Z00.00 HEALTHCARE MAINTENANCE: ICD-10-CM

## 2019-01-01 DIAGNOSIS — E83.42 HYPOMAGNESEMIA: Primary | ICD-10-CM

## 2019-01-01 DIAGNOSIS — N28.9 RENAL INSUFFICIENCY: ICD-10-CM

## 2019-01-01 DIAGNOSIS — L29.9 ITCHING: ICD-10-CM

## 2019-01-01 DIAGNOSIS — I31.39 PERICARDIAL EFFUSION: ICD-10-CM

## 2019-01-01 DIAGNOSIS — I10 ESSENTIAL HYPERTENSION: Primary | ICD-10-CM

## 2019-01-01 DIAGNOSIS — K22.2 ESOPHAGEAL OBSTRUCTION: Primary | ICD-10-CM

## 2019-01-01 DIAGNOSIS — R11.2 NON-INTRACTABLE VOMITING WITH NAUSEA, UNSPECIFIED VOMITING TYPE: Primary | ICD-10-CM

## 2019-01-01 DIAGNOSIS — Z12.39 BREAST CANCER SCREENING: Primary | ICD-10-CM

## 2019-01-01 DIAGNOSIS — H52.7 REFRACTIVE ERROR: ICD-10-CM

## 2019-01-01 DIAGNOSIS — M25.552 CHRONIC LEFT HIP PAIN: Primary | ICD-10-CM

## 2019-01-01 DIAGNOSIS — E86.0 DEHYDRATION: ICD-10-CM

## 2019-01-01 DIAGNOSIS — N30.00 ACUTE CYSTITIS WITHOUT HEMATURIA: ICD-10-CM

## 2019-01-01 DIAGNOSIS — R52 INTRACTABLE PAIN: ICD-10-CM

## 2019-01-01 DIAGNOSIS — Z51.5 PALLIATIVE CARE ENCOUNTER: ICD-10-CM

## 2019-01-01 LAB
ABO + RH BLD: NORMAL
ACANTHOCYTES BLD QL SMEAR: ABNORMAL
ALBUMIN SERPL BCP-MCNC: 1.9 G/DL (ref 3.5–5.2)
ALBUMIN SERPL BCP-MCNC: 2 G/DL (ref 3.5–5.2)
ALBUMIN SERPL BCP-MCNC: 2.1 G/DL (ref 3.5–5.2)
ALBUMIN SERPL BCP-MCNC: 2.2 G/DL (ref 3.5–5.2)
ALBUMIN SERPL BCP-MCNC: 2.4 G/DL (ref 3.5–5.2)
ALBUMIN SERPL BCP-MCNC: 2.4 G/DL (ref 3.5–5.2)
ALBUMIN SERPL BCP-MCNC: 2.5 G/DL (ref 3.5–5.2)
ALBUMIN SERPL BCP-MCNC: 2.5 G/DL (ref 3.5–5.2)
ALBUMIN SERPL BCP-MCNC: 2.6 G/DL (ref 3.5–5.2)
ALBUMIN SERPL BCP-MCNC: 2.8 G/DL (ref 3.5–5.2)
ALBUMIN SERPL BCP-MCNC: 2.8 G/DL (ref 3.5–5.2)
ALBUMIN SERPL BCP-MCNC: 2.9 G/DL
ALBUMIN SERPL BCP-MCNC: 2.9 G/DL (ref 3.5–5.2)
ALBUMIN SERPL BCP-MCNC: 3 G/DL
ALBUMIN SERPL BCP-MCNC: 3 G/DL (ref 3.5–5.2)
ALBUMIN SERPL ELPH-MCNC: 3.32 G/DL
ALLENS TEST: ABNORMAL
ALLENS TEST: ABNORMAL
ALP SERPL-CCNC: 63 U/L (ref 55–135)
ALP SERPL-CCNC: 63 U/L (ref 55–135)
ALP SERPL-CCNC: 65 U/L (ref 55–135)
ALP SERPL-CCNC: 66 U/L
ALP SERPL-CCNC: 66 U/L (ref 55–135)
ALP SERPL-CCNC: 67 U/L (ref 55–135)
ALP SERPL-CCNC: 68 U/L (ref 55–135)
ALP SERPL-CCNC: 70 U/L (ref 55–135)
ALP SERPL-CCNC: 71 U/L (ref 55–135)
ALP SERPL-CCNC: 71 U/L (ref 55–135)
ALP SERPL-CCNC: 73 U/L (ref 55–135)
ALP SERPL-CCNC: 74 U/L (ref 55–135)
ALP SERPL-CCNC: 74 U/L (ref 55–135)
ALP SERPL-CCNC: 75 U/L (ref 55–135)
ALP SERPL-CCNC: 75 U/L (ref 55–135)
ALP SERPL-CCNC: 76 U/L (ref 55–135)
ALP SERPL-CCNC: 77 U/L (ref 55–135)
ALP SERPL-CCNC: 78 U/L (ref 55–135)
ALP SERPL-CCNC: 80 U/L (ref 55–135)
ALP SERPL-CCNC: 80 U/L (ref 55–135)
ALP SERPL-CCNC: 82 U/L (ref 55–135)
ALP SERPL-CCNC: 88 U/L
ALPHA1 GLOB SERPL ELPH-MCNC: 0.41 G/DL
ALPHA2 GLOB SERPL ELPH-MCNC: 1.28 G/DL
ALT SERPL W/O P-5'-P-CCNC: 11 U/L (ref 10–44)
ALT SERPL W/O P-5'-P-CCNC: 12 U/L (ref 10–44)
ALT SERPL W/O P-5'-P-CCNC: 13 U/L (ref 10–44)
ALT SERPL W/O P-5'-P-CCNC: 14 U/L
ALT SERPL W/O P-5'-P-CCNC: 14 U/L (ref 10–44)
ALT SERPL W/O P-5'-P-CCNC: 14 U/L (ref 10–44)
ALT SERPL W/O P-5'-P-CCNC: 18 U/L
ALT SERPL W/O P-5'-P-CCNC: 6 U/L (ref 10–44)
ALT SERPL W/O P-5'-P-CCNC: 7 U/L (ref 10–44)
ALT SERPL W/O P-5'-P-CCNC: 8 U/L (ref 10–44)
ALT SERPL W/O P-5'-P-CCNC: 9 U/L (ref 10–44)
ALT SERPL W/O P-5'-P-CCNC: <5 U/L (ref 10–44)
AMORPH CRY UR QL COMP ASSIST: ABNORMAL
ANION GAP SERPL CALC-SCNC: 10 MMOL/L
ANION GAP SERPL CALC-SCNC: 10 MMOL/L (ref 8–16)
ANION GAP SERPL CALC-SCNC: 11 MMOL/L (ref 8–16)
ANION GAP SERPL CALC-SCNC: 12 MMOL/L (ref 8–16)
ANION GAP SERPL CALC-SCNC: 13 MMOL/L (ref 8–16)
ANION GAP SERPL CALC-SCNC: 15 MMOL/L (ref 8–16)
ANION GAP SERPL CALC-SCNC: 17 MMOL/L (ref 8–16)
ANION GAP SERPL CALC-SCNC: 17 MMOL/L (ref 8–16)
ANION GAP SERPL CALC-SCNC: 18 MMOL/L (ref 8–16)
ANION GAP SERPL CALC-SCNC: 7 MMOL/L (ref 8–16)
ANION GAP SERPL CALC-SCNC: 8 MMOL/L (ref 8–16)
ANION GAP SERPL CALC-SCNC: 9 MMOL/L
ANION GAP SERPL CALC-SCNC: 9 MMOL/L (ref 8–16)
ANISOCYTOSIS BLD QL SMEAR: ABNORMAL
ANISOCYTOSIS BLD QL SMEAR: SLIGHT
AORTIC ROOT ANNULUS: 3.16 CM
AORTIC VALVE CUSP SEPERATION: 2.33 CM
ASCENDING AORTA: 2.92 CM
AST SERPL-CCNC: 11 U/L (ref 10–40)
AST SERPL-CCNC: 12 U/L (ref 10–40)
AST SERPL-CCNC: 13 U/L
AST SERPL-CCNC: 13 U/L (ref 10–40)
AST SERPL-CCNC: 14 U/L (ref 10–40)
AST SERPL-CCNC: 15 U/L (ref 10–40)
AST SERPL-CCNC: 15 U/L (ref 10–40)
AST SERPL-CCNC: 16 U/L
AST SERPL-CCNC: 16 U/L (ref 10–40)
AST SERPL-CCNC: 16 U/L (ref 10–40)
AST SERPL-CCNC: 17 U/L (ref 10–40)
AST SERPL-CCNC: 18 U/L (ref 10–40)
AST SERPL-CCNC: 9 U/L (ref 10–40)
AUER BODIES BLD QL SMEAR: ABNORMAL
AV INDEX (PROSTH): 0.93
AV MEAN GRADIENT: 7.38 MMHG
AV PEAK GRADIENT: 14.75 MMHG
AV VALVE AREA: 2.05 CM2
AV VELOCITY RATIO: 0.89
B-GLOBULIN SERPL ELPH-MCNC: 0.92 G/DL
BACTERIA #/AREA URNS AUTO: ABNORMAL /HPF
BACTERIA #/AREA URNS AUTO: NORMAL /HPF
BACTERIA #/AREA URNS HPF: ABNORMAL /HPF
BACTERIA BLD CULT: NORMAL
BACTERIA UR CULT: NO GROWTH
BACTERIA UR CULT: NO GROWTH
BACTERIA UR CULT: NORMAL
BACTERIA UR CULT: NORMAL
BASO STIPL BLD QL SMEAR: ABNORMAL
BASOPHILS # BLD AUTO: 0 K/UL (ref 0–0.2)
BASOPHILS # BLD AUTO: 0 K/UL (ref 0–0.2)
BASOPHILS # BLD AUTO: 0.01 K/UL
BASOPHILS # BLD AUTO: 0.01 K/UL
BASOPHILS # BLD AUTO: 0.01 K/UL (ref 0–0.2)
BASOPHILS # BLD AUTO: 0.02 K/UL (ref 0–0.2)
BASOPHILS # BLD AUTO: 0.03 K/UL (ref 0–0.2)
BASOPHILS # BLD AUTO: ABNORMAL K/UL (ref 0–0.2)
BASOPHILS NFR BLD: 0 % (ref 0–1.9)
BASOPHILS NFR BLD: 0 % (ref 0–1.9)
BASOPHILS NFR BLD: 0.1 %
BASOPHILS NFR BLD: 0.1 % (ref 0–1.9)
BASOPHILS NFR BLD: 0.2 %
BASOPHILS NFR BLD: 0.2 % (ref 0–1.9)
BASOPHILS NFR BLD: 0.3 % (ref 0–1.9)
BASOPHILS NFR BLD: 0.4 % (ref 0–1.9)
BASOPHILS NFR BLD: 0.4 % (ref 0–1.9)
BASOPHILS NFR BLD: 0.5 % (ref 0–1.9)
BASOPHILS NFR BLD: ABNORMAL % (ref 0–1.9)
BILIRUB SERPL-MCNC: 0.1 MG/DL (ref 0.1–1)
BILIRUB SERPL-MCNC: 0.2 MG/DL (ref 0.1–1)
BILIRUB SERPL-MCNC: 0.3 MG/DL
BILIRUB SERPL-MCNC: 0.3 MG/DL
BILIRUB SERPL-MCNC: 0.3 MG/DL (ref 0.1–1)
BILIRUB SERPL-MCNC: 0.4 MG/DL (ref 0.1–1)
BILIRUB SERPL-MCNC: 0.5 MG/DL (ref 0.1–1)
BILIRUB UR QL STRIP: ABNORMAL
BILIRUB UR QL STRIP: NEGATIVE
BLASTS NFR BLD MANUAL: ABNORMAL %
BLD GP AB SCN CELLS X3 SERPL QL: NORMAL
BLD PROD TYP BPU: NORMAL
BLOOD UNIT EXPIRATION DATE: NORMAL
BLOOD UNIT TYPE CODE: 5100
BLOOD UNIT TYPE: NORMAL
BNP SERPL-MCNC: 26 PG/ML (ref 0–99)
BNP SERPL-MCNC: 58 PG/ML (ref 0–99)
BNP SERPL-MCNC: 64 PG/ML (ref 0–99)
BNP SERPL-MCNC: 75 PG/ML (ref 0–99)
BUN SERPL-MCNC: 10 MG/DL (ref 8–23)
BUN SERPL-MCNC: 11 MG/DL (ref 8–23)
BUN SERPL-MCNC: 13 MG/DL (ref 8–23)
BUN SERPL-MCNC: 14 MG/DL (ref 8–23)
BUN SERPL-MCNC: 15 MG/DL (ref 8–23)
BUN SERPL-MCNC: 16 MG/DL
BUN SERPL-MCNC: 17 MG/DL (ref 8–23)
BUN SERPL-MCNC: 18 MG/DL
BUN SERPL-MCNC: 3 MG/DL (ref 8–23)
BUN SERPL-MCNC: 3 MG/DL (ref 8–23)
BUN SERPL-MCNC: 4 MG/DL (ref 8–23)
BUN SERPL-MCNC: 4 MG/DL (ref 8–23)
BUN SERPL-MCNC: 5 MG/DL (ref 8–23)
BUN SERPL-MCNC: 6 MG/DL (ref 8–23)
BUN SERPL-MCNC: 7 MG/DL (ref 8–23)
BUN SERPL-MCNC: 8 MG/DL (ref 8–23)
BURR CELLS BLD QL SMEAR: ABNORMAL
CABOT RINGS BLD QL SMEAR: ABNORMAL
CALCIUM SERPL-MCNC: 8.4 MG/DL (ref 8.7–10.5)
CALCIUM SERPL-MCNC: 8.4 MG/DL (ref 8.7–10.5)
CALCIUM SERPL-MCNC: 8.6 MG/DL (ref 8.7–10.5)
CALCIUM SERPL-MCNC: 8.7 MG/DL (ref 8.7–10.5)
CALCIUM SERPL-MCNC: 8.8 MG/DL (ref 8.7–10.5)
CALCIUM SERPL-MCNC: 8.9 MG/DL (ref 8.7–10.5)
CALCIUM SERPL-MCNC: 9.1 MG/DL (ref 8.7–10.5)
CALCIUM SERPL-MCNC: 9.2 MG/DL (ref 8.7–10.5)
CALCIUM SERPL-MCNC: 9.2 MG/DL (ref 8.7–10.5)
CALCIUM SERPL-MCNC: 9.4 MG/DL (ref 8.7–10.5)
CALCIUM SERPL-MCNC: 9.5 MG/DL
CALCIUM SERPL-MCNC: 9.5 MG/DL
CALCIUM SERPL-MCNC: 9.5 MG/DL (ref 8.7–10.5)
CALCIUM SERPL-MCNC: 9.5 MG/DL (ref 8.7–10.5)
CALCIUM SERPL-MCNC: 9.6 MG/DL (ref 8.7–10.5)
CALCIUM SERPL-MCNC: 9.6 MG/DL (ref 8.7–10.5)
CALCIUM SERPL-MCNC: 9.9 MG/DL (ref 8.7–10.5)
CALCIUM SERPL-MCNC: 9.9 MG/DL (ref 8.7–10.5)
CHLORIDE SERPL-SCNC: 100 MMOL/L
CHLORIDE SERPL-SCNC: 100 MMOL/L (ref 95–110)
CHLORIDE SERPL-SCNC: 101 MMOL/L (ref 95–110)
CHLORIDE SERPL-SCNC: 103 MMOL/L (ref 95–110)
CHLORIDE SERPL-SCNC: 103 MMOL/L (ref 95–110)
CHLORIDE SERPL-SCNC: 104 MMOL/L (ref 95–110)
CHLORIDE SERPL-SCNC: 106 MMOL/L (ref 95–110)
CHLORIDE SERPL-SCNC: 106 MMOL/L (ref 95–110)
CHLORIDE SERPL-SCNC: 85 MMOL/L (ref 95–110)
CHLORIDE SERPL-SCNC: 86 MMOL/L (ref 95–110)
CHLORIDE SERPL-SCNC: 87 MMOL/L (ref 95–110)
CHLORIDE SERPL-SCNC: 89 MMOL/L (ref 95–110)
CHLORIDE SERPL-SCNC: 90 MMOL/L (ref 95–110)
CHLORIDE SERPL-SCNC: 93 MMOL/L (ref 95–110)
CHLORIDE SERPL-SCNC: 95 MMOL/L (ref 95–110)
CHLORIDE SERPL-SCNC: 98 MMOL/L
CHLORIDE SERPL-SCNC: 98 MMOL/L (ref 95–110)
CHLORIDE SERPL-SCNC: 99 MMOL/L (ref 95–110)
CHLORIDE SERPL-SCNC: 99 MMOL/L (ref 95–110)
CLARITY UR REFRACT.AUTO: ABNORMAL
CLARITY UR REFRACT.AUTO: CLEAR
CLARITY UR: ABNORMAL
CLARITY UR: CLEAR
CO2 SERPL-SCNC: 25 MMOL/L (ref 23–29)
CO2 SERPL-SCNC: 26 MMOL/L (ref 23–29)
CO2 SERPL-SCNC: 26 MMOL/L (ref 23–29)
CO2 SERPL-SCNC: 27 MMOL/L (ref 23–29)
CO2 SERPL-SCNC: 27 MMOL/L (ref 23–29)
CO2 SERPL-SCNC: 28 MMOL/L (ref 23–29)
CO2 SERPL-SCNC: 29 MMOL/L (ref 23–29)
CO2 SERPL-SCNC: 30 MMOL/L (ref 23–29)
CO2 SERPL-SCNC: 30 MMOL/L (ref 23–29)
CO2 SERPL-SCNC: 31 MMOL/L (ref 23–29)
CO2 SERPL-SCNC: 32 MMOL/L
CO2 SERPL-SCNC: 32 MMOL/L
CO2 SERPL-SCNC: 32 MMOL/L (ref 23–29)
CO2 SERPL-SCNC: 32 MMOL/L (ref 23–29)
CO2 SERPL-SCNC: 33 MMOL/L (ref 23–29)
CO2 SERPL-SCNC: 33 MMOL/L (ref 23–29)
CO2 SERPL-SCNC: 34 MMOL/L (ref 23–29)
CO2 SERPL-SCNC: 35 MMOL/L (ref 23–29)
CO2 SERPL-SCNC: 36 MMOL/L (ref 23–29)
CO2 SERPL-SCNC: 37 MMOL/L (ref 23–29)
CO2 SERPL-SCNC: 37 MMOL/L (ref 23–29)
CO2 SERPL-SCNC: 38 MMOL/L (ref 23–29)
CODING SYSTEM: NORMAL
COLOR UR AUTO: ABNORMAL
COLOR UR AUTO: ABNORMAL
COLOR UR AUTO: YELLOW
COLOR UR: YELLOW
COLOR UR: YELLOW
CREAT SERPL-MCNC: 0.6 MG/DL (ref 0.5–1.4)
CREAT SERPL-MCNC: 0.6 MG/DL (ref 0.5–1.4)
CREAT SERPL-MCNC: 0.7 MG/DL (ref 0.5–1.4)
CREAT SERPL-MCNC: 0.8 MG/DL
CREAT SERPL-MCNC: 0.8 MG/DL (ref 0.5–1.4)
CREAT SERPL-MCNC: 1 MG/DL
CREAT SERPL-MCNC: 1 MG/DL (ref 0.5–1.4)
CREAT SERPL-MCNC: 1.5 MG/DL (ref 0.5–1.4)
CV ECHO LV RWT: 0.81 CM
DACRYOCYTES BLD QL SMEAR: ABNORMAL
DAT IGG-SP REAG RBC-IMP: NORMAL
DELSYS: ABNORMAL
DELSYS: ABNORMAL
DIFFERENTIAL METHOD: ABNORMAL
DISPENSE STATUS: NORMAL
DOHLE BOD BLD QL SMEAR: ABNORMAL
DOP CALC AO PEAK VEL: 1.92 M/S
DOP CALC AO VTI: 34.42 CM
DOP CALC LVOT AREA: 2.22 CM2
DOP CALC LVOT DIAMETER: 1.68 CM
DOP CALC LVOT PEAK VEL: 1.72 M/S
DOP CALC LVOT STROKE VOLUME: 70.59 CM3
DOP CALCLVOT PEAK VEL VTI: 31.86 CM
E WAVE DECELERATION TIME: 253.94 MSEC
E/A RATIO: 1.18
ECHO LV POSTERIOR WALL: 1.4 CM (ref 0.6–1.1)
ENTEROVIRUS: NOT DETECTED
EOSINOPHIL # BLD AUTO: 0 K/UL (ref 0–0.5)
EOSINOPHIL # BLD AUTO: 0.1 K/UL
EOSINOPHIL # BLD AUTO: 0.1 K/UL
EOSINOPHIL # BLD AUTO: 0.1 K/UL (ref 0–0.5)
EOSINOPHIL # BLD AUTO: 0.2 K/UL (ref 0–0.5)
EOSINOPHIL # BLD AUTO: ABNORMAL K/UL (ref 0–0.5)
EOSINOPHIL NFR BLD: 0 % (ref 0–8)
EOSINOPHIL NFR BLD: 0.1 % (ref 0–8)
EOSINOPHIL NFR BLD: 0.2 % (ref 0–8)
EOSINOPHIL NFR BLD: 0.4 % (ref 0–8)
EOSINOPHIL NFR BLD: 0.5 % (ref 0–8)
EOSINOPHIL NFR BLD: 0.7 % (ref 0–8)
EOSINOPHIL NFR BLD: 0.8 %
EOSINOPHIL NFR BLD: 0.9 % (ref 0–8)
EOSINOPHIL NFR BLD: 1.2 % (ref 0–8)
EOSINOPHIL NFR BLD: 1.3 % (ref 0–8)
EOSINOPHIL NFR BLD: 1.3 % (ref 0–8)
EOSINOPHIL NFR BLD: 1.6 % (ref 0–8)
EOSINOPHIL NFR BLD: 2.2 %
EOSINOPHIL NFR BLD: 2.6 % (ref 0–8)
EOSINOPHIL NFR BLD: 2.7 % (ref 0–8)
EOSINOPHIL NFR BLD: 2.9 % (ref 0–8)
EOSINOPHIL NFR BLD: 3.6 % (ref 0–8)
EOSINOPHIL NFR BLD: 4 % (ref 0–8)
EOSINOPHIL NFR BLD: ABNORMAL % (ref 0–8)
EP: 5
ERYTHROCYTE [DISTWIDTH] IN BLOOD BY AUTOMATED COUNT: 13.3 %
ERYTHROCYTE [DISTWIDTH] IN BLOOD BY AUTOMATED COUNT: 13.4 % (ref 11.5–14.5)
ERYTHROCYTE [DISTWIDTH] IN BLOOD BY AUTOMATED COUNT: 13.5 % (ref 11.5–14.5)
ERYTHROCYTE [DISTWIDTH] IN BLOOD BY AUTOMATED COUNT: 13.6 %
ERYTHROCYTE [DISTWIDTH] IN BLOOD BY AUTOMATED COUNT: 13.8 % (ref 11.5–14.5)
ERYTHROCYTE [DISTWIDTH] IN BLOOD BY AUTOMATED COUNT: 14.4 % (ref 11.5–14.5)
ERYTHROCYTE [DISTWIDTH] IN BLOOD BY AUTOMATED COUNT: 14.6 % (ref 11.5–14.5)
ERYTHROCYTE [DISTWIDTH] IN BLOOD BY AUTOMATED COUNT: 14.6 % (ref 11.5–14.5)
ERYTHROCYTE [DISTWIDTH] IN BLOOD BY AUTOMATED COUNT: 14.7 % (ref 11.5–14.5)
ERYTHROCYTE [DISTWIDTH] IN BLOOD BY AUTOMATED COUNT: 14.8 % (ref 11.5–14.5)
ERYTHROCYTE [DISTWIDTH] IN BLOOD BY AUTOMATED COUNT: 14.9 % (ref 11.5–14.5)
ERYTHROCYTE [DISTWIDTH] IN BLOOD BY AUTOMATED COUNT: 15 % (ref 11.5–14.5)
ERYTHROCYTE [DISTWIDTH] IN BLOOD BY AUTOMATED COUNT: 15 % (ref 11.5–14.5)
ERYTHROCYTE [DISTWIDTH] IN BLOOD BY AUTOMATED COUNT: 15.1 % (ref 11.5–14.5)
ERYTHROCYTE [DISTWIDTH] IN BLOOD BY AUTOMATED COUNT: 15.1 % (ref 11.5–14.5)
ERYTHROCYTE [DISTWIDTH] IN BLOOD BY AUTOMATED COUNT: 15.2 % (ref 11.5–14.5)
ERYTHROCYTE [DISTWIDTH] IN BLOOD BY AUTOMATED COUNT: 15.3 % (ref 11.5–14.5)
ERYTHROCYTE [DISTWIDTH] IN BLOOD BY AUTOMATED COUNT: 15.3 % (ref 11.5–14.5)
ERYTHROCYTE [DISTWIDTH] IN BLOOD BY AUTOMATED COUNT: 15.4 % (ref 11.5–14.5)
ERYTHROCYTE [DISTWIDTH] IN BLOOD BY AUTOMATED COUNT: 15.5 % (ref 11.5–14.5)
ERYTHROCYTE [DISTWIDTH] IN BLOOD BY AUTOMATED COUNT: 15.6 % (ref 11.5–14.5)
ERYTHROCYTE [DISTWIDTH] IN BLOOD BY AUTOMATED COUNT: 17 % (ref 11.5–14.5)
ERYTHROCYTE [DISTWIDTH] IN BLOOD BY AUTOMATED COUNT: 17.1 % (ref 11.5–14.5)
ERYTHROCYTE [DISTWIDTH] IN BLOOD BY AUTOMATED COUNT: 17.1 % (ref 11.5–14.5)
ERYTHROCYTE [DISTWIDTH] IN BLOOD BY AUTOMATED COUNT: ABNORMAL % (ref 11.5–14.5)
ERYTHROCYTE [SEDIMENTATION RATE] IN BLOOD BY WESTERGREN METHOD: 18 MM/H
EST. GFR  (AFRICAN AMERICAN): 40 ML/MIN/1.73 M^2
EST. GFR  (AFRICAN AMERICAN): >60 ML/MIN/1.73 M^2
EST. GFR  (NON AFRICAN AMERICAN): 34 ML/MIN/1.73 M^2
EST. GFR  (NON AFRICAN AMERICAN): 56 ML/MIN/1.73 M^2
EST. GFR  (NON AFRICAN AMERICAN): 56 ML/MIN/1.73 M^2
EST. GFR  (NON AFRICAN AMERICAN): >60 ML/MIN/1.73 M^2
ESTIMATED AVG GLUCOSE: 114 MG/DL (ref 68–131)
FIO2: 28
FLOW: 2
FRACTIONAL SHORTENING: 29 % (ref 28–44)
GAMMA GLOB SERPL ELPH-MCNC: 1.66 G/DL
GIANT PLATELETS BLD QL SMEAR: ABNORMAL
GLUCOSE SERPL-MCNC: 101 MG/DL (ref 70–110)
GLUCOSE SERPL-MCNC: 104 MG/DL
GLUCOSE SERPL-MCNC: 104 MG/DL (ref 70–110)
GLUCOSE SERPL-MCNC: 105 MG/DL (ref 70–110)
GLUCOSE SERPL-MCNC: 106 MG/DL (ref 70–110)
GLUCOSE SERPL-MCNC: 109 MG/DL (ref 70–110)
GLUCOSE SERPL-MCNC: 111 MG/DL (ref 70–110)
GLUCOSE SERPL-MCNC: 113 MG/DL (ref 70–110)
GLUCOSE SERPL-MCNC: 115 MG/DL (ref 70–110)
GLUCOSE SERPL-MCNC: 117 MG/DL (ref 70–110)
GLUCOSE SERPL-MCNC: 120 MG/DL (ref 70–110)
GLUCOSE SERPL-MCNC: 121 MG/DL (ref 70–110)
GLUCOSE SERPL-MCNC: 123 MG/DL (ref 70–110)
GLUCOSE SERPL-MCNC: 129 MG/DL (ref 70–110)
GLUCOSE SERPL-MCNC: 135 MG/DL (ref 70–110)
GLUCOSE SERPL-MCNC: 141 MG/DL (ref 70–110)
GLUCOSE SERPL-MCNC: 84 MG/DL (ref 70–110)
GLUCOSE SERPL-MCNC: 84 MG/DL (ref 70–110)
GLUCOSE SERPL-MCNC: 85 MG/DL (ref 70–110)
GLUCOSE SERPL-MCNC: 87 MG/DL (ref 70–110)
GLUCOSE SERPL-MCNC: 89 MG/DL (ref 70–110)
GLUCOSE SERPL-MCNC: 90 MG/DL (ref 70–110)
GLUCOSE SERPL-MCNC: 94 MG/DL
GLUCOSE SERPL-MCNC: 95 MG/DL (ref 70–110)
GLUCOSE SERPL-MCNC: 95 MG/DL (ref 70–110)
GLUCOSE SERPL-MCNC: 98 MG/DL (ref 70–110)
GLUCOSE SERPL-MCNC: 98 MG/DL (ref 70–110)
GLUCOSE UR QL STRIP: NEGATIVE
HBA1C MFR BLD HPLC: 5.6 % (ref 4–5.6)
HCO3 UR-SCNC: 36.4 MMOL/L (ref 24–28)
HCO3 UR-SCNC: 36.7 MMOL/L (ref 24–28)
HCT VFR BLD AUTO: 23.2 % (ref 37–48.5)
HCT VFR BLD AUTO: 24.6 % (ref 37–48.5)
HCT VFR BLD AUTO: 25 % (ref 37–48.5)
HCT VFR BLD AUTO: 25.3 % (ref 37–48.5)
HCT VFR BLD AUTO: 26.1 % (ref 37–48.5)
HCT VFR BLD AUTO: 26.4 % (ref 37–48.5)
HCT VFR BLD AUTO: 27.2 % (ref 37–48.5)
HCT VFR BLD AUTO: 27.4 % (ref 37–48.5)
HCT VFR BLD AUTO: 27.4 % (ref 37–48.5)
HCT VFR BLD AUTO: 28.4 % (ref 37–48.5)
HCT VFR BLD AUTO: 28.6 % (ref 37–48.5)
HCT VFR BLD AUTO: 30.2 % (ref 37–48.5)
HCT VFR BLD AUTO: 30.2 % (ref 37–48.5)
HCT VFR BLD AUTO: 30.3 % (ref 37–48.5)
HCT VFR BLD AUTO: 31 % (ref 37–48.5)
HCT VFR BLD AUTO: 31.6 % (ref 37–48.5)
HCT VFR BLD AUTO: 31.7 % (ref 37–48.5)
HCT VFR BLD AUTO: 32.4 % (ref 37–48.5)
HCT VFR BLD AUTO: 32.4 % (ref 37–48.5)
HCT VFR BLD AUTO: 32.5 % (ref 37–48.5)
HCT VFR BLD AUTO: 32.8 %
HCT VFR BLD AUTO: 33.1 % (ref 37–48.5)
HCT VFR BLD AUTO: 33.1 % (ref 37–48.5)
HCT VFR BLD AUTO: 33.7 % (ref 37–48.5)
HCT VFR BLD AUTO: 33.8 % (ref 37–48.5)
HCT VFR BLD AUTO: 34.1 % (ref 37–48.5)
HCT VFR BLD AUTO: 35.1 % (ref 37–48.5)
HCT VFR BLD AUTO: 37 %
HCT VFR BLD AUTO: 37.2 % (ref 37–48.5)
HCT VFR BLD AUTO: 38.3 % (ref 37–48.5)
HCT VFR BLD AUTO: ABNORMAL % (ref 37–48.5)
HEINZ BOD BLD QL SMEAR: ABNORMAL
HGB BLD-MCNC: 10 G/DL (ref 12–16)
HGB BLD-MCNC: 10 G/DL (ref 12–16)
HGB BLD-MCNC: 10.1 G/DL (ref 12–16)
HGB BLD-MCNC: 10.2 G/DL (ref 12–16)
HGB BLD-MCNC: 10.4 G/DL (ref 12–16)
HGB BLD-MCNC: 10.6 G/DL (ref 12–16)
HGB BLD-MCNC: 11.1 G/DL (ref 12–16)
HGB BLD-MCNC: 11.3 G/DL
HGB BLD-MCNC: 11.9 G/DL (ref 12–16)
HGB BLD-MCNC: 6.9 G/DL (ref 12–16)
HGB BLD-MCNC: 7.1 G/DL (ref 12–16)
HGB BLD-MCNC: 7.3 G/DL (ref 12–16)
HGB BLD-MCNC: 7.3 G/DL (ref 12–16)
HGB BLD-MCNC: 7.4 G/DL (ref 12–16)
HGB BLD-MCNC: 7.5 G/DL (ref 12–16)
HGB BLD-MCNC: 8.3 G/DL (ref 12–16)
HGB BLD-MCNC: 8.4 G/DL (ref 12–16)
HGB BLD-MCNC: 8.6 G/DL (ref 12–16)
HGB BLD-MCNC: 8.9 G/DL (ref 12–16)
HGB BLD-MCNC: 9.1 G/DL (ref 12–16)
HGB BLD-MCNC: 9.4 G/DL (ref 12–16)
HGB BLD-MCNC: 9.5 G/DL (ref 12–16)
HGB BLD-MCNC: 9.6 G/DL (ref 12–16)
HGB BLD-MCNC: 9.6 G/DL (ref 12–16)
HGB BLD-MCNC: 9.8 G/DL (ref 12–16)
HGB BLD-MCNC: 9.9 G/DL
HGB BLD-MCNC: ABNORMAL G/DL (ref 12–16)
HGB C CRY RBC QL MICRO: ABNORMAL
HGB UR QL STRIP: ABNORMAL
HGB UR QL STRIP: NEGATIVE
HGB UR QL STRIP: NEGATIVE
HOWELL-JOLLY BOD BLD QL SMEAR: ABNORMAL
HUMAN BOCAVIRUS: NOT DETECTED
HUMAN CORONAVIRUS, COMMON COLD VIRUS: NOT DETECTED
HYALINE CASTS #/AREA URNS LPF: 1 /LPF
HYALINE CASTS UR QL AUTO: 0 /LPF
HYALINE CASTS UR QL AUTO: 0 /LPF
HYALINE CASTS UR QL AUTO: 1 /LPF
HYALINE CASTS UR QL AUTO: 3 /LPF
HYALINE CASTS UR QL AUTO: 7 /LPF
HYALINE CASTS UR QL AUTO: 8 /LPF
HYPOCHROMIA BLD QL SMEAR: ABNORMAL
IGA SERPL-MCNC: 314 MG/DL
IGG SERPL-MCNC: 1680 MG/DL
IGM SERPL-MCNC: 134 MG/DL
IMM GRANULOCYTES # BLD AUTO: 0.02 K/UL (ref 0–0.04)
IMM GRANULOCYTES # BLD AUTO: 0.03 K/UL
IMM GRANULOCYTES # BLD AUTO: 0.03 K/UL (ref 0–0.04)
IMM GRANULOCYTES # BLD AUTO: 0.04 K/UL (ref 0–0.04)
IMM GRANULOCYTES # BLD AUTO: 0.04 K/UL (ref 0–0.04)
IMM GRANULOCYTES # BLD AUTO: 0.06 K/UL (ref 0–0.04)
IMM GRANULOCYTES # BLD AUTO: 0.07 K/UL (ref 0–0.04)
IMM GRANULOCYTES # BLD AUTO: 0.07 K/UL (ref 0–0.04)
IMM GRANULOCYTES # BLD AUTO: 0.08 K/UL
IMM GRANULOCYTES # BLD AUTO: 0.09 K/UL (ref 0–0.04)
IMM GRANULOCYTES # BLD AUTO: 0.1 K/UL (ref 0–0.04)
IMM GRANULOCYTES # BLD AUTO: 0.11 K/UL (ref 0–0.04)
IMM GRANULOCYTES # BLD AUTO: 0.12 K/UL (ref 0–0.04)
IMM GRANULOCYTES # BLD AUTO: 0.13 K/UL (ref 0–0.04)
IMM GRANULOCYTES # BLD AUTO: 0.13 K/UL (ref 0–0.04)
IMM GRANULOCYTES # BLD AUTO: 0.14 K/UL (ref 0–0.04)
IMM GRANULOCYTES # BLD AUTO: 0.14 K/UL (ref 0–0.04)
IMM GRANULOCYTES # BLD AUTO: 0.15 K/UL (ref 0–0.04)
IMM GRANULOCYTES # BLD AUTO: 0.15 K/UL (ref 0–0.04)
IMM GRANULOCYTES # BLD AUTO: 0.16 K/UL (ref 0–0.04)
IMM GRANULOCYTES # BLD AUTO: 0.19 K/UL (ref 0–0.04)
IMM GRANULOCYTES # BLD AUTO: 0.19 K/UL (ref 0–0.04)
IMM GRANULOCYTES # BLD AUTO: ABNORMAL K/UL (ref 0–0.04)
IMM GRANULOCYTES NFR BLD AUTO: 0.3 % (ref 0–0.5)
IMM GRANULOCYTES NFR BLD AUTO: 0.4 % (ref 0–0.5)
IMM GRANULOCYTES NFR BLD AUTO: 0.5 %
IMM GRANULOCYTES NFR BLD AUTO: 0.6 % (ref 0–0.5)
IMM GRANULOCYTES NFR BLD AUTO: 0.7 % (ref 0–0.5)
IMM GRANULOCYTES NFR BLD AUTO: 0.8 % (ref 0–0.5)
IMM GRANULOCYTES NFR BLD AUTO: 0.9 % (ref 0–0.5)
IMM GRANULOCYTES NFR BLD AUTO: 1.1 %
IMM GRANULOCYTES NFR BLD AUTO: 1.2 % (ref 0–0.5)
IMM GRANULOCYTES NFR BLD AUTO: 1.3 % (ref 0–0.5)
IMM GRANULOCYTES NFR BLD AUTO: 1.3 % (ref 0–0.5)
IMM GRANULOCYTES NFR BLD AUTO: 1.4 % (ref 0–0.5)
IMM GRANULOCYTES NFR BLD AUTO: 1.4 % (ref 0–0.5)
IMM GRANULOCYTES NFR BLD AUTO: 1.5 % (ref 0–0.5)
IMM GRANULOCYTES NFR BLD AUTO: 1.6 % (ref 0–0.5)
IMM GRANULOCYTES NFR BLD AUTO: 1.8 % (ref 0–0.5)
IMM GRANULOCYTES NFR BLD AUTO: 1.8 % (ref 0–0.5)
IMM GRANULOCYTES NFR BLD AUTO: 1.9 % (ref 0–0.5)
IMM GRANULOCYTES NFR BLD AUTO: 2.1 % (ref 0–0.5)
IMM GRANULOCYTES NFR BLD AUTO: 2.2 % (ref 0–0.5)
IMM GRANULOCYTES NFR BLD AUTO: 2.2 % (ref 0–0.5)
IMM GRANULOCYTES NFR BLD AUTO: 2.3 % (ref 0–0.5)
IMM GRANULOCYTES NFR BLD AUTO: 2.9 % (ref 0–0.5)
IMM GRANULOCYTES NFR BLD AUTO: ABNORMAL % (ref 0–0.5)
INFLUENZA A - H1N1-09: NOT DETECTED
INR PPP: 1.2 (ref 0.8–1.2)
INR PPP: 1.3 (ref 0.8–1.2)
INR PPP: 1.4 (ref 0.8–1.2)
INTERPRETATION SERPL IFE-IMP: NORMAL
INTERVENTRICULAR SEPTUM: 1.42 CM (ref 0.6–1.1)
IP: 12
IVRT: 0.06 MSEC
KETONES UR QL STRIP: ABNORMAL
KETONES UR QL STRIP: NEGATIVE
KETONES UR QL STRIP: NEGATIVE
LA MAJOR: 4.79 CM
LA MINOR: 4.68 CM
LA WIDTH: 1.94 CM
LACTATE SERPL-SCNC: 0.9 MMOL/L (ref 0.5–2.2)
LACTATE SERPL-SCNC: 1 MMOL/L (ref 0.5–2.2)
LACTATE SERPL-SCNC: 1.3 MMOL/L (ref 0.5–2.2)
LACTATE SERPL-SCNC: 1.5 MMOL/L (ref 0.5–2.2)
LACTATE SERPL-SCNC: 2 MMOL/L (ref 0.5–2.2)
LDH SERPL L TO P-CCNC: 187 U/L (ref 110–260)
LEFT ATRIUM SIZE: 2.81 CM
LEFT ATRIUM VOLUME: 21.94 CM3
LEFT INTERNAL DIMENSION IN SYSTOLE: 2.45 CM (ref 2.1–4)
LEFT VENTRICLE DIASTOLIC VOLUME: 49.17 ML
LEFT VENTRICLE SYSTOLIC VOLUME: 21.2 ML
LEFT VENTRICULAR INTERNAL DIMENSION IN DIASTOLE: 3.45 CM (ref 3.5–6)
LEFT VENTRICULAR MASS: 171.52 G
LEUKOCYTE ESTERASE UR QL STRIP: ABNORMAL
LEUKOCYTE ESTERASE UR QL STRIP: NEGATIVE
LIPASE SERPL-CCNC: 18 U/L (ref 4–60)
LIPASE SERPL-CCNC: 6 U/L (ref 4–60)
LIPASE SERPL-CCNC: 8 U/L (ref 4–60)
LYMPHOCYTES # BLD AUTO: 0.3 K/UL (ref 1–4.8)
LYMPHOCYTES # BLD AUTO: 0.4 K/UL (ref 1–4.8)
LYMPHOCYTES # BLD AUTO: 0.5 K/UL (ref 1–4.8)
LYMPHOCYTES # BLD AUTO: 0.5 K/UL (ref 1–4.8)
LYMPHOCYTES # BLD AUTO: 0.6 K/UL (ref 1–4.8)
LYMPHOCYTES # BLD AUTO: 0.7 K/UL (ref 1–4.8)
LYMPHOCYTES # BLD AUTO: 0.7 K/UL (ref 1–4.8)
LYMPHOCYTES # BLD AUTO: 1 K/UL (ref 1–4.8)
LYMPHOCYTES # BLD AUTO: 1.4 K/UL (ref 1–4.8)
LYMPHOCYTES # BLD AUTO: 1.5 K/UL (ref 1–4.8)
LYMPHOCYTES # BLD AUTO: 1.6 K/UL (ref 1–4.8)
LYMPHOCYTES # BLD AUTO: 1.8 K/UL
LYMPHOCYTES # BLD AUTO: 1.9 K/UL
LYMPHOCYTES # BLD AUTO: ABNORMAL K/UL (ref 1–4.8)
LYMPHOCYTES NFR BLD: 10.8 % (ref 18–48)
LYMPHOCYTES NFR BLD: 19.3 % (ref 18–48)
LYMPHOCYTES NFR BLD: 20.1 % (ref 18–48)
LYMPHOCYTES NFR BLD: 21.6 % (ref 18–48)
LYMPHOCYTES NFR BLD: 21.7 % (ref 18–48)
LYMPHOCYTES NFR BLD: 25.9 %
LYMPHOCYTES NFR BLD: 27.8 %
LYMPHOCYTES NFR BLD: 28.5 % (ref 18–48)
LYMPHOCYTES NFR BLD: 4 % (ref 18–48)
LYMPHOCYTES NFR BLD: 4.1 % (ref 18–48)
LYMPHOCYTES NFR BLD: 4.2 % (ref 18–48)
LYMPHOCYTES NFR BLD: 4.3 % (ref 18–48)
LYMPHOCYTES NFR BLD: 4.6 % (ref 18–48)
LYMPHOCYTES NFR BLD: 4.7 % (ref 18–48)
LYMPHOCYTES NFR BLD: 4.8 % (ref 18–48)
LYMPHOCYTES NFR BLD: 5.2 % (ref 18–48)
LYMPHOCYTES NFR BLD: 5.3 % (ref 18–48)
LYMPHOCYTES NFR BLD: 5.9 % (ref 18–48)
LYMPHOCYTES NFR BLD: 6 % (ref 18–48)
LYMPHOCYTES NFR BLD: 6.2 % (ref 18–48)
LYMPHOCYTES NFR BLD: 6.4 % (ref 18–48)
LYMPHOCYTES NFR BLD: 6.6 % (ref 18–48)
LYMPHOCYTES NFR BLD: 8.1 % (ref 18–48)
LYMPHOCYTES NFR BLD: 8.1 % (ref 18–48)
LYMPHOCYTES NFR BLD: 8.6 % (ref 18–48)
LYMPHOCYTES NFR BLD: 8.6 % (ref 18–48)
LYMPHOCYTES NFR BLD: 9 % (ref 18–48)
LYMPHOCYTES NFR BLD: 9.6 % (ref 18–48)
LYMPHOCYTES NFR BLD: 9.8 % (ref 18–48)
LYMPHOCYTES NFR BLD: 9.9 % (ref 18–48)
LYMPHOCYTES NFR BLD: ABNORMAL % (ref 18–48)
MAGNESIUM SERPL-MCNC: 1.1 MG/DL (ref 1.6–2.6)
MAGNESIUM SERPL-MCNC: 1.1 MG/DL (ref 1.6–2.6)
MAGNESIUM SERPL-MCNC: 1.3 MG/DL (ref 1.6–2.6)
MAGNESIUM SERPL-MCNC: 1.4 MG/DL (ref 1.6–2.6)
MAGNESIUM SERPL-MCNC: 1.5 MG/DL (ref 1.6–2.6)
MAGNESIUM SERPL-MCNC: 1.6 MG/DL (ref 1.6–2.6)
MAGNESIUM SERPL-MCNC: 1.7 MG/DL (ref 1.6–2.6)
MAGNESIUM SERPL-MCNC: 1.7 MG/DL (ref 1.6–2.6)
MAGNESIUM SERPL-MCNC: 1.8 MG/DL (ref 1.6–2.6)
MAGNESIUM SERPL-MCNC: 1.8 MG/DL (ref 1.6–2.6)
MAGNESIUM SERPL-MCNC: 2 MG/DL (ref 1.6–2.6)
MAGNESIUM SERPL-MCNC: 2.1 MG/DL (ref 1.6–2.6)
MAGNESIUM SERPL-MCNC: 2.2 MG/DL (ref 1.6–2.6)
MCH RBC QN AUTO: 26.4 PG (ref 27–31)
MCH RBC QN AUTO: 26.7 PG (ref 27–31)
MCH RBC QN AUTO: 26.8 PG (ref 27–31)
MCH RBC QN AUTO: 26.8 PG (ref 27–31)
MCH RBC QN AUTO: 26.9 PG (ref 27–31)
MCH RBC QN AUTO: 27.1 PG (ref 27–31)
MCH RBC QN AUTO: 27.2 PG (ref 27–31)
MCH RBC QN AUTO: 27.3 PG (ref 27–31)
MCH RBC QN AUTO: 27.4 PG (ref 27–31)
MCH RBC QN AUTO: 27.5 PG (ref 27–31)
MCH RBC QN AUTO: 27.6 PG (ref 27–31)
MCH RBC QN AUTO: 27.9 PG (ref 27–31)
MCH RBC QN AUTO: 27.9 PG (ref 27–31)
MCH RBC QN AUTO: 28.2 PG
MCH RBC QN AUTO: 28.6 PG
MCH RBC QN AUTO: ABNORMAL PG (ref 27–31)
MCHC RBC AUTO-ENTMCNC: 28.4 G/DL (ref 32–36)
MCHC RBC AUTO-ENTMCNC: 28.4 G/DL (ref 32–36)
MCHC RBC AUTO-ENTMCNC: 28.9 G/DL (ref 32–36)
MCHC RBC AUTO-ENTMCNC: 29 G/DL (ref 32–36)
MCHC RBC AUTO-ENTMCNC: 29.2 G/DL (ref 32–36)
MCHC RBC AUTO-ENTMCNC: 29.5 G/DL (ref 32–36)
MCHC RBC AUTO-ENTMCNC: 29.6 G/DL (ref 32–36)
MCHC RBC AUTO-ENTMCNC: 29.6 G/DL (ref 32–36)
MCHC RBC AUTO-ENTMCNC: 29.7 G/DL (ref 32–36)
MCHC RBC AUTO-ENTMCNC: 29.8 G/DL (ref 32–36)
MCHC RBC AUTO-ENTMCNC: 30 G/DL (ref 32–36)
MCHC RBC AUTO-ENTMCNC: 30 G/DL (ref 32–36)
MCHC RBC AUTO-ENTMCNC: 30.1 G/DL (ref 32–36)
MCHC RBC AUTO-ENTMCNC: 30.1 G/DL (ref 32–36)
MCHC RBC AUTO-ENTMCNC: 30.2 G/DL
MCHC RBC AUTO-ENTMCNC: 30.2 G/DL (ref 32–36)
MCHC RBC AUTO-ENTMCNC: 30.2 G/DL (ref 32–36)
MCHC RBC AUTO-ENTMCNC: 30.3 G/DL (ref 32–36)
MCHC RBC AUTO-ENTMCNC: 30.3 G/DL (ref 32–36)
MCHC RBC AUTO-ENTMCNC: 30.4 G/DL (ref 32–36)
MCHC RBC AUTO-ENTMCNC: 30.5 G/DL
MCHC RBC AUTO-ENTMCNC: 30.7 G/DL (ref 32–36)
MCHC RBC AUTO-ENTMCNC: 30.8 G/DL (ref 32–36)
MCHC RBC AUTO-ENTMCNC: 31 G/DL (ref 32–36)
MCHC RBC AUTO-ENTMCNC: 31 G/DL (ref 32–36)
MCHC RBC AUTO-ENTMCNC: 31.1 G/DL (ref 32–36)
MCHC RBC AUTO-ENTMCNC: 31.1 G/DL (ref 32–36)
MCHC RBC AUTO-ENTMCNC: 31.6 G/DL (ref 32–36)
MCHC RBC AUTO-ENTMCNC: ABNORMAL G/DL (ref 32–36)
MCV RBC AUTO: 88 FL (ref 82–98)
MCV RBC AUTO: 89 FL (ref 82–98)
MCV RBC AUTO: 90 FL (ref 82–98)
MCV RBC AUTO: 91 FL (ref 82–98)
MCV RBC AUTO: 92 FL (ref 82–98)
MCV RBC AUTO: 92 FL (ref 82–98)
MCV RBC AUTO: 93 FL
MCV RBC AUTO: 93 FL (ref 82–98)
MCV RBC AUTO: 94 FL
MCV RBC AUTO: 94 FL (ref 82–98)
MCV RBC AUTO: 94 FL (ref 82–98)
MCV RBC AUTO: 95 FL (ref 82–98)
MCV RBC AUTO: ABNORMAL FL (ref 82–98)
MEGAKARYOCYTIC FRAGMENTS: ABNORMAL
METAMYELOCYTES NFR BLD MANUAL: ABNORMAL %
MICROSCOPIC COMMENT: ABNORMAL
MICROSCOPIC COMMENT: NORMAL
MIN VOL: 10.3
MODE: ABNORMAL
MODE: ABNORMAL
MONOCYTES # BLD AUTO: 0.2 K/UL (ref 0.3–1)
MONOCYTES # BLD AUTO: 0.3 K/UL (ref 0.3–1)
MONOCYTES # BLD AUTO: 0.4 K/UL (ref 0.3–1)
MONOCYTES # BLD AUTO: 0.5 K/UL (ref 0.3–1)
MONOCYTES # BLD AUTO: 0.6 K/UL
MONOCYTES # BLD AUTO: 0.6 K/UL (ref 0.3–1)
MONOCYTES # BLD AUTO: 0.7 K/UL
MONOCYTES # BLD AUTO: 0.7 K/UL (ref 0.3–1)
MONOCYTES # BLD AUTO: 0.8 K/UL (ref 0.3–1)
MONOCYTES # BLD AUTO: 0.9 K/UL (ref 0.3–1)
MONOCYTES # BLD AUTO: 1.1 K/UL (ref 0.3–1)
MONOCYTES # BLD AUTO: 1.1 K/UL (ref 0.3–1)
MONOCYTES # BLD AUTO: ABNORMAL K/UL (ref 0.3–1)
MONOCYTES NFR BLD: 10.3 % (ref 4–15)
MONOCYTES NFR BLD: 10.6 % (ref 4–15)
MONOCYTES NFR BLD: 10.8 % (ref 4–15)
MONOCYTES NFR BLD: 11.5 % (ref 4–15)
MONOCYTES NFR BLD: 11.7 %
MONOCYTES NFR BLD: 12 % (ref 4–15)
MONOCYTES NFR BLD: 12.2 % (ref 4–15)
MONOCYTES NFR BLD: 12.7 % (ref 4–15)
MONOCYTES NFR BLD: 12.9 % (ref 4–15)
MONOCYTES NFR BLD: 12.9 % (ref 4–15)
MONOCYTES NFR BLD: 13.1 % (ref 4–15)
MONOCYTES NFR BLD: 2.4 % (ref 4–15)
MONOCYTES NFR BLD: 2.9 % (ref 4–15)
MONOCYTES NFR BLD: 4.6 % (ref 4–15)
MONOCYTES NFR BLD: 4.8 % (ref 4–15)
MONOCYTES NFR BLD: 6 % (ref 4–15)
MONOCYTES NFR BLD: 6.1 % (ref 4–15)
MONOCYTES NFR BLD: 7 % (ref 4–15)
MONOCYTES NFR BLD: 7.2 % (ref 4–15)
MONOCYTES NFR BLD: 7.5 %
MONOCYTES NFR BLD: 7.8 % (ref 4–15)
MONOCYTES NFR BLD: 8 % (ref 4–15)
MONOCYTES NFR BLD: 8.2 % (ref 4–15)
MONOCYTES NFR BLD: 8.3 % (ref 4–15)
MONOCYTES NFR BLD: 8.4 % (ref 4–15)
MONOCYTES NFR BLD: 8.6 % (ref 4–15)
MONOCYTES NFR BLD: 8.6 % (ref 4–15)
MONOCYTES NFR BLD: 9.2 % (ref 4–15)
MONOCYTES NFR BLD: 9.4 % (ref 4–15)
MONOCYTES NFR BLD: 9.5 % (ref 4–15)
MONOCYTES NFR BLD: ABNORMAL % (ref 4–15)
MV PEAK A VEL: 0.94 M/S
MV PEAK E VEL: 1.11 M/S
MYELOCYTES NFR BLD MANUAL: ABNORMAL %
NEUTROPHILS # BLD AUTO: 10.4 K/UL (ref 1.8–7.7)
NEUTROPHILS # BLD AUTO: 12.7 K/UL (ref 1.8–7.7)
NEUTROPHILS # BLD AUTO: 13 K/UL (ref 1.8–7.7)
NEUTROPHILS # BLD AUTO: 3.2 K/UL (ref 1.8–7.7)
NEUTROPHILS # BLD AUTO: 3.2 K/UL (ref 1.8–7.7)
NEUTROPHILS # BLD AUTO: 3.5 K/UL (ref 1.8–7.7)
NEUTROPHILS # BLD AUTO: 3.6 K/UL
NEUTROPHILS # BLD AUTO: 3.6 K/UL (ref 1.8–7.7)
NEUTROPHILS # BLD AUTO: 4.1 K/UL (ref 1.8–7.7)
NEUTROPHILS # BLD AUTO: 4.2 K/UL (ref 1.8–7.7)
NEUTROPHILS # BLD AUTO: 4.5 K/UL (ref 1.8–7.7)
NEUTROPHILS # BLD AUTO: 4.6 K/UL (ref 1.8–7.7)
NEUTROPHILS # BLD AUTO: 4.7 K/UL
NEUTROPHILS # BLD AUTO: 5 K/UL (ref 1.8–7.7)
NEUTROPHILS # BLD AUTO: 5.1 K/UL (ref 1.8–7.7)
NEUTROPHILS # BLD AUTO: 5.2 K/UL (ref 1.8–7.7)
NEUTROPHILS # BLD AUTO: 5.5 K/UL (ref 1.8–7.7)
NEUTROPHILS # BLD AUTO: 5.7 K/UL (ref 1.8–7.7)
NEUTROPHILS # BLD AUTO: 6 K/UL (ref 1.8–7.7)
NEUTROPHILS # BLD AUTO: 6.2 K/UL (ref 1.8–7.7)
NEUTROPHILS # BLD AUTO: 6.3 K/UL (ref 1.8–7.7)
NEUTROPHILS # BLD AUTO: 6.7 K/UL (ref 1.8–7.7)
NEUTROPHILS # BLD AUTO: 6.8 K/UL (ref 1.8–7.7)
NEUTROPHILS # BLD AUTO: 6.8 K/UL (ref 1.8–7.7)
NEUTROPHILS # BLD AUTO: 7.3 K/UL (ref 1.8–7.7)
NEUTROPHILS # BLD AUTO: 7.7 K/UL (ref 1.8–7.7)
NEUTROPHILS # BLD AUTO: 8.9 K/UL (ref 1.8–7.7)
NEUTROPHILS # BLD AUTO: 9.9 K/UL (ref 1.8–7.7)
NEUTROPHILS # BLD AUTO: ABNORMAL K/UL (ref 1.8–7.7)
NEUTROPHILS NFR BLD: 57.6 %
NEUTROPHILS NFR BLD: 59.1 % (ref 38–73)
NEUTROPHILS NFR BLD: 63.7 % (ref 38–73)
NEUTROPHILS NFR BLD: 64.6 %
NEUTROPHILS NFR BLD: 64.7 % (ref 38–73)
NEUTROPHILS NFR BLD: 64.8 % (ref 38–73)
NEUTROPHILS NFR BLD: 66.7 % (ref 38–73)
NEUTROPHILS NFR BLD: 74.2 % (ref 38–73)
NEUTROPHILS NFR BLD: 74.3 % (ref 38–73)
NEUTROPHILS NFR BLD: 75.2 % (ref 38–73)
NEUTROPHILS NFR BLD: 75.6 % (ref 38–73)
NEUTROPHILS NFR BLD: 77.4 % (ref 38–73)
NEUTROPHILS NFR BLD: 81.3 % (ref 38–73)
NEUTROPHILS NFR BLD: 81.3 % (ref 38–73)
NEUTROPHILS NFR BLD: 81.8 % (ref 38–73)
NEUTROPHILS NFR BLD: 81.9 % (ref 38–73)
NEUTROPHILS NFR BLD: 81.9 % (ref 38–73)
NEUTROPHILS NFR BLD: 83.3 % (ref 38–73)
NEUTROPHILS NFR BLD: 83.4 % (ref 38–73)
NEUTROPHILS NFR BLD: 83.6 % (ref 38–73)
NEUTROPHILS NFR BLD: 84.7 % (ref 38–73)
NEUTROPHILS NFR BLD: 85.2 % (ref 38–73)
NEUTROPHILS NFR BLD: 85.4 % (ref 38–73)
NEUTROPHILS NFR BLD: 85.4 % (ref 38–73)
NEUTROPHILS NFR BLD: 85.5 % (ref 38–73)
NEUTROPHILS NFR BLD: 85.6 % (ref 38–73)
NEUTROPHILS NFR BLD: 86.6 % (ref 38–73)
NEUTROPHILS NFR BLD: 86.8 % (ref 38–73)
NEUTROPHILS NFR BLD: 90.9 % (ref 38–73)
NEUTROPHILS NFR BLD: 91.8 % (ref 38–73)
NEUTROPHILS NFR BLD: ABNORMAL % (ref 38–73)
NEUTS BAND NFR BLD MANUAL: ABNORMAL %
NITRITE UR QL STRIP: NEGATIVE
NON-SQ EPI CELLS #/AREA URNS AUTO: <1 /HPF
NON-SQ EPI CELLS #/AREA URNS AUTO: <1 /HPF
NON-SQ EPI CELLS #/AREA URNS HPF: 1 /HPF
NRBC BLD-RTO: 0 /100 WBC
NRBC BLD-RTO: 1 /100 WBC
NRBC BLD-RTO: ABNORMAL /100 WBC
NUM UNITS TRANS PACKED RBC: NORMAL
OVALOCYTES BLD QL SMEAR: ABNORMAL
OVALOCYTES BLD QL SMEAR: ABNORMAL
PAPPENHEIMER BOD BLD QL SMEAR: ABNORMAL
PARAINFLUENZA: NOT DETECTED
PATHOLOGIST INTERPRETATION IFE: NORMAL
PATHOLOGIST INTERPRETATION SPE: NORMAL
PCO2 BLDA: 59 MMHG (ref 35–45)
PCO2 BLDA: 61.9 MMHG (ref 35–45)
PH SMN: 7.38 [PH] (ref 7.35–7.45)
PH SMN: 7.4 [PH] (ref 7.35–7.45)
PH UR STRIP: 5 [PH] (ref 5–8)
PH UR STRIP: 6 [PH] (ref 5–8)
PH UR STRIP: 7 [PH] (ref 5–8)
PHOSPHATE SERPL-MCNC: 2.4 MG/DL (ref 2.7–4.5)
PHOSPHATE SERPL-MCNC: 2.6 MG/DL (ref 2.7–4.5)
PHOSPHATE SERPL-MCNC: 2.7 MG/DL (ref 2.7–4.5)
PHOSPHATE SERPL-MCNC: 2.9 MG/DL (ref 2.7–4.5)
PHOSPHATE SERPL-MCNC: 3.1 MG/DL (ref 2.7–4.5)
PHOSPHATE SERPL-MCNC: 3.1 MG/DL (ref 2.7–4.5)
PHOSPHATE SERPL-MCNC: 3.3 MG/DL (ref 2.7–4.5)
PHOSPHATE SERPL-MCNC: 3.3 MG/DL (ref 2.7–4.5)
PHOSPHATE SERPL-MCNC: 3.4 MG/DL (ref 2.7–4.5)
PHOSPHATE SERPL-MCNC: 3.7 MG/DL (ref 2.7–4.5)
PHOSPHATE SERPL-MCNC: 3.8 MG/DL (ref 2.7–4.5)
PHOSPHATE SERPL-MCNC: 4.2 MG/DL (ref 2.7–4.5)
PISA TR MAX VEL: 3.03 M/S
PLASMODIUM BLD QL SMEAR: ABNORMAL
PLATELET # BLD AUTO: 129 K/UL (ref 150–350)
PLATELET # BLD AUTO: 138 K/UL (ref 150–350)
PLATELET # BLD AUTO: 154 K/UL (ref 150–350)
PLATELET # BLD AUTO: 160 K/UL (ref 150–350)
PLATELET # BLD AUTO: 167 K/UL (ref 150–350)
PLATELET # BLD AUTO: 168 K/UL (ref 150–350)
PLATELET # BLD AUTO: 170 K/UL (ref 150–350)
PLATELET # BLD AUTO: 172 K/UL (ref 150–350)
PLATELET # BLD AUTO: 179 K/UL (ref 150–350)
PLATELET # BLD AUTO: 182 K/UL (ref 150–350)
PLATELET # BLD AUTO: 183 K/UL (ref 150–350)
PLATELET # BLD AUTO: 184 K/UL (ref 150–350)
PLATELET # BLD AUTO: 191 K/UL (ref 150–350)
PLATELET # BLD AUTO: 202 K/UL (ref 150–350)
PLATELET # BLD AUTO: 205 K/UL (ref 150–350)
PLATELET # BLD AUTO: 207 K/UL (ref 150–350)
PLATELET # BLD AUTO: 215 K/UL
PLATELET # BLD AUTO: 216 K/UL (ref 150–350)
PLATELET # BLD AUTO: 222 K/UL (ref 150–350)
PLATELET # BLD AUTO: 233 K/UL (ref 150–350)
PLATELET # BLD AUTO: 247 K/UL (ref 150–350)
PLATELET # BLD AUTO: 250 K/UL (ref 150–350)
PLATELET # BLD AUTO: 250 K/UL (ref 150–350)
PLATELET # BLD AUTO: 251 K/UL (ref 150–350)
PLATELET # BLD AUTO: 252 K/UL (ref 150–350)
PLATELET # BLD AUTO: 258 K/UL
PLATELET # BLD AUTO: 261 K/UL (ref 150–350)
PLATELET # BLD AUTO: 261 K/UL (ref 150–350)
PLATELET # BLD AUTO: 262 K/UL (ref 150–350)
PLATELET # BLD AUTO: 263 K/UL (ref 150–350)
PLATELET # BLD AUTO: ABNORMAL K/UL (ref 150–350)
PLATELET # BLD AUTO: ABNORMAL K/UL (ref 150–350)
PLATELET BLD QL SMEAR: ABNORMAL
PLATELET BLD QL SMEAR: ABNORMAL
PMV BLD AUTO: 10 FL
PMV BLD AUTO: 10 FL (ref 9.2–12.9)
PMV BLD AUTO: 10.2 FL (ref 9.2–12.9)
PMV BLD AUTO: 10.3 FL (ref 9.2–12.9)
PMV BLD AUTO: 10.5 FL (ref 9.2–12.9)
PMV BLD AUTO: 10.6 FL (ref 9.2–12.9)
PMV BLD AUTO: 10.6 FL (ref 9.2–12.9)
PMV BLD AUTO: 10.7 FL (ref 9.2–12.9)
PMV BLD AUTO: 10.9 FL (ref 9.2–12.9)
PMV BLD AUTO: 10.9 FL (ref 9.2–12.9)
PMV BLD AUTO: 11 FL (ref 9.2–12.9)
PMV BLD AUTO: 11 FL (ref 9.2–12.9)
PMV BLD AUTO: 11.2 FL (ref 9.2–12.9)
PMV BLD AUTO: 11.3 FL (ref 9.2–12.9)
PMV BLD AUTO: 11.3 FL (ref 9.2–12.9)
PMV BLD AUTO: 11.4 FL (ref 9.2–12.9)
PMV BLD AUTO: 11.6 FL (ref 9.2–12.9)
PMV BLD AUTO: 11.9 FL (ref 9.2–12.9)
PMV BLD AUTO: 9.5 FL
PMV BLD AUTO: 9.5 FL (ref 9.2–12.9)
PMV BLD AUTO: 9.6 FL (ref 9.2–12.9)
PMV BLD AUTO: 9.9 FL (ref 9.2–12.9)
PMV BLD AUTO: 9.9 FL (ref 9.2–12.9)
PMV BLD AUTO: ABNORMAL FL (ref 9.2–12.9)
PO2 BLDA: 47 MMHG (ref 40–60)
PO2 BLDA: 75 MMHG (ref 80–100)
POC BE: 12 MMOL/L
POC BE: 12 MMOL/L
POC SATURATED O2: 80 % (ref 95–100)
POC SATURATED O2: 94 % (ref 95–100)
POC TCO2: 38 MMOL/L (ref 23–27)
POC TCO2: 39 MMOL/L (ref 24–29)
POCT GLUCOSE: 100 MG/DL (ref 70–110)
POCT GLUCOSE: 101 MG/DL (ref 70–110)
POCT GLUCOSE: 106 MG/DL (ref 70–110)
POCT GLUCOSE: 108 MG/DL (ref 70–110)
POCT GLUCOSE: 109 MG/DL (ref 70–110)
POCT GLUCOSE: 110 MG/DL (ref 70–110)
POCT GLUCOSE: 111 MG/DL (ref 70–110)
POCT GLUCOSE: 113 MG/DL (ref 70–110)
POCT GLUCOSE: 115 MG/DL (ref 70–110)
POCT GLUCOSE: 116 MG/DL (ref 70–110)
POCT GLUCOSE: 117 MG/DL (ref 70–110)
POCT GLUCOSE: 117 MG/DL (ref 70–110)
POCT GLUCOSE: 119 MG/DL (ref 70–110)
POCT GLUCOSE: 119 MG/DL (ref 70–110)
POCT GLUCOSE: 120 MG/DL (ref 70–110)
POCT GLUCOSE: 131 MG/DL (ref 70–110)
POCT GLUCOSE: 131 MG/DL (ref 70–110)
POCT GLUCOSE: 132 MG/DL (ref 70–110)
POCT GLUCOSE: 135 MG/DL (ref 70–110)
POCT GLUCOSE: 158 MG/DL (ref 70–110)
POCT GLUCOSE: 171 MG/DL (ref 70–110)
POCT GLUCOSE: 178 MG/DL (ref 70–110)
POCT GLUCOSE: 92 MG/DL (ref 70–110)
POCT GLUCOSE: 98 MG/DL (ref 70–110)
POCT GLUCOSE: 99 MG/DL (ref 70–110)
POIKILOCYTOSIS BLD QL SMEAR: ABNORMAL
POIKILOCYTOSIS BLD QL SMEAR: SLIGHT
POLYCHROMASIA BLD QL SMEAR: ABNORMAL
POTASSIUM SERPL-SCNC: 2.8 MMOL/L (ref 3.5–5.1)
POTASSIUM SERPL-SCNC: 2.9 MMOL/L (ref 3.5–5.1)
POTASSIUM SERPL-SCNC: 2.9 MMOL/L (ref 3.5–5.1)
POTASSIUM SERPL-SCNC: 3.2 MMOL/L (ref 3.5–5.1)
POTASSIUM SERPL-SCNC: 3.2 MMOL/L (ref 3.5–5.1)
POTASSIUM SERPL-SCNC: 3.3 MMOL/L (ref 3.5–5.1)
POTASSIUM SERPL-SCNC: 3.3 MMOL/L (ref 3.5–5.1)
POTASSIUM SERPL-SCNC: 3.4 MMOL/L
POTASSIUM SERPL-SCNC: 3.4 MMOL/L (ref 3.5–5.1)
POTASSIUM SERPL-SCNC: 3.4 MMOL/L (ref 3.5–5.1)
POTASSIUM SERPL-SCNC: 3.5 MMOL/L (ref 3.5–5.1)
POTASSIUM SERPL-SCNC: 3.5 MMOL/L (ref 3.5–5.1)
POTASSIUM SERPL-SCNC: 3.6 MMOL/L
POTASSIUM SERPL-SCNC: 3.6 MMOL/L (ref 3.5–5.1)
POTASSIUM SERPL-SCNC: 3.6 MMOL/L (ref 3.5–5.1)
POTASSIUM SERPL-SCNC: 3.8 MMOL/L (ref 3.5–5.1)
POTASSIUM SERPL-SCNC: 3.9 MMOL/L (ref 3.5–5.1)
POTASSIUM SERPL-SCNC: 3.9 MMOL/L (ref 3.5–5.1)
POTASSIUM SERPL-SCNC: 4.1 MMOL/L (ref 3.5–5.1)
POTASSIUM SERPL-SCNC: 4.1 MMOL/L (ref 3.5–5.1)
POTASSIUM SERPL-SCNC: 4.3 MMOL/L (ref 3.5–5.1)
POTASSIUM SERPL-SCNC: 4.4 MMOL/L (ref 3.5–5.1)
PROMYELOCYTES NFR BLD MANUAL: ABNORMAL %
PROT SERPL-MCNC: 6.1 G/DL (ref 6–8.4)
PROT SERPL-MCNC: 6.1 G/DL (ref 6–8.4)
PROT SERPL-MCNC: 6.4 G/DL (ref 6–8.4)
PROT SERPL-MCNC: 6.5 G/DL (ref 6–8.4)
PROT SERPL-MCNC: 6.6 G/DL (ref 6–8.4)
PROT SERPL-MCNC: 6.7 G/DL (ref 6–8.4)
PROT SERPL-MCNC: 6.8 G/DL (ref 6–8.4)
PROT SERPL-MCNC: 6.9 G/DL (ref 6–8.4)
PROT SERPL-MCNC: 7 G/DL (ref 6–8.4)
PROT SERPL-MCNC: 7.2 G/DL (ref 6–8.4)
PROT SERPL-MCNC: 7.3 G/DL (ref 6–8.4)
PROT SERPL-MCNC: 7.4 G/DL (ref 6–8.4)
PROT SERPL-MCNC: 7.4 G/DL (ref 6–8.4)
PROT SERPL-MCNC: 7.6 G/DL
PROT SERPL-MCNC: 7.6 G/DL (ref 6–8.4)
PROT SERPL-MCNC: 7.7 G/DL
PROT SERPL-MCNC: 7.7 G/DL (ref 6–8.4)
PROT SERPL-MCNC: 7.7 G/DL (ref 6–8.4)
PROT SERPL-MCNC: 8 G/DL
PROT SERPL-MCNC: 8.3 G/DL (ref 6–8.4)
PROT SERPL-MCNC: 8.4 G/DL (ref 6–8.4)
PROT UR QL STRIP: ABNORMAL
PROT UR QL STRIP: NEGATIVE
PROTHROMBIN TIME: 12.5 SEC (ref 9–12.5)
PROTHROMBIN TIME: 12.9 SEC (ref 9–12.5)
PROTHROMBIN TIME: 13.5 SEC (ref 9–12.5)
PULM VEIN S/D RATIO: 1.28
PV PEAK D VEL: 0.69 M/S
PV PEAK S VEL: 0.88 M/S
PV PEAK VELOCITY: 1.21 CM/S
RA MAJOR: 4.99 CM
RA PRESSURE: 3 MMHG
RA WIDTH: 2.06 CM
RBC # BLD AUTO: 2.61 M/UL (ref 4–5.4)
RBC # BLD AUTO: 2.65 M/UL (ref 4–5.4)
RBC # BLD AUTO: 2.71 M/UL (ref 4–5.4)
RBC # BLD AUTO: 2.73 M/UL (ref 4–5.4)
RBC # BLD AUTO: 2.76 M/UL (ref 4–5.4)
RBC # BLD AUTO: 2.81 M/UL (ref 4–5.4)
RBC # BLD AUTO: 3.06 M/UL (ref 4–5.4)
RBC # BLD AUTO: 3.08 M/UL (ref 4–5.4)
RBC # BLD AUTO: 3.1 M/UL (ref 4–5.4)
RBC # BLD AUTO: 3.14 M/UL (ref 4–5.4)
RBC # BLD AUTO: 3.15 M/UL (ref 4–5.4)
RBC # BLD AUTO: 3.24 M/UL (ref 4–5.4)
RBC # BLD AUTO: 3.31 M/UL (ref 4–5.4)
RBC # BLD AUTO: 3.4 M/UL (ref 4–5.4)
RBC # BLD AUTO: 3.47 M/UL (ref 4–5.4)
RBC # BLD AUTO: 3.47 M/UL (ref 4–5.4)
RBC # BLD AUTO: 3.5 M/UL (ref 4–5.4)
RBC # BLD AUTO: 3.5 M/UL (ref 4–5.4)
RBC # BLD AUTO: 3.51 M/UL
RBC # BLD AUTO: 3.54 M/UL (ref 4–5.4)
RBC # BLD AUTO: 3.58 M/UL (ref 4–5.4)
RBC # BLD AUTO: 3.65 M/UL (ref 4–5.4)
RBC # BLD AUTO: 3.65 M/UL (ref 4–5.4)
RBC # BLD AUTO: 3.71 M/UL (ref 4–5.4)
RBC # BLD AUTO: 3.74 M/UL (ref 4–5.4)
RBC # BLD AUTO: 3.85 M/UL (ref 4–5.4)
RBC # BLD AUTO: 3.9 M/UL (ref 4–5.4)
RBC # BLD AUTO: 3.95 M/UL
RBC # BLD AUTO: 4.05 M/UL (ref 4–5.4)
RBC # BLD AUTO: 4.33 M/UL (ref 4–5.4)
RBC # BLD AUTO: ABNORMAL M/UL (ref 4–5.4)
RBC #/AREA URNS AUTO: 1 /HPF (ref 0–4)
RBC #/AREA URNS AUTO: 19 /HPF (ref 0–4)
RBC #/AREA URNS AUTO: 2 /HPF (ref 0–4)
RBC #/AREA URNS AUTO: 4 /HPF (ref 0–4)
RBC #/AREA URNS AUTO: 5 /HPF (ref 0–4)
RBC #/AREA URNS AUTO: 7 /HPF (ref 0–4)
RBC #/AREA URNS HPF: 2 /HPF (ref 0–4)
RBC #/AREA URNS HPF: 2 /HPF (ref 0–4)
RBC AGGLUT BLD QL: ABNORMAL
RETICS/RBC NFR AUTO: 1.7 % (ref 0.5–2.5)
RIGHT VENTRICULAR END-DIASTOLIC DIMENSION: 2.24 CM
ROULEAUX BLD QL SMEAR: ABNORMAL
RV TISSUE DOPPLER FREE WALL SYSTOLIC VELOCITY 1 (APICAL 4 CHAMBER VIEW): 10.81 M/S
RVP - ADENOVIRUS: NOT DETECTED
RVP - HUMAN METAPNEUMOVIRUS (HMPV): NOT DETECTED
RVP - INFLUENZA A: NOT DETECTED
RVP - INFLUENZA B: NOT DETECTED
RVP - RESPIRATORY SYNCTIAL VIRUS (RSV) A: NOT DETECTED
RVP - RESPIRATORY VIRAL PANEL, SOURCE: NORMAL
RVP - RHINOVIRUS: NOT DETECTED
SAMPLE: ABNORMAL
SAMPLE: ABNORMAL
SCHISTOCYTES BLD QL SMEAR: ABNORMAL
SCHISTOCYTES BLD QL SMEAR: ABNORMAL
SICKLE CELLS BLD QL SMEAR: ABNORMAL
SINUS: 3.21 CM
SITE: ABNORMAL
SITE: ABNORMAL
SMUDGE CELLS BLD QL SMEAR: ABNORMAL
SODIUM SERPL-SCNC: 129 MMOL/L (ref 136–145)
SODIUM SERPL-SCNC: 134 MMOL/L (ref 136–145)
SODIUM SERPL-SCNC: 135 MMOL/L (ref 136–145)
SODIUM SERPL-SCNC: 136 MMOL/L (ref 136–145)
SODIUM SERPL-SCNC: 136 MMOL/L (ref 136–145)
SODIUM SERPL-SCNC: 137 MMOL/L (ref 136–145)
SODIUM SERPL-SCNC: 138 MMOL/L (ref 136–145)
SODIUM SERPL-SCNC: 140 MMOL/L
SODIUM SERPL-SCNC: 140 MMOL/L (ref 136–145)
SODIUM SERPL-SCNC: 141 MMOL/L
SODIUM SERPL-SCNC: 141 MMOL/L (ref 136–145)
SODIUM SERPL-SCNC: 142 MMOL/L (ref 136–145)
SODIUM SERPL-SCNC: 143 MMOL/L (ref 136–145)
SODIUM SERPL-SCNC: 144 MMOL/L (ref 136–145)
SODIUM SERPL-SCNC: 145 MMOL/L (ref 136–145)
SODIUM SERPL-SCNC: 145 MMOL/L (ref 136–145)
SP GR UR STRIP: 1.01 (ref 1–1.03)
SP GR UR STRIP: 1.01 (ref 1–1.03)
SP GR UR STRIP: 1.02 (ref 1–1.03)
SP GR UR STRIP: >1.03 (ref 1–1.03)
SP02: 94
SP02: 98
SPHEROCYTES BLD QL SMEAR: ABNORMAL
SPONT RATE: 50
SQUAMOUS #/AREA URNS AUTO: 0 /HPF
SQUAMOUS #/AREA URNS AUTO: 0 /HPF
SQUAMOUS #/AREA URNS AUTO: 1 /HPF
SQUAMOUS #/AREA URNS AUTO: 5 /HPF
SQUAMOUS #/AREA URNS AUTO: 7 /HPF
SQUAMOUS #/AREA URNS AUTO: 7 /HPF
SQUAMOUS #/AREA URNS HPF: 4 /HPF
SQUAMOUS #/AREA URNS HPF: 5 /HPF
STJ: 2.51 CM
STOMATOCYTES BLD QL SMEAR: ABNORMAL
TARGETS BLD QL SMEAR: ABNORMAL
TOXIC GRANULES BLD QL SMEAR: ABNORMAL
TR MAX PG: 36.72 MMHG
TRICUSPID ANNULAR PLANE SYSTOLIC EXCURSION: 1.87 CM
TROPONIN I SERPL DL<=0.01 NG/ML-MCNC: 0.03 NG/ML (ref 0–0.03)
TROPONIN I SERPL DL<=0.01 NG/ML-MCNC: 0.05 NG/ML (ref 0–0.03)
TROPONIN I SERPL DL<=0.01 NG/ML-MCNC: 0.1 NG/ML (ref 0–0.03)
TROPONIN I SERPL DL<=0.01 NG/ML-MCNC: <0.006 NG/ML (ref 0–0.03)
TV REST PULMONARY ARTERY PRESSURE: 40 MMHG
URATE CRY UR QL COMP ASSIST: ABNORMAL
URATE CRY URNS QL MICRO: ABNORMAL
URATE CRY URNS QL MICRO: ABNORMAL
URN SPEC COLLECT METH UR: ABNORMAL
UROBILINOGEN UR STRIP-ACNC: NEGATIVE EU/DL
UROBILINOGEN UR STRIP-ACNC: NEGATIVE EU/DL
WBC # BLD AUTO: 10.47 K/UL (ref 3.9–12.7)
WBC # BLD AUTO: 12.13 K/UL (ref 3.9–12.7)
WBC # BLD AUTO: 12.16 K/UL (ref 3.9–12.7)
WBC # BLD AUTO: 14.13 K/UL (ref 3.9–12.7)
WBC # BLD AUTO: 14.91 K/UL (ref 3.9–12.7)
WBC # BLD AUTO: 4.28 K/UL (ref 3.9–12.7)
WBC # BLD AUTO: 4.47 K/UL (ref 3.9–12.7)
WBC # BLD AUTO: 4.73 K/UL (ref 3.9–12.7)
WBC # BLD AUTO: 5.47 K/UL (ref 3.9–12.7)
WBC # BLD AUTO: 5.99 K/UL (ref 3.9–12.7)
WBC # BLD AUTO: 6.32 K/UL
WBC # BLD AUTO: 6.39 K/UL (ref 3.9–12.7)
WBC # BLD AUTO: 6.39 K/UL (ref 3.9–12.7)
WBC # BLD AUTO: 6.55 K/UL (ref 3.9–12.7)
WBC # BLD AUTO: 6.58 K/UL (ref 3.9–12.7)
WBC # BLD AUTO: 6.6 K/UL (ref 3.9–12.7)
WBC # BLD AUTO: 6.76 K/UL (ref 3.9–12.7)
WBC # BLD AUTO: 6.78 K/UL (ref 3.9–12.7)
WBC # BLD AUTO: 6.81 K/UL (ref 3.9–12.7)
WBC # BLD AUTO: 6.92 K/UL (ref 3.9–12.7)
WBC # BLD AUTO: 6.97 K/UL (ref 3.9–12.7)
WBC # BLD AUTO: 7.07 K/UL (ref 3.9–12.7)
WBC # BLD AUTO: 7.31 K/UL (ref 3.9–12.7)
WBC # BLD AUTO: 7.34 K/UL
WBC # BLD AUTO: 7.84 K/UL (ref 3.9–12.7)
WBC # BLD AUTO: 7.92 K/UL (ref 3.9–12.7)
WBC # BLD AUTO: 8.2 K/UL (ref 3.9–12.7)
WBC # BLD AUTO: 8.24 K/UL (ref 3.9–12.7)
WBC # BLD AUTO: 8.6 K/UL (ref 3.9–12.7)
WBC # BLD AUTO: 9.2 K/UL (ref 3.9–12.7)
WBC # BLD AUTO: ABNORMAL K/UL (ref 3.9–12.7)
WBC #/AREA URNS AUTO: 11 /HPF (ref 0–5)
WBC #/AREA URNS AUTO: 43 /HPF (ref 0–5)
WBC #/AREA URNS AUTO: 5 /HPF (ref 0–5)
WBC #/AREA URNS AUTO: 7 /HPF (ref 0–5)
WBC #/AREA URNS AUTO: 8 /HPF (ref 0–5)
WBC #/AREA URNS AUTO: 8 /HPF (ref 0–5)
WBC #/AREA URNS HPF: 0 /HPF (ref 0–5)
WBC #/AREA URNS HPF: 3 /HPF (ref 0–5)
WBC NRBC COR # BLD: ABNORMAL K/UL (ref 3.9–12.7)
WBC OTHER NFR BLD MANUAL: ABNORMAL %
WBC TOXIC VACUOLES BLD QL SMEAR: ABNORMAL
YEAST UR QL AUTO: ABNORMAL
YEAST URNS QL MICRO: ABNORMAL

## 2019-01-01 PROCEDURE — 85025 COMPLETE CBC W/AUTO DIFF WBC: CPT

## 2019-01-01 PROCEDURE — 63600175 PHARM REV CODE 636 W HCPCS: Performed by: STUDENT IN AN ORGANIZED HEALTH CARE EDUCATION/TRAINING PROGRAM

## 2019-01-01 PROCEDURE — 96375 TX/PRO/DX INJ NEW DRUG ADDON: CPT

## 2019-01-01 PROCEDURE — 99284 EMERGENCY DEPT VISIT MOD MDM: CPT | Mod: 25

## 2019-01-01 PROCEDURE — 96366 THER/PROPH/DIAG IV INF ADDON: CPT

## 2019-01-01 PROCEDURE — 27000221 HC OXYGEN, UP TO 24 HOURS

## 2019-01-01 PROCEDURE — 88333 CYTOLOGY SPECIMEN-FNA-RADIOLOGY ASSISTED WITH PATH ADEQUACY-CORE BX: ICD-10-PCS | Mod: 26,,, | Performed by: PATHOLOGY

## 2019-01-01 PROCEDURE — 84165 PROTEIN E-PHORESIS SERUM: CPT | Mod: 26,,, | Performed by: PATHOLOGY

## 2019-01-01 PROCEDURE — 99214 PR OFFICE/OUTPT VISIT, EST, LEVL IV, 30-39 MIN: ICD-10-PCS | Mod: S$GLB,,, | Performed by: NURSE PRACTITIONER

## 2019-01-01 PROCEDURE — 51798 US URINE CAPACITY MEASURE: CPT

## 2019-01-01 PROCEDURE — 36600 WITHDRAWAL OF ARTERIAL BLOOD: CPT

## 2019-01-01 PROCEDURE — 37000008 HC ANESTHESIA 1ST 15 MINUTES: Performed by: INTERNAL MEDICINE

## 2019-01-01 PROCEDURE — 99239 PR HOSPITAL DISCHARGE DAY,>30 MIN: ICD-10-PCS | Mod: GC,,, | Performed by: INTERNAL MEDICINE

## 2019-01-01 PROCEDURE — 25000003 PHARM REV CODE 250: Performed by: NURSE ANESTHETIST, CERTIFIED REGISTERED

## 2019-01-01 PROCEDURE — 93010 EKG 12-LEAD: ICD-10-PCS | Mod: ,,, | Performed by: INTERNAL MEDICINE

## 2019-01-01 PROCEDURE — 99900035 HC TECH TIME PER 15 MIN (STAT)

## 2019-01-01 PROCEDURE — 25000003 PHARM REV CODE 250: Performed by: STUDENT IN AN ORGANIZED HEALTH CARE EDUCATION/TRAINING PROGRAM

## 2019-01-01 PROCEDURE — 80053 COMPREHEN METABOLIC PANEL: CPT

## 2019-01-01 PROCEDURE — 86850 RBC ANTIBODY SCREEN: CPT

## 2019-01-01 PROCEDURE — 94660 CPAP INITIATION&MGMT: CPT

## 2019-01-01 PROCEDURE — 3074F SYST BP LT 130 MM HG: CPT | Mod: CPTII,S$GLB,, | Performed by: INTERNAL MEDICINE

## 2019-01-01 PROCEDURE — 20600001 HC STEP DOWN PRIVATE ROOM

## 2019-01-01 PROCEDURE — 3078F PR MOST RECENT DIASTOLIC BLOOD PRESSURE < 80 MM HG: ICD-10-PCS | Mod: CPTII,S$GLB,, | Performed by: INTERNAL MEDICINE

## 2019-01-01 PROCEDURE — 80048 BASIC METABOLIC PNL TOTAL CA: CPT

## 2019-01-01 PROCEDURE — 3075F SYST BP GE 130 - 139MM HG: CPT | Mod: CPTII,S$GLB,, | Performed by: NURSE PRACTITIONER

## 2019-01-01 PROCEDURE — 3078F DIAST BP <80 MM HG: CPT | Mod: CPTII,S$GLB,, | Performed by: INTERNAL MEDICINE

## 2019-01-01 PROCEDURE — 99499 UNLISTED E&M SERVICE: CPT | Mod: S$GLB,,, | Performed by: INTERNAL MEDICINE

## 2019-01-01 PROCEDURE — 1100F PR PT FALLS ASSESS DOC 2+ FALLS/FALL W/INJURY/YR: ICD-10-PCS | Mod: CPTII,S$GLB,, | Performed by: INTERNAL MEDICINE

## 2019-01-01 PROCEDURE — 99223 PR INITIAL HOSPITAL CARE,LEVL III: ICD-10-PCS | Mod: ,,, | Performed by: CLINICAL NURSE SPECIALIST

## 2019-01-01 PROCEDURE — G6002 STEREOSCOPIC X-RAY GUIDANCE: HCPCS | Mod: 26,,, | Performed by: RADIOLOGY

## 2019-01-01 PROCEDURE — 88333 PATH CONSLTJ SURG CYTO XM 1: CPT | Mod: 26,,, | Performed by: PATHOLOGY

## 2019-01-01 PROCEDURE — 83690 ASSAY OF LIPASE: CPT

## 2019-01-01 PROCEDURE — 63600175 PHARM REV CODE 636 W HCPCS: Performed by: EMERGENCY MEDICINE

## 2019-01-01 PROCEDURE — 97530 THERAPEUTIC ACTIVITIES: CPT

## 2019-01-01 PROCEDURE — C9113 INJ PANTOPRAZOLE SODIUM, VIA: HCPCS | Performed by: STUDENT IN AN ORGANIZED HEALTH CARE EDUCATION/TRAINING PROGRAM

## 2019-01-01 PROCEDURE — 1100F PTFALLS ASSESS-DOCD GE2>/YR: CPT | Mod: CPTII,S$GLB,, | Performed by: INTERNAL MEDICINE

## 2019-01-01 PROCEDURE — 36415 COLL VENOUS BLD VENIPUNCTURE: CPT

## 2019-01-01 PROCEDURE — 99233 PR SUBSEQUENT HOSPITAL CARE,LEVL III: ICD-10-PCS | Mod: GC,,, | Performed by: INTERNAL MEDICINE

## 2019-01-01 PROCEDURE — 77412 RADIATION TX DELIVERY LVL 3: CPT | Performed by: RADIOLOGY

## 2019-01-01 PROCEDURE — 99239 HOSP IP/OBS DSCHRG MGMT >30: CPT | Mod: GC,,, | Performed by: INTERNAL MEDICINE

## 2019-01-01 PROCEDURE — G6002 PR STEREOSCOPIC XRAY GUIDE FOR RADIATION TX DELIV: ICD-10-PCS | Mod: 26,,, | Performed by: RADIOLOGY

## 2019-01-01 PROCEDURE — 99233 SBSQ HOSP IP/OBS HIGH 50: CPT | Mod: ,,, | Performed by: INTERNAL MEDICINE

## 2019-01-01 PROCEDURE — 88341 CYTOLOGY SPECIMEN-FNA-RADIOLOGY ASSISTED WITH PATH ADEQUACY-CORE BX: ICD-10-PCS | Mod: 26,,, | Performed by: PATHOLOGY

## 2019-01-01 PROCEDURE — 99999 PR PBB SHADOW E&M-EST. PATIENT-LVL IV: ICD-10-PCS | Mod: PBBFAC,,, | Performed by: INTERNAL MEDICINE

## 2019-01-01 PROCEDURE — 83735 ASSAY OF MAGNESIUM: CPT

## 2019-01-01 PROCEDURE — 84100 ASSAY OF PHOSPHORUS: CPT

## 2019-01-01 PROCEDURE — 27100092 HC HIGH FLOW DELIVERY CANNULA

## 2019-01-01 PROCEDURE — 77417 THER RADIOLOGY PORT IMAGE(S): CPT | Performed by: RADIOLOGY

## 2019-01-01 PROCEDURE — 78815 NM PET CT ROUTINE: ICD-10-PCS | Mod: 26,PS,, | Performed by: RADIOLOGY

## 2019-01-01 PROCEDURE — 3288F FALL RISK ASSESSMENT DOCD: CPT | Mod: CPTII,S$GLB,, | Performed by: INTERNAL MEDICINE

## 2019-01-01 PROCEDURE — 85049 AUTOMATED PLATELET COUNT: CPT

## 2019-01-01 PROCEDURE — 81000 URINALYSIS NONAUTO W/SCOPE: CPT

## 2019-01-01 PROCEDURE — 25000242 PHARM REV CODE 250 ALT 637 W/ HCPCS: Performed by: STUDENT IN AN ORGANIZED HEALTH CARE EDUCATION/TRAINING PROGRAM

## 2019-01-01 PROCEDURE — 99232 SBSQ HOSP IP/OBS MODERATE 35: CPT | Mod: GC,,, | Performed by: INTERNAL MEDICINE

## 2019-01-01 PROCEDURE — 97165 OT EVAL LOW COMPLEX 30 MIN: CPT

## 2019-01-01 PROCEDURE — 3074F SYST BP LT 130 MM HG: CPT | Mod: CPTII,S$GLB,, | Performed by: NURSE PRACTITIONER

## 2019-01-01 PROCEDURE — 25000003 PHARM REV CODE 250: Performed by: INTERNAL MEDICINE

## 2019-01-01 PROCEDURE — 63600175 PHARM REV CODE 636 W HCPCS: Performed by: INTERNAL MEDICINE

## 2019-01-01 PROCEDURE — 99223 1ST HOSP IP/OBS HIGH 75: CPT | Mod: 25,GC,, | Performed by: INTERNAL MEDICINE

## 2019-01-01 PROCEDURE — 99291 PR CRITICAL CARE, E/M 30-74 MINUTES: ICD-10-PCS | Mod: ,,, | Performed by: EMERGENCY MEDICINE

## 2019-01-01 PROCEDURE — 25500020 PHARM REV CODE 255: Performed by: INTERNAL MEDICINE

## 2019-01-01 PROCEDURE — D9220A PRA ANESTHESIA: Mod: CRNA,,, | Performed by: NURSE ANESTHETIST, CERTIFIED REGISTERED

## 2019-01-01 PROCEDURE — 94640 AIRWAY INHALATION TREATMENT: CPT

## 2019-01-01 PROCEDURE — 77387 GUIDANCE FOR RADJ TX DLVR: CPT | Mod: TC | Performed by: RADIOLOGY

## 2019-01-01 PROCEDURE — 88305 TISSUE EXAM BY PATHOLOGIST: CPT | Performed by: PATHOLOGY

## 2019-01-01 PROCEDURE — 3079F DIAST BP 80-89 MM HG: CPT | Mod: CPTII,S$GLB,, | Performed by: FAMILY MEDICINE

## 2019-01-01 PROCEDURE — 3075F PR MOST RECENT SYSTOLIC BLOOD PRESS GE 130-139MM HG: ICD-10-PCS | Mod: CPTII,S$GLB,, | Performed by: INTERNAL MEDICINE

## 2019-01-01 PROCEDURE — 81001 URINALYSIS AUTO W/SCOPE: CPT

## 2019-01-01 PROCEDURE — 96361 HYDRATE IV INFUSION ADD-ON: CPT

## 2019-01-01 PROCEDURE — G0180 MD CERTIFICATION HHA PATIENT: HCPCS | Mod: ,,, | Performed by: INTERNAL MEDICINE

## 2019-01-01 PROCEDURE — 27000190 HC CPAP FULL FACE MASK W/VALVE

## 2019-01-01 PROCEDURE — 84484 ASSAY OF TROPONIN QUANT: CPT

## 2019-01-01 PROCEDURE — 86922 COMPATIBILITY TEST ANTIGLOB: CPT

## 2019-01-01 PROCEDURE — 1101F PR PT FALLS ASSESS DOC 0-1 FALLS W/OUT INJ PAST YR: ICD-10-PCS | Mod: CPTII,S$GLB,, | Performed by: FAMILY MEDICINE

## 2019-01-01 PROCEDURE — 99285 PR EMERGENCY DEPT VISIT,LEVEL V: ICD-10-PCS | Mod: ,,, | Performed by: EMERGENCY MEDICINE

## 2019-01-01 PROCEDURE — 63600175 PHARM REV CODE 636 W HCPCS: Performed by: NURSE ANESTHETIST, CERTIFIED REGISTERED

## 2019-01-01 PROCEDURE — 99232 PR SUBSEQUENT HOSPITAL CARE,LEVL II: ICD-10-PCS | Mod: GC,,, | Performed by: INTERNAL MEDICINE

## 2019-01-01 PROCEDURE — 93010 ELECTROCARDIOGRAM REPORT: CPT | Mod: ,,, | Performed by: INTERNAL MEDICINE

## 2019-01-01 PROCEDURE — 3074F PR MOST RECENT SYSTOLIC BLOOD PRESSURE < 130 MM HG: ICD-10-PCS | Mod: CPTII,S$GLB,, | Performed by: INTERNAL MEDICINE

## 2019-01-01 PROCEDURE — 83605 ASSAY OF LACTIC ACID: CPT

## 2019-01-01 PROCEDURE — 99223 1ST HOSP IP/OBS HIGH 75: CPT | Mod: ,,, | Performed by: INTERNAL MEDICINE

## 2019-01-01 PROCEDURE — 25000003 PHARM REV CODE 250: Performed by: EMERGENCY MEDICINE

## 2019-01-01 PROCEDURE — 3078F PR MOST RECENT DIASTOLIC BLOOD PRESSURE < 80 MM HG: ICD-10-PCS | Mod: CPTII,S$GLB,, | Performed by: FAMILY MEDICINE

## 2019-01-01 PROCEDURE — 99284 PR EMERGENCY DEPT VISIT,LEVEL IV: ICD-10-PCS | Mod: ,,, | Performed by: EMERGENCY MEDICINE

## 2019-01-01 PROCEDURE — 99999 PR PBB SHADOW E&M-EST. PATIENT-LVL II: ICD-10-PCS | Mod: PBBFAC,,, | Performed by: OPTOMETRIST

## 2019-01-01 PROCEDURE — 99999 PR PBB SHADOW E&M-EST. PATIENT-LVL III: CPT | Mod: PBBFAC,,, | Performed by: INTERNAL MEDICINE

## 2019-01-01 PROCEDURE — 99233 PR SUBSEQUENT HOSPITAL CARE,LEVL III: ICD-10-PCS | Mod: ,,, | Performed by: INTERNAL MEDICINE

## 2019-01-01 PROCEDURE — 96365 THER/PROPH/DIAG IV INF INIT: CPT

## 2019-01-01 PROCEDURE — 94761 N-INVAS EAR/PLS OXIMETRY MLT: CPT

## 2019-01-01 PROCEDURE — 99285 EMERGENCY DEPT VISIT HI MDM: CPT

## 2019-01-01 PROCEDURE — 99214 OFFICE O/P EST MOD 30 MIN: CPT | Mod: S$GLB,,, | Performed by: NURSE PRACTITIONER

## 2019-01-01 PROCEDURE — 88305 CYTOLOGY SPECIMEN-FNA-RADIOLOGY ASSISTED WITH PATH ADEQUACY-CORE BX: ICD-10-PCS | Mod: 26,,, | Performed by: PATHOLOGY

## 2019-01-01 PROCEDURE — 88365 PR  TISSUE HYBRIDIZATION: ICD-10-PCS | Mod: 26,,, | Performed by: PATHOLOGY

## 2019-01-01 PROCEDURE — 99214 OFFICE O/P EST MOD 30 MIN: CPT | Mod: S$GLB,,, | Performed by: INTERNAL MEDICINE

## 2019-01-01 PROCEDURE — 88341 IMHCHEM/IMCYTCHM EA ADD ANTB: CPT | Mod: 26,,, | Performed by: PATHOLOGY

## 2019-01-01 PROCEDURE — 43266 PR EGD, FLEX, W/PLCMT, STENT: ICD-10-PCS | Mod: ,,, | Performed by: INTERNAL MEDICINE

## 2019-01-01 PROCEDURE — 77336 RADIATION PHYSICS CONSULT: CPT | Performed by: RADIOLOGY

## 2019-01-01 PROCEDURE — 36415 COLL VENOUS BLD VENIPUNCTURE: CPT | Mod: PN

## 2019-01-01 PROCEDURE — 99284 EMERGENCY DEPT VISIT MOD MDM: CPT

## 2019-01-01 PROCEDURE — 93000 EKG 12-LEAD: ICD-10-PCS | Mod: S$GLB,,, | Performed by: INTERNAL MEDICINE

## 2019-01-01 PROCEDURE — 99999 PR PBB SHADOW E&M-EST. PATIENT-LVL IV: CPT | Mod: PBBFAC,,, | Performed by: INTERNAL MEDICINE

## 2019-01-01 PROCEDURE — 97161 PT EVAL LOW COMPLEX 20 MIN: CPT

## 2019-01-01 PROCEDURE — 37000009 HC ANESTHESIA EA ADD 15 MINS: Performed by: INTERNAL MEDICINE

## 2019-01-01 PROCEDURE — 92015 PR REFRACTION: ICD-10-PCS | Mod: S$GLB,,, | Performed by: OPTOMETRIST

## 2019-01-01 PROCEDURE — 82803 BLOOD GASES ANY COMBINATION: CPT

## 2019-01-01 PROCEDURE — 1101F PT FALLS ASSESS-DOCD LE1/YR: CPT | Mod: CPTII,S$GLB,, | Performed by: INTERNAL MEDICINE

## 2019-01-01 PROCEDURE — 82784 ASSAY IGA/IGD/IGG/IGM EACH: CPT | Mod: 59

## 2019-01-01 PROCEDURE — 87040 BLOOD CULTURE FOR BACTERIA: CPT

## 2019-01-01 PROCEDURE — 83615 LACTATE (LD) (LDH) ENZYME: CPT

## 2019-01-01 PROCEDURE — 83605 ASSAY OF LACTIC ACID: CPT | Mod: 91

## 2019-01-01 PROCEDURE — 85610 PROTHROMBIN TIME: CPT

## 2019-01-01 PROCEDURE — 88305 TISSUE EXAM BY PATHOLOGIST: CPT | Mod: 26,,, | Performed by: PATHOLOGY

## 2019-01-01 PROCEDURE — 97162 PT EVAL MOD COMPLEX 30 MIN: CPT

## 2019-01-01 PROCEDURE — D9220A PRA ANESTHESIA: ICD-10-PCS | Mod: ANES,,, | Performed by: ANESTHESIOLOGY

## 2019-01-01 PROCEDURE — 83036 HEMOGLOBIN GLYCOSYLATED A1C: CPT

## 2019-01-01 PROCEDURE — 1101F PT FALLS ASSESS-DOCD LE1/YR: CPT | Mod: CPTII,S$GLB,, | Performed by: FAMILY MEDICINE

## 2019-01-01 PROCEDURE — 25500020 PHARM REV CODE 255: Performed by: EMERGENCY MEDICINE

## 2019-01-01 PROCEDURE — 99214 PR OFFICE/OUTPT VISIT, EST, LEVL IV, 30-39 MIN: ICD-10-PCS | Mod: S$GLB,,, | Performed by: INTERNAL MEDICINE

## 2019-01-01 PROCEDURE — 88365 INSITU HYBRIDIZATION (FISH): CPT | Mod: 26,,, | Performed by: PATHOLOGY

## 2019-01-01 PROCEDURE — S0028 INJECTION, FAMOTIDINE, 20 MG: HCPCS | Performed by: STUDENT IN AN ORGANIZED HEALTH CARE EDUCATION/TRAINING PROGRAM

## 2019-01-01 PROCEDURE — 96360 HYDRATION IV INFUSION INIT: CPT

## 2019-01-01 PROCEDURE — 74177 CT CHEST ABDOMEN PELVIS WITH CONTRAST (XPD): ICD-10-PCS | Mod: 26,,, | Performed by: RADIOLOGY

## 2019-01-01 PROCEDURE — 93306 TTE W/DOPPLER COMPLETE: CPT | Mod: 26,,, | Performed by: INTERNAL MEDICINE

## 2019-01-01 PROCEDURE — 83880 ASSAY OF NATRIURETIC PEPTIDE: CPT

## 2019-01-01 PROCEDURE — 63600175 PHARM REV CODE 636 W HCPCS: Performed by: RADIOLOGY

## 2019-01-01 PROCEDURE — 86334 IMMUNOFIX E-PHORESIS SERUM: CPT

## 2019-01-01 PROCEDURE — 3288F PR FALLS RISK ASSESSMENT DOCUMENTED: ICD-10-PCS | Mod: CPTII,S$GLB,, | Performed by: NURSE PRACTITIONER

## 2019-01-01 PROCEDURE — 1101F PR PT FALLS ASSESS DOC 0-1 FALLS W/OUT INJ PAST YR: ICD-10-PCS | Mod: CPTII,S$GLB,, | Performed by: NURSE PRACTITIONER

## 2019-01-01 PROCEDURE — 1100F PR PT FALLS ASSESS DOC 2+ FALLS/FALL W/INJURY/YR: ICD-10-PCS | Mod: CPTII,S$GLB,, | Performed by: NURSE PRACTITIONER

## 2019-01-01 PROCEDURE — 85025 COMPLETE CBC W/AUTO DIFF WBC: CPT | Mod: 91

## 2019-01-01 PROCEDURE — 99284 EMERGENCY DEPT VISIT MOD MDM: CPT | Mod: ,,, | Performed by: EMERGENCY MEDICINE

## 2019-01-01 PROCEDURE — 3075F SYST BP GE 130 - 139MM HG: CPT | Mod: CPTII,S$GLB,, | Performed by: INTERNAL MEDICINE

## 2019-01-01 PROCEDURE — 99999 PR PBB SHADOW E&M-EST. PATIENT-LVL IV: ICD-10-PCS | Mod: PBBFAC,,, | Performed by: FAMILY MEDICINE

## 2019-01-01 PROCEDURE — 3075F PR MOST RECENT SYSTOLIC BLOOD PRESS GE 130-139MM HG: ICD-10-PCS | Mod: CPTII,S$GLB,, | Performed by: FAMILY MEDICINE

## 2019-01-01 PROCEDURE — 63600175 PHARM REV CODE 636 W HCPCS: Performed by: ANESTHESIOLOGY

## 2019-01-01 PROCEDURE — 86880 COOMBS TEST DIRECT: CPT

## 2019-01-01 PROCEDURE — 1101F PR PT FALLS ASSESS DOC 0-1 FALLS W/OUT INJ PAST YR: ICD-10-PCS | Mod: CPTII,S$GLB,, | Performed by: INTERNAL MEDICINE

## 2019-01-01 PROCEDURE — 87632 RESP VIRUS 6-11 TARGETS: CPT

## 2019-01-01 PROCEDURE — 99232 SBSQ HOSP IP/OBS MODERATE 35: CPT | Mod: ,,, | Performed by: CLINICAL NURSE SPECIALIST

## 2019-01-01 PROCEDURE — 99223 PR INITIAL HOSPITAL CARE,LEVL III: ICD-10-PCS | Mod: 25,GC,, | Performed by: INTERNAL MEDICINE

## 2019-01-01 PROCEDURE — 99223 1ST HOSP IP/OBS HIGH 75: CPT | Mod: AI,GC,, | Performed by: INTERNAL MEDICINE

## 2019-01-01 PROCEDURE — A4216 STERILE WATER/SALINE, 10 ML: HCPCS | Performed by: INTERNAL MEDICINE

## 2019-01-01 PROCEDURE — 86901 BLOOD TYPING SEROLOGIC RH(D): CPT

## 2019-01-01 PROCEDURE — 88342 CYTOLOGY SPECIMEN-FNA-RADIOLOGY ASSISTED WITH PATH ADEQUACY-CORE BX: ICD-10-PCS | Mod: 26,,, | Performed by: PATHOLOGY

## 2019-01-01 PROCEDURE — 74177 CT ABD & PELVIS W/CONTRAST: CPT | Mod: TC

## 2019-01-01 PROCEDURE — 99999 PR PBB SHADOW E&M-EST. PATIENT-LVL V: ICD-10-PCS | Mod: PBBFAC,,, | Performed by: NURSE PRACTITIONER

## 2019-01-01 PROCEDURE — 99233 SBSQ HOSP IP/OBS HIGH 50: CPT | Mod: GC,,, | Performed by: INTERNAL MEDICINE

## 2019-01-01 PROCEDURE — 3288F PR FALLS RISK ASSESSMENT DOCUMENTED: ICD-10-PCS | Mod: CPTII,S$GLB,, | Performed by: INTERNAL MEDICINE

## 2019-01-01 PROCEDURE — 99499 RISK ADDL DX/OHS AUDIT: ICD-10-PCS | Mod: S$GLB,,, | Performed by: INTERNAL MEDICINE

## 2019-01-01 PROCEDURE — 3074F PR MOST RECENT SYSTOLIC BLOOD PRESSURE < 130 MM HG: ICD-10-PCS | Mod: CPTII,S$GLB,, | Performed by: NURSE PRACTITIONER

## 2019-01-01 PROCEDURE — 99222 1ST HOSP IP/OBS MODERATE 55: CPT | Mod: GC,,, | Performed by: INTERNAL MEDICINE

## 2019-01-01 PROCEDURE — 96368 THER/DIAG CONCURRENT INF: CPT

## 2019-01-01 PROCEDURE — 99221 1ST HOSP IP/OBS SF/LOW 40: CPT | Mod: ,,, | Performed by: RADIOLOGY

## 2019-01-01 PROCEDURE — 87086 URINE CULTURE/COLONY COUNT: CPT

## 2019-01-01 PROCEDURE — 74360 X-RAY GUIDE GI DILATION: CPT | Performed by: INTERNAL MEDICINE

## 2019-01-01 PROCEDURE — 93306 TTE W/DOPPLER COMPLETE: CPT

## 2019-01-01 PROCEDURE — 27200997: Performed by: INTERNAL MEDICINE

## 2019-01-01 PROCEDURE — 99222 PR INITIAL HOSPITAL CARE,LEVL II: ICD-10-PCS | Mod: GC,,, | Performed by: INTERNAL MEDICINE

## 2019-01-01 PROCEDURE — 99284 EMERGENCY DEPT VISIT MOD MDM: CPT | Mod: ,,, | Performed by: PHYSICIAN ASSISTANT

## 2019-01-01 PROCEDURE — 3077F PR MOST RECENT SYSTOLIC BLOOD PRESSURE >= 140 MM HG: ICD-10-PCS | Mod: CPTII,S$GLB,, | Performed by: INTERNAL MEDICINE

## 2019-01-01 PROCEDURE — 74360 PR  X-RAY GUIDE, GI DILATION: ICD-10-PCS | Mod: 26,,, | Performed by: INTERNAL MEDICINE

## 2019-01-01 PROCEDURE — 99214 OFFICE O/P EST MOD 30 MIN: CPT | Mod: S$GLB,,, | Performed by: FAMILY MEDICINE

## 2019-01-01 PROCEDURE — 93005 ELECTROCARDIOGRAM TRACING: CPT

## 2019-01-01 PROCEDURE — 99223 PR INITIAL HOSPITAL CARE,LEVL III: ICD-10-PCS | Mod: ,,, | Performed by: INTERNAL MEDICINE

## 2019-01-01 PROCEDURE — 87040 BLOOD CULTURE FOR BACTERIA: CPT | Mod: 59

## 2019-01-01 PROCEDURE — 3074F SYST BP LT 130 MM HG: CPT | Mod: CPTII,S$GLB,, | Performed by: FAMILY MEDICINE

## 2019-01-01 PROCEDURE — 43266 EGD ENDOSCOPIC STENT PLACE: CPT | Performed by: INTERNAL MEDICINE

## 2019-01-01 PROCEDURE — 43266 EGD ENDOSCOPIC STENT PLACE: CPT | Mod: ,,, | Performed by: INTERNAL MEDICINE

## 2019-01-01 PROCEDURE — 84165 PROTEIN E-PHORESIS SERUM: CPT

## 2019-01-01 PROCEDURE — 99999 PR PBB SHADOW E&M-EST. PATIENT-LVL III: ICD-10-PCS | Mod: PBBFAC,,, | Performed by: INTERNAL MEDICINE

## 2019-01-01 PROCEDURE — 99223 PR INITIAL HOSPITAL CARE,LEVL III: ICD-10-PCS | Mod: AI,GC,, | Performed by: INTERNAL MEDICINE

## 2019-01-01 PROCEDURE — 3077F SYST BP >= 140 MM HG: CPT | Mod: CPTII,S$GLB,, | Performed by: INTERNAL MEDICINE

## 2019-01-01 PROCEDURE — 99999 PR PBB SHADOW E&M-EST. PATIENT-LVL II: CPT | Mod: PBBFAC,,, | Performed by: OPTOMETRIST

## 2019-01-01 PROCEDURE — 3075F PR MOST RECENT SYSTOLIC BLOOD PRESS GE 130-139MM HG: ICD-10-PCS | Mod: CPTII,S$GLB,, | Performed by: NURSE PRACTITIONER

## 2019-01-01 PROCEDURE — C1874 STENT, COATED/COV W/DEL SYS: HCPCS | Performed by: INTERNAL MEDICINE

## 2019-01-01 PROCEDURE — 99223 1ST HOSP IP/OBS HIGH 75: CPT | Mod: ,,, | Performed by: CLINICAL NURSE SPECIALIST

## 2019-01-01 PROCEDURE — 99999 PR PBB SHADOW E&M-EST. PATIENT-LVL V: CPT | Mod: PBBFAC,,, | Performed by: NURSE PRACTITIONER

## 2019-01-01 PROCEDURE — 99999 PR PBB SHADOW E&M-EST. PATIENT-LVL V: ICD-10-PCS | Mod: PBBFAC,,, | Performed by: INTERNAL MEDICINE

## 2019-01-01 PROCEDURE — 25000003 PHARM REV CODE 250: Performed by: PHYSICIAN ASSISTANT

## 2019-01-01 PROCEDURE — G0180 PR HOME HEALTH MD CERTIFICATION: ICD-10-PCS | Mod: ,,, | Performed by: INTERNAL MEDICINE

## 2019-01-01 PROCEDURE — 3078F PR MOST RECENT DIASTOLIC BLOOD PRESSURE < 80 MM HG: ICD-10-PCS | Mod: CPTII,S$GLB,, | Performed by: NURSE PRACTITIONER

## 2019-01-01 PROCEDURE — 99999 PR PBB SHADOW E&M-EST. PATIENT-LVL III: CPT | Mod: PBBFAC,,, | Performed by: NURSE PRACTITIONER

## 2019-01-01 PROCEDURE — 25000003 PHARM REV CODE 250: Mod: ER | Performed by: NURSE PRACTITIONER

## 2019-01-01 PROCEDURE — 93306 TRANSTHORACIC ECHO (TTE) COMPLETE: ICD-10-PCS | Mod: 26,,, | Performed by: INTERNAL MEDICINE

## 2019-01-01 PROCEDURE — 27100171 HC OXYGEN HIGH FLOW UP TO 24 HOURS

## 2019-01-01 PROCEDURE — 71260 CT CHEST ABDOMEN PELVIS WITH CONTRAST (XPD): ICD-10-PCS | Mod: 26,,, | Performed by: RADIOLOGY

## 2019-01-01 PROCEDURE — 84165 PATHOLOGIST INTERPRETATION SPE: ICD-10-PCS | Mod: 26,,, | Performed by: PATHOLOGY

## 2019-01-01 PROCEDURE — 25000003 PHARM REV CODE 250: Performed by: RADIOLOGY

## 2019-01-01 PROCEDURE — 96365 THER/PROPH/DIAG IV INF INIT: CPT | Mod: 59

## 2019-01-01 PROCEDURE — 78815 PET IMAGE W/CT SKULL-THIGH: CPT | Mod: 26,PS,, | Performed by: RADIOLOGY

## 2019-01-01 PROCEDURE — 99223 1ST HOSP IP/OBS HIGH 75: CPT | Mod: GC,,, | Performed by: INTERNAL MEDICINE

## 2019-01-01 PROCEDURE — 3078F DIAST BP <80 MM HG: CPT | Mod: CPTII,S$GLB,, | Performed by: FAMILY MEDICINE

## 2019-01-01 PROCEDURE — 3075F SYST BP GE 130 - 139MM HG: CPT | Mod: CPTII,S$GLB,, | Performed by: FAMILY MEDICINE

## 2019-01-01 PROCEDURE — 99214 PR OFFICE/OUTPT VISIT, EST, LEVL IV, 30-39 MIN: ICD-10-PCS | Mod: S$GLB,,, | Performed by: FAMILY MEDICINE

## 2019-01-01 PROCEDURE — 99999 PR PBB SHADOW E&M-EST. PATIENT-LVL III: ICD-10-PCS | Mod: PBBFAC,,, | Performed by: NURSE PRACTITIONER

## 2019-01-01 PROCEDURE — P9040 RBC LEUKOREDUCED IRRADIATED: HCPCS

## 2019-01-01 PROCEDURE — 71260 CT THORAX DX C+: CPT | Mod: 26,,, | Performed by: RADIOLOGY

## 2019-01-01 PROCEDURE — 99232 PR SUBSEQUENT HOSPITAL CARE,LEVL II: ICD-10-PCS | Mod: ,,, | Performed by: CLINICAL NURSE SPECIALIST

## 2019-01-01 PROCEDURE — 99285 EMERGENCY DEPT VISIT HI MDM: CPT | Mod: ,,, | Performed by: EMERGENCY MEDICINE

## 2019-01-01 PROCEDURE — 99221 PR INITIAL HOSPITAL CARE,LEVL I: ICD-10-PCS | Mod: ,,, | Performed by: RADIOLOGY

## 2019-01-01 PROCEDURE — 92014 PR EYE EXAM, EST PATIENT,COMPREHESV: ICD-10-PCS | Mod: S$GLB,,, | Performed by: OPTOMETRIST

## 2019-01-01 PROCEDURE — 86334 PATHOLOGIST INTERPRETATION IFE: ICD-10-PCS | Mod: 26,,, | Performed by: PATHOLOGY

## 2019-01-01 PROCEDURE — 99291 CRITICAL CARE FIRST HOUR: CPT | Mod: 25

## 2019-01-01 PROCEDURE — 36430 TRANSFUSION BLD/BLD COMPNT: CPT

## 2019-01-01 PROCEDURE — 78815 PET IMAGE W/CT SKULL-THIGH: CPT | Mod: TC

## 2019-01-01 PROCEDURE — 85045 AUTOMATED RETICULOCYTE COUNT: CPT

## 2019-01-01 PROCEDURE — 3288F FALL RISK ASSESSMENT DOCD: CPT | Mod: CPTII,S$GLB,, | Performed by: NURSE PRACTITIONER

## 2019-01-01 PROCEDURE — 74177 CT ABD & PELVIS W/CONTRAST: CPT | Mod: 26,,, | Performed by: RADIOLOGY

## 2019-01-01 PROCEDURE — 99999 PR PBB SHADOW E&M-EST. PATIENT-LVL IV: CPT | Mod: PBBFAC,,, | Performed by: FAMILY MEDICINE

## 2019-01-01 PROCEDURE — 96367 TX/PROPH/DG ADDL SEQ IV INF: CPT

## 2019-01-01 PROCEDURE — 92014 COMPRE OPH EXAM EST PT 1/>: CPT | Mod: S$GLB,,, | Performed by: OPTOMETRIST

## 2019-01-01 PROCEDURE — 99999 PR PBB SHADOW E&M-EST. PATIENT-LVL V: CPT | Mod: PBBFAC,,, | Performed by: INTERNAL MEDICINE

## 2019-01-01 PROCEDURE — 99285 EMERGENCY DEPT VISIT HI MDM: CPT | Mod: 25

## 2019-01-01 PROCEDURE — 1101F PT FALLS ASSESS-DOCD LE1/YR: CPT | Mod: CPTII,S$GLB,, | Performed by: NURSE PRACTITIONER

## 2019-01-01 PROCEDURE — 88342 IMHCHEM/IMCYTCHM 1ST ANTB: CPT | Mod: 26,,, | Performed by: PATHOLOGY

## 2019-01-01 PROCEDURE — 93000 ELECTROCARDIOGRAM COMPLETE: CPT | Mod: S$GLB,,, | Performed by: INTERNAL MEDICINE

## 2019-01-01 PROCEDURE — 3079F PR MOST RECENT DIASTOLIC BLOOD PRESSURE 80-89 MM HG: ICD-10-PCS | Mod: CPTII,S$GLB,, | Performed by: FAMILY MEDICINE

## 2019-01-01 PROCEDURE — 3078F DIAST BP <80 MM HG: CPT | Mod: CPTII,S$GLB,, | Performed by: NURSE PRACTITIONER

## 2019-01-01 PROCEDURE — A9552 F18 FDG: HCPCS

## 2019-01-01 PROCEDURE — 92015 DETERMINE REFRACTIVE STATE: CPT | Mod: S$GLB,,, | Performed by: OPTOMETRIST

## 2019-01-01 PROCEDURE — 1100F PTFALLS ASSESS-DOCD GE2>/YR: CPT | Mod: CPTII,S$GLB,, | Performed by: NURSE PRACTITIONER

## 2019-01-01 PROCEDURE — 96376 TX/PRO/DX INJ SAME DRUG ADON: CPT

## 2019-01-01 PROCEDURE — 3079F DIAST BP 80-89 MM HG: CPT | Mod: CPTII,S$GLB,, | Performed by: NURSE PRACTITIONER

## 2019-01-01 PROCEDURE — D9220A PRA ANESTHESIA: ICD-10-PCS | Mod: CRNA,,, | Performed by: NURSE ANESTHETIST, CERTIFIED REGISTERED

## 2019-01-01 PROCEDURE — 25000003 PHARM REV CODE 250: Performed by: ANESTHESIOLOGY

## 2019-01-01 PROCEDURE — 74360 X-RAY GUIDE GI DILATION: CPT | Mod: 26,,, | Performed by: INTERNAL MEDICINE

## 2019-01-01 PROCEDURE — 3074F PR MOST RECENT SYSTOLIC BLOOD PRESSURE < 130 MM HG: ICD-10-PCS | Mod: CPTII,S$GLB,, | Performed by: FAMILY MEDICINE

## 2019-01-01 PROCEDURE — D9220A PRA ANESTHESIA: Mod: ANES,,, | Performed by: ANESTHESIOLOGY

## 2019-01-01 PROCEDURE — 86334 IMMUNOFIX E-PHORESIS SERUM: CPT | Mod: 26,,, | Performed by: PATHOLOGY

## 2019-01-01 PROCEDURE — 99284 PR EMERGENCY DEPT VISIT,LEVEL IV: ICD-10-PCS | Mod: ,,, | Performed by: PHYSICIAN ASSISTANT

## 2019-01-01 PROCEDURE — 3079F PR MOST RECENT DIASTOLIC BLOOD PRESSURE 80-89 MM HG: ICD-10-PCS | Mod: CPTII,S$GLB,, | Performed by: NURSE PRACTITIONER

## 2019-01-01 PROCEDURE — 99291 CRITICAL CARE FIRST HOUR: CPT | Mod: ,,, | Performed by: EMERGENCY MEDICINE

## 2019-01-01 PROCEDURE — 99223 PR INITIAL HOSPITAL CARE,LEVL III: ICD-10-PCS | Mod: GC,,, | Performed by: INTERNAL MEDICINE

## 2019-01-01 PROCEDURE — 99284 EMERGENCY DEPT VISIT MOD MDM: CPT | Mod: ER

## 2019-01-01 RX ORDER — OXYCODONE AND ACETAMINOPHEN 7.5; 325 MG/1; MG/1
1 TABLET ORAL EVERY 8 HOURS PRN
Qty: 90 TABLET | Refills: 0 | Status: ON HOLD | OUTPATIENT
Start: 2019-01-01 | End: 2019-01-01 | Stop reason: HOSPADM

## 2019-01-01 RX ORDER — PROMETHAZINE HYDROCHLORIDE 12.5 MG/1
12.5 TABLET ORAL EVERY 6 HOURS PRN
Status: DISCONTINUED | OUTPATIENT
Start: 2019-01-01 | End: 2019-01-01

## 2019-01-01 RX ORDER — PSEUDOEPHEDRINE/ACETAMINOPHEN 30MG-500MG
100 TABLET ORAL
Status: COMPLETED | OUTPATIENT
Start: 2019-01-01 | End: 2019-01-01

## 2019-01-01 RX ORDER — ENOXAPARIN SODIUM 100 MG/ML
40 INJECTION SUBCUTANEOUS EVERY 24 HOURS
Status: DISCONTINUED | OUTPATIENT
Start: 2019-01-01 | End: 2019-01-01

## 2019-01-01 RX ORDER — FAMOTIDINE 10 MG/ML
20 INJECTION INTRAVENOUS 2 TIMES DAILY
Status: DISCONTINUED | OUTPATIENT
Start: 2019-01-01 | End: 2019-01-01

## 2019-01-01 RX ORDER — LORAZEPAM 0.5 MG/1
1 TABLET ORAL EVERY 12 HOURS PRN
Qty: 28 TABLET | Refills: 0 | Status: SHIPPED | OUTPATIENT
Start: 2019-01-01 | End: 2019-01-01 | Stop reason: DRUGHIGH

## 2019-01-01 RX ORDER — MORPHINE SULFATE 2 MG/ML
4 INJECTION, SOLUTION INTRAMUSCULAR; INTRAVENOUS EVERY 6 HOURS PRN
Status: DISCONTINUED | OUTPATIENT
Start: 2019-01-01 | End: 2019-01-01

## 2019-01-01 RX ORDER — PANTOPRAZOLE SODIUM 40 MG/1
40 TABLET, DELAYED RELEASE ORAL
Status: DISCONTINUED | OUTPATIENT
Start: 2019-01-01 | End: 2019-01-01 | Stop reason: HOSPADM

## 2019-01-01 RX ORDER — DIPHENHYDRAMINE HYDROCHLORIDE 50 MG/ML
25 INJECTION INTRAMUSCULAR; INTRAVENOUS ONCE
Status: COMPLETED | OUTPATIENT
Start: 2019-01-01 | End: 2019-01-01

## 2019-01-01 RX ORDER — ONDANSETRON 2 MG/ML
8 INJECTION INTRAMUSCULAR; INTRAVENOUS EVERY 8 HOURS PRN
Status: DISCONTINUED | OUTPATIENT
Start: 2019-01-01 | End: 2019-01-01

## 2019-01-01 RX ORDER — ONDANSETRON 4 MG/1
4 TABLET, FILM COATED ORAL EVERY 8 HOURS PRN
Qty: 12 TABLET | Refills: 1 | Status: ON HOLD | OUTPATIENT
Start: 2019-01-01 | End: 2019-01-01 | Stop reason: HOSPADM

## 2019-01-01 RX ORDER — POTASSIUM CHLORIDE 7.45 MG/ML
10 INJECTION INTRAVENOUS
Status: COMPLETED | OUTPATIENT
Start: 2019-01-01 | End: 2019-01-01

## 2019-01-01 RX ORDER — MAGNESIUM SULFATE HEPTAHYDRATE 40 MG/ML
2 INJECTION, SOLUTION INTRAVENOUS ONCE
Status: COMPLETED | OUTPATIENT
Start: 2019-01-01 | End: 2019-01-01

## 2019-01-01 RX ORDER — GLUCAGON 1 MG
1 KIT INJECTION
Status: DISCONTINUED | OUTPATIENT
Start: 2019-01-01 | End: 2019-01-01 | Stop reason: HOSPADM

## 2019-01-01 RX ORDER — TIZANIDINE 4 MG/1
4 TABLET ORAL EVERY 8 HOURS PRN
Qty: 90 TABLET | Refills: 0 | Status: SHIPPED | OUTPATIENT
Start: 2019-01-01 | End: 2019-01-01

## 2019-01-01 RX ORDER — HYDROCODONE BITARTRATE AND ACETAMINOPHEN 5; 325 MG/1; MG/1
1 TABLET ORAL EVERY 4 HOURS PRN
Qty: 18 TABLET | Refills: 0 | Status: SHIPPED | OUTPATIENT
Start: 2019-01-01 | End: 2019-01-01

## 2019-01-01 RX ORDER — IPRATROPIUM BROMIDE AND ALBUTEROL SULFATE 2.5; .5 MG/3ML; MG/3ML
3 SOLUTION RESPIRATORY (INHALATION) EVERY 4 HOURS PRN
Status: DISCONTINUED | OUTPATIENT
Start: 2019-01-01 | End: 2019-01-01 | Stop reason: HOSPADM

## 2019-01-01 RX ORDER — LACTULOSE 10 G/15ML
200 SOLUTION ORAL; RECTAL
Status: DISCONTINUED | OUTPATIENT
Start: 2019-01-01 | End: 2019-01-01

## 2019-01-01 RX ORDER — SODIUM CHLORIDE AND POTASSIUM CHLORIDE 150; 900 MG/100ML; MG/100ML
INJECTION, SOLUTION INTRAVENOUS
Status: COMPLETED | OUTPATIENT
Start: 2019-01-01 | End: 2019-01-01

## 2019-01-01 RX ORDER — MORPHINE SULFATE 20 MG/ML
10 SOLUTION ORAL
Status: DISCONTINUED | OUTPATIENT
Start: 2019-01-01 | End: 2019-01-01 | Stop reason: HOSPADM

## 2019-01-01 RX ORDER — POLYETHYLENE GLYCOL 3350 17 G/17G
17 POWDER, FOR SOLUTION ORAL DAILY
Status: DISCONTINUED | OUTPATIENT
Start: 2019-01-01 | End: 2019-01-01

## 2019-01-01 RX ORDER — IBUPROFEN 200 MG
16 TABLET ORAL
Status: DISCONTINUED | OUTPATIENT
Start: 2019-01-01 | End: 2019-01-01 | Stop reason: HOSPADM

## 2019-01-01 RX ORDER — MORPHINE SULFATE 20 MG/ML
10 SOLUTION ORAL
Refills: 0
Start: 2019-01-01

## 2019-01-01 RX ORDER — OXYCODONE AND ACETAMINOPHEN 7.5; 325 MG/1; MG/1
1 TABLET ORAL EVERY 4 HOURS PRN
Status: DISCONTINUED | OUTPATIENT
Start: 2019-01-01 | End: 2019-01-01 | Stop reason: HOSPADM

## 2019-01-01 RX ORDER — HYDROMORPHONE HYDROCHLORIDE 1 MG/ML
0.2 INJECTION, SOLUTION INTRAMUSCULAR; INTRAVENOUS; SUBCUTANEOUS EVERY 6 HOURS PRN
Status: DISCONTINUED | OUTPATIENT
Start: 2019-01-01 | End: 2019-01-01

## 2019-01-01 RX ORDER — ONDANSETRON HCL IN 0.9 % NACL 8 MG/50 ML
8 INTRAVENOUS SOLUTION, PIGGYBACK (ML) INTRAVENOUS
Status: COMPLETED | OUTPATIENT
Start: 2019-01-01 | End: 2019-01-01

## 2019-01-01 RX ORDER — HEPARIN 100 UNIT/ML
500 SYRINGE INTRAVENOUS
Status: DISCONTINUED | OUTPATIENT
Start: 2019-01-01 | End: 2019-01-01 | Stop reason: HOSPADM

## 2019-01-01 RX ORDER — KETOROLAC TROMETHAMINE 30 MG/ML
10 INJECTION, SOLUTION INTRAMUSCULAR; INTRAVENOUS
Status: COMPLETED | OUTPATIENT
Start: 2019-01-01 | End: 2019-01-01

## 2019-01-01 RX ORDER — GABAPENTIN 300 MG/1
300 CAPSULE ORAL NIGHTLY
Qty: 90 CAPSULE | Refills: 1 | Status: ON HOLD | OUTPATIENT
Start: 2019-01-01 | End: 2019-01-01 | Stop reason: HOSPADM

## 2019-01-01 RX ORDER — LORAZEPAM/0.9% SODIUM CHLORIDE 100MG/0.1L
2 PLASTIC BAG, INJECTION (ML) INTRAVENOUS
Status: COMPLETED | OUTPATIENT
Start: 2019-01-01 | End: 2019-01-01

## 2019-01-01 RX ORDER — PREDNISONE 20 MG/1
60 TABLET ORAL DAILY
Qty: 29 TABLET | Refills: 0 | Status: CANCELLED | OUTPATIENT
Start: 2019-01-01

## 2019-01-01 RX ORDER — LOSARTAN POTASSIUM 100 MG/1
100 TABLET ORAL DAILY
Qty: 90 TABLET | Refills: 1 | OUTPATIENT
Start: 2019-01-01 | End: 2019-01-01

## 2019-01-01 RX ORDER — HYDROMORPHONE HYDROCHLORIDE 1 MG/ML
0.5 INJECTION, SOLUTION INTRAMUSCULAR; INTRAVENOUS; SUBCUTANEOUS EVERY 4 HOURS PRN
Status: DISCONTINUED | OUTPATIENT
Start: 2019-01-01 | End: 2019-01-01

## 2019-01-01 RX ORDER — ACETAMINOPHEN 325 MG/1
650 TABLET ORAL EVERY 8 HOURS PRN
Status: DISCONTINUED | OUTPATIENT
Start: 2019-01-01 | End: 2019-01-01

## 2019-01-01 RX ORDER — TIZANIDINE 4 MG/1
4 TABLET ORAL EVERY 8 HOURS PRN
Qty: 90 TABLET | Refills: 0 | Status: SHIPPED | OUTPATIENT
Start: 2019-01-01 | End: 2019-01-01 | Stop reason: SDUPTHER

## 2019-01-01 RX ORDER — OXYCODONE HYDROCHLORIDE 10 MG/1
10 TABLET ORAL EVERY 4 HOURS PRN
Qty: 60 TABLET | Refills: 0 | Status: ON HOLD | OUTPATIENT
Start: 2019-01-01 | End: 2019-01-01 | Stop reason: HOSPADM

## 2019-01-01 RX ORDER — IBUPROFEN 200 MG
24 TABLET ORAL
Status: DISCONTINUED | OUTPATIENT
Start: 2019-01-01 | End: 2019-01-01

## 2019-01-01 RX ORDER — AMOXICILLIN 250 MG
2 CAPSULE ORAL 2 TIMES DAILY
Status: DISCONTINUED | OUTPATIENT
Start: 2019-01-01 | End: 2019-01-01 | Stop reason: HOSPADM

## 2019-01-01 RX ORDER — LORAZEPAM/0.9% SODIUM CHLORIDE 100MG/0.1L
2 PLASTIC BAG, INJECTION (ML) INTRAVENOUS ONCE
Status: COMPLETED | OUTPATIENT
Start: 2019-01-01 | End: 2019-01-01

## 2019-01-01 RX ORDER — FUROSEMIDE 10 MG/ML
40 INJECTION INTRAMUSCULAR; INTRAVENOUS ONCE
Status: COMPLETED | OUTPATIENT
Start: 2019-01-01 | End: 2019-01-01

## 2019-01-01 RX ORDER — MORPHINE SULFATE 2 MG/ML
2 INJECTION, SOLUTION INTRAMUSCULAR; INTRAVENOUS EVERY 6 HOURS PRN
Status: DISCONTINUED | OUTPATIENT
Start: 2019-01-01 | End: 2019-01-01

## 2019-01-01 RX ORDER — DIPHENHYDRAMINE HCL 25 MG
25 CAPSULE ORAL EVERY 6 HOURS PRN
Status: DISCONTINUED | OUTPATIENT
Start: 2019-01-01 | End: 2019-01-01 | Stop reason: HOSPADM

## 2019-01-01 RX ORDER — HEPARIN 100 UNIT/ML
500 SYRINGE INTRAVENOUS
Status: CANCELLED | OUTPATIENT
Start: 2019-01-01

## 2019-01-01 RX ORDER — POTASSIUM CHLORIDE 20 MEQ/1
40 TABLET, EXTENDED RELEASE ORAL
Status: DISCONTINUED | OUTPATIENT
Start: 2019-01-01 | End: 2019-01-01

## 2019-01-01 RX ORDER — POTASSIUM CHLORIDE 750 MG/1
40 CAPSULE, EXTENDED RELEASE ORAL ONCE
Status: DISCONTINUED | OUTPATIENT
Start: 2019-01-01 | End: 2019-01-01

## 2019-01-01 RX ORDER — ONDANSETRON 2 MG/ML
8 INJECTION INTRAMUSCULAR; INTRAVENOUS ONCE
Status: COMPLETED | OUTPATIENT
Start: 2019-01-01 | End: 2019-01-01

## 2019-01-01 RX ORDER — BISACODYL 10 MG
10 SUPPOSITORY, RECTAL RECTAL DAILY PRN
Status: DISCONTINUED | OUTPATIENT
Start: 2019-01-01 | End: 2019-01-01 | Stop reason: HOSPADM

## 2019-01-01 RX ORDER — HYDROCODONE BITARTRATE AND ACETAMINOPHEN 5; 325 MG/1; MG/1
1 TABLET ORAL EVERY 6 HOURS PRN
Qty: 60 TABLET | Refills: 0 | Status: SHIPPED | OUTPATIENT
Start: 2019-01-01 | End: 2019-01-01 | Stop reason: SDUPTHER

## 2019-01-01 RX ORDER — ONDANSETRON 2 MG/ML
8 INJECTION INTRAMUSCULAR; INTRAVENOUS EVERY 8 HOURS
Status: DISCONTINUED | OUTPATIENT
Start: 2019-01-01 | End: 2019-01-01 | Stop reason: HOSPADM

## 2019-01-01 RX ORDER — HYDROCODONE BITARTRATE AND ACETAMINOPHEN 5; 325 MG/1; MG/1
1 TABLET ORAL EVERY 12 HOURS PRN
Qty: 60 TABLET | Refills: 0 | Status: SHIPPED | OUTPATIENT
Start: 2019-01-01 | End: 2019-01-01 | Stop reason: SDUPTHER

## 2019-01-01 RX ORDER — AMOXICILLIN 250 MG
1 CAPSULE ORAL 2 TIMES DAILY
Status: ON HOLD | COMMUNITY
Start: 2019-01-01 | End: 2019-01-01 | Stop reason: HOSPADM

## 2019-01-01 RX ORDER — TRAZODONE HYDROCHLORIDE 50 MG/1
50 TABLET ORAL NIGHTLY
Status: ON HOLD | COMMUNITY
Start: 2019-01-01 | End: 2019-01-01 | Stop reason: SDUPTHER

## 2019-01-01 RX ORDER — PANTOPRAZOLE SODIUM 40 MG/1
40 TABLET, DELAYED RELEASE ORAL DAILY
Status: DISCONTINUED | OUTPATIENT
Start: 2019-01-01 | End: 2019-01-01

## 2019-01-01 RX ORDER — GLYCOPYRROLATE 0.2 MG/ML
INJECTION INTRAMUSCULAR; INTRAVENOUS
Status: DISCONTINUED | OUTPATIENT
Start: 2019-01-01 | End: 2019-01-01

## 2019-01-01 RX ORDER — RAMELTEON 8 MG/1
8 TABLET ORAL NIGHTLY PRN
Status: DISCONTINUED | OUTPATIENT
Start: 2019-01-01 | End: 2019-01-01 | Stop reason: HOSPADM

## 2019-01-01 RX ORDER — SODIUM CHLORIDE, SODIUM LACTATE, POTASSIUM CHLORIDE, CALCIUM CHLORIDE 600; 310; 30; 20 MG/100ML; MG/100ML; MG/100ML; MG/100ML
INJECTION, SOLUTION INTRAVENOUS CONTINUOUS
Status: DISCONTINUED | OUTPATIENT
Start: 2019-01-01 | End: 2019-01-01

## 2019-01-01 RX ORDER — METOPROLOL SUCCINATE 50 MG/1
50 TABLET, EXTENDED RELEASE ORAL DAILY
Qty: 30 TABLET | Refills: 1 | Status: ON HOLD | OUTPATIENT
Start: 2019-01-01 | End: 2019-01-01 | Stop reason: HOSPADM

## 2019-01-01 RX ORDER — TRAZODONE HYDROCHLORIDE 50 MG/1
50 TABLET ORAL NIGHTLY
Qty: 90 TABLET | Refills: 3 | Status: ON HOLD | OUTPATIENT
Start: 2019-01-01 | End: 2019-01-01 | Stop reason: HOSPADM

## 2019-01-01 RX ORDER — ONDANSETRON 4 MG/1
4 TABLET, FILM COATED ORAL EVERY 6 HOURS PRN
Status: DISCONTINUED | OUTPATIENT
Start: 2019-01-01 | End: 2019-01-01 | Stop reason: HOSPADM

## 2019-01-01 RX ORDER — HYDROCHLOROTHIAZIDE 25 MG/1
TABLET ORAL
Qty: 90 TABLET | Refills: 1 | OUTPATIENT
Start: 2019-01-01

## 2019-01-01 RX ORDER — PROPOFOL 10 MG/ML
VIAL (ML) INTRAVENOUS
Status: DISCONTINUED | OUTPATIENT
Start: 2019-01-01 | End: 2019-01-01

## 2019-01-01 RX ORDER — FUROSEMIDE 20 MG/1
20 TABLET ORAL DAILY
Status: DISCONTINUED | OUTPATIENT
Start: 2019-01-01 | End: 2019-01-01 | Stop reason: HOSPADM

## 2019-01-01 RX ORDER — HYDROCHLOROTHIAZIDE 25 MG/1
25 TABLET ORAL DAILY
Refills: 1 | Status: CANCELLED
Start: 2019-01-01

## 2019-01-01 RX ORDER — ONDANSETRON 2 MG/ML
4 INJECTION INTRAMUSCULAR; INTRAVENOUS
Status: COMPLETED | OUTPATIENT
Start: 2019-01-01 | End: 2019-01-01

## 2019-01-01 RX ORDER — LACTULOSE 10 G/15ML
20 SOLUTION ORAL; RECTAL EVERY 6 HOURS PRN
Qty: 3600 ML | Refills: 0 | Status: ON HOLD | OUTPATIENT
Start: 2019-01-01 | End: 2019-01-01 | Stop reason: HOSPADM

## 2019-01-01 RX ORDER — MORPHINE SULFATE 2 MG/ML
2 INJECTION, SOLUTION INTRAMUSCULAR; INTRAVENOUS EVERY 4 HOURS PRN
Status: DISCONTINUED | OUTPATIENT
Start: 2019-01-01 | End: 2019-01-01

## 2019-01-01 RX ORDER — HEPARIN 100 UNIT/ML
300 SYRINGE INTRAVENOUS EVERY 8 HOURS PRN
Status: DISCONTINUED | OUTPATIENT
Start: 2019-01-01 | End: 2019-01-01 | Stop reason: HOSPADM

## 2019-01-01 RX ORDER — SODIUM CHLORIDE 0.9 % (FLUSH) 0.9 %
10 SYRINGE (ML) INJECTION
Status: DISCONTINUED | OUTPATIENT
Start: 2019-01-01 | End: 2019-01-01 | Stop reason: HOSPADM

## 2019-01-01 RX ORDER — SUCRALFATE 1 G/10ML
1 SUSPENSION ORAL
Status: DISCONTINUED | OUTPATIENT
Start: 2019-01-01 | End: 2019-01-01 | Stop reason: HOSPADM

## 2019-01-01 RX ORDER — FUROSEMIDE 20 MG/1
20 TABLET ORAL DAILY
Qty: 30 TABLET | Refills: 0 | Status: ON HOLD | OUTPATIENT
Start: 2019-01-01 | End: 2019-01-01 | Stop reason: HOSPADM

## 2019-01-01 RX ORDER — PROPOFOL 10 MG/ML
VIAL (ML) INTRAVENOUS CONTINUOUS PRN
Status: DISCONTINUED | OUTPATIENT
Start: 2019-01-01 | End: 2019-01-01

## 2019-01-01 RX ORDER — POTASSIUM CHLORIDE 20 MEQ/15ML
20 SOLUTION ORAL 2 TIMES DAILY
Qty: 473 ML | Refills: 0 | Status: ON HOLD | OUTPATIENT
Start: 2019-01-01 | End: 2019-01-01 | Stop reason: HOSPADM

## 2019-01-01 RX ORDER — LORAZEPAM 0.5 MG/1
TABLET ORAL
Qty: 30 TABLET | Refills: 0 | OUTPATIENT
Start: 2019-01-01

## 2019-01-01 RX ORDER — IPRATROPIUM BROMIDE 0.5 MG/2.5ML
500 SOLUTION RESPIRATORY (INHALATION) 4 TIMES DAILY PRN
Qty: 62.5 ML | Refills: 0 | Status: ON HOLD | OUTPATIENT
Start: 2019-01-01 | End: 2019-01-01 | Stop reason: SDUPTHER

## 2019-01-01 RX ORDER — HYDROMORPHONE HYDROCHLORIDE 1 MG/ML
1 INJECTION, SOLUTION INTRAMUSCULAR; INTRAVENOUS; SUBCUTANEOUS EVERY 6 HOURS PRN
Status: DISCONTINUED | OUTPATIENT
Start: 2019-01-01 | End: 2019-01-01 | Stop reason: HOSPADM

## 2019-01-01 RX ORDER — METHYLPREDNISOLONE SOD SUCC 125 MG
80 VIAL (EA) INJECTION ONCE
Status: COMPLETED | OUTPATIENT
Start: 2019-01-01 | End: 2019-01-01

## 2019-01-01 RX ORDER — SENNOSIDES 8.6 MG/1
8.6 TABLET ORAL DAILY
Status: DISCONTINUED | OUTPATIENT
Start: 2019-01-01 | End: 2019-01-01

## 2019-01-01 RX ORDER — METOCLOPRAMIDE HYDROCHLORIDE 5 MG/ML
10 INJECTION INTRAMUSCULAR; INTRAVENOUS
Status: COMPLETED | OUTPATIENT
Start: 2019-01-01 | End: 2019-01-01

## 2019-01-01 RX ORDER — GABAPENTIN 300 MG/1
300 CAPSULE ORAL NIGHTLY
Status: DISCONTINUED | OUTPATIENT
Start: 2019-01-01 | End: 2019-01-01

## 2019-01-01 RX ORDER — IBUPROFEN 200 MG
24 TABLET ORAL
Status: DISCONTINUED | OUTPATIENT
Start: 2019-01-01 | End: 2019-01-01 | Stop reason: HOSPADM

## 2019-01-01 RX ORDER — OXYCODONE AND ACETAMINOPHEN 10; 325 MG/1; MG/1
1 TABLET ORAL EVERY 4 HOURS PRN
Status: DISCONTINUED | OUTPATIENT
Start: 2019-01-01 | End: 2019-01-01

## 2019-01-01 RX ORDER — POLYETHYLENE GLYCOL 3350 17 G/17G
17 POWDER, FOR SOLUTION ORAL 2 TIMES DAILY
Status: DISCONTINUED | OUTPATIENT
Start: 2019-01-01 | End: 2019-01-01 | Stop reason: HOSPADM

## 2019-01-01 RX ORDER — POTASSIUM CHLORIDE 20 MEQ/1
40 TABLET, EXTENDED RELEASE ORAL
Status: CANCELLED | OUTPATIENT
Start: 2019-01-01 | End: 2019-01-01

## 2019-01-01 RX ORDER — SYRING-NEEDL,DISP,INSUL,0.3 ML 29 G X1/2"
296 SYRINGE, EMPTY DISPOSABLE MISCELLANEOUS
Status: COMPLETED | OUTPATIENT
Start: 2019-01-01 | End: 2019-01-01

## 2019-01-01 RX ORDER — HYDROCODONE BITARTRATE AND ACETAMINOPHEN 500; 5 MG/1; MG/1
TABLET ORAL
Status: DISCONTINUED | OUTPATIENT
Start: 2019-01-01 | End: 2019-01-01

## 2019-01-01 RX ORDER — LORAZEPAM 1 MG/1
1 TABLET ORAL EVERY 6 HOURS PRN
Status: DISCONTINUED | OUTPATIENT
Start: 2019-01-01 | End: 2019-01-01 | Stop reason: HOSPADM

## 2019-01-01 RX ORDER — MIDAZOLAM HYDROCHLORIDE 1 MG/ML
1 INJECTION INTRAMUSCULAR; INTRAVENOUS
Status: DISCONTINUED | OUTPATIENT
Start: 2019-01-01 | End: 2019-01-01 | Stop reason: HOSPADM

## 2019-01-01 RX ORDER — IPRATROPIUM BROMIDE 0.5 MG/2.5ML
500 SOLUTION RESPIRATORY (INHALATION) 4 TIMES DAILY PRN
Qty: 62.5 ML | Refills: 0
Start: 2019-01-01 | End: 2019-09-07

## 2019-01-01 RX ORDER — OXYCODONE AND ACETAMINOPHEN 5; 325 MG/1; MG/1
1 TABLET ORAL EVERY 6 HOURS PRN
Status: DISCONTINUED | OUTPATIENT
Start: 2019-01-01 | End: 2019-01-01

## 2019-01-01 RX ORDER — IPRATROPIUM BROMIDE 0.5 MG/2.5ML
500 SOLUTION RESPIRATORY (INHALATION) 4 TIMES DAILY PRN
Qty: 1 BOX | Refills: 0 | Status: CANCELLED | OUTPATIENT
Start: 2019-01-01

## 2019-01-01 RX ORDER — IPRATROPIUM BROMIDE 0.5 MG/2.5ML
0.5 SOLUTION RESPIRATORY (INHALATION)
Status: DISCONTINUED | OUTPATIENT
Start: 2019-01-01 | End: 2019-01-01

## 2019-01-01 RX ORDER — ACETAMINOPHEN 650 MG/1
650 SUPPOSITORY RECTAL EVERY 6 HOURS PRN
Status: DISCONTINUED | OUTPATIENT
Start: 2019-01-01 | End: 2019-01-01

## 2019-01-01 RX ORDER — IPRATROPIUM BROMIDE AND ALBUTEROL SULFATE 2.5; .5 MG/3ML; MG/3ML
3 SOLUTION RESPIRATORY (INHALATION) EVERY 6 HOURS PRN
Status: DISCONTINUED | OUTPATIENT
Start: 2019-01-01 | End: 2019-01-01 | Stop reason: HOSPADM

## 2019-01-01 RX ORDER — POTASSIUM CHLORIDE 20 MEQ/15ML
40 SOLUTION ORAL
Status: DISPENSED | OUTPATIENT
Start: 2019-01-01 | End: 2019-01-01

## 2019-01-01 RX ORDER — ALUMINUM HYDROXIDE, MAGNESIUM HYDROXIDE, AND SIMETHICONE 2400; 240; 2400 MG/30ML; MG/30ML; MG/30ML
30 SUSPENSION ORAL EVERY 6 HOURS PRN
Status: DISCONTINUED | OUTPATIENT
Start: 2019-01-01 | End: 2019-01-01 | Stop reason: HOSPADM

## 2019-01-01 RX ORDER — CETIRIZINE HYDROCHLORIDE 5 MG/1
5 TABLET ORAL DAILY PRN
Status: DISCONTINUED | OUTPATIENT
Start: 2019-01-01 | End: 2019-01-01

## 2019-01-01 RX ORDER — LORAZEPAM 1 MG/1
TABLET ORAL
Refills: 0
Start: 2019-01-01

## 2019-01-01 RX ORDER — CETIRIZINE HYDROCHLORIDE 5 MG/1
5 TABLET ORAL DAILY
Status: DISCONTINUED | OUTPATIENT
Start: 2019-01-01 | End: 2019-01-01

## 2019-01-01 RX ORDER — PREDNISONE 20 MG/1
60 TABLET ORAL DAILY
Status: DISCONTINUED | OUTPATIENT
Start: 2019-01-01 | End: 2019-01-01 | Stop reason: HOSPADM

## 2019-01-01 RX ORDER — DIAZEPAM 5 MG/1
5 TABLET ORAL EVERY 6 HOURS PRN
Qty: 10 TABLET | Refills: 0 | Status: SHIPPED | OUTPATIENT
Start: 2019-01-01 | End: 2019-01-01

## 2019-01-01 RX ORDER — ONDANSETRON 8 MG/1
8 TABLET, ORALLY DISINTEGRATING ORAL EVERY 8 HOURS PRN
Status: DISCONTINUED | OUTPATIENT
Start: 2019-01-01 | End: 2019-01-01

## 2019-01-01 RX ORDER — POTASSIUM CHLORIDE 7.45 MG/ML
10 INJECTION INTRAVENOUS ONCE
Status: DISCONTINUED | OUTPATIENT
Start: 2019-01-01 | End: 2019-01-01

## 2019-01-01 RX ORDER — IBUPROFEN 200 MG
16 TABLET ORAL
Status: DISCONTINUED | OUTPATIENT
Start: 2019-01-01 | End: 2019-01-01

## 2019-01-01 RX ORDER — BISACODYL 10 MG
10 SUPPOSITORY, RECTAL RECTAL ONCE
Status: DISCONTINUED | OUTPATIENT
Start: 2019-01-01 | End: 2019-01-01

## 2019-01-01 RX ORDER — GLUCAGON 1 MG
1 KIT INJECTION
Status: DISCONTINUED | OUTPATIENT
Start: 2019-01-01 | End: 2019-01-01

## 2019-01-01 RX ORDER — POTASSIUM CHLORIDE 20 MEQ/15ML
40 SOLUTION ORAL ONCE
Status: COMPLETED | OUTPATIENT
Start: 2019-01-01 | End: 2019-01-01

## 2019-01-01 RX ORDER — PREDNISONE 20 MG/1
60 TABLET ORAL DAILY
Qty: 29 TABLET | Refills: 0 | Status: ON HOLD | OUTPATIENT
Start: 2019-01-01 | End: 2019-01-01 | Stop reason: HOSPADM

## 2019-01-01 RX ORDER — TRAZODONE HYDROCHLORIDE 50 MG/1
50 TABLET ORAL NIGHTLY
Start: 2019-01-01

## 2019-01-01 RX ORDER — OXYCODONE HYDROCHLORIDE 10 MG/1
10 TABLET ORAL EVERY 4 HOURS PRN
Qty: 60 TABLET | Refills: 0 | Status: CANCELLED | OUTPATIENT
Start: 2019-01-01 | End: 2019-01-01

## 2019-01-01 RX ORDER — POTASSIUM CHLORIDE 29.8 MG/ML
40 INJECTION INTRAVENOUS ONCE
Status: DISCONTINUED | OUTPATIENT
Start: 2019-01-01 | End: 2019-01-01

## 2019-01-01 RX ORDER — PROMETHAZINE HYDROCHLORIDE 25 MG/1
25 TABLET ORAL EVERY 6 HOURS PRN
Status: DISCONTINUED | OUTPATIENT
Start: 2019-01-01 | End: 2019-01-01 | Stop reason: HOSPADM

## 2019-01-01 RX ORDER — POTASSIUM CHLORIDE 14.9 MG/ML
20 INJECTION INTRAVENOUS
Status: COMPLETED | OUTPATIENT
Start: 2019-01-01 | End: 2019-01-01

## 2019-01-01 RX ORDER — MORPHINE SULFATE 4 MG/ML
4 INJECTION, SOLUTION INTRAMUSCULAR; INTRAVENOUS
Status: COMPLETED | OUTPATIENT
Start: 2019-01-01 | End: 2019-01-01

## 2019-01-01 RX ORDER — METOCLOPRAMIDE 10 MG/1
10 TABLET ORAL EVERY 6 HOURS PRN
Status: DISCONTINUED | OUTPATIENT
Start: 2019-01-01 | End: 2019-01-01 | Stop reason: HOSPADM

## 2019-01-01 RX ORDER — OXYCODONE HYDROCHLORIDE 5 MG/1
5 TABLET ORAL
Status: DISCONTINUED | OUTPATIENT
Start: 2019-01-01 | End: 2019-01-01 | Stop reason: HOSPADM

## 2019-01-01 RX ORDER — SODIUM,POTASSIUM PHOSPHATES 280-250MG
2 POWDER IN PACKET (EA) ORAL EVERY 4 HOURS
Status: COMPLETED | OUTPATIENT
Start: 2019-01-01 | End: 2019-01-01

## 2019-01-01 RX ORDER — PANTOPRAZOLE SODIUM 40 MG/1
40 TABLET, DELAYED RELEASE ORAL
Qty: 60 TABLET | Refills: 11 | Status: SHIPPED | OUTPATIENT
Start: 2019-01-01 | End: 2020-08-07

## 2019-01-01 RX ORDER — POTASSIUM CHLORIDE 20 MEQ/15ML
40 SOLUTION ORAL
Status: COMPLETED | OUTPATIENT
Start: 2019-01-01 | End: 2019-01-01

## 2019-01-01 RX ORDER — METOCLOPRAMIDE 10 MG/1
10 TABLET ORAL EVERY 6 HOURS PRN
Qty: 24 TABLET | Refills: 1 | Status: ON HOLD | OUTPATIENT
Start: 2019-01-01 | End: 2019-01-01 | Stop reason: HOSPADM

## 2019-01-01 RX ORDER — DIPHENHYDRAMINE HCL 25 MG
25 CAPSULE ORAL NIGHTLY PRN
Qty: 20 CAPSULE | Refills: 0 | Status: CANCELLED | OUTPATIENT
Start: 2019-01-01

## 2019-01-01 RX ORDER — DEXAMETHASONE 2 MG/1
2 TABLET ORAL EVERY 12 HOURS
Qty: 120 TABLET | Refills: 0 | Status: SHIPPED | OUTPATIENT
Start: 2019-01-01 | End: 2019-01-01

## 2019-01-01 RX ORDER — LACTULOSE 10 G/15ML
20 SOLUTION ORAL
Status: COMPLETED | OUTPATIENT
Start: 2019-01-01 | End: 2019-01-01

## 2019-01-01 RX ORDER — ONDANSETRON 2 MG/ML
8 INJECTION INTRAMUSCULAR; INTRAVENOUS EVERY 8 HOURS
Status: DISCONTINUED | OUTPATIENT
Start: 2019-01-01 | End: 2019-01-01

## 2019-01-01 RX ORDER — HYDROCHLOROTHIAZIDE 25 MG/1
25 TABLET ORAL DAILY
Refills: 1 | Status: ON HOLD
Start: 2019-01-01 | End: 2019-01-01 | Stop reason: HOSPADM

## 2019-01-01 RX ORDER — METOCLOPRAMIDE 10 MG/1
10 TABLET ORAL EVERY 6 HOURS
Status: DISCONTINUED | OUTPATIENT
Start: 2019-01-01 | End: 2019-01-01

## 2019-01-01 RX ORDER — HEPARIN 100 UNIT/ML
300 SYRINGE INTRAVENOUS
Status: DISCONTINUED | OUTPATIENT
Start: 2019-01-01 | End: 2019-01-01 | Stop reason: HOSPADM

## 2019-01-01 RX ORDER — TIZANIDINE 4 MG/1
4 TABLET ORAL EVERY 8 HOURS
COMMUNITY
End: 2019-01-01

## 2019-01-01 RX ORDER — AMOXICILLIN 250 MG
1 CAPSULE ORAL 2 TIMES DAILY
Status: DISCONTINUED | OUTPATIENT
Start: 2019-01-01 | End: 2019-01-01

## 2019-01-01 RX ORDER — MAGNESIUM SULFATE HEPTAHYDRATE 40 MG/ML
2 INJECTION, SOLUTION INTRAVENOUS
Status: COMPLETED | OUTPATIENT
Start: 2019-01-01 | End: 2019-01-01

## 2019-01-01 RX ORDER — IPRATROPIUM BROMIDE 0.5 MG/2.5ML
0.5 SOLUTION RESPIRATORY (INHALATION)
Status: DISCONTINUED | OUTPATIENT
Start: 2019-01-01 | End: 2019-01-01 | Stop reason: HOSPADM

## 2019-01-01 RX ORDER — LIDOCAINE HCL/PF 100 MG/5ML
SYRINGE (ML) INTRAVENOUS
Status: DISCONTINUED | OUTPATIENT
Start: 2019-01-01 | End: 2019-01-01

## 2019-01-01 RX ORDER — HEPARIN 100 UNIT/ML
500 SYRINGE INTRAVENOUS
Status: COMPLETED | OUTPATIENT
Start: 2019-01-01 | End: 2019-01-01

## 2019-01-01 RX ORDER — FUROSEMIDE 10 MG/ML
80 INJECTION INTRAMUSCULAR; INTRAVENOUS ONCE
Status: COMPLETED | OUTPATIENT
Start: 2019-01-01 | End: 2019-01-01

## 2019-01-01 RX ORDER — POTASSIUM CHLORIDE 20 MEQ/1
40 TABLET, EXTENDED RELEASE ORAL
Status: DISPENSED | OUTPATIENT
Start: 2019-01-01 | End: 2019-01-01

## 2019-01-01 RX ORDER — GABAPENTIN 100 MG/1
100 CAPSULE ORAL NIGHTLY
Qty: 90 CAPSULE | Refills: 0 | Status: SHIPPED | OUTPATIENT
Start: 2019-01-01 | End: 2019-01-01

## 2019-01-01 RX ORDER — MAGNESIUM SULFATE HEPTAHYDRATE 40 MG/ML
2 INJECTION, SOLUTION INTRAVENOUS CONTINUOUS
Status: DISCONTINUED | OUTPATIENT
Start: 2019-01-01 | End: 2019-01-01

## 2019-01-01 RX ORDER — DIPHENHYDRAMINE HCL 25 MG
25 CAPSULE ORAL EVERY 6 HOURS PRN
Status: DISCONTINUED | OUTPATIENT
Start: 2019-01-01 | End: 2019-01-01

## 2019-01-01 RX ORDER — ZOLPIDEM TARTRATE 5 MG/1
5 TABLET ORAL NIGHTLY PRN
Status: DISCONTINUED | OUTPATIENT
Start: 2019-01-01 | End: 2019-01-01

## 2019-01-01 RX ORDER — PANTOPRAZOLE SODIUM 40 MG/1
40 TABLET, DELAYED RELEASE ORAL DAILY
Status: DISCONTINUED | OUTPATIENT
Start: 2019-01-01 | End: 2019-01-01 | Stop reason: HOSPADM

## 2019-01-01 RX ORDER — PANTOPRAZOLE SODIUM 40 MG/1
40 TABLET, DELAYED RELEASE ORAL
Status: DISCONTINUED | OUTPATIENT
Start: 2019-01-01 | End: 2019-01-01

## 2019-01-01 RX ORDER — SODIUM CHLORIDE 9 MG/ML
500 INJECTION, SOLUTION INTRAVENOUS ONCE
Status: DISCONTINUED | OUTPATIENT
Start: 2019-01-01 | End: 2019-01-01 | Stop reason: HOSPADM

## 2019-01-01 RX ORDER — HYDROCODONE BITARTRATE AND ACETAMINOPHEN 5; 325 MG/1; MG/1
1 TABLET ORAL
Status: COMPLETED | OUTPATIENT
Start: 2019-01-01 | End: 2019-01-01

## 2019-01-01 RX ORDER — AMOXICILLIN 250 MG
1 CAPSULE ORAL 2 TIMES DAILY
Status: DISCONTINUED | OUTPATIENT
Start: 2019-01-01 | End: 2019-01-01 | Stop reason: HOSPADM

## 2019-01-01 RX ORDER — SUCRALFATE 1 G/10ML
1 SUSPENSION ORAL EVERY 6 HOURS
Status: DISCONTINUED | OUTPATIENT
Start: 2019-01-01 | End: 2019-01-01

## 2019-01-01 RX ORDER — ONDANSETRON 2 MG/ML
4 INJECTION INTRAMUSCULAR; INTRAVENOUS ONCE
Status: COMPLETED | OUTPATIENT
Start: 2019-01-01 | End: 2019-01-01

## 2019-01-01 RX ORDER — SODIUM CHLORIDE 0.9 % (FLUSH) 0.9 %
10 SYRINGE (ML) INJECTION
Status: DISCONTINUED | OUTPATIENT
Start: 2019-01-01 | End: 2019-01-01

## 2019-01-01 RX ORDER — HYDROCODONE BITARTRATE AND ACETAMINOPHEN 5; 325 MG/1; MG/1
1 TABLET ORAL EVERY 6 HOURS PRN
Qty: 60 TABLET | Refills: 0 | Status: ON HOLD | OUTPATIENT
Start: 2019-01-01 | End: 2019-01-01 | Stop reason: HOSPADM

## 2019-01-01 RX ORDER — LACTULOSE 10 G/15ML
20 SOLUTION ORAL EVERY 6 HOURS PRN
Status: DISCONTINUED | OUTPATIENT
Start: 2019-01-01 | End: 2019-01-01 | Stop reason: HOSPADM

## 2019-01-01 RX ORDER — METOPROLOL SUCCINATE 50 MG/1
50 TABLET, EXTENDED RELEASE ORAL DAILY
Status: DISCONTINUED | OUTPATIENT
Start: 2019-01-01 | End: 2019-01-01 | Stop reason: HOSPADM

## 2019-01-01 RX ORDER — FUROSEMIDE 20 MG/1
20 TABLET ORAL DAILY
Qty: 30 TABLET | Refills: 0 | Status: CANCELLED | OUTPATIENT
Start: 2019-01-01 | End: 2020-07-30

## 2019-01-01 RX ORDER — PROMETHAZINE HYDROCHLORIDE 12.5 MG/1
12.5 TABLET ORAL EVERY 6 HOURS PRN
Qty: 20 TABLET | Refills: 0
Start: 2019-01-01

## 2019-01-01 RX ORDER — SODIUM CHLORIDE 0.9 % (FLUSH) 0.9 %
10 SYRINGE (ML) INJECTION
Status: CANCELLED | OUTPATIENT
Start: 2019-01-01

## 2019-01-01 RX ORDER — TRAZODONE HYDROCHLORIDE 50 MG/1
50 TABLET ORAL NIGHTLY
Status: DISCONTINUED | OUTPATIENT
Start: 2019-01-01 | End: 2019-01-01 | Stop reason: HOSPADM

## 2019-01-01 RX ORDER — SODIUM CHLORIDE 9 MG/ML
INJECTION, SOLUTION INTRAVENOUS CONTINUOUS PRN
Status: DISCONTINUED | OUTPATIENT
Start: 2019-01-01 | End: 2019-01-01

## 2019-01-01 RX ORDER — METOCLOPRAMIDE 10 MG/1
10 TABLET ORAL EVERY 6 HOURS
Qty: 24 TABLET | Refills: 1 | Status: ON HOLD | OUTPATIENT
Start: 2019-01-01 | End: 2019-01-01 | Stop reason: SDUPTHER

## 2019-01-01 RX ORDER — AMOXICILLIN 250 MG
1 CAPSULE ORAL DAILY PRN
Status: DISCONTINUED | OUTPATIENT
Start: 2019-01-01 | End: 2019-01-01

## 2019-01-01 RX ORDER — POTASSIUM CHLORIDE 20 MEQ/15ML
SOLUTION ORAL
Status: ON HOLD | COMMUNITY
Start: 2019-01-01 | End: 2019-01-01 | Stop reason: SDUPTHER

## 2019-01-01 RX ORDER — GABAPENTIN 300 MG/1
600 CAPSULE ORAL NIGHTLY
Status: DISCONTINUED | OUTPATIENT
Start: 2019-01-01 | End: 2019-01-01 | Stop reason: HOSPADM

## 2019-01-01 RX ORDER — ONDANSETRON 8 MG/1
8 TABLET, ORALLY DISINTEGRATING ORAL EVERY 8 HOURS
Status: DISCONTINUED | OUTPATIENT
Start: 2019-01-01 | End: 2019-01-01 | Stop reason: HOSPADM

## 2019-01-01 RX ORDER — LORAZEPAM 0.5 MG/1
TABLET ORAL
Qty: 30 TABLET | Refills: 0 | Status: SHIPPED | OUTPATIENT
Start: 2019-01-01 | End: 2019-01-01

## 2019-01-01 RX ORDER — SODIUM CHLORIDE 0.9 % (FLUSH) 0.9 %
3 SYRINGE (ML) INJECTION
Status: DISCONTINUED | OUTPATIENT
Start: 2019-01-01 | End: 2019-01-01 | Stop reason: HOSPADM

## 2019-01-01 RX ORDER — CALCIUM CARBONATE 200(500)MG
500 TABLET,CHEWABLE ORAL ONCE
Status: COMPLETED | OUTPATIENT
Start: 2019-01-01 | End: 2019-01-01

## 2019-01-01 RX ORDER — ONDANSETRON 8 MG/1
8 TABLET, ORALLY DISINTEGRATING ORAL EVERY 6 HOURS PRN
Qty: 30 TABLET | Refills: 1 | Status: SHIPPED | OUTPATIENT
Start: 2019-01-01 | End: 2019-01-01

## 2019-01-01 RX ORDER — FAMOTIDINE 20 MG/1
20 TABLET, FILM COATED ORAL 2 TIMES DAILY
Status: DISCONTINUED | OUTPATIENT
Start: 2019-01-01 | End: 2019-01-01 | Stop reason: HOSPADM

## 2019-01-01 RX ORDER — NALOXONE HCL 0.4 MG/ML
0.4 VIAL (ML) INJECTION
Status: DISCONTINUED | OUTPATIENT
Start: 2019-01-01 | End: 2019-01-01 | Stop reason: HOSPADM

## 2019-01-01 RX ORDER — OXYCODONE AND ACETAMINOPHEN 7.5; 325 MG/1; MG/1
1 TABLET ORAL EVERY 8 HOURS PRN
Qty: 90 TABLET | Refills: 0 | Status: SHIPPED | OUTPATIENT
Start: 2019-01-01 | End: 2019-01-01

## 2019-01-01 RX ORDER — BISMUTH SUBSALICYLATE 525 MG/30ML
30 LIQUID ORAL EVERY 6 HOURS PRN
Status: DISCONTINUED | OUTPATIENT
Start: 2019-01-01 | End: 2019-01-01

## 2019-01-01 RX ORDER — METHYLPREDNISOLONE SOD SUCC 125 MG
50 VIAL (EA) INJECTION EVERY 8 HOURS
Status: DISCONTINUED | OUTPATIENT
Start: 2019-01-01 | End: 2019-01-01

## 2019-01-01 RX ORDER — LACTULOSE 10 G/15ML
20 SOLUTION ORAL EVERY 6 HOURS PRN
Qty: 900 ML | Refills: 0 | Status: SHIPPED | OUTPATIENT
Start: 2019-01-01 | End: 2019-01-01

## 2019-01-01 RX ORDER — OXYCODONE HYDROCHLORIDE 5 MG/1
5 TABLET ORAL ONCE
Status: COMPLETED | OUTPATIENT
Start: 2019-01-01 | End: 2019-01-01

## 2019-01-01 RX ORDER — HYDROMORPHONE HYDROCHLORIDE 1 MG/ML
0.5 INJECTION, SOLUTION INTRAMUSCULAR; INTRAVENOUS; SUBCUTANEOUS EVERY 4 HOURS PRN
Status: DISCONTINUED | OUTPATIENT
Start: 2019-01-01 | End: 2019-01-01 | Stop reason: HOSPADM

## 2019-01-01 RX ORDER — OXYCODONE HYDROCHLORIDE 10 MG/1
10 TABLET ORAL EVERY 4 HOURS PRN
Qty: 60 TABLET | Refills: 0 | Status: SHIPPED | OUTPATIENT
Start: 2019-01-01 | End: 2019-01-01

## 2019-01-01 RX ORDER — IPRATROPIUM BROMIDE AND ALBUTEROL SULFATE 2.5; .5 MG/3ML; MG/3ML
3 SOLUTION RESPIRATORY (INHALATION) EVERY 4 HOURS PRN
Status: DISCONTINUED | OUTPATIENT
Start: 2019-01-01 | End: 2019-01-01

## 2019-01-01 RX ORDER — PROMETHAZINE HYDROCHLORIDE 25 MG/1
25 TABLET ORAL EVERY 6 HOURS PRN
Status: DISCONTINUED | OUTPATIENT
Start: 2019-01-01 | End: 2019-01-01

## 2019-01-01 RX ORDER — MIDAZOLAM HYDROCHLORIDE 1 MG/ML
INJECTION INTRAMUSCULAR; INTRAVENOUS CODE/TRAUMA/SEDATION MEDICATION
Status: COMPLETED | OUTPATIENT
Start: 2019-01-01 | End: 2019-01-01

## 2019-01-01 RX ORDER — HYDRALAZINE HYDROCHLORIDE 20 MG/ML
5 INJECTION INTRAMUSCULAR; INTRAVENOUS EVERY 6 HOURS PRN
Status: DISCONTINUED | OUTPATIENT
Start: 2019-01-01 | End: 2019-01-01 | Stop reason: HOSPADM

## 2019-01-01 RX ORDER — SODIUM CHLORIDE 0.9 % (FLUSH) 0.9 %
10 SYRINGE (ML) INJECTION
Status: COMPLETED | OUTPATIENT
Start: 2019-01-01 | End: 2019-01-01

## 2019-01-01 RX ORDER — LOSARTAN POTASSIUM 100 MG/1
100 TABLET ORAL DAILY
Status: ON HOLD
Start: 2019-01-01 | End: 2019-01-01 | Stop reason: HOSPADM

## 2019-01-01 RX ORDER — FENTANYL CITRATE 50 UG/ML
INJECTION, SOLUTION INTRAMUSCULAR; INTRAVENOUS
Status: DISCONTINUED | OUTPATIENT
Start: 2019-01-01 | End: 2019-01-01

## 2019-01-01 RX ORDER — LORAZEPAM 1 MG/1
TABLET ORAL
Refills: 0 | Status: ON HOLD | COMMUNITY
Start: 2019-01-01 | End: 2019-01-01 | Stop reason: SDUPTHER

## 2019-01-01 RX ORDER — HYDROMORPHONE HYDROCHLORIDE 1 MG/ML
0.2 INJECTION, SOLUTION INTRAMUSCULAR; INTRAVENOUS; SUBCUTANEOUS
Status: COMPLETED | OUTPATIENT
Start: 2019-01-01 | End: 2019-01-01

## 2019-01-01 RX ORDER — LORAZEPAM 0.5 MG/1
1 TABLET ORAL EVERY 12 HOURS PRN
Qty: 28 TABLET | Refills: 0 | Status: SHIPPED | OUTPATIENT
Start: 2019-01-01 | End: 2019-01-01 | Stop reason: SDUPTHER

## 2019-01-01 RX ORDER — CEPHALEXIN 500 MG/1
1000 CAPSULE ORAL EVERY 12 HOURS
Qty: 28 CAPSULE | Refills: 0 | Status: SHIPPED | OUTPATIENT
Start: 2019-01-01 | End: 2019-01-01

## 2019-01-01 RX ORDER — HYDROMORPHONE HYDROCHLORIDE 1 MG/ML
0.2 INJECTION, SOLUTION INTRAMUSCULAR; INTRAVENOUS; SUBCUTANEOUS ONCE
Status: COMPLETED | OUTPATIENT
Start: 2019-01-01 | End: 2019-01-01

## 2019-01-01 RX ORDER — PANTOPRAZOLE SODIUM 40 MG/10ML
40 INJECTION, POWDER, LYOPHILIZED, FOR SOLUTION INTRAVENOUS 2 TIMES DAILY
Status: DISCONTINUED | OUTPATIENT
Start: 2019-01-01 | End: 2019-01-01

## 2019-01-01 RX ORDER — FUROSEMIDE 20 MG/1
20 TABLET ORAL DAILY PRN
Qty: 90 TABLET | Refills: 0 | Status: SHIPPED | OUTPATIENT
Start: 2019-01-01 | End: 2019-01-01 | Stop reason: SDUPTHER

## 2019-01-01 RX ORDER — TRAMADOL HYDROCHLORIDE 50 MG/1
50 TABLET ORAL EVERY 6 HOURS
Refills: 0 | COMMUNITY
Start: 2019-01-01 | End: 2019-01-01

## 2019-01-01 RX ORDER — LOSARTAN POTASSIUM 100 MG/1
100 TABLET ORAL DAILY
Status: CANCELLED
Start: 2019-01-01

## 2019-01-01 RX ORDER — OXYCODONE HYDROCHLORIDE 10 MG/1
10 TABLET ORAL EVERY 6 HOURS PRN
Status: DISCONTINUED | OUTPATIENT
Start: 2019-01-01 | End: 2019-01-01

## 2019-01-01 RX ORDER — SODIUM CHLORIDE 9 MG/ML
INJECTION, SOLUTION INTRAVENOUS CONTINUOUS
Status: DISCONTINUED | OUTPATIENT
Start: 2019-01-01 | End: 2019-01-01

## 2019-01-01 RX ORDER — METHYLPREDNISOLONE SOD SUCC 125 MG
50 VIAL (EA) INJECTION 2 TIMES DAILY
Status: DISCONTINUED | OUTPATIENT
Start: 2019-01-01 | End: 2019-01-01

## 2019-01-01 RX ORDER — HYDROMORPHONE HYDROCHLORIDE 1 MG/ML
1 INJECTION, SOLUTION INTRAMUSCULAR; INTRAVENOUS; SUBCUTANEOUS EVERY 6 HOURS PRN
Status: DISCONTINUED | OUTPATIENT
Start: 2019-01-01 | End: 2019-01-01

## 2019-01-01 RX ORDER — OXYCODONE HYDROCHLORIDE 10 MG/1
10 TABLET ORAL EVERY 4 HOURS PRN
Status: DISCONTINUED | OUTPATIENT
Start: 2019-01-01 | End: 2019-01-01 | Stop reason: HOSPADM

## 2019-01-01 RX ORDER — LOSARTAN POTASSIUM 100 MG/1
TABLET ORAL
Status: ON HOLD | COMMUNITY
Start: 2019-01-01 | End: 2019-01-01 | Stop reason: SDUPTHER

## 2019-01-01 RX ORDER — ENOXAPARIN SODIUM 100 MG/ML
40 INJECTION SUBCUTANEOUS EVERY 24 HOURS
Status: DISCONTINUED | OUTPATIENT
Start: 2019-01-01 | End: 2019-01-01 | Stop reason: HOSPADM

## 2019-01-01 RX ORDER — HEPARIN 100 UNIT/ML
5 SYRINGE INTRAVENOUS
Status: DISCONTINUED | OUTPATIENT
Start: 2019-01-01 | End: 2019-01-01 | Stop reason: HOSPADM

## 2019-01-01 RX ORDER — CYPROHEPTADINE HYDROCHLORIDE 4 MG/1
4 TABLET ORAL 3 TIMES DAILY PRN
Qty: 90 TABLET | Refills: 2 | Status: ON HOLD | OUTPATIENT
Start: 2019-01-01 | End: 2019-01-01 | Stop reason: HOSPADM

## 2019-01-01 RX ORDER — DIPHENHYDRAMINE HCL 25 MG
25 CAPSULE ORAL NIGHTLY PRN
Qty: 10 CAPSULE | Refills: 0 | Status: SHIPPED | OUTPATIENT
Start: 2019-01-01 | End: 2019-01-01

## 2019-01-01 RX ORDER — ONDANSETRON 8 MG/1
8 TABLET, ORALLY DISINTEGRATING ORAL EVERY 8 HOURS PRN
Qty: 15 TABLET | Refills: 0 | Status: SHIPPED | OUTPATIENT
Start: 2019-01-01 | End: 2019-01-01

## 2019-01-01 RX ORDER — PROMETHAZINE HYDROCHLORIDE 12.5 MG/1
12.5 TABLET ORAL EVERY 6 HOURS PRN
Qty: 20 TABLET | Refills: 0 | Status: ON HOLD | OUTPATIENT
Start: 2019-01-01 | End: 2019-01-01 | Stop reason: SDUPTHER

## 2019-01-01 RX ORDER — HYDROMORPHONE HYDROCHLORIDE 1 MG/ML
1 INJECTION, SOLUTION INTRAMUSCULAR; INTRAVENOUS; SUBCUTANEOUS EVERY 4 HOURS PRN
Status: DISCONTINUED | OUTPATIENT
Start: 2019-01-01 | End: 2019-01-01

## 2019-01-01 RX ORDER — ACETAMINOPHEN 500 MG
500 TABLET ORAL
Status: DISCONTINUED | OUTPATIENT
Start: 2019-01-01 | End: 2019-01-01 | Stop reason: HOSPADM

## 2019-01-01 RX ORDER — HYDROMORPHONE HYDROCHLORIDE 1 MG/ML
0.2 INJECTION, SOLUTION INTRAMUSCULAR; INTRAVENOUS; SUBCUTANEOUS EVERY 5 MIN PRN
Status: COMPLETED | OUTPATIENT
Start: 2019-01-01 | End: 2019-01-01

## 2019-01-01 RX ORDER — FUROSEMIDE 40 MG/1
40 TABLET ORAL DAILY PRN
Qty: 90 TABLET | Refills: 0 | OUTPATIENT
Start: 2019-01-01 | End: 2019-01-01

## 2019-01-01 RX ORDER — LACTULOSE 10 G/15ML
20 SOLUTION ORAL ONCE
Status: COMPLETED | OUTPATIENT
Start: 2019-01-01 | End: 2019-01-01

## 2019-01-01 RX ORDER — FENTANYL CITRATE 50 UG/ML
25 INJECTION, SOLUTION INTRAMUSCULAR; INTRAVENOUS EVERY 5 MIN PRN
Status: COMPLETED | OUTPATIENT
Start: 2019-01-01 | End: 2019-01-01

## 2019-01-01 RX ORDER — ONDANSETRON 2 MG/ML
4 INJECTION INTRAMUSCULAR; INTRAVENOUS DAILY PRN
Status: CANCELLED | OUTPATIENT
Start: 2019-01-01

## 2019-01-01 RX ORDER — LACTULOSE 10 G/15ML
20 SOLUTION ORAL EVERY 6 HOURS PRN
Qty: 30 ML | Refills: 0 | Status: CANCELLED | OUTPATIENT
Start: 2019-01-01

## 2019-01-01 RX ORDER — OXYCODONE HYDROCHLORIDE 5 MG/1
5 TABLET ORAL EVERY 6 HOURS PRN
Status: DISCONTINUED | OUTPATIENT
Start: 2019-01-01 | End: 2019-01-01

## 2019-01-01 RX ORDER — MIDAZOLAM HYDROCHLORIDE 1 MG/ML
INJECTION, SOLUTION INTRAMUSCULAR; INTRAVENOUS
Status: DISCONTINUED | OUTPATIENT
Start: 2019-01-01 | End: 2019-01-01

## 2019-01-01 RX ORDER — POLYETHYLENE GLYCOL 3350 17 G/17G
17 POWDER, FOR SOLUTION ORAL 2 TIMES DAILY
Qty: 14 EACH | Refills: 0 | Status: CANCELLED | OUTPATIENT
Start: 2019-01-01

## 2019-01-01 RX ADMIN — FAMOTIDINE 20 MG: 20 TABLET, FILM COATED ORAL at 09:07

## 2019-01-01 RX ADMIN — PANTOPRAZOLE SODIUM 40 MG: 40 TABLET, DELAYED RELEASE ORAL at 09:06

## 2019-01-01 RX ADMIN — HYDROMORPHONE HYDROCHLORIDE 1 MG: 1 INJECTION, SOLUTION INTRAMUSCULAR; INTRAVENOUS; SUBCUTANEOUS at 08:06

## 2019-01-01 RX ADMIN — POTASSIUM CHLORIDE 10 MEQ: 10 INJECTION, SOLUTION INTRAVENOUS at 04:06

## 2019-01-01 RX ADMIN — OXYCODONE HYDROCHLORIDE AND ACETAMINOPHEN 1 TABLET: 5; 325 TABLET ORAL at 08:06

## 2019-01-01 RX ADMIN — ONDANSETRON 4 MG: 4 TABLET, FILM COATED ORAL at 12:07

## 2019-01-01 RX ADMIN — HYDROMORPHONE HYDROCHLORIDE 1 MG: 1 INJECTION, SOLUTION INTRAMUSCULAR; INTRAVENOUS; SUBCUTANEOUS at 05:08

## 2019-01-01 RX ADMIN — ENOXAPARIN SODIUM 40 MG: 100 INJECTION SUBCUTANEOUS at 05:07

## 2019-01-01 RX ADMIN — ONDANSETRON 8 MG: 2 INJECTION INTRAMUSCULAR; INTRAVENOUS at 09:08

## 2019-01-01 RX ADMIN — IOHEXOL 100 ML: 350 INJECTION, SOLUTION INTRAVENOUS at 10:05

## 2019-01-01 RX ADMIN — IPRATROPIUM BROMIDE 0.5 MG: 0.5 SOLUTION RESPIRATORY (INHALATION) at 07:07

## 2019-01-01 RX ADMIN — METHYLPREDNISOLONE SODIUM SUCCINATE 50 MG: 125 INJECTION, POWDER, FOR SOLUTION INTRAMUSCULAR; INTRAVENOUS at 06:07

## 2019-01-01 RX ADMIN — GABAPENTIN 300 MG: 300 CAPSULE ORAL at 08:07

## 2019-01-01 RX ADMIN — MAGNESIUM SULFATE IN WATER 2 G: 40 INJECTION, SOLUTION INTRAVENOUS at 09:07

## 2019-01-01 RX ADMIN — METOPROLOL SUCCINATE 50 MG: 50 TABLET, EXTENDED RELEASE ORAL at 08:07

## 2019-01-01 RX ADMIN — HYDROMORPHONE HYDROCHLORIDE 0.5 MG: 1 INJECTION, SOLUTION INTRAMUSCULAR; INTRAVENOUS; SUBCUTANEOUS at 09:07

## 2019-01-01 RX ADMIN — ONDANSETRON 4 MG: 2 INJECTION INTRAMUSCULAR; INTRAVENOUS at 07:07

## 2019-01-01 RX ADMIN — HYDROMORPHONE HYDROCHLORIDE 1 MG: 1 INJECTION, SOLUTION INTRAMUSCULAR; INTRAVENOUS; SUBCUTANEOUS at 12:08

## 2019-01-01 RX ADMIN — FENTANYL CITRATE 25 MCG: 50 INJECTION INTRAMUSCULAR; INTRAVENOUS at 01:06

## 2019-01-01 RX ADMIN — RAMELTEON 8 MG: 8 TABLET, FILM COATED ORAL at 09:07

## 2019-01-01 RX ADMIN — MAGNESIUM CITRATE 296 ML: 1.75 LIQUID ORAL at 07:07

## 2019-01-01 RX ADMIN — HYDROCODONE BITARTRATE AND ACETAMINOPHEN 1 TABLET: 5; 325 TABLET ORAL at 02:04

## 2019-01-01 RX ADMIN — IPRATROPIUM BROMIDE 0.5 MG: 0.5 SOLUTION RESPIRATORY (INHALATION) at 11:07

## 2019-01-01 RX ADMIN — HYDROMORPHONE HYDROCHLORIDE 1 MG: 1 INJECTION, SOLUTION INTRAMUSCULAR; INTRAVENOUS; SUBCUTANEOUS at 11:06

## 2019-01-01 RX ADMIN — FAMOTIDINE 20 MG: 20 TABLET, FILM COATED ORAL at 08:07

## 2019-01-01 RX ADMIN — PANTOPRAZOLE SODIUM 40 MG: 40 TABLET, DELAYED RELEASE ORAL at 05:08

## 2019-01-01 RX ADMIN — SENNOSIDES,DOCUSATE SODIUM 2 TABLET: 8.6; 5 TABLET, FILM COATED ORAL at 08:07

## 2019-01-01 RX ADMIN — MIDAZOLAM HYDROCHLORIDE 1 MG: 1 INJECTION, SOLUTION INTRAMUSCULAR; INTRAVENOUS at 10:01

## 2019-01-01 RX ADMIN — HYDROMORPHONE HYDROCHLORIDE 1 MG: 1 INJECTION, SOLUTION INTRAMUSCULAR; INTRAVENOUS; SUBCUTANEOUS at 10:08

## 2019-01-01 RX ADMIN — ONDANSETRON 8 MG: 2 INJECTION INTRAMUSCULAR; INTRAVENOUS at 05:08

## 2019-01-01 RX ADMIN — ENOXAPARIN SODIUM 40 MG: 100 INJECTION SUBCUTANEOUS at 06:07

## 2019-01-01 RX ADMIN — OXYCODONE HYDROCHLORIDE 10 MG: 10 TABLET ORAL at 09:07

## 2019-01-01 RX ADMIN — ONDANSETRON 4 MG: 4 TABLET, FILM COATED ORAL at 08:07

## 2019-01-01 RX ADMIN — SENNOSIDES,DOCUSATE SODIUM 1 TABLET: 8.6; 5 TABLET, FILM COATED ORAL at 08:06

## 2019-01-01 RX ADMIN — Medication 100 ML: at 07:07

## 2019-01-01 RX ADMIN — POLYETHYLENE GLYCOL 3350 17 G: 17 POWDER, FOR SOLUTION ORAL at 09:07

## 2019-01-01 RX ADMIN — POLYETHYLENE GLYCOL 3350 17 G: 17 POWDER, FOR SOLUTION ORAL at 08:08

## 2019-01-01 RX ADMIN — ONDANSETRON 8 MG: 8 TABLET, ORALLY DISINTEGRATING ORAL at 02:06

## 2019-01-01 RX ADMIN — ONDANSETRON 4 MG: 2 INJECTION INTRAMUSCULAR; INTRAVENOUS at 03:07

## 2019-01-01 RX ADMIN — OXYCODONE HYDROCHLORIDE 10 MG: 10 TABLET ORAL at 04:07

## 2019-01-01 RX ADMIN — METOCLOPRAMIDE 10 MG: 5 INJECTION, SOLUTION INTRAMUSCULAR; INTRAVENOUS at 01:07

## 2019-01-01 RX ADMIN — OXYCODONE HYDROCHLORIDE 10 MG: 10 TABLET ORAL at 02:07

## 2019-01-01 RX ADMIN — IPRATROPIUM BROMIDE 0.5 MG: 0.5 SOLUTION RESPIRATORY (INHALATION) at 03:07

## 2019-01-01 RX ADMIN — SENNOSIDES,DOCUSATE SODIUM 1 TABLET: 8.6; 5 TABLET, FILM COATED ORAL at 09:07

## 2019-01-01 RX ADMIN — POTASSIUM CHLORIDE 40 MEQ: 20 SOLUTION ORAL at 11:07

## 2019-01-01 RX ADMIN — ENOXAPARIN SODIUM 40 MG: 100 INJECTION SUBCUTANEOUS at 05:06

## 2019-01-01 RX ADMIN — PROMETHAZINE HYDROCHLORIDE 12.5 MG: 25 INJECTION INTRAMUSCULAR; INTRAVENOUS at 03:06

## 2019-01-01 RX ADMIN — MAGNESIUM SULFATE IN WATER 2 G: 40 INJECTION, SOLUTION INTRAVENOUS at 02:07

## 2019-01-01 RX ADMIN — POTASSIUM CHLORIDE 10 MEQ: 10 INJECTION, SOLUTION INTRAVENOUS at 12:07

## 2019-01-01 RX ADMIN — IPRATROPIUM BROMIDE 0.5 MG: 0.5 SOLUTION RESPIRATORY (INHALATION) at 12:07

## 2019-01-01 RX ADMIN — SENNOSIDES,DOCUSATE SODIUM 1 TABLET: 8.6; 5 TABLET, FILM COATED ORAL at 09:06

## 2019-01-01 RX ADMIN — FUROSEMIDE 20 MG: 20 TABLET ORAL at 09:07

## 2019-01-01 RX ADMIN — OXYCODONE HYDROCHLORIDE AND ACETAMINOPHEN 1 TABLET: 7.5; 325 TABLET ORAL at 11:06

## 2019-01-01 RX ADMIN — SENNOSIDES,DOCUSATE SODIUM 1 TABLET: 8.6; 5 TABLET, FILM COATED ORAL at 08:07

## 2019-01-01 RX ADMIN — POTASSIUM CHLORIDE 10 MEQ: 10 INJECTION, SOLUTION INTRAVENOUS at 02:07

## 2019-01-01 RX ADMIN — OXYCODONE HYDROCHLORIDE AND ACETAMINOPHEN 1 TABLET: 7.5; 325 TABLET ORAL at 09:06

## 2019-01-01 RX ADMIN — POTASSIUM & SODIUM PHOSPHATES POWDER PACK 280-160-250 MG 2 PACKET: 280-160-250 PACK at 02:07

## 2019-01-01 RX ADMIN — LACTULOSE 20 G: 20 SOLUTION ORAL at 08:07

## 2019-01-01 RX ADMIN — CALCIUM CARBONATE (ANTACID) CHEW TAB 500 MG 500 MG: 500 CHEW TAB at 01:07

## 2019-01-01 RX ADMIN — OXYCODONE HYDROCHLORIDE 10 MG: 10 TABLET ORAL at 03:07

## 2019-01-01 RX ADMIN — MORPHINE SULFATE 10 MG: 100 SOLUTION ORAL at 05:08

## 2019-01-01 RX ADMIN — SODIUM CHLORIDE 500 ML: 0.9 INJECTION, SOLUTION INTRAVENOUS at 06:06

## 2019-01-01 RX ADMIN — MAGNESIUM SULFATE IN WATER 2 G: 40 INJECTION, SOLUTION INTRAVENOUS at 03:07

## 2019-01-01 RX ADMIN — IOHEXOL 100 ML: 350 INJECTION, SOLUTION INTRAVENOUS at 03:07

## 2019-01-01 RX ADMIN — SODIUM CHLORIDE 250 ML: 0.9 INJECTION, SOLUTION INTRAVENOUS at 07:08

## 2019-01-01 RX ADMIN — ZOLPIDEM TARTRATE 5 MG: 5 TABLET ORAL at 10:07

## 2019-01-01 RX ADMIN — OXYCODONE HYDROCHLORIDE 10 MG: 10 TABLET ORAL at 01:07

## 2019-01-01 RX ADMIN — MIDAZOLAM HYDROCHLORIDE 2 MG: 1 INJECTION, SOLUTION INTRAMUSCULAR; INTRAVENOUS at 12:06

## 2019-01-01 RX ADMIN — SODIUM CHLORIDE: 0.9 INJECTION, SOLUTION INTRAVENOUS at 12:06

## 2019-01-01 RX ADMIN — Medication 10 ML: at 10:06

## 2019-01-01 RX ADMIN — PREDNISONE 60 MG: 20 TABLET ORAL at 09:07

## 2019-01-01 RX ADMIN — OXYCODONE HYDROCHLORIDE 10 MG: 10 TABLET ORAL at 08:07

## 2019-01-01 RX ADMIN — SODIUM CHLORIDE 500 ML: 0.9 INJECTION, SOLUTION INTRAVENOUS at 07:07

## 2019-01-01 RX ADMIN — FUROSEMIDE 40 MG: 10 INJECTION, SOLUTION INTRAMUSCULAR; INTRAVENOUS at 12:07

## 2019-01-01 RX ADMIN — OXYCODONE HYDROCHLORIDE AND ACETAMINOPHEN 1 TABLET: 10; 325 TABLET ORAL at 07:07

## 2019-01-01 RX ADMIN — ONDANSETRON 8 MG: 8 TABLET, ORALLY DISINTEGRATING ORAL at 10:06

## 2019-01-01 RX ADMIN — SODIUM CHLORIDE 1000 ML: 0.9 INJECTION, SOLUTION INTRAVENOUS at 02:06

## 2019-01-01 RX ADMIN — PROMETHAZINE HYDROCHLORIDE 25 MG: 25 TABLET ORAL at 05:06

## 2019-01-01 RX ADMIN — POTASSIUM CHLORIDE AND SODIUM CHLORIDE: 900; 150 INJECTION, SOLUTION INTRAVENOUS at 04:06

## 2019-01-01 RX ADMIN — MAGNESIUM SULFATE IN WATER 2 G: 40 INJECTION, SOLUTION INTRAVENOUS at 10:07

## 2019-01-01 RX ADMIN — OXYCODONE HYDROCHLORIDE AND ACETAMINOPHEN 1 TABLET: 7.5; 325 TABLET ORAL at 04:06

## 2019-01-01 RX ADMIN — FUROSEMIDE 40 MG: 10 INJECTION, SOLUTION INTRAMUSCULAR; INTRAVENOUS at 11:07

## 2019-01-01 RX ADMIN — PANTOPRAZOLE SODIUM 40 MG: 40 INJECTION, POWDER, FOR SOLUTION INTRAVENOUS at 09:08

## 2019-01-01 RX ADMIN — IOHEXOL 100 ML: 350 INJECTION, SOLUTION INTRAVENOUS at 05:06

## 2019-01-01 RX ADMIN — POTASSIUM CHLORIDE 40 MEQ: 20 TABLET, EXTENDED RELEASE ORAL at 06:08

## 2019-01-01 RX ADMIN — METHYLPREDNISOLONE SODIUM SUCCINATE 50 MG: 125 INJECTION, POWDER, FOR SOLUTION INTRAMUSCULAR; INTRAVENOUS at 01:07

## 2019-01-01 RX ADMIN — HYDROMORPHONE HYDROCHLORIDE 0.5 MG: 1 INJECTION, SOLUTION INTRAMUSCULAR; INTRAVENOUS; SUBCUTANEOUS at 08:07

## 2019-01-01 RX ADMIN — MAGNESIUM SULFATE IN WATER 2 G: 40 INJECTION, SOLUTION INTRAVENOUS at 08:08

## 2019-01-01 RX ADMIN — ENOXAPARIN SODIUM 40 MG: 100 INJECTION SUBCUTANEOUS at 11:06

## 2019-01-01 RX ADMIN — ZOLPIDEM TARTRATE 5 MG: 5 TABLET ORAL at 08:07

## 2019-01-01 RX ADMIN — ENOXAPARIN SODIUM 40 MG: 100 INJECTION SUBCUTANEOUS at 04:08

## 2019-01-01 RX ADMIN — METHYLPREDNISOLONE SODIUM SUCCINATE 50 MG: 125 INJECTION, POWDER, FOR SOLUTION INTRAMUSCULAR; INTRAVENOUS at 09:07

## 2019-01-01 RX ADMIN — KETOROLAC TROMETHAMINE 10 MG: 30 INJECTION, SOLUTION INTRAMUSCULAR at 06:06

## 2019-01-01 RX ADMIN — LORAZEPAM 1 MG: 1 TABLET ORAL at 08:08

## 2019-01-01 RX ADMIN — OXYCODONE HYDROCHLORIDE 5 MG: 5 TABLET ORAL at 01:06

## 2019-01-01 RX ADMIN — ENOXAPARIN SODIUM 40 MG: 100 INJECTION SUBCUTANEOUS at 04:07

## 2019-01-01 RX ADMIN — ONDANSETRON 8 MG: 2 INJECTION INTRAMUSCULAR; INTRAVENOUS at 08:07

## 2019-01-01 RX ADMIN — ONDANSETRON 4 MG: 4 TABLET, FILM COATED ORAL at 10:07

## 2019-01-01 RX ADMIN — OXYCODONE HYDROCHLORIDE 10 MG: 10 TABLET ORAL at 11:07

## 2019-01-01 RX ADMIN — METOPROLOL SUCCINATE 50 MG: 50 TABLET, EXTENDED RELEASE ORAL at 09:07

## 2019-01-01 RX ADMIN — LORAZEPAM 1 MG: 1 TABLET ORAL at 07:08

## 2019-01-01 RX ADMIN — IPRATROPIUM BROMIDE 0.5 MG: 0.5 SOLUTION RESPIRATORY (INHALATION) at 04:07

## 2019-01-01 RX ADMIN — METHYLPREDNISOLONE SODIUM SUCCINATE 50 MG: 125 INJECTION, POWDER, FOR SOLUTION INTRAMUSCULAR; INTRAVENOUS at 04:07

## 2019-01-01 RX ADMIN — SUCRALFATE 1 G: 1 SUSPENSION ORAL at 04:08

## 2019-01-01 RX ADMIN — PROMETHAZINE HYDROCHLORIDE 25 MG: 25 TABLET ORAL at 03:06

## 2019-01-01 RX ADMIN — OXYCODONE HYDROCHLORIDE 10 MG: 10 TABLET ORAL at 12:07

## 2019-01-01 RX ADMIN — HYDROMORPHONE HYDROCHLORIDE 0.2 MG: 1 INJECTION, SOLUTION INTRAMUSCULAR; INTRAVENOUS; SUBCUTANEOUS at 02:06

## 2019-01-01 RX ADMIN — PROMETHAZINE HYDROCHLORIDE 25 MG: 25 INJECTION INTRAMUSCULAR; INTRAVENOUS at 05:06

## 2019-01-01 RX ADMIN — TRAZODONE HYDROCHLORIDE 50 MG: 50 TABLET ORAL at 08:06

## 2019-01-01 RX ADMIN — IPRATROPIUM BROMIDE 0.5 MG: 0.5 SOLUTION RESPIRATORY (INHALATION) at 08:07

## 2019-01-01 RX ADMIN — DIPHENHYDRAMINE HYDROCHLORIDE 25 MG: 25 CAPSULE ORAL at 01:07

## 2019-01-01 RX ADMIN — OXYCODONE HYDROCHLORIDE 10 MG: 10 TABLET ORAL at 10:07

## 2019-01-01 RX ADMIN — HEPARIN 500 UNITS: 100 SYRINGE at 07:08

## 2019-01-01 RX ADMIN — SENNOSIDES,DOCUSATE SODIUM 2 TABLET: 8.6; 5 TABLET, FILM COATED ORAL at 09:07

## 2019-01-01 RX ADMIN — SODIUM PHOSPHATE, MONOBASIC, MONOHYDRATE 15 MMOL: 276; 142 INJECTION, SOLUTION INTRAVENOUS at 10:08

## 2019-01-01 RX ADMIN — PANTOPRAZOLE SODIUM 40 MG: 40 TABLET, DELAYED RELEASE ORAL at 08:07

## 2019-01-01 RX ADMIN — HYDROMORPHONE HYDROCHLORIDE 0.5 MG: 1 INJECTION, SOLUTION INTRAMUSCULAR; INTRAVENOUS; SUBCUTANEOUS at 06:06

## 2019-01-01 RX ADMIN — ALUMINUM HYDROXIDE, MAGNESIUM HYDROXIDE, AND SIMETHICONE 50 ML: 200; 200; 20 SUSPENSION ORAL at 02:07

## 2019-01-01 RX ADMIN — ONDANSETRON 4 MG: 2 INJECTION INTRAMUSCULAR; INTRAVENOUS at 10:07

## 2019-01-01 RX ADMIN — ONDANSETRON 8 MG: 2 INJECTION INTRAMUSCULAR; INTRAVENOUS at 06:06

## 2019-01-01 RX ADMIN — SODIUM CHLORIDE 500 ML: 0.9 INJECTION, SOLUTION INTRAVENOUS at 05:08

## 2019-01-01 RX ADMIN — ONDANSETRON 8 MG: 2 INJECTION INTRAMUSCULAR; INTRAVENOUS at 01:08

## 2019-01-01 RX ADMIN — HEPARIN 300 UNITS: 100 SYRINGE at 06:07

## 2019-01-01 RX ADMIN — Medication 30 ML: at 12:07

## 2019-01-01 RX ADMIN — ONDANSETRON 8 MG: 2 INJECTION INTRAMUSCULAR; INTRAVENOUS at 09:06

## 2019-01-01 RX ADMIN — PANTOPRAZOLE SODIUM 40 MG: 40 INJECTION, POWDER, FOR SOLUTION INTRAVENOUS at 01:08

## 2019-01-01 RX ADMIN — HEPARIN 500 UNITS: 100 SYRINGE at 06:06

## 2019-01-01 RX ADMIN — OXYCODONE HYDROCHLORIDE AND ACETAMINOPHEN 1 TABLET: 10; 325 TABLET ORAL at 08:07

## 2019-01-01 RX ADMIN — FENTANYL CITRATE 50 MCG: 50 INJECTION, SOLUTION INTRAMUSCULAR; INTRAVENOUS at 12:06

## 2019-01-01 RX ADMIN — DIBASIC SODIUM PHOSPHATE, MONOBASIC POTASSIUM PHOSPHATE AND MONOBASIC SODIUM PHOSPHATE 2 TABLET: 852; 155; 130 TABLET ORAL at 11:06

## 2019-01-01 RX ADMIN — SENNOSIDES 8.6 MG: 8.6 TABLET, FILM COATED ORAL at 08:07

## 2019-01-01 RX ADMIN — METHYLPREDNISOLONE SODIUM SUCCINATE 80 MG: 125 INJECTION, POWDER, FOR SOLUTION INTRAMUSCULAR; INTRAVENOUS at 09:07

## 2019-01-01 RX ADMIN — SODIUM CHLORIDE: 0.9 INJECTION, SOLUTION INTRAVENOUS at 12:07

## 2019-01-01 RX ADMIN — SUCRALFATE 1 G: 1 SUSPENSION ORAL at 11:08

## 2019-01-01 RX ADMIN — ONDANSETRON 8 MG: 2 INJECTION INTRAMUSCULAR; INTRAVENOUS at 02:08

## 2019-01-01 RX ADMIN — HYDROMORPHONE HYDROCHLORIDE 1 MG: 1 INJECTION, SOLUTION INTRAMUSCULAR; INTRAVENOUS; SUBCUTANEOUS at 06:08

## 2019-01-01 RX ADMIN — FAMOTIDINE 20 MG: 10 INJECTION, SOLUTION INTRAVENOUS at 06:08

## 2019-01-01 RX ADMIN — HYDROMORPHONE HYDROCHLORIDE 1 MG: 1 INJECTION, SOLUTION INTRAMUSCULAR; INTRAVENOUS; SUBCUTANEOUS at 08:08

## 2019-01-01 RX ADMIN — METHYLPREDNISOLONE SODIUM SUCCINATE 50 MG: 125 INJECTION, POWDER, FOR SOLUTION INTRAMUSCULAR; INTRAVENOUS at 08:07

## 2019-01-01 RX ADMIN — OXYCODONE HYDROCHLORIDE AND ACETAMINOPHEN 1 TABLET: 7.5; 325 TABLET ORAL at 03:06

## 2019-01-01 RX ADMIN — PANTOPRAZOLE SODIUM 40 MG: 40 TABLET, DELAYED RELEASE ORAL at 02:06

## 2019-01-01 RX ADMIN — POLYETHYLENE GLYCOL 3350 17 G: 17 POWDER, FOR SOLUTION ORAL at 09:08

## 2019-01-01 RX ADMIN — POLYETHYLENE GLYCOL 3350 17 G: 17 POWDER, FOR SOLUTION ORAL at 08:07

## 2019-01-01 RX ADMIN — METHYLPREDNISOLONE SODIUM SUCCINATE 50 MG: 125 INJECTION, POWDER, FOR SOLUTION INTRAMUSCULAR; INTRAVENOUS at 05:07

## 2019-01-01 RX ADMIN — HYDROMORPHONE HYDROCHLORIDE 0.2 MG: 1 INJECTION, SOLUTION INTRAMUSCULAR; INTRAVENOUS; SUBCUTANEOUS at 03:06

## 2019-01-01 RX ADMIN — DIPHENHYDRAMINE HYDROCHLORIDE 25 MG: 50 INJECTION INTRAMUSCULAR; INTRAVENOUS at 09:06

## 2019-01-01 RX ADMIN — MAGNESIUM SULFATE HEPTAHYDRATE 1 G: 500 INJECTION, SOLUTION INTRAMUSCULAR; INTRAVENOUS at 06:06

## 2019-01-01 RX ADMIN — CETIRIZINE HYDROCHLORIDE 5 MG: 5 TABLET ORAL at 08:07

## 2019-01-01 RX ADMIN — LACTULOSE 20 G: 20 SOLUTION ORAL at 02:07

## 2019-01-01 RX ADMIN — SODIUM CHLORIDE 1000 ML: 0.9 INJECTION, SOLUTION INTRAVENOUS at 11:05

## 2019-01-01 RX ADMIN — PROMETHAZINE HYDROCHLORIDE 12.5 MG: 25 INJECTION INTRAMUSCULAR; INTRAVENOUS at 01:08

## 2019-01-01 RX ADMIN — HYDROMORPHONE HYDROCHLORIDE 0.2 MG: 1 INJECTION, SOLUTION INTRAMUSCULAR; INTRAVENOUS; SUBCUTANEOUS at 05:06

## 2019-01-01 RX ADMIN — PROPOFOL 30 MG: 10 INJECTION, EMULSION INTRAVENOUS at 12:06

## 2019-01-01 RX ADMIN — FUROSEMIDE 80 MG: 10 INJECTION, SOLUTION INTRAMUSCULAR; INTRAVENOUS at 09:07

## 2019-01-01 RX ADMIN — FUROSEMIDE 40 MG: 10 INJECTION, SOLUTION INTRAMUSCULAR; INTRAVENOUS at 01:07

## 2019-01-01 RX ADMIN — HYDROMORPHONE HYDROCHLORIDE 1 MG: 1 INJECTION, SOLUTION INTRAMUSCULAR; INTRAVENOUS; SUBCUTANEOUS at 07:08

## 2019-01-01 RX ADMIN — POTASSIUM CHLORIDE 10 MEQ: 10 INJECTION, SOLUTION INTRAVENOUS at 05:06

## 2019-01-01 RX ADMIN — POTASSIUM CHLORIDE 20 MEQ: 200 INJECTION, SOLUTION INTRAVENOUS at 12:06

## 2019-01-01 RX ADMIN — MORPHINE SULFATE 4 MG: 4 INJECTION INTRAVENOUS at 01:08

## 2019-01-01 RX ADMIN — HEPARIN SODIUM (PORCINE) LOCK FLUSH IV SOLN 100 UNIT/ML 500 UNITS: 100 SOLUTION at 10:06

## 2019-01-01 RX ADMIN — PANTOPRAZOLE SODIUM 40 MG: 40 TABLET, DELAYED RELEASE ORAL at 04:08

## 2019-01-01 RX ADMIN — POTASSIUM CHLORIDE 40 MEQ: 20 SOLUTION ORAL at 10:07

## 2019-01-01 RX ADMIN — PROMETHAZINE HYDROCHLORIDE 25 MG: 25 TABLET ORAL at 09:06

## 2019-01-01 RX ADMIN — MORPHINE SULFATE 4 MG: 2 INJECTION, SOLUTION INTRAMUSCULAR; INTRAVENOUS at 06:08

## 2019-01-01 RX ADMIN — OXYCODONE HYDROCHLORIDE 5 MG: 5 TABLET ORAL at 11:01

## 2019-01-01 RX ADMIN — LIDOCAINE HYDROCHLORIDE 60 MG: 20 INJECTION, SOLUTION INTRAVENOUS at 12:06

## 2019-01-01 RX ADMIN — SENNOSIDES 8.6 MG: 8.6 TABLET, FILM COATED ORAL at 09:07

## 2019-01-01 RX ADMIN — DIPHENHYDRAMINE HYDROCHLORIDE 25 MG: 25 CAPSULE ORAL at 11:06

## 2019-01-01 RX ADMIN — CEFTRIAXONE 1 G: 1 INJECTION, SOLUTION INTRAVENOUS at 12:05

## 2019-01-01 RX ADMIN — LACTULOSE 20 G: 20 SOLUTION ORAL at 05:07

## 2019-01-01 RX ADMIN — MAGNESIUM SULFATE IN WATER 2 G: 40 INJECTION, SOLUTION INTRAVENOUS at 02:08

## 2019-01-01 RX ADMIN — OXYCODONE HYDROCHLORIDE 10 MG: 10 TABLET ORAL at 05:07

## 2019-01-01 RX ADMIN — ONDANSETRON 8 MG: 2 INJECTION INTRAMUSCULAR; INTRAVENOUS at 02:06

## 2019-01-01 RX ADMIN — OXYCODONE HYDROCHLORIDE 10 MG: 10 TABLET ORAL at 11:08

## 2019-01-01 RX ADMIN — SODIUM CHLORIDE, SODIUM LACTATE, POTASSIUM CHLORIDE, AND CALCIUM CHLORIDE: .6; .31; .03; .02 INJECTION, SOLUTION INTRAVENOUS at 06:06

## 2019-01-01 RX ADMIN — HYDROMORPHONE HYDROCHLORIDE 0.5 MG: 1 INJECTION, SOLUTION INTRAMUSCULAR; INTRAVENOUS; SUBCUTANEOUS at 02:06

## 2019-01-01 RX ADMIN — PROPOFOL 150 MCG/KG/MIN: 10 INJECTION, EMULSION INTRAVENOUS at 12:06

## 2019-01-01 RX ADMIN — RAMELTEON 8 MG: 8 TABLET, FILM COATED ORAL at 08:06

## 2019-01-01 RX ADMIN — SODIUM CHLORIDE 1000 ML: 0.9 INJECTION, SOLUTION INTRAVENOUS at 11:08

## 2019-01-01 RX ADMIN — HYDROMORPHONE HYDROCHLORIDE 0.2 MG: 1 INJECTION, SOLUTION INTRAMUSCULAR; INTRAVENOUS; SUBCUTANEOUS at 09:06

## 2019-01-01 RX ADMIN — POLYETHYLENE GLYCOL 3350 17 G: 17 POWDER, FOR SOLUTION ORAL at 11:07

## 2019-01-01 RX ADMIN — SODIUM CHLORIDE: 0.9 INJECTION, SOLUTION INTRAVENOUS at 02:07

## 2019-01-01 RX ADMIN — FENTANYL CITRATE 25 MCG: 50 INJECTION, SOLUTION INTRAMUSCULAR; INTRAVENOUS at 12:06

## 2019-01-01 RX ADMIN — POTASSIUM & SODIUM PHOSPHATES POWDER PACK 280-160-250 MG 2 PACKET: 280-160-250 PACK at 10:07

## 2019-01-01 RX ADMIN — SODIUM CHLORIDE 8 MG: 9 INJECTION, SOLUTION INTRAVENOUS at 04:06

## 2019-01-01 RX ADMIN — ALUMINUM HYDROXIDE, MAGNESIUM HYDROXIDE, AND SIMETHICONE 50 ML: 200; 200; 20 SUSPENSION ORAL at 01:07

## 2019-01-01 RX ADMIN — ONDANSETRON 8 MG: 8 TABLET, ORALLY DISINTEGRATING ORAL at 05:06

## 2019-01-01 RX ADMIN — ENOXAPARIN SODIUM 40 MG: 100 INJECTION SUBCUTANEOUS at 11:07

## 2019-01-01 RX ADMIN — DIBASIC SODIUM PHOSPHATE, MONOBASIC POTASSIUM PHOSPHATE AND MONOBASIC SODIUM PHOSPHATE 2 TABLET: 852; 155; 130 TABLET ORAL at 02:06

## 2019-01-01 RX ADMIN — SODIUM CHLORIDE, SODIUM LACTATE, POTASSIUM CHLORIDE, AND CALCIUM CHLORIDE 500 ML: .6; .31; .03; .02 INJECTION, SOLUTION INTRAVENOUS at 09:08

## 2019-01-01 RX ADMIN — IPRATROPIUM BROMIDE 0.5 MG: 0.5 SOLUTION RESPIRATORY (INHALATION) at 06:07

## 2019-01-01 RX ADMIN — SENNOSIDES 8.6 MG: 8.6 TABLET, FILM COATED ORAL at 11:07

## 2019-01-01 RX ADMIN — SODIUM CHLORIDE, SODIUM LACTATE, POTASSIUM CHLORIDE, AND CALCIUM CHLORIDE 1000 ML: .6; .31; .03; .02 INJECTION, SOLUTION INTRAVENOUS at 05:06

## 2019-01-01 RX ADMIN — DIPHENHYDRAMINE HYDROCHLORIDE 25 MG: 25 CAPSULE ORAL at 10:06

## 2019-01-01 RX ADMIN — FUROSEMIDE 40 MG: 10 INJECTION, SOLUTION INTRAMUSCULAR; INTRAVENOUS at 07:07

## 2019-01-01 RX ADMIN — PROMETHAZINE HYDROCHLORIDE 25 MG: 25 TABLET ORAL at 02:06

## 2019-01-01 RX ADMIN — MAGNESIUM SULFATE IN WATER 2 G: 40 INJECTION, SOLUTION INTRAVENOUS at 01:07

## 2019-01-01 RX ADMIN — ONDANSETRON 4 MG: 4 TABLET, FILM COATED ORAL at 03:07

## 2019-01-01 RX ADMIN — ONDANSETRON 8 MG: 2 INJECTION INTRAMUSCULAR; INTRAVENOUS at 06:08

## 2019-01-01 RX ADMIN — POTASSIUM CHLORIDE 40 MEQ: 20 SOLUTION ORAL at 12:06

## 2019-01-01 RX ADMIN — ONDANSETRON 8 MG: 2 INJECTION, SOLUTION INTRAMUSCULAR; INTRAVENOUS at 09:06

## 2019-01-01 RX ADMIN — GLYCOPYRROLATE 0.2 MG: 0.2 INJECTION, SOLUTION INTRAMUSCULAR; INTRAVENOUS at 12:06

## 2019-01-01 RX ADMIN — POTASSIUM CHLORIDE 40 MEQ: 20 SOLUTION ORAL at 12:07

## 2019-01-01 RX ADMIN — ONDANSETRON 4 MG: 2 INJECTION INTRAMUSCULAR; INTRAVENOUS at 01:08

## 2019-01-01 RX ADMIN — Medication 30 ML: at 05:07

## 2019-01-01 RX ADMIN — HYDROMORPHONE HYDROCHLORIDE 0.5 MG: 1 INJECTION, SOLUTION INTRAMUSCULAR; INTRAVENOUS; SUBCUTANEOUS at 01:07

## 2019-01-01 RX ADMIN — HYDROMORPHONE HYDROCHLORIDE 1 MG: 1 INJECTION, SOLUTION INTRAMUSCULAR; INTRAVENOUS; SUBCUTANEOUS at 11:08

## 2019-01-01 RX ADMIN — LORAZEPAM 1 MG: 1 TABLET ORAL at 01:08

## 2019-01-01 RX ADMIN — METOPROLOL SUCCINATE 50 MG: 50 TABLET, EXTENDED RELEASE ORAL at 09:06

## 2019-01-01 RX ADMIN — OXYCODONE HYDROCHLORIDE AND ACETAMINOPHEN 1 TABLET: 7.5; 325 TABLET ORAL at 05:06

## 2019-01-01 RX ADMIN — MORPHINE SULFATE 2 MG: 2 INJECTION, SOLUTION INTRAMUSCULAR; INTRAVENOUS at 01:06

## 2019-01-01 RX ADMIN — RAMELTEON 8 MG: 8 TABLET, FILM COATED ORAL at 12:08

## 2019-01-01 RX ADMIN — HYDROMORPHONE HYDROCHLORIDE 1 MG: 1 INJECTION, SOLUTION INTRAMUSCULAR; INTRAVENOUS; SUBCUTANEOUS at 03:08

## 2019-01-01 RX ADMIN — ONDANSETRON 8 MG: 2 INJECTION INTRAMUSCULAR; INTRAVENOUS at 09:07

## 2019-01-01 RX ADMIN — GABAPENTIN 600 MG: 300 CAPSULE ORAL at 08:06

## 2019-01-01 RX ADMIN — POTASSIUM CHLORIDE 20 MEQ: 200 INJECTION, SOLUTION INTRAVENOUS at 02:06

## 2019-01-01 RX ADMIN — MAGNESIUM SULFATE IN WATER 2 G: 40 INJECTION, SOLUTION INTRAVENOUS at 09:06

## 2019-01-01 RX ADMIN — MORPHINE SULFATE 10 MG: 100 SOLUTION ORAL at 09:08

## 2019-01-01 RX ADMIN — HYDROMORPHONE HYDROCHLORIDE 0.2 MG: 1 INJECTION, SOLUTION INTRAMUSCULAR; INTRAVENOUS; SUBCUTANEOUS at 03:07

## 2019-01-01 RX ADMIN — OXYCODONE HYDROCHLORIDE 10 MG: 10 TABLET ORAL at 04:08

## 2019-01-01 RX ADMIN — SODIUM CHLORIDE 1000 ML: 0.9 INJECTION, SOLUTION INTRAVENOUS at 08:06

## 2019-01-01 RX ADMIN — MAGNESIUM SULFATE IN WATER 2 G: 40 INJECTION, SOLUTION INTRAVENOUS at 03:06

## 2019-01-01 RX ADMIN — FUROSEMIDE 40 MG: 10 INJECTION, SOLUTION INTRAVENOUS at 03:07

## 2019-01-01 RX ADMIN — METOPROLOL SUCCINATE 50 MG: 50 TABLET, EXTENDED RELEASE ORAL at 11:06

## 2019-01-01 RX ADMIN — SODIUM CHLORIDE 1000 ML: 0.9 INJECTION, SOLUTION INTRAVENOUS at 10:05

## 2019-01-01 RX ADMIN — SODIUM CHLORIDE 1000 ML: 0.9 INJECTION, SOLUTION INTRAVENOUS at 10:07

## 2019-01-01 RX ADMIN — Medication 30 ML: at 08:07

## 2019-01-01 RX ADMIN — HYDROMORPHONE HYDROCHLORIDE 1 MG: 1 INJECTION, SOLUTION INTRAMUSCULAR; INTRAVENOUS; SUBCUTANEOUS at 07:06

## 2019-01-01 RX ADMIN — GABAPENTIN 300 MG: 300 CAPSULE ORAL at 09:07

## 2019-01-01 RX ADMIN — ONDANSETRON 8 MG: 2 INJECTION INTRAMUSCULAR; INTRAVENOUS at 10:06

## 2019-01-01 RX ADMIN — ONDANSETRON 8 MG: 2 INJECTION INTRAMUSCULAR; INTRAVENOUS at 05:06

## 2019-01-01 RX ADMIN — ONDANSETRON 8 MG: 2 INJECTION INTRAMUSCULAR; INTRAVENOUS at 03:08

## 2019-01-04 PROBLEM — R91.8 LUNG MASS: Status: ACTIVE | Noted: 2019-01-01

## 2019-01-04 NOTE — DISCHARGE INSTRUCTIONS
Please call with any questions or concerns.      Monday thru Friday 8:00 am - 4:30 pm    Interventional Radiology   [phone]    After Hours    Ask for the Radiology Intern on call  [phone]

## 2019-01-04 NOTE — PROGRESS NOTES
Discharge instructions reviewed with pt & dgtr, both verbalize understanding.  Instructions include medications, self/site care, and when to call a physician.  IV removed, DSD applied.  Pt awaits transport to [facility].

## 2019-01-04 NOTE — DISCHARGE SUMMARY
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

## 2019-01-04 NOTE — H&P
Radiology History & Physical      SUBJECTIVE:     Chief Complaint: Multiple mediastinal masses with history of squamous cell carcinoma    History of Present Illness:  Silvia Capellan is a 73 y.o. female who presents for mediastinal mass biopsy.    Past Medical History:   Diagnosis Date    Encounter for blood transfusion     GERD (gastroesophageal reflux disease)     HTN (hypertension)     Rheumatoid arthritis     Rheumatoid arthritis(714.0)     Squamous cell lung cancer, left 2/15/2018     Past Surgical History:   Procedure Laterality Date    APPENDECTOMY      BRONCHOSCOPY N/A 8/28/2017    Performed by [facility] Diagnostic Provider at [facility] OR 2ND FLR    CATARACT EXTRACTION      COLONOSCOPY      COLONOSCOPY N/A 10/30/2017    Performed by Quentin Coppola MD at [facility] ENDO (4TH FLR)    COLONOSCOPY N/A 1/17/2014    Performed by Feliciano Duran [facility] at [facility] ENDO    ESOPHAGOGASTRODUODENOSCOPY (EGD) N/A 10/30/2017    Performed by Quentin Coppola MD at [facility] ENDO (4TH FLR)    FOOT SURGERY Left     HYSTERECTOMY      INSERTION, IOL PROSTHESIS Left 11/8/2018    Performed by Susan Keith MD at [facility] OR 1ST FLR    INSERTION, IOL PROSTHESIS Right 10/18/2018    Performed by Susan Keith MD at [facility] OR 1ST FLR    INSERTION-PORT N/A 11/14/2017    Performed by [facility] Diagnostic Provider at [facility] OR 2ND FLR    PHACOEMULSIFICATION, CATARACT Left 11/8/2018    Performed by Susan Keith MD at [facility] OR 1ST FLR    PHACOEMULSIFICATION, CATARACT Right 10/18/2018    Performed by Susan Keith MD at [facility] OR 1ST FLR    SKIN BIOPSY      TOTAL KNEE ARTHROPLASTY      right       Home Meds:   Prior to Admission medications    Medication Sig Start Date End Date Taking? Authorizing Provider   dexamethasone (DECADRON) 2 MG tablet TAKE 1 TABLET (2 MG TOTAL) BY MOUTH EVERY 12 (TWELVE) HOURS. 9/21/18 9/21/19 Yes Shayne Wong MD   diclofenac (VOLTAREN) 50 MG EC tablet Take 1 tablet (50 mg total) by mouth 3 (three) times  daily as needed. 8/31/18  Yes Eugenio Hawthorne MD   diphenhydrAMINE (BENADRYL) 25 mg capsule Take 1 each (25 mg total) by mouth nightly as needed for Insomnia. 12/6/18  Yes Marleny Sheppard DO   furosemide (LASIX) 20 MG tablet Take 1 tablet (20 mg total) by mouth daily as needed (for swelling of the lower extremities). 10/1/18 10/1/19 Yes Ginette Rosado MD   gabapentin (NEURONTIN) 100 MG capsule Take 1 capsule (100 mg total) by mouth every evening. 11/30/18 11/30/19 Yes Ginette Rosado MD   hydroCHLOROthiazide (HYDRODIURIL) 25 MG tablet Take 1 tablet (25 mg total) by mouth once daily. 8/31/18 8/31/19 Yes Eugenio Hawthorne MD   HYDROcodone-acetaminophen (NORCO) 5-325 mg per tablet Take 1 tablet by mouth every 6 (six) hours as needed for Pain. 12/31/18  Yes Shayne Wong MD   LORazepam (ATIVAN) 0.5 MG tablet TAKE 1 TABLET BY MOUTH 2 TIMES DAILY. 12/6/18  Yes Shayne Wong MD   losartan (COZAAR) 100 MG tablet TAKE 1 TABLET (100 MG TOTAL) BY MOUTH ONCE DAILY. 9/27/18  Yes Ginette Rosado MD   ofloxacin (OCUFLOX) 0.3 % ophthalmic solution Place 1 drop into the left eye 3 (three) times daily.   Yes Historical Provider, MD   pantoprazole (PROTONIX) 40 MG tablet TAKE 1 TABLET (40 MG TOTAL) BY MOUTH ONCE DAILY. 11/25/18 11/25/19 Yes Ginette Rosado MD   prednisoLONE acetate (PRED FORTE) 1 % DrpS Place 1 drop into the left eye 3 (three) times daily.   Yes Historical Provider, MD   tiZANidine (ZANAFLEX) 4 MG tablet TAKE 1 TABLET (4 MG TOTAL) BY MOUTH EVERY 8 (EIGHT) HOURS AS NEEDED (MUSCLE SPASM AND HIP PAIN). 12/27/18 1/6/19 Yes Ginette Rosado MD   traZODone (DESYREL) 50 MG tablet Take 1 tablet (50 mg total) by mouth every evening.  Patient taking differently: Take 50 mg by mouth nightly as needed.  7/24/18 7/24/19 Yes Shayne Wong MD   levocetirizine (XYZAL) 5 MG tablet Take 1 tablet (5 mg total) by mouth every evening. 9/17/18 9/17/19  Ganga Krause MD   methotrexate 2.5 MG Tab as needed.  9/1/16   Historical  "MD Abraham   metoclopramide HCl (REGLAN) 5 MG tablet Take 1 tablet (5 mg total) by mouth 2 (two) times daily as needed (belching, nausea). 6/26/18   Ganga Krause MD     Anticoagulants/Antiplatelets: no anticoagulation    Allergies:   Review of patient's allergies indicates:   Allergen Reactions    Latex, natural rubber Rash    Codeine Hallucinations    Cortisporin [neomycin-bacitracin-poly-hc] Rash    Latex, natural rubber Rash     Sedation History:  no adverse reactions    Review of Systems:   Hematological: no known coagulopathies  Respiratory: no shortness of breath  Cardiovascular: no chest pain  Gastrointestinal: no abdominal pain  Genito-Urinary: no dysuria  Musculoskeletal: negative  Neurological: no TIA or stroke symptoms         OBJECTIVE:     Vital Signs (Most Recent)       Physical Exam:  ASA: 2  Mallampati: 2    General: no acute distress  Mental Status: alert and oriented to person, place and time  HEENT: normocephalic, atraumatic  Chest: unlabored breathing  Heart: regular heart rate  Abdomen: nondistended  Extremity: moves all extremities    Laboratory  Lab Results   Component Value Date    INR 1.1 01/04/2019       Lab Results   Component Value Date    WBC 7.35 11/28/2018    HGB 10.9 (L) 11/28/2018    HCT 35.6 (L) 11/28/2018    MCV 94 11/28/2018     11/28/2018      Lab Results   Component Value Date     11/28/2018     11/28/2018    K 3.9 11/28/2018     11/28/2018    CO2 30 (H) 11/28/2018    BUN 17 11/28/2018    CREATININE 0.7 11/28/2018    CALCIUM 9.4 11/28/2018    MG 1.2 (L) 06/07/2018    ALT 23 11/28/2018    AST 16 11/28/2018    ALBUMIN 2.7 (L) 11/28/2018    BILITOT 0.3 11/28/2018       ASSESSMENT/PLAN:     Sedation Plan: moderate  Patient will undergo mediastinal mass biopsy.    Fam La MD (Buck)  Radiology PGY-5  [phone]      "

## 2019-01-04 NOTE — TELEPHONE ENCOUNTER
----- Message from Zhane Guzman sent at 1/4/2019 10:39 AM CST -----  Contact: [facility]  Is calling to request a cane for pt. Please call at [phone]

## 2019-01-04 NOTE — PROCEDURES
Radiology Post-Procedure Note    Pre Op Diagnosis: Left pleural based mass    Post Op Diagnosis: Left pleural based mass    Procedure: left lung biopsy    Procedure performed by: Abhi Welsh MD    Written Informed Consent Obtained: Yes    Specimen Removed: YES 5 cores    Estimated Blood Loss: Minimal    Findings:   Using CT guidance a 19g sheath needle was placed into a Left lung mass. 20g monopty biopsy gun used to take 5 core biopsy samples. Specimen sent to pathology.     Additional specimen sent to lab: No    Patient tolerated procedure well.    Abhi Welsh MD  Staff Radiologist  Department of Radiology  Pager: [phone]

## 2019-01-14 PROBLEM — L29.9 PRURITUS: Status: RESOLVED | Noted: 2018-03-29 | Resolved: 2019-01-01

## 2019-01-14 PROBLEM — R19.7 DIARRHEA OF PRESUMED INFECTIOUS ORIGIN: Status: RESOLVED | Noted: 2018-06-21 | Resolved: 2019-01-01

## 2019-01-14 PROBLEM — I31.39 PERICARDIAL EFFUSION: Status: RESOLVED | Noted: 2018-05-14 | Resolved: 2019-01-01

## 2019-01-14 NOTE — PROGRESS NOTES
Chief Complaint   Patient presents with    Follow-up       HPI    Silvia Capellan is 73 y.o. female. The primary encounter diagnosis was Chronic left hip pain. Diagnoses of Obesity (BMI 30-39.9), Squamous cell lung cancer, left, Encounter for screening mammogram for breast cancer, Disorder of bone and articular cartilage, and Need for Streptococcus pneumoniae vaccination were also pertinent to this visit.    73 year old female with Obesity comes to clinic for follow up Chronic Left Hip pain.  Patient is s/p lung biopsy.  Patient reports continued chemotherapy.  She has discontinued the Periactin and notes normal satiety and weight loss.  Patient reports today hip pain is less.  She denies changes in physical activity over the last week.      Patient reports lung biopsy during previous week.  She denies chest pain or shortness of breath after procedure.  She plans to follow up with with Dr. Wong regarding results.     Review of Systems   Constitutional: Positive for fatigue. Negative for activity change.   Respiratory: Negative for cough, shortness of breath and wheezing.    Cardiovascular: Negative for chest pain and palpitations.   Musculoskeletal: Positive for arthralgias, back pain, gait problem and myalgias. Negative for joint swelling.   Psychiatric/Behavioral: Negative for suicidal ideas.           Current Outpatient Medications:     dexamethasone (DECADRON) 2 MG tablet, TAKE 1 TABLET (2 MG TOTAL) BY MOUTH EVERY 12 (TWELVE) HOURS., Disp: 120 tablet, Rfl: 0    diclofenac (VOLTAREN) 50 MG EC tablet, Take 1 tablet (50 mg total) by mouth 3 (three) times daily as needed., Disp: 30 tablet, Rfl: 1    diphenhydrAMINE (BENADRYL) 25 mg capsule, Take 1 each (25 mg total) by mouth nightly as needed for Insomnia., Disp: 20 capsule, Rfl: 0    furosemide (LASIX) 20 MG tablet, Take 1 tablet (20 mg total) by mouth daily as needed (for swelling of the lower extremities)., Disp: 90 tablet, Rfl: 0    gabapentin  "(NEURONTIN) 100 MG capsule, Take 1 capsule (100 mg total) by mouth every evening., Disp: 90 capsule, Rfl: 0    hydroCHLOROthiazide (HYDRODIURIL) 25 MG tablet, Take 1 tablet (25 mg total) by mouth once daily., Disp: 90 tablet, Rfl: 1    HYDROcodone-acetaminophen (NORCO) 5-325 mg per tablet, Take 1 tablet by mouth every 12 (twelve) hours as needed for Pain., Disp: 60 tablet, Rfl: 0    levocetirizine (XYZAL) 5 MG tablet, Take 1 tablet (5 mg total) by mouth every evening., Disp: 30 tablet, Rfl: 11    LORazepam (ATIVAN) 0.5 MG tablet, TAKE 1 TABLET BY MOUTH TWICE A DAY, Disp: 30 tablet, Rfl: 0    losartan (COZAAR) 100 MG tablet, TAKE 1 TABLET (100 MG TOTAL) BY MOUTH ONCE DAILY., Disp: 90 tablet, Rfl: 1    methotrexate 2.5 MG Tab, as needed. , Disp: , Rfl:     metoclopramide HCl (REGLAN) 5 MG tablet, Take 1 tablet (5 mg total) by mouth 2 (two) times daily as needed (belching, nausea)., Disp: 10 tablet, Rfl: 0    ofloxacin (OCUFLOX) 0.3 % ophthalmic solution, Place 1 drop into the left eye 3 (three) times daily., Disp: , Rfl:     pantoprazole (PROTONIX) 40 MG tablet, TAKE 1 TABLET (40 MG TOTAL) BY MOUTH ONCE DAILY., Disp: 90 tablet, Rfl: 1    prednisoLONE acetate (PRED FORTE) 1 % DrpS, Place 1 drop into the left eye 3 (three) times daily., Disp: , Rfl:     traZODone (DESYREL) 50 MG tablet, Take 1 tablet (50 mg total) by mouth every evening. (Patient taking differently: Take 50 mg by mouth nightly as needed. ), Disp: 30 tablet, Rfl: 11      Blood pressure 118/74, pulse 96, temperature 97.6 °F (36.4 °C), temperature source Oral, resp. rate 16, height 5' 7" (1.702 m), weight 106.7 kg (235 lb 3.7 oz), SpO2 95 %.    Physical Exam   Constitutional: Vital signs are normal. She appears well-developed and well-nourished. She does not appear ill. No distress.   Cardiovascular: Normal heart sounds.   No murmur heard.  Pulmonary/Chest: Effort normal. She has decreased breath sounds in the right upper field, the right middle " field, the right lower field, the left middle field and the left lower field. She has no wheezes. She has no rhonchi. She has no rales.   Psychiatric: She has a normal mood and affect. Her behavior is normal.       Lab Visit on 01/04/2019   Component Date Value Ref Range Status    WBC 01/04/2019 4.98  3.90 - 12.70 K/uL Final    RBC 01/04/2019 3.49* 4.00 - 5.40 M/uL Final    Hemoglobin 01/04/2019 10.2* 12.0 - 16.0 g/dL Final    Hematocrit 01/04/2019 33.9* 37.0 - 48.5 % Final    MCV 01/04/2019 97  82 - 98 fL Final    MCH 01/04/2019 29.2  27.0 - 31.0 pg Final    MCHC 01/04/2019 30.1* 32.0 - 36.0 g/dL Final    RDW 01/04/2019 13.6  11.5 - 14.5 % Final    Platelets 01/04/2019 245  150 - 350 K/uL Final    MPV 01/04/2019 9.7  9.2 - 12.9 fL Final    Immature Granulocytes 01/04/2019 0.2  0.0 - 0.5 % Final    Gran # (ANC) 01/04/2019 2.9  1.8 - 7.7 K/uL Final    Immature Grans (Abs) 01/04/2019 0.01  0.00 - 0.04 K/uL Final    Lymph # 01/04/2019 1.3  1.0 - 4.8 K/uL Final    Mono # 01/04/2019 0.6  0.3 - 1.0 K/uL Final    Eos # 01/04/2019 0.1  0.0 - 0.5 K/uL Final    Baso # 01/04/2019 0.02  0.00 - 0.20 K/uL Final    nRBC 01/04/2019 0  0 /100 WBC Final    Gran% 01/04/2019 58.6  38.0 - 73.0 % Final    Lymph% 01/04/2019 25.5  18.0 - 48.0 % Final    Mono% 01/04/2019 12.7  4.0 - 15.0 % Final    Eosinophil% 01/04/2019 2.6  0.0 - 8.0 % Final    Basophil% 01/04/2019 0.4  0.0 - 1.9 % Final    Differential Method 01/04/2019 Automated   Final    Prothrombin Time 01/04/2019 11.0  9.0 - 12.5 sec Final    INR 01/04/2019 1.1  0.8 - 1.2 Final   Admission on 12/06/2018, Discharged on 12/06/2018   Component Date Value Ref Range Status    Albumin, POC 12/06/2018 3.1* 3.3 - 5.5 g/dL Final    Alkaline Phosphatase, POC 12/06/2018 80  42 - 141 U/L Final    ALT (SGPT), POC 12/06/2018 23  10 - 47 U/L Final    AST (SGOT), POC 12/06/2018 22  11 - 38 U/L Final    POC BUN 12/06/2018 17  7 - 22 mg/dL Final    Calcium, POC  12/06/2018 9.8  8.0 - 10.3 mg/dL Final    POC Chloride 12/06/2018 97* 98 - 108 mmol/L Final    POC Creatinine 12/06/2018 0.9  0.6 - 1.2 mg/dL Final    POC Glucose 12/06/2018 112  73 - 118 mg/dL Final    POC Potassium 12/06/2018 4.1  3.6 - 5.1 mmol/L Final    POC Sodium 12/06/2018 140  128 - 145 mmol/L Final    Bilirubin 12/06/2018 0.5  0.2 - 1.6 mg/dL Final    POC TCO2 12/06/2018 32  18 - 33 mmol/L Final    Protein 12/06/2018 8.1  6.4 - 8.1 g/dL Final    POC Cardiac Troponin I 12/06/2018 0.00  <0.09 ng/mL Final    Sample 12/06/2018 UNK   Final    POC B-Type Natriuretic Peptide 12/06/2018 116* 0.0 - 100.0 pg/mL Final    POC PTWBT 12/06/2018 11.0  9.7 - 14.3 sec Final    POC PTINR 12/06/2018 0.9  0.9 - 1.2 Final    Sample 12/06/2018 UNK   Final    Glucose, UA 12/06/2018 Negative*  Final    Bilirubin, UA 12/06/2018 Negative*  Final    Ketones, UA 12/06/2018 Negative*  Final    Spec Grav UA 12/06/2018 1.020   Final    Blood, UA 12/06/2018 Negative*  Final    PH, UA 12/06/2018 7.5   Final    Protein, UA 12/06/2018 Negative*  Final    Urobilinogen, UA 12/06/2018 0.2  E.U./dL Final    Nitrite, UA 12/06/2018 Negative*  Final    Leukocytes, UA 12/06/2018 Negative*  Final    Color, UA 12/06/2018 Yellow   Final    Clarity, UA 12/06/2018 Clear   Final   Lab Visit on 11/28/2018   Component Date Value Ref Range Status    WBC 11/28/2018 7.35  3.90 - 12.70 K/uL Final    RBC 11/28/2018 3.79* 4.00 - 5.40 M/uL Final    Hemoglobin 11/28/2018 10.9* 12.0 - 16.0 g/dL Final    Hematocrit 11/28/2018 35.6* 37.0 - 48.5 % Final    MCV 11/28/2018 94  82 - 98 fL Final    MCH 11/28/2018 28.8  27.0 - 31.0 pg Final    MCHC 11/28/2018 30.6* 32.0 - 36.0 g/dL Final    RDW 11/28/2018 14.1  11.5 - 14.5 % Final    Platelets 11/28/2018 186  150 - 350 K/uL Final    MPV 11/28/2018 9.6  9.2 - 12.9 fL Final    Immature Granulocytes 11/28/2018 1.1* 0.0 - 0.5 % Final    Gran # (ANC) 11/28/2018 4.8  1.8 - 7.7 K/uL Final     Immature Grans (Abs) 11/28/2018 0.08* 0.00 - 0.04 K/uL Final    Lymph # 11/28/2018 1.7  1.0 - 4.8 K/uL Final    Mono # 11/28/2018 0.7  0.3 - 1.0 K/uL Final    Eos # 11/28/2018 0.1  0.0 - 0.5 K/uL Final    Baso # 11/28/2018 0.02  0.00 - 0.20 K/uL Final    nRBC 11/28/2018 0  0 /100 WBC Final    Gran% 11/28/2018 65.1  38.0 - 73.0 % Final    Lymph% 11/28/2018 23.7  18.0 - 48.0 % Final    Mono% 11/28/2018 8.8  4.0 - 15.0 % Final    Eosinophil% 11/28/2018 1.0  0.0 - 8.0 % Final    Basophil% 11/28/2018 0.3  0.0 - 1.9 % Final    Differential Method 11/28/2018 Automated   Final    Sodium 11/28/2018 141  136 - 145 mmol/L Final    Potassium 11/28/2018 3.9  3.5 - 5.1 mmol/L Final    Chloride 11/28/2018 102  95 - 110 mmol/L Final    CO2 11/28/2018 30* 23 - 29 mmol/L Final    Glucose 11/28/2018 106  70 - 110 mg/dL Final    BUN, Bld 11/28/2018 17  8 - 23 mg/dL Final    Creatinine 11/28/2018 0.7  0.5 - 1.4 mg/dL Final    Calcium 11/28/2018 9.4  8.7 - 10.5 mg/dL Final    Total Protein 11/28/2018 7.4  6.0 - 8.4 g/dL Final    Albumin 11/28/2018 2.7* 3.5 - 5.2 g/dL Final    Total Bilirubin 11/28/2018 0.3  0.1 - 1.0 mg/dL Final    Alkaline Phosphatase 11/28/2018 82  55 - 135 U/L Final    AST 11/28/2018 16  10 - 40 U/L Final    ALT 11/28/2018 23  10 - 44 U/L Final    Anion Gap 11/28/2018 9  8 - 16 mmol/L Final    eGFR if African American 11/28/2018 >60.0  >60 mL/min/1.73 m^2 Final    eGFR if non African American 11/28/2018 >60.0  >60 mL/min/1.73 m^2 Final    TSH 11/28/2018 0.645  0.400 - 4.000 uIU/mL Final    Free T4 11/28/2018 1.36  0.71 - 1.51 ng/dL Final   Lab Visit on 11/02/2018   Component Date Value Ref Range Status    WBC 11/02/2018 5.22  3.90 - 12.70 K/uL Final    RBC 11/02/2018 3.70* 4.00 - 5.40 M/uL Final    Hemoglobin 11/02/2018 10.7* 12.0 - 16.0 g/dL Final    Hematocrit 11/02/2018 34.5* 37.0 - 48.5 % Final    MCV 11/02/2018 93  82 - 98 fL Final    MCH 11/02/2018 28.9  27.0 - 31.0 pg Final     MCHC 11/02/2018 31.0* 32.0 - 36.0 g/dL Final    RDW 11/02/2018 13.2  11.5 - 14.5 % Final    Platelets 11/02/2018 175  150 - 350 K/uL Final    MPV 11/02/2018 10.0  9.2 - 12.9 fL Final    Immature Granulocytes 11/02/2018 0.4  0.0 - 0.5 % Final    Gran # (ANC) 11/02/2018 2.8  1.8 - 7.7 K/uL Final    Immature Grans (Abs) 11/02/2018 0.02  0.00 - 0.04 K/uL Final    Lymph # 11/02/2018 1.7  1.0 - 4.8 K/uL Final    Mono # 11/02/2018 0.6  0.3 - 1.0 K/uL Final    Eos # 11/02/2018 0.1  0.0 - 0.5 K/uL Final    Baso # 11/02/2018 0.02  0.00 - 0.20 K/uL Final    nRBC 11/02/2018 0  0 /100 WBC Final    Gran% 11/02/2018 53.2  38.0 - 73.0 % Final    Lymph% 11/02/2018 32.4  18.0 - 48.0 % Final    Mono% 11/02/2018 12.1  4.0 - 15.0 % Final    Eosinophil% 11/02/2018 1.5  0.0 - 8.0 % Final    Basophil% 11/02/2018 0.4  0.0 - 1.9 % Final    Differential Method 11/02/2018 Automated   Final    Sodium 11/02/2018 139  136 - 145 mmol/L Final    Potassium 11/02/2018 3.1* 3.5 - 5.1 mmol/L Final    Chloride 11/02/2018 101  95 - 110 mmol/L Final    CO2 11/02/2018 29  23 - 29 mmol/L Final    Glucose 11/02/2018 94  70 - 110 mg/dL Final    BUN, Bld 11/02/2018 22  8 - 23 mg/dL Final    Creatinine 11/02/2018 0.9  0.5 - 1.4 mg/dL Final    Calcium 11/02/2018 9.7  8.7 - 10.5 mg/dL Final    Total Protein 11/02/2018 7.5  6.0 - 8.4 g/dL Final    Albumin 11/02/2018 3.1* 3.5 - 5.2 g/dL Final    Total Bilirubin 11/02/2018 0.4  0.1 - 1.0 mg/dL Final    Alkaline Phosphatase 11/02/2018 76  55 - 135 U/L Final    AST 11/02/2018 15  10 - 40 U/L Final    ALT 11/02/2018 18  10 - 44 U/L Final    Anion Gap 11/02/2018 9  8 - 16 mmol/L Final    eGFR if African American 11/02/2018 >60.0  >60 mL/min/1.73 m^2 Final    eGFR if non African American 11/02/2018 >60.0  >60 mL/min/1.73 m^2 Final    WBC 11/02/2018 5.22  3.90 - 12.70 K/uL Final    RBC 11/02/2018 3.70* 4.00 - 5.40 M/uL Final    Hemoglobin 11/02/2018 10.7* 12.0 - 16.0 g/dL Final     Hematocrit 11/02/2018 34.5* 37.0 - 48.5 % Final    MCV 11/02/2018 93  82 - 98 fL Final    MCH 11/02/2018 28.9  27.0 - 31.0 pg Final    MCHC 11/02/2018 31.0* 32.0 - 36.0 g/dL Final    RDW 11/02/2018 13.2  11.5 - 14.5 % Final    Platelets 11/02/2018 175  150 - 350 K/uL Final    MPV 11/02/2018 10.0  9.2 - 12.9 fL Final    Immature Granulocytes 11/02/2018 0.4  0.0 - 0.5 % Final    Gran # (ANC) 11/02/2018 2.8  1.8 - 7.7 K/uL Final    Immature Grans (Abs) 11/02/2018 0.02  0.00 - 0.04 K/uL Final    Lymph # 11/02/2018 1.7  1.0 - 4.8 K/uL Final    Mono # 11/02/2018 0.6  0.3 - 1.0 K/uL Final    Eos # 11/02/2018 0.1  0.0 - 0.5 K/uL Final    Baso # 11/02/2018 0.02  0.00 - 0.20 K/uL Final    nRBC 11/02/2018 0  0 /100 WBC Final    Gran% 11/02/2018 53.2  38.0 - 73.0 % Final    Lymph% 11/02/2018 32.4  18.0 - 48.0 % Final    Mono% 11/02/2018 12.1  4.0 - 15.0 % Final    Eosinophil% 11/02/2018 1.5  0.0 - 8.0 % Final    Basophil% 11/02/2018 0.4  0.0 - 1.9 % Final    Differential Method 11/02/2018 Automated   Final    Group & Rh 11/02/2018 O POS   Final    Indirect Herman 11/02/2018 NEG   Final   Admission on 10/16/2018, Discharged on 10/16/2018   Component Date Value Ref Range Status    WBC 10/16/2018 6.51  3.90 - 12.70 K/uL Final    RBC 10/16/2018 3.66* 4.00 - 5.40 M/uL Final    Hemoglobin 10/16/2018 11.0* 12.0 - 16.0 g/dL Final    Hematocrit 10/16/2018 34.8* 37.0 - 48.5 % Final    MCV 10/16/2018 95  82 - 98 fL Final    MCH 10/16/2018 30.1  27.0 - 31.0 pg Final    MCHC 10/16/2018 31.6* 32.0 - 36.0 g/dL Final    RDW 10/16/2018 13.5  11.5 - 14.5 % Final    Platelets 10/16/2018 153  150 - 350 K/uL Final    MPV 10/16/2018 10.7  9.2 - 12.9 fL Final    Gran # (ANC) 10/16/2018 4.7  1.8 - 7.7 K/uL Final    Lymph # 10/16/2018 1.1  1.0 - 4.8 K/uL Final    Mono # 10/16/2018 0.7  0.3 - 1.0 K/uL Final    Eos # 10/16/2018 0.0  0.0 - 0.5 K/uL Final    Baso # 10/16/2018 0.01  0.00 - 0.20 K/uL Final    Gran%  10/16/2018 72.6  38.0 - 73.0 % Final    Lymph% 10/16/2018 16.7* 18.0 - 48.0 % Final    Mono% 10/16/2018 10.3  4.0 - 15.0 % Final    Eosinophil% 10/16/2018 0.2  0.0 - 8.0 % Final    Basophil% 10/16/2018 0.2  0.0 - 1.9 % Final    Differential Method 10/16/2018 Automated   Final    Sodium 10/16/2018 137  136 - 145 mmol/L Final    Potassium 10/16/2018 4.2  3.5 - 5.1 mmol/L Final    Chloride 10/16/2018 99  95 - 110 mmol/L Final    CO2 10/16/2018 29  23 - 29 mmol/L Final    Glucose 10/16/2018 114* 70 - 110 mg/dL Final    BUN, Bld 10/16/2018 21  8 - 23 mg/dL Final    Creatinine 10/16/2018 1.2  0.5 - 1.4 mg/dL Final    Calcium 10/16/2018 9.8  8.7 - 10.5 mg/dL Final    Total Protein 10/16/2018 7.5  6.0 - 8.4 g/dL Final    Albumin 10/16/2018 3.0* 3.5 - 5.2 g/dL Final    Total Bilirubin 10/16/2018 0.2  0.1 - 1.0 mg/dL Final    Alkaline Phosphatase 10/16/2018 72  55 - 135 U/L Final    AST 10/16/2018 12  10 - 40 U/L Final    ALT 10/16/2018 22  10 - 44 U/L Final    Anion Gap 10/16/2018 9  8 - 16 mmol/L Final    eGFR if  10/16/2018 52* >60 mL/min/1.73 m^2 Final    eGFR if non African American 10/16/2018 45* >60 mL/min/1.73 m^2 Final    Specimen UA 10/16/2018 Urine, Clean Catch   Final    Color, UA 10/16/2018 Yellow  Yellow, Straw, Kym Final    Appearance, UA 10/16/2018 Clear  Clear Final    pH, UA 10/16/2018 6.0  5.0 - 8.0 Final    Specific Gravity, UA 10/16/2018 1.020  1.005 - 1.030 Final    Protein, UA 10/16/2018 Negative  Negative Final    Glucose, UA 10/16/2018 Negative  Negative Final    Ketones, UA 10/16/2018 Negative  Negative Final    Bilirubin (UA) 10/16/2018 Negative  Negative Final    Occult Blood UA 10/16/2018 Negative  Negative Final    Nitrite, UA 10/16/2018 Negative  Negative Final    Urobilinogen, UA 10/16/2018 Negative  <2.0 EU/dL Final    Leukocytes, UA 10/16/2018 Negative  Negative Final    Troponin I 10/16/2018 <0.006  0.000 - 0.026 ng/mL Final    BNP  10/16/2018 45  0 - 99 pg/mL Final    Lipase 10/16/2018 15  4 - 60 U/L Final   ]    Assessment:    1. Chronic left hip pain    2. Obesity (BMI 30-39.9)    3. Squamous cell lung cancer, left    4. Encounter for screening mammogram for breast cancer    5. Disorder of bone and articular cartilage    6. Need for Streptococcus pneumoniae vaccination          Silvia was seen today for follow-up.    Diagnoses and all orders for this visit:    Chronic left hip pain  -     HYDROcodone-acetaminophen (NORCO) 5-325 mg per tablet; Take 1 tablet by mouth every 12 (twelve) hours as needed for Pain.  - Improved. Secondary to central stenosis of lumbar spine.  Follow up with Spine service.    Obesity (BMI 30-39.9)   -Improved. Patient with weight loss since discontinuing Periactin.  Recommend patient continue balanced diet with minimal salt with monitoring.   -If patient reaches low reserve will resume medication at lower dose.    Squamous cell lung cancer, left  -     HYDROcodone-acetaminophen (NORCO) 5-325 mg per tablet; Take 1 tablet by mouth every 12 (twelve) hours as needed for Pain.  - Stable. Follow up with Hematology/Oncology.     Encounter for screening mammogram for breast cancer  -     Mammo Digital Screening Bilateral With CAD; Future    Disorder of bone and articular cartilage  -     DXA Bone Density Spine And Hip; Future    Need for Streptococcus pneumoniae vaccination  -     (In Office Administered) Pneumococcal Conjugate Vaccine (13 Valent) (IM)          FOLLOW UP: Follow-up in about 3 months (around 4/14/2019) for Follow up.

## 2019-01-16 NOTE — PROGRESS NOTES
CC: Squamous cell carcinoma, unknown primary, on chemotherapy, here for follow up         Oncology History:     Diagnosis: Squamous cell carcinoma , primary site unknown    Molecular/genetic testing: p16 negative; PD L1 could not be run due to inadequate tissue   Stage:        Stage 4   Treatment: Carboplatin/paclitaxel x  6 cycles (2/16/18 - 6/8/18)        HPI:  is a 71 y/o female who underwent bronchoscopy/EBUS evaluation on 8/31/17 for abnormal mediastinal adenopathy  and a 1.2 cm MORRO mass.     Bronchoscopy findings:    The oropharynx appears normal. The larynx appears normal. The vocal cords appear normal. The subglottic space is normal. The trachea is of normal caliber. The otf is sharp. The tracheobronchial tree was examined to at least the first subsegmental level. Bronchial mucosa and anatomy are normal; there are no endobronchial lesions, and no secretions.    Lymph Nodes: Lymph node sizing was performed via endobronchial ultrasound for suspected lung cancer. Sampling by transbronchial needle aspiration was also performed using an Olympus EBUS-TBNA 22  gauge needle in the subcarinal mediastinum (level 7) and sent for routine cytology.       - The 7 (subcarinal) node was 30 mm by EBUS. Three samples with the needle were obtained. The patient's condition was unchanged after the intervention.      Her treatment got delayed as she cancelled several appointments due to some issues at home. She started Caroplatin/paclitaxel palliatively. She had left thoracentesis on 2/9/18. There were atypical cells in the pleural fluid, highly suspicious for malignancy.     Interval history: She is here for a follow up visit. She has fatigue, dyspnea. She has finished 6 cycles of Carbo/Paclitaxel, last treatment on 6/8/18.She is here after repeat lung biopsy.    Review of Systems   Constitutional: Positive for malaise/fatigue. Negative for chills, fever and weight loss.   HENT: Negative for congestion, ear  discharge and nosebleeds.    Eyes: Negative for blurred vision, double vision, photophobia and pain.   Respiratory: Negative for cough, hemoptysis and sputum production.    Cardiovascular: Negative for chest pain, palpitations, orthopnea and claudication.   Gastrointestinal: Negative for diarrhea, heartburn, nausea and vomiting.   Genitourinary: Negative for dysuria, frequency and urgency.   Musculoskeletal: Negative for myalgias and neck pain.   Skin: Negative for rash.   Neurological: Positive for weakness. Negative for dizziness, tingling, tremors, sensory change, speech change and headaches.   Endo/Heme/Allergies: Does not bruise/bleed easily.   Psychiatric/Behavioral: Negative for depression.         Current Outpatient Medications   Medication Sig    dexamethasone (DECADRON) 2 MG tablet TAKE 1 TABLET (2 MG TOTAL) BY MOUTH EVERY 12 (TWELVE) HOURS.    diclofenac (VOLTAREN) 50 MG EC tablet Take 1 tablet (50 mg total) by mouth 3 (three) times daily as needed.    diphenhydrAMINE (BENADRYL) 25 mg capsule Take 1 each (25 mg total) by mouth nightly as needed for Insomnia.    furosemide (LASIX) 20 MG tablet Take 1 tablet (20 mg total) by mouth daily as needed (for swelling of the lower extremities).    gabapentin (NEURONTIN) 100 MG capsule Take 1 capsule (100 mg total) by mouth every evening.    hydroCHLOROthiazide (HYDRODIURIL) 25 MG tablet Take 1 tablet (25 mg total) by mouth once daily.    HYDROcodone-acetaminophen (NORCO) 5-325 mg per tablet Take 1 tablet by mouth every 12 (twelve) hours as needed for Pain.    levocetirizine (XYZAL) 5 MG tablet Take 1 tablet (5 mg total) by mouth every evening.    LORazepam (ATIVAN) 0.5 MG tablet TAKE 1 TABLET BY MOUTH TWICE A DAY    losartan (COZAAR) 100 MG tablet TAKE 1 TABLET (100 MG TOTAL) BY MOUTH ONCE DAILY.    methotrexate 2.5 MG Tab as needed.     metoclopramide HCl (REGLAN) 5 MG tablet Take 1 tablet (5 mg total) by mouth 2 (two) times daily as needed (belching,  nausea).    ofloxacin (OCUFLOX) 0.3 % ophthalmic solution Place 1 drop into the left eye 3 (three) times daily.    pantoprazole (PROTONIX) 40 MG tablet TAKE 1 TABLET (40 MG TOTAL) BY MOUTH ONCE DAILY.    prednisoLONE acetate (PRED FORTE) 1 % DrpS Place 1 drop into the left eye 3 (three) times daily.    traZODone (DESYREL) 50 MG tablet Take 1 tablet (50 mg total) by mouth every evening. (Patient taking differently: Take 50 mg by mouth nightly as needed. )     No current facility-administered medications for this visit.        Vitals:    01/16/19 1009   BP: (!) 121/59   Pulse: 80   Resp: 18   Temp: 98 °F (36.7 °C)       Physical Exam   Constitutional: She is oriented to person, place, and time. She appears well-developed.   HENT:   Head: Normocephalic and atraumatic.   Mouth/Throat: No oropharyngeal exudate.   Eyes: Pupils are equal, round, and reactive to light. No scleral icterus.   Neck: Normal range of motion.   Cardiovascular: Normal rate.   No murmur heard.  Pulmonary/Chest: Breath sounds normal. No respiratory distress. She has no wheezes.   Abdominal: Soft. Bowel sounds are normal. She exhibits no distension. There is no tenderness. There is no rebound.   Musculoskeletal: She exhibits no edema.   Lymphadenopathy:     She has no cervical adenopathy.   Neurological: She is alert and oriented to person, place, and time. No cranial nerve deficit.   Skin: Skin is warm.   Psychiatric: She has a normal mood and affect.       6/12/17 CT chest with cont      1. Large left pleural effusion resulting in atelectasis of the left lower lobe and portions of left upper lobe  2. 1.2 cm left upper lobe pleural based pulmonary nodule  3. Mediastinal adenopathy as described above with diffuse thickening of the wall of the distal esophagus. Findings are suspicious for neoplastic process. Recommend bronchoscopy biopsy and possible endoscopy for further evaluation.  4. 1.3 cm hypodensity within the dome of the liver. Metastatic  focus cannot be entirely excluded.     9/7/17 PET CT       There is mildly increased tracer uptake within the patient's left pleural effusion and overlying the mediastinal adenopathy, max SUV 2.7.    Transmission CT images show continued pleural effusion and circumferential esophageal thickening. Transmission CT images also show non-avid bilateral groin adenopathy likely reactive in nature.      Impression        There is mildly increased tracer uptake within the patient's left pleural effusion and mediastinal adenopathy. While these findings suggest reactive etiology some low grade malignancies, such as bronchioloalveolar carcinoma, may show this level of uptake on PET scan.            10/30/17 EGD     The examined duodenum was normal.The entire examined stomach was normal.The cardia and gastric fundus were normal on retroflexion. A 1 cm hiatal hernia was found. The proximal extent of the gastric folds (end of tubular esophagus) was 38 cm from the incisors. The hiatal narrowing was 39 cm from the incisors. The Z-line was 38 cm from the incisors. Z-line was sharp. No esophagitis and no columnar esophagus and no lesions seen with NBI or white light.    Impression:                                   - Normal examined duodenum.                        - Normal stomach.                        - 1 cm hiatal hernia.                        - No specimens collected.     10/30/17 colonoscopy :     Cecal polyp ( 3mm) identified  Histopathology: Tubular adenoma        Pathology:     1/17/14 colonoscopy     FINAL PATHOLOGIC DIAGNOSIS     1. Colon biopsy, ascending colon-tubular adenoma.  2. Colon biopsy, transverse-tubular adenoma.  3. 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 The stalk and base of the polyp are well represented and are free of atypia.  4. Colon biopsy, sigmoid- mild glandular hyperplasia  consistent with a benign hyperplastic polyp.        7/19/17 PLEURAL FLUID (CYTOLOGY AND CELL BLOCK):     -Groups of atypical cells present, malignancy cannot be excluded.  Note: While these cells could be reactive mesothelial cells, the level of atypia could be seen in malignancy. A cell block was prepared but the atypical cells are not present in the cell block for further characterization. Correlate  clinically. Repeat tap/biopsy might be helpful if clinically indicated.     8/28/17 EBUS FNA     FNA of subcarinal lymph node: Positive for malignant cells  Small clusters of malignant epithelial cells, favor squamous carcinoma Tumor cells are positive for CK 5/6 and CK 7. Immunostains for p63 and TTF-1 are negative.     1/26/18 CT chest, abdomen,pelvis with cont         Comparison: June 12, 2017    Chest: Since prior exam there is been interval enlargement of the right pleural effusion. There is high density material seen within the right pleural effusion that was not visualized on prior exam.    There is a 5.4 cm enhancing mass seen at the left lung base that previously measured approximately 2.9 cm.    There is a subcarinal mass measuring 4.4 cm it previously measured 2.9 cm.    There is circumferential esophageal thickening adjacent to this mass.    There is volume loss in the right chest with mediastinal shift to the right more pronounced than what was seen on prior exam.    Patient is a right internal jugular Port-A-Cath.    Abdomen/pelvis: The liver, spleen, adrenal glands, kidneys and pancreas show a normal contrasted appearance. There is no evidence bowel obstruction. There is no evidence of abdominal or pelvic lymphadenopathy. The osseous structures show degenerative change of the spine.   Impression       Since prior exam in the patient's subcarinal and left lung base masses have increased in size. In addition there is now high density material seen within the patient's left pleural effusion new from prior  exam and concerning for hemorrhage possibly from the mass.         1/26/18 CT head with cont          Comparison: CT June 8, 2017    Technique: Contiguous axial images were acquired from vertex to skull base without contrast.    Findings: There is no evidence acute intracranial abnormality.  Specifically there is no evidence acute infarct, contusion, extra-axial fluid collection or midline shift.  The ventricles are normal in size and configuration.  The calvarium is intact.  The paranasal sinuses and mastoid air cells are clear.    There is no evidence of enhancing mass.   Impression       There is no evidence of enhancing mass within the cerebral parenchyma      2/9/18 FINAL PATHOLOGIC DIAGNOSIS  LEFT LUNG, PLEURAL FLUID (CYTOLOGY):  - Scant groups of atypical cells, malignancy cannot be excluded. Note: Scant atypical groups of cells are present. Differential diagnosis includes reactive atypia vs malignancy .  Immunohistochemistry for CK5/6 and p63 is non-contributory. Please clinically correlate and re-sample as indicated.  All stains have satisfactory positive and negative controls.     5/14/18 CTA CHEST NON CORONARY       .    COMPARISON:  CT chest abdomen and pelvis from 01/26/2018.    FINDINGS:  Structures at the base of the neck are unremarkable.  Right-sided chest port is visualized with distal tip in the SVC.  Aorta is non-aneurysmal.  Heart is mildly enlarged with interval development of moderate pericardial effusion.  There is mild pericardial enhancement visualized.  The pulmonary arteries distribute normally. No intraluminal filling defects to suggest pulmonary thromboembolism to the level of the proximal segmental branches.    The trachea and bronchi are patent.  Moderate left and small right sided pleural effusions are visualized resulting in volume loss and compressive atelectasis within the lower lobes, left greater than right.  Grossly stable mass visualized in the right subcarinal/perihilar  region.  There has been interval reduced size of multifocal left-sided pulmonary masses and anterior mediastinal lymphadenopathy.    Stable hepatic hypodensity is visualized within the anterior aspect of the right hepatic lobe.  Prominent right cardiophrenic lymph nodes are seen which have increased in size.  Redemonstration of diffuse abnormal distal esophageal wall thickening versus adjacent soft tissue lesion.  No acute osseous abnormality identified.  Extrathoracic soft tissues are unremarkable.   Impression        1. Interval development of moderate pericardial effusion with mild pericardial enhancement.  Findings may reflect malignant pericardial effusion.  2. No evidence of PE.  3. Patient with known metastatic cancer.  Relatively stable size mass in the right perihilar/subcarinal region.  Interval reduced size of previously visualized multifocal left lung metastatic lesions and anterior mediastinal lymphadenopathy.  Cardiac phrenic lymph nodes have increased in size.  Persistent abnormal prominent circumferential distal esophageal wall thickening versus surrounding soft tissue mass.  Examination was performed in an emergency setting and not to serve as a restaging scan.  4. Moderate left and small right pleural effusions resulting in volume loss and compressive atelectasis within the lower lobes, left greater than right.         6/7/18 PET CT      FINDINGS:  Patient was administered 12.23 millicuries of FDG intravenously.  Comparison is 09/07/2017.    There is physiologic intracranial, head, and neck activity.  Heart mediastinum are normal.  There is low-grade activity within the left pleural effusion.  There is physiologic liver, spleen, GI  and pelvic organ activity.  No bone lesions are seen.  There is left lower lobe retro cardiac atelectasis.   Impression        See above    Low-grade activity within a loculated left upper pleural effusion.  This could be benign/reactive.  Malignancy not excluded but  it has improved since the prior study.         9/8/18 CT          COMPARISON:  New medicine PET-CT from 06/07/2018, CTA chest from 05/14/2018, and CT chest/abdomen/pelvis from 01/26/2018.    FINDINGS:  Thoracic soft tissues: Right-sided chest port in place with catheter tip terminating in the distal SVC.    Aorta: Normal in course and caliber, without significant atherosclerotic plaque. There are three branching vessels at the arch.    Heart: Mildly enlarged.  No pericardial effusion.    Flaca/Mediastinum: Subcarinal soft tissue mass re-identified the measuring 5.7 x 3.7 cm x 6.2 cm, similar to prior CTA.  Previously described prominent cardiophrenic lymph nodes are now within normal limits for size.  Abundant soft tissues again seen surrounding the distal 3rd of the esophagus, findings are similar to prior study and may represent the extensive esophageal wall thickening versus extension of the mediastinal soft tissue mass.    Lungs: The interval decrease in size of known left malignant effusion with small amount of residual dependent complex fluid and mild adjacent atelectatic changes.  The there is continued nodular left pleural thickening.  Previously described mass at the medial left lung base re-identified measuring 4.3 x 2.2 cm, findings are not well evaluated on previous CTA due to surrounding pleural fluid but is decreased in size in comparison to most recent CT chest/abdomen/pelvis from 01/26/2018.  The no new lesions identified.  Right lung is grossly clear.  No new lung lesions identified.    Liver: Normal in size and attenuation.  Stable 1.2 cm hypoattenuating lesion in the dome of the liver consistent with simple cyst.  No new lesions identified.    Gallbladder: No calcified gallstones.    Bile Ducts: No evidence of dilated ducts.    Pancreas: No mass or peripancreatic fat stranding.    Spleen: Unremarkable.    Adrenals: Unremarkable.    Kidneys/ Ureters: Normal in size and location. Normal concentration  of contrast. No hydronephrosis or nephrolithiasis. No ureteral dilatation.    Bladder: Incompletely distended.    Reproductive organs: Status post hysterectomy.    GI Tract/Mesentery: No evidence of bowel obstruction or inflammation.    Peritoneal Space: No ascites. No free air.    Retroperitoneum:  No significant adenopathy.    Abdominal wall:  Unremarkable.    Vasculature: No significant atherosclerosis or aneurysm.    Bones: Thoracic kyphosis. No acute fracture.Stable degenerative changes seen throughout the spine.       Impression         Subcarinal soft tissue mass re-identified and stable in size as compared to the most recent CTA chest from 05/24/2018, lesion demonstrates mild interval decrease in size as compared to the CT chest/abdomen/pelvis from 01/26/2018.    Interval decrease in size of known left malignant pleural effusion.    Medial left lung base mass, findings are not well evaluated on previous CTA chest due to surrounding pleural fluid but has decreased in size in comparison to most recent CT chest/abdomen/pelvis from 01/26/2018.  Continued nodular left pleural thickening, overall less prominent on today's exam.    Abundant soft tissue again seen surrounding the distal 3rd of the esophagus, findings are similar to prior study and may represent the extensive esophageal wall thickening versus extension of mediastinal soft tissue mass.    Previously described prominent cardiophrenic lymph nodes are now within normal limits for size.  No new lymphadenopathy.    Additional stable findings as above.            12/4/18 CT neck, chest, abdomen, pelvis with cont        COMPARISON:  CTA 09/08/2018    FINDINGS:  Orbits, paranasal sinuses, and skull base: Postoperative changes of bilateral lens replacement.  No orbital masses.  Paranasal sinuses and mastoid air cells clear.  Visualized intracranial structures are unremarkable.    Nasopharynx: Normal.    Suprahyoid neck: Edentulous.  Other wise unremarkable  oral cavity.  Normal oropharynx, parapharyngeal space, and retropharyngeal space.    Infrahyoid neck: Normal larynx, hypopharynx, and supraglottis.    Thyroid: Normal.    Cervical lymph nodes: No lymph node enlargement.    Vascular structures: Jugular veins appear patent.  Right internal jugular port catheter with tip terminating within the SVC.    Thoracic soft tissues: Port within the right anterior chest wall.    Aorta: Left-sided aortic arch.  No aneurysm.  There is mild calcific atherosclerosis of the descending thoracic aorta and abdominal aorta.  Aortic valve calcification.    Heart: Normal size.  No pericardial effusion.  Mild coronary artery atherosclerosis.    Pulmonary vasculature: Pulmonary arteries distribute normally.  There are four pulmonary veins.    Flaca/Mediastinum: Subcarinal mass with multiple calcifications.  This measures 4.4 cm in short axis, previously measuring 4.0 cm.  Extensive abnormal soft tissue attenuation along the esophagus potentially representing esophageal wall thickening or extension of this subcarinal mass--- this appears similar to prior examination.  No new mediastinal or hilar lymph node enlargement.    Airways: Patent.    Lungs/Pleura: There is a small volume of dependent left pleural fluid with scattered hyperattenuating material and nodular pleural thickening, similar to slightly decreased in volume from 09/08/2018.  There is a pleural based mass with calcifications abutting the pleura and posterior mediastinum along the medial margin of the left hemidiaphragm.  This measures 4.3 cm in long axis appears unchanged in size from prior examination.  There is compressive atelectasis of the left lower lobe secondary to pleural fluid.  The lungs are otherwise well aerated and clear.    Esophagus: Abundant soft tissue along the distal 3rd of the esophagus as above.  Proximal esophagus is normal in caliber and course.    Liver: Normal in size and attenuation.  Stable cyst at the  dome.    Gallbladder: No calcified stones or gallbladder wall thickening.    Bile Ducts: No dilatation.    Pancreas: No mass. No peripancreatic fat stranding.    Spleen: Unremarkable.    Adrenals: Unremarkable.    Kidneys/Ureters: Normal in size and enhancement.  Small hypodensity within the upper pole of the right kidney, too small to definitively characterize and likely a cyst.  No definite solid renal masses.  No hydroureteronephrosis.    Bladder: No wall thickening.    Reproductive organs: Status post hysterectomy.    GI Tract/Mesentery: No evidence of bowel obstruction or inflammation. Appendix is not identifiable but there is no infllammation in the expected region of the appendix.    Peritoneal Space: No ascites or free air.    Retroperitoneum: No lymph node enlargement.    Abdominal wall: Normal.    Vasculature: Brachiocephalic veins and SVC are patent.  Portal vein, portal venous branches, SMV, and splenic veins are patent..    Bones: No fracture or aggressive osseous lesion.  Multilevel degenerative changes of the cervical, thoracic, and lumbar spine.        Impression         1.  Stable left pleural based mass.  Stable small volume of left pleural fluid and nodule left pleural thickening compatible with malignant pleural effusion.    2.  Slight interval increase in size of subcarinal soft tissue mass.  Stable abundant soft tissue attenuation surrounding the distal 3rd of the esophagus.    RECIST SUMMARY:    Date of prior examination for comparison: 09/08/2018    Lesion 1: Subcarinal soft tissue mass.  Short axis measurement 4.4 cm cm. Series 2 image 42.  Prior measurement 4.0 cm.    Lesion 2: Left pleural base mass along the left hemidiaphragm.  4.3 cm. Series 2 image 66.  Prior measurement 4.3 cm.    Stable abutting soft tissue along the distal 3rd of the esophagus.    3.  No significant abnormality within the neck to account for this patient's facial edema.  No convincing evidence of SVC syndrome or  jugular vein thrombus.        1/4/19 left pleural biopsy      FINAL PATHOLOGIC DIAGNOSIS  Pleural, left, mass, CT-guided biopsy: Fragments of soft tissue with lymphoplasmacytic infiltration. No definitive morphologic evidence of lymphoma or carcinoma. See comment.  Comment: Immunohistochemical studies were performed on the paraffin embedded tissue block with adequate positive and negative controls. Mixed mostly T cells (CD3 positive, CD5 positive, CD 45 positive, BCL-2 positive)  and occasional B cells (CD20 positive, Mclean 5 positive, CD10 negative, cyclin D1 negative, low Ki-67) are evident. In situ hybridization for kappa and lambda shows polytypic plasma cells. No calretinin positive, CK 5/6 positive, CK 7  positive, P 63 positive large atypical epithelial cells are evident.  There is no definitive evidence of lymphoma or carcinoma on the limited tissue examined. Recommend excisional biopsy if clinically highly suspicious for lymphoma or carcinoma     Component      Latest Ref Rng & Units 1/16/2019   WBC      3.90 - 12.70 K/uL 6.32   RBC      4.00 - 5.40 M/uL 3.51 (L)   Hemoglobin      12.0 - 16.0 g/dL 9.9 (L)   Hematocrit      37.0 - 48.5 % 32.8 (L)   MCV      82 - 98 fL 93   MCH      27.0 - 31.0 pg 28.2   MCHC      32.0 - 36.0 g/dL 30.2 (L)   RDW      11.5 - 14.5 % 13.3   Platelets      150 - 350 K/uL 258   MPV      9.2 - 12.9 fL 9.5   Immature Granulocytes      0.0 - 0.5 % 0.5   Gran # (ANC)      1.8 - 7.7 K/uL 3.6   Immature Grans (Abs)      0.00 - 0.04 K/uL 0.03   Lymph #      1.0 - 4.8 K/uL 1.8   Mono #      0.3 - 1.0 K/uL 0.7   Eos #      0.0 - 0.5 K/uL 0.1   Baso #      0.00 - 0.20 K/uL 0.01   nRBC      0 /100 WBC 0   Gran%      38.0 - 73.0 % 57.6   Lymph%      18.0 - 48.0 % 27.8   Mono%      4.0 - 15.0 % 11.7   Eosinophil%      0.0 - 8.0 % 2.2   Basophil%      0.0 - 1.9 % 0.2   Differential Method       Automated   Sodium      136 - 145 mmol/L 141   Potassium      3.5 - 5.1 mmol/L 3.4 (L)   Chloride       95 - 110 mmol/L 100   CO2      23 - 29 mmol/L 32 (H)   Glucose      70 - 110 mg/dL 94   BUN, Bld      8 - 23 mg/dL 18   Creatinine      0.5 - 1.4 mg/dL 1.0   Calcium      8.7 - 10.5 mg/dL 9.5   Total Protein      6.0 - 8.4 g/dL 7.7   Albumin      3.5 - 5.2 g/dL 3.0 (L)   Total Bilirubin      0.1 - 1.0 mg/dL 0.3   Alkaline Phosphatase      55 - 135 U/L 66   AST      10 - 40 U/L 16   ALT      10 - 44 U/L 14   Anion Gap      8 - 16 mmol/L 9   eGFR if African American      >60 mL/min/1.73 m:2 >60.0   eGFR if non African American      >60 mL/min/1.73 m:2 56.0 (A)       Assessment:     1. is a 74 y/o female who underwent bronchoscopy/EBUS evaluation on 8/31/17 for abnormal mediastinal adenopathy  and a 1.2 cm MORRO mass as well as pericardial adenopathy.FNA of subcarinal lymph node was positive for malignant cells.  There were small clusters of malignant epithelial cells, favoring squamous carcinoma. Site of origin is not clear. PET CT done on 9/7/17 did not reveal any lesion in head and neck region.  There was inadequate tissue to run PD L1.p16 was negative.    Esophageal thickening was noted on CT done on 6/12/17. EGD on 10/30/17 did not reveal any suspicious lesions in esophagus/stomach. ENT evaluation still pending.   I explained to her that if primary site is unclear, she will be treated as metastatic SCC of unknown primary.   Her treatment got delayed as she cancelled several appointments due to some issues at home. She said she could not have MRI due to claustrophobia, even with anti-anxiety medication. CT chest from 1/26/18 showed increase in the sizes of subcarinal and left lung base masses compared to previous CT in June 2017 . High density material was seen within the patient's left pleural effusion. No intracranial lesions noted on CT head. I discussed these findings with the patient and personally reviewed the CT scans from January 2018.  I told her that she has likely metastatic SCC of unknown  primary. She does have RA history. It is unclear if she can tolerate check point inhibitor therapy even if she has high PD1 expression on cytology/biopsy.  She started Carboplatin/Paclitaxel every 3 weeks. I had explained to her that rationale of treatment is palliation and not cure . She did not get her CTs after C4 as planned. PET CT before cycle 6, on 6/7/18 showed low-grade activity within the left pleural effusion.   CT done on 9/8/18 showed overall stable findings compared to her CTs from May 2018. Pleural effusion had resolved. She is still dyspneic, but her cough has resolved. Her performance status is better. She tolerates more activities now, than previously. She has facial puffiness.   No evidence of SVC syndrome/IJ thrombus noted on CT done on 12/4/18. Subcarinal soft tissue mass measures 4.4 cm cm, previously (in Sept 2018) measuring 4.0 cm. Left pleural base mass along the left hemidiaphragm measures 4.3 cm, unchanged from Sept 2018. No other lesions noted in rest of the lungs, abdomen, neck or pelvis. She had biopsy of left pleural mass on 1/4/19. It was negative for malignancy.    Fragments of soft tissue with lymphoplasmacytic infiltration. No definitive morphologic evidence of lymphoma or carcinoma.   She will have SPEP, quantitative immunoglobulins checked.      2. Pleural effusion: She had left sided thoracentesis ( 1050ml) on 2/9/18. Malignancy could not be excluded. No  Indication for pleurex catheter/reepat thoracentesis at this time.    CTA done on 5/14/18 again showed moderate effusion on the left , mild on right. Now resolved.      3. Anorexia: Secondary to #1. She was on Cyproheptadine.However, her appetite was still poor. She was started on Decadron 2mg BID. It helped with her appetite. Howver, it caused her to gain weight as well as disturbed her sleep. She has stopped Decadron now      4. Anemia: Hgb 10.9 on 11/28/18. Normocytic, hypochromic. No overt bleeding. Iron panel on 4/19  showed iron deficiency.      5. Pericardial effusion: She was noted to have pericardial effusion on CTA chest done on 5/14/18. Possibly malignant. No symptoms/signs of tamponade. Now resolved.              Distress Screening Results: Psychosocial Distress screening score of Distress Score: 3 noted and reviewed. No intervention indicated.

## 2019-01-16 NOTE — PROGRESS NOTES
Patient, Silvia Capellan (MRN #[id number]), presented with a recorded BMI of 36.67 kg/m^2 and a documented comorbidity(s):  - Hypertension  to which the severe obesity is a contributing factor. This is consistent with the definition of severe obesity (BMI 35.0-39.9) with comorbidity (ICD-10 E66.01, Z68.35). The patient's severe obesity was monitored, evaluated, addressed and/or treated. This addendum to the medical record is made on 01/16/2019.

## 2019-02-07 PROBLEM — H25.813 MIXED TYPE AGE-RELATED CATARACT, BOTH EYES: Status: RESOLVED | Noted: 2018-08-09 | Resolved: 2019-01-01

## 2019-02-07 PROBLEM — Z96.1 PSEUDOPHAKIA: Status: ACTIVE | Noted: 2019-01-01

## 2019-02-07 PROBLEM — H52.7 REFRACTIVE ERROR: Status: ACTIVE | Noted: 2019-01-01

## 2019-02-07 PROBLEM — H25.10 SENILE NUCLEAR SCLEROSIS: Status: RESOLVED | Noted: 2018-01-01 | Resolved: 2019-01-01

## 2019-02-07 NOTE — PROGRESS NOTES
Subjective:       Patient ID: Silvia Capellan is a 73 y.o. female      Chief Complaint   Patient presents with    Concerns About Ocular Health    Hypertensive Eye Exam     History of Present Illness  Dls: 11/9/18 Dr. Keith    74 y/o female presents today for hypertensive eye exam.   Pt states ou doing well. Pt wears otc readers    No tearing  No itching  No burning  No pain  No ha's  No floaters  No flashes    Eye meds  None       Assessment/Plan:     1. Essential hypertension  No hypertensive retinopathy. Continue BP control. RTC 1 year for DFE.    2. Pseudophakia  Well-centered. Clear.     3. Refractive error  Educated patient on refractive error and discussed lens options. Dispensed updated spectacle Rx. Educated about adaptation period to new specs.    Eyeglass Final Rx     Eyeglass Final Rx       Sphere Cylinder Axis Add    Right +0.25 +1.25 160 +2.50    Left +0.50 +0.50 180 +2.50    Expiration Date:  2/8/2020                Follow-up in about 1 year (around 2/7/2020).

## 2019-02-22 NOTE — TELEPHONE ENCOUNTER
----- Message from Lui Card MA sent at 2/22/2019  9:50 AM CST -----  Contact: Pt   Pt called and would like a call back from the nurse regarding some pain med.        Pt can be reached at [phone].      Thanks

## 2019-03-12 PROBLEM — I27.20 PULMONARY HYPERTENSION: Status: ACTIVE | Noted: 2019-01-01

## 2019-03-12 NOTE — PATIENT INSTRUCTIONS
Pulmonary Hypertension  Pulmonary hypertension is high pressure in the blood vessels that carry blood into the lungs. This strains the lungs and heart and can lead to serious problems.  Systemic hypertension means the pressure is too high in blood vessels throughout the body. A person with pulmonary hypertension may also have systemic hypertension.   What causes pulmonary hypertension?  The cause of pulmonary hypertension is sometimes unknown. But it is most often caused by another health problem. In many cases, controlling this problem can help prevent or control pulmonary hypertension. Some of the most common causes of pulmonary hypertension are:  In children  · Severe lung problems in a   · Lung conditions, such as cystic fibrosis or interstitial lung disease  · Heart disease  · Congenital heart defects  · HIV infection  · Other conditions, such as scleroderma, lupus, or sickle cell disease  In adults  · Lung conditions, such as chronic obstructive pulmonary disease (COPD), advanced bronchitis, cystic fibrosis, or pulmonary fibrosis  · Liver disease  · Blood clots in the lungs  · Left-sided heart failure  · HIV infection  · Sleep apnea  · Other conditions, such as scleroderma, lupus, or sickle cell disease  What are the symptoms of pulmonary hypertension?  Symptoms may come on suddenly. Or, they may come on slowly over time. Symptoms can include:  · Shortness of breath  · Blue lips or fingernails (signs that the body is having trouble getting oxygen)  · Tiring quickly, especially when active  · Fast heartbeat  · Pain in the upper right side of the abdomen  · Swelling in the legs or ankles  · Chest pain or pressure  · Fainting or dizzy spells  How is pulmonary hypertension diagnosed?  Your healthcare provider will examine you and listen to your heart and lungs. Your blood pressure will also be measured. Tests may be done as well. These may include:  · Blood tests. These measure certain body functions.  They also check for problems such as infection.  · A chest X-ray. This takes a picture of the inside of the chest. It can show certain heart and lung problems.  · An electrocardiogram (ECG). This test records the hearts electrical activity.  · An echocardiogram (echo). This test uses sound waves to create a moving picture of the heart.  · Pulmonary function tests. These tests measure breathing and lung capacity.  · Cardiac catheterization. This procedure gives detailed information about the hearts structures. A thin tube (catheter) is put into a blood vessel in the groin or neck and guided into the right side of the heart. Certain blood pressure tests are then done.  How is pulmonary hypertension treated?   Treatment depends on your age, health, and the severity of your symptoms. Any underlying health problems you have will be treated. Treatment may also include:  · Oxygen  · Medicine to lower the pressure in the lung blood vessels  · Medicine to help the body lose excess water  · Medicine to prevent blood clots  · Medicine that help the heart beat stronger, pump more blood and control abnormal heart rhythms  What are the long-term concerns?  Though pulmonary hypertension has no cure, certain treatments may relieve symptoms and slow progression of the disease. In rare and severe cases, a lung transplant may be needed. Your healthcare provider can tell you more about this if needed.  When you should call your healthcare provider  Call your healthcare provider right away if you have any of the following:  · Persistent blueness of lips or fingernails  · Shortness of breath  · Fever of 100.4°F (38.0°C) or higher  · Fainting spells   Date Last Reviewed: 1/1/2017  © 8501-3417 [employer]. [address] [place] [address], [place] [address]. All rights reserved. This information is not intended as a substitute for professional medical care. Always follow your healthcare professional's instructions.

## 2019-03-12 NOTE — PROGRESS NOTES
Chief Complaint   Patient presents with    Follow-up       HPI    Silvia Capellan is 73 y.o. female. The primary encounter diagnosis was Pleural effusion on left. Diagnoses of Pulmonary hypertension, Squamous cell lung cancer, left, Excessive weight gain, Central stenosis of spinal canal, and Anxiety state were also pertinent to this visit.    73 year old female comes to clinic with complaint of persistent fluid retention and shortness of breath.      Patient reports continued fluid retention and shortness of breath for several months.  She is aware of pleural effusion and will have this evaluated by her Oncologist.  Patient reports worse over the last 2-3 days.  She reports taking her medications for fluid retention with only minimal improvement.  She also reports increasing difficulty with anxiety.  She reports use of Ativan has not provided her with any improvement.  She reports anxiety and poor sleep.  She admits that some of her shortness of breath increases her anxiety.    Review of Systems   Constitutional: Negative for activity change.   Respiratory: Positive for chest tightness and shortness of breath. Negative for cough and wheezing.    Cardiovascular: Positive for leg swelling. Negative for chest pain.   Musculoskeletal: Negative for gait problem.   Psychiatric/Behavioral: Positive for agitation, decreased concentration, dysphoric mood and sleep disturbance. Negative for suicidal ideas. The patient is nervous/anxious.            Current Outpatient Medications:     dexamethasone (DECADRON) 2 MG tablet, TAKE 1 TABLET (2 MG TOTAL) BY MOUTH EVERY 12 (TWELVE) HOURS., Disp: 120 tablet, Rfl: 0    diclofenac (VOLTAREN) 50 MG EC tablet, Take 1 tablet (50 mg total) by mouth 3 (three) times daily as needed., Disp: 30 tablet, Rfl: 1    diphenhydrAMINE (BENADRYL) 25 mg capsule, Take 1 each (25 mg total) by mouth nightly as needed for Insomnia., Disp: 20 capsule, Rfl: 0    furosemide (LASIX) 40 MG tablet, Take  1 tablet (40 mg total) by mouth daily as needed (for swelling of the lower extremities)., Disp: 90 tablet, Rfl: 0    gabapentin (NEURONTIN) 300 MG capsule, Take 1 capsule (300 mg total) by mouth every evening., Disp: 90 capsule, Rfl: 1    hydroCHLOROthiazide (HYDRODIURIL) 25 MG tablet, Take 1 tablet (25 mg total) by mouth once daily., Disp: 90 tablet, Rfl: 1    HYDROcodone-acetaminophen (NORCO) 5-325 mg per tablet, Take 1 tablet by mouth every 12 (twelve) hours as needed for Pain., Disp: 60 tablet, Rfl: 0    levocetirizine (XYZAL) 5 MG tablet, Take 1 tablet (5 mg total) by mouth every evening., Disp: 30 tablet, Rfl: 11    LORazepam (ATIVAN) 0.5 MG tablet, Take 2 tablets (1 mg total) by mouth every 12 (twelve) hours as needed for Anxiety., Disp: 28 tablet, Rfl: 0    losartan (COZAAR) 100 MG tablet, TAKE 1 TABLET (100 MG TOTAL) BY MOUTH ONCE DAILY., Disp: 90 tablet, Rfl: 1    methotrexate 2.5 MG Tab, as needed. , Disp: , Rfl:     metoclopramide HCl (REGLAN) 5 MG tablet, Take 1 tablet (5 mg total) by mouth 2 (two) times daily as needed (belching, nausea)., Disp: 10 tablet, Rfl: 0    pantoprazole (PROTONIX) 40 MG tablet, TAKE 1 TABLET (40 MG TOTAL) BY MOUTH ONCE DAILY., Disp: 90 tablet, Rfl: 1      Blood pressure 139/80, pulse 85, temperature 98 °F (36.7 °C), temperature source Oral, weight 110.4 kg (243 lb 6.2 oz), SpO2 96 %.    Physical Exam   Constitutional: Vital signs are normal. She appears well-developed.   Cardiovascular: Normal heart sounds.   No murmur heard.  Pulmonary/Chest: Effort normal and breath sounds normal.   Psychiatric: She has a normal mood and affect. Her behavior is normal.       Lab Visit on 01/16/2019   Component Date Value Ref Range Status    Sodium 01/16/2019 141  136 - 145 mmol/L Final    Potassium 01/16/2019 3.4* 3.5 - 5.1 mmol/L Final    Chloride 01/16/2019 100  95 - 110 mmol/L Final    CO2 01/16/2019 32* 23 - 29 mmol/L Final    Glucose 01/16/2019 94  70 - 110 mg/dL Final     BUN, Bld 01/16/2019 18  8 - 23 mg/dL Final    Creatinine 01/16/2019 1.0  0.5 - 1.4 mg/dL Final    Calcium 01/16/2019 9.5  8.7 - 10.5 mg/dL Final    Total Protein 01/16/2019 7.7  6.0 - 8.4 g/dL Final    Albumin 01/16/2019 3.0* 3.5 - 5.2 g/dL Final    Total Bilirubin 01/16/2019 0.3  0.1 - 1.0 mg/dL Final    Alkaline Phosphatase 01/16/2019 66  55 - 135 U/L Final    AST 01/16/2019 16  10 - 40 U/L Final    ALT 01/16/2019 14  10 - 44 U/L Final    Anion Gap 01/16/2019 9  8 - 16 mmol/L Final    eGFR if African American 01/16/2019 >60.0  >60 mL/min/1.73 m^2 Final    eGFR if non African American 01/16/2019 56.0* >60 mL/min/1.73 m^2 Final    WBC 01/16/2019 6.32  3.90 - 12.70 K/uL Final    RBC 01/16/2019 3.51* 4.00 - 5.40 M/uL Final    Hemoglobin 01/16/2019 9.9* 12.0 - 16.0 g/dL Final    Hematocrit 01/16/2019 32.8* 37.0 - 48.5 % Final    MCV 01/16/2019 93  82 - 98 fL Final    MCH 01/16/2019 28.2  27.0 - 31.0 pg Final    MCHC 01/16/2019 30.2* 32.0 - 36.0 g/dL Final    RDW 01/16/2019 13.3  11.5 - 14.5 % Final    Platelets 01/16/2019 258  150 - 350 K/uL Final    MPV 01/16/2019 9.5  9.2 - 12.9 fL Final    Immature Granulocytes 01/16/2019 0.5  0.0 - 0.5 % Final    Gran # (ANC) 01/16/2019 3.6  1.8 - 7.7 K/uL Final    Immature Grans (Abs) 01/16/2019 0.03  0.00 - 0.04 K/uL Final    Lymph # 01/16/2019 1.8  1.0 - 4.8 K/uL Final    Mono # 01/16/2019 0.7  0.3 - 1.0 K/uL Final    Eos # 01/16/2019 0.1  0.0 - 0.5 K/uL Final    Baso # 01/16/2019 0.01  0.00 - 0.20 K/uL Final    nRBC 01/16/2019 0  0 /100 WBC Final    Gran% 01/16/2019 57.6  38.0 - 73.0 % Final    Lymph% 01/16/2019 27.8  18.0 - 48.0 % Final    Mono% 01/16/2019 11.7  4.0 - 15.0 % Final    Eosinophil% 01/16/2019 2.2  0.0 - 8.0 % Final    Basophil% 01/16/2019 0.2  0.0 - 1.9 % Final    Differential Method 01/16/2019 Automated   Final    Group & Rh 01/16/2019 O POS   Final    Indirect Herman 01/16/2019 NEG   Final   Lab Visit on 01/04/2019    Component Date Value Ref Range Status    WBC 01/04/2019 4.98  3.90 - 12.70 K/uL Final    RBC 01/04/2019 3.49* 4.00 - 5.40 M/uL Final    Hemoglobin 01/04/2019 10.2* 12.0 - 16.0 g/dL Final    Hematocrit 01/04/2019 33.9* 37.0 - 48.5 % Final    MCV 01/04/2019 97  82 - 98 fL Final    MCH 01/04/2019 29.2  27.0 - 31.0 pg Final    MCHC 01/04/2019 30.1* 32.0 - 36.0 g/dL Final    RDW 01/04/2019 13.6  11.5 - 14.5 % Final    Platelets 01/04/2019 245  150 - 350 K/uL Final    MPV 01/04/2019 9.7  9.2 - 12.9 fL Final    Immature Granulocytes 01/04/2019 0.2  0.0 - 0.5 % Final    Gran # (ANC) 01/04/2019 2.9  1.8 - 7.7 K/uL Final    Immature Grans (Abs) 01/04/2019 0.01  0.00 - 0.04 K/uL Final    Lymph # 01/04/2019 1.3  1.0 - 4.8 K/uL Final    Mono # 01/04/2019 0.6  0.3 - 1.0 K/uL Final    Eos # 01/04/2019 0.1  0.0 - 0.5 K/uL Final    Baso # 01/04/2019 0.02  0.00 - 0.20 K/uL Final    nRBC 01/04/2019 0  0 /100 WBC Final    Gran% 01/04/2019 58.6  38.0 - 73.0 % Final    Lymph% 01/04/2019 25.5  18.0 - 48.0 % Final    Mono% 01/04/2019 12.7  4.0 - 15.0 % Final    Eosinophil% 01/04/2019 2.6  0.0 - 8.0 % Final    Basophil% 01/04/2019 0.4  0.0 - 1.9 % Final    Differential Method 01/04/2019 Automated   Final    Prothrombin Time 01/04/2019 11.0  9.0 - 12.5 sec Final    INR 01/04/2019 1.1  0.8 - 1.2 Final   ]    Assessment:    1. Pleural effusion on left    2. Pulmonary hypertension    3. Squamous cell lung cancer, left    4. Excessive weight gain    5. Central stenosis of spinal canal    6. Anxiety state          Silvia was seen today for follow-up.    Diagnoses and all orders for this visit:    Pleural effusion on left   -New problem. Imaging scheduled. Effusion is likely malignant.  Patient to discuss need for thoracentesis with Oncologist to reduce shortness of breath.    Pulmonary hypertension  -     Ambulatory consult to Cardiology  - New problem. Previous echo consistent with diagnosis. Referred to Cardiology  for additional recommendations due to increasing shortness of breath and contribution to edema.    Squamous cell lung cancer, left  -     HYDROcodone-acetaminophen (NORCO) 5-325 mg per tablet; Take 1 tablet by mouth every 12 (twelve) hours as needed for Pain.  - Stable. Medication for pain management refilled.    Excessive weight gain  -     furosemide (LASIX) 40 MG tablet; Take 1 tablet (40 mg total) by mouth daily as needed (for swelling of the lower extremities).  - Unstable. Patient remains on oral steroids and previously on Periactin.  - Multiple factors contributing. Lasix increased.    Central stenosis of spinal canal  -     HYDROcodone-acetaminophen (NORCO) 5-325 mg per tablet; Take 1 tablet by mouth every 12 (twelve) hours as needed for Pain.  -     gabapentin (NEURONTIN) 300 MG capsule; Take 1 capsule (300 mg total) by mouth every evening.  - Unstable. Gabapentin increased. Pain medication refilled. Keep follow up with PM&R.    Anxiety state  -     LORazepam (ATIVAN) 0.5 MG tablet; Take 2 tablets (1 mg total) by mouth every 12 (twelve) hours as needed for Anxiety.  - Unstable. Medication temporarily increased  If regular use will start SSRI.    Other orders  -     Cancel: diphenhydrAMINE (BENADRYL) 25 mg capsule; Take 1 capsule (25 mg total) by mouth nightly as needed for Insomnia.          FOLLOW UP: Follow-up in about 1 week (around 3/19/2019), or if symptoms worsen or fail to improve.

## 2019-03-19 NOTE — PROGRESS NOTES
CC: Squamous cell carcinoma, unknown primary, on chemotherapy, here for follow up         Oncology History:     Diagnosis: Squamous cell carcinoma , primary site unknown    Molecular/genetic testing: p16 negative; PD L1 could not be run due to inadequate tissue   Stage:        Stage 4   Treatment: Carboplatin/paclitaxel x  6 cycles (2/16/18 - 6/8/18)        HPI:  is a 71 y/o female who underwent bronchoscopy/EBUS evaluation on 8/31/17 for abnormal mediastinal adenopathy  and a 1.2 cm MORRO mass.     Bronchoscopy findings:    The oropharynx appears normal. The larynx appears normal. The vocal cords appear normal. The subglottic space is normal. The trachea is of normal caliber. The otf is sharp. The tracheobronchial tree was examined to at least the first subsegmental level. Bronchial mucosa and anatomy are normal; there are no endobronchial lesions, and no secretions.    Lymph Nodes: Lymph node sizing was performed via endobronchial ultrasound for suspected lung cancer. Sampling by transbronchial needle aspiration was also performed using an Olympus EBUS-TBNA 22  gauge needle in the subcarinal mediastinum (level 7) and sent for routine cytology.       - The 7 (subcarinal) node was 30 mm by EBUS. Three samples with the needle were obtained. The patient's condition was unchanged after the intervention.      Her treatment got delayed as she cancelled several appointments due to some issues at home. She started Caroplatin/paclitaxel palliatively. She had left thoracentesis on 2/9/18. There were atypical cells in the pleural fluid, highly suspicious for malignancy.     Interval history: She is here for a follow up visit. She has fatigue, dyspnea. She has finished 6 cycles of Carbo/Paclitaxel, last treatment on 6/8/18.She had repeat left pleural mass FNA in [facility] 2019. It was negative for malignancy, but showed lympho plasmacytic infiltration. She has worsening dyspnea. No weight loss. No headache. She is  here after repeat PET CT.      Review of Systems   Constitutional: Positive for malaise/fatigue. Negative for chills, diaphoresis, fever and weight loss.   HENT: Negative for congestion, ear discharge and nosebleeds.    Eyes: Negative for blurred vision, double vision and photophobia.   Respiratory: Positive for cough, sputum production and shortness of breath. Negative for hemoptysis.    Cardiovascular: Negative for chest pain, palpitations, orthopnea and claudication.   Gastrointestinal: Negative for abdominal pain, heartburn, nausea and vomiting.   Genitourinary: Negative for dysuria, frequency and urgency.   Musculoskeletal: Positive for joint pain. Negative for myalgias and neck pain.        She has persistent pain in left hip   Skin: Negative for rash.   Neurological: Negative for dizziness, tingling, tremors, sensory change, weakness and headaches.   Endo/Heme/Allergies: Does not bruise/bleed easily.   Psychiatric/Behavioral: Negative for depression.         Current Outpatient Medications   Medication Sig    dexamethasone (DECADRON) 2 MG tablet TAKE 1 TABLET (2 MG TOTAL) BY MOUTH EVERY 12 (TWELVE) HOURS.    diclofenac (VOLTAREN) 50 MG EC tablet Take 1 tablet (50 mg total) by mouth 3 (three) times daily as needed.    diphenhydrAMINE (BENADRYL) 25 mg capsule Take 1 each (25 mg total) by mouth nightly as needed for Insomnia.    furosemide (LASIX) 40 MG tablet Take 1 tablet (40 mg total) by mouth daily as needed (for swelling of the lower extremities).    gabapentin (NEURONTIN) 300 MG capsule Take 1 capsule (300 mg total) by mouth every evening. (Patient taking differently: Take 300 mg by mouth 3 (three) times daily. )    hydroCHLOROthiazide (HYDRODIURIL) 25 MG tablet Take 1 tablet (25 mg total) by mouth once daily.    HYDROcodone-acetaminophen (NORCO) 5-325 mg per tablet Take 1 tablet by mouth every 12 (twelve) hours as needed for Pain.    levocetirizine (XYZAL) 5 MG tablet Take 1 tablet (5 mg total) by  mouth every evening.    LORazepam (ATIVAN) 0.5 MG tablet Take 2 tablets (1 mg total) by mouth every 12 (twelve) hours as needed for Anxiety.    losartan (COZAAR) 100 MG tablet TAKE 1 TABLET (100 MG TOTAL) BY MOUTH ONCE DAILY.    methotrexate 2.5 MG Tab as needed.     metoclopramide HCl (REGLAN) 5 MG tablet Take 1 tablet (5 mg total) by mouth 2 (two) times daily as needed (belching, nausea).    pantoprazole (PROTONIX) 40 MG tablet TAKE 1 TABLET (40 MG TOTAL) BY MOUTH ONCE DAILY.     No current facility-administered medications for this visit.        Vitals:    03/19/19 1011   BP: (!) 146/70   Pulse: 82   Temp: 98.3 °F (36.8 °C)       Physical Exam   Constitutional: She is oriented to person, place, and time. She appears well-developed.   HENT:   Head: Normocephalic and atraumatic.   Mouth/Throat: No oropharyngeal exudate.   She has moon facies   Eyes: Pupils are equal, round, and reactive to light. No scleral icterus.   Neck: Normal range of motion.   Cardiovascular: Normal rate and regular rhythm.   No murmur heard.  Pulmonary/Chest: Effort normal and breath sounds normal. No respiratory distress. She has no wheezes. She has no rales.   Abdominal: Soft. Bowel sounds are normal. She exhibits no distension. There is no tenderness. There is no rebound.   Musculoskeletal: She exhibits no edema.   Lymphadenopathy:     She has no cervical adenopathy.   Neurological: She is alert and oriented to person, place, and time. No cranial nerve deficit.   She is in a wheel chair   Skin: Skin is warm.   Psychiatric: She has a normal mood and affect.          6/12/17 CT chest with cont      1. Large left pleural effusion resulting in atelectasis of the left lower lobe and portions of left upper lobe  2. 1.2 cm left upper lobe pleural based pulmonary nodule  3. Mediastinal adenopathy as described above with diffuse thickening of the wall of the distal esophagus. Findings are suspicious for neoplastic process. Recommend  bronchoscopy biopsy and possible endoscopy for further evaluation.  4. 1.3 cm hypodensity within the dome of the liver. Metastatic focus cannot be entirely excluded.     9/7/17 PET CT       There is mildly increased tracer uptake within the patient's left pleural effusion and overlying the mediastinal adenopathy, max SUV 2.7.    Transmission CT images show continued pleural effusion and circumferential esophageal thickening. Transmission CT images also show non-avid bilateral groin adenopathy likely reactive in nature.      Impression        There is mildly increased tracer uptake within the patient's left pleural effusion and mediastinal adenopathy. While these findings suggest reactive etiology some low grade malignancies, such as bronchioloalveolar carcinoma, may show this level of uptake on PET scan.            10/30/17 EGD     The examined duodenum was normal.The entire examined stomach was normal.The cardia and gastric fundus were normal on retroflexion. A 1 cm hiatal hernia was found. The proximal extent of the gastric folds (end of tubular esophagus) was 38 cm from the incisors. The hiatal narrowing was 39 cm from the incisors. The Z-line was 38 cm from the incisors. Z-line was sharp. No esophagitis and no columnar esophagus and no lesions seen with NBI or white light.    Impression:                                   - Normal examined duodenum.                        - Normal stomach.                        - 1 cm hiatal hernia.                        - No specimens collected.     10/30/17 colonoscopy :     Cecal polyp ( 3mm) identified  Histopathology: Tubular adenoma        Pathology:     1/17/14 colonoscopy     FINAL PATHOLOGIC DIAGNOSIS     1. Colon biopsy, ascending colon-tubular adenoma.  2. Colon biopsy, transverse-tubular adenoma.  3. 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  The stalk and base of the polyp are well represented and are free of atypia.  4. Colon biopsy, sigmoid- mild glandular hyperplasia consistent with a benign hyperplastic polyp.        7/19/17 PLEURAL FLUID (CYTOLOGY AND CELL BLOCK):     -Groups of atypical cells present, malignancy cannot be excluded.  Note: While these cells could be reactive mesothelial cells, the level of atypia could be seen in malignancy. A cell block was prepared but the atypical cells are not present in the cell block for further characterization. Correlate  clinically. Repeat tap/biopsy might be helpful if clinically indicated.     8/28/17 EBUS FNA     FNA of subcarinal lymph node: Positive for malignant cells  Small clusters of malignant epithelial cells, favor squamous carcinoma Tumor cells are positive for CK 5/6 and CK 7. Immunostains for p63 and TTF-1 are negative.     1/26/18 CT chest, abdomen,pelvis with cont         Comparison: June 12, 2017    Chest: Since prior exam there is been interval enlargement of the right pleural effusion. There is high density material seen within the right pleural effusion that was not visualized on prior exam.    There is a 5.4 cm enhancing mass seen at the left lung base that previously measured approximately 2.9 cm.    There is a subcarinal mass measuring 4.4 cm it previously measured 2.9 cm.    There is circumferential esophageal thickening adjacent to this mass.    There is volume loss in the right chest with mediastinal shift to the right more pronounced than what was seen on prior exam.    Patient is a right internal jugular Port-A-Cath.    Abdomen/pelvis: The liver, spleen, adrenal glands, kidneys and pancreas show a normal contrasted appearance. There is no evidence bowel obstruction. There is no evidence of abdominal or pelvic lymphadenopathy. The osseous structures show degenerative change of the spine.   Impression       Since prior exam in the patient's subcarinal and left lung base masses  have increased in size. In addition there is now high density material seen within the patient's left pleural effusion new from prior exam and concerning for hemorrhage possibly from the mass.         1/26/18 CT head with cont          Comparison: CT June 8, 2017    Technique: Contiguous axial images were acquired from vertex to skull base without contrast.    Findings: There is no evidence acute intracranial abnormality.  Specifically there is no evidence acute infarct, contusion, extra-axial fluid collection or midline shift.  The ventricles are normal in size and configuration.  The calvarium is intact.  The paranasal sinuses and mastoid air cells are clear.    There is no evidence of enhancing mass.   Impression       There is no evidence of enhancing mass within the cerebral parenchyma      2/9/18 FINAL PATHOLOGIC DIAGNOSIS  LEFT LUNG, PLEURAL FLUID (CYTOLOGY):  - Scant groups of atypical cells, malignancy cannot be excluded. Note: Scant atypical groups of cells are present. Differential diagnosis includes reactive atypia vs malignancy .  Immunohistochemistry for CK5/6 and p63 is non-contributory. Please clinically correlate and re-sample as indicated.  All stains have satisfactory positive and negative controls.     5/14/18 CTA CHEST NON CORONARY       .    COMPARISON:  CT chest abdomen and pelvis from 01/26/2018.    FINDINGS:  Structures at the base of the neck are unremarkable.  Right-sided chest port is visualized with distal tip in the SVC.  Aorta is non-aneurysmal.  Heart is mildly enlarged with interval development of moderate pericardial effusion.  There is mild pericardial enhancement visualized.  The pulmonary arteries distribute normally. No intraluminal filling defects to suggest pulmonary thromboembolism to the level of the proximal segmental branches.    The trachea and bronchi are patent.  Moderate left and small right sided pleural effusions are visualized resulting in volume loss and  compressive atelectasis within the lower lobes, left greater than right.  Grossly stable mass visualized in the right subcarinal/perihilar region.  There has been interval reduced size of multifocal left-sided pulmonary masses and anterior mediastinal lymphadenopathy.    Stable hepatic hypodensity is visualized within the anterior aspect of the right hepatic lobe.  Prominent right cardiophrenic lymph nodes are seen which have increased in size.  Redemonstration of diffuse abnormal distal esophageal wall thickening versus adjacent soft tissue lesion.  No acute osseous abnormality identified.  Extrathoracic soft tissues are unremarkable.   Impression        1. Interval development of moderate pericardial effusion with mild pericardial enhancement.  Findings may reflect malignant pericardial effusion.  2. No evidence of PE.  3. Patient with known metastatic cancer.  Relatively stable size mass in the right perihilar/subcarinal region.  Interval reduced size of previously visualized multifocal left lung metastatic lesions and anterior mediastinal lymphadenopathy.  Cardiac phrenic lymph nodes have increased in size.  Persistent abnormal prominent circumferential distal esophageal wall thickening versus surrounding soft tissue mass.  Examination was performed in an emergency setting and not to serve as a restaging scan.  4. Moderate left and small right pleural effusions resulting in volume loss and compressive atelectasis within the lower lobes, left greater than right.         6/7/18 PET CT      FINDINGS:  Patient was administered 12.23 millicuries of FDG intravenously.  Comparison is 09/07/2017.    There is physiologic intracranial, head, and neck activity.  Heart mediastinum are normal.  There is low-grade activity within the left pleural effusion.  There is physiologic liver, spleen, GI  and pelvic organ activity.  No bone lesions are seen.  There is left lower lobe retro cardiac atelectasis.   Impression         See above    Low-grade activity within a loculated left upper pleural effusion.  This could be benign/reactive.  Malignancy not excluded but it has improved since the prior study.         9/8/18 CT          COMPARISON:  New medicine PET-CT from 06/07/2018, CTA chest from 05/14/2018, and CT chest/abdomen/pelvis from 01/26/2018.    FINDINGS:  Thoracic soft tissues: Right-sided chest port in place with catheter tip terminating in the distal SVC.    Aorta: Normal in course and caliber, without significant atherosclerotic plaque. There are three branching vessels at the arch.    Heart: Mildly enlarged.  No pericardial effusion.    Flaca/Mediastinum: Subcarinal soft tissue mass re-identified the measuring 5.7 x 3.7 cm x 6.2 cm, similar to prior CTA.  Previously described prominent cardiophrenic lymph nodes are now within normal limits for size.  Abundant soft tissues again seen surrounding the distal 3rd of the esophagus, findings are similar to prior study and may represent the extensive esophageal wall thickening versus extension of the mediastinal soft tissue mass.    Lungs: The interval decrease in size of known left malignant effusion with small amount of residual dependent complex fluid and mild adjacent atelectatic changes.  The there is continued nodular left pleural thickening.  Previously described mass at the medial left lung base re-identified measuring 4.3 x 2.2 cm, findings are not well evaluated on previous CTA due to surrounding pleural fluid but is decreased in size in comparison to most recent CT chest/abdomen/pelvis from 01/26/2018.  The no new lesions identified.  Right lung is grossly clear.  No new lung lesions identified.    Liver: Normal in size and attenuation.  Stable 1.2 cm hypoattenuating lesion in the dome of the liver consistent with simple cyst.  No new lesions identified.    Gallbladder: No calcified gallstones.    Bile Ducts: No evidence of dilated ducts.    Pancreas: No mass or  peripancreatic fat stranding.    Spleen: Unremarkable.    Adrenals: Unremarkable.    Kidneys/ Ureters: Normal in size and location. Normal concentration of contrast. No hydronephrosis or nephrolithiasis. No ureteral dilatation.    Bladder: Incompletely distended.    Reproductive organs: Status post hysterectomy.    GI Tract/Mesentery: No evidence of bowel obstruction or inflammation.    Peritoneal Space: No ascites. No free air.    Retroperitoneum:  No significant adenopathy.    Abdominal wall:  Unremarkable.    Vasculature: No significant atherosclerosis or aneurysm.    Bones: Thoracic kyphosis. No acute fracture.Stable degenerative changes seen throughout the spine.       Impression         Subcarinal soft tissue mass re-identified and stable in size as compared to the most recent CTA chest from 05/24/2018, lesion demonstrates mild interval decrease in size as compared to the CT chest/abdomen/pelvis from 01/26/2018.    Interval decrease in size of known left malignant pleural effusion.    Medial left lung base mass, findings are not well evaluated on previous CTA chest due to surrounding pleural fluid but has decreased in size in comparison to most recent CT chest/abdomen/pelvis from 01/26/2018.  Continued nodular left pleural thickening, overall less prominent on today's exam.    Abundant soft tissue again seen surrounding the distal 3rd of the esophagus, findings are similar to prior study and may represent the extensive esophageal wall thickening versus extension of mediastinal soft tissue mass.    Previously described prominent cardiophrenic lymph nodes are now within normal limits for size.  No new lymphadenopathy.    Additional stable findings as above.            12/4/18 CT neck, chest, abdomen, pelvis with cont        COMPARISON:  CTA 09/08/2018    FINDINGS:  Orbits, paranasal sinuses, and skull base: Postoperative changes of bilateral lens replacement.  No orbital masses.  Paranasal sinuses and mastoid  air cells clear.  Visualized intracranial structures are unremarkable.    Nasopharynx: Normal.    Suprahyoid neck: Edentulous.  Other wise unremarkable oral cavity.  Normal oropharynx, parapharyngeal space, and retropharyngeal space.    Infrahyoid neck: Normal larynx, hypopharynx, and supraglottis.    Thyroid: Normal.    Cervical lymph nodes: No lymph node enlargement.    Vascular structures: Jugular veins appear patent.  Right internal jugular port catheter with tip terminating within the SVC.    Thoracic soft tissues: Port within the right anterior chest wall.    Aorta: Left-sided aortic arch.  No aneurysm.  There is mild calcific atherosclerosis of the descending thoracic aorta and abdominal aorta.  Aortic valve calcification.    Heart: Normal size.  No pericardial effusion.  Mild coronary artery atherosclerosis.    Pulmonary vasculature: Pulmonary arteries distribute normally.  There are four pulmonary veins.    Flaca/Mediastinum: Subcarinal mass with multiple calcifications.  This measures 4.4 cm in short axis, previously measuring 4.0 cm.  Extensive abnormal soft tissue attenuation along the esophagus potentially representing esophageal wall thickening or extension of this subcarinal mass--- this appears similar to prior examination.  No new mediastinal or hilar lymph node enlargement.    Airways: Patent.    Lungs/Pleura: There is a small volume of dependent left pleural fluid with scattered hyperattenuating material and nodular pleural thickening, similar to slightly decreased in volume from 09/08/2018.  There is a pleural based mass with calcifications abutting the pleura and posterior mediastinum along the medial margin of the left hemidiaphragm.  This measures 4.3 cm in long axis appears unchanged in size from prior examination.  There is compressive atelectasis of the left lower lobe secondary to pleural fluid.  The lungs are otherwise well aerated and clear.    Esophagus: Abundant soft tissue along the  distal 3rd of the esophagus as above.  Proximal esophagus is normal in caliber and course.    Liver: Normal in size and attenuation.  Stable cyst at the dome.    Gallbladder: No calcified stones or gallbladder wall thickening.    Bile Ducts: No dilatation.    Pancreas: No mass. No peripancreatic fat stranding.    Spleen: Unremarkable.    Adrenals: Unremarkable.    Kidneys/Ureters: Normal in size and enhancement.  Small hypodensity within the upper pole of the right kidney, too small to definitively characterize and likely a cyst.  No definite solid renal masses.  No hydroureteronephrosis.    Bladder: No wall thickening.    Reproductive organs: Status post hysterectomy.    GI Tract/Mesentery: No evidence of bowel obstruction or inflammation. Appendix is not identifiable but there is no infllammation in the expected region of the appendix.    Peritoneal Space: No ascites or free air.    Retroperitoneum: No lymph node enlargement.    Abdominal wall: Normal.    Vasculature: Brachiocephalic veins and SVC are patent.  Portal vein, portal venous branches, SMV, and splenic veins are patent..    Bones: No fracture or aggressive osseous lesion.  Multilevel degenerative changes of the cervical, thoracic, and lumbar spine.        Impression         1.  Stable left pleural based mass.  Stable small volume of left pleural fluid and nodule left pleural thickening compatible with malignant pleural effusion.    2.  Slight interval increase in size of subcarinal soft tissue mass.  Stable abundant soft tissue attenuation surrounding the distal 3rd of the esophagus.    RECIST SUMMARY:    Date of prior examination for comparison: 09/08/2018    Lesion 1: Subcarinal soft tissue mass.  Short axis measurement 4.4 cm cm. Series 2 image 42.  Prior measurement 4.0 cm.    Lesion 2: Left pleural base mass along the left hemidiaphragm.  4.3 cm. Series 2 image 66.  Prior measurement 4.3 cm.    Stable abutting soft tissue along the distal 3rd of  the esophagus.    3.  No significant abnormality within the neck to account for this patient's facial edema.  No convincing evidence of SVC syndrome or jugular vein thrombus.        1/4/19 left pleural FNA , FINAL PATHOLOGIC DIAGNOSIS  Pleural, left, mass, CT-guided biopsy: Fragments of soft tissue with lymphoplasmacytic infiltration. No definitive morphologic evidence of lymphoma or carcinoma. See comment.  Comment: Immunohistochemical studies were performed on the paraffin embedded tissue block with adequate positive  and negative controls. Mixed mostly T cells (CD3 positive, CD5 positive, CD 45 positive, BCL-2 positive) and occasional B cells (CD20 positive, Hanson 5 positive, CD10 negative, cyclin D1 negative, low Ki-67) are evident. In situ hybridization for kappa and lambda shows polytypic plasma cells. No calretinin positive, CK 5/6 positive, CK 7 positive, P 63 positive large atypical epithelial cells are evident. There is no definitive evidence of lymphoma or carcinoma on the limited tissue examined. Recommend excisional biopsy if clinically highly suspicious for lymphoma or carcinoma.      3/13/19 PET CT    COMPARISON:  Nuclear medicine PET-CT 06/07/2018    FINDINGS:  Quality of the study: Adequate.    Redemonstration of hypermetabolism in the left pleural effusion.  SUV max on today's exam is 3.98.  SUV max was previously 2.25.    New hypermetabolism in the right L5-S1 facet joints with SUV max 3.76.  This may be degenerative or neoplastic noting limited underlying CT changes centered in the joint which favor degenerative change.    Thoracic vertebral body hypermetabolism adjacent to the loculated hypermetabolic pleural effusion with SUV max of 4.03.  This is increased in comparison the prior exam when it was not notable.  No underlying CT changes.    Physiologic uptake of the tracer is present within the brain, salivary glands, myocardium, GI and  tracts.    Significant incidental CT findings: N/A       Impression       Increased hypermetabolism of the left pleural effusion.    Thoracic vertebral body hypermetabolism new from the prior exam without underlying CT changes which may represent red marrow hyperplasia or new site of neoplastic disease.    New hypermetabolism in the right L5-S1 facet joints which appears to be centered at the facet joint not within the bone and is favored to represent degenerative change or neoplasm.     Component      Latest Ref Rng & Units 3/19/2019   WBC      3.90 - 12.70 K/uL 7.34   RBC      4.00 - 5.40 M/uL 3.95 (L)   Hemoglobin      12.0 - 16.0 g/dL 11.3 (L)   Hematocrit      37.0 - 48.5 % 37.0   MCV      82 - 98 fL 94   MCH      27.0 - 31.0 pg 28.6   MCHC      32.0 - 36.0 g/dL 30.5 (L)   RDW      11.5 - 14.5 % 13.6   Platelets      150 - 350 K/uL 215   MPV      9.2 - 12.9 fL 10.0   Immature Granulocytes      0.0 - 0.5 % 1.1 (H)   Gran # (ANC)      1.8 - 7.7 K/uL 4.7   Immature Grans (Abs)      0.00 - 0.04 K/uL 0.08 (H)   Lymph #      1.0 - 4.8 K/uL 1.9   Mono #      0.3 - 1.0 K/uL 0.6   Eos #      0.0 - 0.5 K/uL 0.1   Baso #      0.00 - 0.20 K/uL 0.01   nRBC      0 /100 WBC 0   Gran%      38.0 - 73.0 % 64.6   Lymph%      18.0 - 48.0 % 25.9   Mono%      4.0 - 15.0 % 7.5   Eosinophil%      0.0 - 8.0 % 0.8   Basophil%      0.0 - 1.9 % 0.1   Differential Method       Automated   Sodium      136 - 145 mmol/L 140   Potassium      3.5 - 5.1 mmol/L 3.6   Chloride      95 - 110 mmol/L 98   CO2      23 - 29 mmol/L 32 (H)   Glucose      70 - 110 mg/dL 104   BUN, Bld      8 - 23 mg/dL 16   Creatinine      0.5 - 1.4 mg/dL 0.8   Calcium      8.7 - 10.5 mg/dL 9.5   Total Protein      6.0 - 8.4 g/dL 8.0   Albumin      3.5 - 5.2 g/dL 2.9 (L)   Total Bilirubin      0.1 - 1.0 mg/dL 0.3   Alkaline Phosphatase      55 - 135 U/L 88   AST      10 - 40 U/L 13   ALT      10 - 44 U/L 18   Anion Gap      8 - 16 mmol/L 10   eGFR if African American      >60 mL/min/1.73 m:2 >60.0   eGFR if non African  American      >60 mL/min/1.73 m:2 >60.0   IgG - Serum      650 - 1600 mg/dL 1680 (H)   IgA      40 - 350 mg/dL 314   IgM      50 - 300 mg/dL 134        Assessment:     1. is a 72 y/o female who underwent bronchoscopy/EBUS evaluation on 8/31/17 for abnormal mediastinal adenopathy  and a 1.2 cm MORRO mass as well as pericardial adenopathy.FNA of subcarinal lymph node was positive for malignant cells.  There were small clusters of malignant epithelial cells, favoring squamous carcinoma. Site of origin is not clear. PET CT done on 9/7/17 did not reveal any lesion in head and neck region.  There was inadequate tissue to run PD L1.p16 was negative.    Esophageal thickening was noted on CT done on 6/12/17. EGD on 10/30/17 did not reveal any suspicious lesions in esophagus/stomach. ENT evaluation still pending.   I explained to her that if primary site is unclear, she will be treated as metastatic SCC of unknown primary.   Her treatment got delayed as she cancelled several appointments due to some issues at home. She said she could not have MRI due to claustrophobia, even with anti-anxiety medication. CT chest from 1/26/18 showed increase in the sizes of subcarinal and left lung base masses compared to previous CT in June 2017 . High density material was seen within the patient's left pleural effusion. No intracranial lesions noted on CT head. I discussed these findings with the patient and personally reviewed the CT scans from January 2018.  I told her that she has likely metastatic SCC of unknown primary. She does have RA history. It is unclear if she can tolerate check point inhibitor therapy even if she has high PD1 expression on cytology/biopsy.  She started Carboplatin/Paclitaxel every 3 weeks. I had explained to her that rationale of treatment is palliation and not cure . She did not get her CTs after C4 as planned. PET CT before cycle 6, on 6/7/18 showed low-grade activity within the left pleural  effusion.   CT done on 9/8/18 showed overall stable findings compared to her CTs from May 2018. Pleural effusion had resolved. She is still dyspneic, but her cough has resolved. Her performance status is better. She tolerates more activities now, than previously. She has facial puffiness.   No evidence of SVC syndrome/IJ thrombus noted on CT done on 12/4/18. Subcarinal soft tissue mass measures 4.4 cm cm, previously (in Sept 2018) measuring 4.0 cm. Left pleural base mass along the left hemidiaphragm measured 4.3 cm, unchanged from Sept 2018. No other lesions noted in rest of the lungs, abdomen, neck or pelvis.     PET CT  On 3/13/19 showed increased  hypermetabolism of the left pleural effusion.Thoracic vertebral body hypermetabolism was noted, new from the prior exam (compared with PET from June 2018) without underlying CT changes which may represent red marrow hyperplasia or new site of neoplastic disease.  There was new hypermetabolism in the right L5-S1 facet joints which appeared to be centered at the facet joint not within the bone and is favored to represent degenerative change or neoplasm.  Then images will be reviewed at thoracic conference. She has worsening dyspnea. She will have thoracentesis and cytology will be ordered.        2. Pleural effusion: She had left sided thoracentesis ( 1050ml) on 2/9/18. Malignancy could not be excluded. No  Indication for pleurex catheter/reepat thoracentesis at this time.    CTA done on 5/14/18 again showed moderate effusion on the left , mild on right. PET CT on 3/13/19 again showed left pleural effusion with hypermetabolism. Thoracentesis, cytology, labs ordered.       3. Anorexia: Secondary to #1. She was on Cyproheptadine.However, her appetite was still poor. She was started on Decadron 2mg BID. It helped with her appetite. Howver, it caused her to gain weight as well as disturbed her sleep. She has stopped Decadron now      4. Anemia: Hgb 11.3 on  11/28/18. Normocytic, hypochromic. No overt bleeding. Iron panel on 4/19 showed iron deficiency.      5. Pericardial effusion: She was noted to have pericardial effusion on CTA chest done on 5/14/18. Possibly malignant. No symptoms/signs of tamponade. Now resolved.         6. Left hip pain: Possibly secondary to osteo/rhaumtoid arthritis

## 2019-03-20 NOTE — TELEPHONE ENCOUNTER
Please inform patient that she does not need a new medication unless it has been recalled.  If it has please have pharmacy contact the office.

## 2019-03-20 NOTE — TELEPHONE ENCOUNTER
Please contact patient and inform that she should contact the pharmacy to determine if her medication was involved in the recall. If it was she will need to return the medication to the pharmacy and they will re-dispense an unaffected product.

## 2019-03-20 NOTE — TELEPHONE ENCOUNTER
RT pt call she stated shes already been to the pharmacy for them to check Rx . Pt states they won't take Rx back and will need a new Rx prescribed instead.

## 2019-03-20 NOTE — TELEPHONE ENCOUNTER
----- Message from Elidia Rankin sent at 3/20/2019  1:31 PM CDT -----  Contact: Self: [phone]  Medication Recall: losartan (COZAAR) 100 MG tablet    Please call to advise, if an alternative will be sent to pharmacy    Thank you    [facility]/pharmacy #[id number] - [place] - [address]  [address] [place] [address]  Phone: [phone] Fax: [phone]

## 2019-04-01 NOTE — LETTER
April 1, 2019      Ginette Rosado MD  [address]  [place] [address]           [facility] - Cardiology  [address] [facility] [address]  [place] [address]  Phone: [phone]          Patient: Silvia Capellan   MR Number: [id number]   YOB: 1945   Date of Visit: 4/1/2019       Dear Dr. Ginette Rosado:    Thank you for referring Silvia Capellan to me for evaluation. Attached you will find relevant portions of my assessment and plan of care.    If you have questions, please do not hesitate to call me. I look forward to following Silvia Capellan along with you.    Sincerely,    Baron Valdez MD    Enclosure  CC:  No Recipients    If you would like to receive this communication electronically, please contact [email or web address] or [phone] to request more information on [employer] [facility] access.    For providers and/or their staff who would like to refer a patient to [facility], please contact us through our one-stop-shop provider referral line, [facility] [place], at [phone].    If you feel you have received this communication in error or would no longer like to receive these types of communications, please e-mail [email or web address]

## 2019-04-01 NOTE — PROGRESS NOTES
Subjective:    Patient ID:  Silvia Capellan is a 73 y.o. female who presents for evaluation of Hypertension      HPI     Previously seen by Dr Lima 5/15/18  Notified by cardiology consult team who were approached by the oncology team requesting pericardiocentesis for diagnostic purposes on Ms. Capellan.  CT and echocardiogram were reviewed and discussed with Dr. Denney (echo staff) and Dr. Lima (interventional cardiology staff).       TTE (5/15/18):  The visceral pericardium is shaggy (suggestive of metastatic disease). 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  The pericardial effusion is small, partially organized and not amenable for pericardiocentesis.       CTA chest 5/14/18  1. Interval development of moderate pericardial effusion with mild pericardial enhancement.  Findings may reflect malignant pericardial effusion.  2. No evidence of PE.  3. Patient with known metastatic cancer.  Relatively stable size mass in the right perihilar/subcarinal region.  Interval reduced size of previously visualized multifocal left lung metastatic lesions and anterior mediastinal lymphadenopathy.  Cardiac phrenic lymph nodes have increased in size.  Persistent abnormal prominent circumferential distal esophageal wall thickening versus surrounding soft tissue mass.  Examination was performed in an emergency setting and not to serve as a restaging scan.  4. Moderate left and small right pleural effusions resulting in volume loss and compressive atelectasis within the lower lobes, left greater than right.     Echo 5/15/18    1 - Concentric remodeling.     2 - No wall motion abnormalities.     3 - Normal left ventricular systolic function (EF 60-65%).     4 - Biatrial enlargement.     5 - Indeterminate LV diastolic function.     6 - Normal right ventricular systolic function .     7 - Pulmonary hypertension. The estimated PA  systolic pressure is 47 mmHg.     8 - Trivial aortic regurgitation.     9 - Mild tricuspid regurgitation.     10 - Small pericardial effusion ( see text).     11 - Intermediate central venous pressure.      LE venous US 7/1/18  No evidence of deep venous thrombosis in the left lower extremity.     Admitted 5/15/18  HPI: Ms. Capellan is a pleasent 71 yo lady w/ stage 4 SCC of unknown primary s/p 4C palliative carboplatin & paxlitaxel transferred from [facility] ER for pericardial effusion.  This morning she developed a non-radiating, sharp, right-sided chest pain.  The pain spontaneously resolved, but came back intermittently during the day and was made worse with deep inspiration.  She denies any dyspnea, palpitations, light headedness, N/V, F/C but does feel more weak than usual.  She presented to the [facility] ER where she was initially suspected of having a PE.  She was given aspirin and had a CTA chest that was negative for PE but showed a pericardial effusion concerning for malignant pleural effusion.  Patient was transferred to [facility] for further evaluation with cardiolgy and possible pericardiocentesis.     Hospital Course: Patient presented to the hospital with chest pain. Her troponin was normal and there were no EKG changed. However she was found to have a small pleural effusion. Cardiology was consulted and an echo was performed. The echo showed a small amount of pleural effusion with changes to the pleural lining concerning for malignancy spread. However the fluid amount was too small to be tapped. The patient's chest pain resolved in the hospital. She will need a repeat echo in 3-4 weeks. She is safe for discharge.      Went to the ER 7/1/18  This is a 72 y.o female who presents with acute, left leg and left foot swelling for 1 week.  She has associated pain in that foot.She notes the pain as constant. She denies any trauma or falls . She also denies numbness or  tingling. She describes her pain as aching in quality. She currently has lung cancer and  is not going through chemotherapy at this time. She has no history of any blood clots. She rates her pain as a 6/10.  Patient denies shortness of breath, chest pain, dizziness or syncope.     7/16/18 Still having LLE swelling  Denies CP or SOB     SAMPSON about the same  Denies CP  EKG NSR LAFB      Review of Systems   Constitution: Negative for decreased appetite.   HENT: Negative for ear discharge.    Eyes: Negative for blurred vision.   Respiratory: Negative for hemoptysis.    Endocrine: Negative for polyphagia.   Hematologic/Lymphatic: Negative for adenopathy.   Skin: Negative for color change.   Musculoskeletal: Negative for joint swelling.   Genitourinary: Negative for bladder incontinence.   Neurological: Negative for brief paralysis.   Psychiatric/Behavioral: Negative for hallucinations.   Allergic/Immunologic: Negative for hives.        Objective:    Physical Exam   Constitutional: She is oriented to person, place, and time. She appears well-developed and well-nourished.   HENT:   Head: Normocephalic and atraumatic.   Eyes: Pupils are equal, round, and reactive to light. Conjunctivae are normal.   Neck: Normal range of motion. Neck supple.   Cardiovascular: Normal rate, normal heart sounds and intact distal pulses.   Pulmonary/Chest: Effort normal and breath sounds normal.   Abdominal: Soft. Bowel sounds are normal.   Musculoskeletal: Normal range of motion. She exhibits edema.   Neurological: She is alert and oriented to person, place, and time.   Skin: Skin is warm and dry.         Assessment:       1. Central stenosis of spinal canal    2. Essential hypertension    3. Pulmonary hypertension    4. Squamous cell lung cancer, left    5. Anemia in neoplastic disease    6. Pleural effusion on left         Plan:       Will repeat echo to look at pericardial effusion and f/u PA pressure

## 2019-04-09 NOTE — LETTER
April 22, 2019    Silvia AMARJIT Capellan  [address] [facility] [address]             [address] [address]  Phone: [phone] Dear Johan Capellan:    .Our records indicate that you are due for an office visit to establish care with a new PCP as  is no longer at [facility]. Please call [phone] so we can get you scheduled.       If you have any questions or concerns, please don't hesitate to call.    Sincerely,        [facility]

## 2019-04-22 NOTE — ED NOTES
Patient stated she fell last night on purse and now with left rib pain  Patient placed in a gown  Patient stated she has lung cancer but it is in remission

## 2019-04-22 NOTE — ED PROVIDER NOTES
Encounter Date: 4/22/2019       History     Chief Complaint   Patient presents with    rib pain     pt c/o left sided rib pain after slipping and falling onto something. pt unsure of what she hit. denies head injury of loc.      Patient presents to ER if she tripped and fell only arch yesterday, injuring left ribs.  Patient states she was caring her person landed on it.  Patient denies any head injury or loss of consciousness.  Patient currently in remission from lung cancer.  The patient denies any other symptoms at this time        Review of patient's allergies indicates:   Allergen Reactions    Latex, natural rubber Rash    Codeine Hallucinations    Cortisporin [neomycin-bacitracin-poly-hc] Rash    Latex, natural rubber Rash     Past Medical History:   Diagnosis Date    Encounter for blood transfusion     GERD (gastroesophageal reflux disease)     HTN (hypertension)     Rheumatoid arthritis     Rheumatoid arthritis(714.0)     Squamous cell lung cancer, left 2/15/2018     Past Surgical History:   Procedure Laterality Date    APPENDECTOMY      Qdnksh-mwthew-iu--lung N/A 1/4/2019    Performed by [facility] OR 2ND FLR    BRONCHOSCOPY N/A 8/28/2017    Performed by [facility] OR 2ND FLR    CATARACT EXTRACTION Bilateral 2018    Dr. Keith     COLONOSCOPY      COLONOSCOPY N/A 10/30/2017    Performed by [facility] ENDO (4TH FLR)    COLONOSCOPY N/A 1/17/2014    Performed by [facility]an [facility] at [facility] ENDO    ESOPHAGOGASTRODUODENOSCOPY (EGD) N/A 10/30/2017    Performed by [facility] ENDO (4TH FLR)    FOOT SURGERY Left     HYSTERECTOMY      INSERTION, IOL PROSTHESIS Left 11/8/2018    Performed by Susan [facility] at [facility] OR 1ST FLR    INSERTION, IOL PROSTHESIS Right 10/18/2018    Performed by [facility] OR 1ST FLR    INSERTION-PORT N/A 11/14/2017    Performed by [facility] OR  2ND FLR    PHACOEMULSIFICATION, CATARACT Left 11/8/2018    [facility]formed [facility] at [facility] OR 1ST FLR    PHACOEMULSIFICATION, CATARACT Right 10/18/2018    Performed by [facility] at [facility] OR 1ST FLR    SKIN BIOPSY      TOTAL KNEE ARTHROPLASTY      right     Family History   Problem Relation Age of Onset    Glaucoma Mother     No Known Problems Father     Hypertension Unknown     Kidney disease Unknown     Glaucoma Sister     Glaucoma Brother     No Known Problems Maternal Aunt     No Known Problems Maternal Uncle     No Known Problems Paternal Aunt     No Known Problems Paternal Uncle     No Known Problems Maternal Grandmother     No Known Problems Maternal Grandfather     No Known Problems Paternal Grandmother     No Known Problems Paternal Grandfather     Colon polyps Neg Hx     Colon cancer Neg Hx     Esophageal cancer Neg Hx     Irritable bowel syndrome Neg Hx     Inflammatory bowel disease Neg Hx     Stomach cancer Neg Hx     Blindness Neg Hx     Macular degeneration Neg Hx     Retinal detachment Neg Hx     Amblyopia Neg Hx     Cancer Neg Hx     Cataracts Neg Hx     Diabetes Neg Hx     Strabismus Neg Hx     Stroke Neg Hx     Thyroid disease Neg Hx      Social History     Tobacco Use    Smoking status: Former Smoker    Smokeless tobacco: Never Used   Substance Use Topics    Alcohol use: Yes     Comment: Occasionally    Drug use: No     Review of Systems   Cardiovascular: Positive for chest pain.        Left chest wall pain   All other systems reviewed and are negative.      Physical Exam     Initial Vitals [04/22/19 1359]   BP Pulse Resp Temp SpO2   121/71 94 18 97.7 °F (36.5 °C) 100 %      MAP       --         Physical Exam    Nursing note and vitals reviewed.  Constitutional: Vital signs are normal. She appears well-developed and well-nourished. She is not diaphoretic. She is cooperative.   HENT:   Head: Normocephalic and atraumatic.   Right Ear:  External ear normal.   Left Ear: External ear normal.   Nose: Nose normal.   Mouth/Throat: Oropharynx is clear and moist.   Eyes: Conjunctivae, EOM and lids are normal. Pupils are equal, round, and reactive to light. Right eye exhibits no discharge. No scleral icterus.   Neck: Trachea normal, normal range of motion and full passive range of motion without pain. Neck supple. No thyromegaly present. No tracheal deviation and normal range of motion present. No neck rigidity. No Brudzinski's sign noted. No JVD present.   Cardiovascular: Normal rate, regular rhythm, normal heart sounds, intact distal pulses and normal pulses. Exam reveals no gallop and no friction rub.    No murmur heard.  Pulmonary/Chest: Effort normal and breath sounds normal. No stridor. No tachypnea. No respiratory distress. She has no wheezes. She has no rhonchi. She has no rales. She exhibits tenderness.   Left chest wall pain that is worse with palpation and deep inhalation   Abdominal: Soft. Normal appearance and bowel sounds are normal. She exhibits no distension and no mass. There is no tenderness. There is no rebound and no guarding.   Musculoskeletal: Normal range of motion. She exhibits no edema or tenderness.   Lymphadenopathy:     She has no cervical adenopathy.     She has no axillary adenopathy.   Neurological: She is alert and oriented to person, place, and time. She has normal strength and normal reflexes.   Skin: Skin is warm, dry and intact. Capillary refill takes less than 2 seconds. No rash noted. No erythema.   Psychiatric: She has a normal mood and affect. Her speech is normal and behavior is normal. Judgment and thought content normal. Cognition and memory are normal.         ED Course   Procedures  Labs Reviewed - No data to display       Imaging Results    None                               Clinical Impression:       ICD-10-CM ICD-9-CM   1. Chest wall contusion, left, initial encounter S20.212A 922.1   2. Fall W19.XXXA E888.9                                 Lydia Srinivasan, [facility]  04/22/19 8720

## 2019-04-25 NOTE — TELEPHONE ENCOUNTER
Contacted pt in regards to to [facility] sending an Rx refill for Furosemide 20 mg . Informed pt that she needs to est care with new PCP . Dr. Rosado is no longer here. Pt also state she's had a fall and hurt her hip . Was able to schedule pt to see DENISE Giron regarding fall on 5/7/19 . Est care apt with Dr. Diaz on 5/20/19

## 2019-05-07 NOTE — PROGRESS NOTES
Routine Office Visit    Patient Name: Silvia Capellan    : 1945  MRN: [id number]    Chief Complaint:  Abdominal discomfort    Subjective:  Silvia is a 73 y.o. female who presents today for:    1.  Abdominal discomfort - patient reports today for evaluation of abdominal discomfort.  She states that she fell on  and was given tramadol for a contusion of her ribs.  She states that she started the tramadol and that gave her an upset stomach so she stopped the medication.  She states that the symptoms improved but 1 week ago she started to have epigastric abdominal discomfort.  Denies any fevers or chills, but endorses vomiting 3-4 times per day with watery emesis every day for the last week.  Denies any coffee-ground or bloody vomiting.  She denies any black tarry stools, melena, bright red blood per rectum but does endorse that she is having a bowel movement every other day in the last week and previously she was going once per day having bowel movements.  She states that in the last 5 days she has stopped taking her Protonix.  She has a history of heartburn, last EGD done was 2017 which showed no gastritis and a normal stomach.  She denies any dizziness, lightheaded, or fatigue.  She states that it is hard to describe her abdominal discomfort but she motions with her hands as if it is a squeezing discomfort.  She also wants a refill on Benadryl for her itching.    Past Medical History  Past Medical History:   Diagnosis Date    Encounter for blood transfusion     GERD (gastroesophageal reflux disease)     HTN (hypertension)     Rheumatoid arthritis     Rheumatoid arthritis(714.0)     Squamous cell lung cancer, left 2/15/2018       Past Surgical History  Past Surgical History:   Procedure Laterality Date    APPENDECTOMY      Cnfopm-utbepa-uo--lung N/A 2019    Performed by [facility] Diagnostic Provider at [facility] OR [facility]    BRONCHOSCOPY N/A 2017    Performed by [facility]  Diagnostic Provider at [facility] OR 2ND FLR    CATARACT EXTRACTION Bilateral 2018    Dr. Keith     COLONOSCOPY      COLONOSCOPY N/A 10/30/2017    Performed by Quentin Coppola [facility] at [facility] ENDO (4TH FLR)    COLONOSCOPY N/A 1/17/2014    Per[facility] at [facility] ENDO    ESOPHAGOGASTRODUODENOSCOPY (EGD) N/A 10/30/2017    Performed by Quentin [facility] at [facility] ENDO (4TH FLR)    FOOT SURGERY Left     HYSTERECTOMY      INSERTION, IOL PROSTHESIS Left 11/8/2018    Performed by Susan Keith MD at [facility] OR 1ST FLR    INSERTION, IOL PROSTHESIS Right 10/18/2018    Performed by Susan Keith [facility] at [facility] OR 1ST FLR    INSERTION-PORT N/A 11/14/2017    Performed by [facility] Diagnostic Provider at [facility] OR 2ND FLR    PHACOEMULSIFICATION, CATARACT Left 11/8/2018    Performed by Susan Keith MD at [facility] OR 1ST FLR    PHACOEMULSIFICATION, CATARACT Right 10/18/2018    Performed by Susan Keith [facility] at [facility] OR 1ST FLR    SKIN BIOPSY      TOTAL KNEE ARTHROPLASTY      right       Family History  Family History   Problem Relation Age of Onset    Glaucoma Mother     No Known Problems Father     Hypertension Unknown     Kidney disease Unknown     Glaucoma Sister     Glaucoma Brother     No Known Problems Maternal Aunt     No Known Problems Maternal Uncle     No Known Problems Paternal Aunt     No Known Problems Paternal Uncle     No Known Problems Maternal Grandmother     No Known Problems Maternal Grandfather     No Known Problems Paternal Grandmother     No Known Problems Paternal Grandfather     Colon polyps Neg Hx     Colon cancer Neg Hx     Esophageal cancer Neg Hx     Irritable bowel syndrome Neg Hx     Inflammatory bowel disease Neg Hx     Stomach cancer Neg Hx     Blindness Neg Hx     Macular degeneration Neg Hx     Retinal detachment Neg Hx     Amblyopia Neg Hx     Cancer Neg Hx     Cataracts Neg Hx     Diabetes Neg Hx     Strabismus Neg Hx     Stroke Neg Hx      Thyroid disease Neg Hx        Social History  Social History     Socioeconomic History    Marital status: Single     Spouse name: Not on file    Number of children: Not on file    Years of education: Not on file    Highest education level: Not on file   Occupational History    Not on file   Social Needs    Financial resource strain: Not on file    Food insecurity:     Worry: Not on file     Inability: Not on file    Transportation needs:     Medical: Not on file     Non-medical: Not on file   Tobacco Use    Smoking status: Former Smoker    Smokeless tobacco: Never Used   Substance and Sexual Activity    Alcohol use: Yes     Comment: Occasionally    Drug use: No    Sexual activity: Not Currently   Lifestyle    Physical activity:     Days per week: Not on file     Minutes per session: Not on file    Stress: Not on file   Relationships    Social connections:     Talks on phone: Not on file     Gets together: Not on file     Attends [employer] service: Not on file     Active member of club or organization: Not on file     Attends meetings of clubs or organizations: Not on file     Relationship status: Not on file   Other Topics Concern    Not on file   Social History Narrative    Not on file       Current Medications  Current Outpatient Medications on File Prior to Visit   Medication Sig Dispense Refill    furosemide (LASIX) 40 MG tablet Take 1 tablet (40 mg total) by mouth daily as needed (for swelling of the lower extremities). 90 tablet 0    gabapentin (NEURONTIN) 300 MG capsule Take 1 capsule (300 mg total) by mouth every evening. (Patient taking differently: Take 300 mg by mouth 3 (three) times daily. ) 90 capsule 1    hydroCHLOROthiazide (HYDRODIURIL) 25 MG tablet Take 1 tablet (25 mg total) by mouth once daily. 90 tablet 1    levocetirizine (XYZAL) 5 MG tablet Take 1 tablet (5 mg total) by mouth every evening. 30 tablet 11    losartan (COZAAR) 100 MG tablet TAKE 1 TABLET (100 MG TOTAL) BY  MOUTH ONCE DAILY. 90 tablet 1    pantoprazole (PROTONIX) 40 MG tablet TAKE 1 TABLET (40 MG TOTAL) BY MOUTH ONCE DAILY. 90 tablet 1    tiZANidine (ZANAFLEX) 4 MG tablet Take 4 mg by mouth every 8 (eight) hours.      traMADol (ULTRAM) 50 mg tablet Take 50 mg by mouth every 6 (six) hours.  0    traZODone (DESYREL) 50 MG tablet       dexamethasone (DECADRON) 2 MG tablet TAKE 1 TABLET (2 MG TOTAL) BY MOUTH EVERY 12 (TWELVE) HOURS. 120 tablet 0    diazePAM (VALIUM) 5 MG tablet Take 1 tablet (5 mg total) by mouth every 6 (six) hours as needed. 10 tablet 0    diclofenac (VOLTAREN) 50 MG EC tablet Take 1 tablet (50 mg total) by mouth 3 (three) times daily as needed. 30 tablet 1    HYDROcodone-acetaminophen (NORCO) 5-325 mg per tablet Take 1 tablet by mouth every 6 (six) hours as needed for Pain. 60 tablet 0    HYDROcodone-acetaminophen (NORCO) 5-325 mg per tablet Take 1 tablet by mouth every 4 (four) hours as needed. 18 tablet 0    LORazepam (ATIVAN) 0.5 MG tablet TAKE 2 TABLETS (1 MG TOTAL) BY MOUTH EVERY 12 (TWELVE) HOURS AS NEEDED FOR ANXIETY. 28 tablet 0    methotrexate 2.5 MG Tab as needed.       metoclopramide HCl (REGLAN) 5 MG tablet Take 1 tablet (5 mg total) by mouth 2 (two) times daily as needed (belching, nausea). 10 tablet 0    [DISCONTINUED] diphenhydrAMINE (BENADRYL) 25 mg capsule Take 1 each (25 mg total) by mouth nightly as needed for Insomnia. 20 capsule 0     No current facility-administered medications on file prior to visit.        Allergies   Review of patient's allergies indicates:   Allergen Reactions    Latex, natural rubber Rash    Codeine Hallucinations    Cortisporin [neomycin-bacitracin-poly-hc] Rash    Latex, natural rubber Rash       Review of Systems (Pertinent positives)  Review of Systems   Constitutional: Negative for chills, diaphoresis, fever, malaise/fatigue and weight loss.   HENT: Negative.    Eyes: Negative.    Respiratory: Negative for cough, hemoptysis, sputum  "production, shortness of breath and wheezing.    Cardiovascular: Negative for chest pain, palpitations, orthopnea, claudication, leg swelling and PND.   Gastrointestinal: Positive for abdominal pain, constipation, heartburn, nausea and vomiting. Negative for blood in stool, diarrhea and melena.   Genitourinary: Negative.  Negative for dysuria, flank pain, frequency, hematuria and urgency.   Musculoskeletal: Negative.    Skin: Negative.    Neurological: Negative for dizziness, tingling, tremors, sensory change, speech change and headaches.   Psychiatric/Behavioral: Negative.        /74 (BP Location: Left arm, Patient Position: Sitting, BP Method: Medium (Automatic))   Pulse 91   Temp 98 °F (36.7 °C) (Oral)   Resp 16   Ht 5' 7" (1.702 m)   Wt 101.6 kg (223 lb 15.8 oz)   SpO2 96%   BMI 35.08 kg/m²     Physical Exam   Constitutional: She is oriented to person, place, and time. She appears well-developed and well-nourished. No distress.   HENT:   Head: Normocephalic and atraumatic.   Eyes: Pupils are equal, round, and reactive to light. Conjunctivae and EOM are normal.   Neck: Normal range of motion. Neck supple.   Cardiovascular: Normal rate, regular rhythm, normal heart sounds and intact distal pulses. Exam reveals no gallop and no friction rub.   No murmur heard.  Pulmonary/Chest: Effort normal and breath sounds normal. No stridor. No respiratory distress. She has no wheezes. She has no rales. She exhibits no tenderness.   Abdominal: Soft. Normal appearance and bowel sounds are normal. She exhibits no distension, no pulsatile liver, no fluid wave and no ascites. There is no hepatosplenomegaly. There is tenderness in the epigastric area. There is no rigidity, no rebound, no guarding, no CVA tenderness, no tenderness at McBurney's point and negative Ramírez's sign. No hernia. Hernia confirmed negative in the ventral area.   Musculoskeletal: Normal range of motion.   Neurological: She is alert and oriented to " person, place, and time. No cranial nerve deficit. Coordination normal.   Skin: Skin is warm and dry. Capillary refill takes less than 2 seconds. She is not diaphoretic.        Assessment/Plan:  Silvia Capellan is a 73 y.o. female who presents today for :    Silvia was seen today for allergic reaction and rib injury.    Diagnoses and all orders for this visit:    Abdominal discomfort, epigastric  -     Comprehensive metabolic panel; Future  -     H. PYLORI ANTIBODY, IGG; Future  -     CBC auto differential; Future  -     ondansetron (ZOFRAN-ODT) 8 MG TbDL; Take 1 tablet (8 mg total) by mouth every 8 (eight) hours as needed.    Possibly some component of gastritis.  She states that she has not taken her Protonix in the last week.  No alarm symptoms or alarm signs on exam.  No need for emergent imaging at this time.  Will however check an H pylori, CMP to check her kidney function giving the nausea and vomiting, as well as the CBC to evaluate her blood count.  Zofran for nausea as needed.  Patient was encouraged to drink plenty of liquids any a tolerable diet.  She was instructed that if the nausea and vomiting persist despite these recommendations, or if she has other acute severe symptoms, such as worsening abdominal pain, fevers or chills, bloody stool, coffee-ground emesis then she needs to go the emergency room immediately.  Patient verbalized understanding of this    Itching  -     diphenhydrAMINE (BENADRYL) 25 mg capsule; Take 1 capsule (25 mg total) by mouth nightly as needed for Itching or Insomnia.    Patient was instructed that this medication may make her sleepy and she should not take this during the day.    Patient verbalized understanding of all of these instructions.  Will notify patient of lab results.        This office note has been dictated.  This dictation has been generated using M-Modal Fluency Direct dictation; some phonetic errors may occur.   My collaborating physician is Dr. Uri Landis.

## 2019-05-07 NOTE — TELEPHONE ENCOUNTER
I was able to get in touch with the patient's daughter Joanie.  I instructed the patient's daughter that the patient's kidney function is slightly decreased from baseline.  This is likely due to her nausea and vomiting and ill and inability to drink plenty of fluids.  I told the daughter that the patient needs to hydrate aggressively with water.  I told the patient's daughter that if the patient is truly not able to keep anything down or if she is not able to drink water aggressively overnight and she is to go to the emergency room as she may need IV fluids.  I told the patient started that I will contact her in the morning to see how the patient did overnight.  I again urged the patient started to take the patient to the emergency room if the patient is unable to keep down a sufficient amount of fluids.  Patient's daughter verbalized understanding of these instructions.

## 2019-05-07 NOTE — TELEPHONE ENCOUNTER
Attempted to call patient via mobile phone listed in chart twice.  No answer was given there was no voicemail to be left.  I am calling in regards to her recent CMP results.  I did call her emergency contact dizziness phone and left a voice message for her to call back the clinic to discuss the patient's lab results.

## 2019-05-08 NOTE — TELEPHONE ENCOUNTER
Patient's daughter was called to check on the patient's status.  She states that the patient is in better spirits today and is feeling better.  She states that the patient was able to keep some water down last night.  Patient is not having any fevers or chills.  I told the daughter that the patient needs to hydrate aggressively and I will recheck her kidney function this week.  I will write this order and asked the patient come in tomorrow early morning to get this done.  Patient's daughter verbalized understanding.

## 2019-05-08 NOTE — ED TRIAGE NOTES
"Pt arrived to Ed with c/o emesis x 1 week. Reports no episodes today. Pt states was sent here by doctor for "fluids." Pt also states "some SOB."  Hx lung cancer, last did chemo 6 months ago. NAD. Pt connected to monitor. Call light within reach.   "

## 2019-05-08 NOTE — ED PROVIDER NOTES
"Encounter Date: 5/8/2019    SCRIBE #1 NOTE: I, Vishaltami Root, am scribing for, and in the presence of,  Dragan Meyers MD. I have scribed the following portions of the note - Other sections scribed: HPI, ROS.       History     Chief Complaint   Patient presents with    Emesis     " I feel dehydrated, and need fluids." reports vomitting x1 week, and abdominal pain      CC: Emesis    HPI: This is a 73 y.o. F who has HTN, Rheumatoid arthritis, GERD, Squamous cell left lung cancer who presents to the ED for emergent evaluation of acute emesis that began 6 days ago. Pt had mild abdominal pain yesterday that spontaneously resolved. She states that she was instructed by her PCP to "report to the ED for fluids." Pt has a surgical history of Appendectomy and Hysterectomy. Pt denies fever, chills, ear pain, eye pain, sore throat, cough, SOB, CP, abdominal pain, nausea, diarrhea, dysuria, back pain, arm or leg problems, rash, headache, tobacco use, or alcohol use.    The history is provided by the patient. No  was used.     Review of patient's allergies indicates:   Allergen Reactions    Latex, natural rubber Rash    Codeine Hallucinations    Cortisporin [neomycin-bacitracin-poly-hc] Rash    Latex, natural rubber Rash     Past Medical History:   Diagnosis Date    Encounter for blood transfusion     GERD (gastroesophageal reflux disease)     HTN (hypertension)     Rheumatoid arthritis     Rheumatoid arthritis(714.0)     Squamous cell lung cancer, left 2/15/2018     Past Surgical History:   Procedure Laterality Date    APPENDECTOMY      Mwkywx-xrseys-vd--lung N/A 1/4/2019    Performed by [facility] Provider at [facility] OR 2ND FLR    BRONCHOSCOPY N/A 8/28/2017    Performed by [facility] Diagnostic Provider at [facility] OR 2ND FLR    CATARACT EXTRACTION Bilateral 2018    Dr. Keith     COLONOSCOPY      COLONOSCOPY N/A 10/30/2017    Performed by Qu[facility] at [facility] ENDO (4TH FLR)    " COLONOSCOPY N/A 1/17/2014    Performed by [facility]an MD at [facility] ENDO    ESOPHAGOGASTRODUODENOSCOPY (EGD) N/A 10/30/2017    Performed by [facility] at [facility] ENDO (4TH FLR)    FOOT SURGERY Left     HYSTERECTOMY      INSERTION, IOL PROSTHESIS Left 11/8/2018    Performed by [facility] at [facility] OR 1ST FLR    INSERTION, IOL PROSTHESIS Right 10/18/2018    Performed by [facility] at [facility] OR 1ST FLR    INSERTION-PORT N/A 11/14/2017    Performed by [facility] Diagnostic Provider at [facility] OR 2ND FLR    PHACOEMULSIFICATION, CATARACT Left 11/8/2018    Performed by [facility] at [facility] OR 1ST FLR    PHACOEMULSIFICATION, CATARACT Right 10/18/2018    Performed by [facility] MD at [facility] OR 1ST FLR    SKIN BIOPSY      TOTAL KNEE ARTHROPLASTY      right     Family History   Problem Relation Age of Onset    Glaucoma Mother     No Known Problems Father     Hypertension Unknown     Kidney disease Unknown     Glaucoma Sister     Glaucoma Brother     No Known Problems Maternal Aunt     No Known Problems Maternal Uncle     No Known Problems Paternal Aunt     No Known Problems Paternal Uncle     No Known Problems Maternal Grandmother     No Known Problems Maternal Grandfather     No Known Problems Paternal Grandmother     No Known Problems Paternal Grandfather     Colon polyps Neg Hx     Colon cancer Neg Hx     Esophageal cancer Neg Hx     Irritable bowel syndrome Neg Hx     Inflammatory bowel disease Neg Hx     Stomach cancer Neg Hx     Blindness Neg Hx     Macular degeneration Neg Hx     Retinal detachment Neg Hx     Amblyopia Neg Hx     Cancer Neg Hx     Cataracts Neg Hx     Diabetes Neg Hx     Strabismus Neg Hx     Stroke Neg Hx     Thyroid disease Neg Hx      Social History     Tobacco Use    Smoking status: Former Smoker    Smokeless tobacco: Never Used   Substance Use Topics    Alcohol use: Yes     Comment: Occasionally    Drug use: No     Review of  Systems   Constitutional: Negative for chills and fever.   HENT: Negative for ear pain and sore throat.    Eyes: Negative for pain.   Respiratory: Negative for cough and shortness of breath.    Cardiovascular: Negative for chest pain.   Gastrointestinal: Positive for vomiting. Negative for abdominal pain, diarrhea and nausea.   Genitourinary: Negative for dysuria.   Musculoskeletal: Negative for back pain.        (-) arm or leg problems   Skin: Negative for rash.   Neurological: Negative for headaches.       Physical Exam     Initial Vitals [05/08/19 0937]   BP Pulse Resp Temp SpO2   (!) 108/59 90 18 98.5 °F (36.9 °C) (!) 93 %      MAP       --         Physical Exam  The patient was examined specifically for the following:   General:No significant distress, Good color, Warm and dry. Head and neck:Scalp atraumatic, Neck supple. Neurological:Appropriate conversation, Gross motor deficits. Eyes:Conjugate gaze, Clear corneas. ENT: No epistaxis. Cardiac: Regular rate and rhythm, Grossly normal heart tones. Pulmonary: Wheezing, Rales. Gastrointestinal: Abdominal tenderness, Abdominal distention. Musculoskeletal: Extremity deformity, Apparent pain with range of motion of the joints. Skin: Rash.   The findings on examination were normal.  Lungs are clear.  The heart tones are normal.  The abdomen is soft.  There is no evidence of respiratory distress.  ED Course   Procedures  Labs Reviewed   URINALYSIS, REFLEX TO URINE CULTURE - Abnormal; Notable for the following components:       Result Value    Appearance, UA Hazy (*)     Occult Blood UA 1+ (*)     All other components within normal limits    Narrative:     Preferred Collection Type->Urine, Clean Catch   MAGNESIUM - Abnormal; Notable for the following components:    Magnesium 1.5 (*)     All other components within normal limits   BASIC METABOLIC PANEL - Abnormal; Notable for the following components:    CO2 31 (*)     Glucose 113 (*)     Creatinine 1.5 (*)     eGFR if   40 (*)     eGFR if non  34 (*)     All other components within normal limits   URINALYSIS MICROSCOPIC - Abnormal; Notable for the following components:    Bacteria Moderate (*)     Uric Acid Shirlene, UA Many (*)     All other components within normal limits    Narrative:     Preferred Collection Type->Urine, Clean Catch          Imaging Results    None       Medical decision making:  Given the above I believe this patient may well have cystitis.  This may be the cause of her vomiting. I will treat her with Zofran.  She does have a slightly elevated BUN and creatinine.  Renal insufficiency is in the differential diagnosis.  I will refer the patient back to Internal Medicine.  I will treat with Zofran and Keflex.  I will have the patient return if she gets worse or if new problems develop.  Patient's blood pressure was borderline low.  I will stop the Lasix hydrochlorothiazide and losartan.  I will start the patient on metoprolol and amlodipine.  I will have the patient follow up with primary care this week.                Scribe Attestation:   Scribe #1: I performed the above scribed service and the documentation accurately describes the services I performed. I attest to the accuracy of the note.    Attending Attestation:           Physician Attestation for Scribe:  Physician Attestation Statement for Scribe #1: I, Dragan Meyers MD, reviewed documentation, as scribed by Vishal Root in my presence, and it is both accurate and complete.                    Clinical Impression:       ICD-10-CM ICD-9-CM   1. Nausea and vomiting, intractability of vomiting not specified, unspecified vomiting type R11.2 787.01   2. Renal insufficiency N28.9 593.9   3. Dehydration E86.0 276.51   4. Acute cystitis without hematuria N30.00 595.0                                Dragan Meyers MD  05/08/19 [id number]

## 2019-05-08 NOTE — DISCHARGE INSTRUCTIONS
Please return immediately if you get worse or if new problems develop.  Lots of liquids.  Please follow-up with your primary care doctor this week.  Rest.  Please check her blood pressure and write the numbers down every day.  Please stop losartan furosemide and hydrochlorothiazide.  Keflex for your bladder infection.  Zofran for nausea.

## 2019-05-10 NOTE — TELEPHONE ENCOUNTER
Attempted to call patient in regards to recent CMP done today but no answer and no voicemail able to be left

## 2019-05-13 NOTE — CONSULTS
Inpatient Radiology Pre-procedure Note    History of Present Illness:  Silvia Capellan is a 73 y.o. female who presents for thoracentesis.  Admission H&P reviewed.  Past Medical History:   Diagnosis Date    Encounter for blood transfusion     GERD (gastroesophageal reflux disease)     HTN (hypertension)     Rheumatoid arthritis     Rheumatoid arthritis(714.0)     Squamous cell lung cancer, left 2/15/2018     Past Surgical History:   Procedure Laterality Date    APPENDECTOMY      Ymuagp-izvljq-mp--lung N/A 1/4/2019    Performed by [facility] Diagnostic Provider at [facility] OR 2ND FLR    BRONCHOSCOPY N/A 8/28/2017    Performed by [facility] Diagnostic Provider at [facility] OR 2ND FLR    CATARACT EXTRACTION Bilateral 2018    Dr. Keith     COLONOSCOPY      COLONOSCOPY N/A 10/30/2017    Performed by Quentin Coppola MD at [facility] ENDO (4TH FLR)    COLONOSCOPY N/A 1/17/2014    Per[facility] at [facility] END[facility]    ESOPHAGOGASTRODUODENOSCOPY (EGD) N/A 10/30/2017    Performed by Quentin [facility]ola [facility] at [facility] ENDO (4TH FLR)    FOOT SURGERY Left     HYSTERECTOMY      INSERTION, IOL PROSTHESIS Left 11/8/2018    Performed by Susan Keith MD at [facility] OR 1ST FLR    INSERTION, IOL PROSTHESIS Right 10/18/2018    Performed by Susan Keith MD at [facility] OR 1ST FLR    INSERTION-PORT N/A 11/14/2017    Performed by [facility] Diagnostic Provider at [facility] OR 2ND FLR    PHACOEMULSIFICATION, CATARACT Left 11/8/2018    Performed by Susan Keith MD at [facility] OR 1ST FLR    PHACOEMULSIFICATION, CATARACT Right 10/18/2018    Performed by Susan Keith MD at [facility] OR 1ST FLR    SKIN BIOPSY      TOTAL KNEE ARTHROPLASTY      right       Review of Systems:   As documented in primary team H&P    Home Meds:   Prior to Admission medications    Medication Sig Start Date End Date Taking? Authorizing Provider   cephALEXin (KEFLEX) 500 MG capsule Take 2 capsules (1,000 mg total) by mouth every 12 (twelve) hours. for 7 days 5/8/19  5/15/19  Dragan Meyers MD   dexamethasone (DECADRON) 2 MG tablet TAKE 1 TABLET (2 MG TOTAL) BY MOUTH EVERY 12 (TWELVE) HOURS. 2/11/19 2/11/20  Shayne Wong MD   diazePAM (VALIUM) 5 MG tablet Take 1 tablet (5 mg total) by mouth every 6 (six) hours as needed. 4/22/19 5/22/19  Lydia Srinivasan DNP   diclofenac (VOLTAREN) 50 MG EC tablet Take 1 tablet (50 mg total) by mouth 3 (three) times daily as needed. 8/31/18   Eugenio Hawthorne MD   diphenhydrAMINE (BENADRYL) 25 mg capsule Take 1 capsule (25 mg total) by mouth nightly as needed for Itching or Insomnia. 5/7/19   Terrance Giron NP   gabapentin (NEURONTIN) 300 MG capsule Take 1 capsule (300 mg total) by mouth every evening.  Patient taking differently: Take 300 mg by mouth 3 (three) times daily.  3/12/19 3/11/20  Ginette Rosado MD   HYDROcodone-acetaminophen (NORCO) 5-325 mg per tablet Take 1 tablet by mouth every 6 (six) hours as needed for Pain. 3/19/19   Shayne Wong MD   HYDROcodone-acetaminophen (NORCO) 5-325 mg per tablet Take 1 tablet by mouth every 4 (four) hours as needed. 4/22/19   Lydia Srinivasan DNP   levocetirizine (XYZAL) 5 MG tablet Take 1 tablet (5 mg total) by mouth every evening. 9/17/18 9/17/19  Ganga Krause MD   LORazepam (ATIVAN) 0.5 MG tablet TAKE 2 TABLETS (1 MG TOTAL) BY MOUTH EVERY 12 (TWELVE) HOURS AS NEEDED FOR ANXIETY. 4/18/19 5/2/19  Ynes Stallworth MD   methotrexate 2.5 MG Tab as needed.  9/1/16   Cheyenne Rosario MD   metoclopramide HCl (REGLAN) 5 MG tablet Take 1 tablet (5 mg total) by mouth 2 (two) times daily as needed (belching, nausea). 6/26/18   Ganga Krause MD   metoprolol succinate (TOPROL-XL) 50 MG 24 hr tablet Take 1 tablet (50 mg total) by mouth once daily. 5/8/19 5/7/20  Dragan Meyers MD   ondansetron (ZOFRAN) 4 MG tablet Take 1 tablet (4 mg total) by mouth every 8 (eight) hours as needed (Nausea and vomiting). 5/8/19   Dragan Meyers MD   pantoprazole (PROTONIX) 40 MG tablet TAKE 1  TABLET (40 MG TOTAL) BY MOUTH ONCE DAILY. 11/25/18 11/25/19  Ginette Rosado MD   tiZANidine (ZANAFLEX) 4 MG tablet Take 4 mg by mouth every 8 (eight) hours.    Historical Provider, MD   traMADol (ULTRAM) 50 mg tablet Take 50 mg by mouth every 6 (six) hours. 4/22/19   Historical Provider, MD   traZODone (DESYREL) 50 MG tablet  5/6/19   Historical Provider, MD     Scheduled Meds:   Continuous Infusions:   PRN Meds:  Anticoagulants/Antiplatelets: no anticoagulation    Allergies:   Review of patient's allergies indicates:   Allergen Reactions    Latex, natural rubber Rash    Codeine Hallucinations    Cortisporin [neomycin-bacitracin-poly-hc] Rash    Latex, natural rubber Rash     Sedation Hx: have not been any systemic reactions    Labs:  No results for input(s): INR in the last 168 hours.    Invalid input(s):  PT,  PTT    Recent Labs   Lab 05/13/19  1124   WBC 5.47   HGB 10.2*   HCT 33.1*   MCV 91         Recent Labs   Lab 05/13/19  1124         K 3.6      CO2 30*   BUN 6*   CREATININE 0.8   CALCIUM 9.5   MG 1.1*   ALT 14   AST 13   ALBUMIN 2.8*   BILITOT 0.2         Vitals:  Temp: 97.8 °F (36.6 °C) (05/13/19 1217)  Pulse: 79 (05/13/19 1217)  Resp: (!) 34 (05/13/19 1217)  BP: 121/83 (05/13/19 1217)  SpO2: 100 % (05/13/19 1217)     Physical Exam:  ASA: 2  Mallampati: 2    General: no acute distress  Mental Status: alert and oriented to person, place and time  HEENT: normocephalic, atraumatic  Chest: unlabored breathing  Heart: regular heart rate  Abdomen: nondistended  Extremity: moves all extremities    Plan: Thoracentesis  Sedation Plan: local    .Jeff VANG M.D. personally reviewed and agree with the above dictated note.

## 2019-05-13 NOTE — ED TRIAGE NOTES
"Pt "I been SOB, weak, and tired for last few days.  They stopped my lasix and I getting fluid."    "

## 2019-05-13 NOTE — PROCEDURES
Patient came to IR and had left and right chest evaluated with US with no significant fluid identified for drainage.     Procedure aborted.     Jeff VANG M.D. personally reviewed and agree with the above dictated note.

## 2019-05-13 NOTE — ED PROVIDER NOTES
"Encounter Date: 5/13/2019    SCRIBE #1 NOTE: I, Anna Gill, am scribing for, and in the presence of,  Dragan Meyers MD. I have scribed the following portions of the note - Other sections scribed: ROS and HPI.       History     Chief Complaint   Patient presents with    Shortness of Breath     Pt. arrives to ER reports increased SOB for last few days, pt. reports only SOB on exertion. Was recently prescribed lasix but said "they stopped my fluid pills" increased in swelling noted to lower extremities.      CC: Shortness of Breath    HPI: This 73 y.o. female with a past medical history of Encounter for blood transfusion, GERD, HTN, Rheumatoid arthritis, and Squamous cell lung cancer, left (2/15/2018), presents to the ED for an emergent evaluation of progressively worsening dyspnea upon exertion for last 2 weeks. She gets short of breath with walking from her bedroom to bathroom. Currently denies SOB at rest. Denies any prior similar episodes of SOB. She also reports worsening swelling to her BLE. She was recently taken off of her prescribed Lasix by her doctor but she does not know the reason of stopping it. Denies cough. She reports having emesis 3 days ago and mild intermittent epigastric abdominal pain for last 3 days. She also reports decrease in her appetite, fatigue, and generalized weakness. She also reports having mild L sided chest pain since after a mechanical fall on 04/21/2019. She reports her chest is tender to touch but denies any worsening in pain with movement and breathing. Denies cough, fever, chills, diaphoresis, numbness or any other sx. No reported prior medical intervention. Former smoker 14-15 yrs ago.     The history is provided by the patient. No  was used.     Review of patient's allergies indicates:   Allergen Reactions    Latex, natural rubber Rash    Codeine Hallucinations    Cortisporin [neomycin-bacitracin-poly-hc] Rash    Latex, natural rubber Rash     Past " Medical History:   Diagnosis Date    Encounter for blood transfusion     GERD (gastroesophageal reflux disease)     HTN (hypertension)     Rheumatoid arthritis     Rheumatoid arthritis(714.0)     Squamous cell lung cancer, left 2/15/2018     Past Surgical History:   Procedure Laterality Date    APPENDECTOMY      Ysnsyi-dpxlcp-km--lung N/A 1/4/2019    Performed by [facility] Diagnostic Provider at [facility] OR 2ND FLR    BRONCHOSCOPY N/A 8/28/2017    Performed by [facility] Diagnostic Provider at [facility] OR 2ND FLR    CATARACT EXTRACTION Bilateral 2018    Dr. Keith     COLONOSCOPY      COLONOSCOPY N/A 10/30/2017    Performed by Quentin Coppola MD at [facility] ENDO (4TH FLR)    COLONOSCOPY N/A 1/17/2014    Performed by [facility] at [facility] ENDO    ESOPHAGOGASTRODUODENOSCOPY (EGD) N/A 10/30/2017    Performed by Quentin Co[facility]ola MD at [facility] ENDO (4TH FLR)    FOOT SURGERY Left     HYSTERECTOMY      INSERTION, IOL PROSTHESIS Left 11/8/2018    Performed by Susan Keith MD at [facility] OR 1ST FLR    INSERTION, IOL PROSTHESIS Right 10/18/2018    Performed by Susan Keith MD at [facility] OR 1ST FLR    INSERTION-PORT N/A 11/14/2017    Performed by [facility] Diagnostic Provider at [facility] OR 2ND FLR    PHACOEMULSIFICATION, CATARACT Left 11/8/2018    Performed by Susan Keith MD at [facility] OR 1ST FLR    PHACOEMULSIFICATION, CATARACT Right 10/18/2018    Performed by Susan Keith MD at [facility] OR 1ST FLR    SKIN BIOPSY      TOTAL KNEE ARTHROPLASTY      right     Family History   Problem Relation Age of Onset    Glaucoma Mother     No Known Problems Father     Hypertension Unknown     Kidney disease Unknown     Glaucoma Sister     Glaucoma Brother     No Known Problems Maternal Aunt     No Known Problems Maternal Uncle     No Known Problems Paternal Aunt     No Known Problems Paternal Uncle     No Known Problems Maternal Grandmother     No Known Problems Maternal Grandfather     No Known Problems Paternal  Grandmother     No Known Problems Paternal Grandfather     Colon polyps Neg Hx     Colon cancer Neg Hx     Esophageal cancer Neg Hx     Irritable bowel syndrome Neg Hx     Inflammatory bowel disease Neg Hx     Stomach cancer Neg Hx     Blindness Neg Hx     Macular degeneration Neg Hx     Retinal detachment Neg Hx     Amblyopia Neg Hx     Cancer Neg Hx     Cataracts Neg Hx     Diabetes Neg Hx     Strabismus Neg Hx     Stroke Neg Hx     Thyroid disease Neg Hx      Social History     Tobacco Use    Smoking status: Former Smoker    Smokeless tobacco: Never Used   Substance Use Topics    Alcohol use: Yes     Comment: Occasionally    Drug use: No     Review of Systems   Constitutional: Positive for appetite change and fatigue. Negative for chills and fever.   HENT: Negative for congestion, ear pain, rhinorrhea and sore throat.    Eyes: Negative for pain and visual disturbance.   Respiratory: Positive for shortness of breath (dyspnea upon exertion). Negative for cough.    Cardiovascular: Positive for leg swelling (BLE). Negative for chest pain.   Gastrointestinal: Negative for abdominal pain, diarrhea, nausea and vomiting.   Genitourinary: Negative for dysuria.   Musculoskeletal: Negative for back pain and neck pain.   Skin: Negative for rash.   Neurological: Positive for weakness (generalized). Negative for headaches.       Physical Exam     Initial Vitals [05/13/19 1017]   BP Pulse Resp Temp SpO2   112/65 87 20 98.6 °F (37 °C) 97 %      MAP       --         Physical Exam  The patient was examined specifically for the following:   General:No significant distress, Good color, Warm and dry. Head and neck:Scalp atraumatic, Neck supple. Neurological:Appropriate conversation, Gross motor deficits. Eyes:Conjugate gaze, Clear corneas. ENT: No epistaxis. Cardiac: Regular rate and rhythm, Grossly normal heart tones. Pulmonary: Wheezing, Rales. Gastrointestinal: Abdominal tenderness, Abdominal distention.  Musculoskeletal: Extremity deformity, Apparent pain with range of motion of the joints. Skin: Rash.   The findings on examination were normal except for the following:  The patient looks slightly pale.  The lungs are clear and free of wheezing rales rubs or rhonchi.  Heart tones are normal the patient has regular rate and rhythm. The patient has a heart rate of 76 during the physical examination an oxygen saturation of 100%.  There is some left inframammary chest wall tenderness. There is no significant abdominal tenderness.  The patient has minimal pedal edema worse on the left than on the right.  ED Course   Procedures  Labs Reviewed   COMPREHENSIVE METABOLIC PANEL - Abnormal; Notable for the following components:       Result Value    CO2 30 (*)     BUN, Bld 6 (*)     Albumin 2.8 (*)     All other components within normal limits   CBC W/ AUTO DIFFERENTIAL - Abnormal; Notable for the following components:    RBC 3.65 (*)     Hemoglobin 10.2 (*)     Hematocrit 33.1 (*)     Mean Corpuscular Hemoglobin Conc 30.8 (*)     All other components within normal limits   URINALYSIS, REFLEX TO URINE CULTURE - Abnormal; Notable for the following components:    Ketones, UA Trace (*)     Leukocytes, UA Trace (*)     All other components within normal limits    Narrative:     Preferred Collection Type->Urine, Clean Catch   MAGNESIUM - Abnormal; Notable for the following components:    Magnesium 1.1 (*)     All other components within normal limits   URINALYSIS MICROSCOPIC - Abnormal; Notable for the following components:    Yeast, UA Occasional (*)     Non-Squam Epith 1 (*)     All other components within normal limits    Narrative:     Preferred Collection Type->Urine, Clean Catch   LIPASE   TROPONIN I   B-TYPE NATRIURETIC PEPTIDE     EKG Readings: (Independently Interpreted)   This patient is in a normal sinus rhythm with a heart rate of 78.  The p.r. QRS and QT intervals are normal.  There is no definite evidence of acute  myocardial infarction or malignant arrhythmia.     ECG Results          EKG 12-lead (In process)  Result time 05/13/19 13:32:07    In process by Interface, Lab In [facility] (05/13/19 13:32:07)                 Narrative:    Test Reason :     Vent. Rate : 078 BPM     Atrial Rate : 078 BPM     P-R Int : 164 ms          QRS Dur : 104 ms      QT Int : 422 ms       P-R-T Axes : 055 -20 055 degrees     QTc Int : 481 ms    Normal sinus rhythm  Incomplete right bundle branch block  Nonspecific T wave abnormality  Prolonged QT  Abnormal ECG  When compared with ECG of 01-APR-2019 09:07,  Significant changes have occurred    Referred By: [email or web address]   SELF           Confirmed By:                   In process by Interface, Lab In [facility] (05/13/19 12:35:35)                 Narrative:    Test Reason :     Vent. Rate : 078 BPM     Atrial Rate : 078 BPM     P-R Int : 164 ms          QRS Dur : 104 ms      QT Int : 422 ms       P-R-T Axes : 055 -20 055 degrees     QTc Int : 481 ms    Normal sinus rhythm  Incomplete right bundle branch block  Nonspecific T wave abnormality  Prolonged QT  Abnormal ECG  When compared with ECG of 01-APR-2019 09:07,  Significant changes have occurred    Referred By: [email or web address]   SELF           Confirmed By:                             Imaging Results          IR ULTRASOUND MEDIASTINUM CHEST (In process)  Result time 05/13/19 14:22:49   Procedure changed from IR Thoracentesis with Imaging                X-Ray Chest AP Portable (Final result)  Result time 05/13/19 12:12:40    Final result by Immanuel Carrero MD (05/13/19 12:12:40)                 Impression:      Left lung base opacities, increased from the prior exam, suggesting effusion with associated atelectasis and/or infiltrate.  Short-term PA and lateral chest follow-up could be performed to ensure resolution or further evaluation with CT chest, if indicated..      Electronically signed by: Immanuel Carrero MD  Date:    05/13/2019  Time:    12:12              Narrative:    EXAMINATION:  XR CHEST AP PORTABLE    CLINICAL HISTORY:  chest pain;    TECHNIQUE:  Single frontal view of the chest was performed.    COMPARISON:  01/04/2019    FINDINGS:  Poorly visualized left hemidiaphragm with increased left basilar lung opacities.  Enlarged cardiac silhouette.  No gross pneumothorax.  Right central line tip projecting in the distal SVC unchanged.  Right lung fairly well aerated.                              Medical decision making:  Given the above this patient presents to the emergency room complaining of exertional dyspnea.  The patient has lung cancer and a left pleural effusion.  We tried to drain it in Radiology.  The effusion was too small to drain.  The patient's oxygen saturations are 97% to 100% at rest.  The patient drops as low as 90% with exercise.  I will discharge her to follow up with primary care.  There is no evidence of infection bronchospasm pulmonary edema. I will have her return if she gets worse or if new problems develop.                Scribe Attestation:   Scribe #1: I performed the above scribed service and the documentation accurately describes the services I performed. I attest to the accuracy of the note.    Attending Attestation:           Physician Attestation for Scribe:  Physician Attestation Statement for Scribe #1: I, Dragan Meyers MD, reviewed documentation, as scribed by Anna Gill in my presence, and it is both accurate and complete.                    Clinical Impression:       ICD-10-CM ICD-9-CM   1. Exertional dyspnea R06.09 786.09   2. Pleural effusion on left J90 511.9   3. Malignant neoplasm of hilus of left lung C34.02 162.2                                Dragan Meyers MD  05/13/19 1704       Dragan Meyers MD  05/13/19 1728

## 2019-05-13 NOTE — ED NOTES
Pt has not void at this time.  Pt is aware if unable to void, an in & out catheter may be perform to obtain urine.  Pt request to give her a few more minutes to void on her own.  Will continue to monitor.

## 2019-05-14 PROBLEM — J44.9 CHRONIC OBSTRUCTIVE PULMONARY DISEASE: Status: ACTIVE | Noted: 2019-01-01

## 2019-05-14 PROBLEM — C77.1 SECONDARY AND UNSPECIFIED MALIGNANT NEOPLASM OF INTRATHORACIC LYMPH NODES: Status: ACTIVE | Noted: 2019-01-01

## 2019-05-14 PROBLEM — D69.6 THROMBOCYTOPENIA: Status: ACTIVE | Noted: 2019-01-01

## 2019-05-14 NOTE — PROGRESS NOTES
Routine Office Visit    Patient Name: Silvia Capellan    : 1945  MRN: [id number]    Chief Complaint:  Shortness of breath, lack of appetite    Subjective:  Silvia is a 73 y.o. female who presents today for:    1.  Shortness of breath, lack of appetite - patient presents today for evaluation of shortness of breath.  She states that she gets short of breath on exertion.  She was seen in the emergency room for this yesterday and was diagnosed with a pleural effusion on the left side.  She has had this problem before due to her stage IV lung cancer.  They attempted to drain the effusion yesterday in the emergency room but they were unable to because the effusion was too small.  She states that she was taking Lasix for leg swelling but the drug was discontinued for reasons unknown to the patient.  She also states that she would like to get something to help stimulate her appetite.  She states that she is able to keep water down and drink enough fluids, but she is not able to eat an entire meal.  Denies any nausea, vomiting, diarrhea, or abdominal pain. Denies any fevers or chills.  Otherwise, she denies any cough, chest pain, palpitations.  She denies any shortness of breath at rest.  She states that she mostly gets short of breath walking back and forth from her bed to her bathroom.  She states that her shortness of breath has not worsened since her emergency room visit yesterday.    Past Medical History  Past Medical History:   Diagnosis Date    Chronic obstructive pulmonary disease 2019    Encounter for blood transfusion     GERD (gastroesophageal reflux disease)     HTN (hypertension)     Rheumatoid arthritis     Rheumatoid arthritis(714.0)     Squamous cell lung cancer, left 2/15/2018       Past Surgical History  Past Surgical History:   Procedure Laterality Date    APPENDECTOMY      Zohibt-uufdgf-sa--lung N/A 2019    Performed by [facility] Provider at [facility] OR [address] [place] [address]     BRONCHOSCOPY N/A 8/28/2017    Performed by [facility] Provider at [facility] OR 2ND FLR    CATARACT EXTRACTION Bilateral 2018    Dr. Keith     COLONOSCOPY      COLONOSCOPY N/A 10/30/2017    Performed by Quentin [facility] at [facility] ENDO (4TH FLR)    COLONOSCOPY N/A 1/17/2014    Per[facility] by [facility] at [facility] ENDO    ESOPHAGOGASTRODUODENOSCOPY (EGD) N/A 10/30/2017    Performed by Qu[facility] ENDO (4TH FLR)    FOOT SURGERY Left     HYSTERECTOMY      INSERTION, IOL PROSTHESIS Left 11/8/2018    Performed by Susan Keith [facility] at [facility] OR 1ST FLR    INSERTION, IOL PROSTHESIS Right 10/18/2018    Performed by Susan [facility] at [facility] OR 1ST FLR    INSERTION-PORT N/A 11/14/2017    Performed by [facility] Diagnostic Provider at [facility] OR 2ND FLR    PHACOEMULSIFICATION, CATARACT Left 11/8/2018    Performed by Susan [facility] at [facility] OR 1ST FLR    PHACOEMULSIFICATION, CATARACT Right 10/18/2018    Performed by [facility] at [facility] OR 1ST FLR    SKIN BIOPSY      TOTAL KNEE ARTHROPLASTY      right       Family History  Family History   Problem Relation Age of Onset    Glaucoma Mother     No Known Problems Father     Hypertension Unknown     Kidney disease Unknown     Glaucoma Sister     Glaucoma Brother     No Known Problems Maternal Aunt     No Known Problems Maternal Uncle     No Known Problems Paternal Aunt     No Known Problems Paternal Uncle     No Known Problems Maternal Grandmother     No Known Problems Maternal Grandfather     No Known Problems Paternal Grandmother     No Known Problems Paternal Grandfather     Colon polyps Neg Hx     Colon cancer Neg Hx     Esophageal cancer Neg Hx     Irritable bowel syndrome Neg Hx     Inflammatory bowel disease Neg Hx     Stomach cancer Neg Hx     Blindness Neg Hx     Macular degeneration Neg Hx     Retinal detachment Neg Hx     Amblyopia Neg Hx     Cancer Neg Hx     Cataracts Neg Hx     Diabetes Neg Hx      Strabismus Neg Hx     Stroke Neg Hx     Thyroid disease Neg Hx        Social History  Social History     Socioeconomic History    Marital status: Single     Spouse name: Not on file    Number of children: Not on file    Years of education: Not on file    Highest education level: Not on file   Occupational History    Not on file   Social Needs    Financial resource strain: Not on file    Food insecurity:     Worry: Not on file     Inability: Not on file    Transportation needs:     Medical: Not on file     Non-medical: Not on file   Tobacco Use    Smoking status: Former Smoker    Smokeless tobacco: Never Used   Substance and Sexual Activity    Alcohol use: Yes     Comment: Occasionally    Drug use: No    Sexual activity: Not Currently   Lifestyle    Physical activity:     Days per week: Not on file     Minutes per session: Not on file    Stress: Not on file   Relationships    Social connections:     Talks on phone: Not on file     Gets together: Not on file     Attends Worship service: Not on file     Active member of club or organization: Not on file     Attends meetings of clubs or organizations: Not on file     Relationship status: Not on file   Other Topics Concern    Not on file   Social History Narrative    Not on file       Current Medications  Current Outpatient Medications on File Prior to Visit   Medication Sig Dispense Refill    gabapentin (NEURONTIN) 300 MG capsule Take 1 capsule (300 mg total) by mouth every evening. (Patient taking differently: Take 300 mg by mouth 3 (three) times daily. ) 90 capsule 1    metoprolol succinate (TOPROL-XL) 50 MG 24 hr tablet Take 1 tablet (50 mg total) by mouth once daily. 30 tablet 1    pantoprazole (PROTONIX) 40 MG tablet TAKE 1 TABLET (40 MG TOTAL) BY MOUTH ONCE DAILY. 90 tablet 1    cephALEXin (KEFLEX) 500 MG capsule Take 2 capsules (1,000 mg total) by mouth every 12 (twelve) hours. for 7 days 28 capsule 0    dexamethasone (DECADRON) 2  MG tablet TAKE 1 TABLET (2 MG TOTAL) BY MOUTH EVERY 12 (TWELVE) HOURS. 120 tablet 0    diazePAM (VALIUM) 5 MG tablet Take 1 tablet (5 mg total) by mouth every 6 (six) hours as needed. 10 tablet 0    diclofenac (VOLTAREN) 50 MG EC tablet Take 1 tablet (50 mg total) by mouth 3 (three) times daily as needed. 30 tablet 1    diphenhydrAMINE (BENADRYL) 25 mg capsule Take 1 capsule (25 mg total) by mouth nightly as needed for Itching or Insomnia. 10 capsule 0    HYDROcodone-acetaminophen (NORCO) 5-325 mg per tablet Take 1 tablet by mouth every 6 (six) hours as needed for Pain. 60 tablet 0    HYDROcodone-acetaminophen (NORCO) 5-325 mg per tablet Take 1 tablet by mouth every 4 (four) hours as needed. 18 tablet 0    levocetirizine (XYZAL) 5 MG tablet Take 1 tablet (5 mg total) by mouth every evening. 30 tablet 11    LORazepam (ATIVAN) 0.5 MG tablet TAKE 2 TABLETS (1 MG TOTAL) BY MOUTH EVERY 12 (TWELVE) HOURS AS NEEDED FOR ANXIETY. 28 tablet 0    methotrexate 2.5 MG Tab as needed.       metoclopramide HCl (REGLAN) 5 MG tablet Take 1 tablet (5 mg total) by mouth 2 (two) times daily as needed (belching, nausea). 10 tablet 0    ondansetron (ZOFRAN) 4 MG tablet Take 1 tablet (4 mg total) by mouth every 8 (eight) hours as needed (Nausea and vomiting). 12 tablet 1    tiZANidine (ZANAFLEX) 4 MG tablet Take 4 mg by mouth every 8 (eight) hours.      traMADol (ULTRAM) 50 mg tablet Take 50 mg by mouth every 6 (six) hours.  0    traZODone (DESYREL) 50 MG tablet        Current Facility-Administered Medications on File Prior to Visit   Medication Dose Route Frequency Provider Last Rate Last Dose    [DISCONTINUED] acetaminophen tablet 500 mg  500 mg Oral ED 1 Time Dragan Meyers MD           Allergies   Review of patient's allergies indicates:   Allergen Reactions    Latex, natural rubber Rash    Codeine Hallucinations    Cortisporin [neomycin-bacitracin-poly-hc] Rash    Latex, natural rubber Rash       Review of Systems  "(Pertinent positives)  Review of Systems   Constitutional: Positive for malaise/fatigue. Negative for chills and fever.   HENT: Negative.    Eyes: Negative for blurred vision, double vision, photophobia, pain and redness.   Respiratory: Positive for shortness of breath. Negative for cough, hemoptysis, sputum production and wheezing.    Cardiovascular: Positive for leg swelling. Negative for chest pain, palpitations, orthopnea, claudication and PND.   Gastrointestinal: Negative for abdominal pain, constipation, diarrhea, nausea and vomiting.        Anorexia, lack of appetite   Genitourinary: Negative.    Musculoskeletal: Negative.    Skin: Negative.    Neurological: Negative for dizziness, tingling, tremors, sensory change and headaches.   Endo/Heme/Allergies: Negative.    Psychiatric/Behavioral: Negative for depression, hallucinations, substance abuse and suicidal ideas.       /83 (BP Location: Right arm, Patient Position: Sitting, BP Method: Medium (Automatic))   Pulse 89   Temp 98.2 °F (36.8 °C) (Oral)   Resp 16   Ht 5' 7" (1.702 m)   Wt 102 kg (224 lb 13.9 oz)   SpO2 96%   BMI 35.22 kg/m²     Physical Exam   Constitutional: She is oriented to person, place, and time. Vital signs are normal. She appears well-developed and well-nourished.  Non-toxic appearance. She does not have a sickly appearance. No distress.   HENT:   Head: Normocephalic and atraumatic.   Eyes: Pupils are equal, round, and reactive to light. Conjunctivae and EOM are normal.   Neck: Normal range of motion. Neck supple.   Cardiovascular: Normal rate, regular rhythm, S1 normal, S2 normal, normal heart sounds and intact distal pulses.  No extrasystoles are present. Exam reveals no gallop, no distant heart sounds and no friction rub.   No murmur heard.  No appreciable edema on lower extremities bilaterally   Pulmonary/Chest: Effort normal and breath sounds normal. No accessory muscle usage or stridor. No tachypnea. No respiratory " distress. She has no decreased breath sounds. She has no wheezes. She has no rhonchi. She has no rales. She exhibits no tenderness.   Abdominal: Soft. Bowel sounds are normal. She exhibits no distension and no mass. There is no tenderness. There is no rebound and no guarding. No hernia.   Musculoskeletal: Normal range of motion.   Neurological: She is alert and oriented to person, place, and time.   Skin: Skin is warm and dry. Capillary refill takes less than 2 seconds. She is not diaphoretic.        Assessment/Plan:  Silvia Capellan is a 73 y.o. female who presents today for :    Diagnoses and all orders for this visit:    Squamous cell lung cancer, left  - unstable, needs follow-up with Hematology-Oncology    Secondary and unspecified malignant neoplasm of intrathoracic lymph nodes  - unstable, needs follow-up with Hematology-Oncology    Rheumatoid arthritis of hand, unspecified laterality, unspecified rheumatoid factor presence  - stable, asymptomatic, no changes today    Thrombocytopenia  - resolve from previous CBCs    Chronic obstructive pulmonary disease, unspecified COPD type    She may have been taking Lasix for pleural effusions.  However I am hesitant to restart this given that she just had an acute kidney injury due to nausea and vomiting, for which she needed intravenous fluids in the emergency room.  She also complains of a lack of appetite today. I believe all of these symptoms are due to her stage IV lung cancer.  She had an appointment with her oncologist on April 2nd, which she missed and she has not followed up since then.  I personally called Hematology Oncology number and sent a message to the  for Dr. Wong to contact the patient to establish follow-up.  I told the patient that she needs to be aware that the  will be contacting her to set up an appointment for her oncologist as I was unable to set up the appointment myself.  I also contacted the patient's daughter Leola so  she may be cognizant of this issue as well.  It is paramount for this patient to reestablish care with Hematology-Oncology to monitor her pleural effusion as well as her appetite.  Her vital signs are stable today and she has no acute distress. Her nausea, vomiting, and diarrhea are resolved from the last visit.  Labs from the ED showed resolved ELSY.  Patient was educated to continue drinking plenty of water.  She was instructed that if her shortness of breath acutely worsens or if she develops fevers or chills or wheezing then she needs to go to the emergency room for possible drainage of her pleural effusion.  Patient verbalized understanding of these instructions.        This office note has been dictated.  This dictation has been generated using M-Modal Fluency Direct dictation; some phonetic errors may occur.   My collaborating physician is Dr. Uri Landis.

## 2019-05-14 NOTE — TELEPHONE ENCOUNTER
----- Message from July Haywood sent at 5/14/2019 12:16 PM CDT -----  Contact: pt  Pt needs to schedule a f/u appt with Dr. Wong    Please contact Terrance Giron at [phone]  or ext: [phone]   He is helping the pt schedule all of her appts.

## 2019-05-14 NOTE — TELEPHONE ENCOUNTER
Please contact patient to schedule her with cbc/cmp and appt with dr new. First available is OK.  ~evie

## 2019-05-20 NOTE — TELEPHONE ENCOUNTER
----- Message from Ganga Krause MD sent at 5/20/2019  4:53 PM CDT -----  Please call the patient regarding her result. Chest xray without significant change from prior study 5/13/19.  Keep follow up with oncology clinic.  Report to ED for severe worsening of symptoms.

## 2019-05-20 NOTE — TELEPHONE ENCOUNTER
I attempted to contact the pt. Her number rang with no answer. I was unable to leave a message. The second home number has been disconnected.

## 2019-05-20 NOTE — PROGRESS NOTES
HISTORY OF PRESENT ILLNESS:  Silvia Capellan is a 73 y.o. female with squamous cell cancer of left lung who presents to the clinic today for Shortness of Breath; Fatigue; and Anorexia    ED 5/13/19 for dyspnea with finding of left lung base opacity increased on CXR.  Attempt for drainage in Radiology and noted to be too small.  Respiratory status was stable so sent home from ED.  Seen by Terrance Giron NP on 5/14/19 at which time he contacted heme-onc to coordinate follow up for patient given concern her underlying stage IV lung cancer contributing to her overall presentation.    Heme-onc Appt Dr. Wong 5/23/19.    Report vomiting every time she drinks water or tries to eat.  Reports no appetite for the last two weeks. She fees like Zofran is not working.  No fever.  No cough.  Reports dyspnea is increased for the last 2-3 days.    PAST MEDICAL HISTORY:  Past Medical History:   Diagnosis Date    Chronic obstructive pulmonary disease 5/14/2019    Encounter for blood transfusion     GERD (gastroesophageal reflux disease)     HTN (hypertension)     Rheumatoid arthritis     Rheumatoid arthritis(714.0)     Squamous cell lung cancer, left 2/15/2018       PAST SURGICAL HISTORY:  Past Surgical History:   Procedure Laterality Date    APPENDECTOMY      Yajrcg-cfncyv-st--lung N/A 1/4/2019    Performed by [facility] Diagnostic Provider at [facility] OR 2ND FLR    BRONCHOSCOPY N/A 8/28/2017    Performed by [facility] Provider at [facility] OR 2ND FLR    CATARACT EXTRACTION Bilateral 2018    Dr. Keith     COLONOSCOPY      COLONOSCOPY N/A 10/30/2017    Performed by [facility] at [facility] ENDO (4TH FLR)    COLONOSCOPY N/A 1/17/2014    [facility] at [facility] ENDO    ESOPHAGOGASTRODUODENOSCOPY (EGD) N/A 10/30/2017    Performed by [facility] ENDO (4TH FLR)    FOOT SURGERY Left     HYSTERECTOMY      INSERTION, IOL PROSTHESIS Left 11/8/2018    Performed by [facility] at [facility]  OR 1ST FLR    INSERTION, IOL PROSTHESIS Right 10/18/2018    Performed by [facility] MD at [facility] OR 1ST FLR    INSERTION-PORT N/A 11/14/2017    Performed by [facility] Diagnostic Provider at [facility] OR 2ND FLR    PHACOEMULSIFICATION, CATARACT Left 11/8/2018    Performed by [facility] MD at [facility] OR 1ST FLR    PHACOEMULSIFICATION, CATARACT Right 10/18/2018    Performed by [facility] MD at [facility] OR 1ST FLR    SKIN BIOPSY      TOTAL KNEE ARTHROPLASTY      right       SOCIAL HISTORY:  Social History     Socioeconomic History    Marital status: Single     Spouse name: Not on file    Number of children: Not on file    Years of education: Not on file    Highest education level: Not on file   Occupational History    Not on file   Social Needs    Financial resource strain: Not on file    Food insecurity:     Worry: Not on file     Inability: Not on file    Transportation needs:     Medical: Not on file     Non-medical: Not on file   Tobacco Use    Smoking status: Former Smoker    Smokeless tobacco: Never Used   Substance and Sexual Activity    Alcohol use: Yes     Comment: Occasionally    Drug use: No    Sexual activity: Not Currently   Lifestyle    Physical activity:     Days per week: Not on file     Minutes per session: Not on file    Stress: Not on file   Relationships    Social connections:     Talks on phone: Not on file     Gets together: Not on file     Attends Anabaptism service: Not on file     Active member of club or organization: Not on file     Attends meetings of clubs or organizations: Not on file     Relationship status: Not on file   Other Topics Concern    Not on file   Social History Narrative    Not on file       FAMILY HISTORY:  Family History   Problem Relation Age of Onset    Glaucoma Mother     No Known Problems Father     Hypertension Unknown     Kidney disease Unknown     Glaucoma Sister     Glaucoma Brother     No Known Problems Maternal Aunt     No Known  Problems Maternal Uncle     No Known Problems Paternal Aunt     No Known Problems Paternal Uncle     No Known Problems Maternal Grandmother     No Known Problems Maternal Grandfather     No Known Problems Paternal Grandmother     No Known Problems Paternal Grandfather     Colon polyps Neg Hx     Colon cancer Neg Hx     Esophageal cancer Neg Hx     Irritable bowel syndrome Neg Hx     Inflammatory bowel disease Neg Hx     Stomach cancer Neg Hx     Blindness Neg Hx     Macular degeneration Neg Hx     Retinal detachment Neg Hx     Amblyopia Neg Hx     Cancer Neg Hx     Cataracts Neg Hx     Diabetes Neg Hx     Strabismus Neg Hx     Stroke Neg Hx     Thyroid disease Neg Hx        ALLERGIES AND MEDICATIONS: updated and reviewed.  Review of patient's allergies indicates:   Allergen Reactions    Latex, natural rubber Rash    Codeine Hallucinations    Cortisporin [neomycin-bacitracin-poly-hc] Rash    Latex, natural rubber Rash     Medication List with Changes/Refills   Current Medications    DEXAMETHASONE (DECADRON) 2 MG TABLET    TAKE 1 TABLET (2 MG TOTAL) BY MOUTH EVERY 12 (TWELVE) HOURS.    DIAZEPAM (VALIUM) 5 MG TABLET    Take 1 tablet (5 mg total) by mouth every 6 (six) hours as needed.    DICLOFENAC (VOLTAREN) 50 MG EC TABLET    Take 1 tablet (50 mg total) by mouth 3 (three) times daily as needed.    DIPHENHYDRAMINE (BENADRYL) 25 MG CAPSULE    Take 1 capsule (25 mg total) by mouth nightly as needed for Itching or Insomnia.    GABAPENTIN (NEURONTIN) 300 MG CAPSULE    Take 1 capsule (300 mg total) by mouth every evening.    HYDROCODONE-ACETAMINOPHEN (NORCO) 5-325 MG PER TABLET    Take 1 tablet by mouth every 6 (six) hours as needed for Pain.    HYDROCODONE-ACETAMINOPHEN (NORCO) 5-325 MG PER TABLET    Take 1 tablet by mouth every 4 (four) hours as needed.    LEVOCETIRIZINE (XYZAL) 5 MG TABLET    Take 1 tablet (5 mg total) by mouth every evening.    LORAZEPAM (ATIVAN) 0.5 MG TABLET    TAKE 2  TABLETS (1 MG TOTAL) BY MOUTH EVERY 12 (TWELVE) HOURS AS NEEDED FOR ANXIETY.    METHOTREXATE 2.5 MG TAB    as needed.     METOCLOPRAMIDE HCL (REGLAN) 5 MG TABLET    Take 1 tablet (5 mg total) by mouth 2 (two) times daily as needed (belching, nausea).    METOPROLOL SUCCINATE (TOPROL-XL) 50 MG 24 HR TABLET    Take 1 tablet (50 mg total) by mouth once daily.    ONDANSETRON (ZOFRAN) 4 MG TABLET    Take 1 tablet (4 mg total) by mouth every 8 (eight) hours as needed (Nausea and vomiting).    PANTOPRAZOLE (PROTONIX) 40 MG TABLET    TAKE 1 TABLET (40 MG TOTAL) BY MOUTH ONCE DAILY.    TIZANIDINE (ZANAFLEX) 4 MG TABLET    Take 4 mg by mouth every 8 (eight) hours.    TRAMADOL (ULTRAM) 50 MG TABLET    Take 50 mg by mouth every 6 (six) hours.    TRAZODONE (DESYREL) 50 MG TABLET              CARE TEAM:  Patient Care Team:  Ginette Rosado MD (Inactive) as PCP - General (Family Medicine)  Shayne Wong MD as Consulting Physician (Hematology and Oncology)  Guru Antunez MD as Consulting Physician (Rheumatology)  Tatiana Crump MD as Consulting Physician (Physical Medicine and Rehabilitation)         REVIEW OF SYSTEMS:  Review of Systems   Constitutional: Positive for appetite change. Negative for fatigue and fever.   HENT: Negative for congestion and postnasal drip.    Eyes: Negative for photophobia and visual disturbance.   Respiratory: Positive for shortness of breath. Negative for cough and wheezing.    Cardiovascular: Negative for chest pain and palpitations.   Gastrointestinal: Positive for nausea and vomiting.   Musculoskeletal: Negative for back pain and neck pain.   Neurological: Negative for light-headedness and headaches.         PHYSICAL EXAM:   Vitals:    05/20/19 1010   BP: 126/70   Pulse: 96   Resp: 20   Temp: 97.9 °F (36.6 °C)             Body mass index is 34.46 kg/m².     General appearance - alert, well appearing, and in no distress and overweight  Mental status - normal mood, behavior, speech, dress,  motor activity, and thought processes  Eyes - sclera anicteric, left eye normal, right eye normal  Chest - clear to auscultation, no wheezes, rales or rhonchi, symmetric air entry  Heart - normal rate and regular rhythm  Neurological - alert, oriented, normal speech, no focal findings or movement disorder noted, motor and sensory grossly normal bilaterally  Extremities - no pedal edema noted, intact peripheral pulses      ASSESSMENT AND PLAN:  1. Pleural effusion  2. Secondary and unspecified malignant neoplasm of intrathoracic lymph nodes  - X-Ray Chest PA And Lateral; Future  - Notes and pertinent findings from recent ED visit reviewed.  - Advised for close follow up with oncology.  Has appt this Wednesday.    3. Nausea  - Continue PRN Zofran with addition of as needed Phenergan  - promethazine (PHENERGAN) 12.5 MG Tab; Take 1 tablet (12.5 mg total) by mouth every 6 (six) hours as needed (nausea or vomiting).  Dispense: 20 tablet; Refill: 0    4. Healthcare maintenance  - Mammo Digital Screening Bilat; Future    5. Encounter for screening mammogram for malignant neoplasm of breast   - Mammo Digital Screening Bilat; Future           Follow up 3 months or sooner as needed.

## 2019-05-23 PROBLEM — D47.2 MGUS (MONOCLONAL GAMMOPATHY OF UNKNOWN SIGNIFICANCE): Status: ACTIVE | Noted: 2019-01-01

## 2019-05-23 PROBLEM — D69.6 THROMBOCYTOPENIA: Status: RESOLVED | Noted: 2019-01-01 | Resolved: 2019-01-01

## 2019-05-23 NOTE — PROGRESS NOTES
CC: Squamous cell carcinoma, unknown primary, on chemotherapy, here for follow up         Oncology History:     Diagnosis: Squamous cell carcinoma , primary site unknown    Molecular/genetic testing: p16 negative; PD L1 could not be run due to inadequate tissue   Stage:        Stage 4   Treatment: Carboplatin/paclitaxel x  6 cycles (2/16/18 - 6/8/18)        HPI:  is a 73 y/o female who underwent bronchoscopy/EBUS evaluation on 8/31/17 for abnormal mediastinal adenopathy  and a 1.2 cm MORRO mass.     Bronchoscopy findings:    The oropharynx appears normal. The larynx appears normal. The vocal cords appear normal. The subglottic space is normal. The trachea is of normal caliber. The otf is sharp. The tracheobronchial tree was examined to at least the first subsegmental level. Bronchial mucosa and anatomy are normal; there are no endobronchial lesions, and no secretions.    Lymph Nodes: Lymph node sizing was performed via endobronchial ultrasound for suspected lung cancer. Sampling by transbronchial needle aspiration was also performed using an Olympus EBUS-TBNA 22  gauge needle in the subcarinal mediastinum (level 7) and sent for routine cytology.       - The 7 (subcarinal) node was 30 mm by EBUS. Three samples with the needle were obtained. The patient's condition was unchanged after the intervention.      Her treatment got delayed as she cancelled several appointments due to some issues at home. She started Caroplatin/paclitaxel palliatively. She had left thoracentesis on 2/9/18. There were atypical cells in the pleural fluid, highly suspicious for malignancy.  She completed cycles of Carboplatin/Paclitaxel, last treatment on 6/8/18.    She had repeat left pleural mass FNA in [facility] 2019. It was negative for malignancy, but showed lympho plasmacytic infiltration.           PET CT on 3/13/19 showed increased  hypermetabolism of the left pleural effusion.Thoracic vertebral body hypermetabolism was noted,  new from the prior exam (compared with PET from June 2018) without underlying CT changes which may represent red marrow hyperplasia or new site of neoplastic disease.  There was new hypermetabolism in the right L5-S1 facet joints which appeared to be centered at the facet joint not within the bone and is favored to represent degenerative change or neoplasm.  She has been non -compliant with her appointments here.      Interval history: She is here for a follow up visit. She has fatigue, dyspnea. She has intermittent nausea from the past 2 weeks, with emesis. She was evaluated She has worsening dyspnea. No weight loss. No headache.   She was evaluate don 5/8/19 for nausea and abdominal pain at ER and received IV fluids. On 5/13/19 she was again in the ER , this time for dyspnea. She was evaluated with chest X ray and thoracentesis was attempted, but the fluid volume was very little and was not done.  She has no constipation or diarrhea. No fever.         Review of Systems   Constitutional: Positive for malaise/fatigue and weight loss. Negative for fever.   HENT: Negative for ear discharge, ear pain, hearing loss, nosebleeds and tinnitus.    Eyes: Negative for blurred vision, double vision and photophobia.   Respiratory: Positive for shortness of breath. Negative for cough, hemoptysis and sputum production.    Cardiovascular: Negative for chest pain, palpitations, orthopnea and claudication.   Gastrointestinal: Positive for abdominal pain, nausea and vomiting. Negative for blood in stool, constipation, diarrhea and heartburn.   Genitourinary: Negative for dysuria, frequency and urgency.   Musculoskeletal: Positive for back pain and joint pain. Negative for myalgias.   Skin: Negative for rash.   Neurological: Negative for dizziness, tingling, tremors, speech change and headaches.   Endo/Heme/Allergies: Does not bruise/bleed easily.   Psychiatric/Behavioral: Negative for depression, hallucinations and suicidal ideas.                 Current Outpatient Medications   Medication Sig    gabapentin (NEURONTIN) 300 MG capsule Take 1 capsule (300 mg total) by mouth every evening. (Patient taking differently: Take 300 mg by mouth 3 (three) times daily. )    HYDROcodone-acetaminophen (NORCO) 5-325 mg per tablet Take 1 tablet by mouth every 6 (six) hours as needed for Pain.    levocetirizine (XYZAL) 5 MG tablet Take 1 tablet (5 mg total) by mouth every evening.    methotrexate 2.5 MG Tab as needed.     metoprolol succinate (TOPROL-XL) 50 MG 24 hr tablet Take 1 tablet (50 mg total) by mouth once daily.    ondansetron (ZOFRAN) 4 MG tablet Take 1 tablet (4 mg total) by mouth every 8 (eight) hours as needed (Nausea and vomiting).    pantoprazole (PROTONIX) 40 MG tablet TAKE 1 TABLET (40 MG TOTAL) BY MOUTH ONCE DAILY.    promethazine (PHENERGAN) 12.5 MG Tab Take 1 tablet (12.5 mg total) by mouth every 6 (six) hours as needed (nausea or vomiting).    traZODone (DESYREL) 50 MG tablet     LORazepam (ATIVAN) 0.5 MG tablet TAKE 2 TABLETS (1 MG TOTAL) BY MOUTH EVERY 12 (TWELVE) HOURS AS NEEDED FOR ANXIETY.     No current facility-administered medications for this visit.          Vitals:    05/23/19 0955   BP: 123/70   Pulse: 87   Resp: 18   Temp: 98.4 °F (36.9 °C)     PS: ECOG 2    Physical Exam   Constitutional: She is oriented to person, place, and time.   HENT:   Head: Normocephalic and atraumatic.   Mouth/Throat: No oropharyngeal exudate.   Eyes: Pupils are equal, round, and reactive to light. No scleral icterus.   Neck: Normal range of motion.   Cardiovascular: Normal rate.   Pulmonary/Chest: Effort normal. No respiratory distress. She has no wheezes.   She has globally diminished breath sounds   Abdominal: Soft. She exhibits no distension. There is no tenderness.   Musculoskeletal: She exhibits no edema.   Lymphadenopathy:     She has no cervical adenopathy.   Neurological: She is alert and oriented to person, place, and time. No  cranial nerve deficit.   Skin: Skin is warm.   Psychiatric: She has a normal mood and affect.         6/12/17 CT chest with cont      1. Large left pleural effusion resulting in atelectasis of the left lower lobe and portions of left upper lobe  2. 1.2 cm left upper lobe pleural based pulmonary nodule  3. Mediastinal adenopathy as described above with diffuse thickening of the wall of the distal esophagus. Findings are suspicious for neoplastic process. Recommend bronchoscopy biopsy and possible endoscopy for further evaluation.  4. 1.3 cm hypodensity within the dome of the liver. Metastatic focus cannot be entirely excluded.     9/7/17 PET CT       There is mildly increased tracer uptake within the patient's left pleural effusion and overlying the mediastinal adenopathy, max SUV 2.7.    Transmission CT images show continued pleural effusion and circumferential esophageal thickening. Transmission CT images also show non-avid bilateral groin adenopathy likely reactive in nature.      Impression        There is mildly increased tracer uptake within the patient's left pleural effusion and mediastinal adenopathy. While these findings suggest reactive etiology some low grade malignancies, such as bronchioloalveolar carcinoma, may show this level of uptake on PET scan.            10/30/17 EGD     The examined duodenum was normal.The entire examined stomach was normal.The cardia and gastric fundus were normal on retroflexion. A 1 cm hiatal hernia was found. The proximal extent of the gastric folds (end of tubular esophagus) was 38 cm from the incisors. The hiatal narrowing was 39 cm from the incisors. The Z-line was 38 cm from the incisors. Z-line was sharp. No esophagitis and no columnar esophagus and no lesions seen with NBI or white light.    Impression:                                   - Normal examined duodenum.                        - Normal stomach.                        - 1 cm hiatal  hernia.                        - No specimens collected.     10/30/17 colonoscopy :     Cecal polyp ( 3mm) identified  Histopathology: Tubular adenoma        Pathology:     1/17/14 colonoscopy     FINAL PATHOLOGIC DIAGNOSIS     1. Colon biopsy, ascending colon-tubular adenoma.  2. Colon biopsy, transverse-tubular adenoma.  3. 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 The stalk and base of the polyp are well represented and are free of atypia.  4. Colon biopsy, sigmoid- mild glandular hyperplasia consistent with a benign hyperplastic polyp.        7/19/17 PLEURAL FLUID (CYTOLOGY AND CELL BLOCK):     -Groups of atypical cells present, malignancy cannot be excluded.  Note: While these cells could be reactive mesothelial cells, the level of atypia could be seen in malignancy. A cell block was prepared but the atypical cells are not present in the cell block for further characterization. Correlate  clinically. Repeat tap/biopsy might be helpful if clinically indicated.     8/28/17 EBUS FNA     FNA of subcarinal lymph node: Positive for malignant cells  Small clusters of malignant epithelial cells, favor squamous carcinoma Tumor cells are positive for CK 5/6 and CK 7. Immunostains for p63 and TTF-1 are negative.     1/26/18 CT chest, abdomen,pelvis with cont         Comparison: June 12, 2017    Chest: Since prior exam there is been interval enlargement of the right pleural effusion. There is high density material seen within the right pleural effusion that was not visualized on prior exam.    There is a 5.4 cm enhancing mass seen at the left lung base that previously measured approximately 2.9 cm.    There is a subcarinal mass measuring 4.4 cm it previously measured 2.9 cm.    There is circumferential esophageal thickening adjacent to this mass.    There is volume loss in the right chest with  mediastinal shift to the right more pronounced than what was seen on prior exam.    Patient is a right internal jugular Port-A-Cath.    Abdomen/pelvis: The liver, spleen, adrenal glands, kidneys and pancreas show a normal contrasted appearance. There is no evidence bowel obstruction. There is no evidence of abdominal or pelvic lymphadenopathy. The osseous structures show degenerative change of the spine.   Impression       Since prior exam in the patient's subcarinal and left lung base masses have increased in size. In addition there is now high density material seen within the patient's left pleural effusion new from prior exam and concerning for hemorrhage possibly from the mass.         1/26/18 CT head with cont          Comparison: CT June 8, 2017    Technique: Contiguous axial images were acquired from vertex to skull base without contrast.    Findings: There is no evidence acute intracranial abnormality.  Specifically there is no evidence acute infarct, contusion, extra-axial fluid collection or midline shift.  The ventricles are normal in size and configuration.  The calvarium is intact.  The paranasal sinuses and mastoid air cells are clear.    There is no evidence of enhancing mass.   Impression       There is no evidence of enhancing mass within the cerebral parenchyma      2/9/18 FINAL PATHOLOGIC DIAGNOSIS  LEFT LUNG, PLEURAL FLUID (CYTOLOGY):  - Scant groups of atypical cells, malignancy cannot be excluded. Note: Scant atypical groups of cells are present. Differential diagnosis includes reactive atypia vs malignancy .  Immunohistochemistry for CK5/6 and p63 is non-contributory. Please clinically correlate and re-sample as indicated.  All stains have satisfactory positive and negative controls.     5/14/18 CTA CHEST NON CORONARY       .    COMPARISON:  CT chest abdomen and pelvis from 01/26/2018.    FINDINGS:  Structures at the base of the neck are unremarkable.  Right-sided chest port is visualized with  distal tip in the SVC.  Aorta is non-aneurysmal.  Heart is mildly enlarged with interval development of moderate pericardial effusion.  There is mild pericardial enhancement visualized.  The pulmonary arteries distribute normally. No intraluminal filling defects to suggest pulmonary thromboembolism to the level of the proximal segmental branches.    The trachea and bronchi are patent.  Moderate left and small right sided pleural effusions are visualized resulting in volume loss and compressive atelectasis within the lower lobes, left greater than right.  Grossly stable mass visualized in the right subcarinal/perihilar region.  There has been interval reduced size of multifocal left-sided pulmonary masses and anterior mediastinal lymphadenopathy.    Stable hepatic hypodensity is visualized within the anterior aspect of the right hepatic lobe.  Prominent right cardiophrenic lymph nodes are seen which have increased in size.  Redemonstration of diffuse abnormal distal esophageal wall thickening versus adjacent soft tissue lesion.  No acute osseous abnormality identified.  Extrathoracic soft tissues are unremarkable.   Impression        1. Interval development of moderate pericardial effusion with mild pericardial enhancement.  Findings may reflect malignant pericardial effusion.  2. No evidence of PE.  3. Patient with known metastatic cancer.  Relatively stable size mass in the right perihilar/subcarinal region.  Interval reduced size of previously visualized multifocal left lung metastatic lesions and anterior mediastinal lymphadenopathy.  Cardiac phrenic lymph nodes have increased in size.  Persistent abnormal prominent circumferential distal esophageal wall thickening versus surrounding soft tissue mass.  Examination was performed in an emergency setting and not to serve as a restaging scan.  4. Moderate left and small right pleural effusions resulting in volume loss and compressive atelectasis within the lower  lobes, left greater than right.         6/7/18 PET CT      FINDINGS:  Patient was administered 12.23 millicuries of FDG intravenously.  Comparison is 09/07/2017.    There is physiologic intracranial, head, and neck activity.  Heart mediastinum are normal.  There is low-grade activity within the left pleural effusion.  There is physiologic liver, spleen, GI  and pelvic organ activity.  No bone lesions are seen.  There is left lower lobe retro cardiac atelectasis.   Impression        See above    Low-grade activity within a loculated left upper pleural effusion.  This could be benign/reactive.  Malignancy not excluded but it has improved since the prior study.         9/8/18 CT          COMPARISON:  New medicine PET-CT from 06/07/2018, CTA chest from 05/14/2018, and CT chest/abdomen/pelvis from 01/26/2018.    FINDINGS:  Thoracic soft tissues: Right-sided chest port in place with catheter tip terminating in the distal SVC.    Aorta: Normal in course and caliber, without significant atherosclerotic plaque. There are three branching vessels at the arch.    Heart: Mildly enlarged.  No pericardial effusion.    Flaca/Mediastinum: Subcarinal soft tissue mass re-identified the measuring 5.7 x 3.7 cm x 6.2 cm, similar to prior CTA.  Previously described prominent cardiophrenic lymph nodes are now within normal limits for size.  Abundant soft tissues again seen surrounding the distal 3rd of the esophagus, findings are similar to prior study and may represent the extensive esophageal wall thickening versus extension of the mediastinal soft tissue mass.    Lungs: The interval decrease in size of known left malignant effusion with small amount of residual dependent complex fluid and mild adjacent atelectatic changes.  The there is continued nodular left pleural thickening.  Previously described mass at the medial left lung base re-identified measuring 4.3 x 2.2 cm, findings are not well evaluated on previous CTA due to  surrounding pleural fluid but is decreased in size in comparison to most recent CT chest/abdomen/pelvis from 01/26/2018.  The no new lesions identified.  Right lung is grossly clear.  No new lung lesions identified.    Liver: Normal in size and attenuation.  Stable 1.2 cm hypoattenuating lesion in the dome of the liver consistent with simple cyst.  No new lesions identified.    Gallbladder: No calcified gallstones.    Bile Ducts: No evidence of dilated ducts.    Pancreas: No mass or peripancreatic fat stranding.    Spleen: Unremarkable.    Adrenals: Unremarkable.    Kidneys/ Ureters: Normal in size and location. Normal concentration of contrast. No hydronephrosis or nephrolithiasis. No ureteral dilatation.    Bladder: Incompletely distended.    Reproductive organs: Status post hysterectomy.    GI Tract/Mesentery: No evidence of bowel obstruction or inflammation.    Peritoneal Space: No ascites. No free air.    Retroperitoneum:  No significant adenopathy.    Abdominal wall:  Unremarkable.    Vasculature: No significant atherosclerosis or aneurysm.    Bones: Thoracic kyphosis. No acute fracture.Stable degenerative changes seen throughout the spine.       Impression         Subcarinal soft tissue mass re-identified and stable in size as compared to the most recent CTA chest from 05/24/2018, lesion demonstrates mild interval decrease in size as compared to the CT chest/abdomen/pelvis from 01/26/2018.    Interval decrease in size of known left malignant pleural effusion.    Medial left lung base mass, findings are not well evaluated on previous CTA chest due to surrounding pleural fluid but has decreased in size in comparison to most recent CT chest/abdomen/pelvis from 01/26/2018.  Continued nodular left pleural thickening, overall less prominent on today's exam.    Abundant soft tissue again seen surrounding the distal 3rd of the esophagus, findings are similar to prior study and may represent the extensive esophageal  wall thickening versus extension of mediastinal soft tissue mass.    Previously described prominent cardiophrenic lymph nodes are now within normal limits for size.  No new lymphadenopathy.    Additional stable findings as above.            12/4/18 CT neck, chest, abdomen, pelvis with cont            COMPARISON:  CTA 09/08/2018    FINDINGS:  Orbits, paranasal sinuses, and skull base: Postoperative changes of bilateral lens replacement.  No orbital masses.  Paranasal sinuses and mastoid air cells clear.  Visualized intracranial structures are unremarkable.    Nasopharynx: Normal.    Suprahyoid neck: Edentulous.  Other wise unremarkable oral cavity.  Normal oropharynx, parapharyngeal space, and retropharyngeal space.    Infrahyoid neck: Normal larynx, hypopharynx, and supraglottis.    Thyroid: Normal.    Cervical lymph nodes: No lymph node enlargement.    Vascular structures: Jugular veins appear patent.  Right internal jugular port catheter with tip terminating within the SVC.    Thoracic soft tissues: Port within the right anterior chest wall.    Aorta: Left-sided aortic arch.  No aneurysm.  There is mild calcific atherosclerosis of the descending thoracic aorta and abdominal aorta.  Aortic valve calcification.    Heart: Normal size.  No pericardial effusion.  Mild coronary artery atherosclerosis.    Pulmonary vasculature: Pulmonary arteries distribute normally.  There are four pulmonary veins.    Flaca/Mediastinum: Subcarinal mass with multiple calcifications.  This measures 4.4 cm in short axis, previously measuring 4.0 cm.  Extensive abnormal soft tissue attenuation along the esophagus potentially representing esophageal wall thickening or extension of this subcarinal mass--- this appears similar to prior examination.  No new mediastinal or hilar lymph node enlargement.    Airways: Patent.    Lungs/Pleura: There is a small volume of dependent left pleural fluid with scattered hyperattenuating material and nodular  pleural thickening, similar to slightly decreased in volume from 09/08/2018.  There is a pleural based mass with calcifications abutting the pleura and posterior mediastinum along the medial margin of the left hemidiaphragm.  This measures 4.3 cm in long axis appears unchanged in size from prior examination.  There is compressive atelectasis of the left lower lobe secondary to pleural fluid.  The lungs are otherwise well aerated and clear.    Esophagus: Abundant soft tissue along the distal 3rd of the esophagus as above.  Proximal esophagus is normal in caliber and course.    Liver: Normal in size and attenuation.  Stable cyst at the dome.    Gallbladder: No calcified stones or gallbladder wall thickening.    Bile Ducts: No dilatation.    Pancreas: No mass. No peripancreatic fat stranding.    Spleen: Unremarkable.    Adrenals: Unremarkable.    Kidneys/Ureters: Normal in size and enhancement.  Small hypodensity within the upper pole of the right kidney, too small to definitively characterize and likely a cyst.  No definite solid renal masses.  No hydroureteronephrosis.    Bladder: No wall thickening.    Reproductive organs: Status post hysterectomy.    GI Tract/Mesentery: No evidence of bowel obstruction or inflammation. Appendix is not identifiable but there is no infllammation in the expected region of the appendix.    Peritoneal Space: No ascites or free air.    Retroperitoneum: No lymph node enlargement.    Abdominal wall: Normal.    Vasculature: Brachiocephalic veins and SVC are patent.  Portal vein, portal venous branches, SMV, and splenic veins are patent..    Bones: No fracture or aggressive osseous lesion.  Multilevel degenerative changes of the cervical, thoracic, and lumbar spine.         Impression         1.  Stable left pleural based mass.  Stable small volume of left pleural fluid and nodule left pleural thickening compatible with malignant pleural effusion.    2.  Slight interval increase in size of  subcarinal soft tissue mass.  Stable abundant soft tissue attenuation surrounding the distal 3rd of the esophagus.    RECIST SUMMARY:    Date of prior examination for comparison: 09/08/2018    Lesion 1: Subcarinal soft tissue mass.  Short axis measurement 4.4 cm cm. Series 2 image 42.  Prior measurement 4.0 cm.    Lesion 2: Left pleural base mass along the left hemidiaphragm.  4.3 cm. Series 2 image 66.  Prior measurement 4.3 cm.    Stable abutting soft tissue along the distal 3rd of the esophagus.    3.  No significant abnormality within the neck to account for this patient's facial edema.  No convincing evidence of SVC syndrome or jugular vein thrombus.         1/4/19 left pleural FNA , FINAL PATHOLOGIC DIAGNOSIS    Pleural, left, mass, CT-guided biopsy: Fragments of soft tissue with lymphoplasmacytic infiltration. No definitive morphologic evidence of lymphoma or carcinoma. See comment.  Comment: Immunohistochemical studies were performed on the paraffin embedded tissue block with adequate positive  and negative controls. Mixed mostly T cells (CD3 positive, CD5 positive, CD 45 positive, BCL-2 positive) and occasional B cells (CD20 positive, [place] 5 positive, CD10 negative, cyclin D1 negative, low Ki-67) are evident. In situ hybridization for kappa and lambda shows polytypic plasma cells. No calretinin positive, CK 5/6 positive, CK 7 positive, P 63 positive large atypical epithelial cells are evident. There is no definitive evidence of lymphoma or carcinoma on the limited tissue examined. Recommend excisional biopsy if clinically highly suspicious for lymphoma or carcinoma.        3/13/19 PET CT          COMPARISON:  Nuclear medicine PET-CT 06/07/2018    FINDINGS:  Quality of the study: Adequate.    Redemonstration of hypermetabolism in the left pleural effusion.  SUV max on today's exam is 3.98.  SUV max was previously 2.25.    New hypermetabolism in the right L5-S1 facet joints with SUV max 3.76.  This may be  degenerative or neoplastic noting limited underlying CT changes centered in the joint which favor degenerative change.    Thoracic vertebral body hypermetabolism adjacent to the loculated hypermetabolic pleural effusion with SUV max of 4.03.  This is increased in comparison the prior exam when it was not notable.  No underlying CT changes.    Physiologic uptake of the tracer is present within the brain, salivary glands, myocardium, GI and  tracts.    Significant incidental CT findings: N/A       Impression         Increased hypermetabolism of the left pleural effusion.    Thoracic vertebral body hypermetabolism new from the prior exam without underlying CT changes which may represent red marrow hyperplasia or new site of neoplastic disease.    New hypermetabolism in the right L5-S1 facet joints which appears to be centered at the facet joint not within the bone and is favored to represent degenerative change or neoplasm.          Component      Latest Ref Rng & Units 3/19/2019   IgG - Serum      650 - 1600 mg/dL 1680 (H)   IgA      40 - 350 mg/dL 314   IgM      50 - 300 mg/dL 134     3/19/19 SPEP: Normal total protein. There is a paraprotein band in gamma = 0.63 g/dL.  Correlate with JAHAIRA results  3/19/19 serum JAHAIRA: IgG lambda specific monoclonal band present.       Component      Latest Ref Rng & Units 5/23/2019   WBC      3.90 - 12.70 K/uL 6.39   RBC      4.00 - 5.40 M/uL 3.85 (L)   Hemoglobin      12.0 - 16.0 g/dL 10.6 (L)   Hematocrit      37.0 - 48.5 % 34.1 (L)   MCV      82 - 98 fL 89   MCH      27.0 - 31.0 pg 27.5   MCHC      32.0 - 36.0 g/dL 31.1 (L)   RDW      11.5 - 14.5 % 13.4   Platelets      150 - 350 K/uL 263   MPV      9.2 - 12.9 fL 10.2   Immature Granulocytes      0.0 - 0.5 % 0.3   Gran # (ANC)      1.8 - 7.7 K/uL 4.1   Immature Grans (Abs)      0.00 - 0.04 K/uL 0.02   Lymph #      1.0 - 4.8 K/uL 1.4   Mono #      0.3 - 1.0 K/uL 0.6   Eos #      0.0 - 0.5 K/uL 0.2   Baso #      0.00 - 0.20 K/uL  0.02   nRBC      0 /100 WBC 0   Gran%      38.0 - 73.0 % 64.7   Lymph%      18.0 - 48.0 % 21.6   Mono%      4.0 - 15.0 % 9.5   Eosinophil%      0.0 - 8.0 % 3.6   Basophil%      0.0 - 1.9 % 0.3   Differential Method       Automated   Sodium      136 - 145 mmol/L 143   Potassium      3.5 - 5.1 mmol/L 3.3 (L)   Chloride      95 - 110 mmol/L 103   CO2      23 - 29 mmol/L 29   Glucose      70 - 110 mg/dL 105   BUN, Bld      8 - 23 mg/dL 6 (L)   Creatinine      0.5 - 1.4 mg/dL 0.8   Calcium      8.7 - 10.5 mg/dL 9.9   PROTEIN TOTAL      6.0 - 8.4 g/dL 7.7   Albumin      3.5 - 5.2 g/dL 2.8 (L)   BILIRUBIN TOTAL      0.1 - 1.0 mg/dL 0.3   Alkaline Phosphatase      55 - 135 U/L 74   AST      10 - 40 U/L 11   ALT      10 - 44 U/L 8 (L)   Anion Gap      8 - 16 mmol/L 11   eGFR if African American      >60 mL/min/1.73 m:2 >60.0   eGFR if non African American      >60 mL/min/1.73 m:2 >60.0       Assessment:     1. is a 74 y/o female who underwent bronchoscopy/EBUS evaluation on 8/31/17 for abnormal mediastinal adenopathy  and a 1.2 cm MORRO mass as well as pericardial adenopathy.FNA of subcarinal lymph node was positive for malignant cells.  There were small clusters of malignant epithelial cells, favoring squamous carcinoma. Site of origin is not clear. PET CT done on 9/7/17 did not reveal any lesion in head and neck region.  There was inadequate tissue to run PD L1.p16 was negative.    Esophageal thickening was noted on CT done on 6/12/17. EGD on 10/30/17 did not reveal any suspicious lesions in esophagus/stomach. ENT evaluation still pending.   I explained to her that if primary site is unclear, she will be treated as metastatic SCC of unknown primary.   Her treatment got delayed as she cancelled several appointments due to some issues at home. She said she could not have MRI due to claustrophobia, even with anti-anxiety medication. CT chest from 1/26/18 showed increase in the sizes of subcarinal and left lung base  masses compared to previous CT in June 2017 . High density material was seen within the patient's left pleural effusion. No intracranial lesions noted on CT head. I discussed these findings with the patient and personally reviewed the CT scans from January 2018.  I told her that she has likely metastatic SCC of unknown primary. She does have RA history. It is unclear if she can tolerate check point inhibitor therapy even if she has high PD1 expression on cytology/biopsy.  She started Carboplatin/Paclitaxel every 3 weeks. I had explained to her that rationale of treatment is palliation and not cure . She did not get her CTs after C4 as planned. PET CT before cycle 6, on 6/7/18 showed low-grade activity within the left pleural effusion.   CT done on 9/8/18 showed overall stable findings compared to her CTs from May 2018. Pleural effusion had resolved. She is still dyspneic, but her cough has resolved. Her performance status is better. She tolerates more activities now, than previously. She has facial puffiness.   No evidence of SVC syndrome/IJ thrombus noted on CT done on 12/4/18. Subcarinal soft tissue mass measures 4.4 cm cm, previously (in Sept 2018) measuring 4.0 cm. Left pleural base mass along the left hemidiaphragm measured 4.3 cm, unchanged from Sept 2018. No other lesions noted in rest of the lungs, abdomen, neck or pelvis.     PET CT on 3/13/19 showed increased  hypermetabolism of the left pleural effusion.Thoracic vertebral body hypermetabolism was noted, new from the prior exam (compared with PET from June 2018) without underlying CT changes which may represent red marrow hyperplasia or new site of neoplastic disease.  There was new hypermetabolism in the right L5-S1 facet joints which appeared to be centered at the facet joint not within the bone and is favored to represent degenerative change or neoplasm.          2. Pleural effusion: She had left sided thoracentesis ( 1050ml) on 2/9/18. Malignancy could  not be excluded. No  Indication for pleurex catheter/reepat thoracentesis at this time.    CTA done on 5/14/18 again showed moderate effusion on the left , mild on right. PET CT on 3/13/19 again showed left pleural effusion with hypermetabolism. Thoracentesis, cytology, labs ordered.       3. Anorexia: Secondary to #1. She was on Cyproheptadine.However, her appetite was still poor. She was started on Decadron 2mg BID. It helped with her appetite. Howver, it caused her to gain weight as well as disturbed her sleep. She has stopped Decadron now      4. Anemia: Hgb 11.3 on 11/28/18. Normocytic, hypochromic. No overt bleeding. Iron panel on 4/19 showed iron deficiency.      5. Pericardial effusion: She was noted to have pericardial effusion on CTA chest done on 5/14/18. Possibly malignant. No symptoms/signs of tamponade. Now resolved.         6. Left hip pain: Possibly secondary to osteo/rhaumtoid arthritis       7. Left pleural mass: It was biopsied on 1/4/19. Fragments of soft tissue with lymphoplasmacytic infiltration were seen. No definitive morphologic evidence of lymphoma or carcinoma seen. SPEP on 3/129/19 showed a paraprotein band in gamma, measuring 0.63 g/dL.  Serum JAHAIRA demonstrated a IgG lambda specific monoclonal band. Calcium, creatinine normal. She has mild anemia.      8. Nausea and emesis: She is on Phenergan. She will also try Zofran as needed. She has no abdominal distention. She will have CT.                  Distress Screening Results: Psychosocial Distress screening score of Distress Score: 0 noted and reviewed. No intervention indicated.

## 2019-05-31 NOTE — PLAN OF CARE
START ON PATHWAY REGIMEN - Non-Small Cell Lung    [id number]        Atezolizumab (Tecentriq(R))           Additional Orders: Severe immune-mediated reactions can occur (e.g.   pneumonitis, colitis, and hepatitis).  See prescribing information for more   details including monitoring and required immediate management with steroids.   Monitor thyroid and liver function tests at baseline and periodically during   therapy.    References:  Kacie et al. Lancet. 2016 May 7;387(12044):2062-41    Tecentriq(TM) (atezolizumab) prescribing information, 5/2016    **Always confirm dose/schedule in your pharmacy ordering system**    Patient Characteristics:  Stage IV Metastatic, Squamous, PS = 2, Second Line, No Prior PD-1/PD-L1    Inhibitor and Immunotherapy Candidate  AJCC T Category: TX  Current Disease Status: Distant Metastases  AJCC N Category: NX  AJCC M Category: M1  AJCC 8 Stage Grouping: IV  Histology: Squamous Cell  Line of therapy: Second Line  PD-L1 Expression Status: Quantity Not Sufficient  Performance Status: PS = 2  Immunotherapy Candidate Status: Candidate for Immunotherapy  Prior Immunotherapy Status: No Prior PD-1/PD-L1 Inhibitor  Intent of Therapy:  Non-Curative / Palliative Intent, Discussed with Patient

## 2019-06-03 NOTE — TELEPHONE ENCOUNTER
Spoke with patient.  Advised her to proceed to ED.  She will proceed to ED, as soon as her grandson cane bring her. She thanked nurse.

## 2019-06-03 NOTE — ED PROVIDER NOTES
"Encounter Date: 6/3/2019    SCRIBE #1 NOTE: I, Mari Robb, am scribing for, and in the presence of,  Domenico Montanez MD. I have scribed the following portions of the note - Other sections scribed: HPI ROS ED NOTE.       History     Chief Complaint   Patient presents with    Vomiting     vomiting x 1 week.  Pt denies pain     Time patient was seen by the provider: 4:44 PM      The patient is a 73 y.o. female with co-morbidities including: HTN, GERD, squamous cell lung cancer, who presents to the ED with a complaint of emesis that began 2 weeks ago and has been constant with associated fatigue. Patient has not been able to tolerate PO since then. She was given Zofran and Phenergan which has not resolved her symptoms. Patient also reports having a UTI 4 weeks ago and she has not been able to take her antibiotics because she hasn't been able to tolerate PO. Patient also endorses dyspnea on exertion, which has been constant and has not gotten worse. She also reports "gas pain" but was unable to further explain. Patient reports having three episode of diarrhea today. Denies abdominal pain, fever, chills, diaphoresis, sore throat, difficulty swallowing, leg swelling, chest pain, or vision problem. Patient had a CT abdomen, chest, pelvis on May 31.     The history is provided by the patient.     Review of patient's allergies indicates:   Allergen Reactions    Latex, natural rubber Rash    Codeine Hallucinations    Cortisporin [neomycin-bacitracin-poly-hc] Rash    Latex, natural rubber Rash     Past Medical History:   Diagnosis Date    Chronic obstructive pulmonary disease 5/14/2019    Encounter for blood transfusion     GERD (gastroesophageal reflux disease)     HTN (hypertension)     Rheumatoid arthritis     Rheumatoid arthritis(714.0)     Squamous cell lung cancer, left 2/15/2018     Past Surgical History:   Procedure Laterality Date    APPENDECTOMY      Ppcgfu-txaxlf-jh--lung N/A 1/4/2019    " Performed by [facility] at [facility] OR 2ND FLR    BRONCHOSCOPY N/A 8/28/2017    Performed by [facility] OR 2ND FLR    CATARACT EXTRACTION Bilateral 2018    Dr. Keith     COLONOSCOPY      COLONOSCOPY N/A 10/30/2017    Performed by Qu[facility]ppola [facility] at [facility] ENDO (4TH FLR)    COLONOSCOPY N/A 1/17/2014    Per[facility] by [facility] at [facility] END[facility]    ESOPHAGOGASTRODUODENOSCOPY (EGD) N/A 10/30/2017    Performed by [facility] at [facility] ENDO (4TH FLR)    FOOT SURGERY Left     HYSTERECTOMY      INSERTION, IOL PROSTHESIS Left 11/8/2018    Performed by Susan Keith MD at [facility] OR 1ST FLR    INSERTION, IOL PROSTHESIS Right 10/18/2018    Performed by [facility] at [facility] OR 1ST FLR    INSERTION-PORT N/A 11/14/2017    Performed by [facility] at [facility] OR 2ND FLR    PHACOEMULSIFICATION, CATARACT Left 11/8/2018    Performed by [facility] MD at [facility] OR 1ST FLR    PHACOEMULSIFICATION, CATARACT Right 10/18/2018    Performed by [facility] at [facility] OR 1ST FLR    SKIN BIOPSY      TOTAL KNEE ARTHROPLASTY      right     Family History   Problem Relation Age of Onset    Glaucoma Mother     No Known Problems Father     Hypertension Unknown     Kidney disease Unknown     Glaucoma Sister     Glaucoma Brother     No Known Problems Maternal Aunt     No Known Problems Maternal Uncle     No Known Problems Paternal Aunt     No Known Problems Paternal Uncle     No Known Problems Maternal Grandmother     No Known Problems Maternal Grandfather     No Known Problems Paternal Grandmother     No Known Problems Paternal Grandfather     Colon polyps Neg Hx     Colon cancer Neg Hx     Esophageal cancer Neg Hx     Irritable bowel syndrome Neg Hx     Inflammatory bowel disease Neg Hx     Stomach cancer Neg Hx     Blindness Neg Hx     Macular degeneration Neg Hx     Retinal detachment Neg Hx     Amblyopia Neg Hx     Cancer Neg Hx      Cataracts Neg Hx     Diabetes Neg Hx     Strabismus Neg Hx     Stroke Neg Hx     Thyroid disease Neg Hx      Social History     Tobacco Use    Smoking status: Former Smoker    Smokeless tobacco: Never Used   Substance Use Topics    Alcohol use: Yes     Comment: Occasionally    Drug use: No     Review of Systems   Constitutional: Positive for fatigue. Negative for fever.   HENT: Negative for sore throat and trouble swallowing.    Respiratory: Negative for chest tightness and shortness of breath.    Cardiovascular: Negative for chest pain.   Gastrointestinal: Positive for diarrhea, nausea and vomiting.   Genitourinary: Negative for dysuria.   Musculoskeletal: Negative for back pain.   Skin: Negative for rash.   Neurological: Negative for weakness.   Hematological: Does not bruise/bleed easily.       Physical Exam     Initial Vitals [06/03/19 1511]   BP Pulse Resp Temp SpO2   127/73 92 16 98 °F (36.7 °C) 96 %      MAP       --         Physical Exam    Constitutional: She is cooperative. She appears ill. No distress.   HENT:   Head: Normocephalic and atraumatic.   Mouth/Throat: Oropharynx is clear and moist.   Eyes: EOM are normal. No scleral icterus.   Neck: Normal range of motion. Neck supple. No JVD present.   Cardiovascular: Normal rate, regular rhythm and normal heart sounds.   Pulmonary/Chest: No accessory muscle usage. No tachypnea. No respiratory distress. She has decreased breath sounds in the left upper field, the left middle field and the left lower field.   Abdominal: She exhibits no distension. There is no tenderness.   Musculoskeletal: Normal range of motion.   Neurological: She is alert. She has normal strength. No cranial nerve deficit. GCS eye subscore is 4. GCS verbal subscore is 5. GCS motor subscore is 6.   Skin: Skin is warm and dry.         ED Course   Procedures  Labs Reviewed   CBC W/ AUTO DIFFERENTIAL - Abnormal; Notable for the following components:       Result Value    Hemoglobin  11.1 (*)     Mean Corpuscular Hemoglobin Conc 29.8 (*)     All other components within normal limits   COMPREHENSIVE METABOLIC PANEL - Abnormal; Notable for the following components:    Potassium 3.4 (*)     BUN, Bld 6 (*)     Albumin 2.9 (*)     ALT 9 (*)     All other components within normal limits   URINALYSIS, REFLEX TO URINE CULTURE - Abnormal; Notable for the following components:    Appearance, UA Cloudy (*)     Protein, UA 1+ (*)     Ketones, UA 1+ (*)     Bilirubin (UA) 1+ (*)     Occult Blood UA 1+ (*)     Leukocytes, UA Trace (*)     All other components within normal limits    Narrative:     Preferred Collection Type->Urine, Clean Catch   URINALYSIS MICROSCOPIC - Abnormal; Notable for the following components:    WBC, UA 8 (*)     Yeast, UA Occasional (*)     Hyaline Casts, UA 8 (*)     All other components within normal limits    Narrative:     Preferred Collection Type->Urine, Clean Catch   LACTIC ACID, PLASMA   TROPONIN I   B-TYPE NATRIURETIC PEPTIDE   LIPASE   MAGNESIUM   PROTIME-INR   PROTIME-INR          Imaging Results    None          Medical Decision Making:   History:   Old Medical Records: I decided to obtain old medical records.  Old Records Summarized: records from clinic visits.       <> Summary of Records: Records summarized in HPI  Initial Assessment:   Patient presenting to the ED with chief complaint of vomiting off and on for 2 weeks patient is a lung cancer patient but not undergoing active treatment she states that she has been unable to keep anything down about what she tries from water to ensure.  States has had some weakness because of this also states that she is to contact her physician to see how the previous medications have been working which apparently they have  Independently Interpreted Test(s):   I have ordered and independently interpreted EKG Reading(s) - see prior notes  Clinical Tests:   Lab Tests: Ordered and Reviewed  Radiological Study: Reviewed  Medical Tests:  Ordered and Reviewed  ED Management:  Will give IV fluids and contemplate the use of antiemetics if there is a difficulty here will also get labs            Scribe Attestation:   Scribe #1: I performed the above scribed service and the documentation accurately describes the services I performed. I attest to the accuracy of the note.    Attending Attestation:             Attending ED Notes:   Labs and electrolytes appear to be good. Good renal function. These do not seem to reflect the history of the patient vomiting and not being able to keep anything down. We are waiting a urine analysis.   Urinalysis came back likewise normal we must mention that reviewing the CT of chest abdomen pelvis apparently there seems to be a progression of the disease and a could be that this is why she is having difficulty with significant nausea and vomiting will give Reglan and hopes that this might achieve some sense of health her she is also supposed to use some Benadryl for side effects patient is discharged             Clinical Impression:       ICD-10-CM ICD-9-CM   1. Non-intractable vomiting with nausea, unspecified vomiting type R11.2 787.01   2. Vomiting R11.10 787.03         Disposition:   Disposition: Discharged  Condition: Stable                        Domenico Montanez MD  06/03/19 [id number]

## 2019-06-03 NOTE — TELEPHONE ENCOUNTER
----- Message from Miguelina Tran sent at 6/3/2019  9:41 AM CDT -----  Contact: self  [phone]  .Type: Patient Call Back    Who called: self    What is the request in detail: Patient would like to speak to nurse or dr in regards to getting in today. C/O throwing up, not feeling well. Found an appointment for tomorrow, 06-. Please call.    Would the patient rather a call back or a response via [facility]?  Call back    Best call back number: [phone]

## 2019-06-03 NOTE — TELEPHONE ENCOUNTER
I called and spoke to the pt. She has no transportation for today. She made an appointment for tomorrow with Dr. Krause.

## 2019-06-03 NOTE — TELEPHONE ENCOUNTER
----- Message from July Haywood sent at 6/3/2019 12:01 PM CDT -----  Contact: Pt  Pt calling and requesting to speak to someone, she's not feeling well and will like to schedule a appt. She can be reached at [phone].     Thank you

## 2019-06-03 NOTE — ED NOTES
LOC: The patient is awake, alert and aware of environment with an appropriate affect, the patient is oriented x 3 and speaking appropriately.  APPEARANCE: Patient in no acute distress, patient is clean and well groomed, patient's clothing is properly fastened.  SKIN: The skin is warm and dry, color consistent with ethnicity, skin intact, no breakdown or bruising noted.  MUSCULOSKELETAL: Patient moving all extremities spontaneously, no obvious swelling or deformities noted.  RESPIRATORY: Airway is open and patent, respirations are spontaneous, patient has a normal effort and rate, no accessory muscle use noted  CARDIAC: Patient has a normal rate, no peripheral edema noted, capillary refill < 3 seconds.  ABDOMEN: no distention noted  NEUROLOGIC: eyes open spontaneously, behavior appropriate to situation, follows commands, facial expression symmetrical purposeful motor response noted

## 2019-06-03 NOTE — TELEPHONE ENCOUNTER
Spoke with patient.  She notes not being able to hold anything down x 1 week. Denies nausea.   She endorses weakness and feeling dry.  She denies mouth sores, dysphagia, odynophagia.   Water, soft, and solid foods ---she has emesis with it all.  She is taking zofran 4mg every 8 hours and alternating that with phenergan 12.5mg every 6 hours.  She is thinking she is on the verge on dehydration again,    Message routed to dr new (would you like cbc/cmp and 1 liter NS?)

## 2019-06-03 NOTE — ED NOTES
"Patient stating she has been vomiting "all day, every day" for 2 weeks. Reports she can't keep water down. Denies fever.  Patient reports diarrhea that started today, 4 episodes.  Hasn't been able to take meds in 3 days due to vomiting.  Hx of lung CA and oncologist recommended ER visit.   "

## 2019-06-07 NOTE — Clinical Note
Iv NS, [id number], zofran 8mg iv todayIv NS, [facility] 2 wksF/u on day of chemo here (can open schedule in afternoon)

## 2019-06-07 NOTE — TELEPHONE ENCOUNTER
----- Message from Evelina Juares sent at 6/7/2019  2:29 PM CDT -----  Regarding: Referral for GI  Please sched appt for pt to see katerin....Gi referral for nausea and vomiting per Dr Wong

## 2019-06-07 NOTE — PROGRESS NOTES
CC: Squamous cell carcinoma, unknown primary, on chemotherapy, here for follow up         Oncology History:     Diagnosis: Squamous cell carcinoma , primary site unknown    Molecular/genetic testing: p16 negative; PD L1 could not be run due to inadequate tissue   Stage:        Stage 4   Treatment: Carboplatin/paclitaxel x  6 cycles (2/16/18 - 6/8/18)        HPI:  is a 73 y/o female who underwent bronchoscopy/EBUS evaluation on 8/31/17 for abnormal mediastinal adenopathy  and a 1.2 cm MORRO mass.     Bronchoscopy findings:    The oropharynx appears normal. The larynx appears normal. The vocal cords appear normal. The subglottic space is normal. The trachea is of normal caliber. The otf is sharp. The tracheobronchial tree was examined to at least the first subsegmental level. Bronchial mucosa and anatomy are normal; there are no endobronchial lesions, and no secretions.    Lymph Nodes: Lymph node sizing was performed via endobronchial ultrasound for suspected lung cancer. Sampling by transbronchial needle aspiration was also performed using an Olympus EBUS-TBNA 22  gauge needle in the subcarinal mediastinum (level 7) and sent for routine cytology.       - The 7 (subcarinal) node was 30 mm by EBUS. Three samples with the needle were obtained. The patient's condition was unchanged after the intervention.      Her treatment got delayed as she cancelled several appointments due to some issues at home. She started Caroplatin/paclitaxel palliatively. She had left thoracentesis on 2/9/18. There were atypical cells in the pleural fluid, highly suspicious for malignancy.  She completed cycles of Carboplatin/Paclitaxel, last treatment on 6/8/18.     She had [facility] It was negative for malignancy, but showed lympho plasmacytic infiltration.            PET CT on 3/13/19 showed increased  hypermetabolism of the left pleural effusion.Thoracic vertebral body hypermetabolism was  noted, new from the prior exam (compared with PET from June 2018) without underlying CT changes which may represent red marrow hyperplasia or new site of neoplastic disease.  There was new hypermetabolism in the right L5-S1 facet joints which appeared to be centered at the facet joint not within the bone and is favored to represent degenerative change or neoplasm.  She has been non -compliant with her appointments here.        Interval history: She is here for a follow up visit. She has fatigue, dyspnea. She has intermittent nausea from the past 2 weeks, with emesis. She was evaluated She has worsening dyspnea. No weight loss. No headache.   She was evaluated on 5/8/19 for nausea and abdominal pain at ER and received IV fluids. On 5/13/19 she was again in the ER , this time for dyspnea. She was evaluated with chest X ray and thoracentesis was attempted, but the fluid volume was very little and was not done.  She has no constipation or diarrhea. No fever.   She was in ER again on 6/3/19 for nausea and emesis. She was started on reglan. She was given iv fluids. She has persistent nausea and emesis.        Review of Systems   Constitutional: Positive for malaise/fatigue and weight loss. Negative for chills and fever.   HENT: Negative for ear discharge, ear pain and sinus pain.    Eyes: Negative for blurred vision, double vision and photophobia.   Respiratory: Negative for cough and sputum production.    Cardiovascular: Negative for chest pain, palpitations and orthopnea.   Gastrointestinal: Negative for abdominal pain, diarrhea, heartburn and vomiting.   Genitourinary: Negative for frequency, hematuria and urgency.   Musculoskeletal: Negative for back pain and neck pain.   Neurological: Negative for dizziness, tingling, tremors, sensory change and headaches.   Endo/Heme/Allergies: Negative for environmental allergies.   Psychiatric/Behavioral: Negative for depression, hallucinations, substance abuse and suicidal ideas.           Current Outpatient Medications   Medication Sig    gabapentin (NEURONTIN) 300 MG capsule Take 1 capsule (300 mg total) by mouth every evening. (Patient taking differently: Take 300 mg by mouth 3 (three) times daily. )    hydroCHLOROthiazide (HYDRODIURIL) 25 MG tablet Take 25 mg by mouth once daily.    HYDROcodone-acetaminophen (NORCO) 5-325 mg per tablet Take 1 tablet by mouth every 6 (six) hours as needed for Pain.    levocetirizine (XYZAL) 5 MG tablet Take 1 tablet (5 mg total) by mouth every evening.    methotrexate 2.5 MG Tab as needed.     metoclopramide HCl (REGLAN) 10 MG tablet Take 1 tablet (10 mg total) by mouth every 6 (six) hours.    metoprolol succinate (TOPROL-XL) 50 MG 24 hr tablet Take 1 tablet (50 mg total) by mouth once daily.    ondansetron (ZOFRAN) 4 MG tablet Take 1 tablet (4 mg total) by mouth every 8 (eight) hours as needed (Nausea and vomiting).    pantoprazole (PROTONIX) 40 MG tablet TAKE 1 TABLET (40 MG TOTAL) BY MOUTH ONCE DAILY.    promethazine (PHENERGAN) 12.5 MG Tab Take 1 tablet (12.5 mg total) by mouth every 6 (six) hours as needed (nausea or vomiting).    traZODone (DESYREL) 50 MG tablet     LORazepam (ATIVAN) 0.5 MG tablet TAKE 2 TABLETS (1 MG TOTAL) BY MOUTH EVERY 12 (TWELVE) HOURS AS NEEDED FOR ANXIETY.     No current facility-administered medications for this visit.              Vitals:    06/07/19 1344   BP: 130/69   Pulse: 88   Resp: 18   Temp: 98.1 °F (36.7 °C)       PS: ECOG 2    Physical Exam   Constitutional: She is oriented to person, place, and time.   HENT:   Head: Normocephalic and atraumatic.   Eyes: Pupils are equal, round, and reactive to light. No scleral icterus.   Neck: Normal range of motion.   Cardiovascular: Normal rate.   No murmur heard.  Pulmonary/Chest: Effort normal. No respiratory distress. She has no wheezes.   Abdominal: Soft. She exhibits no distension. There is no rebound.   Lymphadenopathy:     She has no cervical adenopathy.    Neurological: She is alert and oriented to person, place, and time.   She is in a wheel chair   Skin: Skin is warm.   Psychiatric:   She appears depressed         6/12/17 CT chest with cont      1. Large left pleural effusion resulting in atelectasis of the left lower lobe and portions of left upper lobe  2. 1.2 cm left upper lobe pleural based pulmonary nodule  3. Mediastinal adenopathy as described above with diffuse thickening of the wall of the distal esophagus. Findings are suspicious for neoplastic process. Recommend bronchoscopy biopsy and possible endoscopy for further evaluation.  4. 1.3 cm hypodensity within the dome of the liver. Metastatic focus cannot be entirely excluded.     9/7/17 PET CT       There is mildly increased tracer uptake within the patient's left pleural effusion and overlying the mediastinal adenopathy, max SUV 2.7.    Transmission CT images show continued pleural effusion and circumferential esophageal thickening. Transmission CT images also show non-avid bilateral groin adenopathy likely reactive in nature.      Impression        There is mildly increased tracer uptake within the patient's left pleural effusion and mediastinal adenopathy. While these findings suggest reactive etiology some low grade malignancies, such as bronchioloalveolar carcinoma, may show this level of uptake on PET scan.            10/30/17 EGD     The examined duodenum was normal.The entire examined stomach was normal.The cardia and gastric fundus were normal on retroflexion. A 1 cm hiatal hernia was found. The proximal extent of the gastric folds (end of tubular esophagus) was 38 cm from the incisors. The hiatal narrowing was 39 cm from the incisors. The Z-line was 38 cm from the incisors. Z-line was sharp. No esophagitis and no columnar esophagus and no lesions seen with NBI or white light.    Impression:                                   - Normal examined duodenum.                        - Normal  stomach.                        - 1 cm hiatal hernia.                        - No specimens collected.     10/30/17 colonoscopy :     Cecal polyp ( 3mm) identified  Histopathology: Tubular adenoma        Pathology:     1/17/14 colonoscopy     FINAL PATHOLOGIC DIAGNOSIS     1. Colon biopsy, ascending colon-tubular adenoma.  2. Colon biopsy, transverse-tubular adenoma.  3. 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 The stalk and base of the polyp are well represented and are free of atypia.  4. Colon biopsy, sigmoid- mild glandular hyperplasia consistent with a benign hyperplastic polyp.        7/19/17 PLEURAL FLUID (CYTOLOGY AND CELL BLOCK):     -Groups of atypical cells present, malignancy cannot be excluded.  Note: While these cells could be reactive mesothelial cells, the level of atypia could be seen in malignancy. A cell block was prepared but the atypical cells are not present in the cell block for further characterization. Correlate  clinically. Repeat tap/biopsy might be helpful if clinically indicated.     8/28/17 EBUS FNA     FNA of subcarinal lymph node: Positive for malignant cells  Small clusters of malignant epithelial cells, favor squamous carcinoma Tumor cells are positive for CK 5/6 and CK 7. Immunostains for p63 and TTF-1 are negative.     1/26/18 CT chest, abdomen,pelvis with cont         Comparison: June 12, 2017    Chest: Since prior exam there is been interval enlargement of the right pleural effusion. There is high density material seen within the right pleural effusion that was not visualized on prior exam.    There is a 5.4 cm enhancing mass seen at the left lung base that previously measured approximately 2.9 cm.    There is a subcarinal mass measuring 4.4 cm it previously measured 2.9 cm.    There is circumferential esophageal thickening adjacent to this mass.    There  is volume loss in the right chest with mediastinal shift to the right more pronounced than what was seen on prior exam.    Patient is a right internal jugular Port-A-Cath.    Abdomen/pelvis: The liver, spleen, adrenal glands, kidneys and pancreas show a normal contrasted appearance. There is no evidence bowel obstruction. There is no evidence of abdominal or pelvic lymphadenopathy. The osseous structures show degenerative change of the spine.   Impression       Since prior exam in the patient's subcarinal and left lung base masses have increased in size. In addition there is now high density material seen within the patient's left pleural effusion new from prior exam and concerning for hemorrhage possibly from the mass.         1/26/18 CT head with cont          Comparison: CT June 8, 2017    Technique: Contiguous axial images were acquired from vertex to skull base without contrast.    Findings: There is no evidence acute intracranial abnormality.  Specifically there is no evidence acute infarct, contusion, extra-axial fluid collection or midline shift.  The ventricles are normal in size and configuration.  The calvarium is intact.  The paranasal sinuses and mastoid air cells are clear.    There is no evidence of enhancing mass.   Impression       There is no evidence of enhancing mass within the cerebral parenchyma      2/9/18 FINAL PATHOLOGIC DIAGNOSIS  LEFT LUNG, PLEURAL FLUID (CYTOLOGY):  - Scant groups of atypical cells, malignancy cannot be excluded. Note: Scant atypical groups of cells are present. Differential diagnosis includes reactive atypia vs malignancy .  Immunohistochemistry for CK5/6 and p63 is non-contributory. Please clinically correlate and re-sample as indicated.  All stains have satisfactory positive and negative controls.     5/14/18 CTA CHEST NON CORONARY       .    COMPARISON:  CT chest abdomen and pelvis from 01/26/2018.    FINDINGS:  Structures at the base of the neck are unremarkable.   Right-sided chest port is visualized with distal tip in the SVC.  Aorta is non-aneurysmal.  Heart is mildly enlarged with interval development of moderate pericardial effusion.  There is mild pericardial enhancement visualized.  The pulmonary arteries distribute normally. No intraluminal filling defects to suggest pulmonary thromboembolism to the level of the proximal segmental branches.    The trachea and bronchi are patent.  Moderate left and small right sided pleural effusions are visualized resulting in volume loss and compressive atelectasis within the lower lobes, left greater than right.  Grossly stable mass visualized in the right subcarinal/perihilar region.  There has been interval reduced size of multifocal left-sided pulmonary masses and anterior mediastinal lymphadenopathy.    Stable hepatic hypodensity is visualized within the anterior aspect of the right hepatic lobe.  Prominent right cardiophrenic lymph nodes are seen which have increased in size.  Redemonstration of diffuse abnormal distal esophageal wall thickening versus adjacent soft tissue lesion.  No acute osseous abnormality identified.  Extrathoracic soft tissues are unremarkable.   Impression        1. Interval development of moderate pericardial effusion with mild pericardial enhancement.  Findings may reflect malignant pericardial effusion.  2. No evidence of PE.  3. Patient with known metastatic cancer.  Relatively stable size mass in the right perihilar/subcarinal region.  Interval reduced size of previously visualized multifocal left lung metastatic lesions and anterior mediastinal lymphadenopathy.  Cardiac phrenic lymph nodes have increased in size.  Persistent abnormal prominent circumferential distal esophageal wall thickening versus surrounding soft tissue mass.  Examination was performed in an emergency setting and not to serve as a restaging scan.  4. Moderate left and small right pleural effusions resulting in volume loss and  compressive atelectasis within the lower lobes, left greater than right.         6/7/18 PET CT      FINDINGS:  Patient was administered 12.23 millicuries of FDG intravenously.  Comparison is 09/07/2017.    There is physiologic intracranial, head, and neck activity.  Heart mediastinum are normal.  There is low-grade activity within the left pleural effusion.  There is physiologic liver, spleen, GI  and pelvic organ activity.  No bone lesions are seen.  There is left lower lobe retro cardiac atelectasis.   Impression        See above    Low-grade activity within a loculated left upper pleural effusion.  This could be benign/reactive.  Malignancy not excluded but it has improved since the prior study.         9/8/18 CT          COMPARISON:  New medicine PET-CT from 06/07/2018, CTA chest from 05/14/2018, and CT chest/abdomen/pelvis from 01/26/2018.    FINDINGS:  Thoracic soft tissues: Right-sided chest port in place with catheter tip terminating in the distal SVC.    Aorta: Normal in course and caliber, without significant atherosclerotic plaque. There are three branching vessels at the arch.    Heart: Mildly enlarged.  No pericardial effusion.    Flaca/Mediastinum: Subcarinal soft tissue mass re-identified the measuring 5.7 x 3.7 cm x 6.2 cm, similar to prior CTA.  Previously described prominent cardiophrenic lymph nodes are now within normal limits for size.  Abundant soft tissues again seen surrounding the distal 3rd of the esophagus, findings are similar to prior study and may represent the extensive esophageal wall thickening versus extension of the mediastinal soft tissue mass.    Lungs: The interval decrease in size of known left malignant effusion with small amount of residual dependent complex fluid and mild adjacent atelectatic changes.  The there is continued nodular left pleural thickening.  Previously described mass at the medial left lung base re-identified measuring 4.3 x 2.2 cm, findings are not well  evaluated on previous CTA due to surrounding pleural fluid but is decreased in size in comparison to most recent CT chest/abdomen/pelvis from 01/26/2018.  The no new lesions identified.  Right lung is grossly clear.  No new lung lesions identified.    Liver: Normal in size and attenuation.  Stable 1.2 cm hypoattenuating lesion in the dome of the liver consistent with simple cyst.  No new lesions identified.    Gallbladder: No calcified gallstones.    Bile Ducts: No evidence of dilated ducts.    Pancreas: No mass or peripancreatic fat stranding.    Spleen: Unremarkable.    Adrenals: Unremarkable.    Kidneys/ Ureters: Normal in size and location. Normal concentration of contrast. No hydronephrosis or nephrolithiasis. No ureteral dilatation.    Bladder: Incompletely distended.    Reproductive organs: Status post hysterectomy.    GI Tract/Mesentery: No evidence of bowel obstruction or inflammation.    Peritoneal Space: No ascites. No free air.    Retroperitoneum:  No significant adenopathy.    Abdominal wall:  Unremarkable.    Vasculature: No significant atherosclerosis or aneurysm.    Bones: Thoracic kyphosis. No acute fracture.Stable degenerative changes seen throughout the spine.       Impression         Subcarinal soft tissue mass re-identified and stable in size as compared to the most recent CTA chest from 05/24/2018, lesion demonstrates mild interval decrease in size as compared to the CT chest/abdomen/pelvis from 01/26/2018.    Interval decrease in size of known left malignant pleural effusion.    Medial left lung base mass, findings are not well evaluated on previous CTA chest due to surrounding pleural fluid but has decreased in size in comparison to most recent CT chest/abdomen/pelvis from 01/26/2018.  Continued nodular left pleural thickening, overall less prominent on today's exam.    Abundant soft tissue again seen surrounding the distal 3rd of the esophagus, findings are similar to prior study and may  represent the extensive esophageal wall thickening versus extension of mediastinal soft tissue mass.    Previously described prominent cardiophrenic lymph nodes are now within normal limits for size.  No new lymphadenopathy.    Additional stable findings as above.            12/4/18 CT neck, chest, abdomen, pelvis with cont            COMPARISON:  CTA 09/08/2018    FINDINGS:  Orbits, paranasal sinuses, and skull base: Postoperative changes of bilateral lens replacement.  No orbital masses.  Paranasal sinuses and mastoid air cells clear.  Visualized intracranial structures are unremarkable.    Nasopharynx: Normal.    Suprahyoid neck: Edentulous.  Other wise unremarkable oral cavity.  Normal oropharynx, parapharyngeal space, and retropharyngeal space.    Infrahyoid neck: Normal larynx, hypopharynx, and supraglottis.    Thyroid: Normal.    Cervical lymph nodes: No lymph node enlargement.    Vascular structures: Jugular veins appear patent.  Right internal jugular port catheter with tip terminating within the SVC.    Thoracic soft tissues: Port within the right anterior chest wall.    Aorta: Left-sided aortic arch.  No aneurysm.  There is mild calcific atherosclerosis of the descending thoracic aorta and abdominal aorta.  Aortic valve calcification.    Heart: Normal size.  No pericardial effusion.  Mild coronary artery atherosclerosis.    Pulmonary vasculature: Pulmonary arteries distribute normally.  There are four pulmonary veins.    Flaca/Mediastinum: Subcarinal mass with multiple calcifications.  This measures 4.4 cm in short axis, previously measuring 4.0 cm.  Extensive abnormal soft tissue attenuation along the esophagus potentially representing esophageal wall thickening or extension of this subcarinal mass--- this appears similar to prior examination.  No new mediastinal or hilar lymph node enlargement.    Airways: Patent.    Lungs/Pleura: There is a small volume of dependent left pleural fluid with scattered  hyperattenuating material and nodular pleural thickening, similar to slightly decreased in volume from 09/08/2018.  There is a pleural based mass with calcifications abutting the pleura and posterior mediastinum along the medial margin of the left hemidiaphragm.  This measures 4.3 cm in long axis appears unchanged in size from prior examination.  There is compressive atelectasis of the left lower lobe secondary to pleural fluid.  The lungs are otherwise well aerated and clear.    Esophagus: Abundant soft tissue along the distal 3rd of the esophagus as above.  Proximal esophagus is normal in caliber and course.    Liver: Normal in size and attenuation.  Stable cyst at the dome.    Gallbladder: No calcified stones or gallbladder wall thickening.    Bile Ducts: No dilatation.    Pancreas: No mass. No peripancreatic fat stranding.    Spleen: Unremarkable.    Adrenals: Unremarkable.    Kidneys/Ureters: Normal in size and enhancement.  Small hypodensity within the upper pole of the right kidney, too small to definitively characterize and likely a cyst.  No definite solid renal masses.  No hydroureteronephrosis.    Bladder: No wall thickening.    Reproductive organs: Status post hysterectomy.    GI Tract/Mesentery: No evidence of bowel obstruction or inflammation. Appendix is not identifiable but there is no infllammation in the expected region of the appendix.    Peritoneal Space: No ascites or free air.    Retroperitoneum: No lymph node enlargement.    Abdominal wall: Normal.    Vasculature: Brachiocephalic veins and SVC are patent.  Portal vein, portal venous branches, SMV, and splenic veins are patent..    Bones: No fracture or aggressive osseous lesion.  Multilevel degenerative changes of the cervical, thoracic, and lumbar spine.         Impression         1.  Stable left pleural based mass.  Stable small volume of left pleural fluid and nodule left pleural thickening compatible with malignant pleural effusion.    2.   Slight interval increase in size of subcarinal soft tissue mass.  Stable abundant soft tissue attenuation surrounding the distal 3rd of the esophagus.    RECIST SUMMARY:    Date of prior examination for comparison: 09/08/2018    Lesion 1: Subcarinal soft tissue mass.  Short axis measurement 4.4 cm cm. Series 2 image 42.  Prior measurement 4.0 cm.    Lesion 2: Left pleural base mass along the left hemidiaphragm.  4.3 cm. Series 2 image 66.  Prior measurement 4.3 cm.    Stable abutting soft tissue along the distal 3rd of the esophagus.    3.  No significant abnormality within the neck to account for this patient's facial edema.  No convincing evidence of SVC syndrome or jugular vein thrombus.         1/4/19 left pleural FNA , FINAL PATHOLOGIC DIAGNOSIS     Pleural, left, mass, CT-guided biopsy: Fragments of soft tissue with lymphoplasmacytic infiltration. No definitive morphologic evidence of lymphoma or carcinoma. See comment.  Comment: Immunohistochemical studies were performed on the paraffin embedded tissue block with adequate positive  and negative controls. Mixed mostly T cells (CD3 positive, CD5 positive, CD 45 positive, BCL-2 positive) and occasional B cells (CD20 positive, [place] 5 positive, CD10 negative, cyclin D1 negative, low Ki-67) are evident. In situ hybridization for kappa and lambda shows polytypic plasma cells. No calretinin positive, CK 5/6 positive, CK 7 positive, P 63 positive large atypical epithelial cells are evident. There is no definitive evidence of lymphoma or carcinoma on the limited tissue examined. Recommend excisional biopsy if clinically highly suspicious for lymphoma or carcinoma.        3/13/19 PET CT             COMPARISON:  Nuclear medicine PET-CT 06/07/2018    FINDINGS:  Quality of the study: Adequate.    Redemonstration of hypermetabolism in the left pleural effusion.  SUV max on today's exam is 3.98.  SUV max was previously 2.25.    New hypermetabolism in the right L5-S1 facet  joints with SUV max 3.76.  This may be degenerative or neoplastic noting limited underlying CT changes centered in the joint which favor degenerative change.    Thoracic vertebral body hypermetabolism adjacent to the loculated hypermetabolic pleural effusion with SUV max of 4.03.  This is increased in comparison the prior exam when it was not notable.  No underlying CT changes.    Physiologic uptake of the tracer is present within the brain, salivary glands, myocardium, GI and  tracts.    Significant incidental CT findings: N/A       Impression         Increased hypermetabolism of the left pleural effusion.    Thoracic vertebral body hypermetabolism new from the prior exam without underlying CT changes which may represent red marrow hyperplasia or new site of neoplastic disease.    New hypermetabolism in the right L5-S1 facet joints which appears to be centered at the facet joint not within the bone and is favored to represent degenerative change or neoplasm.            Component      Latest Ref Rng & Units 3/19/2019   IgG - Serum      650 - 1600 mg/dL 1680 (H)   IgA      40 - 350 mg/dL 314   IgM      50 - 300 mg/dL 134      3/19/19 SPEP: Normal total protein. There is a paraprotein band in gamma = 0.63 g/dL.  Correlate with JAHAIRA results  3/19/19 serum JAHAIRA: IgG lambda specific monoclonal band present.         5/31/19 CT chest/abdomen/pelvis with cont  .  COMPARISON:  December 2018.    FINDINGS:  Visualized thyroid is normal.  7.4 cm left apical mass abuts the mediastinum and chest wall.  Significant enlargement of the subcarinal mass today measures at least 6.9 cm short axis compared to 4.4 cm prior.  The mass abuts the inferior medial pleural base mass which today measures 8.7 cm compared to 4.3 cm prior.  Increase in the volume of loculated left pleural fluid.  Some additional hyperenhancing mediastinal and left pleural based nodules noted.    The lungs demonstrate volume loss on the left.  Some compressive  atelectasis at the right lung medially as well.  No convincing right lung masses.  The    In the abdomen 1.3 cm hypodensity hepatic dome appears similar.  There is some hypodensity abutting the falciform ligament may be focal fat though not visualized on the prior study.  Developing mass not excluded.  Gallbladder is unremarkable.  No pancreatic or splenic masses.  No adrenal masses.  Hypodensity apex of the right kidney appears similar.  No convincing solid masses.    Aorta tapers normally.  No convincing para-aortic or pelvic adenopathy.  Uterus not visualized presumably removed.    Evaluation of the bowel demonstrates no focal dilatation.  Colon is relatively decompressed throughout its course.  No convincing wall thickening or pericolonic stranding.  Some scattered diverticula are noted.  No significant mesenteric adenopathy.    Bone windows demonstrate osteopenia.  Facet arthropathy noted.  No convincing lytic or blastic lesion.  Several healing anterior left rib fractures.  Exaggeration of the normal thoracic kyphosis the mild compression in the midthoracic spine similar.      Impression       Detrimental interval change with increase in the mediastinal and left pleural masses.  There is also increase in the volume of loculated left pleural fluid.    Hypodensity in the liver and right kidney appears similar.    There is some hypodensity abutting the falciform ligament which may be focal fat though not visualized on the prior study.  Developing metastasis not excluded and follow-up would be helpful.    Interval healing left rib fractures.    RECIST SUMMARY:    Date of prior examination for comparison: December 4, 2018.    Lesion 1: Subcarinal soft tissue mass..  6.9 cm cm. Series 2 image 48.  Prior measurement 4.4 cm cm.    Lesion 2: Left pleural based mass along the left diaphragm.  8.7 cm. Series 2 image 72.  Prior measurement 4.3 cm.    Lesion 3: Left apical pleural base mass.  7.4 cm. Series 2 image 21.   Prior measurement not measurable.        Component      Latest Ref Rng & Units 6/7/2019   WBC      3.90 - 12.70 K/uL 6.94   RBC      4.00 - 5.40 M/uL 3.91 (L)   Hemoglobin      12.0 - 16.0 g/dL 10.7 (L)   Hematocrit      37.0 - 48.5 % 34.8 (L)   MCV      82 - 98 fL 89   MCH      27.0 - 31.0 pg 27.4   MCHC      32.0 - 36.0 g/dL 30.7 (L)   RDW      11.5 - 14.5 % 14.0   Platelets      150 - 350 K/uL 275   MPV      9.2 - 12.9 fL 11.1   Immature Granulocytes      0.0 - 0.5 % 0.3   Gran # (ANC)      1.8 - 7.7 K/uL 4.7   Immature Grans (Abs)      0.00 - 0.04 K/uL 0.02   Lymph #      1.0 - 4.8 K/uL 1.3   Mono #      0.3 - 1.0 K/uL 0.8   Eos #      0.0 - 0.5 K/uL 0.1   Baso #      0.00 - 0.20 K/uL 0.03   nRBC      0 /100 WBC 0   Gran%      38.0 - 73.0 % 67.6   Lymph%      18.0 - 48.0 % 18.6   Mono%      4.0 - 15.0 % 11.1   Eosinophil%      0.0 - 8.0 % 2.0   Basophil%      0.0 - 1.9 % 0.4   Differential Method       Automated   Sodium      136 - 145 mmol/L 145   Potassium      3.5 - 5.1 mmol/L 2.9 (L)   Chloride      95 - 110 mmol/L 104   CO2      23 - 29 mmol/L 27   Glucose      70 - 110 mg/dL 105   BUN, Bld      8 - 23 mg/dL 6 (L)   Creatinine      0.5 - 1.4 mg/dL 0.8   Calcium      8.7 - 10.5 mg/dL 9.3   PROTEIN TOTAL      6.0 - 8.4 g/dL 7.5   Albumin      3.5 - 5.2 g/dL 2.7 (L)   BILIRUBIN TOTAL      0.1 - 1.0 mg/dL 0.4   Alkaline Phosphatase      55 - 135 U/L 74   AST      10 - 40 U/L 12   ALT      10 - 44 U/L 8 (L)   Anion Gap      8 - 16 mmol/L 14   eGFR if African American      >60 mL/min/1.73 m:2 >60.0   eGFR if non African American      >60 mL/min/1.73 m:2 >60.0       Assessment:     1. is a 72 y/o female who underwent bronchoscopy/EBUS evaluation on 8/31/17 for abnormal mediastinal adenopathy  and a 1.2 cm MORRO mass as well as pericardial adenopathy.FNA of subcarinal lymph node was positive for malignant cells.  There were small clusters of malignant epithelial cells, favoring squamous carcinoma. Site of  origin is not clear. PET CT done on 9/7/17 did not reveal any lesion in head and neck region.  There was inadequate tissue to run PD L1.p16 was negative.    Esophageal thickening was noted on CT done on 6/12/17. EGD on 10/30/17 did not reveal any suspicious lesions in esophagus/stomach. ENT evaluation still pending.   I explained to her that if primary site is unclear, she will be treated as metastatic SCC of unknown primary.   Her treatment got delayed as she cancelled several appointments due to some issues at home. She said she could not have MRI due to claustrophobia, even with anti-anxiety medication. CT chest from 1/26/18 showed increase in the sizes of subcarinal and left lung base masses compared to previous CT in June 2017 . High density material was seen within the patient's left pleural effusion. No intracranial lesions noted on CT head. I discussed these findings with the patient and personally reviewed the CT scans from January 2018.  I told her that she has likely metastatic SCC of unknown primary. She does have RA history. It is unclear if she can tolerate check point inhibitor therapy even if she has high PD1 expression on cytology/biopsy.  She started Carboplatin/Paclitaxel every 3 weeks. I had explained to her that rationale of treatment is palliation and not cure . She did not get her CTs after C4 as planned. PET CT before cycle 6, on 6/7/18 showed low-grade activity within the left pleural effusion.   CT done on 9/8/18 showed overall stable findings compared to her CTs from May 2018. Pleural effusion had resolved. She is still dyspneic, but her cough has resolved. Her performance status is better. She tolerates more activities now, than previously. She has facial puffiness.   No evidence of SVC syndrome/IJ thrombus noted on CT done on 12/4/18. Subcarinal soft tissue mass measures 4.4 cm cm, previously (in Sept 2018) measuring 4.0 cm. Left pleural base mass along the left  hemidiaphragm measured 4.3 cm, unchanged from Sept 2018. No other lesions noted in rest of the lungs, abdomen, neck or pelvis.     PET CT on 3/13/19 showed increased  hypermetabolism of the left pleural effusion.Thoracic vertebral body hypermetabolism was noted, new from the prior exam (compared with PET from June 2018) without underlying CT changes which may represent red marrow hyperplasia or new site of neoplastic disease.  There was new hypermetabolism in the right L5-S1 facet joints which appeared to be centered at the facet joint not within the bone and is favored to represent degenerative change or neoplasm.  Repeat CT on 5/31/19 demonstrated an increase in the mediastinal and left pleural masses.  There was also increase in the volume of loculated left pleural fluid.  I discussed the results of the scan today. She has arthritis--not sure if it is RA or OA. She will, however, start Atezolizumab once approved by insurance.    Risks and benefits discussed in detail,including possible flare up of her RA.  She verbalizes understanding and consents for it.            2. Pleural effusion: She had left sided thoracentesis ( 1050ml) on 2/9/18. Malignancy could not be excluded. No  Indication for pleurex catheter/reepat thoracentesis at this time.    CTA done on 5/14/18 again showed moderate effusion on the left , mild on right. PET CT on 3/13/19 again showed left pleural effusion with hypermetabolism. Thoracentesis, cytology, labs ordered.       3. Anorexia: Secondary to #1. She was on Cyproheptadine.However, her appetite was still poor. She was started on Decadron 2mg BID. It helped with her appetite. Howver, it caused her to gain weight as well as disturbed her sleep. She has stopped Decadron now      4. Anemia: Hgb 10.7.  Normocytic, hypochromic. No overt bleeding. Iron panel on 4/19 showed iron deficiency.      5. Pericardial effusion: She was noted to have pericardial effusion on CTA chest done on 5/14/18.  Possibly malignant. No symptoms/signs of tamponade. Now resolved.         6. Left hip pain: Possibly secondary to osteo/rhaumtoid arthritis        7. Left pleural mass: It was biopsied on 1/4/19. Fragments of soft tissue with lymphoplasmacytic infiltration were seen. No definitive morphologic evidence of lymphoma or carcinoma seen. SPEP on 3/129/19 showed a paraprotein band in gamma, measuring 0.63 g/dL.  Serum JAHAIRA demonstrated a IgG lambda specific monoclonal band. Calcium, creatinine normal. She has mild anemia.       8. Nausea and emesis: She is on Phenergan. She is  also on Zofran as needed. She is alternating between the two. She took 2 pills of Reglan after she went home from ER on 5/31/19, but has lost her bottle of medicine. A new prescription for Reglan will be sent.  She has no abdominal distention. No evidence of obstruction evident on CT done on 5/31/19.She I son Reglan, Zofran, without much benefit. She is also on PPI. She will receive IV fluids, iv Zofran twice weekly.       She will also have GI evaluation.     9. Hypokalemia: Secondary to poor oral intake, emesis. She will receive iv potassium.,       9. Advance Care Planning     Power of   I initiated the process of advance care planning today and explained the importance of this process to the patient.  I introduced the concept of advance directives to the patient, as well. Then the patient received detailed information about the importance of designating a Health Care Power of  (HCPOA). She was also instructed to communicate with this person about their wishes for future healthcare, should she become sick and lose decision-making capacity. The patient has not previously appointed a HCPOA. After our discussion, the patient has not decided to complete a HCPOA today, but has appointed her daughter and NAME:Joanie Belle has her HCOPOA.  I spent a total time of 15 minutes discussing this issue with the patient.               Distress  Screening Results: Psychosocial Distress screening score of Distress Score: 4 noted and reviewed. No intervention indicated.

## 2019-06-13 NOTE — PLAN OF CARE
Problem: Adult Inpatient Plan of Care  Goal: Plan of Care Review  Outcome: Ongoing (interventions implemented as appropriate)  Arrived to unit in wheelchair. Pt reports nausea and not eating. VSS. Tolerated Zofran and IVF. No other issues reported. Pt in wheelchair, accompanied by family. Discharged from unit.

## 2019-06-24 PROBLEM — Z71.89 ADVANCE CARE PLANNING: Status: ACTIVE | Noted: 2019-01-01

## 2019-06-24 PROBLEM — E87.6 HYPOKALEMIA: Status: ACTIVE | Noted: 2019-01-01

## 2019-06-24 PROBLEM — R53.1 WEAKNESS: Status: ACTIVE | Noted: 2019-01-01

## 2019-06-24 PROBLEM — K22.2 ESOPHAGEAL OBSTRUCTION: Status: ACTIVE | Noted: 2019-01-01

## 2019-06-24 PROBLEM — R62.7 FAILURE TO THRIVE IN ADULT: Status: ACTIVE | Noted: 2019-01-01

## 2019-06-24 NOTE — ED TRIAGE NOTES
Pt presents from home with generalized weakness, nausea and vomiting X3-4 weeks. Pt previously on chemo for lung cancer but stopped infusions over 5 months ago. Pt reports persistent vomiting for the last few weeks and not being able to keep any food or liquids down. Pt denies any abd pain, diarrhea, CP, SOB at this time.

## 2019-06-24 NOTE — ED PROVIDER NOTES
" Encounter Date: 6/24/2019    SCRIBE #1 NOTE: I, Digna Fermin, am scribing for, and in the presence of,  Dr. Alcazar. I have scribed the entire note. the EKG reading.       History     Chief Complaint   Patient presents with    Anorexia     vomiting, not on chemo now     Time patient was seen by the provider: 2:39 PM      The patient is a 73 y.o. female with co-morbidities including HTN, GERD, COPD and lung cancer who presents to the ED for evaluation of weakness. Patient reports generalized weakness and fatigue associated with vomiting x 3 weeks. She endorses decrease in activity secondary to the weakness and fatigue and states her body is, not used to being in bed a lot." She states has been unable to tolerate PO intake including liquids and vomits immediately after. She notes weight loss from 240 to 197 lbs within the last 3 weeks. She notes she was started on antibiotics for a UTI 6 weeks ago, but has not been able to take the medication due to inability to tolerate PO. She also complains of progressively worsening SOB and intermittent chills. She was seen in the ED 3 weeks ago for similar and discharged with reglan. At that time, she had diarrhea as well, but denies diarrhea, nausea or abdominal pain at this time. Patient had infusion of IV fluids on 06/13. Her last chemo was 6 months ago. Patient lives at home with daughter and son-in-law. She denies any pain at this time.     The history is provided by the patient and medical records.     Review of patient's allergies indicates:   Allergen Reactions    Latex, natural rubber Rash    Codeine Hallucinations    Cortisporin [neomycin-bacitracin-poly-hc] Rash    Latex, natural rubber Rash     Past Medical History:   Diagnosis Date    Chronic obstructive pulmonary disease 5/14/2019    Encounter for blood transfusion     GERD (gastroesophageal reflux disease)     HTN (hypertension)     Rheumatoid arthritis     Rheumatoid arthritis(714.0)     Squamous cell " lung cancer, left 2/15/2018     Past Surgical History:   Procedure Laterality Date    APPENDECTOMY      Yoezbg-cdraxg-sl--lung N/A 1/4/2019    Performed by [facility] OR 2ND FLR    BRONCHOSCOPY N/A 8/28/2017    Performed by [facility] OR 2ND FLR    CATARACT EXTRACTION Bilateral 2018    Dr. Keith     COLONOSCOPY      COLONOSCOPY N/A 10/30/2017    Performed by Qu[facility] at [facility] ENDO (4TH FLR)    COLONOSCOPY N/A 1/17/2014    Per[facility]    ESOPHAGOGASTRODUODENOSCOPY (EGD) N/A 10/30/2017    Performed by [facility] ENDO (4TH FLR)    FOOT SURGERY Left     HYSTERECTOMY      INSERTION, IOL PROSTHESIS Left 11/8/2018    Performed by Susan [facility] at [facility] OR 1ST FLR    INSERTION, IOL PROSTHESIS Right 10/18/2018    Performed by [facility] at [facility] OR 1ST FLR    INSERTION-PORT N/A 11/14/2017    Performed by [facility] OR 2ND FLR    PHACOEMULSIFICATION, CATARACT Left 11/8/2018    Performed by [facility] at [facility] OR 1ST FLR    PHACOEMULSIFICATION, CATARACT Right 10/18/2018    Performed by [facility] at [facility] OR 1ST FLR    SKIN BIOPSY      TOTAL KNEE ARTHROPLASTY      right     Family History   Problem Relation Age of Onset    Glaucoma Mother     No Known Problems Father     Hypertension Unknown     Kidney disease Unknown     Glaucoma Sister     Glaucoma Brother     No Known Problems Maternal Aunt     No Known Problems Maternal Uncle     No Known Problems Paternal Aunt     No Known Problems Paternal Uncle     No Known Problems Maternal Grandmother     No Known Problems Maternal Grandfather     No Known Problems Paternal Grandmother     No Known Problems Paternal Grandfather     Colon polyps Neg Hx     Colon cancer Neg Hx     Esophageal cancer Neg Hx     Irritable bowel syndrome Neg Hx     Inflammatory bowel disease Neg Hx     Stomach  cancer Neg Hx     Blindness Neg Hx     Macular degeneration Neg Hx     Retinal detachment Neg Hx     Amblyopia Neg Hx     Cancer Neg Hx     Cataracts Neg Hx     Diabetes Neg Hx     Strabismus Neg Hx     Stroke Neg Hx     Thyroid disease Neg Hx      Social History     Tobacco Use    Smoking status: Former Smoker    Smokeless tobacco: Never Used   Substance Use Topics    Alcohol use: Not Currently     Comment: Occasionally    Drug use: No     Review of Systems  Constitutional: Intermittent chills, Decreased Weight, No Fever  Eyes: No Vision Changes  ENT/Mouth: No sore throat, No rhinorrhea  Cardiovascular:  No Chest Pain, No Palpitations  Respiratory: SOB, No Cough  Gastrointestinal: Vomiting, No Nausea, No Diarrhea, No abdo pain.  Genitourinary:  No  pain, No dysuria   Musculoskeletal:  No Arthralgias, No Back Pain, No Neck Pain, No recent trauma.  Skin:  No skin Lesions  Neuro: Generalized weakness, No Numbness, No Paresthesias, No Dizziness, No Headache     Physical Exam     Initial Vitals [06/24/19 1426]   BP Pulse Resp Temp SpO2   130/79 97 18 98.3 °F (36.8 °C) 96 %      MAP       --         Physical Exam    Nursing note and vitals reviewed.    Physical Exam:  GENERAL APPEARANCE: Well developed, well nourished, in no acute distress.  HENT: Dry mucous membranes. Normocephalic, atraumatic    EYES: Eyes appear sunken. Sclerae anicteric   NECK: Supple, no thyroid enlargement  CARDIOVASCULAR: Regular rate and rhythm without any murmurs, gallops, rubs.  LUNGS: Decreased breathe sounds on left. Speaking in full sentences. Breathing comfortably.  ABDOMEN: Soft and nontender, no masses, no rebound or guarding   NEUROLOGIC: 5/5 strength b/l upper and lower extremities Alert, interacting normally. No facial droop.   MSK: Moving all four extremities.  Skin: Decreased skin turgor. Warm and dry. No visible rash on exposed areas of skin.    Psych: Mood and affect normal.      ED Course   Procedures  Labs  Reviewed   CBC W/ AUTO DIFFERENTIAL - Abnormal; Notable for the following components:       Result Value    RBC 3.90 (*)     Hemoglobin 10.4 (*)     Hematocrit 35.1 (*)     Mean Corpuscular Hemoglobin 26.7 (*)     Mean Corpuscular Hemoglobin Conc 29.6 (*)     RDW 14.6 (*)     Immature Granulocytes 0.6 (*)     All other components within normal limits   COMPREHENSIVE METABOLIC PANEL - Abnormal; Notable for the following components:    Potassium 2.9 (*)     BUN, Bld 5 (*)     Albumin 2.6 (*)     ALT 7 (*)     Anion Gap 18 (*)     All other components within normal limits   MAGNESIUM - Abnormal; Notable for the following components:    Magnesium 1.3 (*)     All other components within normal limits   PHOSPHORUS   URINALYSIS, REFLEX TO URINE CULTURE          Imaging Results          CT Chest Abdoment Pelvis With Contrast (In process)    Procedure changed from CT Abdomen Pelvis With Contrast                X-Ray Chest AP Portable (Final result)  Result time 06/24/19 15:07:27    Final result by Dragan Bautista MD (06/24/19 15:07:27)                 Impression:      No significant changes      Electronically signed by: Dragan Bautista MD  Date:    06/24/2019  Time:    15:07             Narrative:    EXAMINATION:  XR CHEST AP PORTABLE    CLINICAL HISTORY:  weakness, hx of lung CA;    TECHNIQUE:  Single frontal view of the chest was performed.    COMPARISON:  N 05/20/2019 one    FINDINGS:  Central venous catheter remain in the SVC.  Continued significant opacification within the left hemithorax similar to the previous study.  The trachea in the superior mediastinum is slightly displaced to the right as before.  Small subsegmental atelectatic changes noted at the right lung base but otherwise the right lung is clear.                                 Medical Decision Making:   History:   Old Medical Records: I decided to obtain old medical records.  Old Records Summarized: records from clinic visits and other records.  Initial  Assessment:   73 y.o. female with 3 weeks of inability to tolerate PO and vomiting. Patient feeling extremely weak. Feels like she vomiting immediately after taking PO. Concern for esophageal obstruction possibly from mass. Less likely bowel obstruction, as abdomen is benign. Will check labs, look at electrolytes and kidney function, rehydrate with IV fluid and CT scan of chest to look for esophageal compression  and abdomen/pelvis to look for obstruction vs other abdominal pathology.   Clinical Tests:   Lab Tests: Ordered and Reviewed  Radiological Study: Ordered and Reviewed  Medical Tests: Ordered and Reviewed  ED Management:  5:25 PM  K and Mag are low, repleting.   Getting 2nd L of fluids, EF on previous echo was 55%.   On review of CT appears that the esophagus is encased in tumor, possibly explaining symptoms.   Waiting for official CT read, will discuss with Heme-Onc.  No infectious symptoms, chest x-ray is as noted. Not consistent with infection at this time.     7:05 PM  CT confirms likely extrinsic compression of the esophagus.  Discussed with Heme-Onc, they are discussing with the staff to see if patient should come to hematology oncology vs Medicine.    Update:  Patient accepted to Heme-Onc service for continued management of her symptoms, inability to tolerate p.o., hypokalemia, dehydration.    MDM Complexity Points:   Problem Points:  1.New problem, with no additional ED work-up planned (maximum of 1) -    Data Points:  Review or order clinical lab tests, Review or order radiology test, Review or order medicine test (PFTs, EKG, cardiac echo or catheterization), Independent review of image, tracing, or specimen, Decision to obtain old records (in the EHR), Review and summarization of old records and Discuss test with performing physician/consulting physician - discussed with Hematology-Oncology physician.    Risk:  High Risk              Scribe Attestation:   Scribe #1: I performed the above scribed  service and the documentation accurately describes the services I performed. I attest to the accuracy of the note.               Clinical Impression:       ICD-10-CM ICD-9-CM   1. Weakness R53.1 780.79                                Syed Alcazar MD  06/26/19 [id number]

## 2019-06-24 NOTE — ED NOTES
LOC: Patient name and date of birth verified for Silvia Capellan. The patient is awake, alert and aware of environment with an appropriate affect, the patient is oriented x 3 and speaking appropriately.   APPEARANCE: Patient resting comfortably, patient is clean and well groomed, patient's clothing is properly fastened.  SKIN: The skin is warm and dry, color consistent with ethnicity, patient has normal skin turgor and moist mucus membranes, skin intact, no breakdown or bruising noted.  MUSCULOSKELETAL: Patient moving all extremities well, no obvious swelling or deformities noted.   RESPIRATORY: Respirations are spontaneous, patient has a normal effort and rate, no accessory muscle use noted.  CARDIAC: Patient has a normal rate, no periphreal edema noted, capillary refill < 3 seconds.  ABDOMEN: Soft and non tender to palpation, no distention noted. Pt reports vomiting X3 weeks, persistent nausea.   NEUROLOGIC: Eyes open spontaneously, behavior appropriate to situation, follows commands, facial expression symmetrical.

## 2019-06-25 PROBLEM — J96.01 ACUTE HYPOXEMIC RESPIRATORY FAILURE: Status: ACTIVE | Noted: 2019-01-01

## 2019-06-25 NOTE — ASSESSMENT & PLAN NOTE
FAILURE TO THRIVE IN ADULT    72 yo F w history of squamous cell carcinoma, with unknown primary, affecting the lung and mediastinum. Masses in the past have resulted in tracheal deviation. She presents for evaluation of one month history of decreased PO intake contributing to her 50+ lb weight loss over the past two months. CT chest could not allow for visualization of the esophagus in its entirety, which could represent compression/obstruction by adjacent mass. She is unable to tolerate any PO medication as she regurgitates anything she takes in PO.    Plan  - Continuing medications via IV formulation  - [facility] consult for stent vs PEG tube placement, to go for EGD today. Appreciate their help.

## 2019-06-25 NOTE — CONSULTS
"[facility]  Gastroenterology  Consult Note    Patient Name: Silvia Capellan  MRN: [id number]  Admission Date: 6/24/2019  Hospital Length of Stay: 1 days  Code Status: Full Code   Attending Provider: Yessi Kc MD   Consulting Provider: Sal Kwok MD  Primary Care Physician: Ganga Krause MD  Principal Problem:Esophageal obstruction    Inpatient consult to [facility]  Consult performed by: Sal Kwok MD  Consult ordered by: Behram Khan, MD        Subjective:     HPI:  This is a 74 yo F with hx of RA, HTN, and metastatic squamous cell carcinoma of unknown primary who presented to the ED for generalized weakness and inability to tolerate PO. She reports that for the past 2-3 months, she has not been able to keep much of anything down. Over the last one month, she reports she has not been able to keep even liquids down. She does not have any abdominal or chest pain. She denies feeling nauseated, and reports that anything she puts down comes back up moments later. She denies loss of appetite, and would like to be able to eat. She reports about a 50 pound weight loss. With regards to her cancer, she follows with Dr. Wong in clinic. She has received three cycles of palliative chemotherapy and is planned to start Atezolizumab when insurance improves it. On arrival here, she had CT done here which found "8.3 cm left apical mass abutting the mediastinum and appears to cause mass effect and slight rightward mediastinal shift. There has been interval enlargement of subcarinal mass on today's examination measuring approximately 7.6 cm and short axis (previously measured 6.9 cm in short axis). There is inferior medial pleural based mass with internal calcifications measuring approximately 9.2 cm (axial series 2, image 42). The esophagus is not visualized in its entirety and may represent component of compression/obstruction by adjacent mass." [facility] consulted for evaluation " for esophageal stent.    Past Medical History:   Diagnosis Date    Chronic obstructive pulmonary disease 5/14/2019    Encounter for blood transfusion     GERD (gastroesophageal reflux disease)     HTN (hypertension)     Rheumatoid arthritis     Rheumatoid arthritis(714.0)     Squamous cell lung cancer, left 2/15/2018       Past Surgical History:   Procedure Laterality Date    APPENDECTOMY      Tqssdg-qxjcah-rx--lung N/A 1/4/2019    Performed by [facility] OR 2ND FLR    BRONCHOSCOPY N/A 8/28/2017    Performed by [facility] OR 2ND FLR    CATARACT EXTRACTION Bilateral 2018    Dr. Keith     COLONOSCOPY      COLONOSCOPY N/A 10/30/2017    Performed by Quentin Co[facility] ENDO (4TH FLR)    COLONOSCOPY N/A 1/17/2014    [facility] ENDO    ESOPHAGOGASTRODUODENOSCOPY (EGD) N/A 10/30/2017    [facility] by [facility] ENDO (4TH FLR)    FOOT SURGERY Left     HYSTERECTOMY      INSERTION, IOL PROSTHESIS Left 11/8/2018    Performed by Susan [facility] OR 1ST FLR    INSERTION, IOL PROSTHESIS Right 10/18/2018    Performed by [facility] OR 1ST FLR    INSERTION-PORT N/A 11/14/2017    Performed by [facility] OR 2ND FLR    PHACOEMULSIFICATION, CATARACT Left 11/8/2018    Performed by [facility] OR 1ST FLR    PHACOEMULSIFICATION, CATARACT Right 10/18/2018    Performed by [facility] OR 1ST FLR    SKIN BIOPSY      TOTAL KNEE ARTHROPLASTY      right       Review of patient's allergies indicates:   Allergen Reactions    Latex, natural rubber Rash    Codeine Hallucinations    Cortisporin [neomycin-bacitracin-poly-hc] Rash    Latex, natural rubber Rash     Family History     Problem Relation (Age of Onset)    Glaucoma Mother, Sister, Brother    Hypertension     Kidney disease     No Known Problems Father, Maternal Aunt, Maternal  Uncle, Paternal Aunt, Paternal Uncle, Maternal Grandmother, Maternal Grandfather, Paternal Grandmother, Paternal Grandfather        Tobacco Use    Smoking status: Former Smoker    Smokeless tobacco: Never Used   Substance and Sexual Activity    Alcohol use: Not Currently     Comment: Occasionally    Drug use: No    Sexual activity: Not Currently     Review of Systems   Constitutional: Positive for activity change, fatigue and unexpected weight change. Negative for appetite change.   HENT: Positive for trouble swallowing. Negative for sore throat.    Respiratory: Positive for shortness of breath. Negative for cough.    Cardiovascular: Negative for chest pain and leg swelling.   Gastrointestinal: Positive for vomiting. Negative for abdominal distention, abdominal pain, constipation, diarrhea and nausea.   Genitourinary: Negative for decreased urine volume and difficulty urinating.   Musculoskeletal: Positive for arthralgias. Negative for back pain.   Skin: Negative for color change and pallor.   Neurological: Positive for weakness. Negative for dizziness and light-headedness.   Psychiatric/Behavioral: Negative for agitation and confusion.     Objective:     Vital Signs (Most Recent):  Temp: 97.7 °F (36.5 °C) (06/25/19 0823)  Pulse: 99 (06/25/19 0823)  Resp: 18 (06/25/19 0823)  BP: 131/60 (06/25/19 0823)  SpO2: 95 % (06/25/19 0823) Vital Signs (24h Range):  Temp:  [97.7 °F (36.5 °C)-98.5 °F (36.9 °C)] 97.7 °F (36.5 °C)  Pulse:  [] 99  Resp:  [18-20] 18  SpO2:  [87 %-98 %] 95 %  BP: (120-143)/(57-83) 131/60     Weight: 89.5 kg (197 lb 5 oz) (06/24/19 1426)  Body mass index is 30.9 kg/m².      Intake/Output Summary (Last 24 hours) at 6/25/2019 0974  Last data filed at 6/25/2019 0500  Gross per 24 hour   Intake 2450 ml   Output 700 ml   Net 1750 ml       Lines/Drains/Airways     Central Venous Catheter Line                 Port A Cath Single Lumen right atrial -- days          Peripheral Intravenous Line                  Peripheral IV - Single Lumen 06/24/19 1500 20 G Right Antecubital less than 1 day                Physical Exam   Constitutional: She is oriented to person, place, and time. No distress.   HENT:   Mouth/Throat: Oropharynx is clear and moist.   Eyes: No scleral icterus.   Cardiovascular: Normal rate and regular rhythm.   Pulmonary/Chest: Effort normal and breath sounds normal.   Abdominal: Soft. Bowel sounds are normal. She exhibits no distension. There is no tenderness. There is no guarding.   Musculoskeletal: She exhibits no edema or deformity.   Neurological: She is alert and oriented to person, place, and time.   Skin: Skin is warm and dry.   Psychiatric: She has a normal mood and affect.   Vitals reviewed.      Significant Labs:  CBC:   Recent Labs   Lab 06/24/19  1501 06/25/19  0400 06/25/19  0555   WBC 7.07 SEE COMMENT 6.58   HGB 10.4* SEE COMMENT 9.5*   HCT 35.1* SEE COMMENT 31.7*    SEE COMMENT 247     CMP:   Recent Labs   Lab 06/25/19  0555   GLU 84   CALCIUM 8.7   ALBUMIN 2.2*   PROT 6.6      K 3.6   CO2 26      BUN 3*   CREATININE 0.6   ALKPHOS 70   ALT 6*   AST 13   BILITOT 0.3     Coagulation:   Recent Labs   Lab 06/24/19  2058   INR 1.4*         Assessment/Plan:     * Esophageal obstruction  72 yo F with hx of RA, HTN, and metastatic squamous cell carcinoma of unknown primary who presented to the ED for generalized weakness and inability to tolerate PO. CT obtained here notable for compression/obstruction by adjacent mass. [facility] consulted for evaluation of esophageal stent placement.     Recommendations:  - Keep NPO  - Plan for EGD with fluoroscopy for placement of esophageal stent  - Supportive care per primary        Thank you for your consult. I will follow-up with patient. Please contact us if you have any additional questions.    Sal Kwok MD  Gastroenterology  [facility]

## 2019-06-25 NOTE — SUBJECTIVE & OBJECTIVE
Interval History: No acute event overnight, patient with no nausea or vomiting as long as she doesn't eating. She has no chest pain or shortness of breath, breathing comfortably on room air.    Oncology Treatment Plan:   OP ATEZOLIZUMAB Q3W    Medications:  Continuous Infusions:  Scheduled Meds:   enoxaparin  40 mg Subcutaneous Daily     PRN Meds:acetaminophen, albuterol-ipratropium, Dextrose 10% Bolus, Dextrose 10% Bolus, glucagon (human recombinant), glucose, glucose, hydrALAZINE, morphine, ondansetron, promethazine, ramelteon, sodium chloride 0.9%     Review of Systems   Constitutional: Positive for unexpected weight change. Negative for fever.   HENT: Negative for ear pain, sinus pressure, sneezing and sore throat.    Eyes: Negative for pain and visual disturbance.   Respiratory: Negative for cough, chest tightness, shortness of breath and wheezing.    Cardiovascular: Negative for chest pain, palpitations and leg swelling.   Gastrointestinal: Negative for abdominal pain, constipation, diarrhea, nausea and vomiting.   Endocrine: Negative for polydipsia and polyuria.   Genitourinary: Negative for decreased urine volume, difficulty urinating, dysuria and urgency.   Musculoskeletal: Negative for arthralgias and myalgias.   Skin: Negative for color change and rash.   Allergic/Immunologic: Negative for environmental allergies and food allergies.   Neurological: Negative for dizziness, syncope, weakness, light-headedness and headaches.   Psychiatric/Behavioral: Negative for confusion and dysphoric mood. The patient is not nervous/anxious.      Objective:     Vital Signs (Most Recent):  Temp: 98.6 °F (37 °C) (06/25/19 1114)  Pulse: 90 (06/25/19 1114)  Resp: 16 (06/25/19 1114)  BP: (!) 146/82 (06/25/19 1114)  SpO2: 96 % (06/25/19 1114) Vital Signs (24h Range):  Temp:  [97.7 °F (36.5 °C)-98.6 °F (37 °C)] 98.6 °F (37 °C)  Pulse:  [] 90  Resp:  [16-20] 16  SpO2:  [87 %-98 %] 96 %  BP: (120-146)/(57-83) 146/82      Weight: 89.5 kg (197 lb 5 oz)  Body mass index is 30.9 kg/m².  Body surface area is 2.06 meters squared.      Intake/Output Summary (Last 24 hours) at 6/25/2019 1234  Last data filed at 6/25/2019 0500  Gross per 24 hour   Intake 2450 ml   Output 700 ml   Net 1750 ml       Physical Exam   Constitutional: She is oriented to person, place, and time. She appears well-developed and well-nourished.   Body mass index is 30.9 kg/m².     HENT:   Head: Normocephalic and atraumatic.   Eyes: Pupils are equal, round, and reactive to light. EOM are normal. No scleral icterus.   Neck: Normal range of motion. Neck supple.   Central line in place   Cardiovascular: Normal rate, regular rhythm, normal heart sounds and intact distal pulses.   No murmur heard.  Pulmonary/Chest: Effort normal. No respiratory distress. She has decreased breath sounds in the left upper field, the left middle field and the left lower field. She has no wheezes.   Abdominal: Soft. Bowel sounds are normal. She exhibits no distension. There is no tenderness.   Neurological: She is alert and oriented to person, place, and time.   Skin: Skin is warm and dry.   Psychiatric: She has a normal mood and affect. Her behavior is normal.   Vitals reviewed.      Significant Labs:   CBC:   Recent Labs   Lab 06/24/19  1501 06/25/19  0400 06/25/19  0555   WBC 7.07 SEE COMMENT 6.58   HGB 10.4* SEE COMMENT 9.5*   HCT 35.1* SEE COMMENT 31.7*    SEE COMMENT 247   , CMP:   Recent Labs   Lab 06/24/19  1501 06/24/19  2058 06/25/19  0042 06/25/19  0555    144 143 144   K 2.9* 2.9* 2.8* 3.6    104 101 103   CO2 27 25 29 26   GLU 87 84 85 84   BUN 5* 4* 3* 3*   CREATININE 0.7 0.6 0.7 0.6   CALCIUM 9.5 8.8 8.9 8.7   PROT 7.6  --   --  6.6   ALBUMIN 2.6*  --   --  2.2*   BILITOT 0.3  --   --  0.3   ALKPHOS 73  --   --  70   AST 14  --   --  13   ALT 7*  --   --  6*   ANIONGAP 18* 15 13 15   EGFRNONAA >60.0 >60.0 >60.0 >60.0   , Urine Studies:   Recent Labs   Lab  06/24/19  1742   COLORU Kym   APPEARANCEUA Clear   PHUR 7.0   SPECGRAV 1.015   PROTEINUA Negative   GLUCUA Negative   KETONESU 1+*   BILIRUBINUA Negative   OCCULTUA 1+*   NITRITE Negative   LEUKOCYTESUR 1+*   RBCUA 4   WBCUA 7*   BACTERIA Rare   SQUAMEPITHEL 7   HYALINECASTS 7*    and All pertinent labs from the last 24 hours have been reviewed.    Diagnostic Results:  I have reviewed all pertinent imaging results/findings within the past 24 hours.

## 2019-06-25 NOTE — MEDICAL/APP STUDENT
HISTORY & PHYSICAL  Hospital Medicine    Team: Networked reference to record PCT     PRESENTING HISTORY     Chief Complaint/Reason for Admission:  Dysphagia 2/2 external esophageal compression    History of Present Illness:  Ms. Silvia Capellan is a 73 y.o. female with a past hx of stage 4 squamous cell carcinoma, RA, and HTN presented to the ED yesterday with progressively worsening 2 mo of regurgitation of both solids and liquids 2-3 minutes after ingestion. Vomitus contains mostly ingested food or liquid without blood. She denies odynophagia but reports intermittent hoarse voice, reduced appetite and significantly decreased PO intake over the last 2 mo. She denies nausea or vomiting when not having any intake. She also denies any abdominal pain, abdominal distension, or any changes in bowel movement. She last opened her bowel the day before yesterday.She presented to the ED 3 weeks ago with similar presentation and was discharged home with Reglan. Previous EGD done in 2017 was normal.     ROS reveals significant SOB on exertion with very limited functional capacity that she becomes significantly SOB when walking from the bed to bathroom. 2 mo ago, she was able to do housework, and some gardening without getting SOB.    Constitutional: Positive for malaise/fatigue, weight loss of 50lbs over 2 mo, occasional chills . Negative for fever.   HENT: Negative for ear discharge, ear pain and sinus pain.    Eyes: Negative for blurred vision, double vision and photophobia.   Respiratory: Significant SOB on exertion with very limited functional capacity. Negative for cough and sputum production.    Cardiovascular: Negative for chest pain, palpitatind orthopnea.   Gastrointestinal: Negative for abdominal pain, diarrheons aa, heartburn and vomiting.   Genitourinary: Negative for frequency, hematuria and urgency.   Musculoskeletal: Negative for back pain and neck pain.   Neurological: Negative for dizziness, tingling,  tremors, sensory change and headaches.   Endo/Heme/Allergies: Negative for environmental allergies.   Psychiatric/Behavioral: Negative for depression, hallucinations, substance abuse and suicidal ideas.     Oncologic Hx:    Diagnosed w squamous cell carcinoma with no clear primary origin in 08/2017 with bronchoscopy FNA of the subcarinal mass and subsequently underwent 6 cycles of Carboplatin/Paclitaxel (02/16/18-06/08/18) however treatment was delayed due to poor compliance with follow up. Immunohistochemistry is negative for CK6, CK7, and p63. Subcarinal mass and left pleural based mass along diaphragm remains relatively stable until this May when it grows significantly from previous 4.4 cm to 6.9 cm for the subcarinal, and 8.7cm from 4.3 for left pleural base mass along the diaphragm, and new left apical pleural base mass measuring 7.4 cm. Atelizumab treatment is discussed with the patient and is supposed to be started today.        PAST HISTORY:     Past Medical History:   Diagnosis Date    Chronic obstructive pulmonary disease 5/14/2019    Encounter for blood transfusion     GERD (gastroesophageal reflux disease)     HTN (hypertension)     Rheumatoid arthritis     Rheumatoid arthritis(714.0)     Squamous cell lung cancer, left 2/15/2018       Past Surgical History:   Procedure Laterality Date    APPENDECTOMY      Quusms-qculuo-cx--lung N/A 1/4/2019    Performed by [facility] at [facility] OR 2ND FLR    BRONCHOSCOPY N/A 8/28/2017    Performed by [facility] at [facility] OR 2ND FLR    CATARACT EXTRACTION Bilateral 2018    Dr. Keith     COLONOSCOPY      COLONOSCOPY N/A 10/30/2017    Performed by Quentin Coppola [facility] at [facility] ENDO (4TH FLR)    COLONOSCOPY N/A 1/17/2014    [facility] at [facility]    ESOPHAGOGASTRODUODENOSCOPY (EGD) N/A 10/30/2017    Performed by Qu[facility] at [facility] ENDO (4TH FLR)    FOOT SURGERY Left     HYSTERECTOMY      INSERTION,  IOL PROSTHESIS Left 11/8/2018    Performed by [facility] at [facility] OR 1ST FLR    INSERTION, IOL PROSTHESIS Right 10/18/2018    Performed by [facility] at [facility] OR 1ST FLR    INSERTION-PORT N/A 11/14/2017    Performed by [facility] Diagnostic Provider at [facility] OR 2ND FLR    PHACOEMULSIFICATION, CATARACT Left 11/8/2018    Performed by Susan [facility] at [facility] OR 1ST FLR    PHACOEMULSIFICATION, CATARACT Right 10/18/2018    Performed by Susan [facility] at [facility] OR 1ST FLR    SKIN BIOPSY      TOTAL KNEE ARTHROPLASTY      right       Family History   Problem Relation Age of Onset    Glaucoma Mother     No Known Problems Father     Hypertension Unknown     Kidney disease Unknown     Glaucoma Sister     Glaucoma Brother     No Known Problems Maternal Aunt     No Known Problems Maternal Uncle     No Known Problems Paternal Aunt     No Known Problems Paternal Uncle     No Known Problems Maternal Grandmother     No Known Problems Maternal Grandfather     No Known Problems Paternal Grandmother     No Known Problems Paternal Grandfather     Colon polyps Neg Hx     Colon cancer Neg Hx     Esophageal cancer Neg Hx     Irritable bowel syndrome Neg Hx     Inflammatory bowel disease Neg Hx     Stomach cancer Neg Hx     Blindness Neg Hx     Macular degeneration Neg Hx     Retinal detachment Neg Hx     Amblyopia Neg Hx     Cancer Neg Hx     Cataracts Neg Hx     Diabetes Neg Hx     Strabismus Neg Hx     Stroke Neg Hx     Thyroid disease Neg Hx        Social History     Socioeconomic History    Marital status: Single     Spouse name: Not on file    Number of children: Not on file    Years of education: Not on file    Highest education level: Not on file   Occupational History    Not on file   Social Needs    Financial resource strain: Not on file    Food insecurity:     Worry: Not on file     Inability: Not on file    Transportation needs:     Medical: Not on file      Non-medical: Not on file   Tobacco Use    Smoking status: Former Smoker    Smokeless tobacco: Never Used   Substance and Sexual Activity    Alcohol use: Not Currently     Comment: Occasionally    Drug use: No    Sexual activity: Not Currently   Lifestyle    Physical activity:     Days per week: Not on file     Minutes per session: Not on file    Stress: Not on file   Relationships    Social connections:     Talks on phone: Not on file     Gets together: Not on file     Attends Sikhism service: Not on file     Active member of club or organization: Not on file     Attends meetings of clubs or organizations: Not on file     Relationship status: Not on file   Other Topics Concern    Not on file   Social History Narrative    Not on file       MEDICATIONS & ALLERGIES:     No current facility-administered medications on file prior to encounter.      Current Outpatient Medications on File Prior to Encounter   Medication Sig Dispense Refill    cyproheptadine (PERIACTIN) 4 mg tablet Take 1 tablet (4 mg total) by mouth 3 (three) times daily as needed. 90 tablet 2    gabapentin (NEURONTIN) 300 MG capsule Take 1 capsule (300 mg total) by mouth every evening. (Patient taking differently: Take 300 mg by mouth 3 (three) times daily. ) 90 capsule 1    hydroCHLOROthiazide (HYDRODIURIL) 25 MG tablet Take 25 mg by mouth once daily.  1    HYDROcodone-acetaminophen (NORCO) 5-325 mg per tablet Take 1 tablet by mouth every 6 (six) hours as needed for Pain. 60 tablet 0    levocetirizine (XYZAL) 5 MG tablet Take 1 tablet (5 mg total) by mouth every evening. 30 tablet 11    LORazepam (ATIVAN) 0.5 MG tablet TAKE 2 TABLETS (1 MG TOTAL) BY MOUTH EVERY 12 (TWELVE) HOURS AS NEEDED FOR ANXIETY. 28 tablet 0    methotrexate 2.5 MG Tab as needed.       metoclopramide HCl (REGLAN) 10 MG tablet Take 1 tablet (10 mg total) by mouth every 6 (six) hours. 24 tablet 1    metoprolol succinate (TOPROL-XL) 50 MG 24 hr tablet Take 1 tablet  (50 mg total) by mouth once daily. 30 tablet 1    ondansetron (ZOFRAN) 4 MG tablet Take 1 tablet (4 mg total) by mouth every 8 (eight) hours as needed (Nausea and vomiting). 12 tablet 1    ondansetron (ZOFRAN-ODT) 8 MG TbDL Take 1 tablet (8 mg total) by mouth every 6 (six) hours as needed. 30 tablet 1    pantoprazole (PROTONIX) 40 MG tablet TAKE 1 TABLET (40 MG TOTAL) BY MOUTH ONCE DAILY. 90 tablet 1    promethazine (PHENERGAN) 12.5 MG Tab Take 1 tablet (12.5 mg total) by mouth every 6 (six) hours as needed (nausea or vomiting). 20 tablet 0    traZODone (DESYREL) 50 MG tablet           Review of patient's allergies indicates:   Allergen Reactions    Latex, natural rubber Rash    Codeine Hallucinations    Cortisporin [neomycin-bacitracin-poly-hc] Rash    Latex, natural rubber Rash       OBJECTIVE:     Vital Signs:  Temp:  [97.7 °F (36.5 °C)-98.5 °F (36.9 °C)] 97.7 °F (36.5 °C)  Pulse:  [] 99  Resp:  [18-20] 18  SpO2:  [87 %-98 %] 95 %  BP: (120-143)/(57-83) 131/60  Body mass index is 30.9 kg/m².     Physical Exam:  Constitutional: She is oriented to person, place, and time.   HEENT and Head: Normocephalic and atraumatic.   Eyes: Pupils are equal, round, and reactive to light. No scleral icterus. Conjunctival pallor.   Neck: Normal range of motion. No neck mass or lymphadenopathy.   Cardiovascular: Normal rate. No murmur heard.  Pulmonary/Chest: Effort normal. No respiratory distress. She has no wheezes. Diminished breath sounds on the L side.   Abdominal: Soft. She exhibits no distension. There is no rebound, and no hepatosplenomegaly.   Neurological: She is alert and oriented to person, place, and time.   Skin: Skin is warm. No pedal edema.      Laboratory  Lab Results   Component Value Date    WBC 6.58 06/25/2019    HGB 9.5 (L) 06/25/2019    HCT 31.7 (L) 06/25/2019    MCV 91 06/25/2019     06/25/2019     Recent Labs   Lab 06/25/19  0555   GLU 84      K 3.6      CO2 26   BUN 3*    CREATININE 0.6   CALCIUM 8.7   MG 1.3*     Lab Results   Component Value Date    INR 1.4 (H) 06/24/2019    INR 1.2 06/03/2019    INR 1.1 01/04/2019     Lab Results   Component Value Date    HGBA1C 5.6 06/24/2019     No results for input(s): POCTGLUCOSE in the last 72 hours.    Diagnostic Results:  06/24/19 AP CXR   Central venous catheter remain in the SVC.  Continued significant opacification within the left hemithorax similar to the previous study.  The trachea in the superior mediastinum is slightly displaced to the right as before.  Small subsegmental atelectatic changes noted at the right lung base but otherwise the right lung is clearCentral venous catheter remain in the SVC.  Continued significant opacification within the left hemithorax similar to the previous study.  The trachea in the superior mediastinum is slightly displaced to the right as before.  Small subsegmental atelectatic changes noted at the right lung base but otherwise the right lung is clear    06/24/19 CT Chest, Abdo, Pelvis w contrast  1. Nonvisualization of the mid to distal esophagus which may represent component of obstruction from adjacent mass.  2. Slight increase in size of mediastinal and left pleural masses with measurements as below.  3. Subcentimeter hypodense lesion within the right hepatic lobe, new from prior however too small to characterize.  4. Additional stable findings as above.  RECIST SUMMARY:    Date of prior examination for comparison: 05/31/2019    Lesion 1: Subcarinal soft tissue mass.  7.6 cm. Series 2 image 29.  Prior measurement 6.9 cm.    Lesion 2: Left pleural based mass.  9.2 cm. Series 2 image 42.  Prior measurement 8.7 cm.    Lesion 3: Left apical pleural base mass.  8.3 cm. Series 2 image 12.  Prior measurement 7.4 cm.      ASSESSMENT & PLAN:     Current Problems List:  Active Hospital Problems    Diagnosis  POA    *Esophageal obstruction [K22.2]  Yes    Failure to thrive in adult [R62.7]  Yes     Hypokalemia [E87.6]  Yes    Advance care planning [Z71.89]  Not Applicable    Weakness [R53.1]  Yes    Neoplastic malignant related fatigue [R53.0]  Yes    Anemia in neoplastic disease [D63.0]  Yes    Squamous cell lung cancer, left [C34.92]  Yes    Pleural effusion on left [J90]  Yes    Obesity (BMI 30-39.9) [E66.9]  Yes    Non-seasonal Rhinitis [J31.0]  Yes    Essential hypertension [I10]  Yes      Resolved Hospital Problems   No resolved problems to display.       Problem Assessment & Treatment Plan:    Ms. Silvia Capellan is a 73 y.o. female with a past hx of stage 4 squamous cell carcinoma, RA, and HTN presented with 2 mo of dysphagia of both solids and liquids possibly secondary to external esophageal compression from mediastinal mass with progressing malignancy.     Dysphagia:  -most likely 2/2 external esophageal compression from mediastinal mass with progressing malignancy  -GI consult for EGD to investigate intrinsic pathology  - Thoracic consult to consider putting a stent to improve oral intake and comfort care  - No radiation as goal not to cure and complications with esophagus is not palliative    Dyspnea:  - most likely related to progressing lung and mediastinal mass limiting lung vital capacity, pleural effusion on CT not significant enough for therapeutic thoracentesis, unlikely cardiogenic pulmonary edema Echo done 04/19 75% EF  - Continue Duoneb    Squamous cell carcinoma of unknown primary origin:  - S/p 6 cycles of Carboplatin/Paclitaxel  - Scheduled Atelizumab 6/25/2019 but won't start inpatient    Anemia:  -Longstanding and most likely iron deficiency     Advanced health care directive:  - Discussion initiated by Dr. Wong however patient not prepared to discuss until she is able to speak with her daughter and son    Rheumatoid arthritis:  -No active joint pain  -Well controlled with methotrexate, however the commencement of Atelizumab could flare up RA, close monitor when  medication begins    Hypertension:  - On HCTZ, but switch to IV hydralazine PRN due to poor PO intake. Start when SBP >160    Anish Aguirre, M4

## 2019-06-25 NOTE — ASSESSMENT & PLAN NOTE
Unclear history of RA, last seen in rheumatology clinic in 2017 when at that time patient with no active disease, not on therapy

## 2019-06-25 NOTE — ANESTHESIA POSTPROCEDURE EVALUATION
Anesthesia Post Evaluation    Patient: Silvia Capellan    Procedure(s) Performed: Procedure(s) (LRB):  EGD (ESOPHAGOGASTRODUODENOSCOPY) WITH FLUOROSCOPY (N/A)    Final Anesthesia Type: general  Patient location during evaluation: PACU  Patient participation: Yes- Able to Participate  Level of consciousness: awake and alert and oriented  Post-procedure vital signs: reviewed and stable  Pain management: adequate  Airway patency: patent  PONV status at discharge: No PONV  Anesthetic complications: no      Cardiovascular status: stable  Respiratory status: unassisted  Hydration status: euvolemic  Follow-up not needed.          Vitals Value Taken Time   /75 6/25/2019  2:01 PM   Temp 37 °C (98.6 °F) 6/25/2019  1:12 PM   Pulse 107 6/25/2019  2:05 PM   Resp 48 6/25/2019  2:05 PM   SpO2 95 % 6/25/2019  2:05 PM   Vitals shown include unvalidated device data.      No case tracking events are documented in the log.      Pain/Matt Score: Pain Rating Prior to Med Admin: 10 (6/25/2019  1:54 PM)

## 2019-06-25 NOTE — ED NOTES
Pt resting comfortably on stretcher with family at bedside. Repositioned to R side for comfort. No complaints at this time, will continue to monitor.

## 2019-06-25 NOTE — ED NOTES
Pt repositioned for comfort with pillows. Pt denies any requests at this time. Will continue to monitor.

## 2019-06-25 NOTE — PLAN OF CARE
Problem: Adult Inpatient Plan of Care  Goal: Plan of Care Review  Outcome: Ongoing (interventions implemented as appropriate)  Pt AAOx4 and up with x1 assist, with nonskid footwear on and bed locked and in lowest position with bed rails up x2. Call light is within reach and Pt instructed to call for assistance as needed. NPO at midnight for AES consult this AM. Pt states she cannot keep any food, water, or oral medications down. Denies any pain or nausea at this time. Pt's R chest port accessed. IV potassium replacements given. Pt reports feeling SOB with O2 sats 87% on RA, placed on 2L NC. Up to BSC, no BM overnight, voiding without difficulty. Remained afebrile throughout shift with no falls or injuries. VSS. Will continue to monitor.

## 2019-06-25 NOTE — SUBJECTIVE & OBJECTIVE
Oncology Treatment Plan:   OP ATEZOLIZUMAB Q3W    Medications:  Continuous Infusions:  Scheduled Meds:   enoxaparin  40 mg Subcutaneous Daily    [START ON 6/25/2019] polyethylene glycol  17 g Oral Daily    senna-docusate 8.6-50 mg  1 tablet Oral BID     PRN Meds:acetaminophen, albuterol-ipratropium, cetirizine, dextrose 50%, dextrose 50%, glucagon (human recombinant), glucose, glucose, morphine, ondansetron, promethazine, ramelteon, sodium chloride 0.9%     Review of patient's allergies indicates:   Allergen Reactions    Latex, natural rubber Rash    Codeine Hallucinations    Cortisporin [neomycin-bacitracin-poly-hc] Rash    Latex, natural rubber Rash        Past Medical History:   Diagnosis Date    Chronic obstructive pulmonary disease 5/14/2019    Encounter for blood transfusion     GERD (gastroesophageal reflux disease)     HTN (hypertension)     Rheumatoid arthritis     Rheumatoid arthritis(714.0)     Squamous cell lung cancer, left 2/15/2018     Past Surgical History:   Procedure Laterality Date    APPENDECTOMY      Zznguu-hnvgcp-nf--lung N/A 1/4/2019    Performed by [facility] Diagnostic Provider at [facility] OR 2ND FLR    BRONCHOSCOPY N/A 8/28/2017    Performed by [facility] Diagnostic Provider at [facility] OR 2ND [facility]    CATARACT EXTRACTION Bilateral 2018    Dr. Keith     COLONOSCOPY      COLONOSCOPY N/A 10/30/2017    Performed by Quentin Co[facility]ola MD at [facility] ENDO (4TH FLR)    COLONOSCOPY N/A 1/17/2014    [facility] ENDO    ESOPHAGOGASTRODUODENOSCOPY (EGD) N/A 10/30/2017    Performed by Quentin Co[facility]ola MD at [facility] ENDO (4TH FLR)    FOOT SURGERY Left     HYSTERECTOMY      INSERTION, IOL PROSTHESIS Left 11/8/2018    Performed by Susan Keith MD at [facility] OR 1ST FLR    INSERTION, IOL PROSTHESIS Right 10/18/2018    Performed by [facility] at [facility] OR 1ST FLR    INSERTION-PORT N/A 11/14/2017    [facility]     PHACOEMULSIFICATION, CATARACT Left 11/8/2018    Performed by [facility] MD at [facility] OR 1ST FLR    PHACOEMULSIFICATION, CATARACT Right 10/18/2018    Performed by Susan Keith MD at [facility] OR 1ST FLR    SKIN BIOPSY      TOTAL KNEE ARTHROPLASTY      right     Family History     Problem Relation (Age of Onset)    Glaucoma Mother, Sister, Brother    Hypertension     Kidney disease     No Known Problems Father, Maternal Aunt, Maternal Uncle, Paternal Aunt, Paternal Uncle, Maternal Grandmother, Maternal Grandfather, Paternal Grandmother, Paternal Grandfather        Tobacco Use    Smoking status: Former Smoker    Smokeless tobacco: Never Used   Substance and Sexual Activity    Alcohol use: Not Currently     Comment: Occasionally    Drug use: No    Sexual activity: Not Currently       Review of Systems   Constitutional: Positive for unexpected weight change. Negative for fever.   HENT: Negative for ear pain, sinus pressure, sneezing and sore throat.    Eyes: Negative for pain and visual disturbance.   Respiratory: Negative for cough, chest tightness, shortness of breath and wheezing.    Cardiovascular: Negative for chest pain, palpitations and leg swelling.   Gastrointestinal: Negative for abdominal pain, constipation, diarrhea, nausea and vomiting.   Endocrine: Negative for polydipsia and polyuria.   Genitourinary: Negative for decreased urine volume, difficulty urinating, dysuria and urgency.   Musculoskeletal: Negative for arthralgias and myalgias.   Skin: Negative for color change and rash.   Allergic/Immunologic: Negative for environmental allergies and food allergies.   Neurological: Negative for dizziness, syncope, weakness, light-headedness and headaches.   Psychiatric/Behavioral: Negative for confusion and dysphoric mood. The patient is not nervous/anxious.      Objective:     Vital Signs (Most Recent):  Temp: 98.3 °F (36.8 °C) (06/24/19 1426)  Pulse: 93 (06/24/19 2033)  Resp: 18 (06/24/19  1426)  BP: (!) 141/65 (06/24/19 2033)  SpO2: (!) 94 % (06/24/19 2033) Vital Signs (24h Range):  Temp:  [98.3 °F (36.8 °C)] 98.3 °F (36.8 °C)  Pulse:  [] 93  Resp:  [18] 18  SpO2:  [94 %-97 %] 94 %  BP: (120-143)/(57-83) 141/65     Weight: 89.5 kg (197 lb 5 oz)  Body mass index is 30.9 kg/m².  Body surface area is 2.06 meters squared.      Intake/Output Summary (Last 24 hours) at 6/24/2019 2052  Last data filed at 6/24/2019 1934  Gross per 24 hour   Intake 2250 ml   Output --   Net 2250 ml       Physical Exam   Constitutional: She is oriented to person, place, and time. She appears well-developed and well-nourished.   Body mass index is 30.9 kg/m².     HENT:   Head: Normocephalic and atraumatic.   Eyes: Pupils are equal, round, and reactive to light. EOM are normal. No scleral icterus.   Neck: Normal range of motion. Neck supple.   Central line in place   Cardiovascular: Normal rate, regular rhythm, normal heart sounds and intact distal pulses.   No murmur heard.  Pulmonary/Chest: Effort normal. No respiratory distress. She has decreased breath sounds in the left upper field, the left middle field and the left lower field. She has no wheezes.   Abdominal: Soft. Bowel sounds are normal. She exhibits no distension. There is no tenderness.   Neurological: She is alert and oriented to person, place, and time.   Skin: Skin is warm and dry.   Psychiatric: She has a normal mood and affect. Her behavior is normal.   Vitals reviewed.      Significant Labs:   All pertinent labs from the last 24 hours have been reviewed.    Diagnostic Results:  I have reviewed all pertinent imaging results/findings within the past 24 hours.

## 2019-06-25 NOTE — ASSESSMENT & PLAN NOTE
PLEURAL EFFUSION ON LEFT    Pt of Dr Wong with history of squamous cell carcinoma with unknown primary. As such, she is to be treated as metastatic SCC. Despite her history of RA, she was scheduled to initiate PD-L1 inhibitor therapy (atezolizumab) once approved by insurance.    Plan  - Treatment per primary oncologist

## 2019-06-25 NOTE — HOSPITAL COURSE
Patient admitted to medical oncology overnight on 6/24/19 for regurgitation and inability to tolerate PO. CT chest showing complete extrinsic esophageal obstruction from mediastinal mass. S/p esophageal stent per AES on 6/25/19. Patient with nausea and vomiting overnight, evaluated again by AES in the AM, expected following her procedure. Started on clear liquids on 6/26/19, radiation oncology consulted as well. Evaluated by radiation oncology, patient to be set up for outpatient palliative radiation. Patient tolerated clear liquid diet, but continues to feel weak. Dietary consulted for assistance with patient's diet for discharge and instructions on advancing diet at home. Patient discharged with home health PT/OT and rolling walker.

## 2019-06-25 NOTE — ED NOTES
Pt resting comfortably on stretcher with family at bedside. Pt reports some mild abd pain. Will continue to monitor.

## 2019-06-25 NOTE — H&P
[facility]  Hematology/Oncology  H&P    Patient Name: Silvia Capellan  MRN: [id number]  Admission Date: 6/24/2019  Code Status: Full Code   Attending Provider: Syed Alcazar MD  Primary Care Physician: Ganga Krause MD  Principal Problem:Esophageal obstruction    Subjective:     HPI: Ms Capellan is a 72 yo F w PMHx significant for rheumatoid arthritis, HTN, and squamous cell carcinoma (unknown primary) resulting in lung and mediastinal masses s/p three cycles of palliative carboplatin/paclitaxel.    Pt was directed to [facility] [place] clinic for evaluation of decreased PO intake over the last month. Over the last month, pt has been unable to keep food, drink or medication down for more than a few minutes. Whatever she would take in by mouth would come back up spontaneously and without warning after a few minutes. She denies any warning signs of regurgitation, including nausea. At first she attributed these symptoms to something she ate, which she later doubted as a cause as her symptoms persisted. She has tried anti-emetics including metoclopramide and ondansetron without any benefit. She was also recently started on cyproheptadine as an appetite stimulant. She denies loss of appetite. She states she has the desire to eat, but has stopped trying to eat as she is afraid the contents would come back up. Over the past two and a half months, the patient has lost more than 50 lbs (250 to 197). She denies fever, abdominal pain, diarrhea, sick contacts, or chest pain.     ROS was positive for shortness of breath that has been going on for the past three months. It has worsened to the point that she has difficulty walking about 10 ft. She spends most of her day in bed. The shortness of breath does not worsen with lying down, however last night she did wake up to sit up and catch her breath. This had never happened before.     Oncology History per Dr Wong's most recent clinic note (6/7):     Diagnosis:  Squamous cell carcinoma , primary site unknown    Molecular/genetic testing: p16 negative; PD L1 could not be run due to inadequate tissue   Stage:        Stage 4   Treatment: Carboplatin/paclitaxel x  6 cycles (2/16/18 - 6/8/18)        HPI:  is a 71 y/o female who underwent bronchoscopy/EBUS evaluation on 8/31/17 for abnormal mediastinal adenopathy  and a 1.2 cm MORRO mass.     Bronchoscopy findings:    The oropharynx appears normal. The larynx appears normal. The vocal cords appear normal. The subglottic space is normal. The trachea is of normal caliber. The otf is sharp. The tracheobronchial tree was examined to at least the first subsegmental level. Bronchial mucosa and anatomy are normal; there are no endobronchial lesions, and no secretions.    Lymph Nodes: Lymph node sizing was performed via endobronchial ultrasound for suspected lung cancer. Sampling by transbronchial needle aspiration was also performed using an Olympus EBUS-TBNA 22  gauge needle in the subcarinal mediastinum (level 7) and sent for routine cytology.       - The 7 (subcarinal) node was 30 mm by EBUS. Three samples with the needle were obtained. The patient's condition was unchanged after the intervention.      Her treatment got delayed as she cancelled several appointments due to some issues at home. She started Caroplatin/paclitaxel palliatively. She had left thoracentesis on 2/9/18. There were atypical cells in the pleural fluid, highly suspicious for malignancy.  She completed cycles of Carboplatin/Paclitaxel, last treatment on 6/8/18.     She had repeat left pleural mass FNA in [facility] 2019. It was negative for malignancy, but showed lympho plasmacytic infiltration.            PET CT on 3/13/19 showed increased  hypermetabolism of the left pleural effusion.Thoracic vertebral body hypermetabolism was noted, new from the prior exam (compared with PET from June 2018) without underlying CT changes which may represent red marrow  hyperplasia or new site of neoplastic disease.  There was new hypermetabolism in the right L5-S1 facet joints which appeared to be centered at the facet joint not within the bone and is favored to represent degenerative change or neoplasm.  She has been non -compliant with her appointments here.        Interval history: She is here for a follow up visit. She has fatigue, dyspnea. She has intermittent nausea from the past 2 weeks, with emesis. She was evaluated She has worsening dyspnea. No weight loss. No headache.   She was evaluated on 5/8/19 for nausea and abdominal pain at ER and received IV fluids. On 5/13/19 she was again in the ER , this time for dyspnea. She was evaluated with chest X ray and thoracentesis was attempted, but the fluid volume was very little and was not done.  She has no constipation or diarrhea. No fever.   She was in ER again on 6/3/19 for nausea and emesis. She was started on reglan. She was given iv fluids. She has persistent nausea and emesis.          Oncology Treatment Plan:   OP ATEZOLIZUMAB Q3W    Medications:  Continuous Infusions:  Scheduled Meds:   enoxaparin  40 mg Subcutaneous Daily    [START ON 6/25/2019] polyethylene glycol  17 g Oral Daily    senna-docusate 8.6-50 mg  1 tablet Oral BID     PRN Meds:acetaminophen, albuterol-ipratropium, cetirizine, dextrose 50%, dextrose 50%, glucagon (human recombinant), glucose, glucose, morphine, ondansetron, promethazine, ramelteon, sodium chloride 0.9%     Review of patient's allergies indicates:   Allergen Reactions    Latex, natural rubber Rash    Codeine Hallucinations    Cortisporin [neomycin-bacitracin-poly-hc] Rash    Latex, natural rubber Rash        Past Medical History:   Diagnosis Date    Chronic obstructive pulmonary disease 5/14/2019    Encounter for blood transfusion     GERD (gastroesophageal reflux disease)     HTN (hypertension)     Rheumatoid arthritis     Rheumatoid arthritis(714.0)     Squamous cell lung  cancer, left 2/15/2018     Past Surgical History:   Procedure Laterality Date    APPENDECTOMY      Fzeayc-aeofan-qt--lung N/A 1/4/2019    Performed by [facility] OR 2ND FLR    BRONCHOSCOPY N/A 8/28/2017    Performed by [facility] OR 2ND FLR    CATARACT EXTRACTION Bilateral 2018    Dr. Keith     COLONOSCOPY      COLONOSCOPY N/A 10/30/2017    Performed by Quentin Coppola [facility] at [facility] ENDO (4TH FLR)    COLONOSCOPY N/A 1/17/2014    Performed by [facility]an [facility] at [facility] ENDO    ESOPHAGOGASTRODUODENOSCOPY (EGD) N/A 10/30/2017    Performed by [facility] ENDO (4TH FLR)    FOOT SURGERY Left     HYSTERECTOMY      INSERTION, IOL PROSTHESIS Left 11/8/2018    Performed by Susan Keith [facility] at [facility] OR 1ST FLR    INSERTION, IOL PROSTHESIS Right 10/18/2018    Performed by Susan [facility] at [facility] OR 1ST FLR    INSERTION-PORT N/A 11/14/2017    Performed by [facility] OR 2ND FLR    PHACOEMULSIFICATION, CATARACT Left 11/8/2018    Performed by [facility] at [facility] OR 1ST FLR    PHACOEMULSIFICATION, CATARACT Right 10/18/2018    Performed by [facility] at [facility] OR 1ST FLR    SKIN BIOPSY      TOTAL KNEE ARTHROPLASTY      right     Family History     Problem Relation (Age of Onset)    Glaucoma Mother, Sister, Brother    Hypertension     Kidney disease     No Known Problems Father, Maternal Aunt, Maternal Uncle, Paternal Aunt, Paternal Uncle, Maternal Grandmother, Maternal Grandfather, Paternal Grandmother, Paternal Grandfather        Tobacco Use    Smoking status: Former Smoker    Smokeless tobacco: Never Used   Substance and Sexual Activity    Alcohol use: Not Currently     Comment: Occasionally    Drug use: No    Sexual activity: Not Currently       Review of Systems   Constitutional: Positive for unexpected weight change. Negative for fever.   HENT: Negative for ear pain, sinus pressure, sneezing and  sore throat.    Eyes: Negative for pain and visual disturbance.   Respiratory: Negative for cough, chest tightness, shortness of breath and wheezing.    Cardiovascular: Negative for chest pain, palpitations and leg swelling.   Gastrointestinal: Negative for abdominal pain, constipation, diarrhea, nausea and vomiting.   Endocrine: Negative for polydipsia and polyuria.   Genitourinary: Negative for decreased urine volume, difficulty urinating, dysuria and urgency.   Musculoskeletal: Negative for arthralgias and myalgias.   Skin: Negative for color change and rash.   Allergic/Immunologic: Negative for environmental allergies and food allergies.   Neurological: Negative for dizziness, syncope, weakness, light-headedness and headaches.   Psychiatric/Behavioral: Negative for confusion and dysphoric mood. The patient is not nervous/anxious.      Objective:     Vital Signs (Most Recent):  Temp: 98.3 °F (36.8 °C) (06/24/19 1426)  Pulse: 93 (06/24/19 2033)  Resp: 18 (06/24/19 1426)  BP: (!) 141/65 (06/24/19 2033)  SpO2: (!) 94 % (06/24/19 2033) Vital Signs (24h Range):  Temp:  [98.3 °F (36.8 °C)] 98.3 °F (36.8 °C)  Pulse:  [] 93  Resp:  [18] 18  SpO2:  [94 %-97 %] 94 %  BP: (120-143)/(57-83) 141/65     Weight: 89.5 kg (197 lb 5 oz)  Body mass index is 30.9 kg/m².  Body surface area is 2.06 meters squared.      Intake/Output Summary (Last 24 hours) at 6/24/2019 2052  Last data filed at 6/24/2019 1934  Gross per 24 hour   Intake 2250 ml   Output --   Net 2250 ml       Physical Exam   Constitutional: She is oriented to person, place, and time. She appears well-developed and well-nourished.   Body mass index is 30.9 kg/m².     HENT:   Head: Normocephalic and atraumatic.   Eyes: Pupils are equal, round, and reactive to light. EOM are normal. No scleral icterus.   Neck: Normal range of motion. Neck supple.   Central line in place   Cardiovascular: Normal rate, regular rhythm, normal heart sounds and intact distal pulses.   No  murmur heard.  Pulmonary/Chest: Effort normal. No respiratory distress. She has decreased breath sounds in the left upper field, the left middle field and the left lower field. She has no wheezes.   Abdominal: Soft. Bowel sounds are normal. She exhibits no distension. There is no tenderness.   Neurological: She is alert and oriented to person, place, and time.   Skin: Skin is warm and dry.   Psychiatric: She has a normal mood and affect. Her behavior is normal.   Vitals reviewed.      Significant Labs:   All pertinent labs from the last 24 hours have been reviewed.    Diagnostic Results:  I have reviewed all pertinent imaging results/findings within the past 24 hours.    Assessment/Plan:     * Esophageal obstruction  FAILURE TO THRIVE IN ADULT    72 yo F w history of squamous cell carcinoma, with unknown primary, affecting the lung and mediastinum. Masses in the past have resulted in tracheal deviation. She presents for evaluation of one month history of decreased PO intake contributing to her 50+ lb weight loss over the past two months. CT chest could not allow for visualization of the esophagus in its entirety, which could represent compression/obstruction by adjacent mass. She is unable to tolerate any PO medication as she regurgitates anything she takes in PO.    Plan  - Convert medications to IV formulation  - AES consult for stent vs PEG tube placement        Hypokalemia  She has continued to try to take her anti-hypertensive medications, including HCTZ, however is unsure if anything is able to stay down. K of 2.9 on arrival to ED    - Repeat BMP  - Replace PRN    Squamous cell lung cancer, left  PLEURAL EFFUSION ON LEFT    Pt of Dr Wong with history of squamous cell carcinoma with unknown primary. As such, she is to be treated as metastatic SCC. Despite her history of RA, she was scheduled to initiate PD-L1 inhibitor therapy (atezolizumab) once approved by insurance.    Plan  - Treatment per primary  oncologist    Obesity (BMI 30-39.9)  Body mass index is 30.9 kg/m². Currently with 50+ lb weight loss over the past two months.     - Adult regular diet, 2000 Jaime    Anemia in neoplastic disease  Hgb of 10.4 today and normocytic. Stable.     Essential hypertension  Home anti-hypertensive regimen includes HCTZ and metoprolol, PO.     Plan  - IV hydralazine for SBP >180 mmHg    Non-seasonal Rhinitis  Only if exposed to environmental allergen.     - Cetirizine 5 mg, PRN    Weakness  NEOPLASTIC MALIGNANT RELATED FATIGUE    PT/OT     Advance care planning  Per Dr Wong's last clinic note, pt appointed Joanie Belle has her HCOPOA.        Behram Khan, MD  Hematology/Oncology  [facility]    Attending Note  I have personally taken the history and examined this patient and agree with the resident's note as stated above.  Progressive disease with dysphagia now and weight loss  Scope today- if extrinsic compression we will see if stent feasible  Honest discussion regarding prognosis and likelihood of immunotherapy helping in timely fashion to address current issue  Reassess post procedure

## 2019-06-25 NOTE — NURSING TRANSFER
Nursing Transfer Note      6/25/2019     Transfer to 823A from [facility] 37    Transfer via stretcher    Transfer with oxygen      Transported by transport Tien    Medicines sent: na    Chart send with patient: yes    Notified: patrick laura    Patient reassessed at: 6/25/2019 1530    Upon arrival to floor: patient assisted to bed, chart left at nurses station, and nurse notified of arrival to room

## 2019-06-25 NOTE — ASSESSMENT & PLAN NOTE
FAILURE TO THRIVE IN ADULT    72 yo F w history of squamous cell carcinoma, with unknown primary, affecting the lung and mediastinum. Masses in the past have resulted in tracheal deviation. She presents for evaluation of one month history of decreased PO intake contributing to her 50+ lb weight loss over the past two months. CT chest could not allow for visualization of the esophagus in its entirety, which could represent compression/obstruction by adjacent mass. She is unable to tolerate any PO medication as she regurgitates anything she takes in PO.    Plan  - Convert medications to IV formulation  - AES consult for stent vs PEG tube placement

## 2019-06-25 NOTE — TRANSFER OF CARE
"Anesthesia Transfer of Care Note    Patient: Silvia Capellan    Procedure(s) Performed: Procedure(s) (LRB):  EGD (ESOPHAGOGASTRODUODENOSCOPY) WITH FLUOROSCOPY (N/A)    Patient location: PACU    Anesthesia Type: general    Transport from OR: Transported from OR on 2-3 L/min O2 by NC with adequate spontaneous ventilation    Post pain: adequate analgesia    Post assessment: no apparent anesthetic complications    Post vital signs: stable    Level of consciousness: awake, alert and oriented    Nausea/Vomiting: vomiting and nausea    Complications: none    Transfer of care protocol was followed      Last vitals:   Visit Vitals  BP (!) 146/82 (BP Location: Left arm, Patient Position: Lying)   Pulse 90   Temp 37 °C (98.6 °F) (Temporal)   Resp 16   Ht 5' 7" (1.702 m)   Wt 89.5 kg (197 lb 5 oz)   SpO2 96%   Breastfeeding? No   BMI 30.90 kg/m²     "

## 2019-06-25 NOTE — ASSESSMENT & PLAN NOTE
Home anti-hypertensive regimen includes HCTZ and metoprolol, PO.     Plan  - IV hydralazine for SBP >180 mmHg

## 2019-06-25 NOTE — ASSESSMENT & PLAN NOTE
72 yo F with hx of RA, HTN, and metastatic squamous cell carcinoma of unknown primary who presented to the ED for generalized weakness and inability to tolerate PO. CT obtained here notable for compression/obstruction by adjacent mass. AES consulted for evaluation of esophageal stent placement.     Recommendations:  - Keep NPO  - Plan for EGD with fluoroscopy for placement of esophageal stent  - Supportive care per primary

## 2019-06-25 NOTE — ED NOTES
Pt resting comfortably on stretcher, call light in place, lights off for comfort. No requests at this time. Will continue to monitor.

## 2019-06-25 NOTE — PLAN OF CARE
MDRs completed with Dr. Kc and the team. Patient of Dr. Wong with a history of squamous cell carcinoma (unknown primary) with mets to lung and mediastinal masses s/p palliative chemotherapy (carboplatin/paclitaxel). Patient admitted on 6/24/19 for decreased PO intake, severe weight loss, possible esophageal obstruction. AES consulted for stent vs PEG tube placement. VSS. O2 sats 87% on room air. Patient 95% on 2L per nasal cannula. MD discussed the plan of care with the patient. Discharge needs to be determined. CM to continue to follow with the team.    Future Appointments   Date Time Provider Department Center   8/20/2019  9:00 AM Ganga Krause MD [facility] GI[facility], RN, BSN, CM  [facility]  Nurse - Med Onc/Gyn Onc

## 2019-06-25 NOTE — ASSESSMENT & PLAN NOTE
She has continued to try to take her anti-hypertensive medications, including HCTZ, however is unsure if anything is able to stay down. K of 2.9 on arrival to ED    - Repeat BMP  - Replace PRN

## 2019-06-25 NOTE — ASSESSMENT & PLAN NOTE
Body mass index is 30.9 kg/m². Currently with 50+ lb weight loss over the past two months.     - Adult regular diet, 2000 Jaime

## 2019-06-25 NOTE — PROGRESS NOTES
[facility]  Hematology/Oncology  Progress Note    Patient Name: Silvia Capellan  Admission Date: 6/24/2019  Hospital Length of Stay: 1 days  Code Status: Full Code     Subjective:     HPI:  Ms Capelaln is a 74 yo F w PMHx significant for rheumatoid arthritis, HTN, and squamous cell carcinoma (unknown primary) resulting in lung and mediastinal masses s/p three cycles of palliative carboplatin/paclitaxel.    Pt was directed to [facility] from clinic for evaluation of decreased PO intake over the last month. Over the last month, pt has been unable to keep food, drink or medication down for more than a few minutes. Whatever she would take in by mouth would come back up spontaneously and without warning after a few minutes. She denies any warning signs of regurgitation, including nausea. At first she attributed these symptoms to something she ate, which she later doubted as a cause as her symptoms persisted. She has tried anti-emetics including metoclopramide and ondansetron without any benefit. She was also recently started on cyproheptadine as an appetite stimulant. She denies loss of appetite. She states she has the desire to eat, but has stopped trying to eat as she is afraid the contents would come back up. Over the past two and a half months, the patient has lost more than 50 lbs (250 to 197). She denies fever, abdominal pain, diarrhea, sick contacts, or chest pain.     ROS was positive for shortness of breath that has been going on for the past three months. It has worsened to the point that she has difficulty walking about 10 ft. She spends most of her day in bed. The shortness of breath does not worsen with lying down, however last night she did wake up to sit up and catch her breath. This had never happened before.     Oncology History per Dr Wong's most recent clinic note (6/7):     Diagnosis: Squamous cell carcinoma , primary site unknown    Molecular/genetic testing: p16 negative; PD L1  could not be run due to inadequate tissue   Stage:        Stage 4   Treatment: Carboplatin/paclitaxel x  6 cycles (2/16/18 - 6/8/18)        HPI:  is a 73 y/o female who underwent bronchoscopy/EBUS evaluation on 8/31/17 for abnormal mediastinal adenopathy  and a 1.2 cm MORRO mass.     Bronchoscopy findings:    The oropharynx appears normal. The larynx appears normal. The vocal cords appear normal. The subglottic space is normal. The trachea is of normal caliber. The otf is sharp. The tracheobronchial tree was examined to at least the first subsegmental level. Bronchial mucosa and anatomy are normal; there are no endobronchial lesions, and no secretions.    Lymph Nodes: Lymph node sizing was performed via endobronchial ultrasound for suspected lung cancer. Sampling by transbronchial needle aspiration was also performed using an Olympus EBUS-TBNA 22  gauge needle in the subcarinal mediastinum (level 7) and sent for routine cytology.       - The 7 (subcarinal) node was 30 mm by EBUS. Three samples with the needle were obtained. The patient's condition was unchanged after the intervention.      Her treatment got delayed as she cancelled several appointments due to some issues at home. She started Caroplatin/paclitaxel palliatively. She had left thoracentesis on 2/9/18. There were atypical cells in the pleural fluid, highly suspicious for malignancy.  She completed cycles of Carboplatin/Paclitaxel, last treatment on 6/8/18.     She had repeat left pleural mass FNA in [facility] 2019. It was negative for malignancy, but showed lympho plasmacytic infiltration.            PET CT on 3/13/19 showed increased  hypermetabolism of the left pleural effusion.Thoracic vertebral body hypermetabolism was noted, new from the prior exam (compared with PET from June 2018) without underlying CT changes which may represent red marrow hyperplasia or new site of neoplastic disease.  There was new hypermetabolism in the right L5-S1  facet joints which appeared to be centered at the facet joint not within the bone and is favored to represent degenerative change or neoplasm.  She has been non -compliant with her appointments here.        Interval history: She is here for a follow up visit. She has fatigue, dyspnea. She has intermittent nausea from the past 2 weeks, with emesis. She was evaluated She has worsening dyspnea. No weight loss. No headache.   She was evaluated on 5/8/19 for nausea and abdominal pain at ER and received IV fluids. On 5/13/19 she was again in the ER , this time for dyspnea. She was evaluated with chest X ray and thoracentesis was attempted, but the fluid volume was very little and was not done.  She has no constipation or diarrhea. No fever.   She was in ER again on 6/3/19 for nausea and emesis. She was started on reglan. She was given iv fluids. She has persistent nausea and emesis.         Interval History: No acute event overnight, patient with no nausea or vomiting as long as she doesn't eating. She has no chest pain or shortness of breath, breathing comfortably on room air.    Oncology Treatment Plan:   OP ATEZOLIZUMAB Q3W    Medications:  Continuous Infusions:  Scheduled Meds:   enoxaparin  40 mg Subcutaneous Daily     PRN Meds:acetaminophen, albuterol-ipratropium, Dextrose 10% Bolus, Dextrose 10% Bolus, glucagon (human recombinant), glucose, glucose, hydrALAZINE, morphine, ondansetron, promethazine, ramelteon, sodium chloride 0.9%     Review of Systems   Constitutional: Positive for unexpected weight change. Negative for fever.   HENT: Negative for ear pain, sinus pressure, sneezing and sore throat.    Eyes: Negative for pain and visual disturbance.   Respiratory: Negative for cough, chest tightness, shortness of breath and wheezing.    Cardiovascular: Negative for chest pain, palpitations and leg swelling.   Gastrointestinal: Negative for abdominal pain, constipation, diarrhea, nausea and vomiting.   Endocrine:  Negative for polydipsia and polyuria.   Genitourinary: Negative for decreased urine volume, difficulty urinating, dysuria and urgency.   Musculoskeletal: Negative for arthralgias and myalgias.   Skin: Negative for color change and rash.   Allergic/Immunologic: Negative for environmental allergies and food allergies.   Neurological: Negative for dizziness, syncope, weakness, light-headedness and headaches.   Psychiatric/Behavioral: Negative for confusion and dysphoric mood. The patient is not nervous/anxious.      Objective:     Vital Signs (Most Recent):  Temp: 98.6 °F (37 °C) (06/25/19 1114)  Pulse: 90 (06/25/19 1114)  Resp: 16 (06/25/19 1114)  BP: (!) 146/82 (06/25/19 1114)  SpO2: 96 % (06/25/19 1114) Vital Signs (24h Range):  Temp:  [97.7 °F (36.5 °C)-98.6 °F (37 °C)] 98.6 °F (37 °C)  Pulse:  [] 90  Resp:  [16-20] 16  SpO2:  [87 %-98 %] 96 %  BP: (120-146)/(57-83) 146/82     Weight: 89.5 kg (197 lb 5 oz)  Body mass index is 30.9 kg/m².  Body surface area is 2.06 meters squared.      Intake/Output Summary (Last 24 hours) at 6/25/2019 1234  Last data filed at 6/25/2019 0500  Gross per 24 hour   Intake 2450 ml   Output 700 ml   Net 1750 ml       Physical Exam   Constitutional: She is oriented to person, place, and time. She appears well-developed and well-nourished.   Body mass index is 30.9 kg/m².     HENT:   Head: Normocephalic and atraumatic.   Eyes: Pupils are equal, round, and reactive to light. EOM are normal. No scleral icterus.   Neck: Normal range of motion. Neck supple.   Central line in place   Cardiovascular: Normal rate, regular rhythm, normal heart sounds and intact distal pulses.   No murmur heard.  Pulmonary/Chest: Effort normal. No respiratory distress. She has decreased breath sounds in the left upper field, the left middle field and the left lower field. She has no wheezes.   Abdominal: Soft. Bowel sounds are normal. She exhibits no distension. There is no tenderness.   Neurological: She is  alert and oriented to person, place, and time.   Skin: Skin is warm and dry.   Psychiatric: She has a normal mood and affect. Her behavior is normal.   Vitals reviewed.      Significant Labs:   CBC:   Recent Labs   Lab 06/24/19  1501 06/25/19  0400 06/25/19  0555   WBC 7.07 SEE COMMENT 6.58   HGB 10.4* SEE COMMENT 9.5*   HCT 35.1* SEE COMMENT 31.7*    SEE COMMENT 247   , CMP:   Recent Labs   Lab 06/24/19  1501 06/24/19  2058 06/25/19  0042 06/25/19  0555    144 143 144   K 2.9* 2.9* 2.8* 3.6    104 101 103   CO2 27 25 29 26   GLU 87 84 85 84   BUN 5* 4* 3* 3*   CREATININE 0.7 0.6 0.7 0.6   CALCIUM 9.5 8.8 8.9 8.7   PROT 7.6  --   --  6.6   ALBUMIN 2.6*  --   --  2.2*   BILITOT 0.3  --   --  0.3   ALKPHOS 73  --   --  70   AST 14  --   --  13   ALT 7*  --   --  6*   ANIONGAP 18* 15 13 15   EGFRNONAA >60.0 >60.0 >60.0 >60.0   , Urine Studies:   Recent Labs   Lab 06/24/19  1742   COLORU Kym   APPEARANCEUA Clear   PHUR 7.0   SPECGRAV 1.015   PROTEINUA Negative   GLUCUA Negative   KETONESU 1+*   BILIRUBINUA Negative   OCCULTUA 1+*   NITRITE Negative   LEUKOCYTESUR 1+*   RBCUA 4   WBCUA 7*   BACTERIA Rare   SQUAMEPITHEL 7   HYALINECASTS 7*    and All pertinent labs from the last 24 hours have been reviewed.    Diagnostic Results:  I have reviewed all pertinent imaging results/findings within the past 24 hours.    Assessment/Plan:     * Esophageal obstruction  FAILURE TO THRIVE IN ADULT    74 yo F w history of squamous cell carcinoma, with unknown primary, affecting the lung and mediastinum. Masses in the past have resulted in tracheal deviation. She presents for evaluation of one month history of decreased PO intake contributing to her 50+ lb weight loss over the past two months. CT chest could not allow for visualization of the esophagus in its entirety, which could represent compression/obstruction by adjacent mass. She is unable to tolerate any PO medication as she regurgitates anything she takes  in PO.    Plan  - Continuing medications via IV formulation  - AES consult for stent vs PEG tube placement, to go for EGD today. Appreciate their help.    Acute hypoxemic respiratory failure  Patient comfortable, satting well on room air. States she gets significantly dyspneic on exertion. States she was active up until roughly 2 months ago and can now no longer ambulate a few feet without severe dyspnea. Chart review shows ED visits from January of 2019 with complaints of dyspnea. Patient not wheezing, and without a cough. Has loculated pleural effusion which has been difficult to tap in the past and size of effusion is not particularly large, however patient with significant mediastinal and lung disease.     - Unlikely 2/2 effusion as it is chronic and stable. Patient with poor baseline pulmonary reserve, these symptoms are likely 2/2 progression of disease  - Will follow up patient's oxygenation and symptoms following EGD, will perform 6-minute walk test  - Patient may require home O2 at time of discharge    Weakness  NEOPLASTIC MALIGNANT RELATED FATIGUE    PT/OT     Advance care planning  Per Dr Wong's last clinic note, pt appointed Joanie Belle has her HCOPOA. Will have further discussions with patient regarding wishes and advanced care planning pending her clinical course while inpatient.    Hypokalemia  She has continued to try to take her anti-hypertensive medications, including HCTZ, however is unsure if anything is able to stay down. K of 2.9 and Magnesion 1.3 on arrival to ED. Repleted in ED and on floor.    - K improved, patient with central line, can receive IV repletion  - Replace PRN    Anemia in neoplastic disease  Hgb of 10.4 today and normocytic. Stable.     Squamous cell lung cancer, left  PLEURAL EFFUSION ON LEFT    Pt of Dr Wong with history of squamous cell carcinoma with unknown primary. As such, she is to be treated as metastatic SCC. Despite her history of RA, she was scheduled to  initiate PD-L1 inhibitor therapy (atezolizumab) once approved by insurance.     Plan  - Treatment per primary oncologist  - Would be started on outpatient bases pending clinical course and discharge    Obesity (BMI 30-39.9)  Body mass index is 30.9 kg/m². Currently with 50+ lb weight loss over the past two months.     - Adult regular diet once she can tolerate PO  - To have EGD today 6/25/19    Essential hypertension  Home anti-hypertensive regimen includes HCTZ and metoprolol, PO.   Not currently tolerating PO. Normotensive at this time    Plan  - IV hydralazine for SBP >180 mmHg    Non-seasonal Rhinitis  Only if exposed to environmental allergen.     - Cetirizine 5 mg, PRN, once patient can tolerate PO    Rheumatoid arthritis  Unclear history of RA, last seen in rheumatology clinic in 2017 when at that time patient with no active disease, not on therapy             Jeff López MD  Hematology/Oncology  [facility]    Attending Note  See my H+P note from today

## 2019-06-25 NOTE — ANESTHESIA PREPROCEDURE EVALUATION
06/25/2019  Silvia Capellan is a 73 y.o., female.  Patient Active Problem List   Diagnosis    Rheumatoid arthritis    Non-seasonal Rhinitis    Insomnia, unspecified    Essential hypertension    Anxiety disorder due to general medical condition    Depression due to physical illness    Reflux esophagitis    Overactive bladder    Obesity (BMI 30-39.9)    Fall, accidental    Closed fracture of right orbital floor with routine healing    Pleural effusion on left    Pulmonary nodule, left    Stage 4 Squamous cell carcinoma of unknown origin    History of colonic polyps    Squamous cell lung cancer, left    Anemia in neoplastic disease    Neoplastic malignant related fatigue    Episodic benzodiazepine dependence    Osteoarthritis    Primary osteoarthritis of left hip    Spondylolisthesis at L5-S1 level    Facial edema    Central stenosis of spinal canal    Chronic left hip pain    Lung mass    Pseudophakia    Refractive error    Pulmonary hypertension    Secondary and unspecified malignant neoplasm of intrathoracic lymph nodes    Chronic obstructive pulmonary disease    MGUS (monoclonal gammopathy of unknown significance)    Failure to thrive in adult    Esophageal obstruction    Hypokalemia    Advance care planning    Weakness   ·   · Echo 4/2019  · TDS  · Normal left ventricular systolic function. The estimated ejection fraction is 55%  · Concentric left ventricular hypertrophy.  · Mild left atrial enlargement.  The estimated PA systolic pressure is 40 mm Hg  Past Surgical History:   Procedure Laterality Date    APPENDECTOMY      Lwjjta-psrwnv-li--lung N/A 1/4/2019    Per[facility]    BRONCHOSCOPY N/A 8/28/2017    Performed by [facility]    CATARACT EXTRACTION Bilateral 2018    Dr. Keith      COLONOSCOPY      COLONOSCOPY N/A 10/30/2017    Performed by [facility]ola [facility] at [facility] ENDO (4TH FLR)    COLONOSCOPY N/A 1/17/2014    Per[facility] at [facility]    ESOPHAGOGASTRODUODENOSCOPY (EGD) N/A 10/30/2017    Performed by [facility] at [facility] ENDO (4TH FLR)    FOOT SURGERY Left     HYSTERECTOMY      INSERTION, IOL PROSTHESIS Left 11/8/2018    Performed by Susan Keith MD at [facility] OR 1ST FLR    INSERTION, IOL PROSTHESIS Right 10/18/2018    Performed by Susan Keith [facility] at [facility] OR 1ST FLR    INSERTION-PORT N/A 11/14/2017    Performed by [facility] Diagnostic Provider at [facility] OR 2ND FLR    PHACOEMULSIFICATION, CATARACT Left 11/8/2018    Performed by Susan Keith MD at [facility] OR 1ST FLR    PHACOEMULSIFICATION, CATARACT Right 10/18/2018    Performed by Susan Keith MD at [facility] OR 1ST FLR    SKIN BIOPSY      TOTAL KNEE ARTHROPLASTY      right       Anesthesia Evaluation    I have reviewed the Patient Summary Reports.    I have reviewed the Nursing Notes.   I have reviewed the Medications.     Review of Systems      Physical Exam  General:  Well nourished    Airway/Jaw/Neck:  Airway Findings: Mouth Opening: Normal General Airway Assessment: Adult  Mallampati: III  Improves to II, III with phonation.  TM Distance: 4 - 6 cm  Jaw/Neck Findings:  Limited Ability to Prognath  Neck ROM: Normal ROM     Eyes/Ears/Nose:  Eyes/Ears/Nose Findings:    Dental:  Dental Findings: In tact   Chest/Lungs:  Chest/Lungs Findings: Clear to auscultation, Normal Respiratory Rate     Heart/Vascular:  Heart Findings: Rate: Normal  Rhythm: Regular Rhythm  Sounds: Normal     Abdomen:  Abdomen Findings:  Normal     Musculoskeletal:  Musculoskeletal Findings:    Skin:  Skin Findings:     Mental Status:  Mental Status Findings:  Cooperative, Alert and Oriented         Anesthesia Plan  Type of Anesthesia, risks & benefits discussed:  Anesthesia Type:  general, MAC  Patient's Preference:   Intra-op  Monitoring Plan:   Intra-op Monitoring Plan Comments:   Post Op Pain Control Plan:   Post Op Pain Control Plan Comments:   Induction:   IV  Beta Blocker:  Patient is not currently on a Beta-Blocker (No further documentation required).       Informed Consent: Patient understands risks and agrees with Anesthesia plan.  Questions answered. Anesthesia consent signed with patient.  ASA Score: 2     Day of Surgery Review of History & Physical:    H&P update referred to the surgeon.         Ready For Surgery From Anesthesia Perspective.

## 2019-06-25 NOTE — PLAN OF CARE
Problem: Adult Inpatient Plan of Care  Goal: Plan of Care Review  Outcome: Ongoing (interventions implemented as appropriate)  Side rails up x2; call bell in place; bed in lowest, locked position; skid proof socks on; no evidence of skin breakdown; care plan explained to patient; pt remains free of injury. Pt returned from endoscopy dept, EGD  Complete with stent placement. Arrived on unit without incident, Magnesium rider 2/2 infusing. 2 L NC inprogress, Will continue to monitor, VSS and afebrile.

## 2019-06-25 NOTE — [employer] PATIENT INSTRUCTIONS
Discharge Summary/Instructions after an Endoscopic Procedure  Patient Name: Silvia Capellan  Patient MRN: [id number]  Patient YOB: 1945 Tuesday, June 25, 2019  Aime Huff MD  RESTRICTIONS:  During your procedure today, you received medications for sedation.  These   medications may affect your judgment, balance and coordination.  Therefore,   for 24 hours, you have the following restrictions:   - DO NOT drive a car, operate machinery, make legal/financial decisions,   sign important papers or drink alcohol.    ACTIVITY:  Today: no heavy lifting, straining or running due to procedural   sedation/anesthesia.  The following day: return to full activity including work.  DIET:  Eat and drink normally unless instructed otherwise.     TREATMENT FOR COMMON SIDE EFFECTS:  - Mild abdominal pain, nausea, belching, bloating or excessive gas:  rest,   eat lightly and use a heating pad.  - Sore Throat: treat with throat lozenges and/or gargle with warm salt   water.  - Because air was used during the procedure, expelling large amounts of air   from your rectum or belching is normal.  - If a bowel prep was taken, you may not have a bowel movement for 1-3 days.    This is normal.  SYMPTOMS TO WATCH FOR AND REPORT TO YOUR PHYSICIAN:  1. Abdominal pain or bloating, other than gas cramps.  2. Chest pain.  3. Back pain.  4. Signs of infection such as: chills or fever occurring within 24 hours   after the procedure.  5. Rectal bleeding, which would show as bright red, maroon, or black stools.   (A tablespoon of blood from the rectum is not serious, especially if   hemorrhoids are present.)  6. Vomiting.  7. Weakness or dizziness.  GO DIRECTLY TO THE NEAREST EMERGENCY ROOM IF YOU HAVE ANY OF THE FOLLOWING:      Difficulty breathing              Chills and/or fever over 101 F   Persistent vomiting and/or vomiting blood   Severe abdominal pain   Severe chest pain   Black, tarry stools   Bleeding- more than one  tablespoon   Any other symptom or condition that you feel may need urgent attention  Your doctor recommends these additional instructions:  If any biopsies were taken, your doctors clinic will contact you in 1 to 2   weeks with any results.  - [facility].   - NPO today (ice chips OK). May start clear liquids tomorrow. May then   advanced to full liquids, but would maintain a full liquid diet for 1 week.   May then advance as tolerated (after 1 week of liquid diet) to no more than   a mechanical soft diet.  For questions, problems or results please call your physician - Aime Huff MD at Work:  [phone].  [facility], EMERGENCY ROOM PHONE NUMBER: [phone]  IF A COMPLICATION OR EMERGENCY SITUATION ARISES AND YOU ARE UNABLE TO REACH   YOUR PHYSICIAN - GO DIRECTLY TO THE EMERGENCY ROOM.  Aime Huff MD  6/25/2019 1:25:59 PM  This report has been verified and signed electronically.  [employer]

## 2019-06-25 NOTE — TREATMENT PLAN
AES Treatment Plan    Impression:              - No gross lesions in the entire examined duodenum.   - No gross lesions in the stomach.   - Extrinsic narrowing of the esophagus. Prosthesis placed.   - No specimens collected.    Recommendation:          - [facility].   - NPO today (ice chips OK). May start clear liquids tomorrow. May then advanced to full liquids, but would maintain a full liquid diet for 1 week. May then advance as tolerated (after 1 week of liquid diet) to no more than a mechanical soft diet.    Sal Kwok MD PGY-V  Gastroenterology Fellow  [facility]  [phone]

## 2019-06-25 NOTE — ASSESSMENT & PLAN NOTE
PLEURAL EFFUSION ON LEFT    Pt of Dr Wong with history of squamous cell carcinoma with unknown primary. As such, she is to be treated as metastatic SCC. Despite her history of RA, she was scheduled to initiate PD-L1 inhibitor therapy (atezolizumab) once approved by insurance.     Plan  - Treatment per primary oncologist  - Would be started on outpatient bases pending clinical course and discharge

## 2019-06-25 NOTE — ASSESSMENT & PLAN NOTE
Patient comfortable, satting well on room air. States she gets significantly dyspneic on exertion. States she was active up until roughly 2 months ago and can now no longer ambulate a few feet without severe dyspnea. Chart review shows ED visits from January of 2019 with complaints of dyspnea. Patient not wheezing, and without a cough. Has loculated pleural effusion which has been difficult to tap in the past and size of effusion is not particularly large, however patient with significant mediastinal and lung disease.     - Unlikely 2/2 effusion as it is chronic and stable. Patient with poor baseline pulmonary reserve, these symptoms are likely 2/2 progression of disease  - Will follow up patient's oxygenation and symptoms following EGD, will perform 6-minute walk test  - Patient may require home O2 at time of discharge

## 2019-06-25 NOTE — HPI
Ms Capellan is a 74 yo F w PMHx significant for rheumatoid arthritis, HTN, and squamous cell carcinoma (unknown primary) resulting in lung and mediastinal masses s/p three cycles of palliative carboplatin/paclitaxel.    Pt was directed to [facility] from clinic for evaluation of decreased PO intake over the last month. Over the last month, pt has been unable to keep food, drink or medication down for more than a few minutes. Whatever she would take in by mouth would come back up spontaneously and without warning after a few minutes. She denies any warning signs of regurgitation, including nausea. At first she attributed these symptoms to something she ate, which she later doubted as a cause as her symptoms persisted. She has tried anti-emetics including metoclopramide and ondansetron without any benefit. She was also recently started on cyproheptadine as an appetite stimulant. She denies loss of appetite. She states she has the desire to eat, but has stopped trying to eat as she is afraid the contents would come back up. Over the past two and a half months, the patient has lost more than 50 lbs (250 to 197). She denies fever, abdominal pain, diarrhea, sick contacts, or chest pain.     ROS was positive for shortness of breath that has been going on for the past three months. It has worsened to the point that she has difficulty walking about 10 ft. She spends most of her day in bed. The shortness of breath does not worsen with lying down, however last night she did wake up to sit up and catch her breath. This had never happened before.     Oncology History per Dr Wong's most recent clinic note (6/7):     Diagnosis: Squamous cell carcinoma , primary site unknown    Molecular/genetic testing: p16 negative; PD L1 could not be run due to inadequate tissue   Stage:        Stage 4   Treatment: Carboplatin/paclitaxel x  6 cycles (2/16/18 - 6/8/18)        HPI:  is a 73 y/o female who underwent bronchoscopy/EBUS  evaluation on 8/31/17 for abnormal mediastinal adenopathy  and a 1.2 cm MORRO mass.     Bronchoscopy findings:    The oropharynx appears normal. The larynx appears normal. The vocal cords appear normal. The subglottic space is normal. The trachea is of normal caliber. The otf is sharp. The tracheobronchial tree was examined to at least the first subsegmental level. Bronchial mucosa and anatomy are normal; there are no endobronchial lesions, and no secretions.    Lymph Nodes: Lymph node sizing was performed via endobronchial ultrasound for suspected lung cancer. Sampling by transbronchial needle aspiration was also performed using an Olympus EBUS-TBNA 22  gauge needle in the subcarinal mediastinum (level 7) and sent for routine cytology.       - The 7 (subcarinal) node was 30 mm by EBUS. Three samples with the needle were obtained. The patient's condition was unchanged after the intervention.      Her treatment got delayed as she cancelled several appointments due to some issues at home. She started Caroplatin/paclitaxel palliatively. She had left thoracentesis on 2/9/18. There were atypical cells in the pleural fluid, highly suspicious for malignancy.  She completed cycles of Carboplatin/Paclitaxel, last treatment on 6/8/18.     She had repeat left pleural mass FNA in [facility] 2019. It was negative for malignancy, but showed lympho plasmacytic infiltration.            PET CT on 3/13/19 showed increased  hypermetabolism of the left pleural effusion.Thoracic vertebral body hypermetabolism was noted, new from the prior exam (compared with PET from June 2018) without underlying CT changes which may represent red marrow hyperplasia or new site of neoplastic disease.  There was new hypermetabolism in the right L5-S1 facet joints which appeared to be centered at the facet joint not within the bone and is favored to represent degenerative change or neoplasm.  She has been non -compliant with her appointments  here.        Interval history: She is here for a follow up visit. She has fatigue, dyspnea. She has intermittent nausea from the past 2 weeks, with emesis. She was evaluated She has worsening dyspnea. No weight loss. No headache.   She was evaluated on 5/8/19 for nausea and abdominal pain at ER and received IV fluids. On 5/13/19 she was again in the ER , this time for dyspnea. She was evaluated with chest X ray and thoracentesis was attempted, but the fluid volume was very little and was not done.  She has no constipation or diarrhea. No fever.   She was in ER again on 6/3/19 for nausea and emesis. She was started on reglan. She was given iv fluids. She has persistent nausea and emesis.

## 2019-06-25 NOTE — HPI
"This is a 74 yo F with hx of RA, HTN, and metastatic squamous cell carcinoma of unknown primary who presented to the ED for generalized weakness and inability to tolerate PO. She reports that for the past 2-3 months, she has not been able to keep much of anything down. Over the last one month, she reports she has not been able to keep even liquids down. She does not have any abdominal or chest pain. She denies feeling nauseated, and reports that anything she puts down comes back up moments later. She denies loss of appetite, and would like to be able to eat. She reports about a 50 pound weight loss. With regards to her cancer, she follows with Dr. Wong in clinic. She has received three cycles of palliative chemotherapy and is planned to start Atezolizumab when insurance improves it. On arrival here, she had CT done here which found "8.3 cm left apical mass abutting the mediastinum and appears to cause mass effect and slight rightward mediastinal shift. There has been interval enlargement of subcarinal mass on today's examination measuring approximately 7.6 cm and short axis (previously measured 6.9 cm in short axis). There is inferior medial pleural based mass with internal calcifications measuring approximately 9.2 cm (axial series 2, image 42). The esophagus is not visualized in its entirety and may represent component of compression/obstruction by adjacent mass." AES consulted for evaluation for esophageal stent.  "

## 2019-06-25 NOTE — ASSESSMENT & PLAN NOTE
Only if exposed to environmental allergen.     - Cetirizine 5 mg, PRN, once patient can tolerate PO

## 2019-06-25 NOTE — SUBJECTIVE & OBJECTIVE
Past Medical History:   Diagnosis Date    Chronic obstructive pulmonary disease 5/14/2019    Encounter for blood transfusion     GERD (gastroesophageal reflux disease)     HTN (hypertension)     Rheumatoid arthritis     Rheumatoid arthritis(714.0)     Squamous cell lung cancer, left 2/15/2018       Past Surgical History:   Procedure Laterality Date    APPENDECTOMY      Iapwkg-lakhfe-fq--lung N/A 1/4/2019    Performed by [facility] OR 2ND FLR    BRONCHOSCOPY N/A 8/28/2017    Performed by [facility] OR 2ND FLR    CATARACT EXTRACTION Bilateral 2018    Dr. Keith     COLONOSCOPY      COLONOSCOPY N/A 10/30/2017    Performed by Quentin Coppola [facility] at [facility] ENDO (4TH FLR)    COLONOSCOPY N/A 1/17/2014    [facility]    ESOPHAGOGASTRODUODENOSCOPY (EGD) N/A 10/30/2017    Performed by Qu[facility] at [facility] ENDO (4TH FLR)    FOOT SURGERY Left     HYSTERECTOMY      INSERTION, IOL PROSTHESIS Left 11/8/2018    Performed by Susan Keith [facility] at [facility] OR 1ST FLR    INSERTION, IOL PROSTHESIS Right 10/18/2018    Performed by Susan [facility] at [facility] OR 1ST FLR    INSERTION-PORT N/A 11/14/2017    Performed by [facility] OR 2ND FLR    PHACOEMULSIFICATION, CATARACT Left 11/8/2018    Performed by [facility] at [facility] OR 1ST FLR    PHACOEMULSIFICATION, CATARACT Right 10/18/2018    Performed by [facility] at [facility] OR 1ST FLR    SKIN BIOPSY      TOTAL KNEE ARTHROPLASTY      right       Review of patient's allergies indicates:   Allergen Reactions    Latex, natural rubber Rash    Codeine Hallucinations    Cortisporin [neomycin-bacitracin-poly-hc] Rash    Latex, natural rubber Rash     Family History     Problem Relation (Age of Onset)    Glaucoma Mother, Sister, Brother    Hypertension     Kidney disease     No Known Problems Father, Maternal Aunt, Maternal Uncle, Paternal Aunt,  Paternal Uncle, Maternal Grandmother, Maternal Grandfather, Paternal Grandmother, Paternal Grandfather        Tobacco Use    Smoking status: Former Smoker    Smokeless tobacco: Never Used   Substance and Sexual Activity    Alcohol use: Not Currently     Comment: Occasionally    Drug use: No    Sexual activity: Not Currently     Review of Systems   Constitutional: Positive for activity change, fatigue and unexpected weight change. Negative for appetite change.   HENT: Positive for trouble swallowing. Negative for sore throat.    Respiratory: Positive for shortness of breath. Negative for cough.    Cardiovascular: Negative for chest pain and leg swelling.   Gastrointestinal: Positive for vomiting. Negative for abdominal distention, abdominal pain, constipation, diarrhea and nausea.   Genitourinary: Negative for decreased urine volume and difficulty urinating.   Musculoskeletal: Positive for arthralgias. Negative for back pain.   Skin: Negative for color change and pallor.   Neurological: Positive for weakness. Negative for dizziness and light-headedness.   Psychiatric/Behavioral: Negative for agitation and confusion.     Objective:     Vital Signs (Most Recent):  Temp: 97.7 °F (36.5 °C) (06/25/19 0823)  Pulse: 99 (06/25/19 0823)  Resp: 18 (06/25/19 0823)  BP: 131/60 (06/25/19 0823)  SpO2: 95 % (06/25/19 0823) Vital Signs (24h Range):  Temp:  [97.7 °F (36.5 °C)-98.5 °F (36.9 °C)] 97.7 °F (36.5 °C)  Pulse:  [] 99  Resp:  [18-20] 18  SpO2:  [87 %-98 %] 95 %  BP: (120-143)/(57-83) 131/60     Weight: 89.5 kg (197 lb 5 oz) (06/24/19 1426)  Body mass index is 30.9 kg/m².      Intake/Output Summary (Last 24 hours) at 6/25/2019 0947  Last data filed at 6/25/2019 0500  Gross per 24 hour   Intake 2450 ml   Output 700 ml   Net 1750 ml       Lines/Drains/Airways     Central Venous Catheter Line                 Port A Cath Single Lumen right atrial -- days          Peripheral Intravenous Line                 Peripheral  IV - Single Lumen 06/24/19 1500 20 G Right Antecubital less than 1 day                Physical Exam   Constitutional: She is oriented to person, place, and time. No distress.   HENT:   Mouth/Throat: Oropharynx is clear and moist.   Eyes: No scleral icterus.   Cardiovascular: Normal rate and regular rhythm.   Pulmonary/Chest: Effort normal and breath sounds normal.   Abdominal: Soft. Bowel sounds are normal. She exhibits no distension. There is no tenderness. There is no guarding.   Musculoskeletal: She exhibits no edema or deformity.   Neurological: She is alert and oriented to person, place, and time.   Skin: Skin is warm and dry.   Psychiatric: She has a normal mood and affect.   Vitals reviewed.      Significant Labs:  CBC:   Recent Labs   Lab 06/24/19  1501 06/25/19  0400 06/25/19  0555   WBC 7.07 SEE COMMENT 6.58   HGB 10.4* SEE COMMENT 9.5*   HCT 35.1* SEE COMMENT 31.7*    SEE COMMENT 247     CMP:   Recent Labs   Lab 06/25/19  0555   GLU 84   CALCIUM 8.7   ALBUMIN 2.2*   PROT 6.6      K 3.6   CO2 26      BUN 3*   CREATININE 0.6   ALKPHOS 70   ALT 6*   AST 13   BILITOT 0.3     Coagulation:   Recent Labs   Lab 06/24/19 2058   INR 1.4*

## 2019-06-25 NOTE — ASSESSMENT & PLAN NOTE
Home anti-hypertensive regimen includes HCTZ and metoprolol, PO.   Not currently tolerating PO. Normotensive at this time    Plan  - IV hydralazine for SBP >180 mmHg

## 2019-06-26 NOTE — ASSESSMENT & PLAN NOTE
She has continued to try to take her anti-hypertensive medications, including HCTZ, however is unsure if anything is able to stay down. K of 2.9 and Magnesion 1.3 on arrival to ED. Repleted in ED and on floor.    - K improved, patient with central line, can receive IV repletion  - Replace PRN

## 2019-06-26 NOTE — PT/OT/SLP EVAL
Physical Therapy Evaluation    Patient Name:  Silvia Capellan   MRN:  [id number]    Recommendations:     Discharge Recommendations:  home health PT, home health OT   Discharge Equipment Recommendations: Rolling walker (additional DME TBD pending progress)    Barriers to discharge: Inaccessible home and Decreased caregiver support    Assessment:     Silvia Capellan is a 73 y.o. female admitted with a medical diagnosis of Esophageal obstruction. Pt's activity tolerance limited by fatigue this visit. She presents with the following impairments /functional limitations:  weakness, impaired endurance, impaired self care skills, gait instability, impaired functional mobilty, impaired balance, pain, impaired cardiopulmonary response to activity. Pt would benefit from skilled PT intervention to address listed functional deficits, reduce fall risk, and maximize (I) and safety with functional mobility.     Rehab Prognosis: Good; patient would benefit from acute skilled PT services to address these deficits and reach maximum level of function.      Recent Surgery: Procedure(s) (LRB):  EGD (ESOPHAGOGASTRODUODENOSCOPY) WITH FLUOROSCOPY (N/A) 1 Day Post-Op    Plan:     During this hospitalization, patient to be seen 3 x/week to address the identified rehab impairments via gait training, therapeutic activities, therapeutic exercises, neuromuscular re-education and progress toward the following goals:    · Plan of Care Expires:  07/24/19    Subjective     Chief Complaint: fatigue  Patient/Family Comments/goals: return home  Pain/Comfort:  · Pain Rating 1: (Pt did not rate pain on numeric scale)  · Location 1: abdomen  · Pain Addressed 1: Reposition, Distraction    Patients cultural, spiritual, Evangelical conflicts given the current situation:  none    Patient History:     Living Environment: Pt lives alone in [facility] with 3 [address]ERT[address]. Bathroom: tub. Pt is able to stay with daughter as needed during recovery.    Prior Level of Function:  (I) with mobility and ADLs   DME owned: tub bench   Caregiver Assistance: limited assistance from daughter and extended family;  support not available     Objective:     Communicated with RN prior to session.  Patient found supine with telemetry, oxygen  upon PT entry to room.    General Precautions: Standard, fall   Orthopedic Precautions:N/A   Braces: N/A     Exams:  · Cognitive Exam:  Patient is oriented to Person, Place, Time and Situation  · Postural Exam:  Patient presented with the following abnormalities:    · -       Rounded shoulders  · -       Forward head  · Sensation:    · -       Intact  · RLE ROM: WFL  · RLE Strength: WFL  · LLE ROM: WFL  · LLE Strength: WFL      Functional Mobility:    Bed Mobility:   · Supine > Sit: stand by assistance   · Sit > Supine: stand by assistance     Transfers:   · Sit <> Stand Transfer: stand by assistance from EOB with no AD                 Gait:  · Distance: ~10 feet   · Assistance level: contact guard assistance  · Assistive Device: no AD  · Gait Assessment: decreased step length, narrow base of support  · *distance limited 2/2 pt with fatigue and requesting to rest     Therapeutic Activities and Exercises:   Pt educated on:  - Role of PT and POC/goals for therapy   - Safety with mobility  - Instructed to call nursing staff for assistance with mobility as needed 2/2 fall risk     Pt verbalized understanding and expressed no further concerns/questions.    AM-PAC 6 CLICK MOBILITY  Total Score:18     Patient left HOB elevated with all lines intact, call button in reach and RN notified.    GOALS:   Multidisciplinary Problems     Physical Therapy Goals        Problem: Physical Therapy Goal    Goal Priority Disciplines Outcome Goal Variances Interventions   Physical Therapy Goal     PT, PT/OT Ongoing (interventions implemented as appropriate)     Description:  Goals to be met by: 19     Patient will increase functional independence with mobility by performin.  Sit to stand transfer with Modified [place]  2. Gait  x 150 feet with Supervision with or without using least restrictive AD.   3. Ascend/descend 3 stairs with right handrails Stand-by Assistance  4. Stand for 3 minutes with Supervision   5. Lower extremity exercise program x30 reps per handout, with independence                      History:     Past Medical History:   Diagnosis Date    Chronic obstructive pulmonary disease 5/14/2019    Encounter for blood transfusion     GERD (gastroesophageal reflux disease)     HTN (hypertension)     Rheumatoid arthritis     Rheumatoid arthritis(714.0)     Squamous cell lung cancer, left 2/15/2018       Past Surgical History:   Procedure Laterality Date    APPENDECTOMY      Nsqpsg-ppztyp-dl--lung N/A 1/4/2019    Performed by [facility] OR 2ND FLR    BRONCHOSCOPY N/A 8/28/2017    Performed by [facility] OR 2ND FLR    CATARACT EXTRACTION Bilateral 2018    Dr. Keith     COLONOSCOPY      COLONOSCOPY N/A 10/30/2017    Performed by Qu[facility] at [facility] ENDO (4TH FLR)    COLONOSCOPY N/A 1/17/2014    [facility]    EGD (ESOPHAGOGASTRODUODENOSCOPY) WITH FLUOROSCOPY N/A 6/25/2019    Performed by Aime Huff MD at [facility] ENDO (2ND FLR)    ESOPHAGOGASTRODUODENOSCOPY (EGD) N/A 10/30/2017    Performed by Quentin [facility] at [facility] ENDO (4TH FLR)    FOOT SURGERY Left     HYSTERECTOMY      INSERTION, IOL PROSTHESIS Left 11/8/2018    Performed by Susan Keith [facility] at [facility] OR 1ST FLR    INSERTION, IOL PROSTHESIS Right 10/18/2018    Performed by [facility] at [facility] OR 1ST FLR    INSERTION-PORT N/A 11/14/2017    Performed by [facility] OR 2ND FLR    PHACOEMULSIFICATION, CATARACT Left 11/8/2018    Performed by [facility] at [facility] OR 1ST FLR    PHACOEMULSIFICATION, CATARACT Right 10/18/2018    Performed by Susan [facility] at [facility] OR 1ST  FLR    SKIN BIOPSY      TOTAL KNEE ARTHROPLASTY      right       Time Tracking:     PT Received On: 06/26/19  PT Start Time: 0840     PT Stop Time: 0852  PT Total Time (min): 12 min     Billable Minutes: Evaluation 12 min    Dejah Gill PT, DPT   6/26/2019  Pager: [phone]

## 2019-06-26 NOTE — CONSULTS
[facility]  Radiation Oncology  Consult Note    Patient Name: Silvia Capellan  MRN: [id number]  Admission Date: 6/24/2019  Hospital Length of Stay: 2 days  Code Status: Full Code   Attending Provider: Yessi Kc MD  Consulting Provider: Lencho Kauffman MD  Primary Care Physician: Ganga Krause MD  Principal Problem:Esophageal obstruction    Inpatient consult to Radiation Oncology  Consult performed by: Lencho Kauffman MD  Consult ordered by: Jeff López MD        Subjective:     HPI:  Ms. Capellan is a 72 y/o female with metastatic SCC of unknown primary diagnosed in 8/2017 with mets to mediastinum. She received palliative chemotherapy in 2018 but has been off for some time. More recently she had several admission in May and June 2019 for N/V and poor PO intake. She was admitted for this 6/24/19. CT C/A/P demonstrated a large subcarinal mass with effacement of the esophagus. She underwent EGD with esophageal stent placement on 6/25/19. Radiation Oncology is consulted for consideration of palliative RT.     I met with the patient and family members at bedside. She reports being able to tolerate liquids PO today, which was was not prior to stent placement. She is feeling tired but better since the procedure. She denies N/V or hematemesis today.     Oncology Treatment Plan:   OP ATEZOLIZUMAB Q3W    Current Facility-Administered Medications   Medication    albuterol-ipratropium 2.5 mg-0.5 mg/3 mL nebulizer solution 3 mL    bisacodyl suppository 10 mg    dextrose 10% (D10W) Bolus    dextrose 10% (D10W) Bolus    glucagon (human recombinant) injection 1 mg    glucose chewable tablet 16 g    glucose chewable tablet 24 g    hydrALAZINE injection 5 mg    HYDROmorphone injection 1 mg    naloxone 0.4 mg/mL injection 0.4 mg    ondansetron injection 8 mg    oxyCODONE-acetaminophen 7.5-325 mg per tablet 1 tablet    promethazine tablet 25 mg    ramelteon tablet 8 mg     senna-docusate 8.6-50 mg per tablet 1 tablet    sodium chloride 0.9% flush 10 mL       Review of patient's allergies indicates:   Allergen Reactions    Latex, natural rubber Rash    Codeine Hallucinations    Cortisporin [neomycin-bacitracin-poly-hc] Rash    Latex, natural rubber Rash        Past Medical History:   Diagnosis Date    Chronic obstructive pulmonary disease 5/14/2019    Encounter for blood transfusion     GERD (gastroesophageal reflux disease)     HTN (hypertension)     Rheumatoid arthritis     Rheumatoid arthritis(714.0)     Squamous cell lung cancer, left 2/15/2018     Past Surgical History:   Procedure Laterality Date    APPENDECTOMY      Yrelde-wzgzaz-qk--lung N/A 1/4/2019    Performed by [facility] Provider at [facility] OR 2ND FLR    BRONCHOSCOPY N/A 8/28/2017    Performed by [facility] Diagnostic Provider at [facility] OR 2ND FLR    CATARACT EXTRACTION Bilateral 2018    Dr. Keith     COLONOSCOPY      COLONOSCOPY N/A 10/30/2017    Performed by Quentin Coppola MD at [facility] ENDO (4TH FLR)    COLONOSCOPY N/A 1/17/2014    [facility]    EGD (ESOPHAGOGASTRODUODENOSCOPY) WITH FLUOROSCOPY N/A 6/25/2019    Performed by Aime Huff MD at [facility] ENDO (2ND FLR)    ESOPHAGOGASTRODUODENOSCOPY (EGD) N/A 10/30/2017    Performed by Quentin [facility]ola [facility] at [facility] ENDO (4TH FLR)    FOOT SURGERY Left     HYSTERECTOMY      INSERTION, IOL PROSTHESIS Left 11/8/2018    Performed by Susan Keith [facility] at [facility] OR 1ST FLR    INSERTION, IOL PROSTHESIS Right 10/18/2018    Performed by [facility] at [facility] OR 1ST FLR    INSERTION-PORT N/A 11/14/2017    Performed by [facility] Diagnostic Provider at [facility] OR 2ND FLR    PHACOEMULSIFICATION, CATARACT Left 11/8/2018    Performed by [facility] at [facility] OR 1ST FLR    PHACOEMULSIFICATION, CATARACT Right 10/18/2018    Performed by [facility] at [facility] OR 1ST FLR    SKIN BIOPSY      TOTAL KNEE ARTHROPLASTY       right     Family History     Problem Relation (Age of Onset)    Glaucoma Mother, Sister, Brother    Hypertension     Kidney disease     No Known Problems Father, Maternal Aunt, Maternal Uncle, Paternal Aunt, Paternal Uncle, Maternal Grandmother, Maternal Grandfather, Paternal Grandmother, Paternal Grandfather        Tobacco Use    Smoking status: Former Smoker    Smokeless tobacco: Never Used   Substance and Sexual Activity    Alcohol use: Not Currently     Comment: Occasionally    Drug use: No    Sexual activity: Not Currently       Review of Systems  Objective:     Vital Signs (Most Recent):  Temp: 98.1 °F (36.7 °C) (06/26/19 1136)  Pulse: 99 (06/26/19 1136)  Resp: 20 (06/26/19 1136)  BP: (!) 146/85 (06/26/19 1136)  SpO2: (!) 94 % (06/26/19 1136) Vital Signs (24h Range):  Temp:  [98 °F (36.7 °C)-98.6 °F (37 °C)] 98.1 °F (36.7 °C)  Pulse:  [] 99  Resp:  [18-37] 20  SpO2:  [94 %-100 %] 94 %  BP: (124-169)/(56-99) 146/85     Weight: 89.5 kg (197 lb 5 oz)  Body mass index is 30.9 kg/m².  Body surface area is 2.06 meters squared.    Physical Exam   Constitutional: She is oriented to person, place, and time. She appears well-developed and well-nourished.   HENT:   Head: Normocephalic and atraumatic.   Mouth/Throat: Mucous membranes are normal.   Eyes: EOM are normal. No scleral icterus.   Neck: Normal range of motion. Neck supple.   Pulmonary/Chest: Effort normal. No accessory muscle usage. No respiratory distress.   Abdominal: Soft. Normal appearance.   Musculoskeletal: Normal range of motion. She exhibits no edema.   Lymphadenopathy:     She has no cervical adenopathy.        Right: No supraclavicular adenopathy present.        Left: No supraclavicular adenopathy present.   Neurological: She is alert and oriented to person, place, and time. No cranial nerve deficit.   Skin: Skin is warm. No rash noted. No cyanosis.   Psychiatric: She has a normal mood and affect. Her speech is normal. Judgment normal.    Vitals reviewed.    Diagnostic Results:  I have reviewed all pertinent imaging results/findings within the past 24 hours.    Assessment/Plan:     * Esophageal obstruction  This is a 72 y/o female with SCC of unknown primary (possibly lung?) diagnosed 8/2017 s/p palliative systemic therapy last in 2018. She has significant mediastinal progression with esophageal obstruction preventing PO intake. Stent was placed 6/25/19. Radiation Oncology is consulted for consideration of palliative RT.     I discussed the role of palliative RT in relief of esophageal obstruction from cancer, with ~60-70% of patients experiencing relief when treated for esophageal cancer. Potential short- and long-term side effects of radiation were reviewed. I recommend treating 20-30 Gy in 5-10 fractions, depending on her goals after radiation. Given the significant burden of her disease, I also briefly discussed consideration of hospice and encouraged her to raise goals of care with her primary team.     Will plan to arrange for CT Simulation before she is discharged, then likely start treatments early next week assuming she continues to tolerate PO liquids.         Thank you for your consult. I will follow-up with patient. Please contact us if you have any additional questions.    Lencho Kauffman MD  Radiation Oncology  [facility]

## 2019-06-26 NOTE — PT/OT/SLP EVAL
Occupational Therapy   Evaluation    Name: Silvia Capellan  MRN: [id number]  Admitting Diagnosis:  Esophageal obstruction 1 Day Post-Op    Recommendations:     Discharge Recommendations: home with home health  Discharge Equipment Recommendations:  (TBD-- pending progress)  Barriers to discharge:  Decreased caregiver support    Assessment:     Silvia Capellan is a 73 y.o. female with a medical diagnosis of Esophageal obstruction.  She presents alert and willing to participate in therapy evaluation. Pt chief complaint being minimal abdominal pain and overall fatigue 2/2 not sleeping well overnight.  Performance deficits affecting function: weakness, impaired endurance, impaired self care skills, impaired functional mobilty, impaired balance, impaired cardiopulmonary response to activity.  At this time, pt requires increased level of skilled assistance with ADl and functional mobility. Pt lives alone however reports she will be discharging to daughter home for a few days. She would benefit from HHOT following d/c to continue to progress towards goals and improve quality of life.     Rehab Prognosis: Good; patient would benefit from acute skilled OT services to address these deficits and reach maximum level of function.       Plan:     Patient to be seen 3 x/week to address the above listed problems via self-care/home management, therapeutic activities, therapeutic exercises, neuromuscular re-education  · Plan of Care Expires: 07/25/19  · Plan of Care Reviewed with: patient    Subjective     Chief Complaint: minimal abdominal pain; fatigue   Patient/Family Comments/goals: Return to baseline    Occupational Profile:  Living Environment: Pt lives alone in 1SH with 3STE and hand rail; pt reports she will be d/c to daughters home for a few days ( daughter works during the day)  Previous level of function: PTA, pt reports being independent with ADl and functional mobility   Roles and Routines: Home/community dweller,  house hold duties   Equipment Used at Home:  shower chair  Assistance upon Discharge: Pt will have limited assistance from daughter ( daughter works during the day)    Pain/Comfort:  · Pain Rating 1: (minimal abdominal pain--did not rate)    Patients cultural, spiritual, Buddhism conflicts given the current situation: no    Objective:     Communicated with: RN prior to session.  Patient found supine with telemetry, oxygen(Chest port) upon OT entry to room.    General Precautions: Standard,     Orthopedic Precautions:    Braces:       Occupational Performance:    Bed Mobility:    · Patient completed Rolling/Turning to Right with stand by assistance  · Patient completed Supine to Sit with stand by assistance  · Patient completed Sit to Supine with stand by assistance    Functional Mobility/Transfers:  · Patient completed Sit <> Stand Transfer with stand by assistance  with  no assistive device   · Functional Mobility: Pt completed functional mobility within room requiring CGA and HHA    Activities of Daily Living:  · Upper Body Dressing: minimum assistance to ayush gown like jacket while seated EOB  · Lower Body Dressing: maximal assistance to ayush B socks while supine ( pt reports she wears slip on shoes at home)    Cognitive/Visual Perceptual:  Cognitive/Psychosocial Skills:     -       Oriented to: Person, Place, Time and Situation   -       Follows Commands/attention:Follows multistep  commands  -       Communication: clear/fluent  -       Safety awareness/insight to disability: intact   -       Mood/Affect/Coping skills/emotional control: Appropriate to situation  Visual/Perceptual:      -Intact     Physical Exam:  Postural examination/scapula alignment:    -       Rounded shoulders  Skin integrity: Visible skin intact  Dominant hand:    -       RUE  Upper Extremity Range of Motion:     -       Right Upper Extremity: WFL  -       Left Upper Extremity: WFL  Upper Extremity Strength:    -       Right Upper  Extremity: WFL  5/5  -       Left Upper Extremity: WFL  5/5   Strength:    -       Right Upper Extremity: WFL  -       Left Upper Extremity: WFL    AMPAC 6 Click ADL:  AMPAC Total Score: 21    Treatment & Education:  -Pt edu on OT role/POC  -Importance of OOB activity with staff assistance ( UIC 3x per day)  -Safety during functional t/f and mobility  -White board updated  - Multiple self care tasks completed--as noted above  - All questions/concerns answered within OT scope of practice  Education:    Patient left supine with all lines intact, call button in reach and RN notified    GOALS:   Multidisciplinary Problems     Occupational Therapy Goals        Problem: Occupational Therapy Goal    Goal Priority Disciplines Outcome Interventions   Occupational Therapy Goal     OT, PT/OT Ongoing (interventions implemented as appropriate)    Description:  Goals to be met by: 7/6/19    Patient will increase functional independence with ADLs by performing:    UE Dressing with Supervision.  LE Dressing with Stand-by Assistance.  Grooming while standing at sink with Supervision.  Toileting from toilet with Stand-by Assistance for hygiene and clothing management.  Toilet transfer to toilet with Stand-by Assistance.  Upper extremity exercise program per handout, with independence.                      History:     Past Medical History:   Diagnosis Date    Chronic obstructive pulmonary disease 5/14/2019    Encounter for blood transfusion     GERD (gastroesophageal reflux disease)     HTN (hypertension)     Rheumatoid arthritis     Rheumatoid arthritis(714.0)     Squamous cell lung cancer, left 2/15/2018       Past Surgical History:   Procedure Laterality Date    APPENDECTOMY      Azskhy-lkaepg-is--lung N/A 1/4/2019    [facility]    BRONCHOSCOPY N/A 8/28/2017    [facility]    CATARACT EXTRACTION Bilateral 2018    Dr. Keith      COLONOSCOPY      COLONOSCOPY N/A 10/30/2017    Performed by [facility] at [facility] ENDO (4TH FLR)    COLONOSCOPY N/A 1/17/2014    [facility] at [facility] ENDO    EGD (ESOPHAGOGASTRODUODENOSCOPY) WITH FLUOROSCOPY N/A 6/25/2019    Performed by Aime Huff MD at [facility] ENDO (2ND FLR)    ESOPHAGOGASTRODUODENOSCOPY (EGD) N/A 10/30/2017    Performed by Quentin Coppola MD at [facility] ENDO (4TH FLR)    FOOT SURGERY Left     HYSTERECTOMY      INSERTION, IOL PROSTHESIS Left 11/8/2018    Performed by Susan Keith MD at [facility] OR 1ST FLR    INSERTION, IOL PROSTHESIS Right 10/18/2018    Performed by Susan Keith MD at [facility] OR 1ST FLR    INSERTION-PORT N/A 11/14/2017    Performed by [facility] Diagnostic Provider at [facility] OR 2ND FLR    PHACOEMULSIFICATION, CATARACT Left 11/8/2018    Performed by Susan Keith MD at [facility] OR 1ST FLR    PHACOEMULSIFICATION, CATARACT Right 10/18/2018    Performed by Susan Keith MD at [facility] OR 1ST FLR    SKIN BIOPSY      TOTAL KNEE ARTHROPLASTY      right       Time Tracking:     OT Date of Treatment: 06/26/19  OT Start Time: 0840  OT Stop Time: 0852  OT Total Time (min): 12 min    Billable Minutes:Evaluation 12    Mary Kate dorman, OT  6/26/2019

## 2019-06-26 NOTE — PLAN OF CARE
Problem: Adult Inpatient Plan of Care  Goal: Plan of Care Review  Outcome: Ongoing (interventions implemented as appropriate)  Pt is AAOx4 and ambulates with a stand by assist. Pt was tachy, especially with acute pain and pt remained afebrile. Pt c/o 10/10 pain for most of the night and had IV Dilaudid and Morphine, as well as P.O. Oxycodone-Acetaminophen 5-325. Pt was nauseated throughout the night and had IV Zofran and Promethazine. LR @ 125/hr until 3am. Pt used bedside commode one time with low output. Pt was bladder scanned and showed 280cc. MD informed, no further action was taken.  Personal effects  @ bedside. Pt remained safe and free of injury through the night. Bed in low and locked position, bed alarm set, bed rails up x2, call bell in reach, non skid socks worn out of bed. Will continue to monitor.

## 2019-06-26 NOTE — PHYSICIAN QUERY
PT Name: Silvia Capellan  MR #: [id number]     PHYSICIAN QUERY -  ELECTROLYTE CLARIFICATION      CDS: Viktoriya Foster RN   Contact information:[email or web address]    This form is a permanent document in the medical record.     Query Date: June 26, 2019    By submitting this query, we are merely seeking further clarification of documentation to reflect the severity of illness of your patient. Please utilize your independent clinical judgment when addressing the question(s) below.    The Medical record reflects the following:     Indicators   Supporting Clinical Findings Location in Medical Record   x Lab Value(s)  6/24 6/25   Mag 1.3  1.3     Lab Results    x Treatment                 Medication magnesium sulfate 2g in water 50mL IVPB  Ordered Dose: 2 g   Route: Intravenous   Frequency: Every 2 hours over 120 Minutes MAR- Given 6/25 x2    Other       Provider, please specify the diagnosis or diagnoses that correspond(s) to the above indicators. Sagar all that apply:    [ x  ] Hypomagnesemia   [   ] Other electrolyte disturbance (please specify): _______   [   ]  Clinically Undetermined       Please document in your progress notes daily for the duration of treatment until resolved, and include in your discharge summary.

## 2019-06-26 NOTE — NURSING
Pt was nauseous the entire night and vomited 2x, with 300cc total of dark brown, coffee grind emesis. Pt had IV Zofran and IV Promethazine with very mild relief. Pt also c/o 10/10 pain for most of the night and had IV Dilaudid, IV Morphine and Oxycodone-acetaminophen 5-325mg w/little to no relief. Pt only urinated once and output was 100cc. Pt was bladder scanned and showed 280cc in bladder. Dr. Vera was informed, no further orders were given.

## 2019-06-26 NOTE — HPI
Ms. Capellan is a 74 y/o female with metastatic SCC of unknown primary diagnosed in 8/2017 with mets to mediastinum. She received palliative chemotherapy in 2018 but has been off for some time. More recently she had several admission in May and June 2019 for N/V and poor PO intake. She was admitted for this 6/24/19. CT C/A/P demonstrated a large subcarinal mass with effacement of the esophagus. She underwent EGD with esophageal stent placement on 6/25/19. Radiation Oncology is consulted for consideration of palliative RT.     I met with the patient and family members at bedside. She reports being able to tolerate liquids PO today, which was was not prior to stent placement. She is feeling tired but better since the procedure. She denies N/V or hematemesis today.

## 2019-06-26 NOTE — SUBJECTIVE & OBJECTIVE
Interval History: Patient with vomiting overnight and nausea. This AM symptoms improved, evaluated by AES. To start clear liquids today and advance to fulls as tolerated.    Oncology Treatment Plan:   OP ATEZOLIZUMAB Q3W    Medications:  Continuous Infusions:  Scheduled Meds:   ondansetron  8 mg Intravenous Q8H     PRN Meds:albuterol-ipratropium, bisacodyl, Dextrose 10% Bolus, Dextrose 10% Bolus, glucagon (human recombinant), glucose, glucose, hydrALAZINE, HYDROmorphone, naloxone, oxyCODONE-acetaminophen, promethazine, ramelteon, senna-docusate 8.6-50 mg, sodium chloride 0.9%     Review of Systems   Constitutional: Positive for unexpected weight change. Negative for fever.   HENT: Negative for ear pain, sinus pressure, sneezing and sore throat.    Eyes: Negative for pain and visual disturbance.   Respiratory: Negative for cough, chest tightness, shortness of breath and wheezing.    Cardiovascular: Negative for chest pain, palpitations and leg swelling.   Gastrointestinal: Negative for abdominal pain, constipation, diarrhea, nausea and vomiting.   Endocrine: Negative for polydipsia and polyuria.   Genitourinary: Negative for decreased urine volume, difficulty urinating, dysuria and urgency.   Musculoskeletal: Negative for arthralgias and myalgias.   Skin: Negative for color change and rash.   Allergic/Immunologic: Negative for environmental allergies and food allergies.   Neurological: Negative for dizziness, syncope, weakness, light-headedness and headaches.   Psychiatric/Behavioral: Negative for confusion and dysphoric mood. The patient is not nervous/anxious.      Objective:     Vital Signs (Most Recent):  Temp: 98.1 °F (36.7 °C) (06/26/19 1136)  Pulse: 99 (06/26/19 1136)  Resp: 20 (06/26/19 1136)  BP: (!) 146/85 (06/26/19 1136)  SpO2: (!) 94 % (06/26/19 1136) Vital Signs (24h Range):  Temp:  [98 °F (36.7 °C)-98.6 °F (37 °C)] 98.1 °F (36.7 °C)  Pulse:  [] 99  Resp:  [18-37] 20  SpO2:  [89 %-100 %] 94 %  BP:  (124-169)/(56-99) 146/85     Weight: 89.5 kg (197 lb 5 oz)  Body mass index is 30.9 kg/m².  Body surface area is 2.06 meters squared.      Intake/Output Summary (Last 24 hours) at 6/26/2019 1321  Last data filed at 6/25/2019 2110  Gross per 24 hour   Intake 100 ml   Output 100 ml   Net 0 ml       Physical Exam   Constitutional: She is oriented to person, place, and time. She appears well-developed and well-nourished.   Body mass index is 30.9 kg/m².     HENT:   Head: Normocephalic and atraumatic.   Eyes: Pupils are equal, round, and reactive to light. EOM are normal. No scleral icterus.   Neck: Normal range of motion. Neck supple.   Central line in place   Cardiovascular: Normal rate, regular rhythm, normal heart sounds and intact distal pulses.   No murmur heard.  Pulmonary/Chest: Effort normal. No respiratory distress. She has decreased breath sounds in the left upper field, the left middle field and the left lower field. She has no wheezes.   Abdominal: Soft. Bowel sounds are normal. She exhibits no distension. There is no tenderness.   Neurological: She is alert and oriented to person, place, and time.   Skin: Skin is warm and dry.   Psychiatric: She has a normal mood and affect. Her behavior is normal.   Vitals reviewed.      Significant Labs:   CBC:   Recent Labs   Lab 06/25/19  0400 06/25/19  0555 06/26/19  0158   WBC SEE COMMENT 6.58 10.47   HGB SEE COMMENT 9.5* 10.1*   HCT SEE COMMENT 31.7* 33.7*   PLT SEE COMMENT [id number]   , CMP:   Recent Labs   Lab 06/24/19  1501  06/25/19  0042 06/25/19  0555 06/26/19  0158      < > 143 144 142   K 2.9*   < > 2.8* 3.6 3.5      < > 101 103 100   CO2 27   < > 29 26 32*   GLU 87   < > 85 84 120*   BUN 5*   < > 3* 3* 4*   CREATININE 0.7   < > 0.7 0.6 0.7   CALCIUM 9.5   < > 8.9 8.7 9.2   PROT 7.6  --   --  6.6 7.3   ALBUMIN 2.6*  --   --  2.2* 2.4*   BILITOT 0.3  --   --  0.3 0.3   ALKPHOS 73  --   --  70 71   AST 14  --   --  13 14   ALT 7*  --   --  6* 7*    ANIONGAP 18*   < > 13 15 10   EGFRNONAA >60.0   < > >60.0 >60.0 >60.0    < > = values in this interval not displayed.    and All pertinent labs from the last 24 hours have been reviewed.    Diagnostic Results:  I have reviewed all pertinent imaging results/findings within the past 24 hours.

## 2019-06-26 NOTE — ASSESSMENT & PLAN NOTE
Body mass index is 30.9 kg/m². Currently with 50+ lb weight loss over the past two months.     - clear liquids to advance to full liquids as tolerated

## 2019-06-26 NOTE — PLAN OF CARE
Problem: Occupational Therapy Goal  Goal: Occupational Therapy Goal  Goals to be met by: 7/6/19    Patient will increase functional independence with ADLs by performing:    UE Dressing with Supervision.  LE Dressing with Stand-by Assistance.  Grooming while standing at sink with Supervision.  Toileting from toilet with Stand-by Assistance for hygiene and clothing management.  Toilet transfer to toilet with Stand-by Assistance.  Upper extremity exercise program per handout, with independence.    Outcome: Ongoing (interventions implemented as appropriate)  Evaluation completed. Initiate POC.   Mary Kate dorman OT  6/26/2019

## 2019-06-26 NOTE — PROGRESS NOTES
[facility]  Hematology/Oncology  Progress Note    Patient Name: Silvia Capellan  Admission Date: 6/24/2019  Hospital Length of Stay: 2 days  Code Status: Full Code     Subjective:     HPI:  Ms Capellan is a 74 yo F w PMHx significant for rheumatoid arthritis, HTN, and squamous cell carcinoma (unknown primary) resulting in lung and mediastinal masses s/p three cycles of palliative carboplatin/paclitaxel.    Pt was directed to [facility] from clinic for evaluation of decreased PO intake over the last month. Over the last month, pt has been unable to keep food, drink or medication down for more than a few minutes. Whatever she would take in by mouth would come back up spontaneously and without warning after a few minutes. She denies any warning signs of regurgitation, including nausea. At first she attributed these symptoms to something she ate, which she later doubted as a cause as her symptoms persisted. She has tried anti-emetics including metoclopramide and ondansetron without any benefit. She was also recently started on cyproheptadine as an appetite stimulant. She denies loss of appetite. She states she has the desire to eat, but has stopped trying to eat as she is afraid the contents would come back up. Over the past two and a half months, the patient has lost more than 50 lbs (250 to 197). She denies fever, abdominal pain, diarrhea, sick contacts, or chest pain.     ROS was positive for shortness of breath that has been going on for the past three months. It has worsened to the point that she has difficulty walking about 10 ft. She spends most of her day in bed. The shortness of breath does not worsen with lying down, however last night she did wake up to sit up and catch her breath. This had never happened before.     Oncology History per Dr Wong's most recent clinic note (6/7):     Diagnosis: Squamous cell carcinoma , primary site unknown    Molecular/genetic testing: p16 negative; PD L1  could not be run due to inadequate tissue   Stage:        Stage 4   Treatment: Carboplatin/paclitaxel x  6 cycles (2/16/18 - 6/8/18)        HPI:  is a 73 y/o female who underwent bronchoscopy/EBUS evaluation on 8/31/17 for abnormal mediastinal adenopathy  and a 1.2 cm MORRO mass.     Bronchoscopy findings:    The oropharynx appears normal. The larynx appears normal. The vocal cords appear normal. The subglottic space is normal. The trachea is of normal caliber. The otf is sharp. The tracheobronchial tree was examined to at least the first subsegmental level. Bronchial mucosa and anatomy are normal; there are no endobronchial lesions, and no secretions.    Lymph Nodes: Lymph node sizing was performed via endobronchial ultrasound for suspected lung cancer. Sampling by transbronchial needle aspiration was also performed using an Olympus EBUS-TBNA 22  gauge needle in the subcarinal mediastinum (level 7) and sent for routine cytology.       - The 7 (subcarinal) node was 30 mm by EBUS. Three samples with the needle were obtained. The patient's condition was unchanged after the intervention.      Her treatment got delayed as she cancelled several appointments due to some issues at home. She started Caroplatin/paclitaxel palliatively. She had left thoracentesis on 2/9/18. There were atypical cells in the pleural fluid, highly suspicious for malignancy.  She completed cycles of Carboplatin/Paclitaxel, last treatment on 6/8/18.     She had repeat left pleural mass FNA in [facility] 2019. It was negative for malignancy, but showed lympho plasmacytic infiltration.            PET CT on 3/13/19 showed increased  hypermetabolism of the left pleural effusion.Thoracic vertebral body hypermetabolism was noted, new from the prior exam (compared with PET from June 2018) without underlying CT changes which may represent red marrow hyperplasia or new site of neoplastic disease.  There was new hypermetabolism in the right L5-S1  facet joints which appeared to be centered at the facet joint not within the bone and is favored to represent degenerative change or neoplasm.  She has been non -compliant with her appointments here.        Interval history: She is here for a follow up visit. She has fatigue, dyspnea. She has intermittent nausea from the past 2 weeks, with emesis. She was evaluated She has worsening dyspnea. No weight loss. No headache.   She was evaluated on 5/8/19 for nausea and abdominal pain at ER and received IV fluids. On 5/13/19 she was again in the ER , this time for dyspnea. She was evaluated with chest X ray and thoracentesis was attempted, but the fluid volume was very little and was not done.  She has no constipation or diarrhea. No fever.   She was in ER again on 6/3/19 for nausea and emesis. She was started on reglan. She was given iv fluids. She has persistent nausea and emesis.         Interval History: Patient with vomiting overnight and nausea. This AM symptoms improved, evaluated by [facility]. To start clear liquids today and advance to fulls as tolerated.    Oncology Treatment Plan:   OP ATEZOLIZUMAB Q3W    Medications:  Continuous Infusions:  Scheduled Meds:   ondansetron  8 mg Intravenous Q8H     PRN Meds:albuterol-ipratropium, bisacodyl, Dextrose 10% Bolus, Dextrose 10% Bolus, glucagon (human recombinant), glucose, glucose, hydrALAZINE, HYDROmorphone, naloxone, oxyCODONE-acetaminophen, promethazine, ramelteon, senna-docusate 8.6-50 mg, sodium chloride 0.9%     Review of Systems   Constitutional: Positive for unexpected weight change. Negative for fever.   HENT: Negative for ear pain, sinus pressure, sneezing and sore throat.    Eyes: Negative for pain and visual disturbance.   Respiratory: Negative for cough, chest tightness, shortness of breath and wheezing.    Cardiovascular: Negative for chest pain, palpitations and leg swelling.   Gastrointestinal: Negative for abdominal pain, constipation, diarrhea, nausea and  vomiting.   Endocrine: Negative for polydipsia and polyuria.   Genitourinary: Negative for decreased urine volume, difficulty urinating, dysuria and urgency.   Musculoskeletal: Negative for arthralgias and myalgias.   Skin: Negative for color change and rash.   Allergic/Immunologic: Negative for environmental allergies and food allergies.   Neurological: Negative for dizziness, syncope, weakness, light-headedness and headaches.   Psychiatric/Behavioral: Negative for confusion and dysphoric mood. The patient is not nervous/anxious.      Objective:     Vital Signs (Most Recent):  Temp: 98.1 °F (36.7 °C) (06/26/19 1136)  Pulse: 99 (06/26/19 1136)  Resp: 20 (06/26/19 1136)  BP: (!) 146/85 (06/26/19 1136)  SpO2: (!) 94 % (06/26/19 1136) Vital Signs (24h Range):  Temp:  [98 °F (36.7 °C)-98.6 °F (37 °C)] 98.1 °F (36.7 °C)  Pulse:  [] 99  Resp:  [18-37] 20  SpO2:  [89 %-100 %] 94 %  BP: (124-169)/(56-99) 146/85     Weight: 89.5 kg (197 lb 5 oz)  Body mass index is 30.9 kg/m².  Body surface area is 2.06 meters squared.      Intake/Output Summary (Last 24 hours) at 6/26/2019 1321  Last data filed at 6/25/2019 2110  Gross per 24 hour   Intake 100 ml   Output 100 ml   Net 0 ml       Physical Exam   Constitutional: She is oriented to person, place, and time. She appears well-developed and well-nourished.   Body mass index is 30.9 kg/m².     HENT:   Head: Normocephalic and atraumatic.   Eyes: Pupils are equal, round, and reactive to light. EOM are normal. No scleral icterus.   Neck: Normal range of motion. Neck supple.   Central line in place   Cardiovascular: Normal rate, regular rhythm, normal heart sounds and intact distal pulses.   No murmur heard.  Pulmonary/Chest: Effort normal. No respiratory distress. She has decreased breath sounds in the left upper field, the left middle field and the left lower field. She has no wheezes.   Abdominal: Soft. Bowel sounds are normal. She exhibits no distension. There is no  tenderness.   Neurological: She is alert and oriented to person, place, and time.   Skin: Skin is warm and dry.   Psychiatric: She has a normal mood and affect. Her behavior is normal.   Vitals reviewed.      Significant Labs:   CBC:   Recent Labs   Lab 06/25/19  0400 06/25/19  0555 06/26/19  0158   WBC SEE COMMENT 6.58 10.47   HGB SEE COMMENT 9.5* 10.1*   HCT SEE COMMENT 31.7* 33.7*   PLT SEE COMMENT [id number]   , CMP:   Recent Labs   Lab 06/24/19  1501  06/25/19  0042 06/25/19  0555 06/26/19  0158      < > 143 144 142   K 2.9*   < > 2.8* 3.6 3.5      < > 101 103 100   CO2 27   < > 29 26 32*   GLU 87   < > 85 84 120*   BUN 5*   < > 3* 3* 4*   CREATININE 0.7   < > 0.7 0.6 0.7   CALCIUM 9.5   < > 8.9 8.7 9.2   PROT 7.6  --   --  6.6 7.3   ALBUMIN 2.6*  --   --  2.2* 2.4*   BILITOT 0.3  --   --  0.3 0.3   ALKPHOS 73  --   --  70 71   AST 14  --   --  13 14   ALT 7*  --   --  6* 7*   ANIONGAP 18*   < > 13 15 10   EGFRNONAA >60.0   < > >60.0 >60.0 >60.0    < > = values in this interval not displayed.    and All pertinent labs from the last 24 hours have been reviewed.    Diagnostic Results:  I have reviewed all pertinent imaging results/findings within the past 24 hours.    Assessment/Plan:     * Esophageal obstruction  FAILURE TO THRIVE IN ADULT    72 yo F w history of squamous cell carcinoma, with unknown primary, affecting the lung and mediastinum. Masses in the past have resulted in tracheal deviation. She presents for evaluation of one month history of decreased PO intake contributing to her 50+ lb weight loss over the past two months. CT chest could not allow for visualization of the esophagus in its entirety, which could represent compression/obstruction by adjacent mass. She is unable to tolerate any PO medication as she regurgitates anything she takes in PO.    Plan  - S/p Esophageal stent on 6/25/19  - Starting on clears, advancing diet as tolerated  - transitioning to PO pain medications    Acute  hypoxemic respiratory failure  Patient comfortable, satting well on room air. States she gets significantly dyspneic on exertion. States she was active up until roughly 2 months ago and can now no longer ambulate a few feet without severe dyspnea. Chart review shows ED visits from January of 2019 with complaints of dyspnea. Patient not wheezing, and without a cough. Has loculated pleural effusion which has been difficult to tap in the past and size of effusion is not particularly large, however patient with significant mediastinal and lung disease.     - Unlikely 2/2 effusion as it is chronic and stable. Patient with poor baseline pulmonary reserve, these symptoms are likely 2/2 progression of disease  - Will follow up patient's oxygenation and symptoms following EGD, will perform 6-minute walk test  - Patient may require home O2 at time of discharge    Weakness  NEOPLASTIC MALIGNANT RELATED FATIGUE    PT/OT     Advance care planning  Per Dr Wong's last clinic note, pt appointed Joanie Belle has her HCOPOA. Will have further discussions with patient regarding wishes and advanced care planning pending her clinical course while inpatient.    Hypokalemia  She has continued to try to take her anti-hypertensive medications, including HCTZ, however is unsure if anything is able to stay down. K of 2.9 and Magnesion 1.3 on arrival to ED. Repleted in ED and on floor.    - K improved, patient with central line, can receive IV repletion  - Replace PRN    Anemia in neoplastic disease  Hgb of 10.4 today and normocytic. Stable.     Squamous cell lung cancer, left  PLEURAL EFFUSION ON LEFT    Pt of Dr Wong with history of squamous cell carcinoma with unknown primary. As such, she is to be treated as metastatic SCC. Despite her history of RA, she was scheduled to initiate PD-L1 inhibitor therapy (atezolizumab) once approved by insurance.     Plan  - Treatment per primary oncologist  - Would be started on outpatient bases  pending clinical course and discharge  - Radiation oncology consulted for evaluation if RT would be beneficial for the patient given immunotherapy would take 6-8 weeks before any benefit is seen and likely patient's disease is so advanced that may take too long.    Obesity (BMI 30-39.9)  Body mass index is 30.9 kg/m². Currently with 50+ lb weight loss over the past two months.     - clear liquids to advance to full liquids as tolerated    Essential hypertension  Home anti-hypertensive regimen includes HCTZ and metoprolol, PO.   Not currently tolerating PO. Normotensive at this time    Plan  - IV hydralazine for SBP >180 mmHg  - Will aim to resume PO BP meds in AM    Non-seasonal Rhinitis  Only if exposed to environmental allergen.     - Cetirizine 5 mg, PRN, once patient can tolerate PO    Rheumatoid arthritis  Unclear history of RA, last seen in rheumatology clinic in 2017 when at that time patient with no active disease, not on therapy             Jeff López MD  Hematology/Oncology  [facility]      Attending Note  I have personally taken the history and examined this patient and agree with the resident's note as stated above.  Discussed Rad Onc consult with patient as immunotherapy not likely to help patient with symptoms in a timely fashion if at all- unclear if this option feasible but worth reviewing.

## 2019-06-26 NOTE — ASSESSMENT & PLAN NOTE
Per Dr Wong's last clinic note, pt appointed Joanie Belle has her HCOPOA. Will have further discussions with patient regarding wishes and advanced care planning pending her clinical course while inpatient.

## 2019-06-26 NOTE — PLAN OF CARE
Problem: Physical Therapy Goal  Goal: Physical Therapy Goal  Goals to be met by: 19     Patient will increase functional independence with mobility by performin. Sit to stand transfer with Modified Leslie  2. Gait  x 150 feet with Supervision with or without using least restrictive AD.   3. Ascend/descend 3 stairs with right handrails Stand-by Assistance  4. Stand for 3 minutes with Supervision   5. Lower extremity exercise program x30 reps per handout, with independence    Outcome: Ongoing (interventions implemented as appropriate)  PT evaluation completed- see note for details. Goals established and POC initiated.     Dejah Gill, PT, DPT   2019  Pager: [phone]

## 2019-06-26 NOTE — ASSESSMENT & PLAN NOTE
This is a 74 y/o female with SCC of unknown primary (possibly lung?) diagnosed 8/2017 s/p palliative systemic therapy last in 2018. She has significant mediastinal progression with esophageal obstruction preventing PO intake. Stent was placed 6/25/19. Radiation Oncology is consulted for consideration of palliative RT.     I discussed the role of palliative RT in relief of esophageal obstruction from cancer, with ~60-70% of patients experiencing relief when treated for esophageal cancer. Potential short- and long-term side effects of radiation were reviewed. I recommend treating 20-30 Gy in 5-10 fractions, depending on her goals after radiation. Given the significant burden of her disease, I also briefly discussed consideration of hospice and encouraged her to raise goals of care with her primary team.     Will plan to arrange for CT Simulation before she is discharged, then likely start treatments early next week assuming she continues to tolerate PO liquids.

## 2019-06-26 NOTE — ASSESSMENT & PLAN NOTE
Home anti-hypertensive regimen includes HCTZ and metoprolol, PO.   Not currently tolerating PO. Normotensive at this time    Plan  - IV hydralazine for SBP >180 mmHg  - Will aim to resume PO BP meds in AM

## 2019-06-26 NOTE — SUBJECTIVE & OBJECTIVE
Oncology Treatment Plan:   OP ATEZOLIZUMAB Q3W    Current Facility-Administered Medications   Medication    albuterol-ipratropium 2.5 mg-0.5 mg/3 mL nebulizer solution 3 mL    bisacodyl suppository 10 mg    dextrose 10% (D10W) Bolus    dextrose 10% (D10W) Bolus    glucagon (human recombinant) injection 1 mg    glucose chewable tablet 16 g    glucose chewable tablet 24 g    hydrALAZINE injection 5 mg    HYDROmorphone injection 1 mg    naloxone 0.4 mg/mL injection 0.4 mg    ondansetron injection 8 mg    oxyCODONE-acetaminophen 7.5-325 mg per tablet 1 tablet    promethazine tablet 25 mg    ramelteon tablet 8 mg    senna-docusate 8.6-50 mg per tablet 1 tablet    sodium chloride 0.9% flush 10 mL       Review of patient's allergies indicates:   Allergen Reactions    Latex, natural rubber Rash    Codeine Hallucinations    Cortisporin [neomycin-bacitracin-poly-hc] Rash    Latex, natural rubber Rash        Past Medical History:   Diagnosis Date    Chronic obstructive pulmonary disease 5/14/2019    Encounter for blood transfusion     GERD (gastroesophageal reflux disease)     HTN (hypertension)     Rheumatoid arthritis     Rheumatoid arthritis(714.0)     Squamous cell lung cancer, left 2/15/2018     Past Surgical History:   Procedure Laterality Date    APPENDECTOMY      Ovhwdq-qmgfzb-xb--lung N/A 1/4/2019    Performed by [facility] OR 2ND FLR    BRONCHOSCOPY N/A 8/28/2017    Performed by [facility] OR 2ND FLR    CATARACT EXTRACTION Bilateral 2018    Dr. Keith     COLONOSCOPY      COLONOSCOPY N/A 10/30/2017    Performed by [facility] at [facility] ENDO (4TH FLR)    COLONOSCOPY N/A 1/17/2014    Performed [facility] at [facility]    EGD (ESOPHAGOGASTRODUODENOSCOPY) WITH FLUOROSCOPY N/A 6/25/2019    Performed by Aime Huff [facility] at [facility] ENDO (2ND FLR)    ESOPHAGOGASTRODUODENOSCOPY (EGD) N/A 10/30/2017    Performed by Quentin YODER  MD [place] at [facility] ENDO (4TH FLR)    FOOT SURGERY Left     HYSTERECTOMY      INSERTION, IOL PROSTHESIS Left 11/8/2018    Performed by Susan [facility] at [facility] OR 1ST FLR    INSERTION, IOL PROSTHESIS Right 10/18/2018    Performed by Susan [facility] at [facility] OR 1ST FLR    INSERTION-PORT N/A 11/14/2017    Performed by [facility] Provider at [facility] OR 2ND FLR    PHACOEMULSIFICATION, CATARACT Left 11/8/2018    Performed by Susan [facility] at [facility] OR 1ST FLR    PHACOEMULSIFICATION, CATARACT Right 10/18/2018    Performed by Susan [facility] at [facility] OR 1ST FLR    SKIN BIOPSY      TOTAL KNEE ARTHROPLASTY      right     Family History     Problem Relation (Age of Onset)    Glaucoma Mother, Sister, Brother    Hypertension     Kidney disease     No Known Problems Father, Maternal Aunt, Maternal Uncle, Paternal Aunt, Paternal Uncle, Maternal Grandmother, Maternal Grandfather, Paternal Grandmother, Paternal Grandfather        Tobacco Use    Smoking status: Former Smoker    Smokeless tobacco: Never Used   Substance and Sexual Activity    Alcohol use: Not Currently     Comment: Occasionally    Drug use: No    Sexual activity: Not Currently       Review of Systems  Objective:     Vital Signs (Most Recent):  Temp: 98.1 °F (36.7 °C) (06/26/19 1136)  Pulse: 99 (06/26/19 1136)  Resp: 20 (06/26/19 1136)  BP: (!) 146/85 (06/26/19 1136)  SpO2: (!) 94 % (06/26/19 1136) Vital Signs (24h Range):  Temp:  [98 °F (36.7 °C)-98.6 °F (37 °C)] 98.1 °F (36.7 °C)  Pulse:  [] 99  Resp:  [18-37] 20  SpO2:  [94 %-100 %] 94 %  BP: (124-169)/(56-99) 146/85     Weight: 89.5 kg (197 lb 5 oz)  Body mass index is 30.9 kg/m².  Body surface area is 2.06 meters squared.    Physical Exam   Constitutional: She is oriented to person, place, and time. She appears well-developed and well-nourished.   HENT:   Head: Normocephalic and atraumatic.   Mouth/Throat: Mucous membranes are normal.   Eyes: EOM are normal. No scleral  icterus.   Neck: Normal range of motion. Neck supple.   Pulmonary/Chest: Effort normal. No accessory muscle usage. No respiratory distress.   Abdominal: Soft. Normal appearance.   Musculoskeletal: Normal range of motion. She exhibits no edema.   Lymphadenopathy:     She has no cervical adenopathy.        Right: No supraclavicular adenopathy present.        Left: No supraclavicular adenopathy present.   Neurological: She is alert and oriented to person, place, and time. No cranial nerve deficit.   Skin: Skin is warm. No rash noted. No cyanosis.   Psychiatric: She has a normal mood and affect. Her speech is normal. Judgment normal.   Vitals reviewed.    Diagnostic Results:  I have reviewed all pertinent imaging results/findings within the past 24 hours.

## 2019-06-26 NOTE — PROGRESS NOTES
" Admit Assessment    Patient Identification  Silvia Capellan   :  1945  Admit Date:  2019  Attending Provider:  Yessi Kc MD              Referral:   Pt has a diagnosis of Squamous Cell Carcinoma - Unknown Primary and was admitted this hospital stay due to Esophageal obstruction,  Weakness [R53.1]  Weakness [R53.1].  Oncology social worker is involved for psychosocial services.  Patient presents as a 73 y.o. year old  female.    Persons interviewed: Patient     Living Situation:      Lives alone at  [address] [place] [address] , phone: [phone] (home).      (RETIRED) Functional Status Prior  Ambulation Prior: 1-->assistive equipment  Transferrin-->assistive equipment  Toiletin-->independent    Current or Past Agencies and Description of Services/Supplies    DME  None      Home Health  None      IV Infusion  None    Nutrition: Oral  Outpatient Pharmacy:     [facility]/pharmacy #[id number] - [place], [place] - [address] [id number]  Phone: [phone] Fax: [phone]      Patient Preference of agencies include: As stated above    Patient/Caregiver informed of right to choose providers or agencies.  Patient provides permission to release any necessary information to [facility] and to [facility] agencies as needed to facilitate patient care, treatment planning, and patient discharge planning.  Written and verbal resources provided.            Adjustment to Diagnosis and Treatment  Appropriate - the patient lives alone, but has very good support from her daughter and 2 sons living in town as well as a son living out of state. She was functioning independently prior to her admission. She stated that she was "not feeling well" and that it was difficult to cope with feeling weak.                 History/Current Symptoms of Anxiety/Depression: No  History/Current Substance Use:   Social History     Tobacco Use    Smoking status: Former Smoker    " Smokeless tobacco: Never Used   Substance and Sexual Activity    Alcohol use: Not Currently     Comment: Occasionally    Drug use: No    Sexual activity: Not Currently       Indications of Abuse/Neglect: No      Financial:  Payor/Plan Subscr  Sex Relation Sub. Ins. ID Effective Group Num   1. [employer]* GAYE CARRERO* 1945 Female  [id number]                                   [address]                            Other identified concerns/needs: None    Plan:    Interventions/Referrals: Offered services to the patient and will follow as needed for supportive counseling and discharge arrangements. Provided the patient with contact information for me.  Patient/caregiver engaged in treatment planning process.     providing psychosocial and supportive counseling, resources, education, assistance and discharge planning as appropriate.  Patient/caregiver state understanding of  available resources,  following, remains available.

## 2019-06-26 NOTE — ASSESSMENT & PLAN NOTE
PLEURAL EFFUSION ON LEFT    Pt of Dr Wong with history of squamous cell carcinoma with unknown primary. As such, she is to be treated as metastatic SCC. Despite her history of RA, she was scheduled to initiate PD-L1 inhibitor therapy (atezolizumab) once approved by insurance.     Plan  - Treatment per primary oncologist  - Would be started on outpatient bases pending clinical course and discharge  - Radiation oncology consulted for evaluation if RT would be beneficial for the patient given immunotherapy would take 6-8 weeks before any benefit is seen and likely patient's disease is so advanced that may take too long.

## 2019-06-26 NOTE — ASSESSMENT & PLAN NOTE
FAILURE TO THRIVE IN ADULT    74 yo F w history of squamous cell carcinoma, with unknown primary, affecting the lung and mediastinum. Masses in the past have resulted in tracheal deviation. She presents for evaluation of one month history of decreased PO intake contributing to her 50+ lb weight loss over the past two months. CT chest could not allow for visualization of the esophagus in its entirety, which could represent compression/obstruction by adjacent mass. She is unable to tolerate any PO medication as she regurgitates anything she takes in PO.    Plan  - S/p Esophageal stent on 6/25/19  - Starting on clears, advancing diet as tolerated  - transitioning to PO pain medications

## 2019-06-27 NOTE — PROGRESS NOTES
[facility]  Hematology/Oncology  Progress Note    Patient Name: Silvia Capellan  Admission Date: 6/24/2019  Hospital Length of Stay: 3 days  Code Status: Full Code     Subjective:     HPI:  Ms Capellan is a 74 yo F w PMHx significant for rheumatoid arthritis, HTN, and squamous cell carcinoma (unknown primary) resulting in lung and mediastinal masses s/p three cycles of palliative carboplatin/paclitaxel.    Pt was directed to [facility] from clinic for evaluation of decreased PO intake over the last month. Over the last month, pt has been unable to keep food, drink or medication down for more than a few minutes. Whatever she would take in by mouth would come back up spontaneously and without warning after a few minutes. She denies any warning signs of regurgitation, including nausea. At first she attributed these symptoms to something she ate, which she later doubted as a cause as her symptoms persisted. She has tried anti-emetics including metoclopramide and ondansetron without any benefit. She was also recently started on cyproheptadine as an appetite stimulant. She denies loss of appetite. She states she has the desire to eat, but has stopped trying to eat as she is afraid the contents would come back up. Over the past two and a half months, the patient has lost more than 50 lbs (250 to 197). She denies fever, abdominal pain, diarrhea, sick contacts, or chest pain.     ROS was positive for shortness of breath that has been going on for the past three months. It has worsened to the point that she has difficulty walking about 10 ft. She spends most of her day in bed. The shortness of breath does not worsen with lying down, however last night she did wake up to sit up and catch her breath. This had never happened before.     Oncology History per Dr Wong's most recent clinic note (6/7):     Diagnosis: Squamous cell carcinoma , primary site unknown    Molecular/genetic testing: p16 negative; PD L1  could not be run due to inadequate tissue   Stage:        Stage 4   Treatment: Carboplatin/paclitaxel x  6 cycles (2/16/18 - 6/8/18)        HPI:  is a 73 y/o female who underwent bronchoscopy/EBUS evaluation on 8/31/17 for abnormal mediastinal adenopathy  and a 1.2 cm MORRO mass.     Bronchoscopy findings:    The oropharynx appears normal. The larynx appears normal. The vocal cords appear normal. The subglottic space is normal. The trachea is of normal caliber. The otf is sharp. The tracheobronchial tree was examined to at least the first subsegmental level. Bronchial mucosa and anatomy are normal; there are no endobronchial lesions, and no secretions.    Lymph Nodes: Lymph node sizing was performed via endobronchial ultrasound for suspected lung cancer. Sampling by transbronchial needle aspiration was also performed using an Olympus EBUS-TBNA 22  gauge needle in the subcarinal mediastinum (level 7) and sent for routine cytology.       - The 7 (subcarinal) node was 30 mm by EBUS. Three samples with the needle were obtained. The patient's condition was unchanged after the intervention.      Her treatment got delayed as she cancelled several appointments due to some issues at home. She started Caroplatin/paclitaxel palliatively. She had left thoracentesis on 2/9/18. There were atypical cells in the pleural fluid, highly suspicious for malignancy.  She completed cycles of Carboplatin/Paclitaxel, last treatment on 6/8/18.     She had repeat left pleural mass FNA in [facility] 2019. It was negative for malignancy, but showed lympho plasmacytic infiltration.            PET CT on 3/13/19 showed increased  hypermetabolism of the left pleural effusion.Thoracic vertebral body hypermetabolism was noted, new from the prior exam (compared with PET from June 2018) without underlying CT changes which may represent red marrow hyperplasia or new site of neoplastic disease.  There was new hypermetabolism in the right L5-S1  facet joints which appeared to be centered at the facet joint not within the bone and is favored to represent degenerative change or neoplasm.  She has been non -compliant with her appointments here.        Interval history: She is here for a follow up visit. She has fatigue, dyspnea. She has intermittent nausea from the past 2 weeks, with emesis. She was evaluated She has worsening dyspnea. No weight loss. No headache.   She was evaluated on 5/8/19 for nausea and abdominal pain at ER and received IV fluids. On 5/13/19 she was again in the ER , this time for dyspnea. She was evaluated with chest X ray and thoracentesis was attempted, but the fluid volume was very little and was not done.  She has no constipation or diarrhea. No fever.   She was in ER again on 6/3/19 for nausea and emesis. She was started on reglan. She was given iv fluids. She has persistent nausea and emesis.      Interval History: No acute events overnight, patient's n/v well controlled. Patient reports that she is tolerating clears, advancing to full liquids today. Patient with complaints of itching, transitioning to PO pain meds today.    Oncology Treatment Plan:   OP ATEZOLIZUMAB Q3W    Medications:  Continuous Infusions:  Scheduled Meds:   k phos di & mono-sod phos mono  2 tablet Oral Q4H    metoprolol succinate  50 mg Oral Daily    ondansetron  8 mg Oral Q8H    senna-docusate 8.6-50 mg  1 tablet Oral BID     PRN Meds:albuterol-ipratropium, bisacodyl, Dextrose 10% Bolus, Dextrose 10% Bolus, diphenhydrAMINE, glucagon (human recombinant), glucose, glucose, heparin, porcine (PF), hydrALAZINE, naloxone, oxyCODONE-acetaminophen, promethazine, ramelteon, sodium chloride 0.9%     Review of Systems   Constitutional: Positive for unexpected weight change. Negative for fever.   HENT: Negative for ear pain, sinus pressure, sneezing and sore throat.    Eyes: Negative for pain and visual disturbance.   Respiratory: Negative for cough, chest tightness,  shortness of breath and wheezing.    Cardiovascular: Negative for chest pain, palpitations and leg swelling.   Gastrointestinal: Negative for abdominal pain, constipation, diarrhea, nausea and vomiting.   Endocrine: Negative for polydipsia and polyuria.   Genitourinary: Negative for decreased urine volume, difficulty urinating, dysuria and urgency.   Musculoskeletal: Negative for arthralgias and myalgias.   Skin: Negative for color change and rash.   Allergic/Immunologic: Negative for environmental allergies and food allergies.   Neurological: Negative for dizziness, syncope, weakness, light-headedness and headaches.   Psychiatric/Behavioral: Negative for confusion and dysphoric mood. The patient is not nervous/anxious.      Objective:     Vital Signs (Most Recent):  Temp: 97.8 °F (36.6 °C) (06/27/19 0916)  Pulse: 104 (06/27/19 0916)  Resp: 20 (06/27/19 0916)  BP: 135/82 (06/27/19 0916)  SpO2: 96 % (06/27/19 0916) Vital Signs (24h Range):  Temp:  [97.8 °F (36.6 °C)-98.8 °F (37.1 °C)] 97.8 °F (36.6 °C)  Pulse:  [104-115] 104  Resp:  [17-20] 20  SpO2:  [67 %-98 %] 96 %  BP: (131-147)/(74-91) 135/82     Weight: 89.5 kg (197 lb 5 oz)  Body mass index is 30.9 kg/m².  Body surface area is 2.06 meters squared.      Intake/Output Summary (Last 24 hours) at 6/27/2019 1142  Last data filed at 6/27/2019 1043  Gross per 24 hour   Intake 220 ml   Output 650 ml   Net -430 ml       Physical Exam   Constitutional: She is oriented to person, place, and time. She appears well-developed and well-nourished.   Body mass index is 30.9 kg/m².     HENT:   Head: Normocephalic and atraumatic.   Eyes: Pupils are equal, round, and reactive to light. EOM are normal. No scleral icterus.   Neck: Normal range of motion. Neck supple.   Central line in place   Cardiovascular: Normal rate, regular rhythm, normal heart sounds and intact distal pulses.   No murmur heard.  Pulmonary/Chest: Effort normal. No respiratory distress. She has decreased breath  sounds in the left upper field, the left middle field and the left lower field. She has no wheezes.   Abdominal: Soft. Bowel sounds are normal. She exhibits no distension. There is no tenderness.   Neurological: She is alert and oriented to person, place, and time.   Skin: Skin is warm and dry.   Psychiatric: She has a normal mood and affect. Her behavior is normal.   Vitals reviewed.      Significant Labs:   CBC:   Recent Labs   Lab 06/26/19  0158 06/27/19  0342   WBC 10.47 14.91*   HGB 10.1* 9.4*   HCT 33.7* 32.5*    250   , CMP:   Recent Labs   Lab 06/26/19  0158 06/27/19  0342    144   K 3.5 3.5    101   CO2 32* 33*   * 115*   BUN 4* 7*   CREATININE 0.7 0.8   CALCIUM 9.2 8.9   PROT 7.3 6.9   ALBUMIN 2.4* 2.1*   BILITOT 0.3 0.2   ALKPHOS 71 82   AST 14 14   ALT 7* 6*   ANIONGAP 10 10   EGFRNONAA >60.0 >60.0    and All pertinent labs from the last 24 hours have been reviewed.    Diagnostic Results:  I have reviewed all pertinent imaging results/findings within the past 24 hours.    Assessment/Plan:     * Esophageal obstruction  FAILURE TO THRIVE IN ADULT    74 yo F w history of squamous cell carcinoma, with unknown primary, affecting the lung and mediastinum. Masses in the past have resulted in tracheal deviation. She presents for evaluation of one month history of decreased PO intake contributing to her 50+ lb weight loss over the past two months. CT chest could not allow for visualization of the esophagus in its entirety, which could represent compression/obstruction by adjacent mass. She is unable to tolerate any PO medication as she regurgitates anything she takes in PO.    Plan  - S/p Esophageal stent on 6/25/19  - Tolerating clears, advancing diet as tolerated  - transitioning to PO pain medications    Acute hypoxemic respiratory failure  Patient comfortable, satting well on room air. States she gets significantly dyspneic on exertion. States she was active up until roughly 2 months  ago and can now no longer ambulate a few feet without severe dyspnea. Chart review shows ED visits from January of 2019 with complaints of dyspnea. Patient not wheezing, and without a cough. Has loculated pleural effusion which has been difficult to tap in the past and size of effusion is not particularly large, however patient with significant mediastinal and lung disease.     - Unlikely 2/2 effusion as it is chronic and stable. Patient with poor baseline pulmonary reserve, these symptoms are likely 2/2 progression of disease  - Will follow up patient's oxygenation and symptoms following EGD, will perform 6-minute walk test  - Patient satting 73% on room air at rest, will arrange home oxygen upon discharge    Weakness  NEOPLASTIC MALIGNANT RELATED FATIGUE    PT/OT     Advance care planning  Per Dr Wong's last clinic note, pt appointed Joanie Belle has her HCOPOA. Will have further discussions with patient regarding wishes and advanced care planning pending her clinical course while inpatient.    Hypokalemia  She has continued to try to take her anti-hypertensive medications, including HCTZ, however is unsure if anything is able to stay down. K of 2.9 and Magnesion 1.3 on arrival to ED. Repleted in ED and on floor.    - K improved, patient with central line, can receive IV repletion  - Replace PRN    Anemia in neoplastic disease  Hgb of 10.4 on admit, normocytic. Stable.     - Will continue to monitor and transfuse for Hg < 7 or otherwise clinically indicated    Squamous cell lung cancer, left  PLEURAL EFFUSION ON LEFT    Pt of Dr Wong with history of squamous cell carcinoma with unknown primary. As such, she is to be treated as metastatic SCC. Despite her history of RA, she was scheduled to initiate PD-L1 inhibitor therapy (atezolizumab) once approved by insurance.     Plan  - Treatment per primary oncologist  - Would be started on outpatient bases pending clinical course and discharge  - Radiation oncology  consulted for evaluation if RT, patient scheduled to begin RT as on outpatient early next week    Obesity (BMI 30-39.9)  Body mass index is 30.9 kg/m². Currently with 50+ lb weight loss over the past two months.     - tolerating clear liquids, advancing to full liquids    Essential hypertension  Home anti-hypertensive regimen includes HCTZ and metoprolol, PO.    Normotensive at this time    Plan  - BP mildly elevated, tachcycardic  - Resuming home metoprolol XL-50mg   - patient hypokalemic on admit, will continue to trend BP and K and possibly discontinue HCTZ on discharge    Non-seasonal Rhinitis  Only if exposed to environmental allergen.     - Cetirizine 5 mg, PRN PO    Rheumatoid arthritis  Unclear history of RA, last seen in rheumatology clinic in 2017 when at that time patient with no active disease, not on therapy             Jeff López MD  Hematology/Oncology  [facility]    Attending Note  I have personally taken the history and examined this patient and agree with the resident's note as stated above.  Tolerating PO, all meds changed to PO  Radiation planned  Dr. Wong updated  Discharge tomorrow

## 2019-06-27 NOTE — PHARMACY MED REC
"Admission Medication Reconciliation - Pharmacy Consult Note    The home medication history was taken by Oswald Kaplan PharmD.  Based on information gathered and subsequent review by the clinical pharmacist, the items below may need attention.     You may go to "Admission" then "Reconcile Home Medications" tabs to review and/or act upon these items.     Potentially problematic discrepancies with current MAR  o Patient IS taking the following which was not ordered upon admit - consider restarting as needed/as patient tolerates  o Gabapentin 600mg PO QHS  o HCTZ 25mg PO daily  o Protonix 40mg PO daily  o Trazodone 50mg PO QHS  o Xyzal 5mg PO QHS  - Our formulary alternative is Zyrtec (ceterizine)      Please address this information as you see fit.  Feel free to contact us if you have any questions or require assistance.    Oswald Kaplan PharmD, BCPS  [id number]                  .    .            "

## 2019-06-27 NOTE — ASSESSMENT & PLAN NOTE
Home anti-hypertensive regimen includes HCTZ and metoprolol, PO.    Normotensive at this time    Plan  - BP mildly elevated, tachcycardic  - Resuming home metoprolol XL-50mg   - patient hypokalemic on admit, will continue to trend BP and K and possibly discontinue HCTZ on discharge

## 2019-06-27 NOTE — ASSESSMENT & PLAN NOTE
PLEURAL EFFUSION ON LEFT    Pt of Dr Wong with history of squamous cell carcinoma with unknown primary. As such, she is to be treated as metastatic SCC. Despite her history of RA, she was scheduled to initiate PD-L1 inhibitor therapy (atezolizumab) once approved by insurance.     Plan  - Treatment per primary oncologist  - Would be started on outpatient bases pending clinical course and discharge  - Radiation oncology consulted for evaluation if RT, patient scheduled to begin RT as on outpatient early next week

## 2019-06-27 NOTE — PROGRESS NOTES
Order for oxygen placed for the patient. Spoke to Shannan at [facility]: She confirmed that they sent referral to the patient's insurance company, [employer], and that they were awaiting their authorization. Will follow for final discharge arrangements tomorrow.

## 2019-06-27 NOTE — PLAN OF CARE
Problem: Adult Inpatient Plan of Care  Goal: Plan of Care Review  Outcome: Ongoing (interventions implemented as appropriate)  Pt is AAOx4 and ambulates with a stand by assist. 2L NC Pt is tachy and afebrile. Pt c/o 8-9/10 pain for most of the night and had IV Dilaudid, as well as Oxy. Pt had intermittent nausea and had scheduled IV Zofran and prn Promethazine. Pt used bedside commode one time w/350cc of concentrated yellow output. Personal effects @ bedside. Pt remained safe and free of injury through the night. Bed in low and locked position, bed alarm set, bed rails up x2, call bell in reach, non skid socks worn out of bed. Will continue to monitor.

## 2019-06-27 NOTE — ASSESSMENT & PLAN NOTE
Hgb of 10.4 on admit, normocytic. Stable.     - Will continue to monitor and transfuse for Hg < 7 or otherwise clinically indicated

## 2019-06-27 NOTE — ASSESSMENT & PLAN NOTE
FAILURE TO THRIVE IN ADULT    74 yo F w history of squamous cell carcinoma, with unknown primary, affecting the lung and mediastinum. Masses in the past have resulted in tracheal deviation. She presents for evaluation of one month history of decreased PO intake contributing to her 50+ lb weight loss over the past two months. CT chest could not allow for visualization of the esophagus in its entirety, which could represent compression/obstruction by adjacent mass. She is unable to tolerate any PO medication as she regurgitates anything she takes in PO.    Plan  - S/p Esophageal stent on 6/25/19  - Tolerating clears, advancing diet as tolerated  - transitioning to PO pain medications

## 2019-06-27 NOTE — PLAN OF CARE
Problem: Adult Inpatient Plan of Care  Goal: Plan of Care Review  Outcome: Ongoing (interventions implemented as appropriate)  Patient AAOX4, up with assistance to bedside commode, and fall precautions maintained with bed alarm. Instructed patient to call for assistance. Radiation planning this am. Scheduled Zofran given, as ordered for nausea; PRN percocet administered for abdominal pain. Offered this afternoon, but patient refusing. Kphos replaced, as ordered and PO lopressor restarted. Oxygen saturation maintained on 2L NC. Stool softener started today; patient appetite poor on full liquid diet. Patient stable, will continue to monitor.

## 2019-06-27 NOTE — PLAN OF CARE
Problem: Adult Inpatient Plan of Care  Goal: Plan of Care Review  Outcome: Ongoing (interventions implemented as appropriate)  Side rails up x2; call bell in place; bed in lowest, locked position; skid proof socks on; no evidence of skin breakdown; care plan explained to patient; pt remains free of injury. Pt with clear liquids, still with c/o nausea zofran and phenergan given, pt with c/o pain percocet given. Assisted pt up to BSC, 2 L NC in progress. VSS and afebrile.

## 2019-06-27 NOTE — PROGRESS NOTES
"6MW Test  Height: 5' 7" (170.2 cm)  Weight: 89.5 kg (197 lb 5 oz)  BMI (Calculated): 31  Predicted Distance: 258.97  Interpretation  Predicted Distance Meters (Calculated): 399.23 meters     Oxygen saturation at rest on room air 73%.  "

## 2019-06-27 NOTE — ASSESSMENT & PLAN NOTE
Body mass index is 30.9 kg/m². Currently with 50+ lb weight loss over the past two months.     - tolerating clear liquids, advancing to full liquids

## 2019-06-27 NOTE — SUBJECTIVE & OBJECTIVE
Interval History: No acute events overnight, patient's n/v well controlled. Patient reports that she is tolerating clears, advancing to full liquids today. Patient with complaints of itching, transitioning to PO pain meds today.    Oncology Treatment Plan:   OP ATEZOLIZUMAB Q3W    Medications:  Continuous Infusions:  Scheduled Meds:   k phos di & mono-sod phos mono  2 tablet Oral Q4H    metoprolol succinate  50 mg Oral Daily    ondansetron  8 mg Oral Q8H    senna-docusate 8.6-50 mg  1 tablet Oral BID     PRN Meds:albuterol-ipratropium, bisacodyl, Dextrose 10% Bolus, Dextrose 10% Bolus, diphenhydrAMINE, glucagon (human recombinant), glucose, glucose, heparin, porcine (PF), hydrALAZINE, naloxone, oxyCODONE-acetaminophen, promethazine, ramelteon, sodium chloride 0.9%     Review of Systems   Constitutional: Positive for unexpected weight change. Negative for fever.   HENT: Negative for ear pain, sinus pressure, sneezing and sore throat.    Eyes: Negative for pain and visual disturbance.   Respiratory: Negative for cough, chest tightness, shortness of breath and wheezing.    Cardiovascular: Negative for chest pain, palpitations and leg swelling.   Gastrointestinal: Negative for abdominal pain, constipation, diarrhea, nausea and vomiting.   Endocrine: Negative for polydipsia and polyuria.   Genitourinary: Negative for decreased urine volume, difficulty urinating, dysuria and urgency.   Musculoskeletal: Negative for arthralgias and myalgias.   Skin: Negative for color change and rash.   Allergic/Immunologic: Negative for environmental allergies and food allergies.   Neurological: Negative for dizziness, syncope, weakness, light-headedness and headaches.   Psychiatric/Behavioral: Negative for confusion and dysphoric mood. The patient is not nervous/anxious.      Objective:     Vital Signs (Most Recent):  Temp: 97.8 °F (36.6 °C) (06/27/19 0916)  Pulse: 104 (06/27/19 0916)  Resp: 20 (06/27/19 0916)  BP: 135/82 (06/27/19  0916)  SpO2: 96 % (06/27/19 0916) Vital Signs (24h Range):  Temp:  [97.8 °F (36.6 °C)-98.8 °F (37.1 °C)] 97.8 °F (36.6 °C)  Pulse:  [104-115] 104  Resp:  [17-20] 20  SpO2:  [67 %-98 %] 96 %  BP: (131-147)/(74-91) 135/82     Weight: 89.5 kg (197 lb 5 oz)  Body mass index is 30.9 kg/m².  Body surface area is 2.06 meters squared.      Intake/Output Summary (Last 24 hours) at 6/27/2019 1142  Last data filed at 6/27/2019 1043  Gross per 24 hour   Intake 220 ml   Output 650 ml   Net -430 ml       Physical Exam   Constitutional: She is oriented to person, place, and time. She appears well-developed and well-nourished.   Body mass index is 30.9 kg/m².     HENT:   Head: Normocephalic and atraumatic.   Eyes: Pupils are equal, round, and reactive to light. EOM are normal. No scleral icterus.   Neck: Normal range of motion. Neck supple.   Central line in place   Cardiovascular: Normal rate, regular rhythm, normal heart sounds and intact distal pulses.   No murmur heard.  Pulmonary/Chest: Effort normal. No respiratory distress. She has decreased breath sounds in the left upper field, the left middle field and the left lower field. She has no wheezes.   Abdominal: Soft. Bowel sounds are normal. She exhibits no distension. There is no tenderness.   Neurological: She is alert and oriented to person, place, and time.   Skin: Skin is warm and dry.   Psychiatric: She has a normal mood and affect. Her behavior is normal.   Vitals reviewed.      Significant Labs:   CBC:   Recent Labs   Lab 06/26/19  0158 06/27/19  0342   WBC 10.47 14.91*   HGB 10.1* 9.4*   HCT 33.7* 32.5*    250   , CMP:   Recent Labs   Lab 06/26/19  0158 06/27/19  0342    144   K 3.5 3.5    101   CO2 32* 33*   * 115*   BUN 4* 7*   CREATININE 0.7 0.8   CALCIUM 9.2 8.9   PROT 7.3 6.9   ALBUMIN 2.4* 2.1*   BILITOT 0.3 0.2   ALKPHOS 71 82   AST 14 14   ALT 7* 6*   ANIONGAP 10 10   EGFRNONAA >60.0 >60.0    and All pertinent labs from the last 24  hours have been reviewed.    Diagnostic Results:  I have reviewed all pertinent imaging results/findings within the past 24 hours.

## 2019-06-28 NOTE — PLAN OF CARE
Plans for patient to discharge home today. Patient to discharge home with home oxygen and home health.  assisting with home health and home oxygen setup. MDRs completed with Dr. Noguera. Dr. Noguera discussed the discharge plan with the patient. Follow-up and repeat labs scheduled as listed below. No other discharge needs identified. Discharge and follow-up instructions to be completed by the bedside nurse.    Future Appointments   Date Time Provider Department Center   7/11/2019 12:00 PM LAB, HEMONC SAME DAY [facility] LAB HO Matute Cangardenia   7/11/2019  1:00 PM Shayne Wong MD [facility] BM FERRARA Mat[place] Cangardenia   7/18/2019  9:00 AM CHAIR 02 [facility]   8/20/2019  9:00 AM Ganga Krause MD [facility] B        06/28/19 1126   Final Note   Assessment Type Final Discharge Note   Anticipated Discharge Disposition Home-Health   What phone number can be called within the next 1-3 days to see how you are doing after discharge?   ([phone])   Hospital Follow Up  Appt(s) scheduled? Yes   Discharge plans and expectations educations in teach back method with documentation complete? Yes  (per bedside nurse)

## 2019-06-28 NOTE — PLAN OF CARE
Problem: Adult Inpatient Plan of Care  Goal: Plan of Care Review  Outcome: Ongoing (interventions implemented as appropriate)  Plan of care reviewed with the patient at the beginning of shift. The patient is alert and oriented. Complaining of pain well controlled with PO medications. Denying all other complaints. Pt is on O2, but continues to remove her NC. VSS. Bed in low locked position. Call bell within reach. Will continue to monitor.

## 2019-06-28 NOTE — PROGRESS NOTES
Spoke to the patient about discharge arrangements: Arranged for a wheelchair and oxygen to be delivered to the patient's room by [facility]. Faxed medical necessity form, order and documentation to request home health care to [facility] - they called to inform that the patient's home health care was arranged through [facility].

## 2019-06-28 NOTE — PT/OT/SLP PROGRESS
Occupational Therapy      Patient Name:  Silvia Capellan   MRN:  [id number]    Occupational therapy visit attempted 2x in AM however both attempts pt complaints of fatigue and requesting to come back at later time. Unable to re-attempt in PM. Will follow-up as scheduled.    Mary Kate Keller, OT  6/28/2019

## 2019-06-28 NOTE — PLAN OF CARE
CM requested a closer follow-up appt with repeat labs with Dr. Wong's clinic. Follow-up and repeat labs scheduled as listed below.     Future Appointments   Date Time Provider Department Center   7/1/2019  8:20 AM LAB, HEMON CANCER BLDG [facility] LAB HO Giovani Camerongardenia   7/1/2019  9:20 AM Shayne Wong MD [facility] FERRARA Giovani Wilson   7/11/2019 12:00 PM LAB, HEMON SAME DAY [facility] LAB HO Matute Katie   7/11/2019  1:00 PM Shayne Wong MD [facility] FERRARA Mat[place] Cangardenia   7/18/2019  9:00 AM CHAIR 02 [facility]   8/20/2019  9:00 AM Ganga Krause MD [facility] -      Marybel Solares, RN, BSN, CM  [facility]  Nurse - Med Onc/Gyn Onc

## 2019-06-28 NOTE — ASSESSMENT & PLAN NOTE
NEOPLASTIC MALIGNANT RELATED FATIGUE    PT/OT recommending home health with PT/OT; will discharge with rolling walker

## 2019-06-28 NOTE — PLAN OF CARE
Problem: Adult Inpatient Plan of Care  Goal: Plan of Care Review  Outcome: Ongoing (interventions implemented as appropriate)  Recommendations    Recommendation:   1. Continue full liquid diet, ADAT per medical team   2. Add boost plus to increase intake   3 RD following  Goals: pt to meet >75% of EEN and EPN while admitted   Nutrition Goal Status: new  Communication of RD Recs: other (comment)(POC)

## 2019-06-28 NOTE — DISCHARGE SUMMARY
[facility]  Hematology/Oncology  Discharge Summary      Patient Name: Silvia Capellan  MRN: [id number]  Admission Date: 6/24/2019  Hospital Length of Stay: 4 days  Discharge Date and Time:  06/28/2019 11:50 AM  Attending Physician: Dragan Noguera MD   Discharging Provider: Lydia Mclaughlin MD  Primary Care Provider: Ganga Krause MD    HPI: Ms Capellan is a 74 yo F w PMHx significant for rheumatoid arthritis, HTN, and squamous cell carcinoma (unknown primary) resulting in lung and mediastinal masses s/p three cycles of palliative carboplatin/paclitaxel.    Pt was directed to [facility] from clinic for evaluation of decreased PO intake over the last month. Over the last month, pt has been unable to keep food, drink or medication down for more than a few minutes. Whatever she would take in by mouth would come back up spontaneously and without warning after a few minutes. She denies any warning signs of regurgitation, including nausea. At first she attributed these symptoms to something she ate, which she later doubted as a cause as her symptoms persisted. She has tried anti-emetics including metoclopramide and ondansetron without any benefit. She was also recently started on cyproheptadine as an appetite stimulant. She denies loss of appetite. She states she has the desire to eat, but has stopped trying to eat as she is afraid the contents would come back up. Over the past two and a half months, the patient has lost more than 50 lbs (250 to 197). She denies fever, abdominal pain, diarrhea, sick contacts, or chest pain.     ROS was positive for shortness of breath that has been going on for the past three months. It has worsened to the point that she has difficulty walking about 10 ft. She spends most of her day in bed. The shortness of breath does not worsen with lying down, however last night she did wake up to sit up and catch her breath. This had never happened before.     Oncology History  per Dr Wong's most recent clinic note (6/7):     Diagnosis: Squamous cell carcinoma , primary site unknown    Molecular/genetic testing: p16 negative; PD L1 could not be run due to inadequate tissue   Stage:        Stage 4   Treatment: Carboplatin/paclitaxel x  6 cycles (2/16/18 - 6/8/18)        HPI:  is a 71 y/o female who underwent bronchoscopy/EBUS evaluation on 8/31/17 for abnormal mediastinal adenopathy  and a 1.2 cm MORRO mass.     Bronchoscopy findings:    The oropharynx appears normal. The larynx appears normal. The vocal cords appear normal. The subglottic space is normal. The trachea is of normal caliber. The otf is sharp. The tracheobronchial tree was examined to at least the first subsegmental level. Bronchial mucosa and anatomy are normal; there are no endobronchial lesions, and no secretions.    Lymph Nodes: Lymph node sizing was performed via endobronchial ultrasound for suspected lung cancer. Sampling by transbronchial needle aspiration was also performed using an Olympus EBUS-TBNA 22  gauge needle in the subcarinal mediastinum (level 7) and sent for routine cytology.       - The 7 (subcarinal) node was 30 mm by EBUS. Three samples with the needle were obtained. The patient's condition was unchanged after the intervention.      Her treatment got delayed as she cancelled several appointments due to some issues at home. She started Caroplatin/paclitaxel palliatively. She had left thoracentesis on 2/9/18. There were atypical cells in the pleural fluid, highly suspicious for malignancy.  She completed cycles of Carboplatin/Paclitaxel, last treatment on 6/8/18.     She had repeat left pleural mass FNA in [facility] 2019. It was negative for malignancy, but showed lympho plasmacytic infiltration.            PET CT on 3/13/19 showed increased  hypermetabolism of the left pleural effusion.Thoracic vertebral body hypermetabolism was noted, new from the prior exam (compared with PET from June  2018) without underlying CT changes which may represent red marrow hyperplasia or new site of neoplastic disease.  There was new hypermetabolism in the right L5-S1 facet joints which appeared to be centered at the facet joint not within the bone and is favored to represent degenerative change or neoplasm.  She has been non -compliant with her appointments here.        Interval history: She is here for a follow up visit. She has fatigue, dyspnea. She has intermittent nausea from the past 2 weeks, with emesis. She was evaluated She has worsening dyspnea. No weight loss. No headache.   She was evaluated on 5/8/19 for nausea and abdominal pain at ER and received IV fluids. On 5/13/19 she was again in the ER , this time for dyspnea. She was evaluated with chest X ray and thoracentesis was attempted, but the fluid volume was very little and was not done.  She has no constipation or diarrhea. No fever.   She was in ER again on 6/3/19 for nausea and emesis. She was started on reglan. She was given iv fluids. She has persistent nausea and emesis.      Procedure(s) (LRB):  EGD (ESOPHAGOGASTRODUODENOSCOPY) WITH FLUOROSCOPY (N/A)     Hospital Course: Patient admitted to medical oncology overnight on 6/24/19 for regurgitation and inability to tolerate PO. CT chest showing complete extrinsic esophageal obstruction from mediastinal mass. S/p esophageal stent per AES on 6/25/19. Patient with nausea and vomiting overnight, evaluated again by [facility] in the AM, expected following her procedure. Started on clear liquids on 6/26/19, radiation oncology consulted as well. Evaluated by radiation oncology, patient to be set up for outpatient palliative radiation. Patient tolerated clear liquid diet, but continues to feel weak. Dietary consulted for assistance with patient's diet for discharge and instructions on advancing diet at home. Patient discharged with home health PT/OT and rolling walker.    Physical Exam   Constitutional: She is  oriented to person, place, and time. She appears well-developed and well-nourished.   Body mass index is 30.9 kg/m².     HENT:   Head: Normocephalic and atraumatic.   Eyes: Pupils are equal, round, and reactive to light. EOM are normal. No scleral icterus.   Neck: Normal range of motion. Neck supple.   Central line in place   Cardiovascular: Normal rate, regular rhythm, normal heart sounds and intact distal pulses.   No murmur heard.  Pulmonary/Chest: Effort normal. No respiratory distress. She has decreased breath sounds in the left upper field, the left middle field and the left lower field. She has no wheezes.   Abdominal: Soft. Bowel sounds are normal. She exhibits no distension. There is no tenderness.   Neurological: She is alert and oriented to person, place, and time.   Skin: Skin is warm and dry.   Psychiatric: She has a normal mood and affect. Her behavior is normal.   Vitals reviewed.    Consults:   Consults (From admission, onward)        Status Ordering Provider     Inpatient consult to [facility] ([facility])  Once     Provider:  (Not yet assigned)    Completed KHAN, BEHRAM     Inpatient consult to Radiation Oncology  Once     Provider:  (Not yet assigned)    Completed JORDAN RONDON     Inpatient consult to Registered Dietitian/Nutritionist  Once     Provider:  (Not yet assigned)    Acknowledged ALESSIA BOND [facility]          Significant Diagnostic Studies: Labs:   CMP   Recent Labs   Lab 06/27/19  0342 06/28/19  0430    145   K 3.5 3.2*    101   CO2 33* 36*   * 98   BUN 7* 8   CREATININE 0.8 0.7   CALCIUM 8.9 9.2   PROT 6.9 6.6   ALBUMIN 2.1* 2.0*   BILITOT 0.2 0.2   ALKPHOS 82 70   AST 14 11   ALT 6* <5*   ANIONGAP 10 8   ESTGFRAFRICA >60.0 >60.0   EGFRNONAA >60.0 >60.0    and CBC   Recent Labs   Lab 06/27/19  0342 06/28/19  0430   WBC 14.91* 12.16   HGB 9.4* 8.9*   HCT 32.5* 30.2*    233     SURG FL Surgery Fluoro Usage  See OP Notes for results.  "    IMPRESSION: See OP Notes for results.     This procedure was auto-finalized by: Virtual Radiologist        Pending Diagnostic Studies:     Procedure Component Value Units Date/Time    FL Flouro Usage [[id number]]     Order Status:  Sent Lab Status:  No result         Final Active Diagnoses:    Diagnosis Date Noted POA    PRINCIPAL PROBLEM:  Esophageal obstruction [K22.2] 06/24/2019 Yes    Acute hypoxemic respiratory failure [J96.01] 06/25/2019 Unknown    Failure to thrive in adult [R62.7] 06/24/2019 Yes    Hypokalemia [E87.6] 06/24/2019 Yes    Advance care planning [Z71.89] 06/24/2019 Not Applicable    Weakness [R53.1] 06/24/2019 Yes    Neoplastic malignant related fatigue [R53.0] 04/19/2018 Yes    Anemia in neoplastic disease [D63.0] 03/29/2018 Yes    Squamous cell lung cancer, left [C34.92] 02/15/2018 Yes    Pleural effusion on left [J90] 06/08/2017 Yes    Obesity (BMI 30-39.9) [E66.9] 04/25/2014 Yes    Non-seasonal Rhinitis [J31.0] 10/28/2013 Yes    Essential hypertension [I10] 10/28/2013 Yes    Rheumatoid arthritis [M06.9]  Yes      Problems Resolved During this Admission:      Discharged Condition: fair    [place] [facility]    Follow Up:  Follow-up Information     Shayne Wong MD.    Specialty:  Hematology and Oncology  Why:  Follow-Up Appointment as scheduled by the clinic  Contact information:  [facility] [address]  [phone]                 Patient Instructions:      OXYGEN FOR HOME USE     Order Specific Question Answer Comments   Liter Flow 2    Duration Continuous    Qualifying SpO2: 78    Testing done at: [facility]    Device home concentrator with portable unit    Length of need (in months): 99 mos    Height: 5' 7" (1.702 m)    Weight: 89.5 kg (197 lb 5 oz)    Does patient have medical equipment at home? shower chair    Alternative treatment measures have been tried or considered and deemed clinically ineffective. Yes      WALKER FOR HOME USE     Order " "Specific Question Answer Comments   Type of Walker: Rollator    With wheels? Yes    Height: 5' 7" (1.702 m)    Weight: 89.5 kg (197 lb 5 oz)    Length of need (1-99 months): 99    Does patient have medical equipment at home? shower chair    Please check all that apply: Patient's condition impairs ambulation.    Please check all that apply: Patient is unable to safely ambulate without equipment.    Please check all that apply: Patient needs help to get in and out of chair.    Please check all that apply: Walker will be used for gait training.      Medications:  Reconciled Home Medications:      Medication List      START taking these medications    oxyCODONE-acetaminophen 7.5-325 mg per tablet  Commonly known as:  PERCOCET  Take 1 tablet by mouth every 8 (eight) hours as needed.     senna-docusate 8.6-50 mg 8.6-50 mg per tablet  Commonly known as:  PERICOLACE  Take 1 tablet by mouth 2 (two) times daily.        CHANGE how you take these medications    gabapentin 300 MG capsule  Commonly known as:  NEURONTIN  Take 1 capsule (300 mg total) by mouth every evening.  What changed:  how much to take        CONTINUE taking these medications    cyproheptadine 4 mg tablet  Commonly known as:  PERIACTIN  Take 1 tablet (4 mg total) by mouth 3 (three) times daily as needed.     hydroCHLOROthiazide 25 MG tablet  Commonly known as:  HYDRODIURIL  Take 25 mg by mouth once daily.     levocetirizine 5 MG tablet  Commonly known as:  XYZAL  Take 1 tablet (5 mg total) by mouth every evening.     LORazepam 0.5 MG tablet  Commonly known as:  ATIVAN  TAKE 2 TABLETS (1 MG TOTAL) BY MOUTH EVERY 12 (TWELVE) HOURS AS NEEDED FOR ANXIETY.     metoclopramide HCl 10 MG tablet  Commonly known as:  REGLAN  Take 1 tablet (10 mg total) by mouth every 6 (six) hours.     metoprolol succinate 50 MG 24 hr tablet  Commonly known as:  TOPROL-XL  Take 1 tablet (50 mg total) by mouth once daily.     ondansetron 8 MG Tbdl  Commonly known as:  ZOFRAN-ODT  Take 1 " tablet (8 mg total) by mouth every 6 (six) hours as needed.     pantoprazole 40 MG tablet  Commonly known as:  PROTONIX  TAKE 1 TABLET (40 MG TOTAL) BY MOUTH ONCE DAILY.     promethazine 12.5 MG Tab  Commonly known as:  PHENERGAN  Take 1 tablet (12.5 mg total) by mouth every 6 (six) hours as needed (nausea or vomiting).     traZODone 50 MG tablet  Commonly known as:  DESYREL  Take 50 mg by mouth every evening.        STOP taking these medications    HYDROcodone-acetaminophen 5-325 mg per tablet  Commonly known as:  NORCO     methotrexate 2.5 MG Tab     ondansetron 4 MG tablet  Commonly known as:  AGUSTIN Mclaughlin MD  Hematology/Oncology  [facility]

## 2019-06-28 NOTE — CONSULTS
"  [facility]  Adult Nutrition  Consult Note    SUMMARY     Recommendations    Recommendation:   1. Continue full liquid diet, ADAT per medical team   2. Add boost plus to increase intake   3 RD following  Goals: pt to meet >75% of EEN and EPN while admitted   Nutrition Goal Status: new  Communication of RD Recs: other (comment)(POC)    Reason for Assessment    Reason For Assessment: consult  Diagnosis: (Failure to thrive)  Relevant Medical History: HTN; GERD: Arthritis; COPD; cancer  Interdisciplinary Rounds: did not attend  General Information Comments: Spoke with pt about full liquid diet and advancing diet. Pt states appetite is good, able to drink most liquids. Disucssed energy and protein needs with pt and adequate intake. Pt verbalized understanding and agrees to drink boost TID. Upon d/c ONS of choice. No c/o N/V/C/D reported at this time. Pt well nourished. Wt loss of ~50 lbs over 2 months. RD following.   Nutrition Discharge Planning: d/c on full liquid diet, ADAT per team     Nutrition Risk Screen    Nutrition Risk Screen: dysphagia or difficulty swallowing    Nutrition/Diet History    Patient Reported Diet/Restrictions/Preferences: general  Spiritual, Cultural Beliefs, Scientologist Practices, Values that Affect Care: no  Factors Affecting Nutritional Intake: difficulty/impaired swallowing    Anthropometrics    Temp: 97.8 °F (36.6 °C)  Height Method: Stated  Height: 5' 7" (170.2 cm)  Height (inches): 67 in  Weight Method: Standard Scale  Weight: 89.5 kg (197 lb 5 oz)  Weight (lb): 197.31 lb  Ideal Body Weight (IBW), Female: 135 lb  % Ideal Body Weight, Female (lb): 146.16 lb  BMI (Calculated): 31  BMI Grade: 30 - 34.9- obesity - grade I  Usual Body Weight (UBW), k.64 kg  % Usual Body Weight: 78.92    Lab/Procedures/Meds    Pertinent Labs Reviewed: reviewed  Pertinent Labs Comments: K 3.2  Pertinent Medications Reviewed: reviewed  Pertinent Medications Comments: gabapentin; " metoprolol; pantoprazole; potassium chloride; senna-docusate; trazodone     Estimated/Assessed Needs    Weight Used For Calorie Calculations: 89.5 kg (197 lb 5 oz)  Energy Calorie Requirements (kcal): 1790 kcal/day   Energy Need Method: Brooke-St Jeor(x1.25)  Protein Requirements:  g/day (1.0-1.3 g/kg)  Weight Used For Protein Calculations: 89.5 kg (197 lb 5 oz)  Fluid Requirements (mL): 1 mL/kcal or per MD  RDA Method (mL): 1790    Nutrition Prescription Ordered    Current Diet Order: Full liquid diet     Evaluation of Received Nutrient/Fluid Intake    Energy Calories Required: not meeting needs  Protein Required: not meeting needs  Fluid Required: meeting needs  Comments: LBM 6/24  Tolerance: tolerating  % Intake of Estimated Energy Needs: 25 - 50 %  % Meal Intake: 50 - 75 %    Nutrition Risk    Level of Risk/Frequency of Follow-up: low(1x/week)     Assessment and Plan    Nutrition Problem  Inadequate oral intake    Related to (etiology):   Decreased intake     Signs and Symptoms (as evidenced by):   Pt on full liquid diet, receiving <75% of EEN/EPN     Interventions (treatment strategy):  Collaboration with other provider  Commercial beverage     Nutrition Diagnosis Status:   New    Monitor and Evaluation    Food and Nutrient Intake: energy intake, food and beverage intake  Food and Nutrient Adminstration: diet order  Anthropometric Measurements: weight, weight change  Biochemical Data, Medical Tests and Procedures: electrolyte and renal panel, gastrointestinal profile, glucose/endocrine profile, lipid profile, inflammatory profile  Nutrition-Focused Physical Findings: overall appearance, extremities, muscles and bones     Nutrition Follow-Up    RD Follow-up?: Yes

## 2019-06-28 NOTE — PROGRESS NOTES
Spoke to Jailene at [facility] - they just received authorization for the patient's oxygen from [facility] and they agreed to deliver the equipment immediately. Notified the patient's daughter (who will transport the patient home), the patient's nurse and the Oncology team.

## 2019-06-28 NOTE — PLAN OF CARE
[facility]    HOME HEALTH ORDERS  FACE TO FACE ENCOUNTER    Patient Name: Silvia Capellan  YOB: 1945    PCP: Ganga Krause MD   PCP Address: [place] [address]  PCP Phone Number: [phone]  PCP Fax: [phone]    Encounter Date: 06/28/2019    Admit to Home Health    Diagnoses:  Active Hospital Problems    Diagnosis  POA    *Esophageal obstruction [K22.2]  Yes    Acute hypoxemic respiratory failure [J96.01]  Unknown    Failure to thrive in adult [R62.7]  Yes    Hypokalemia [E87.6]  Yes    Advance care planning [Z71.89]  Not Applicable    Weakness [R53.1]  Yes    Neoplastic malignant related fatigue [R53.0]  Yes    Anemia in neoplastic disease [D63.0]  Yes    Squamous cell lung cancer, left [C34.92]  Yes    Pleural effusion on left [J90]  Yes    Obesity (BMI 30-39.9) [E66.9]  Yes    Non-seasonal Rhinitis [J31.0]  Yes    Essential hypertension [I10]  Yes    Rheumatoid arthritis [M06.9]  Yes     [facility] rheumatology        Resolved Hospital Problems   No resolved problems to display.       Future Appointments   Date Time Provider Department Center   7/11/2019 12:00 PM LAB, HEMONC SAME DAY [facility] LAB HO Giovani Cangardenia   7/11/2019  1:00 PM Shayne Wong MD [facility] Giovani Cangardenia   7/18/2019  9:00 AM [address]   8/20/2019  9:00 AM Ganga Krause MD [facility]     Follow-up Information     Shayne Wong MD.    Specialty:  Hematology and Oncology  Why:  Follow-Up Appointment as scheduled by the clinic  Contact information:  [address]  [place] [address]  [phone]                     I have seen and examined this patient face to face today. My clinical findings that support the need for the home health skilled services and home bound status are the following:  Weakness/numbness causing balance and gait disturbance due to Weakness/Debility and Malignancy/Cancer making it taxing to leave  home.    Allergies:  Review of patient's allergies indicates:   Allergen Reactions    Latex, natural rubber Rash    Codeine Hallucinations    Cortisporin [neomycin-bacitracin-poly-hc] Rash    Latex, natural rubber Rash       Diet: regular diet and encourage fluids    Activities: activity as tolerated    Nursing:   SN to complete comprehensive assessment including routine vital signs. Instruct on disease process and s/s of complications to report to MD. Review/verify medication list sent home with the patient at time of discharge  and instruct patient/caregiver as needed. Frequency may be adjusted depending on start of care date.    Notify MD if SBP > 160 or < 90; DBP > 90 or < 50; HR > 120 or < 50; Temp > 101; Other:         CONSULTS:    Physical Therapy to evaluate and treat. Evaluate for home safety and equipment needs; Establish/upgrade home exercise program. Perform / instruct on therapeutic exercises, gait training, transfer training, and Range of Motion.  Occupational Therapy to evaluate and treat. Evaluate home environment for safety and equipment needs. Perform/Instruct on transfers, ADL training, ROM, and therapeutic exercises.    MISCELLANEOUS CARE:  Home Oxygen:  Oxygen at 2 L/min nasal canula to be used:  Continuously.    WOUND CARE ORDERS  n/a      Medications: Review discharge medications with patient and family and provide education.      Current Discharge Medication List      START taking these medications    Details   oxyCODONE-acetaminophen (PERCOCET) 7.5-325 mg per tablet Take 1 tablet by mouth every 8 (eight) hours as needed.  Qty: 90 tablet, Refills: 0      senna-docusate 8.6-50 mg (PERICOLACE) 8.6-50 mg per tablet Take 1 tablet by mouth 2 (two) times daily.         CONTINUE these medications which have NOT CHANGED    Details   cyproheptadine (PERIACTIN) 4 mg tablet Take 1 tablet (4 mg total) by mouth 3 (three) times daily as needed.  Qty: 90 tablet, Refills: 2    Associated Diagnoses:  Squamous cell carcinoma of unknown origin      gabapentin (NEURONTIN) 300 MG capsule Take 1 capsule (300 mg total) by mouth every evening.  Qty: 90 capsule, Refills: 1    Associated Diagnoses: Central stenosis of spinal canal      hydroCHLOROthiazide (HYDRODIURIL) 25 MG tablet Take 25 mg by mouth once daily.  Refills: 1      levocetirizine (XYZAL) 5 MG tablet Take 1 tablet (5 mg total) by mouth every evening.  Qty: 30 tablet, Refills: 11    Associated Diagnoses: Seasonal allergic rhinitis, unspecified trigger      metoclopramide HCl (REGLAN) 10 MG tablet Take 1 tablet (10 mg total) by mouth every 6 (six) hours.  Qty: 24 tablet, Refills: 1      metoprolol succinate (TOPROL-XL) 50 MG 24 hr tablet Take 1 tablet (50 mg total) by mouth once daily.  Qty: 30 tablet, Refills: 1      ondansetron (ZOFRAN-ODT) 8 MG TbDL Take 1 tablet (8 mg total) by mouth every 6 (six) hours as needed.  Qty: 30 tablet, Refills: 1    Associated Diagnoses: Reflux esophagitis; Squamous cell carcinoma of unknown origin      pantoprazole (PROTONIX) 40 MG tablet TAKE 1 TABLET (40 MG TOTAL) BY MOUTH ONCE DAILY.  Qty: 90 tablet, Refills: 1    Associated Diagnoses: Gastroesophageal reflux disease, esophagitis presence not specified      promethazine (PHENERGAN) 12.5 MG Tab Take 1 tablet (12.5 mg total) by mouth every 6 (six) hours as needed (nausea or vomiting).  Qty: 20 tablet, Refills: 0    Associated Diagnoses: Nausea      traZODone (DESYREL) 50 MG tablet Take 50 mg by mouth every evening.       LORazepam (ATIVAN) 0.5 MG tablet TAKE 2 TABLETS (1 MG TOTAL) BY MOUTH EVERY 12 (TWELVE) HOURS AS NEEDED FOR ANXIETY.  Qty: 28 tablet, Refills: 0    Comments: Not to exceed 5 additional fills before 09/08/2019 DX Code Needed  .  Associated Diagnoses: Anxiety state         STOP taking these medications       HYDROcodone-acetaminophen (NORCO) 5-325 mg per tablet Comments:   Reason for Stopping:         methotrexate 2.5 MG Tab Comments:   Reason for Stopping:          ondansetron (ZOFRAN) 4 MG tablet Comments:   Reason for Stopping:               I certify that this patient is confined to her home and needs intermittent skilled nursing care, physical therapy and occupational therapy.

## 2019-06-28 NOTE — ASSESSMENT & PLAN NOTE
FAILURE TO THRIVE IN ADULT    72 yo F w history of squamous cell carcinoma, with unknown primary, affecting the lung and mediastinum. Masses in the past have resulted in tracheal deviation. She presents for evaluation of one month history of decreased PO intake contributing to her 50+ lb weight loss over the past two months. CT chest could not allow for visualization of the esophagus in its entirety, which could represent compression/obstruction by adjacent mass. She is unable to tolerate any PO medication as she regurgitates anything she takes in PO.    Plan  - S/p Esophageal stent on 6/25/19  - Tolerating clears, advancing diet as tolerated  - transitioning to PO pain medications

## 2019-06-28 NOTE — PROGRESS NOTES
"   06/28/19 1500   Vital Signs   Temp 98.4 °F (36.9 °C)   Pulse 91   Resp 19   SpO2 95 %   O2 Device (Oxygen Therapy) nasal cannula   /83   MAP (mmHg) 104     After the patient returned from radiation, she was complaining of "knots in her chest" and vomited once. After vomiting, the patient had relief from the reported "knots in her chest." Phenergan was administered by aly-Charge nurse. VS are listed above. Reported to patients medical team.   "

## 2019-06-28 NOTE — ASSESSMENT & PLAN NOTE
Patient comfortable, satting well on room air. States she gets significantly dyspneic on exertion. States she was active up until roughly 2 months ago and can now no longer ambulate a few feet without severe dyspnea. Chart review shows ED visits from January of 2019 with complaints of dyspnea. Patient not wheezing, and without a cough. Has loculated pleural effusion which has been difficult to tap in the past and size of effusion is not particularly large, however patient with significant mediastinal and lung disease.     - Unlikely 2/2 effusion as it is chronic and stable. Patient with poor baseline pulmonary reserve, these symptoms are likely 2/2 progression of disease  - Patient satting 73% on room air at rest, will arrange home oxygen upon discharge

## 2019-06-28 NOTE — PT/OT/SLP PROGRESS
Physical Therapy      Patient Name:  Silvia Capellan   MRN:  [id number]    Patient not seen today secondary to pt fatigued in AM and off the floor for radiation in PM. Will follow up at next scheduled visit.     Dejah Gill, PT, DPT   6/28/2019

## 2019-06-29 NOTE — NURSING
Pt discharged to home at this time per MD order.  Med rec completed by MD prior to discharge and copy of current med list given to pt.  All discharge instructions, medications, prescriptions, and follow-up appointments reviewed with pt and daughter; copy given and all questions answered to pt's satisfaction.  RU-chest portacath flushed with normal saline and 100u/ml heparin flush and de-accessed per protocol. tolerating full liquid diet; voiding without difficulty; pain well-controlled with PO pain meds.  All VSS; O2 sats WNL on RA.  Pt left floor via wheelchair; accompanied by pt escort and  no distress noted.

## 2019-07-01 NOTE — PROGRESS NOTES
CC: Squamous cell carcinoma, unknown primary, on chemotherapy, here for follow up         Oncology History:     Diagnosis: Squamous cell carcinoma , primary site unknown    Molecular/genetic testing: p16 negative; PD L1 could not be run due to inadequate tissue   Stage:        Stage 4   Treatment: Carboplatin/paclitaxel x  6 cycles (2/16/18 - 6/8/18)        HPI:  is a 73 y/o female who underwent bronchoscopy/EBUS evaluation on 8/31/17 for abnormal mediastinal adenopathy  and a 1.2 cm MORRO mass.     Bronchoscopy findings:    The oropharynx appears normal. The larynx appears normal. The vocal cords appear normal. The subglottic space is normal. The trachea is of normal caliber. The otf is sharp. The tracheobronchial tree was examined to at least the first subsegmental level. Bronchial mucosa and anatomy are normal; there are no endobronchial lesions, and no secretions.    Lymph Nodes: Lymph node sizing was performed via endobronchial ultrasound for suspected lung cancer. Sampling by transbronchial needle aspiration was also performed using an Olympus EBUS-TBNA 22  gauge needle in the subcarinal mediastinum (level 7) and sent for routine cytology.       - The 7 (subcarinal) node was 30 mm by EBUS. Three samples with the needle were obtained. The patient's condition was unchanged after the intervention.      Her treatment got delayed as she cancelled several appointments due to some issues at home. She started Caroplatin/paclitaxel palliatively. She had left thoracentesis on 2/9/18. There were atypical cells in the pleural fluid, highly suspicious for malignancy.  She completed cycles of Carboplatin/Paclitaxel, last treatment on 6/8/18.     She had repeat left pleural mass FNA in [facility] 2019. It was negative for malignancy, but showed lympho plasmacytic infiltration.            PET CT on 3/13/19 showed increased  hypermetabolism of the left pleural effusion.Thoracic vertebral body hypermetabolism was  noted, new from the prior exam (compared with PET from June 2018) without underlying CT changes which may represent red marrow hyperplasia or new site of neoplastic disease.  There was new hypermetabolism in the right L5-S1 facet joints which appeared to be centered at the facet joint not within the bone and is favored to represent degenerative change or neoplasm.  She has been non -compliant with her appointments here.          She was evaluated on 5/8/19 for nausea and abdominal pain at ER and received IV fluids. On 5/13/19 she was again in the ER , this time for dyspnea. She was evaluated with chest X ray and thoracentesis was attempted, but the fluid volume was very little and was not done.  She has no constipation or diarrhea. No fever.   She was in ER again on 6/3/19 for nausea and emesis. She was started on reglan. She was given iv fluids.     Interval history: She is here for a follow up visit. She was admitted to medical oncology on 6/24/19 for regurgitation and inability to tolerate PO. CT chest showed complete extrinsic esophageal obstruction from mediastinal mass. She underwent esophageal stenting by  AES on 6/25/19. She was started on clear liquids on 6/26/19, radiation oncology consulted as well. She has been  evaluated by radiation oncology, and will start outpatient palliative radiation.  She is tolerating liquid diet without difficulty now.      Review of Systems   Constitutional: Negative for chills and fever.   HENT: Negative for ear discharge and ear pain.    Eyes: Negative for blurred vision, double vision and photophobia.   Respiratory: Negative for cough, hemoptysis, sputum production and shortness of breath.    Cardiovascular: Negative for chest pain, palpitations, orthopnea and claudication.   Gastrointestinal: Positive for nausea. Negative for abdominal pain, blood in stool, constipation, diarrhea, heartburn and vomiting.   Genitourinary: Negative for frequency and urgency.    Musculoskeletal: Negative for myalgias and neck pain.   Neurological: Negative for dizziness, tremors, speech change and headaches.   Psychiatric/Behavioral: Negative for depression, substance abuse and suicidal ideas.         Current Outpatient Medications   Medication Sig    cyproheptadine (PERIACTIN) 4 mg tablet Take 1 tablet (4 mg total) by mouth 3 (three) times daily as needed.    gabapentin (NEURONTIN) 300 MG capsule Take 1 capsule (300 mg total) by mouth every evening. (Patient taking differently: Take 600 mg by mouth every evening. )    hydroCHLOROthiazide (HYDRODIURIL) 25 MG tablet Take 25 mg by mouth once daily.    levocetirizine (XYZAL) 5 MG tablet Take 1 tablet (5 mg total) by mouth every evening.    LORazepam (ATIVAN) 1 MG tablet TAKE 3 TABLETS BY MOUTH 1&1/2 HOURS BEFORE APPT. ON A EMPTY STOMACH    metoclopramide HCl (REGLAN) 10 MG tablet Take 1 tablet (10 mg total) by mouth every 6 (six) hours.    metoprolol succinate (TOPROL-XL) 50 MG 24 hr tablet Take 1 tablet (50 mg total) by mouth once daily.    oxyCODONE-acetaminophen (PERCOCET) 7.5-325 mg per tablet Take 1 tablet by mouth every 8 (eight) hours as needed.    pantoprazole (PROTONIX) 40 MG tablet TAKE 1 TABLET (40 MG TOTAL) BY MOUTH ONCE DAILY.    promethazine (PHENERGAN) 12.5 MG Tab Take 1 tablet (12.5 mg total) by mouth every 6 (six) hours as needed (nausea or vomiting).    senna-docusate 8.6-50 mg (PERICOLACE) 8.6-50 mg per tablet Take 1 tablet by mouth 2 (two) times daily.    traZODone (DESYREL) 50 MG tablet Take 50 mg by mouth every evening.      No current facility-administered medications for this visit.          Vitals:    07/01/19 0932   BP: 112/66   Pulse: 97   Resp: 17   Temp: 97.5 °F (36.4 °C)       Physical Exam   Constitutional: She appears well-developed.   HENT:   Head: Normocephalic.   Mouth/Throat: No oropharyngeal exudate.   Eyes: Pupils are equal, round, and reactive to light. No scleral icterus.   Neck: Normal  range of motion.   Cardiovascular: Normal rate and regular rhythm.   No murmur heard.  Pulmonary/Chest: Effort normal and breath sounds normal. No respiratory distress. She has no wheezes. She has no rales.   She has oxygen via NC   Abdominal: Soft. She exhibits no distension. There is no tenderness. There is no rebound.   Neurological:   She is sitting in a wheel chair   Psychiatric: She has a normal mood and affect.       6/12/17 CT chest with cont      1. Large left pleural effusion resulting in atelectasis of the left lower lobe and portions of left upper lobe  2. 1.2 cm left upper lobe pleural based pulmonary nodule  3. Mediastinal adenopathy as described above with diffuse thickening of the wall of the distal esophagus. Findings are suspicious for neoplastic process. Recommend bronchoscopy biopsy and possible endoscopy for further evaluation.  4. 1.3 cm hypodensity within the dome of the liver. Metastatic focus cannot be entirely excluded.     9/7/17 PET CT       There is mildly increased tracer uptake within the patient's left pleural effusion and overlying the mediastinal adenopathy, max SUV 2.7.    Transmission CT images show continued pleural effusion and circumferential esophageal thickening. Transmission CT images also show non-avid bilateral groin adenopathy likely reactive in nature.      Impression        There is mildly increased tracer uptake within the patient's left pleural effusion and mediastinal adenopathy. While these findings suggest reactive etiology some low grade malignancies, such as bronchioloalveolar carcinoma, may show this level of uptake on PET scan.            10/30/17 EGD     The examined duodenum was normal.The entire examined stomach was normal.The cardia and gastric fundus were normal on retroflexion. A 1 cm hiatal hernia was found. The proximal extent of the gastric folds (end of tubular esophagus) was 38 cm from the incisors. The hiatal narrowing was 39 cm from the incisors.  The Z-line was 38 cm from the incisors. Z-line was sharp. No esophagitis and no columnar esophagus and no lesions seen with NBI or white light.    Impression:                                   - Normal examined duodenum.                        - Normal stomach.                        - 1 cm hiatal hernia.                        - No specimens collected.     10/30/17 colonoscopy :     Cecal polyp ( 3mm) identified  Histopathology: Tubular adenoma        Pathology:     1/17/14 colonoscopy     FINAL PATHOLOGIC DIAGNOSIS     1. Colon biopsy, ascending colon-tubular adenoma.  2. Colon biopsy, transverse-tubular adenoma.  3. 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 The stalk and base of the polyp are well represented and are free of atypia.  4. Colon biopsy, sigmoid- mild glandular hyperplasia consistent with a benign hyperplastic polyp.        7/19/17 PLEURAL FLUID (CYTOLOGY AND CELL BLOCK):     -Groups of atypical cells present, malignancy cannot be excluded.  Note: While these cells could be reactive mesothelial cells, the level of atypia could be seen in malignancy. A cell block was prepared but the atypical cells are not present in the cell block for further characterization. Correlate  clinically. Repeat tap/biopsy might be helpful if clinically indicated.     8/28/17 EBUS FNA     FNA of subcarinal lymph node: Positive for malignant cells  Small clusters of malignant epithelial cells, favor squamous carcinoma Tumor cells are positive for CK 5/6 and CK 7. Immunostains for p63 and TTF-1 are negative.     1/26/18 CT chest, abdomen,pelvis with cont         Comparison: June 12, 2017    Chest: Since prior exam there is been interval enlargement of the right pleural effusion. There is high density material seen within the right pleural effusion that was not visualized on prior exam.    There is a  5.4 cm enhancing mass seen at the left lung base that previously measured approximately 2.9 cm.    There is a subcarinal mass measuring 4.4 cm it previously measured 2.9 cm.    There is circumferential esophageal thickening adjacent to this mass.    There is volume loss in the right chest with mediastinal shift to the right more pronounced than what was seen on prior exam.    Patient is a right internal jugular Port-A-Cath.    Abdomen/pelvis: The liver, spleen, adrenal glands, kidneys and pancreas show a normal contrasted appearance. There is no evidence bowel obstruction. There is no evidence of abdominal or pelvic lymphadenopathy. The osseous structures show degenerative change of the spine.   Impression       Since prior exam in the patient's subcarinal and left lung base masses have increased in size. In addition there is now high density material seen within the patient's left pleural effusion new from prior exam and concerning for hemorrhage possibly from the mass.         1/26/18 CT head with cont          Comparison: CT June 8, 2017    Technique: Contiguous axial images were acquired from vertex to skull base without contrast.    Findings: There is no evidence acute intracranial abnormality.  Specifically there is no evidence acute infarct, contusion, extra-axial fluid collection or midline shift.  The ventricles are normal in size and configuration.  The calvarium is intact.  The paranasal sinuses and mastoid air cells are clear.    There is no evidence of enhancing mass.   Impression       There is no evidence of enhancing mass within the cerebral parenchyma      2/9/18 FINAL PATHOLOGIC DIAGNOSIS  LEFT LUNG, PLEURAL FLUID (CYTOLOGY):  - Scant groups of atypical cells, malignancy cannot be excluded. Note: Scant atypical groups of cells are present. Differential diagnosis includes reactive atypia vs malignancy .  Immunohistochemistry for CK5/6 and p63 is non-contributory. Please clinically correlate and  re-sample as indicated.  All stains have satisfactory positive and negative controls.     5/14/18 CTA CHEST NON CORONARY       .    COMPARISON:  CT chest abdomen and pelvis from 01/26/2018.    FINDINGS:  Structures at the base of the neck are unremarkable.  Right-sided chest port is visualized with distal tip in the SVC.  Aorta is non-aneurysmal.  Heart is mildly enlarged with interval development of moderate pericardial effusion.  There is mild pericardial enhancement visualized.  The pulmonary arteries distribute normally. No intraluminal filling defects to suggest pulmonary thromboembolism to the level of the proximal segmental branches.    The trachea and bronchi are patent.  Moderate left and small right sided pleural effusions are visualized resulting in volume loss and compressive atelectasis within the lower lobes, left greater than right.  Grossly stable mass visualized in the right subcarinal/perihilar region.  There has been interval reduced size of multifocal left-sided pulmonary masses and anterior mediastinal lymphadenopathy.    Stable hepatic hypodensity is visualized within the anterior aspect of the right hepatic lobe.  Prominent right cardiophrenic lymph nodes are seen which have increased in size.  Redemonstration of diffuse abnormal distal esophageal wall thickening versus adjacent soft tissue lesion.  No acute osseous abnormality identified.  Extrathoracic soft tissues are unremarkable.   Impression        1. Interval development of moderate pericardial effusion with mild pericardial enhancement.  Findings may reflect malignant pericardial effusion.  2. No evidence of PE.  3. Patient with known metastatic cancer.  Relatively stable size mass in the right perihilar/subcarinal region.  Interval reduced size of previously visualized multifocal left lung metastatic lesions and anterior mediastinal lymphadenopathy.  Cardiac phrenic lymph nodes have increased in size.  Persistent abnormal prominent  circumferential distal esophageal wall thickening versus surrounding soft tissue mass.  Examination was performed in an emergency setting and not to serve as a restaging scan.  4. Moderate left and small right pleural effusions resulting in volume loss and compressive atelectasis within the lower lobes, left greater than right.         6/7/18 PET CT      FINDINGS:  Patient was administered 12.23 millicuries of FDG intravenously.  Comparison is 09/07/2017.    There is physiologic intracranial, head, and neck activity.  Heart mediastinum are normal.  There is low-grade activity within the left pleural effusion.  There is physiologic liver, spleen, GI  and pelvic organ activity.  No bone lesions are seen.  There is left lower lobe retro cardiac atelectasis.   Impression        See above    Low-grade activity within a loculated left upper pleural effusion.  This could be benign/reactive.  Malignancy not excluded but it has improved since the prior study.         9/8/18 CT          COMPARISON:  New medicine PET-CT from 06/07/2018, CTA chest from 05/14/2018, and CT chest/abdomen/pelvis from 01/26/2018.    FINDINGS:  Thoracic soft tissues: Right-sided chest port in place with catheter tip terminating in the distal SVC.    Aorta: Normal in course and caliber, without significant atherosclerotic plaque. There are three branching vessels at the arch.    Heart: Mildly enlarged.  No pericardial effusion.    Flaca/Mediastinum: Subcarinal soft tissue mass re-identified the measuring 5.7 x 3.7 cm x 6.2 cm, similar to prior CTA.  Previously described prominent cardiophrenic lymph nodes are now within normal limits for size.  Abundant soft tissues again seen surrounding the distal 3rd of the esophagus, findings are similar to prior study and may represent the extensive esophageal wall thickening versus extension of the mediastinal soft tissue mass.    Lungs: The interval decrease in size of known left malignant effusion with small  amount of residual dependent complex fluid and mild adjacent atelectatic changes.  The there is continued nodular left pleural thickening.  Previously described mass at the medial left lung base re-identified measuring 4.3 x 2.2 cm, findings are not well evaluated on previous CTA due to surrounding pleural fluid but is decreased in size in comparison to most recent CT chest/abdomen/pelvis from 01/26/2018.  The no new lesions identified.  Right lung is grossly clear.  No new lung lesions identified.    Liver: Normal in size and attenuation.  Stable 1.2 cm hypoattenuating lesion in the dome of the liver consistent with simple cyst.  No new lesions identified.    Gallbladder: No calcified gallstones.    Bile Ducts: No evidence of dilated ducts.    Pancreas: No mass or peripancreatic fat stranding.    Spleen: Unremarkable.    Adrenals: Unremarkable.    Kidneys/ Ureters: Normal in size and location. Normal concentration of contrast. No hydronephrosis or nephrolithiasis. No ureteral dilatation.    Bladder: Incompletely distended.    Reproductive organs: Status post hysterectomy.    GI Tract/Mesentery: No evidence of bowel obstruction or inflammation.    Peritoneal Space: No ascites. No free air.    Retroperitoneum:  No significant adenopathy.    Abdominal wall:  Unremarkable.    Vasculature: No significant atherosclerosis or aneurysm.    Bones: Thoracic kyphosis. No acute fracture.Stable degenerative changes seen throughout the spine.       Impression         Subcarinal soft tissue mass re-identified and stable in size as compared to the most recent CTA chest from 05/24/2018, lesion demonstrates mild interval decrease in size as compared to the CT chest/abdomen/pelvis from 01/26/2018.    Interval decrease in size of known left malignant pleural effusion.    Medial left lung base mass, findings are not well evaluated on previous CTA chest due to surrounding pleural fluid but has decreased in size in comparison to most  recent CT chest/abdomen/pelvis from 01/26/2018.  Continued nodular left pleural thickening, overall less prominent on today's exam.    Abundant soft tissue again seen surrounding the distal 3rd of the esophagus, findings are similar to prior study and may represent the extensive esophageal wall thickening versus extension of mediastinal soft tissue mass.    Previously described prominent cardiophrenic lymph nodes are now within normal limits for size.  No new lymphadenopathy.    Additional stable findings as above.            12/4/18 CT neck, chest, abdomen, pelvis with cont            COMPARISON:  CTA 09/08/2018    FINDINGS:  Orbits, paranasal sinuses, and skull base: Postoperative changes of bilateral lens replacement.  No orbital masses.  Paranasal sinuses and mastoid air cells clear.  Visualized intracranial structures are unremarkable.    Nasopharynx: Normal.    Suprahyoid neck: Edentulous.  Other wise unremarkable oral cavity.  Normal oropharynx, parapharyngeal space, and retropharyngeal space.    Infrahyoid neck: Normal larynx, hypopharynx, and supraglottis.    Thyroid: Normal.    Cervical lymph nodes: No lymph node enlargement.    Vascular structures: Jugular veins appear patent.  Right internal jugular port catheter with tip terminating within the SVC.    Thoracic soft tissues: Port within the right anterior chest wall.    Aorta: Left-sided aortic arch.  No aneurysm.  There is mild calcific atherosclerosis of the descending thoracic aorta and abdominal aorta.  Aortic valve calcification.    Heart: Normal size.  No pericardial effusion.  Mild coronary artery atherosclerosis.    Pulmonary vasculature: Pulmonary arteries distribute normally.  There are four pulmonary veins.    Flaca/Mediastinum: Subcarinal mass with multiple calcifications.  This measures 4.4 cm in short axis, previously measuring 4.0 cm.  Extensive abnormal soft tissue attenuation along the esophagus potentially representing esophageal wall  thickening or extension of this subcarinal mass--- this appears similar to prior examination.  No new mediastinal or hilar lymph node enlargement.    Airways: Patent.    Lungs/Pleura: There is a small volume of dependent left pleural fluid with scattered hyperattenuating material and nodular pleural thickening, similar to slightly decreased in volume from 09/08/2018.  There is a pleural based mass with calcifications abutting the pleura and posterior mediastinum along the medial margin of the left hemidiaphragm.  This measures 4.3 cm in long axis appears unchanged in size from prior examination.  There is compressive atelectasis of the left lower lobe secondary to pleural fluid.  The lungs are otherwise well aerated and clear.    Esophagus: Abundant soft tissue along the distal 3rd of the esophagus as above.  Proximal esophagus is normal in caliber and course.    Liver: Normal in size and attenuation.  Stable cyst at the dome.    Gallbladder: No calcified stones or gallbladder wall thickening.    Bile Ducts: No dilatation.    Pancreas: No mass. No peripancreatic fat stranding.    Spleen: Unremarkable.    Adrenals: Unremarkable.    Kidneys/Ureters: Normal in size and enhancement.  Small hypodensity within the upper pole of the right kidney, too small to definitively characterize and likely a cyst.  No definite solid renal masses.  No hydroureteronephrosis.    Bladder: No wall thickening.    Reproductive organs: Status post hysterectomy.    GI Tract/Mesentery: No evidence of bowel obstruction or inflammation. Appendix is not identifiable but there is no infllammation in the expected region of the appendix.    Peritoneal Space: No ascites or free air.    Retroperitoneum: No lymph node enlargement.    Abdominal wall: Normal.    Vasculature: Brachiocephalic veins and SVC are patent.  Portal vein, portal venous branches, SMV, and splenic veins are patent..    Bones: No fracture or aggressive osseous lesion.  Multilevel  degenerative changes of the cervical, thoracic, and lumbar spine.         Impression         1.  Stable left pleural based mass.  Stable small volume of left pleural fluid and nodule left pleural thickening compatible with malignant pleural effusion.    2.  Slight interval increase in size of subcarinal soft tissue mass.  Stable abundant soft tissue attenuation surrounding the distal 3rd of the esophagus.    RECIST SUMMARY:    Date of prior examination for comparison: 09/08/2018    Lesion 1: Subcarinal soft tissue mass.  Short axis measurement 4.4 cm cm. Series 2 image 42.  Prior measurement 4.0 cm.    Lesion 2: Left pleural base mass along the left hemidiaphragm.  4.3 cm. Series 2 image 66.  Prior measurement 4.3 cm.    Stable abutting soft tissue along the distal 3rd of the esophagus.    3.  No significant abnormality within the neck to account for this patient's facial edema.  No convincing evidence of SVC syndrome or jugular vein thrombus.         1/4/19 left pleural FNA , FINAL PATHOLOGIC DIAGNOSIS     Pleural, left, mass, CT-guided biopsy: Fragments of soft tissue with lymphoplasmacytic infiltration. No definitive morphologic evidence of lymphoma or carcinoma. See comment.  Comment: Immunohistochemical studies were performed on the paraffin embedded tissue block with adequate positive  and negative controls. Mixed mostly T cells (CD3 positive, CD5 positive, CD 45 positive, BCL-2 positive) and occasional B cells (CD20 positive, [place] 5 positive, CD10 negative, cyclin D1 negative, low Ki-67) are evident. In situ hybridization for kappa and lambda shows polytypic plasma cells. No calretinin positive, CK 5/6 positive, CK 7 positive, P 63 positive large atypical epithelial cells are evident. There is no definitive evidence of lymphoma or carcinoma on the limited tissue examined. Recommend excisional biopsy if clinically highly suspicious for lymphoma or carcinoma.        3/13/19 PET CT              COMPARISON:  Nuclear medicine PET-CT 06/07/2018    FINDINGS:  Quality of the study: Adequate.    Redemonstration of hypermetabolism in the left pleural effusion.  SUV max on today's exam is 3.98.  SUV max was previously 2.25.    New hypermetabolism in the right L5-S1 facet joints with SUV max 3.76.  This may be degenerative or neoplastic noting limited underlying CT changes centered in the joint which favor degenerative change.    Thoracic vertebral body hypermetabolism adjacent to the loculated hypermetabolic pleural effusion with SUV max of 4.03.  This is increased in comparison the prior exam when it was not notable.  No underlying CT changes.    Physiologic uptake of the tracer is present within the brain, salivary glands, myocardium, GI and  tracts.    Significant incidental CT findings: N/A       Impression         Increased hypermetabolism of the left pleural effusion.    Thoracic vertebral body hypermetabolism new from the prior exam without underlying CT changes which may represent red marrow hyperplasia or new site of neoplastic disease.    New hypermetabolism in the right L5-S1 facet joints which appears to be centered at the facet joint not within the bone and is favored to represent degenerative change or neoplasm.                Component      Latest Ref Rng & Units 3/19/2019   IgG - Serum      650 - 1600 mg/dL 1680 (H)   IgA      40 - 350 mg/dL 314   IgM      50 - 300 mg/dL 134      3/19/19 SPEP: Normal total protein. There is a paraprotein band in gamma = 0.63 g/dL.  Correlate with JAHAIRA results  3/19/19 serum JAHAIRA: IgG lambda specific monoclonal band present.         5/31/19 CT chest/abdomen/pelvis with cont  .       COMPARISON:  December 2018.    FINDINGS:  Visualized thyroid is normal.  7.4 cm left apical mass abuts the mediastinum and chest wall.  Significant enlargement of the subcarinal mass today measures at least 6.9 cm short axis compared to 4.4 cm prior.  The mass abuts the inferior medial  pleural base mass which today measures 8.7 cm compared to 4.3 cm prior.  Increase in the volume of loculated left pleural fluid.  Some additional hyperenhancing mediastinal and left pleural based nodules noted.    The lungs demonstrate volume loss on the left.  Some compressive atelectasis at the right lung medially as well.  No convincing right lung masses.  The    In the abdomen 1.3 cm hypodensity hepatic dome appears similar.  There is some hypodensity abutting the falciform ligament may be focal fat though not visualized on the prior study.  Developing mass not excluded.  Gallbladder is unremarkable.  No pancreatic or splenic masses.  No adrenal masses.  Hypodensity apex of the right kidney appears similar.  No convincing solid masses.    Aorta tapers normally.  No convincing para-aortic or pelvic adenopathy.  Uterus not visualized presumably removed.    Evaluation of the bowel demonstrates no focal dilatation.  Colon is relatively decompressed throughout its course.  No convincing wall thickening or pericolonic stranding.  Some scattered diverticula are noted.  No significant mesenteric adenopathy.    Bone windows demonstrate osteopenia.  Facet arthropathy noted.  No convincing lytic or blastic lesion.  Several healing anterior left rib fractures.  Exaggeration of the normal thoracic kyphosis the mild compression in the midthoracic spine similar.       Impression         Detrimental interval change with increase in the mediastinal and left pleural masses.  There is also increase in the volume of loculated left pleural fluid.    Hypodensity in the liver and right kidney appears similar.    There is some hypodensity abutting the falciform ligament which may be focal fat though not visualized on the prior study.  Developing metastasis not excluded and follow-up would be helpful.    Interval healing left rib fractures.    RECIST SUMMARY:    Date of prior examination for comparison: December 4, 2018.    Lesion 1:  Subcarinal soft tissue mass..  6.9 cm cm. Series 2 image 48.  Prior measurement 4.4 cm cm.    Lesion 2: Left pleural based mass along the left diaphragm.  8.7 cm. Series 2 image 72.  Prior measurement 4.3 cm.    Lesion 3: Left apical pleural base mass.  7.4 cm. Series 2 image 21.  Prior measurement not measurable.      6/24/19 CT chest /abdopmen/pelvis with cont  '  COMPARISON:  CT chest abdomen pelvis 05/31/2019.    FINDINGS:  Base of the neck: Structures of the base the neck demonstrate no significant abnormality.  Thyroid gland is within normal limits.  Right-sided chest port.    Heart/Vasculature: The heart is normal in size with no pericardial effusion.    Flaca/mediastinum: There is a 8.3 cm left apical mass abutting the mediastinum and appears to cause mass effect and slight rightward mediastinal shift.  There has been interval enlargement of subcarinal mass on today's examination measuring approximately 7.6 cm and short axis (previously measured 6.9 cm in short axis).  There is inferior medial pleural based mass with internal calcifications measuring approximately 9.2 cm (axial series 2, image 42).    Lungs/airways: Redemonstration of volume loss within the left lung.  Mild volume loculated left pleural effusion, similar from prior.  No focal consolidation.    Liver: There is a 1.3 cm hypodense lesion within the hepatic dome, grossly similar from prior.  Hypodense area adjacent to the falciform ligament, grossly similar from prior and favored to represent focal fatty infiltration.  There is a new 1.0 cm hypodense lesion within the right hepatic lobe (axial series 2, image 50).    Gallbladder: No evidence of cholelithiasis, gallbladder wall thickening, or pericholecystic fluid.    Bile Ducts: No intra or extrahepatic biliary ductal dilatation.    Spleen: Unremarkable.    Pancreas: No pancreatic mass or peripancreatic inflammatory changes.    Adrenals: Unremarkable.    Kidneys/ Ureters: The kidneys are stable  in size and location without nephrolithiasis, or hydroureteronephrosis.  There is a subcentimeter hypodensity within the superior pole of the right kidney, prior and too small to characterize.    Reproductive organs: Hysterectomy.    GI tract/Mesentery: The esophagus is not visualized in its entirety and may represent component of compression/obstruction by adjacent mass.  The small and large bowel are normal in course and caliber without evidence for obstruction or inflammation.    Peritoneal space: No abdominopelvic ascites, intraperitoneal free air, or significant adenopathy.    Vasculature: Calcific atherosclerosis noted along the course abdominal aorta.  No aneurysmal dilatation identified.    Abdominal wall/extraperitoneal soft tissues: Unremarkable.    Bones: Degenerative changes of the spine.  Grade 1 spondylolisthesis at L5-S1.  Mild compression deformity again seen at the midthoracic spine.  Healing or healed left-sided rib fractures again seen.  No acute fracture osseous destructive lesion identified.      Impression       1. Nonvisualization of the mid to distal esophagus which may represent component of obstruction from adjacent mass.  2. Slight increase in size of mediastinal and left pleural masses with measurements as below.  3. Subcentimeter hypodense lesion within the right hepatic lobe, new from prior however too small to characterize.  4. Additional stable findings as above.  RECIST SUMMARY:    Date of prior examination for comparison: 05/31/2019    Lesion 1: Subcarinal soft tissue mass.  7.6 cm. Series 2 image 29.  Prior measurement 6.9 cm.    Lesion 2: Left pleural based mass.  9.2 cm. Series 2 image 42.  Prior measurement 8.7 cm.    Lesion 3: Left apical pleural base mass.  8.3 cm. Series 2 image 12.  Prior measurement 7.4 cm.         Component      Latest Ref Rng & Units 7/1/2019   WBC      3.90 - 12.70 K/uL 8.24   RBC      4.00 - 5.40 M/uL 3.74 (L)   Hemoglobin      12.0 - 16.0 g/dL 10.0 (L)    Hematocrit      37.0 - 48.5 % 33.8 (L)   MCV      82 - 98 fL 90   MCH      27.0 - 31.0 pg 26.7 (L)   MCHC      32.0 - 36.0 g/dL 29.6 (L)   RDW      11.5 - 14.5 % 14.4   Platelets      150 - 350 K/uL 252   MPV      9.2 - 12.9 fL 11.0   Immature Granulocytes      0.0 - 0.5 % 1.6 (H)   Gran # (ANC)      1.8 - 7.7 K/uL 6.8   Immature Grans (Abs)      0.00 - 0.04 K/uL 0.13 (H)   Lymph #      1.0 - 4.8 K/uL 0.7 (L)   Mono #      0.3 - 1.0 K/uL 0.6   Eos #      0.0 - 0.5 K/uL 0.0   Baso #      0.00 - 0.20 K/uL 0.01   nRBC      0 /100 WBC 1 (A)   Gran%      38.0 - 73.0 % 81.9 (H)   Lymph%      18.0 - 48.0 % 9.0 (L)   Mono%      4.0 - 15.0 % 7.0   Eosinophil%      0.0 - 8.0 % 0.4   Basophil%      0.0 - 1.9 % 0.1   Differential Method       Automated   Sodium      136 - 145 mmol/L 138   Potassium      3.5 - 5.1 mmol/L 2.8 (L)   Chloride      95 - 110 mmol/L 89 (L)   CO2      23 - 29 mmol/L 32 (H)   Glucose      70 - 110 mg/dL 106   BUN, Bld      8 - 23 mg/dL 10   Creatinine      0.5 - 1.4 mg/dL 0.7   Calcium      8.7 - 10.5 mg/dL 9.1   PROTEIN TOTAL      6.0 - 8.4 g/dL 7.2   Albumin      3.5 - 5.2 g/dL 2.2 (L)   BILIRUBIN TOTAL      0.1 - 1.0 mg/dL 0.4   Alkaline Phosphatase      55 - 135 U/L 75   AST      10 - 40 U/L 12   ALT      10 - 44 U/L 7 (L)   Anion Gap      8 - 16 mmol/L 17 (H)   eGFR if African American      >60 mL/min/1.73 m:2 >60.0   eGFR if non African American      >60 mL/min/1.73 m:2 >60.0       Assessment:     1. is a 72 y/o female who underwent bronchoscopy/EBUS evaluation on 8/31/17 for abnormal mediastinal adenopathy  and a 1.2 cm MORRO mass as well as pericardial adenopathy.FNA of subcarinal lymph node was positive for malignant cells.  There were small clusters of malignant epithelial cells, favoring squamous carcinoma. Site of origin is not clear. PET CT done on 9/7/17 did not reveal any lesion in head and neck region.  There was inadequate tissue to run PD L1.p16 was negative.    Esophageal  thickening was noted on CT done on 6/12/17. EGD on 10/30/17 did not reveal any suspicious lesions in esophagus/stomach. ENT evaluation still pending.   I explained to her that if primary site is unclear, she will be treated as metastatic SCC of unknown primary.   Her treatment got delayed as she cancelled several appointments due to some issues at home. She said she could not have MRI due to claustrophobia, even with anti-anxiety medication. CT chest from 1/26/18 showed increase in the sizes of subcarinal and left lung base masses compared to previous CT in June 2017 . High density material was seen within the patient's left pleural effusion. No intracranial lesions noted on CT head. I discussed these findings with the patient and personally reviewed the CT scans from January 2018.  I told her that she has likely metastatic SCC of unknown primary. She does have RA history. It is unclear if she can tolerate check point inhibitor therapy even if she has high PD1 expression on cytology/biopsy.  She started Carboplatin/Paclitaxel every 3 weeks. I had explained to her that rationale of treatment is palliation and not cure . She did not get her CTs after C4 as planned. PET CT before cycle 6, on 6/7/18 showed low-grade activity within the left pleural effusion.   CT done on 9/8/18 showed overall stable findings compared to her CTs from May 2018. Pleural effusion had resolved. She is still dyspneic, but her cough has resolved. Her performance status is better. She tolerates more activities now, than previously. She has facial puffiness.   No evidence of SVC syndrome/IJ thrombus noted on CT done on 12/4/18. Subcarinal soft tissue mass measures 4.4 cm cm, previously (in Sept 2018) measuring 4.0 cm. Left pleural base mass along the left hemidiaphragm measured 4.3 cm, unchanged from Sept 2018. No other lesions noted in rest of the lungs, abdomen, neck or pelvis.     PET CT on 3/13/19 showed increased  hypermetabolism of the  left pleural effusion.Thoracic vertebral body hypermetabolism was noted, new from the prior exam (compared with PET from June 2018) without underlying CT changes which may represent red marrow hyperplasia or new site of neoplastic disease.  There was new hypermetabolism in the right L5-S1 facet joints which appeared to be centered at the facet joint not within the bone and is favored to represent degenerative change or neoplasm.  Repeat CT on 5/31/19 demonstrated an increase in the mediastinal and left pleural masses.  There was also increase in the volume of loculated left pleural fluid.    She was admitted to medical oncology on 6/24/19 for regurgitation and inability to tolerate PO. CT chest showed complete extrinsic esophageal obstruction from mediastinal mass. She underwent esophageal stenting by  AES on 6/25/19. She was started on clear liquids on 6/26/19, radiation oncology consulted as well. She has been  evaluated by radiation oncology, and will start outpatient palliative radiation.  She is tolerating liquid diet without difficulty now.  She has arthritis--not sure if it is RA or OA. She will, however, start Atezolizumab on 7/18/19 once she completes palliative RT for mediastinal mass.                2. Pleural effusion: She had left sided thoracentesis ( 1050ml) on 2/9/18. Malignancy could not be excluded. No  Indication for pleurex catheter/reepat thoracentesis at this time.    CTA done on 5/14/18 again showed moderate effusion on the left , mild on right. PET CT on 3/13/19 again showed left pleural effusion with hypermetabolism. Thoracentesis, cytology, labs ordered.       3. Anorexia: Secondary to #1. She was on Cyproheptadine.However, her appetite was still poor. She was started on Decadron 2mg BID. It helped with her appetite. Howver, it caused her to gain weight as well as disturbed her sleep. She has stopped Decadron now      4. Anemia: Hgb 10.7.  Normocytic, hypochromic. No overt bleeding. Iron  panel on 4/19 showed iron deficiency.      5. Pericardial effusion: She was noted to have pericardial effusion on CTA chest done on 5/14/18. Possibly malignant. No symptoms/signs of tamponade. Now resolved.         6. Left hip pain: Possibly secondary to osteo/rhaumtoid arthritis        7. Left pleural mass: It was biopsied on 1/4/19. Fragments of soft tissue with lymphoplasmacytic infiltration were seen. No definitive morphologic evidence of lymphoma or carcinoma seen. SPEP on 3/129/19 showed a paraprotein band in gamma, measuring 0.63 g/dL.  Serum JAHAIRA demonstrated a IgG lambda specific monoclonal band. Calcium, creatinine normal. She has mild anemia.       8. Nausea and emesis: She is on Phenergan. She is  also on Zofran as needed. She is alternating between the two. She took 2 pills of Reglan after she went home from ER on 5/31/19, but has lost her bottle of medicine. A new prescription for Reglan will be sent.  She has no abdominal distention. No evidence of obstruction evident on CT done on 5/31/19.She I son Reglan, Zofran, without much benefit. She is also on PPI. She will receive IV fluids, iv Zofran twice weekly.       She will also have GI evaluation.      9. Hypokalemia: Secondary to poor oral intake, emesis. She is on oral potassium.            Distress Screening Results: Psychosocial Distress screening score of Distress Score: 0 noted and reviewed. No intervention indicated.

## 2019-07-01 NOTE — PLAN OF CARE
Problem: Adult Inpatient Plan of Care  Goal: Plan of Care Review  Outcome: Ongoing (interventions implemented as appropriate)  Day 1 of inpatient XRT to the Esophagus. Pt tolerated 1st treatment well.

## 2019-07-05 NOTE — PLAN OF CARE
Problem: Adult Inpatient Plan of Care  Goal: Plan of Care Review  Outcome: Ongoing (interventions implemented as appropriate)  Day 5 of outpatient XRT to the esophagus. Epigastric pain off and on. Drinking and eating ok. Pt in wheelchair. Reports some nausea at times.

## 2019-07-08 NOTE — ED NOTES
Pt identifiers checked and accurate with Silvia Capellan     Pt reports to ED with complaints of constipation x 5 days with abdominal pain. Pt reports passing gas, feeling the urge to pass stool but unable. Pt denies N/V, fever, chills, trauma, falls.     LOC: The patient is awake, alert and aware of environment with an appropriate affect, the patient is oriented x 3 and speaking appropriately.  APPEARANCE: Patient presents in wheelchair, mild distress, patient is clean and well groomed.  SKIN: The skin is warm and dry, color consistent with ethnicity, patient has normal skin turgor and moist mucus membranes, skin intact.  MUSCULOSKELETAL: Patient moving all extremities well, no obvious swelling or deformities noted. Pt presents in wheelchair   RESPIRATORY: Airway is open and patent; respirations are spontaneous, patient has a normal effort and rate, no accessory muscle use noted.   ABDOMEN: Soft and non tender to palpation, no distention noted. Bowel sounds present x 4. Pt reports last BM 5 days ago, taking percocet for pain, taking one dose of stool softener this AM. Pt reports ability to pass gas.   NEUROLOGIC: Eyes open spontaneously, behavior appropriate to situation, follows commands, purposeful motor response noted

## 2019-07-08 NOTE — ED PROVIDER NOTES
Encounter Date: 7/8/2019       History     Chief Complaint   Patient presents with    Constipation     Pt c/o abdominal pain & constipation.  Last BM 4 days ago.     73-year-old female with PMHx of HTN, rheumatoid arthritis, GERD, COPD, and lung squamous cell carcinoma who presents to the ED with c/o constipation. Per pt, she has been taking Percocet for esophageal pain related to undergoing radiation therapy. She has had constipation for the past week with her last bowel movement 5 days. She continues to pass gas regularly. She started taking MiraLax this morning, but it did not provide much relief to her symptoms. She reports developing associated abdominal plain this morning, which she describes as a constant bloating pain throughout her abdomen, rated 10/10 currently. No known alleviating or exacerbating features.  Denies fever, chills, chest pain, shortness of breath, dysuria, urinary frequency, hematuria, blood in stool, melena, nausea, vomiting, weakness, numbness, back pain, or any other medical complaints.     The history is provided by the patient.     Review of patient's allergies indicates:   Allergen Reactions    Latex, natural rubber Rash    Codeine Hallucinations    Cortisporin [neomycin-bacitracin-poly-hc] Rash    Latex, natural rubber Rash     Past Medical History:   Diagnosis Date    Chronic obstructive pulmonary disease 5/14/2019    Encounter for blood transfusion     GERD (gastroesophageal reflux disease)     HTN (hypertension)     Rheumatoid arthritis     Rheumatoid arthritis(714.0)     Squamous cell lung cancer, left 2/15/2018     Past Surgical History:   Procedure Laterality Date    APPENDECTOMY      Jmyovo-vndexy-cj--lung N/A 1/4/2019    Performed by [facility] OR 2ND FLR    BRONCHOSCOPY N/A 8/28/2017    Performed by [facility]ND [facility]LR    CATARACT EXTRACTION Bilateral 2018    Dr. Keith     COLONOSCOPY      COLONOSCOPY N/A  10/30/2017    Performed by [facility] at [facility] ENDO (4TH FLR)    COLONOSCOPY N/A 1/17/2014    [facility] at [facility]    EGD (ESOPHAGOGASTRODUODENOSCOPY) WITH FLUOROSCOPY N/A 6/25/2019    Performed by Aime Huff MD at [facility] ENDO (2ND FLR)    ESOPHAGOGASTRODUODENOSCOPY (EGD) N/A 10/30/2017    Performed by [facility] at [facility] ENDO (4TH FLR)    FOOT SURGERY Left     HYSTERECTOMY      INSERTION, IOL PROSTHESIS Left 11/8/2018    Performed by Susan Keith [facility] at [facility] OR 1ST FLR    INSERTION, IOL PROSTHESIS Right 10/18/2018    Performed by Susan [facility] at [facility] OR 1ST FLR    INSERTION-PORT N/A 11/14/2017    Performed by [facility] Provider at [facility] OR 2ND FLR    PHACOEMULSIFICATION, CATARACT Left 11/8/2018    Performed by [facility] at [facility] OR 1ST FLR    PHACOEMULSIFICATION, CATARACT Right 10/18/2018    [facility] at [facility] OR 1ST FLR    SKIN BIOPSY      TOTAL KNEE ARTHROPLASTY      right     Family History   Problem Relation Age of Onset    Glaucoma Mother     No Known Problems Father     Hypertension Unknown     Kidney disease Unknown     Glaucoma Sister     Glaucoma Brother     No Known Problems Maternal Aunt     No Known Problems Maternal Uncle     No Known Problems Paternal Aunt     No Known Problems Paternal Uncle     No Known Problems Maternal Grandmother     No Known Problems Maternal Grandfather     No Known Problems Paternal Grandmother     No Known Problems Paternal Grandfather     Colon polyps Neg Hx     Colon cancer Neg Hx     Esophageal cancer Neg Hx     Irritable bowel syndrome Neg Hx     Inflammatory bowel disease Neg Hx     Stomach cancer Neg Hx     Blindness Neg Hx     Macular degeneration Neg Hx     Retinal detachment Neg Hx     Amblyopia Neg Hx     Cancer Neg Hx     Cataracts Neg Hx     Diabetes Neg Hx     Strabismus Neg Hx     Stroke Neg Hx     Thyroid disease Neg Hx       Social History     Tobacco Use    Smoking status: Former Smoker    Smokeless tobacco: Never Used   Substance Use Topics    Alcohol use: Not Currently     Comment: Occasionally    Drug use: No     Review of Systems   Constitutional: Negative for chills, fatigue and fever.   HENT:        +esophageal pain (with radiation)   Eyes: Negative for visual disturbance.   Respiratory: Negative for shortness of breath.    Cardiovascular: Negative for chest pain.   Gastrointestinal: Positive for abdominal pain and constipation. Negative for blood in stool, diarrhea, nausea and vomiting.   Genitourinary: Negative for dysuria, frequency and hematuria.   Musculoskeletal: Negative for back pain.   Neurological: Negative for dizziness, weakness, light-headedness, numbness and headaches.   Hematological: Does not bruise/bleed easily.   Psychiatric/Behavioral: Negative for confusion.       Physical Exam     Initial Vitals [07/08/19 1420]   BP Pulse Resp Temp SpO2   125/63 (!) 112 16 98.3 °F (36.8 °C) 97 %      MAP       --         Physical Exam    Nursing note and vitals reviewed.  Constitutional: She appears well-developed and well-nourished.   HENT:   Head: Normocephalic and atraumatic.   Eyes: Conjunctivae and EOM are normal.   Neck: Normal range of motion. Neck supple.   Cardiovascular: Regular rhythm and normal heart sounds. Tachycardia present.    Pulmonary/Chest: Breath sounds normal. She has no wheezes. She has no rhonchi. She has no rales.   Abdominal: Soft. There is no tenderness. There is no rebound and no guarding.   No CVA tenderness.    Genitourinary:   Genitourinary Comments: Large amount of brown hard stool in rectal vault.  Hemoccult negative.   Musculoskeletal: Normal range of motion. She exhibits no edema or tenderness.   Neurological: She is alert and oriented to person, place, and time. She has normal strength.   Normal gait   Skin: Skin is warm. Capillary refill takes less than 2 seconds.   Psychiatric:  She has a normal mood and affect.         ED Course   Procedures  Labs Reviewed - No data to display       Imaging Results    None          Medical Decision Making:   History:   I obtained history from: someone other than patient.       <> Summary of History: Daughter assisted in history  Old Medical Records: I decided to obtain old medical records.  Initial Assessment:   73-year-old female with PMHx of HTN, rheumatoid arthritis, GERD, COPD, and lung squamous cell carcinoma who presents to the ED with c/o constipation.  Patient has not had bowel movement 5 days, but continues to pass gas.  She developed abdominal pain this AM. which she describes as bloating pain. Patient has been taking Percocet regularly the past week for pain related to radiation therapy.  Patient is tachycardic with heart rate of 112.  Upon reviewing previously recorded vital signs, patient is baseline HR is around mid 90s.  Afebrile.  Lungs CTA bilaterally. Abdomen soft and nontender. Hard brown stool in rectal vault, Hemoccult negative. Neurovascular intact throughout.  Differential Diagnosis:   DDx includes but is not limited to constipation, opioid induced constipation, stool impaction.  Considered but do not suspect SBO as patient continues to pass gas and has benign abdominal exam.   ED Management:  Patient had discomfort with manual disimpaction.  She would prefer receiving brown bomb in the ED at this time. No indication for labs or imaging at this time.     I signed out the care of this patient to Dr. Coombs. Final dispo pending brown bomb administration and reassessment. Stressed the importance of taking Miralax while taking Percocet for pain relief.     I have discussed the treatment and management of this patient with my supervising physician, and we agree on the plan of care.      Madeline Amaya PA-C                Attending Attestation:     Physician Attestation Statement for NP/PA:   I have conducted a face to face encounter with  this patient in addition to the NP/PA, due to Medical Complexity    Other NP/PA Attestation Additions:    History of Present Illness: Constipation   Physical Exam: After enema, I performed a rectal exam and was able to disimpact the patient with more stool coming out.  Repeat examination of her abdomen is completely benign.   Medical Decision Making: Will discharge the patient home with lactulose                    Clinical Impression:       ICD-10-CM ICD-9-CM   1. Drug-induced constipation K59.03 564.09     E980.5   2. Constipation, unspecified constipation type K59.00 564.00   3. Fecal impaction K56.41 560.32         Disposition:   Disposition: Discharged  Condition: Stable                        Madeline Amaya PA-C  07/08/19 1936       Gabriel Casas III, MD  07/08/19 [id number]

## 2019-07-08 NOTE — DISCHARGE INSTRUCTIONS
It important that you take MiraLax as directed to prevent constipation with Percocet use.  Please follow up with her primary care physician within a week to discuss her ED visit today.  Retur to the ER if he develops severe abdominal pain, blood in her stool, black tarry stool, fevers, chills, or any other concerning symptoms.

## 2019-07-09 NOTE — TELEPHONE ENCOUNTER
----- Message from Morena Kauffman sent at 7/9/2019  1:12 PM CDT -----  Contact: Anton DAIGLE  Type: Patient Call Back    Who called: Anton DAIGLE     What is the request in detail:called to notify the doctor that the patient has poor appetite and would like to know if the patient can get Ensure called in to a DME for her     Can the clinic reply by [facility]? No    Would the patient rather a call back or a response via [facility]? Call     Best call back number:[phone]

## 2019-07-11 NOTE — TELEPHONE ENCOUNTER
Spoke with daughter and she stated she would check with mother and see about getting her to her next appointment for radiation treatment since she did not show up today.

## 2019-07-12 NOTE — TELEPHONE ENCOUNTER
Calling to see if pt was coming for radiation treatment today. Will try to contact daughter again on Monday.

## 2019-07-18 NOTE — PROGRESS NOTES
Pt was scheduled for Tecentriq on 7/18 at 9a. RN in Infusion called pt at 1225. Phone rang and no voicemail available. Pt has missed Infusion appts in the past. Dr. Wong notified.

## 2019-07-18 NOTE — TELEPHONE ENCOUNTER
Spoke to home health nurse patient is on periactin 3 times daily ask the nurse to make sure she is taking the medication 3 times day.

## 2019-07-22 PROBLEM — R06.02 SHORTNESS OF BREATH: Status: ACTIVE | Noted: 2019-01-01

## 2019-07-22 PROBLEM — E83.42 HYPOMAGNESEMIA: Status: ACTIVE | Noted: 2019-01-01

## 2019-07-22 PROBLEM — R52 INTRACTABLE PAIN: Status: ACTIVE | Noted: 2019-01-01

## 2019-07-22 PROBLEM — R11.0 NAUSEA: Status: ACTIVE | Noted: 2019-01-01

## 2019-07-22 NOTE — ED NOTES
Pt ambulated to restroom outside of ED room. Pt aware of need of urine sample since 11:00am. Pt did not take urine sample cup with her to provide urine sample.

## 2019-07-22 NOTE — ED NOTES
Unsuccessful attempt at port a cath access . Physician aware. Pt reports loosing over 50 pounds over the last two months.

## 2019-07-22 NOTE — ED NOTES
Pt reports 10/10 nonradiating midsternal and around the throat area chest pain/burning sensation. Physician aware.

## 2019-07-22 NOTE — ED PROVIDER NOTES
Encounter Date: 7/22/2019    SCRIBE #1 NOTE: I, Hamlet Broussard, am scribing for, and in the presence of,  Dr. Cobb. I have scribed the entire note.       History     Chief Complaint   Patient presents with    Shortness of Breath     hx lung cancer, on home oxygen, chest hurts with breathing     Patient is a 73 year old F with Pmh of hypertension, rheumatoid arthritis, COPD, and GERD. who presents to the ED with a chief complaint of shortness of breath that started 2-3 days ago. She has pain with deep inspiration.   Endorses chest pain, cough, chills and vomiting. Denies dysuria and fever.  She is a lung cancer patient who has been undergoing radiation therapy.  Patient is not currently on any blood thinners.     The history is provided by the patient.     Review of patient's allergies indicates:   Allergen Reactions    Latex, natural rubber Rash    Codeine Hallucinations    Cortisporin [neomycin-bacitracin-poly-hc] Rash    Latex, natural rubber Rash     Past Medical History:   Diagnosis Date    Chronic obstructive pulmonary disease 5/14/2019    Encounter for blood transfusion     GERD (gastroesophageal reflux disease)     HTN (hypertension)     Rheumatoid arthritis     Rheumatoid arthritis(714.0)     Squamous cell lung cancer, left 2/15/2018     Past Surgical History:   Procedure Laterality Date    APPENDECTOMY      Swmoos-fzztea-fr--lung N/A 1/4/2019    Performed by [facility] Diagnostic Provider at [facility] OR 2ND FLR    BRONCHOSCOPY N/A 8/28/2017    Performed by [facility] Diagnostic Provider at [facility] OR 2ND FLR    CATARACT EXTRACTION Bilateral 2018    Dr. Keith     COLONOSCOPY      COLONOSCOPY N/A 10/30/2017    Performed by Quentin Coppola MD at [facility] ENDO (4TH FLR)    COLONOSCOPY N/A 1/17/2014    Performed by Fel[facility]o [facility]an [facility] at [facility] ENDO    EGD (ESOPHAGOGASTRODUODENOSCOPY) WITH FLUOROSCOPY N/A 6/25/2019    Performed by Aime Huff MD at [facility] ENDO (2ND FLR)    ESOPHAGOGASTRODUODENOSCOPY (EGD)  N/A 10/30/2017    Performed by Quentin [facility]ola MD at [facility] ENDO (4TH FLR)    FOOT SURGERY Left     HYSTERECTOMY      INSERTION, IOL PROSTHESIS Left 11/8/2018    Performed by Susan Keith MD at [facility] OR 1ST FLR    INSERTION, IOL PROSTHESIS Right 10/18/2018    Performed by Susan Keith MD at [facility] OR 1ST FLR    INSERTION-PORT N/A 11/14/2017    Performed by [facility] Diagnostic Provider at [facility] OR 2ND FLR    PHACOEMULSIFICATION, CATARACT Left 11/8/2018    Performed by Susan Keith MD at [facility] OR 1ST FLR    PHACOEMULSIFICATION, CATARACT Right 10/18/2018    Performed by Susan Keith MD at [facility] OR 1ST FLR    SKIN BIOPSY      TOTAL KNEE ARTHROPLASTY      right     Family History   Problem Relation Age of Onset    Glaucoma Mother     No Known Problems Father     Hypertension Unknown     Kidney disease Unknown     Glaucoma Sister     Glaucoma Brother     No Known Problems Maternal Aunt     No Known Problems Maternal Uncle     No Known Problems Paternal Aunt     No Known Problems Paternal Uncle     No Known Problems Maternal Grandmother     No Known Problems Maternal Grandfather     No Known Problems Paternal Grandmother     No Known Problems Paternal Grandfather     Colon polyps Neg Hx     Colon cancer Neg Hx     Esophageal cancer Neg Hx     Irritable bowel syndrome Neg Hx     Inflammatory bowel disease Neg Hx     Stomach cancer Neg Hx     Blindness Neg Hx     Macular degeneration Neg Hx     Retinal detachment Neg Hx     Amblyopia Neg Hx     Cancer Neg Hx     Cataracts Neg Hx     Diabetes Neg Hx     Strabismus Neg Hx     Stroke Neg Hx     Thyroid disease Neg Hx      Social History     Tobacco Use    Smoking status: Former Smoker    Smokeless tobacco: Never Used   Substance Use Topics    Alcohol use: Not Currently     Comment: Occasionally    Drug use: No     Review of Systems   Constitutional: Positive for chills. Negative for fever.   Respiratory: Positive for cough  and shortness of breath.    Cardiovascular: Positive for chest pain.   Gastrointestinal: Positive for vomiting.   Genitourinary: Negative for dysuria.   Musculoskeletal: Negative.    Neurological: Negative.    All other systems reviewed and are negative.      Physical Exam     Initial Vitals [07/22/19 0955]   BP Pulse Resp Temp SpO2   108/68 106 (!) 22 97.6 °F (36.4 °C) 100 %      MAP       --         Physical Exam    Nursing note and vitals reviewed.  Constitutional: She appears well-developed and well-nourished.   HENT:   Head: Normocephalic and atraumatic.   Eyes: EOM are normal.   Neck: Normal range of motion.   Cardiovascular: Regular rhythm and intact distal pulses.   tachycardia   Pulmonary/Chest:   Mild tachypnea, decreased breath sounds on left   Abdominal: Soft.   Musculoskeletal: Normal range of motion.   Neurological: She is alert and oriented to person, place, and time. GCS score is 15. GCS eye subscore is 4. GCS verbal subscore is 5. GCS motor subscore is 6.   Skin: Skin is warm.   Psychiatric: She has a normal mood and affect.         ED Course   Procedures  Labs Reviewed   CBC W/ AUTO DIFFERENTIAL - Abnormal; Notable for the following components:       Result Value    RBC 3.47 (*)     Hemoglobin 9.4 (*)     Hematocrit 32.4 (*)     Mean Corpuscular Hemoglobin Conc 29.0 (*)     RDW 15.2 (*)     Immature Granulocytes 1.3 (*)     Immature Grans (Abs) 0.06 (*)     Lymph # 0.4 (*)     Gran% 81.3 (*)     Lymph% 8.1 (*)     All other components within normal limits   COMPREHENSIVE METABOLIC PANEL - Abnormal; Notable for the following components:    Potassium 2.8 (*)     Chloride 86 (*)     CO2 35 (*)     Albumin 2.5 (*)     ALT 7 (*)     Anion Gap 17 (*)     All other components within normal limits   MAGNESIUM - Abnormal; Notable for the following components:    Magnesium 1.1 (*)     All other components within normal limits   B-TYPE NATRIURETIC PEPTIDE   LIPASE   LACTIC ACID, PLASMA   TROPONIN I   TROPONIN  I    Narrative:     ADD ON TROP [id number] PER DR. ESSENCE JENKINS  07/22/2019  16:50      EKG Readings: (Independently Interpreted)   1018  Sinus tachycardia with a rate of 105. Incomplete rbbb. Left axis deviation. Compared with previous EKG on 6/292019,  T-wave inversions in v1-v3 have improved. Otherwise no other significant changes.     1139  Sinus tachycardia with a rate of 101. Incomplete rbbb. Left axis deviation. Non-specific T-wave abnormality. Compared to earlier, T-waves in 1 and 2 AvL are more flattened.        ECG Results          EKG 12-lead (Final result)  Result time 07/22/19 15:27:18    Final result by Interface, Lab In [facility] (07/22/19 15:27:18)                 Narrative:    Test Reason : R00.0    Vent. Rate : 101 BPM     Atrial Rate : 101 BPM     P-R Int : 144 ms          QRS Dur : 098 ms      QT Int : 312 ms       P-R-T Axes : 049 -23 064 degrees     QTc Int : 404 ms      Sinus tachycardia  Incomplete right bundle branch block  Nonspecific T wave abnormality  Abnormal ECG  When compared with ECG of 22-JUL-2019 10:18,  No significant change was found  Confirmed by Zoë Funk MD ([id number]) on 7/22/2019 3:27:08 PM    Referred By: [email or web address]   SELF           Confirmed By:Zoë Funk MD                             EKG 12-lead (Final result)  Result time 07/22/19 15:26:16    Final result by Interface, Lab In [facility] (07/22/19 15:26:16)                 Narrative:    Test Reason : R06.02,    Vent. Rate : 105 BPM     Atrial Rate : 105 BPM     P-R Int : 138 ms          QRS Dur : 092 ms      QT Int : 306 ms       P-R-T Axes : 053 -23 063 degrees     QTc Int : 404 ms    Sinus tachycardia  Incomplete right bundle branch block  T wave abnormality, consider inferior ischemia  Abnormal ECG  When compared with ECG of 29-JUN-2019 02:58,  Left anterior fascicular block is no longer Present    Confirmed by GHAZAL OCONNELL MD ([id number]) on 7/22/2019 3:26:12 PM    Referred By: [email or web address]   SELF           Confirmed  By:GHAZAL OCONNELL MD                            Imaging Results           CTA Chest Non-Coronary (PE Study) (Final result)  Result time 07/22/19 16:53:22    Final result by Cyndy Montana MD (07/22/19 16:53:22)                 Impression:      1.  No pulmonary embolism.  No definitive evidence of pulmonary infarction.  No evidence of right heart strain.    2.  Redemonstration of mediastinal and left pleural masses which appear grossly stable from recent prior examinations.  Of note, these masses are not as well visualized/characterized when compared with prior contrast enhanced examination within the levo-phase.  There remains significant compressive atelectasis of the left lung.    3.  Interval placement of an esophageal stent since the most recent CT, the patency of which cannot be determined on today's examination.    4.  Stable multilevel degenerative changes with thoracic spine wedge compression and exaggerated kyphosis.    5.  Additional, stable findings as above.    This report was flagged in Epic as abnormal.    Electronically signed by resident: Zander Esquivel  Date:    07/22/2019  Time:    16:13    Electronically signed by: Cyndy Montana MD  Date:    07/22/2019  Time:    16:53             Narrative:    EXAMINATION:  CTA CHEST NON CORONARY    CLINICAL HISTORY:  dyspnea, cancer, shortness of breath, tachycardia, concern for PE    TECHNIQUE:  Low dose axial images, sagittal and coronal reformations were obtained from the thoracic inlet to the lung bases following the IV administration of 100 mL of Omnipaque 350.  Contrast timing was optimized to evaluate the pulmonary arteries.    COMPARISON:  CT chest abdomen pelvis 06/24/2019, 05/31/2019, PET-CT 03/13/2019, CTA chest 12/06/2018    FINDINGS:  No convincing evidence of abnormality at the base of the neck.  Left chest port seen general venous catheter tip terminating at the superior cavoatrial junction.  No acute abnormality of the extrathoracic soft  tissues.    Left-sided aortic arch with normal branching pattern which maintains normal caliber and contour with mildly tortuous course and minimal calcific atherosclerosis.    Heart is normal in size without significant pericardial fluid.  Mild calcific atherosclerosis seen involving the coronary arteries.  Grossly stable rightward mediastinal shift.  Redemonstration of a large subcarinal mass measuring approximately 7.4 cm on today's examination.  Of note, mass is better characterized on prior contrast enhanced examinations within the levo-phase.    Pulmonary arteries distribute normally.  There is no intraluminal filling defect.  Systemic and pulmonary veno atrial connections are concordant.    Redemonstration of numerous left pleural masses which appear grossly stable from recent prior examinations.  Again, these masses are not as well visualized/characterized when compared with prior contrast enhanced examination is within the levo-phase.  There remains significant compressive atelectasis of the left lung.  No acute consolidation or pneumothorax.    Interval placement of an esophageal stent since most recent CT.  There is no air visualized within the stent us the patency is not known.  Upper abdominal structures appear grossly stable without acute bowel distension, free air, or biliary dilatation.    Stable moderate degenerative changes of the visualized osseous structures with multilevel areas of mild wedge compression in the thoracic spine with associated increase in kyphosis.  No acute fracture or aggressive osseous lesion.                               X-Ray Chest AP Portable (Final result)  Result time 07/22/19 10:48:16    Final result by Dragan Bautista MD (07/22/19 10:48:16)                 Impression:      See above      Electronically signed by: Dragan Bautista MD  Date:    07/22/2019  Time:    10:48             Narrative:    EXAMINATION:  XR CHEST AP PORTABLE    CLINICAL HISTORY:  shortness of  breath;    TECHNIQUE:  Single frontal view of the chest was performed.    COMPARISON:  No 06/29/2019 ne    FINDINGS:  Central venous catheter remain.  Almost complete opacification of the left hemithorax progressed as compared to the previous study.  The right lung is clear.                                 Medical Decision Making:   Initial Assessment:   Pt with lung cancer, recent radiation, recent esophageal stent  She is short of breath and having upper chest pain  While in the ER developed upper chest pain, persistent nausea  Wondering about effectiveness of esophageal stent  Differential Diagnosis:   ACS, PE, pneumonia, pleural effusion, worsening of mediastinal mass  Independently Interpreted Test(s):   I have ordered and independently interpreted X-rays - see prior notes.  I have ordered and independently interpreted EKG Reading(s) - see prior notes  Clinical Tests:   Lab Tests: Ordered and Reviewed  Radiological Study: Ordered and Reviewed  Medical Tests: Ordered and Reviewed  ED Management:  1144  Spoke with [facility] who stated that they would see the patient.     CTA done and no PE but masses and compressive atelectasis  Pt to be admitted              Scribe Attestation:   Scribe #1: I performed the above scribed service and the documentation accurately describes the services I performed. I attest to the accuracy of the note.            ED Course as of Jul 24 2352   Mon Jul 22, 2019   1200 Pt complaining of pain now in her upper chest with nausea  Repeat ekg unremarkable  Did have stent to esophagus recently,pain could be related to this or possible has thrush or esophagitis  Heme onc will come evaluate patient    [GS]      ED Course User Index  [GS] Lisa Cobb MD     Clinical Impression:       ICD-10-CM ICD-9-CM   1. Hypomagnesemia E83.42 275.2   2. Shortness of breath R06.02 786.05   3. Hypokalemia E87.6 276.8   4. Pleural effusion, malignant J91.0 511.81   5. Intractable vomiting with  nausea, unspecified vomiting type R11.2 536.2   6. Intractable pain R52 780.96   7. Stage 4 Squamous cell carcinoma of unknown origin C44.92 199.1   8. Dehydration  E86.0 276.51   9. Tachycardia R00.0 785.0   10. 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

## 2019-07-22 NOTE — PROGRESS NOTES
ATTENDING NOTE, ONCOLOGY INPATIENT TEAM      Patient seen and examined, chart reviewed.  Full note to follow by housestaff  Radiologic studies reviewed with Dr. Au.  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  She does have an esophageal stent.  She presented to the ED complaining of nausea, vomiting, shortness of breath, and and pain      On examination, she appears comfortable, in NAD.  Lungs Reduced air entry bilaterally, with few scattered ronchi.  No wheexing.    Abdomen is soft, obese, nontender.  Labs reviewed.    PLAN  Admit for further evaluation.  Oxycodone 10 mg Q 4 hours prn; we will reassess her pain regimen after 24 hours.  DVT prophylaxis.  Discussed code status; she would like to be DNR.    We will follow.  Her multiple questions were answered to her satisfaction.    Winston Quan MD

## 2019-07-22 NOTE — ED TRIAGE NOTES
Pt presents to ed complaining of midsternal burning chest pain since yesterday non-radiating. Pt reports hx of lung cancer and is on home o2 2L. Pt denies sob. PT reports abdominal pain with nausea/vomiting for past few days. Pt reports last chemo was Thursday.

## 2019-07-22 NOTE — ED NOTES
LOC: The patient is awake, alert, and aware of environment. The patient is oriented x 3 and speaking appropriately.   APPEARANCE: No acute distress noted.   PSYCHOSOCIAL: Patient is calm and cooperative.   SKIN: The skin is warm, dry.   RESPIRATORY: Airway is open and patent. Bilateral chest rise and fall. Respirations are spontaneous, even and unlabored. Normal effort and rate noted. No accessory muscle use noted.  Pt is on 2l home o2.   CARDIAC: Patient has a normal rate and rhythm. Pt reports mid sternal burning non-radiating.   ABDOMEN: Soft and non tender to palpation. No distention noted.   URINARY:  Voids independently.   NEUROLOGIC: Eyes open spontaneously. Speech clear. Tolerating saliva secretions well. Able to follow commands, demonstrating ability to actively and appropriately communicate within context of current conversation. Symmetrical facial muscles. Moving all extremities well. Movement is purposeful.   MUSCULOSKELETAL: No obvious deformities noted.

## 2019-07-23 NOTE — SUBJECTIVE & OBJECTIVE
Interval History: Patient doing well this morning. Is not feeling short of breath. Her chest pain has gotten better. Received one dose of her prn percocet. Nausea is doing better, but still has not eaten    Oncology Treatment Plan:   OP ATEZOLIZUMAB Q3W    Medications:  Continuous Infusions:  Scheduled Meds:   cetirizine  5 mg Oral Daily    enoxaparin  40 mg Subcutaneous Daily    gabapentin  300 mg Oral QHS    metoprolol succinate  50 mg Oral Daily    pantoprazole  40 mg Oral Daily    senna-docusate 8.6-50 mg  1 tablet Oral BID     PRN Meds:Dextrose 10% Bolus, Dextrose 10% Bolus, glucagon (human recombinant), glucose, glucose, metoclopramide HCl, ondansetron, oxyCODONE-acetaminophen, sodium chloride 0.9%, zolpidem     Review of Systems  Objective:     Vital Signs (Most Recent):  Temp: 98 °F (36.7 °C) (07/23/19 0404)  Pulse: 108 (07/23/19 0404)  Resp: 18 (07/23/19 0404)  BP: 115/62 (07/23/19 0404)  SpO2: 100 % (07/23/19 0404) Vital Signs (24h Range):  Temp:  [97.6 °F (36.4 °C)-98.9 °F (37.2 °C)] 98 °F (36.7 °C)  Pulse:  [] 108  Resp:  [16-39] 18  SpO2:  [94 %-100 %] 100 %  BP: ()/(57-78) 115/62     Weight: 85.5 kg (188 lb 7.9 oz)  Body mass index is 31.37 kg/m².  Body surface area is 1.98 meters squared.      Intake/Output Summary (Last 24 hours) at 7/23/2019 0736  Last data filed at 7/23/2019 0500  Gross per 24 hour   Intake 1610 ml   Output 250 ml   Net 1360 ml       Physical Exam   Constitutional: She is oriented to person, place, and time. She appears well-developed and well-nourished.   HENT:   Head: Normocephalic and atraumatic.   Eyes: Conjunctivae and EOM are normal.   Neck: Normal range of motion. No tracheal deviation present.   Cardiovascular: Normal rate and regular rhythm. Exam reveals no gallop and no friction rub.   No murmur heard.  Pulmonary/Chest: Effort normal. No respiratory distress.   No breath sounds on the Left lung   Abdominal: Soft. She exhibits no distension. There is no  tenderness. There is no rebound and no guarding.   Musculoskeletal: Normal range of motion. She exhibits no edema.   Neurological: She is alert and oriented to person, place, and time. No cranial nerve deficit.   Skin: Skin is warm and dry.   Psychiatric: She has a normal mood and affect. Her behavior is normal.   Vitals reviewed.      Significant Labs:   Recent Results (from the past 24 hour(s))   Brain natriuretic peptide    Collection Time: 07/22/19 10:17 AM   Result Value Ref Range    BNP 58 0 - 99 pg/mL   CBC auto differential    Collection Time: 07/22/19 10:17 AM   Result Value Ref Range    WBC 4.47 3.90 - 12.70 K/uL    RBC 3.47 (L) 4.00 - 5.40 M/uL    Hemoglobin 9.4 (L) 12.0 - 16.0 g/dL    Hematocrit 32.4 (L) 37.0 - 48.5 %    Mean Corpuscular Volume 93 82 - 98 fL    Mean Corpuscular Hemoglobin 27.1 27.0 - 31.0 pg    Mean Corpuscular Hemoglobin Conc 29.0 (L) 32.0 - 36.0 g/dL    RDW 15.2 (H) 11.5 - 14.5 %    Platelets 216 150 - 350 K/uL    MPV 10.5 9.2 - 12.9 fL    Immature Granulocytes 1.3 (H) 0.0 - 0.5 %    Gran # (ANC) 3.6 1.8 - 7.7 K/uL    Immature Grans (Abs) 0.06 (H) 0.00 - 0.04 K/uL    Lymph # 0.4 (L) 1.0 - 4.8 K/uL    Mono # 0.4 0.3 - 1.0 K/uL    Eos # 0.1 0.0 - 0.5 K/uL    Baso # 0.01 0.00 - 0.20 K/uL    nRBC 0 0 /100 WBC    Gran% 81.3 (H) 38.0 - 73.0 %    Lymph% 8.1 (L) 18.0 - 48.0 %    Mono% 7.8 4.0 - 15.0 %    Eosinophil% 1.3 0.0 - 8.0 %    Basophil% 0.2 0.0 - 1.9 %    Differential Method Automated    Comprehensive metabolic panel    Collection Time: 07/22/19 10:17 AM   Result Value Ref Range    Sodium 138 136 - 145 mmol/L    Potassium 2.8 (L) 3.5 - 5.1 mmol/L    Chloride 86 (L) 95 - 110 mmol/L    CO2 35 (H) 23 - 29 mmol/L    Glucose 95 70 - 110 mg/dL    BUN, Bld 8 8 - 23 mg/dL    Creatinine 0.7 0.5 - 1.4 mg/dL    Calcium 9.6 8.7 - 10.5 mg/dL    Total Protein 8.3 6.0 - 8.4 g/dL    Albumin 2.5 (L) 3.5 - 5.2 g/dL    Total Bilirubin 0.3 0.1 - 1.0 mg/dL    Alkaline Phosphatase 75 55 - 135 U/L    AST 14  10 - 40 U/L    ALT 7 (L) 10 - 44 U/L    Anion Gap 17 (H) 8 - 16 mmol/L    eGFR if African American >60.0 >60 mL/min/1.73 m^2    eGFR if non African American >60.0 >60 mL/min/1.73 m^2   Blood culture #1 **CANNOT BE ORDERED STAT**    Collection Time: 07/22/19 10:17 AM   Result Value Ref Range    Blood Culture, Routine No Growth to date    Blood culture #2 **CANNOT BE ORDERED STAT**    Collection Time: 07/22/19 10:17 AM   Result Value Ref Range    Blood Culture, Routine No Growth to date    Magnesium    Collection Time: 07/22/19 10:17 AM   Result Value Ref Range    Magnesium 1.1 (L) 1.6 - 2.6 mg/dL   Lipase    Collection Time: 07/22/19 10:17 AM   Result Value Ref Range    Lipase 18 4 - 60 U/L   Troponin I    Collection Time: 07/22/19 10:17 AM   Result Value Ref Range    Troponin I <0.006 0.000 - 0.026 ng/mL   Lactic acid, plasma    Collection Time: 07/22/19 10:39 AM   Result Value Ref Range    Lactate (Lactic Acid) 0.9 0.5 - 2.2 mmol/L   POCT glucose    Collection Time: 07/23/19 12:56 AM   Result Value Ref Range    POCT Glucose 106 70 - 110 mg/dL   Comprehensive Metabolic Panel (CMP)    Collection Time: 07/23/19  3:17 AM   Result Value Ref Range    Sodium 143 136 - 145 mmol/L    Potassium 3.3 (L) 3.5 - 5.1 mmol/L    Chloride 95 95 - 110 mmol/L    CO2 37 (H) 23 - 29 mmol/L    Glucose 95 70 - 110 mg/dL    BUN, Bld 5 (L) 8 - 23 mg/dL    Creatinine 0.7 0.5 - 1.4 mg/dL    Calcium 8.4 (L) 8.7 - 10.5 mg/dL    Total Protein 6.5 6.0 - 8.4 g/dL    Albumin 2.0 (L) 3.5 - 5.2 g/dL    Total Bilirubin 0.2 0.1 - 1.0 mg/dL    Alkaline Phosphatase 67 55 - 135 U/L    AST 13 10 - 40 U/L    ALT 6 (L) 10 - 44 U/L    Anion Gap 11 8 - 16 mmol/L    eGFR if African American >60.0 >60 mL/min/1.73 m^2    eGFR if non African American >60.0 >60 mL/min/1.73 m^2   Magnesium    Collection Time: 07/23/19  3:17 AM   Result Value Ref Range    Magnesium 1.5 (L) 1.6 - 2.6 mg/dL   Phosphorus    Collection Time: 07/23/19  3:17 AM   Result Value Ref Range     Phosphorus 2.6 (L) 2.7 - 4.5 mg/dL   CBC auto differential    Collection Time: 07/23/19  3:17 AM   Result Value Ref Range    WBC 4.73 3.90 - 12.70 K/uL    RBC 2.65 (L) 4.00 - 5.40 M/uL    Hemoglobin 7.1 (L) 12.0 - 16.0 g/dL    Hematocrit 24.6 (L) 37.0 - 48.5 %    Mean Corpuscular Volume 93 82 - 98 fL    Mean Corpuscular Hemoglobin 26.8 (L) 27.0 - 31.0 pg    Mean Corpuscular Hemoglobin Conc 28.9 (L) 32.0 - 36.0 g/dL    RDW 15.4 (H) 11.5 - 14.5 %    Platelets 168 150 - 350 K/uL    MPV 11.4 9.2 - 12.9 fL    Immature Granulocytes 1.5 (H) 0.0 - 0.5 %    Gran # (ANC) 3.5 1.8 - 7.7 K/uL    Immature Grans (Abs) 0.07 (H) 0.00 - 0.04 K/uL    Lymph # 0.5 (L) 1.0 - 4.8 K/uL    Mono # 0.6 0.3 - 1.0 K/uL    Eos # 0.1 0.0 - 0.5 K/uL    Baso # 0.02 0.00 - 0.20 K/uL    nRBC 0 0 /100 WBC    Gran% 74.2 (H) 38.0 - 73.0 %    Lymph% 9.9 (L) 18.0 - 48.0 %    Mono% 12.7 4.0 - 15.0 %    Eosinophil% 1.3 0.0 - 8.0 %    Basophil% 0.4 0.0 - 1.9 %    Differential Method Automated    POCT glucose    Collection Time: 07/23/19  6:26 AM   Result Value Ref Range    POCT Glucose 109 70 - 110 mg/dL   Urinalysis, Reflex to Urine Culture Urine, Clean Catch    Collection Time: 07/23/19  6:31 AM   Result Value Ref Range    Specimen UA Urine, Clean Catch     Color, UA Yellow Yellow, Straw, Kym    Appearance, UA Cloudy (A) Clear    pH, UA 6.0 5.0 - 8.0    Specific Gravity, UA >1.030 (A) 1.005 - 1.030    Protein, UA 1+ (A) Negative    Glucose, UA Negative Negative    Ketones, UA 1+ (A) Negative    Bilirubin (UA) Negative Negative    Occult Blood UA 1+ (A) Negative    Nitrite, UA Negative Negative    Leukocytes, UA Trace (A) Negative   Urinalysis Microscopic    Collection Time: 07/23/19  6:31 AM   Result Value Ref Range    RBC, UA 5 (H) 0 - 4 /hpf    WBC, UA 8 (H) 0 - 5 /hpf    Bacteria Few (A) None-Occ /hpf    Squam Epithel, UA 5 /hpf    Hyaline Casts, UA 0 0-1/lpf /lpf    Uric Acid Shirlene, UA Rare None-Moderate    Microscopic Comment SEE COMMENT         Diagnostic Results:  I have reviewed all pertinent imaging results/findings within the past 24 hours.

## 2019-07-23 NOTE — PLAN OF CARE
MDRs completed with Dr. Rodriguez and the team. Patient of Dr. Wong with a history of squamous cell carcinoma (unknown primary) with mets to the lung and mediastinal masses s/p 6 cycles of palliative carboplatin/paclitaxel. Patient admitted on 7/22/19 for c/o SOB and pain. Patient completed 10 out of 10 radiation treatments today. VSS. Patient is currently afebrile. O2 sats % on 2L O2 per nasal cannula. Patient is on 2L O2 per nasal cannula at home. On admit Hgb 9.4, WBC 4.47, Plt 216, K 2.8, Mg 1.1. Today Hgb 7.1 (dropped), WBC 4.73, Plt 168, K 3.3, Phos 2.6, Mg 1.5. MD to check stool for occult blood. H/H to be repeated every 12 hours. MD discontinued Lovenox. MD replacing electrolytes as needed. Pain control with oxycodone IR 10 mg tablet every 4 hours PRN. MD discussed the plan of care with the patient. Discharge needs to be determined. CM to continue to follow with the team.    Future Appointments   Date Time Provider Department Center   8/20/2019  9:00 AM Ganga Krause MD [facility] -      Marybel Solares, RN, BSN, CM  [facility]  Nurse - Med Onc/Gyn Onc

## 2019-07-23 NOTE — H&P
[facility]  Hematology/Oncology  H&P    Patient Name: Silvia Capellan  MRN: [id number]  Admission Date: 7/22/2019  Code Status: DNR   Attending Provider: Winston Cobb DO  Primary Care Physician: Ganga Krause MD  Principal Problem:<principal problem not specified>    Subjective:     HPI: Ms Capellan is a 74 yo F w PMHx significant for rheumatoid arthritis, HTN, and squamous cell carcinoma (unknown primary) resulting in lung and mediastinal masses s/p six cycles of palliative carboplatin/paclitaxel who presents with a chief complaint of shortness of breath. Patient states it started about a month but it has progressively gotten worse. Yesterday she couldn't sleep and was very uncofmortable. She does well at rest but walking is what brings it on. She is on 2l of oxygen at home. Doesn't get worse laying down. She also has this chest pain on her right side that moves around. She says it is similar to her acid reflux pain. She has also had poor PO intake for over a month. Had a esophageal stent placed about 2 weeks ago to help, but is still having poor intake. Complains of nausea, poor appetite and vomitting. She has lost over 50 pounds in the past few months. She has not had chemo in over a year but is currently undergoing radiation therapy.     In the ED, patient was found to have low potassium and Magnesium. They started repleting those. She was having sats in the lower 80s off of oxygen but with 2L she was in the 90s. ED got a ct scan and it showed no PE.   Oncology History per Dr Wong's most recent clinic note (6/7):     Diagnosis: Squamous cell carcinoma , primary site unknown    Molecular/genetic testing: p16 negative; PD L1 could not be run due to inadequate tissue   Stage:        Stage 4   Treatment: Carboplatin/paclitaxel x  6 cycles (2/16/18 - 6/8/18)     HPI:  is a 73 y/o female who underwent bronchoscopy/EBUS evaluation on 8/31/17 for abnormal mediastinal adenopathy   and a 1.2 cm MORRO mass.     Bronchoscopy findings:    The oropharynx appears normal. The larynx appears normal. The vocal cords appear normal. The subglottic space is normal. The trachea is of normal caliber. The otf is sharp. The tracheobronchial tree was examined to at least the first subsegmental level. Bronchial mucosa and anatomy are normal; there are no endobronchial lesions, and no secretions.    Lymph Nodes: Lymph node sizing was performed via endobronchial ultrasound for suspected lung cancer. Sampling by transbronchial needle aspiration was also performed using an Olympus EBUS-TBNA 22  gauge needle in the subcarinal mediastinum (level 7) and sent for routine cytology.       - The 7 (subcarinal) node was 30 mm by EBUS. Three samples with the needle were obtained. The patient's condition was unchanged after the intervention.      Her treatment got delayed as she cancelled several appointments due to some issues at home. She started Caroplatin/paclitaxel palliatively. She had left thoracentesis on 2/9/18. There were atypical cells in the pleural fluid, highly suspicious for malignancy.  She completed cycles of Carboplatin/Paclitaxel, last treatment on 6/8/18.     She had repeat left pleural mass FNA in [facility] 2019. It was negative for malignancy, but showed lympho plasmacytic infiltration.      PET CT on 3/13/19 showed increased  hypermetabolism of the left pleural effusion.Thoracic vertebral body hypermetabolism was noted, new from the prior exam (compared with PET from June 2018) without underlying CT changes which may represent red marrow hyperplasia or new site of neoplastic disease.  There was new hypermetabolism in the right L5-S1 facet joints which appeared to be centered at the facet joint not within the bone and is favored to represent degenerative change or neoplasm.  She has been non -compliant with her appointments here.        Interval history: She is here for a follow up visit. She has  fatigue, dyspnea. She has intermittent nausea from the past 2 weeks, with emesis. She was evaluated She has worsening dyspnea. No weight loss. No headache.   She was evaluated on 5/8/19 for nausea and abdominal pain at ER and received IV fluids. On 5/13/19 she was again in the ER , this time for dyspnea. She was evaluated with chest X ray and thoracentesis was attempted, but the fluid volume was very little and was not done.  She has no constipation or diarrhea. No fever.   She was in ER again on 6/3/19 for nausea and emesis. She was started on reglan. She was given iv fluids. She has persistent nausea and emesis.     Oncology Treatment Plan:   OP ATEZOLIZUMAB Q3W    Medications:  Continuous Infusions:  Scheduled Meds:   [START ON 7/23/2019] cetirizine  5 mg Oral Daily    enoxaparin  40 mg Subcutaneous Daily    gabapentin  300 mg Oral QHS    [START ON 7/23/2019] metoprolol succinate  50 mg Oral Daily    [START ON 7/23/2019] pantoprazole  40 mg Oral Daily    senna-docusate 8.6-50 mg  1 tablet Oral BID     PRN Meds:Dextrose 10% Bolus, Dextrose 10% Bolus, glucagon (human recombinant), glucose, glucose, metoclopramide HCl, ondansetron, oxyCODONE-acetaminophen, promethazine, sodium chloride 0.9%, zolpidem     Review of patient's allergies indicates:   Allergen Reactions    Latex, natural rubber Rash    Codeine Hallucinations    Cortisporin [neomycin-bacitracin-poly-hc] Rash    Latex, natural rubber Rash        Past Medical History:   Diagnosis Date    Chronic obstructive pulmonary disease 5/14/2019    Encounter for blood transfusion     GERD (gastroesophageal reflux disease)     HTN (hypertension)     Rheumatoid arthritis     Rheumatoid arthritis(714.0)     Squamous cell lung cancer, left 2/15/2018     Past Surgical History:   Procedure Laterality Date    APPENDECTOMY      Nkljon-yiaivy-ld--lung N/A 1/4/2019    Performed by [facility] OR 2ND FLR    BRONCHOSCOPY N/A 8/28/2017     Performed by [facility] OR 2ND FLR    CATARACT EXTRACTION Bilateral 2018    Dr. Keith     COLONOSCOPY      COLONOSCOPY N/A 10/30/2017    Performed by Quentin Coppola MD at [facility] ENDO (4TH FLR)    COLONOSCOPY N/A 1/17/2014    Per[facility] at [facility] ENDO    EGD (ESOPHAGOGASTRODUODENOSCOPY) WITH FLUOROSCOPY N/A 6/25/2019    Performed by Aime Huff MD at [facility] ENDO (2ND FLR)    ESOPHAGOGASTRODUODENOSCOPY (EGD) N/A 10/30/2017    Performed by Quentin Coppola MD at [facility] ENDO (4TH FLR)    FOOT SURGERY Left     HYSTERECTOMY      INSERTION, IOL PROSTHESIS Left 11/8/2018    Performed by Susan Keith MD at [facility] OR 1ST FLR    INSERTION, IOL PROSTHESIS Right 10/18/2018    Performed by Susan Keith MD at [facility] OR 1ST FLR    INSERTION-PORT N/A 11/14/2017    Performed by [facility] Diagnostic Provider at [facility] OR 2ND FLR    PHACOEMULSIFICATION, CATARACT Left 11/8/2018    Performed by Susan Keith MD at [facility] OR 1ST FLR    PHACOEMULSIFICATION, CATARACT Right 10/18/2018    Performed by Susan Keith MD at [facility] OR 1ST FLR    SKIN BIOPSY      TOTAL KNEE ARTHROPLASTY      right     Family History     Problem Relation (Age of Onset)    Glaucoma Mother, Sister, Brother    Hypertension     Kidney disease     No Known Problems Father, Maternal Aunt, Maternal Uncle, Paternal Aunt, Paternal Uncle, Maternal Grandmother, Maternal Grandfather, Paternal Grandmother, Paternal Grandfather        Tobacco Use    Smoking status: Former Smoker    Smokeless tobacco: Never Used   Substance and Sexual Activity    Alcohol use: Not Currently     Comment: Occasionally    Drug use: No    Sexual activity: Not Currently       Review of Systems   Constitutional: Negative for activity change, appetite change, chills, fatigue, fever and unexpected weight change.   HENT: Negative for congestion and ear pain.    Eyes: Negative for pain and visual disturbance.   Respiratory: Positive for  shortness of breath. Negative for cough and wheezing.    Cardiovascular: Positive for chest pain. Negative for palpitations.   Gastrointestinal: Positive for nausea and vomiting. Negative for abdominal pain.   Endocrine: Negative for cold intolerance and heat intolerance.   Genitourinary: Negative for difficulty urinating and hematuria.   Musculoskeletal: Negative for arthralgias, joint swelling and neck stiffness.   Skin: Negative for rash and wound.   Allergic/Immunologic: Negative for food allergies.   Neurological: Negative for seizures, weakness, light-headedness, numbness and headaches.   Hematological: Negative for adenopathy.   Psychiatric/Behavioral: Negative for agitation, confusion and hallucinations. The patient is not nervous/anxious.      Objective:     Vital Signs (Most Recent):  Temp: 97.6 °F (36.4 °C) (07/22/19 0955)  Pulse: 98 (07/22/19 1820)  Resp: 16 (07/22/19 1820)  BP: (!) 101/57 (07/22/19 1820)  SpO2: 96 % (07/22/19 1820) Vital Signs (24h Range):  Temp:  [97.6 °F (36.4 °C)] 97.6 °F (36.4 °C)  Pulse:  [] 98  Resp:  [16-39] 16  SpO2:  [96 %-100 %] 96 %  BP: (101-133)/(57-78) 101/57     Weight: 89.8 kg (198 lb)  Body mass index is 31.01 kg/m².  Body surface area is 2.06 meters squared.      Intake/Output Summary (Last 24 hours) at 7/22/2019 1911  Last data filed at 7/22/2019 1638  Gross per 24 hour   Intake 1250 ml   Output --   Net 1250 ml       Physical Exam   Constitutional: She is oriented to person, place, and time. She appears well-developed and well-nourished.   HENT:   Head: Normocephalic and atraumatic.   Eyes: Conjunctivae and EOM are normal.   Neck: Normal range of motion. No tracheal deviation present.   Cardiovascular: Normal rate and regular rhythm. Exam reveals no gallop and no friction rub.   No murmur heard.  Pulmonary/Chest: Effort normal. No respiratory distress.   No breath sounds on the Left lung   Abdominal: Soft. She exhibits no distension. There is no tenderness.  There is no rebound and no guarding.   Musculoskeletal: Normal range of motion. She exhibits no edema.   Neurological: She is alert and oriented to person, place, and time. No cranial nerve deficit.   Skin: Skin is warm and dry.   Psychiatric: She has a normal mood and affect. Her behavior is normal.   Vitals reviewed.      Significant Labs:   Recent Results (from the past 48 hour(s))   Brain natriuretic peptide    Collection Time: 07/22/19 10:17 AM   Result Value Ref Range    BNP 58 0 - 99 pg/mL   CBC auto differential    Collection Time: 07/22/19 10:17 AM   Result Value Ref Range    WBC 4.47 3.90 - 12.70 K/uL    RBC 3.47 (L) 4.00 - 5.40 M/uL    Hemoglobin 9.4 (L) 12.0 - 16.0 g/dL    Hematocrit 32.4 (L) 37.0 - 48.5 %    Mean Corpuscular Volume 93 82 - 98 fL    Mean Corpuscular Hemoglobin 27.1 27.0 - 31.0 pg    Mean Corpuscular Hemoglobin Conc 29.0 (L) 32.0 - 36.0 g/dL    RDW 15.2 (H) 11.5 - 14.5 %    Platelets 216 150 - 350 K/uL    MPV 10.5 9.2 - 12.9 fL    Immature Granulocytes 1.3 (H) 0.0 - 0.5 %    Gran # (ANC) 3.6 1.8 - 7.7 K/uL    Immature Grans (Abs) 0.06 (H) 0.00 - 0.04 K/uL    Lymph # 0.4 (L) 1.0 - 4.8 K/uL    Mono # 0.4 0.3 - 1.0 K/uL    Eos # 0.1 0.0 - 0.5 K/uL    Baso # 0.01 0.00 - 0.20 K/uL    nRBC 0 0 /100 WBC    Gran% 81.3 (H) 38.0 - 73.0 %    Lymph% 8.1 (L) 18.0 - 48.0 %    Mono% 7.8 4.0 - 15.0 %    Eosinophil% 1.3 0.0 - 8.0 %    Basophil% 0.2 0.0 - 1.9 %    Differential Method Automated    Comprehensive metabolic panel    Collection Time: 07/22/19 10:17 AM   Result Value Ref Range    Sodium 138 136 - 145 mmol/L    Potassium 2.8 (L) 3.5 - 5.1 mmol/L    Chloride 86 (L) 95 - 110 mmol/L    CO2 35 (H) 23 - 29 mmol/L    Glucose 95 70 - 110 mg/dL    BUN, Bld 8 8 - 23 mg/dL    Creatinine 0.7 0.5 - 1.4 mg/dL    Calcium 9.6 8.7 - 10.5 mg/dL    Total Protein 8.3 6.0 - 8.4 g/dL    Albumin 2.5 (L) 3.5 - 5.2 g/dL    Total Bilirubin 0.3 0.1 - 1.0 mg/dL    Alkaline Phosphatase 75 55 - 135 U/L    AST 14 10 - 40 U/L     ALT 7 (L) 10 - 44 U/L    Anion Gap 17 (H) 8 - 16 mmol/L    eGFR if African American >60.0 >60 mL/min/1.73 m^2    eGFR if non African American >60.0 >60 mL/min/1.73 m^2   Blood culture #1 **CANNOT BE ORDERED STAT**    Collection Time: 07/22/19 10:17 AM   Result Value Ref Range    Blood Culture, Routine No Growth to date    Blood culture #2 **CANNOT BE ORDERED STAT**    Collection Time: 07/22/19 10:17 AM   Result Value Ref Range    Blood Culture, Routine No Growth to date    Magnesium    Collection Time: 07/22/19 10:17 AM   Result Value Ref Range    Magnesium 1.1 (L) 1.6 - 2.6 mg/dL   Lipase    Collection Time: 07/22/19 10:17 AM   Result Value Ref Range    Lipase 18 4 - 60 U/L   Troponin I    Collection Time: 07/22/19 10:17 AM   Result Value Ref Range    Troponin I <0.006 0.000 - 0.026 ng/mL   Lactic acid, plasma    Collection Time: 07/22/19 10:39 AM   Result Value Ref Range    Lactate (Lactic Acid) 0.9 0.5 - 2.2 mmol/L   {    Diagnostic Results:  CT scan showed mass in the left lung consistent with previous scans. Possibly a pleural effusion    Assessment/Plan:     Shortness of breath  Ms Capellan is a 74 yo F w PMHx significant for rheumatoid arthritis, HTN, and squamous cell carcinoma (unknown primary) resulting in lung and mediastinal masses s/p six cycles of palliative carboplatin/paclitaxel who presents with a chief complaint of shortness of breath. CT scan showed opacification of the left lung that is thought to be cancer  There maybe a small pleural effusion but not thought to be tapable at this time. . Patient is sating well on 2L of oxygen. After more discussion with the patient seems like the problem is with her pain regimen. She just feels uncomfortable taking breaths.     - Continue Nasal canula oxygen as needed  - Q4 PRN Percocet       Nausea  -Zofran first choice PRN  -Promethazine second choice PRN  -Metclopramide also available     Hypomagnesemia  Magnesium 1.1 on admission. Received 2g in  the ER    -Replete as necessary     Hypokalemia  K+ 2.8 on admission. Received 60meq in the ED    -Replete K as necessary     Secondary and unspecified malignant neoplasm of intrathoracic lymph nodes  Pt of Dr Wong with history of squamous cell carcinoma with unknown primary. As such, she is to be treated as metastatic SCC. She completed cycles of Carboplatin/Paclitaxel, last treatment on 6/8/18. Currently receiving radiation therapy     Reflux esophagitis  Patient is complaining of some chest pain which is most likely due to her reflux.    -Protonix 40 mg daily    Essential hypertension  -Metoprolol (torprol-xl) 50mg oral daily     Insomnia, unspecified  -Ambien 5mg PRN     Non-seasonal Rhinitis  -Cetirizine 5mg        Angelito Singer MD  Hematology/Oncology  [facility]            ATTENDING NOTE, ONCOLOGY INPATIENT TEAM    As above; please refer to my note of same day for our assessment and plan    Winston Quan MD

## 2019-07-23 NOTE — PROGRESS NOTES
07/23/19 0752   Vital Signs   Temp 98 °F (36.7 °C)   Temp src Oral   Pulse 103   Heart Rate Source Monitor   Resp 17   SpO2 98 %   Pulse Oximetry Type Intermittent   Flow (L/min) 3   O2 Device (Oxygen Therapy) nasal cannula   BP (!) 93/51   MAP (mmHg) 69   BP Location Right arm   BP Method Automatic   Patient Position Lying     Notified Dr. Way of tachycardia and hypotension. Will continue to monitor.

## 2019-07-23 NOTE — PROGRESS NOTES
Admit Assessment    Patient Identification  Silvia Capellan   :  1945  Admit Date:  2019  Attending Provider:  Winston Quan MD              Referral:   Pt has a diagnosis of Squamous Cell Carcinoma - Unknown Primary , and was admitted this hospital stay due to Shortness of breath [R06.02]  Hypokalemia [E87.6]  Hypomagnesemia [E83.42]  Pleural effusion, malignant [J91.0]  Intractable pain [R52]  Intractable vomiting with nausea, unspecified vomiting type [R11.2].  Oncology social worker is involved for psychosocial services.  Patient presents as a 73 y.o. year old  female.    Persons interviewed: Patient    Living Situation:     Currently stays with her daughter, Joanie Belle at  [address] [place], [place] [address], phone: [phone] (home).      (RETIRED) Functional Status Prior  Ambulation Prior: 1-->assistive equipment  Transferrin-->assistive equipment  Toiletin-->assistive equipment    Current or Past Agencies and Description of Services/Supplies    DME  Agency Name: [facility]  Agency Phone Number: [phone]   (Cane, Walker and Oxygen)      Home Health  Agency Name: [facility]   Agency Phone Number: [phone]  Services: Skilled Nursing (PT/OT discharged her one week ago)    IV Infusion  None    Nutrition: Oral    Outpatient Pharmacy:     [facility]/pharmacy #[id number] - [place] - [address] [place] [address]  Phone: [phone] Fax: [phone]      Patient Preference of agencies include: As stated above    Patient/Caregiver informed of right to choose providers or agencies.  Patient provides permission to release any necessary information to [facility] and to Non[facility] agencies as needed to facilitate patient care, treatment planning, and patient discharge planning.  Written and verbal resources provided.               Adjustment to Diagnosis and Treatment  The patient is currently staying with her  daughter. She has very good support from her as well as from her two sons living in town and a son living out of state. She stated that she was feeling much better today. She was hoping that she could have an aide to assist her with bathing at time of her discharge home.             History/Current Symptoms of Anxiety/Depression: No  History/Current Substance Use:   Social History     Tobacco Use    Smoking status: Former Smoker    Smokeless tobacco: Never Used   Substance and Sexual Activity    Alcohol use: Not Currently     Comment: Occasionally    Drug use: No    Sexual activity: Not Currently       Indications of Abuse/Neglect: No    Financial:  Payor/Plan Subscr  Sex Relation Sub. Ins. ID Effective Group Num   1. [employer]* GAYE CARRERO* 1945 Female  [id number]                                   [address]                            Other identified concerns/needs: None at present    Plan:    Interventions/Referrals: Re-offered services to the patient and will follow for supportive counseling and discharge arrangements as needed. The patient has contact information.   Patient/caregiver engaged in treatment planning process.     providing psychosocial and supportive counseling, resources, education, assistance and discharge planning as appropriate.  Patient/caregiver state understanding of  available resources,  following, remains available.

## 2019-07-23 NOTE — CONSULTS
This patient is on the Medical Oncology service, this note was written to answer the consult order.    Laxmi Way MD  Hematology/Oncology Fellow, PGY V  [facility]

## 2019-07-23 NOTE — PLAN OF CARE
Problem: Adult Inpatient Plan of Care  Goal: Plan of Care Review  Patient AAOX4. Admitted last night due to N/V, chest pain and shortness of breath. Patient had no complaints of nausea or vomiting or shortness or breath. No complaints of chest pain. Patient had complaints of generalized pain. PRN pain medication provided. Patient requires 2L O2 via NC and wears home O2. Patient's HR is slightly tachycardic. Other vital signs stabilized. Patient afebrile. Will continue to monitor.

## 2019-07-23 NOTE — PROGRESS NOTES
07/23/19 1511   Vital Signs   Temp 98.3 °F (36.8 °C)   Temp src Oral   Pulse 108   Heart Rate Source Monitor   Resp (!) 40   SpO2 (!) 94 %   BP (!) 102/58   MAP (mmHg) 75   BP Location Right arm   Patient Position Lying     MD called to bedside about tachypnea and SOB. Will continue to monitor.

## 2019-07-23 NOTE — PLAN OF CARE
Problem: Adult Inpatient Plan of Care  Goal: Plan of Care Review  Outcome: Ongoing (interventions implemented as appropriate)  POC reviewed with patient; understanding verbalized. Pt hypotensive, tachycardic, and tachypnic this shift. She C/O of right lateral chest pain and has received PRN Oxy 10mg when available. MD notified of low urinary output; bladder scan done. Occult stool outstanding. Radiation complete this shift. Potassium, Magnesium, and NaPhos replaced. Pt C/O intermittent nausea and received PRN Zofran x1. She remains a one person assist. Regular diet with decreased appetite. Accuchecks done q6.  Pt. with nonskid footwear on, bed in lowest position, and locked with bed rails up x2.  Pt. instructed to call prior to getting OOB.  Pt. has call light and personal items within reach. All questions and concerns addressed at this time. Will continue to monitor.

## 2019-07-23 NOTE — HPI
Ms Capellan is a 74 yo F w PMHx significant for rheumatoid arthritis, HTN, and squamous cell carcinoma (unknown primary) resulting in lung and mediastinal masses s/p six cycles of palliative carboplatin/paclitaxel who presents with a chief complaint of shortness of breath. Patient states it started about a month but it has progressively gotten worse. Yesterday she couldn't sleep and was very uncofmortable. She does well at rest but walking is what brings it on. She is on 2l of oxygen at home. Doesn't get worse laying down. She also has this chest pain on her right side that moves around. She says it is similar to her acid reflux pain. She has also had poor PO intake for over a month. Had a esophageal stent placed about 2 weeks ago to help, but is still having poor intake. Complains of nausea, poor appetite and vomitting. She has lost over 50 pounds in the past few months. She has not had chemo in over a year but is currently undergoing radiation therapy.     In the ED, patient was found to have low potassium and Magnesium. They started repleting those. She was having sats in the lower 80s off of oxygen but with 2L she was in the 90s. ED got a ct scan and it showed no PE.     Oncology History per Dr Wong's most recent clinic note (6/7):     Diagnosis: Squamous cell carcinoma , primary site unknown    Molecular/genetic testing: p16 negative; PD L1 could not be run due to inadequate tissue   Stage:        Stage 4   Treatment: Carboplatin/paclitaxel x  6 cycles (2/16/18 - 6/8/18)  Bronchoscopy/EBUS evaluation on 8/31/17 for abnormal mediastinal adenopathy  and a 1.2 cm MORRO mass.  - started Caroplatin/paclitaxel palliatively. She had left thoracentesis on 2/9/18. There were atypical cells in the pleural fluid, highly suspicious for malignancy.  - She completed cycles of Carboplatin/Paclitaxel, last treatment on 6/8/18.  - She had repeat left pleural mass FNA in [facility] 2019. It was negative for malignancy, but showed  lympho plasmacytic infiltration.   - PET CT on 3/13/19 showed increased  hypermetabolism of the left pleural effusion.Thoracic vertebral body, L5-S1 hypermetabolism which was possible metastases

## 2019-07-23 NOTE — SUBJECTIVE & OBJECTIVE
Oncology Treatment Plan:   OP ATEZOLIZUMAB Q3W    Medications:  Continuous Infusions:  Scheduled Meds:   [START ON 7/23/2019] cetirizine  5 mg Oral Daily    enoxaparin  40 mg Subcutaneous Daily    gabapentin  300 mg Oral QHS    [START ON 7/23/2019] metoprolol succinate  50 mg Oral Daily    [START ON 7/23/2019] pantoprazole  40 mg Oral Daily    senna-docusate 8.6-50 mg  1 tablet Oral BID     PRN Meds:Dextrose 10% Bolus, Dextrose 10% Bolus, glucagon (human recombinant), glucose, glucose, metoclopramide HCl, ondansetron, oxyCODONE-acetaminophen, promethazine, sodium chloride 0.9%, zolpidem     Review of patient's allergies indicates:   Allergen Reactions    Latex, natural rubber Rash    Codeine Hallucinations    Cortisporin [neomycin-bacitracin-poly-hc] Rash    Latex, natural rubber Rash        Past Medical History:   Diagnosis Date    Chronic obstructive pulmonary disease 5/14/2019    Encounter for blood transfusion     GERD (gastroesophageal reflux disease)     HTN (hypertension)     Rheumatoid arthritis     Rheumatoid arthritis(714.0)     Squamous cell lung cancer, left 2/15/2018     Past Surgical History:   Procedure Laterality Date    APPENDECTOMY      Gtesza-gksybo-nf--lung N/A 1/4/2019    Performed by [facility] Diagnostic Provider at [facility] OR 2ND FLR    BRONCHOSCOPY N/A 8/28/2017    Performed by [facility] Provider at [facility] OR 2ND FLR    CATARACT EXTRACTION Bilateral 2018    Dr. Keith     COLONOSCOPY      COLONOSCOPY N/A 10/30/2017    Performed by Quentin Coppola MD at [facility] ENDO (4TH FLR)    COLONOSCOPY N/A 1/17/2014    Performed by [facility] at [facility] ENDO    EGD (ESOPHAGOGASTRODUODENOSCOPY) WITH FLUOROSCOPY N/A 6/25/2019    Performed by Aime Huff MD at [facility] ENDO (2ND FLR)    ESOPHAGOGASTRODUODENOSCOPY (EGD) N/A 10/30/2017    Performed by Quentin Co[facility]ola MD at [facility] ENDO (4TH FLR)    FOOT SURGERY Left     HYSTERECTOMY      INSERTION, IOL PROSTHESIS Left 11/8/2018     Performed by [facility] at [facility] OR 1ST FLR    INSERTION, IOL PROSTHESIS Right 10/18/2018    Performed by Susan Keith MD at [facility] OR 1ST FLR    INSERTION-PORT N/A 11/14/2017    Performed by [facility] Diagnostic Provider at [facility] OR 2ND FLR    PHACOEMULSIFICATION, CATARACT Left 11/8/2018    Performed by Susan Keith MD at [facility] OR 1ST FLR    PHACOEMULSIFICATION, CATARACT Right 10/18/2018    Performed by Susan Keith MD at [facility] OR 1ST FLR    SKIN BIOPSY      TOTAL KNEE ARTHROPLASTY      right     Family History     Problem Relation (Age of Onset)    Glaucoma Mother, Sister, Brother    Hypertension     Kidney disease     No Known Problems Father, Maternal Aunt, Maternal Uncle, Paternal Aunt, Paternal Uncle, Maternal Grandmother, Maternal Grandfather, Paternal Grandmother, Paternal Grandfather        Tobacco Use    Smoking status: Former Smoker    Smokeless tobacco: Never Used   Substance and Sexual Activity    Alcohol use: Not Currently     Comment: Occasionally    Drug use: No    Sexual activity: Not Currently       Review of Systems   Constitutional: Negative for activity change, appetite change, chills, fatigue, fever and unexpected weight change.   HENT: Negative for congestion and ear pain.    Eyes: Negative for pain and visual disturbance.   Respiratory: Positive for shortness of breath. Negative for cough and wheezing.    Cardiovascular: Positive for chest pain. Negative for palpitations.   Gastrointestinal: Positive for nausea and vomiting. Negative for abdominal pain.   Endocrine: Negative for cold intolerance and heat intolerance.   Genitourinary: Negative for difficulty urinating and hematuria.   Musculoskeletal: Negative for arthralgias, joint swelling and neck stiffness.   Skin: Negative for rash and wound.   Allergic/Immunologic: Negative for food allergies.   Neurological: Negative for seizures, weakness, light-headedness, numbness and headaches.   Hematological: Negative for  adenopathy.   Psychiatric/Behavioral: Negative for agitation, confusion and hallucinations. The patient is not nervous/anxious.      Objective:     Vital Signs (Most Recent):  Temp: 97.6 °F (36.4 °C) (07/22/19 0955)  Pulse: 98 (07/22/19 1820)  Resp: 16 (07/22/19 1820)  BP: (!) 101/57 (07/22/19 1820)  SpO2: 96 % (07/22/19 1820) Vital Signs (24h Range):  Temp:  [97.6 °F (36.4 °C)] 97.6 °F (36.4 °C)  Pulse:  [] 98  Resp:  [16-39] 16  SpO2:  [96 %-100 %] 96 %  BP: (101-133)/(57-78) 101/57     Weight: 89.8 kg (198 lb)  Body mass index is 31.01 kg/m².  Body surface area is 2.06 meters squared.      Intake/Output Summary (Last 24 hours) at 7/22/2019 1911  Last data filed at 7/22/2019 1638  Gross per 24 hour   Intake 1250 ml   Output --   Net 1250 ml       Physical Exam   Constitutional: She is oriented to person, place, and time. She appears well-developed and well-nourished.   HENT:   Head: Normocephalic and atraumatic.   Eyes: Conjunctivae and EOM are normal.   Neck: Normal range of motion. No tracheal deviation present.   Cardiovascular: Normal rate and regular rhythm. Exam reveals no gallop and no friction rub.   No murmur heard.  Pulmonary/Chest: Effort normal. No respiratory distress.   No breath sounds on the Left lung   Abdominal: Soft. She exhibits no distension. There is no tenderness. There is no rebound and no guarding.   Musculoskeletal: Normal range of motion. She exhibits no edema.   Neurological: She is alert and oriented to person, place, and time. No cranial nerve deficit.   Skin: Skin is warm and dry.   Psychiatric: She has a normal mood and affect. Her behavior is normal.   Vitals reviewed.      Significant Labs:   Recent Results (from the past 48 hour(s))   Brain natriuretic peptide    Collection Time: 07/22/19 10:17 AM   Result Value Ref Range    BNP 58 0 - 99 pg/mL   CBC auto differential    Collection Time: 07/22/19 10:17 AM   Result Value Ref Range    WBC 4.47 3.90 - 12.70 K/uL    RBC 3.47  (L) 4.00 - 5.40 M/uL    Hemoglobin 9.4 (L) 12.0 - 16.0 g/dL    Hematocrit 32.4 (L) 37.0 - 48.5 %    Mean Corpuscular Volume 93 82 - 98 fL    Mean Corpuscular Hemoglobin 27.1 27.0 - 31.0 pg    Mean Corpuscular Hemoglobin Conc 29.0 (L) 32.0 - 36.0 g/dL    RDW 15.2 (H) 11.5 - 14.5 %    Platelets 216 150 - 350 K/uL    MPV 10.5 9.2 - 12.9 fL    Immature Granulocytes 1.3 (H) 0.0 - 0.5 %    Gran # (ANC) 3.6 1.8 - 7.7 K/uL    Immature Grans (Abs) 0.06 (H) 0.00 - 0.04 K/uL    Lymph # 0.4 (L) 1.0 - 4.8 K/uL    Mono # 0.4 0.3 - 1.0 K/uL    Eos # 0.1 0.0 - 0.5 K/uL    Baso # 0.01 0.00 - 0.20 K/uL    nRBC 0 0 /100 WBC    Gran% 81.3 (H) 38.0 - 73.0 %    Lymph% 8.1 (L) 18.0 - 48.0 %    Mono% 7.8 4.0 - 15.0 %    Eosinophil% 1.3 0.0 - 8.0 %    Basophil% 0.2 0.0 - 1.9 %    Differential Method Automated    Comprehensive metabolic panel    Collection Time: 07/22/19 10:17 AM   Result Value Ref Range    Sodium 138 136 - 145 mmol/L    Potassium 2.8 (L) 3.5 - 5.1 mmol/L    Chloride 86 (L) 95 - 110 mmol/L    CO2 35 (H) 23 - 29 mmol/L    Glucose 95 70 - 110 mg/dL    BUN, Bld 8 8 - 23 mg/dL    Creatinine 0.7 0.5 - 1.4 mg/dL    Calcium 9.6 8.7 - 10.5 mg/dL    Total Protein 8.3 6.0 - 8.4 g/dL    Albumin 2.5 (L) 3.5 - 5.2 g/dL    Total Bilirubin 0.3 0.1 - 1.0 mg/dL    Alkaline Phosphatase 75 55 - 135 U/L    AST 14 10 - 40 U/L    ALT 7 (L) 10 - 44 U/L    Anion Gap 17 (H) 8 - 16 mmol/L    eGFR if African American >60.0 >60 mL/min/1.73 m^2    eGFR if non African American >60.0 >60 mL/min/1.73 m^2   Blood culture #1 **CANNOT BE ORDERED STAT**    Collection Time: 07/22/19 10:17 AM   Result Value Ref Range    Blood Culture, Routine No Growth to date    Blood culture #2 **CANNOT BE ORDERED STAT**    Collection Time: 07/22/19 10:17 AM   Result Value Ref Range    Blood Culture, Routine No Growth to date    Magnesium    Collection Time: 07/22/19 10:17 AM   Result Value Ref Range    Magnesium 1.1 (L) 1.6 - 2.6 mg/dL   Lipase    Collection Time: 07/22/19  10:17 AM   Result Value Ref Range    Lipase 18 4 - 60 U/L   Troponin I    Collection Time: 07/22/19 10:17 AM   Result Value Ref Range    Troponin I <0.006 0.000 - 0.026 ng/mL   Lactic acid, plasma    Collection Time: 07/22/19 10:39 AM   Result Value Ref Range    Lactate (Lactic Acid) 0.9 0.5 - 2.2 mmol/L   {    Diagnostic Results:  CT scan showed mass in the left lung consistent with previous scans. Possibly a pleural effusion

## 2019-07-23 NOTE — PROGRESS NOTES
[facility]  Hematology/Oncology  Progress Note    Patient Name: Silvia Capellan  Admission Date: 7/22/2019  Hospital Length of Stay: 1 days  Code Status: DNR     Subjective:     HPI:  Ms Capellan is a 74 yo F w PMHx significant for rheumatoid arthritis, HTN, and squamous cell carcinoma (unknown primary) resulting in lung and mediastinal masses s/p six cycles of palliative carboplatin/paclitaxel who presents with a chief complaint of shortness of breath. Patient states it started about a month but it has progressively gotten worse. Yesterday she couldn't sleep and was very uncofmortable. She does well at rest but walking is what brings it on. She is on 2l of oxygen at home. Doesn't get worse laying down. She also has this chest pain on her right side that moves around. She says it is similar to her acid reflux pain. She has also had poor PO intake for over a month. Had a esophageal stent placed about 2 weeks ago to help, but is still having poor intake. Complains of nausea, poor appetite and vomitting. She has lost over 50 pounds in the past few months. She has not had chemo in over a year but is currently undergoing radiation therapy.     In the ED, patient was found to have low potassium and Magnesium. They started repleting those. She was having sats in the lower 80s off of oxygen but with 2L she was in the 90s. ED got a ct scan and it showed no PE.   Oncology History per Dr Wong's most recent clinic note (6/7):     Diagnosis: Squamous cell carcinoma , primary site unknown    Molecular/genetic testing: p16 negative; PD L1 could not be run due to inadequate tissue   Stage:        Stage 4   Treatment: Carboplatin/paclitaxel x  6 cycles (2/16/18 - 6/8/18)     HPI:  is a 73 y/o female who underwent bronchoscopy/EBUS evaluation on 8/31/17 for abnormal mediastinal adenopathy  and a 1.2 cm MORRO mass.     Bronchoscopy findings:    The oropharynx appears normal. The larynx appears  normal. The vocal cords appear normal. The subglottic space is normal. The trachea is of normal caliber. The otf is sharp. The tracheobronchial tree was examined to at least the first subsegmental level. Bronchial mucosa and anatomy are normal; there are no endobronchial lesions, and no secretions.    Lymph Nodes: Lymph node sizing was performed via endobronchial ultrasound for suspected lung cancer. Sampling by transbronchial needle aspiration was also performed using an Olympus EBUS-TBNA 22  gauge needle in the subcarinal mediastinum (level 7) and sent for routine cytology.       - The 7 (subcarinal) node was 30 mm by EBUS. Three samples with the needle were obtained. The patient's condition was unchanged after the intervention.      Her treatment got delayed as she cancelled several appointments due to some issues at home. She started Caroplatin/paclitaxel palliatively. She had left thoracentesis on 2/9/18. There were atypical cells in the pleural fluid, highly suspicious for malignancy.  She completed cycles of Carboplatin/Paclitaxel, last treatment on 6/8/18.     She had repeat left pleural mass FNA in [facility] 2019. It was negative for malignancy, but showed lympho plasmacytic infiltration.      PET CT on 3/13/19 showed increased  hypermetabolism of the left pleural effusion.Thoracic vertebral body hypermetabolism was noted, new from the prior exam (compared with PET from June 2018) without underlying CT changes which may represent red marrow hyperplasia or new site of neoplastic disease.  There was new hypermetabolism in the right L5-S1 facet joints which appeared to be centered at the facet joint not within the bone and is favored to represent degenerative change or neoplasm.  She has been non -compliant with her appointments here.        Interval history: She is here for a follow up visit. She has fatigue, dyspnea. She has intermittent nausea from the past 2 weeks, with emesis. She was evaluated She has  worsening dyspnea. No weight loss. No headache.   She was evaluated on 5/8/19 for nausea and abdominal pain at ER and received IV fluids. On 5/13/19 she was again in the ER , this time for dyspnea. She was evaluated with chest X ray and thoracentesis was attempted, but the fluid volume was very little and was not done.  She has no constipation or diarrhea. No fever.   She was in ER again on 6/3/19 for nausea and emesis. She was started on reglan. She was given iv fluids. She has persistent nausea and emesis.    Interval History: Patient doing well this morning. Is not feeling short of breath. Her chest pain has gotten better. Received one dose of her prn percocet. Nausea is doing better, but still has not eaten    Oncology Treatment Plan:   OP ATEZOLIZUMAB Q3W    Medications:  Continuous Infusions:  Scheduled Meds:   cetirizine  5 mg Oral Daily    enoxaparin  40 mg Subcutaneous Daily    gabapentin  300 mg Oral QHS    metoprolol succinate  50 mg Oral Daily    pantoprazole  40 mg Oral Daily    senna-docusate 8.6-50 mg  1 tablet Oral BID     PRN Meds:Dextrose 10% Bolus, Dextrose 10% Bolus, glucagon (human recombinant), glucose, glucose, metoclopramide HCl, ondansetron, oxyCODONE-acetaminophen, sodium chloride 0.9%, zolpidem     Review of Systems  Objective:     Vital Signs (Most Recent):  Temp: 98 °F (36.7 °C) (07/23/19 0404)  Pulse: 108 (07/23/19 0404)  Resp: 18 (07/23/19 0404)  BP: 115/62 (07/23/19 0404)  SpO2: 100 % (07/23/19 0404) Vital Signs (24h Range):  Temp:  [97.6 °F (36.4 °C)-98.9 °F (37.2 °C)] 98 °F (36.7 °C)  Pulse:  [] 108  Resp:  [16-39] 18  SpO2:  [94 %-100 %] 100 %  BP: ()/(57-78) 115/62     Weight: 85.5 kg (188 lb 7.9 oz)  Body mass index is 31.37 kg/m².  Body surface area is 1.98 meters squared.      Intake/Output Summary (Last 24 hours) at 7/23/2019 0736  Last data filed at 7/23/2019 0500  Gross per 24 hour   Intake 1610 ml   Output 250 ml   Net 1360 ml       Physical Exam    Constitutional: She is oriented to person, place, and time. She appears well-developed and well-nourished.   HENT:   Head: Normocephalic and atraumatic.   Eyes: Conjunctivae and EOM are normal.   Neck: Normal range of motion. No tracheal deviation present.   Cardiovascular: Normal rate and regular rhythm. Exam reveals no gallop and no friction rub.   No murmur heard.  Pulmonary/Chest: Effort normal. No respiratory distress.   No breath sounds on the Left lung   Abdominal: Soft. She exhibits no distension. There is no tenderness. There is no rebound and no guarding.   Musculoskeletal: Normal range of motion. She exhibits no edema.   Neurological: She is alert and oriented to person, place, and time. No cranial nerve deficit.   Skin: Skin is warm and dry.   Psychiatric: She has a normal mood and affect. Her behavior is normal.   Vitals reviewed.      Significant Labs:   Recent Results (from the past 24 hour(s))   Brain natriuretic peptide    Collection Time: 07/22/19 10:17 AM   Result Value Ref Range    BNP 58 0 - 99 pg/mL   CBC auto differential    Collection Time: 07/22/19 10:17 AM   Result Value Ref Range    WBC 4.47 3.90 - 12.70 K/uL    RBC 3.47 (L) 4.00 - 5.40 M/uL    Hemoglobin 9.4 (L) 12.0 - 16.0 g/dL    Hematocrit 32.4 (L) 37.0 - 48.5 %    Mean Corpuscular Volume 93 82 - 98 fL    Mean Corpuscular Hemoglobin 27.1 27.0 - 31.0 pg    Mean Corpuscular Hemoglobin Conc 29.0 (L) 32.0 - 36.0 g/dL    RDW 15.2 (H) 11.5 - 14.5 %    Platelets 216 150 - 350 K/uL    MPV 10.5 9.2 - 12.9 fL    Immature Granulocytes 1.3 (H) 0.0 - 0.5 %    Gran # (ANC) 3.6 1.8 - 7.7 K/uL    Immature Grans (Abs) 0.06 (H) 0.00 - 0.04 K/uL    Lymph # 0.4 (L) 1.0 - 4.8 K/uL    Mono # 0.4 0.3 - 1.0 K/uL    Eos # 0.1 0.0 - 0.5 K/uL    Baso # 0.01 0.00 - 0.20 K/uL    nRBC 0 0 /100 WBC    Gran% 81.3 (H) 38.0 - 73.0 %    Lymph% 8.1 (L) 18.0 - 48.0 %    Mono% 7.8 4.0 - 15.0 %    Eosinophil% 1.3 0.0 - 8.0 %    Basophil% 0.2 0.0 - 1.9 %    Differential  Method Automated    Comprehensive metabolic panel    Collection Time: 07/22/19 10:17 AM   Result Value Ref Range    Sodium 138 136 - 145 mmol/L    Potassium 2.8 (L) 3.5 - 5.1 mmol/L    Chloride 86 (L) 95 - 110 mmol/L    CO2 35 (H) 23 - 29 mmol/L    Glucose 95 70 - 110 mg/dL    BUN, Bld 8 8 - 23 mg/dL    Creatinine 0.7 0.5 - 1.4 mg/dL    Calcium 9.6 8.7 - 10.5 mg/dL    Total Protein 8.3 6.0 - 8.4 g/dL    Albumin 2.5 (L) 3.5 - 5.2 g/dL    Total Bilirubin 0.3 0.1 - 1.0 mg/dL    Alkaline Phosphatase 75 55 - 135 U/L    AST 14 10 - 40 U/L    ALT 7 (L) 10 - 44 U/L    Anion Gap 17 (H) 8 - 16 mmol/L    eGFR if African American >60.0 >60 mL/min/1.73 m^2    eGFR if non African American >60.0 >60 mL/min/1.73 m^2   Blood culture #1 **CANNOT BE ORDERED STAT**    Collection Time: 07/22/19 10:17 AM   Result Value Ref Range    Blood Culture, Routine No Growth to date    Blood culture #2 **CANNOT BE ORDERED STAT**    Collection Time: 07/22/19 10:17 AM   Result Value Ref Range    Blood Culture, Routine No Growth to date    Magnesium    Collection Time: 07/22/19 10:17 AM   Result Value Ref Range    Magnesium 1.1 (L) 1.6 - 2.6 mg/dL   Lipase    Collection Time: 07/22/19 10:17 AM   Result Value Ref Range    Lipase 18 4 - 60 U/L   Troponin I    Collection Time: 07/22/19 10:17 AM   Result Value Ref Range    Troponin I <0.006 0.000 - 0.026 ng/mL   Lactic acid, plasma    Collection Time: 07/22/19 10:39 AM   Result Value Ref Range    Lactate (Lactic Acid) 0.9 0.5 - 2.2 mmol/L   POCT glucose    Collection Time: 07/23/19 12:56 AM   Result Value Ref Range    POCT Glucose 106 70 - 110 mg/dL   Comprehensive Metabolic Panel (CMP)    Collection Time: 07/23/19  3:17 AM   Result Value Ref Range    Sodium 143 136 - 145 mmol/L    Potassium 3.3 (L) 3.5 - 5.1 mmol/L    Chloride 95 95 - 110 mmol/L    CO2 37 (H) 23 - 29 mmol/L    Glucose 95 70 - 110 mg/dL    BUN, Bld 5 (L) 8 - 23 mg/dL    Creatinine 0.7 0.5 - 1.4 mg/dL    Calcium 8.4 (L) 8.7 - 10.5 mg/dL     Total Protein 6.5 6.0 - 8.4 g/dL    Albumin 2.0 (L) 3.5 - 5.2 g/dL    Total Bilirubin 0.2 0.1 - 1.0 mg/dL    Alkaline Phosphatase 67 55 - 135 U/L    AST 13 10 - 40 U/L    ALT 6 (L) 10 - 44 U/L    Anion Gap 11 8 - 16 mmol/L    eGFR if African American >60.0 >60 mL/min/1.73 m^2    eGFR if non African American >60.0 >60 mL/min/1.73 m^2   Magnesium    Collection Time: 07/23/19  3:17 AM   Result Value Ref Range    Magnesium 1.5 (L) 1.6 - 2.6 mg/dL   Phosphorus    Collection Time: 07/23/19  3:17 AM   Result Value Ref Range    Phosphorus 2.6 (L) 2.7 - 4.5 mg/dL   CBC auto differential    Collection Time: 07/23/19  3:17 AM   Result Value Ref Range    WBC 4.73 3.90 - 12.70 K/uL    RBC 2.65 (L) 4.00 - 5.40 M/uL    Hemoglobin 7.1 (L) 12.0 - 16.0 g/dL    Hematocrit 24.6 (L) 37.0 - 48.5 %    Mean Corpuscular Volume 93 82 - 98 fL    Mean Corpuscular Hemoglobin 26.8 (L) 27.0 - 31.0 pg    Mean Corpuscular Hemoglobin Conc 28.9 (L) 32.0 - 36.0 g/dL    RDW 15.4 (H) 11.5 - 14.5 %    Platelets 168 150 - 350 K/uL    MPV 11.4 9.2 - 12.9 fL    Immature Granulocytes 1.5 (H) 0.0 - 0.5 %    Gran # (ANC) 3.5 1.8 - 7.7 K/uL    Immature Grans (Abs) 0.07 (H) 0.00 - 0.04 K/uL    Lymph # 0.5 (L) 1.0 - 4.8 K/uL    Mono # 0.6 0.3 - 1.0 K/uL    Eos # 0.1 0.0 - 0.5 K/uL    Baso # 0.02 0.00 - 0.20 K/uL    nRBC 0 0 /100 WBC    Gran% 74.2 (H) 38.0 - 73.0 %    Lymph% 9.9 (L) 18.0 - 48.0 %    Mono% 12.7 4.0 - 15.0 %    Eosinophil% 1.3 0.0 - 8.0 %    Basophil% 0.4 0.0 - 1.9 %    Differential Method Automated    POCT glucose    Collection Time: 07/23/19  6:26 AM   Result Value Ref Range    POCT Glucose 109 70 - 110 mg/dL   Urinalysis, Reflex to Urine Culture Urine, Clean Catch    Collection Time: 07/23/19  6:31 AM   Result Value Ref Range    Specimen UA Urine, Clean Catch     Color, UA Yellow Yellow, Straw, Kym    Appearance, UA Cloudy (A) Clear    pH, UA 6.0 5.0 - 8.0    Specific Gravity, UA >1.030 (A) 1.005 - 1.030    Protein, UA 1+ (A) Negative     Glucose, UA Negative Negative    Ketones, UA 1+ (A) Negative    Bilirubin (UA) Negative Negative    Occult Blood UA 1+ (A) Negative    Nitrite, UA Negative Negative    Leukocytes, UA Trace (A) Negative   Urinalysis Microscopic    Collection Time: 07/23/19  6:31 AM   Result Value Ref Range    RBC, UA 5 (H) 0 - 4 /hpf    WBC, UA 8 (H) 0 - 5 /hpf    Bacteria Few (A) None-Occ /hpf    Squam Epithel, UA 5 /hpf    Hyaline Casts, UA 0 0-1/lpf /lpf    Uric Acid Shirlene, UA Rare None-Moderate    Microscopic Comment SEE COMMENT        Diagnostic Results:  I have reviewed all pertinent imaging results/findings within the past 24 hours.    Assessment/Plan:     Shortness of breath  Ms Capellan is a 74 yo F w PMHx significant for rheumatoid arthritis, HTN, and squamous cell carcinoma (unknown primary) resulting in lung and mediastinal masses s/p six cycles of palliative carboplatin/paclitaxel who presents with a chief complaint of shortness of breath. CT scan showed opacification of the left lung that is thought to be cancer  There maybe a small pleural effusion but not thought to be tapable at this time. . Patient is sating well on 2L of oxygen. After more discussion with the patient seems like the problem is with her pain regimen. She just feels uncomfortable taking breaths.     - Continue Nasal canula oxygen as needed  - Q4 PRN Percocet       Nausea  -Zofran first choice PRN  -Promethazine second choice PRN  -Metclopramide also available     Hypomagnesemia  Magnesium 1.1 on admission. Received 2g in the ER    -Replete as necessary     Hypokalemia  K+ 2.8 on admission. Received 60meq in the ED    -Replete K as necessary     Secondary and unspecified malignant neoplasm of intrathoracic lymph nodes  Pt of Dr Wong with history of squamous cell carcinoma with unknown primary. As such, she is to be treated as metastatic SCC. She completed cycles of Carboplatin/Paclitaxel, last treatment on 6/8/18. Currently receiving radiation  therapy     Reflux esophagitis  Patient is complaining of some chest pain which is most likely due to her reflux.    -Protonix 40 mg daily    Essential hypertension  -Metoprolol (torprol-xl) 50mg oral daily     Insomnia, unspecified  -Ambien 5mg PRN     Non-seasonal Rhinitis  -Cetirizine 5mg             Angelito Singer MD  Hematology/Oncology  [facility]      ATTENDING NOTE, ONCOLOGY INPATIENT TEAM    As above; events of last 24 hours noted.  Patient seen and examined, chart reviewed.  Appears comfortable, in NAD.  Lungs are clear to auscultation.  Abdomen is soft, nontender.  Labs reviewed.  Drop in H/H noted.    PLAN  Follow H/H Q 12 hours for the next 24 hours.  Follow electrolytes, replace Mg as needed.  Check stools for Guaiac.  Check direct Herman, LDH, and reticulocyte count  Protonix.  Continue oxycodone Q 4 hours.  Patient was advised to remain ambulatory; in view of the drop in H/H we will d/c enoxaparin.      Winston Quan MD

## 2019-07-23 NOTE — NURSING
Patient arrived via stretcher from ED. No complaints of pain or shortness of breath. Vital signs stable and patient afebrile. Patient oriented to room and instructed on how to call the nurse for assistance. Patient complained of nausea and pain. Contacted physician for IV nausea medication and provided PO pain medication. Patient provided non-skid socks and placed on fall precautions. No IV fluids infusing. Patient AAOX4. Family arrived and patient visiting. Patient in no acute distress.

## 2019-07-24 PROBLEM — J96.02 ACUTE HYPERCAPNIC RESPIRATORY FAILURE: Status: ACTIVE | Noted: 2019-01-01

## 2019-07-24 NOTE — PROGRESS NOTES
[facility]  Hematology/Oncology  Progress Note    Patient Name: Silvia Capellan  Admission Date: 7/22/2019  Hospital Length of Stay: 2 days  Code Status: DNR     Subjective:     HPI:  Ms Capellan is a 74 yo F w PMHx significant for rheumatoid arthritis, HTN, and squamous cell carcinoma (unknown primary) resulting in lung and mediastinal masses s/p six cycles of palliative carboplatin/paclitaxel who presents with a chief complaint of shortness of breath. Patient states it started about a month but it has progressively gotten worse. Yesterday she couldn't sleep and was very uncofmortable. She does well at rest but walking is what brings it on. She is on 2l of oxygen at home. Doesn't get worse laying down. She also has this chest pain on her right side that moves around. She says it is similar to her acid reflux pain. She has also had poor PO intake for over a month. Had a esophageal stent placed about 2 weeks ago to help, but is still having poor intake. Complains of nausea, poor appetite and vomitting. She has lost over 50 pounds in the past few months. She has not had chemo in over a year but is currently undergoing radiation therapy.     In the ED, patient was found to have low potassium and Magnesium. They started repleting those. She was having sats in the lower 80s off of oxygen but with 2L she was in the 90s. ED got a ct scan and it showed no PE.   Oncology History per Dr Wong's most recent clinic note (6/7):     Diagnosis: Squamous cell carcinoma , primary site unknown    Molecular/genetic testing: p16 negative; PD L1 could not be run due to inadequate tissue   Stage:        Stage 4   Treatment: Carboplatin/paclitaxel x  6 cycles (2/16/18 - 6/8/18)     HPI:  is a 71 y/o female who underwent bronchoscopy/EBUS evaluation on 8/31/17 for abnormal mediastinal adenopathy  and a 1.2 cm MORRO mass.     Bronchoscopy findings:    The oropharynx appears normal. The larynx appears  normal. The vocal cords appear normal. The subglottic space is normal. The trachea is of normal caliber. The otf is sharp. The tracheobronchial tree was examined to at least the first subsegmental level. Bronchial mucosa and anatomy are normal; there are no endobronchial lesions, and no secretions.    Lymph Nodes: Lymph node sizing was performed via endobronchial ultrasound for suspected lung cancer. Sampling by transbronchial needle aspiration was also performed using an Olympus EBUS-TBNA 22  gauge needle in the subcarinal mediastinum (level 7) and sent for routine cytology.       - The 7 (subcarinal) node was 30 mm by EBUS. Three samples with the needle were obtained. The patient's condition was unchanged after the intervention.      Her treatment got delayed as she cancelled several appointments due to some issues at home. She started Caroplatin/paclitaxel palliatively. She had left thoracentesis on 2/9/18. There were atypical cells in the pleural fluid, highly suspicious for malignancy.  She completed cycles of Carboplatin/Paclitaxel, last treatment on 6/8/18.     She had repeat left pleural mass FNA in [facility] 2019. It was negative for malignancy, but showed lympho plasmacytic infiltration.      PET CT on 3/13/19 showed increased  hypermetabolism of the left pleural effusion.Thoracic vertebral body hypermetabolism was noted, new from the prior exam (compared with PET from June 2018) without underlying CT changes which may represent red marrow hyperplasia or new site of neoplastic disease.  There was new hypermetabolism in the right L5-S1 facet joints which appeared to be centered at the facet joint not within the bone and is favored to represent degenerative change or neoplasm.  She has been non -compliant with her appointments here.        Interval history: She is here for a follow up visit. She has fatigue, dyspnea. She has intermittent nausea from the past 2 weeks, with emesis. She was evaluated She has  worsening dyspnea. No weight loss. No headache.   She was evaluated on 5/8/19 for nausea and abdominal pain at ER and received IV fluids. On 5/13/19 she was again in the ER , this time for dyspnea. She was evaluated with chest X ray and thoracentesis was attempted, but the fluid volume was very little and was not done.  She has no constipation or diarrhea. No fever.   She was in ER again on 6/3/19 for nausea and emesis. She was started on reglan. She was given iv fluids. She has persistent nausea and emesis.    Interval History: Overnight patient decompensated. Heart rate in the 130s. Oxygen sats in the 70s. ABG showed a pco2 of 59. She was altered but sats increased into the 90s with 6l 02 nasal canula. Bipap was ordered which she is currently on. Chest x-ray was ordered    Oncology Treatment Plan:   OP ATEZOLIZUMAB Q3W    Medications:  Continuous Infusions:  Scheduled Meds:   cetirizine  5 mg Oral Daily    furosemide (LASIX) IV  80 mg Intravenous Once    gabapentin  300 mg Oral QHS    methylPREDNISolone sodium succinate  80 mg Intravenous Once    metoprolol succinate  50 mg Oral Daily    ondansetron  8 mg Intravenous Once    pantoprazole  40 mg Oral Daily    senna-docusate 8.6-50 mg  1 tablet Oral BID     PRN Meds:albuterol-ipratropium, Dextrose 10% Bolus, Dextrose 10% Bolus, glucagon (human recombinant), glucose, glucose, metoclopramide HCl, ondansetron, oxyCODONE, sodium chloride 0.9%, zolpidem     Review of Systems  Objective:     Vital Signs (Most Recent):  Temp: 99.5 °F (37.5 °C) (07/24/19 0808)  Pulse: (!) 125 (07/24/19 0820)  Resp: (!) 27 (07/24/19 0820)  BP: 107/62 (07/24/19 0808)  SpO2: 98 % (07/24/19 0820) Vital Signs (24h Range):  Temp:  [98.1 °F (36.7 °C)-99.5 °F (37.5 °C)] 99.5 °F (37.5 °C)  Pulse:  [108-145] 125  Resp:  [12-40] 27  SpO2:  [70 %-99 %] 98 %  BP: (101-149)/(56-80) 107/62     Weight: 85.5 kg (188 lb 7.9 oz)  Body mass index is 31.37 kg/m².  Body surface area is 1.98 meters  squared.      Intake/Output Summary (Last 24 hours) at 7/24/2019 0836  Last data filed at 7/24/2019 0006  Gross per 24 hour   Intake 1182.5 ml   Output 150 ml   Net 1032.5 ml       Physical Exam    Significant Labs:   Recent Results (from the past 48 hour(s))   Brain natriuretic peptide    Collection Time: 07/22/19 10:17 AM   Result Value Ref Range    BNP 58 0 - 99 pg/mL   CBC auto differential    Collection Time: 07/22/19 10:17 AM   Result Value Ref Range    WBC 4.47 3.90 - 12.70 K/uL    RBC 3.47 (L) 4.00 - 5.40 M/uL    Hemoglobin 9.4 (L) 12.0 - 16.0 g/dL    Hematocrit 32.4 (L) 37.0 - 48.5 %    Mean Corpuscular Volume 93 82 - 98 fL    Mean Corpuscular Hemoglobin 27.1 27.0 - 31.0 pg    Mean Corpuscular Hemoglobin Conc 29.0 (L) 32.0 - 36.0 g/dL    RDW 15.2 (H) 11.5 - 14.5 %    Platelets 216 150 - 350 K/uL    MPV 10.5 9.2 - 12.9 fL    Immature Granulocytes 1.3 (H) 0.0 - 0.5 %    Gran # (ANC) 3.6 1.8 - 7.7 K/uL    Immature Grans (Abs) 0.06 (H) 0.00 - 0.04 K/uL    Lymph # 0.4 (L) 1.0 - 4.8 K/uL    Mono # 0.4 0.3 - 1.0 K/uL    Eos # 0.1 0.0 - 0.5 K/uL    Baso # 0.01 0.00 - 0.20 K/uL    nRBC 0 0 /100 WBC    Gran% 81.3 (H) 38.0 - 73.0 %    Lymph% 8.1 (L) 18.0 - 48.0 %    Mono% 7.8 4.0 - 15.0 %    Eosinophil% 1.3 0.0 - 8.0 %    Basophil% 0.2 0.0 - 1.9 %    Differential Method Automated    Comprehensive metabolic panel    Collection Time: 07/22/19 10:17 AM   Result Value Ref Range    Sodium 138 136 - 145 mmol/L    Potassium 2.8 (L) 3.5 - 5.1 mmol/L    Chloride 86 (L) 95 - 110 mmol/L    CO2 35 (H) 23 - 29 mmol/L    Glucose 95 70 - 110 mg/dL    BUN, Bld 8 8 - 23 mg/dL    Creatinine 0.7 0.5 - 1.4 mg/dL    Calcium 9.6 8.7 - 10.5 mg/dL    Total Protein 8.3 6.0 - 8.4 g/dL    Albumin 2.5 (L) 3.5 - 5.2 g/dL    Total Bilirubin 0.3 0.1 - 1.0 mg/dL    Alkaline Phosphatase 75 55 - 135 U/L    AST 14 10 - 40 U/L    ALT 7 (L) 10 - 44 U/L    Anion Gap 17 (H) 8 - 16 mmol/L    eGFR if African American >60.0 >60 mL/min/1.73 m^2    eGFR if non  African American >60.0 >60 mL/min/1.73 m^2   Blood culture #1 **CANNOT BE ORDERED STAT**    Collection Time: 07/22/19 10:17 AM   Result Value Ref Range    Blood Culture, Routine No Growth to date     Blood Culture, Routine No Growth to date    Blood culture #2 **CANNOT BE ORDERED STAT**    Collection Time: 07/22/19 10:17 AM   Result Value Ref Range    Blood Culture, Routine No Growth to date     Blood Culture, Routine No Growth to date    Magnesium    Collection Time: 07/22/19 10:17 AM   Result Value Ref Range    Magnesium 1.1 (L) 1.6 - 2.6 mg/dL   Lipase    Collection Time: 07/22/19 10:17 AM   Result Value Ref Range    Lipase 18 4 - 60 U/L   Troponin I    Collection Time: 07/22/19 10:17 AM   Result Value Ref Range    Troponin I <0.006 0.000 - 0.026 ng/mL   Lactic acid, plasma    Collection Time: 07/22/19 10:39 AM   Result Value Ref Range    Lactate (Lactic Acid) 0.9 0.5 - 2.2 mmol/L   POCT glucose    Collection Time: 07/23/19 12:56 AM   Result Value Ref Range    POCT Glucose 106 70 - 110 mg/dL   Comprehensive Metabolic Panel (CMP)    Collection Time: 07/23/19  3:17 AM   Result Value Ref Range    Sodium 143 136 - 145 mmol/L    Potassium 3.3 (L) 3.5 - 5.1 mmol/L    Chloride 95 95 - 110 mmol/L    CO2 37 (H) 23 - 29 mmol/L    Glucose 95 70 - 110 mg/dL    BUN, Bld 5 (L) 8 - 23 mg/dL    Creatinine 0.7 0.5 - 1.4 mg/dL    Calcium 8.4 (L) 8.7 - 10.5 mg/dL    Total Protein 6.5 6.0 - 8.4 g/dL    Albumin 2.0 (L) 3.5 - 5.2 g/dL    Total Bilirubin 0.2 0.1 - 1.0 mg/dL    Alkaline Phosphatase 67 55 - 135 U/L    AST 13 10 - 40 U/L    ALT 6 (L) 10 - 44 U/L    Anion Gap 11 8 - 16 mmol/L    eGFR if African American >60.0 >60 mL/min/1.73 m^2    eGFR if non African American >60.0 >60 mL/min/1.73 m^2   Magnesium    Collection Time: 07/23/19  3:17 AM   Result Value Ref Range    Magnesium 1.5 (L) 1.6 - 2.6 mg/dL   Phosphorus    Collection Time: 07/23/19  3:17 AM   Result Value Ref Range    Phosphorus 2.6 (L) 2.7 - 4.5 mg/dL   CBC auto  differential    Collection Time: 07/23/19  3:17 AM   Result Value Ref Range    WBC 4.73 3.90 - 12.70 K/uL    RBC 2.65 (L) 4.00 - 5.40 M/uL    Hemoglobin 7.1 (L) 12.0 - 16.0 g/dL    Hematocrit 24.6 (L) 37.0 - 48.5 %    Mean Corpuscular Volume 93 82 - 98 fL    Mean Corpuscular Hemoglobin 26.8 (L) 27.0 - 31.0 pg    Mean Corpuscular Hemoglobin Conc 28.9 (L) 32.0 - 36.0 g/dL    RDW 15.4 (H) 11.5 - 14.5 %    Platelets 168 150 - 350 K/uL    MPV 11.4 9.2 - 12.9 fL    Immature Granulocytes 1.5 (H) 0.0 - 0.5 %    Gran # (ANC) 3.5 1.8 - 7.7 K/uL    Immature Grans (Abs) 0.07 (H) 0.00 - 0.04 K/uL    Lymph # 0.5 (L) 1.0 - 4.8 K/uL    Mono # 0.6 0.3 - 1.0 K/uL    Eos # 0.1 0.0 - 0.5 K/uL    Baso # 0.02 0.00 - 0.20 K/uL    nRBC 0 0 /100 WBC    Gran% 74.2 (H) 38.0 - 73.0 %    Lymph% 9.9 (L) 18.0 - 48.0 %    Mono% 12.7 4.0 - 15.0 %    Eosinophil% 1.3 0.0 - 8.0 %    Basophil% 0.4 0.0 - 1.9 %    Differential Method Automated    POCT glucose    Collection Time: 07/23/19  6:26 AM   Result Value Ref Range    POCT Glucose 109 70 - 110 mg/dL   Urinalysis, Reflex to Urine Culture Urine, Clean Catch    Collection Time: 07/23/19  6:31 AM   Result Value Ref Range    Specimen UA Urine, Clean Catch     Color, UA Yellow Yellow, Straw, Kym    Appearance, UA Cloudy (A) Clear    pH, UA 6.0 5.0 - 8.0    Specific Gravity, UA >1.030 (A) 1.005 - 1.030    Protein, UA 1+ (A) Negative    Glucose, UA Negative Negative    Ketones, UA 1+ (A) Negative    Bilirubin (UA) Negative Negative    Occult Blood UA 1+ (A) Negative    Nitrite, UA Negative Negative    Leukocytes, UA Trace (A) Negative   Urinalysis Microscopic    Collection Time: 07/23/19  6:31 AM   Result Value Ref Range    RBC, UA 5 (H) 0 - 4 /hpf    WBC, UA 8 (H) 0 - 5 /hpf    Bacteria Few (A) None-Occ /hpf    Squam Epithel, UA 5 /hpf    Hyaline Casts, UA 0 0-1/lpf /lpf    Uric Acid Shirlene, UA Rare None-Moderate    Microscopic Comment SEE COMMENT    POCT glucose    Collection Time: 07/23/19 11:43 AM    Result Value Ref Range    POCT Glucose 120 (H) 70 - 110 mg/dL   CBC auto differential    Collection Time: 07/23/19  3:52 PM   Result Value Ref Range    WBC 4.28 3.90 - 12.70 K/uL    RBC 2.71 (L) 4.00 - 5.40 M/uL    Hemoglobin 7.3 (L) 12.0 - 16.0 g/dL    Hematocrit 25.0 (L) 37.0 - 48.5 %    Mean Corpuscular Volume 92 82 - 98 fL    Mean Corpuscular Hemoglobin 26.9 (L) 27.0 - 31.0 pg    Mean Corpuscular Hemoglobin Conc 29.2 (L) 32.0 - 36.0 g/dL    RDW 15.4 (H) 11.5 - 14.5 %    Platelets 167 150 - 350 K/uL    MPV 9.6 9.2 - 12.9 fL    Immature Granulocytes 1.4 (H) 0.0 - 0.5 %    Gran # (ANC) 3.2 1.8 - 7.7 K/uL    Immature Grans (Abs) 0.06 (H) 0.00 - 0.04 K/uL    Lymph # 0.4 (L) 1.0 - 4.8 K/uL    Mono # 0.6 0.3 - 1.0 K/uL    Eos # 0.1 0.0 - 0.5 K/uL    Baso # 0.01 0.00 - 0.20 K/uL    nRBC 1 (A) 0 /100 WBC    Gran% 74.3 (H) 38.0 - 73.0 %    Lymph% 9.6 (L) 18.0 - 48.0 %    Mono% 12.9 4.0 - 15.0 %    Eosinophil% 1.6 0.0 - 8.0 %    Basophil% 0.2 0.0 - 1.9 %    Differential Method Automated    Lactate dehydrogenase    Collection Time: 07/23/19  3:52 PM   Result Value Ref Range     110 - 260 U/L   Direct antiglobulin test    Collection Time: 07/23/19  3:52 PM   Result Value Ref Range    Direct Herman (MARIAM) NEG    Reticulocytes    Collection Time: 07/23/19  3:52 PM   Result Value Ref Range    Retic 1.7 0.5 - 2.5 %   POCT glucose    Collection Time: 07/23/19  6:06 PM   Result Value Ref Range    POCT Glucose 131 (H) 70 - 110 mg/dL   CBC auto differential    Collection Time: 07/23/19  8:04 PM   Result Value Ref Range    WBC 6.55 3.90 - 12.70 K/uL    RBC 2.76 (L) 4.00 - 5.40 M/uL    Hemoglobin 7.4 (L) 12.0 - 16.0 g/dL    Hematocrit 26.1 (L) 37.0 - 48.5 %    Mean Corpuscular Volume 95 82 - 98 fL    Mean Corpuscular Hemoglobin 26.8 (L) 27.0 - 31.0 pg    Mean Corpuscular Hemoglobin Conc 28.4 (L) 32.0 - 36.0 g/dL    RDW 15.6 (H) 11.5 - 14.5 %    Platelets 160 150 - 350 K/uL    MPV 9.9 9.2 - 12.9 fL    Immature Granulocytes 1.5  (H) 0.0 - 0.5 %    Gran # (ANC) 5.1 1.8 - 7.7 K/uL    Immature Grans (Abs) 0.10 (H) 0.00 - 0.04 K/uL    Lymph # 0.4 (L) 1.0 - 4.8 K/uL    Mono # 0.9 0.3 - 1.0 K/uL    Eos # 0.1 0.0 - 0.5 K/uL    Baso # 0.01 0.00 - 0.20 K/uL    nRBC 1 (A) 0 /100 WBC    Gran% 77.4 (H) 38.0 - 73.0 %    Lymph% 6.6 (L) 18.0 - 48.0 %    Mono% 13.1 4.0 - 15.0 %    Eosinophil% 1.2 0.0 - 8.0 %    Basophil% 0.2 0.0 - 1.9 %    Differential Method Automated    Comprehensive Metabolic Panel (CMP)    Collection Time: 07/24/19  3:47 AM   Result Value Ref Range    Sodium 141 136 - 145 mmol/L    Potassium 4.1 3.5 - 5.1 mmol/L    Chloride 98 95 - 110 mmol/L    CO2 33 (H) 23 - 29 mmol/L    Glucose 129 (H) 70 - 110 mg/dL    BUN, Bld 5 (L) 8 - 23 mg/dL    Creatinine 0.7 0.5 - 1.4 mg/dL    Calcium 8.6 (L) 8.7 - 10.5 mg/dL    Total Protein 6.7 6.0 - 8.4 g/dL    Albumin 2.1 (L) 3.5 - 5.2 g/dL    Total Bilirubin 0.1 0.1 - 1.0 mg/dL    Alkaline Phosphatase 71 55 - 135 U/L    AST 13 10 - 40 U/L    ALT 6 (L) 10 - 44 U/L    Anion Gap 10 8 - 16 mmol/L    eGFR if African American >60.0 >60 mL/min/1.73 m^2    eGFR if non African American >60.0 >60 mL/min/1.73 m^2   Magnesium    Collection Time: 07/24/19  3:47 AM   Result Value Ref Range    Magnesium 1.6 1.6 - 2.6 mg/dL   Phosphorus    Collection Time: 07/24/19  3:47 AM   Result Value Ref Range    Phosphorus 2.4 (L) 2.7 - 4.5 mg/dL   CBC auto differential    Collection Time: 07/24/19  3:47 AM   Result Value Ref Range    WBC 5.99 3.90 - 12.70 K/uL    RBC 2.81 (L) 4.00 - 5.40 M/uL    Hemoglobin 7.5 (L) 12.0 - 16.0 g/dL    Hematocrit 26.4 (L) 37.0 - 48.5 %    Mean Corpuscular Volume 94 82 - 98 fL    Mean Corpuscular Hemoglobin 26.7 (L) 27.0 - 31.0 pg    Mean Corpuscular Hemoglobin Conc 28.4 (L) 32.0 - 36.0 g/dL    RDW 15.6 (H) 11.5 - 14.5 %    Platelets 154 150 - 350 K/uL    MPV 11.3 9.2 - 12.9 fL    Immature Granulocytes 1.2 (H) 0.0 - 0.5 %    Gran # (ANC) 4.5 1.8 - 7.7 K/uL    Immature Grans (Abs) 0.07 (H) 0.00 -  0.04 K/uL    Lymph # 0.6 (L) 1.0 - 4.8 K/uL    Mono # 0.7 0.3 - 1.0 K/uL    Eos # 0.0 0.0 - 0.5 K/uL    Baso # 0.03 0.00 - 0.20 K/uL    nRBC 1 (A) 0 /100 WBC    Gran% 75.6 (H) 38.0 - 73.0 %    Lymph% 9.8 (L) 18.0 - 48.0 %    Mono% 12.2 4.0 - 15.0 %    Eosinophil% 0.7 0.0 - 8.0 %    Basophil% 0.5 0.0 - 1.9 %    Differential Method Automated    POCT glucose    Collection Time: 07/24/19  5:25 AM   Result Value Ref Range    POCT Glucose 158 (H) 70 - 110 mg/dL   ISTAT PROCEDURE    Collection Time: 07/24/19  5:38 AM   Result Value Ref Range    POC PH 7.398 7.35 - 7.45    POC PCO2 59.0 (HH) 35 - 45 mmHg    POC PO2 75 (L) 80 - 100 mmHg    POC HCO3 36.4 (H) 24 - 28 mmol/L    POC BE 12 -2 to 2 mmol/L    POC SATURATED O2 94 (L) 95 - 100 %    POC TCO2 38 (H) 23 - 27 mmol/L    Sample ARTERIAL     Site LR     Allens Test Pass     DelSys Nasal Can     Mode SPONT     Flow 2     Sp02 94    Lactic acid, plasma    Collection Time: 07/24/19  6:09 AM   Result Value Ref Range    Lactate (Lactic Acid) 1.5 0.5 - 2.2 mmol/L   ISTAT PROCEDURE    Collection Time: 07/24/19  8:33 AM   Result Value Ref Range    POC PH 7.381 7.35 - 7.45    POC PCO2 61.9 (H) 35 - 45 mmHg    POC PO2 47 40 - 60 mmHg    POC HCO3 36.7 (H) 24 - 28 mmol/L    POC BE 12 -2 to 2 mmol/L    POC SATURATED O2 80 (L) 95 - 100 %    POC TCO2 39 (H) 24 - 29 mmol/L    Rate 18     Sample VENOUS     Site Other     Allens Test N/A     DelSys CPAP/BiPAP     Mode BiPAP     FiO2 28     Spont Rate 50     Min Vol 10.3     Sp02 98     IP 12     EP 5        Diagnostic Results:  Chest X-ray- shows worse opacification of the left lung and increased markings of the pulmonary vasculature in the right lung    Assessment/Plan:     Acute hypercapnic respiratory failure  On 7/23 at night patient started to desat. Her heart rate increased to 130s. 02 sats were in the 70s and she was altered. Sats increased to the 90s with 6l o2 nasal canula but still altered. ABG revealed a CO2 of 59. Bipap was put  on the patient. Chest x-ray showed worse opacification of the left lung and increased pulmonary vasculature in the right lung. This could be due to fluid overload, infection, or worsening of her cancer. Lactate is within normal limits and she has been afebrile during hospital stay.       -Continue bipap  -Check VBGs  -D/C fluids  -80mg lasix  -80mg solumderol      Shortness of breath  Ms Capellan is a 74 yo F w PMHx significant for rheumatoid arthritis, HTN, and squamous cell carcinoma (unknown primary) resulting in lung and mediastinal masses s/p six cycles of palliative carboplatin/paclitaxel who presents with a chief complaint of shortness of breath. CT scan showed opacification of the left lung that is thought to be cancer  There maybe a small pleural effusion but not thought to be tapable at this time. . Patient is sating well on 2L of oxygen. After more discussion with the patient seems like the problem is with her pain regimen. She just feels uncomfortable taking breaths.     - Continue Nasal canula oxygen as needed  - Q4 PRN Percocet       Nausea  -Zofran first choice PRN  -Promethazine second choice PRN  -Metclopramide also available     Hypomagnesemia  Magnesium 1.1 on admission. Received 2g in the ER    -Replete as necessary     Hypokalemia  K+ 2.8 on admission. Received 60meq in the ED    -Replete K as necessary     Secondary and unspecified malignant neoplasm of intrathoracic lymph nodes  Pt of Dr Wong with history of squamous cell carcinoma with unknown primary. As such, she is to be treated as metastatic SCC. She completed cycles of Carboplatin/Paclitaxel, last treatment on 6/8/18. Currently receiving radiation therapy     Reflux esophagitis  Patient is complaining of some chest pain which is most likely due to her reflux.    -Protonix 40 mg daily    Essential hypertension  -Metoprolol (torprol-xl) 50mg oral daily     Insomnia, unspecified  -Ambien 5mg PRN     Non-seasonal  Rhinitis  -Cetirizine 5mg             Angelito Singer MD  Hematology/Oncology  [facility]        ATTENDING NOTE, ONCOLOGY INPATIENT TEAM    As above; events of last 24 hours noted.  Patient seen and examined, chart reviewed.  Appears quite uncomfortable, on bypap, in moderate to severe respiratory distress, using accessory muscles.  .  Lungs with reduced entry on the left and scattered ronchi  Abdomen is soft, nontender.  Labs reviewed.    PLAN  Patients is DNR; we talked to her about starting a morphine drip but she is adamantly opposed to it.  Continue supportive care, add solumedrol 50 mg Q 8 hours.  Prognosis is extremely poor; her daughter in law was made aware.     Winston Quan MD

## 2019-07-24 NOTE — CHAPLAIN
Patient has made herself a DNR but family was in disagreement. After speaking with the MD and team the daughter is accepting and is working to help other family members accept it also. Dtr values her mother's wishes and wants to honor them.  reflected that it must hurt her heart and dtr agreed, but is strong.  offered to hold patient and family in prayer and remains available as circumstances allow.

## 2019-07-24 NOTE — SIGNIFICANT EVENT
Called to bedside for increased work of breathing, tachycardia and AMS. Patient with , RR 36, SBP  131/80.Sating in the 70s off oxygen,  Patient waxing and waning but oriented to self. ABG ordered, lactate, xray ordered    ABG  with hypercarbia no acidosis, patient with 88 cc of urine and has not urinated      Plan   -  will start with  BIPAP 12/5 for acute hypercarbic respiratory failure  - Labs in the am are still pending   - Will follow up EKG   - sating 94% on 2 L     Will continue to monitor         Amarilis Rodarte MD, MPH  Internal Medicine PGY3    ATTENDING NOTE    Above note.  This patient was transferred straight to ICU from the ED, without being admitted to the oncology floor.

## 2019-07-24 NOTE — CARE UPDATE
Rapid Response Nurse Chart Check     Chart check completed, abnormal VS noted, bedside RN, Ana contacted, no concerns   verbalized at this time, instructed to call [phone] for further concerns or assistance.

## 2019-07-24 NOTE — CARE UPDATE
Rapid Response Respiratory Therapy Proactive Rounding Note      Time of visit: 821    Code Status: DNR   : 1945  Age: 73 y.o.  Weight:   Wt Readings from Last 1 Encounters:   19 85.5 kg (188 lb 7.9 oz)     Sex: female  Race: Black or    Bed: 808/808A:   MRN: [id number]    SITUATION     Evaluated patient for: Respiratory- BIPAP    BACKGROUND     Patient has a past medical history of Chronic obstructive pulmonary disease, Encounter for blood transfusion, GERD (gastroesophageal reflux disease), HTN (hypertension), Rheumatoid arthritis, Rheumatoid arthritis(714.0), and Squamous cell lung cancer, left.  Pulmonary Hx: SOB  Clinically Significant Surgical Hx: None    ASSESSMENT     Pulse: Pulse: (!) 125 Respiratory rate: Resp: (!) 27 Temperature: Temp: 99.5 °F (37.5 °C) BP: BP: 107/62 SpO2:SpO2: 98 %   Level of Consciousness: Level of Consciousness (AVPU): alert  Respiratory Effort: Respiratory Effort: Unlabored  Expansion/Accessory Muscle Usage: Expansion/Accessory Muscles/Retractions: expansion symmetric, no retractions, no use of accessory muscles  All Lung Field Breath Sounds: All Lung Fields Breath Sounds: clear  Cough Type:    Mobility at time of assessment: General Mobility: generalized weakness  O2 Device/Concentration: O2 Device (Oxygen Therapy): BiPAP   Flow (L/min): 28 Oxygen Concentration (%): 28  Most recent blood gas:   Recent Labs     19  0833   PH 7.381   PCO2 61.9*   PO2 47   HCO3 36.7*   POCSATURATED 80*   BE 12       Current Respiratory Care Orders:  19 1045  ipratropium 0.02 % nebulizer solution 0.5 mg (ipratropium (ATROVENT) 0.02 % nebulizer solution panel)    Discontinue    -- Verified NEBULIZATION Every 4 hours while awake        19 1200  Inhalation Treatment Q4H WAKE Every 4 hours while awake (3 of 15 released)    Release    19 0938   19 0836  Oxygen Continuous Continuous     References: Oxygen Titration Protocol   Question Answer  Comment   Device type: High flow    Device: High Flow Nasal Cannula (6 -15 Liters)    FiO2%: 50    LPM: 15    Titrate O2 per Oxygen Titration Protocol: Yes    To maintain SpO2 goal of: >= 88%    Notify MD of: Inability to achieve desired SpO2; Sudden change in patient status and requires 20% increase in FiO2; Patient requires >60% FiO2        07/24/19 0836   07/24/19 0541  Bipap Continuous Continuous (5 of 23 released)    Release   Question Answer Comment   FiO2% 30    Inspiratory pressure: 12    Expiratory pressure: 5        07/24/19 0540   07/22/19 2000  Pulse Oximetry Q4H Every 4 hours (14 of 37 released)    Release    07/22/19 1802   07/22/19 1937  Oxygen Continuous Continuous     Comments: Patient utilizes home O2 at 2LPM via NC   References: Oxygen Titration Protocol   Question Answer Comment   Device type: Low flow    Device: Nasal Cannula (1- 5 Liters)    LPM: 2    Titrate O2 per Oxygen Titration Protocol: Yes    To maintain SpO2 goal of: >= 90%    Notify MD of: Inability to achieve desired SpO2; Sudden change in patient status and requires 20% increase in FiO2; Patient requires >60% FiO2        07/22/19 1937   Unscheduled  Inhalation Treatment Q4H PRN Every 4 hours PRN (0 of 58108 released)    Release    07/24/19 0534   Unscheduled  POCT Venous Blood Gas Q4H PRN Every 4 hours PRN (0 of 28691 released)    Release   Comments: This test should be used for VBGs.  If using this order for other tests (K, creatinine, HCT, PT/INR, lactate etc)  ONLY do so in the case of an emergency or rapid response.   Notify Physician if: see parameters below.           NIPPV: Yes, Type: BIPAP  Settings: 12/5 28%  Surgical airway: No  ETCO2 monitored:    Ambu at bedside: Ambu bag with the patient?: Yes, Adult Ambu    INTERVENTIONS/RECOMMENDATIONS   ?  Continue current POC. Aerosol treatments ordered Q4W. Will continue to monitor.     Discussed plan of care with  primary RTMarianna.     FOLLOW-UP     Please call back the Rapid  Response RT, Katherin Morrison, CRT at x [phone] for any questions or concerns.

## 2019-07-24 NOTE — PLAN OF CARE
"Problem: Adult Inpatient Plan of Care  Goal: Plan of Care Review  Patient AAOX4 at beginning of shift and vital signs stable with HR slightly tachycardic. Pain medication and ambien provided at patient's request prior to sleep for pain and inability to sleep at 2230. Patient nauseous and dry heaving with HR in 130s at midnight vital signs, contacted MD and MD ordered EKG also provided PRN zofran. Patient still AAOX4. Patient on 2L O2 all night. Checked on patient Q1 hour 19:00 to midnight and then Q2h from midnight to 0600. At just before 0500, went to check patient's vital signs and patient had O2 off. Repositioned patient to place O2 back in patient's nose and patient was difficult to reposition tramaine, "no, no, no." Placed finger probe for pulse ox on patient's finger and initial reading was in low 70s after placing O2 back in patient's nose. Contacted Isreal (OC) to request urgent assistance in room stating situation and got current vital signs while continuing to attempt to arouse patient to vigorous clavicle rub and telling patient to take deep breaths through nose. O2 sats climbed to 95% with increasing O2 to 6L per OC's direction. Performed bladder scan as patient had not urinated since previous night shift (bladder scan was performed by day shift and very little urine was in patient's bladder). Patient had less than 80ml in bladder. Patient is arouseable and can be reoriented but is confused and easily drifts to sleep. Called to room by nurse at this time.  Patient was off oxygen and confused.  Placed oxygen on patient and increased to 6L to bring oxygen saturation above 95% at this time.  Weaned oxygen back down to 2L within a few minutes.  Dr. Rodarte to bedside at this time.  ABG and EKG performed.  Dr. Rodarte ordered bipap after seeing the ABG.  Patient more arousable but still slightly confused at this time. Will continue to monitor.      "

## 2019-07-24 NOTE — PROGRESS NOTES
07/23/19 2329   Vital Signs   Temp 99 °F (37.2 °C)   Temp src Oral   Pulse (!) 124   Heart Rate Source Monitor   Resp 12   SpO2 96 %   Pulse Oximetry Type Intermittent   Flow (L/min) 2   O2 Device (Oxygen Therapy) nasal cannula   /68   MAP (mmHg) 85   BP Location Left arm   BP Method Automatic   Patient Position Lying   Assessments (Pre/Post)   Level of Consciousness (AVPU) alert     Notified Dr. Rodarte of elevated HR and history of tachycardia during the day with elevated HR and SOB. Let her know that I evaluated HR secondary to PCT's reading and the new reading was sustained in the 130s. Dr. Rodarte ordered an EKG. Patient is not complaining of chest pain, SOB or any other symptoms.

## 2019-07-24 NOTE — SUBJECTIVE & OBJECTIVE
Interval History: Overnight patient decompensated. Heart rate in the 130s. Oxygen sats in the 70s. ABG showed a pco2 of 59. She was altered but sats increased into the 90s with 6l 02 nasal canula. Bipap was ordered which she is currently on. Chest x-ray was ordered    Oncology Treatment Plan:   OP ATEZOLIZUMAB Q3W    Medications:  Continuous Infusions:  Scheduled Meds:   cetirizine  5 mg Oral Daily    furosemide (LASIX) IV  80 mg Intravenous Once    gabapentin  300 mg Oral QHS    methylPREDNISolone sodium succinate  80 mg Intravenous Once    metoprolol succinate  50 mg Oral Daily    ondansetron  8 mg Intravenous Once    pantoprazole  40 mg Oral Daily    senna-docusate 8.6-50 mg  1 tablet Oral BID     PRN Meds:albuterol-ipratropium, Dextrose 10% Bolus, Dextrose 10% Bolus, glucagon (human recombinant), glucose, glucose, metoclopramide HCl, ondansetron, oxyCODONE, sodium chloride 0.9%, zolpidem     Review of Systems  Objective:     Vital Signs (Most Recent):  Temp: 99.5 °F (37.5 °C) (07/24/19 0808)  Pulse: (!) 125 (07/24/19 0820)  Resp: (!) 27 (07/24/19 0820)  BP: 107/62 (07/24/19 0808)  SpO2: 98 % (07/24/19 0820) Vital Signs (24h Range):  Temp:  [98.1 °F (36.7 °C)-99.5 °F (37.5 °C)] 99.5 °F (37.5 °C)  Pulse:  [108-145] 125  Resp:  [12-40] 27  SpO2:  [70 %-99 %] 98 %  BP: (101-149)/(56-80) 107/62     Weight: 85.5 kg (188 lb 7.9 oz)  Body mass index is 31.37 kg/m².  Body surface area is 1.98 meters squared.      Intake/Output Summary (Last 24 hours) at 7/24/2019 0836  Last data filed at 7/24/2019 0006  Gross per 24 hour   Intake 1182.5 ml   Output 150 ml   Net 1032.5 ml       Physical Exam    Significant Labs:   Recent Results (from the past 48 hour(s))   Brain natriuretic peptide    Collection Time: 07/22/19 10:17 AM   Result Value Ref Range    BNP 58 0 - 99 pg/mL   CBC auto differential    Collection Time: 07/22/19 10:17 AM   Result Value Ref Range    WBC 4.47 3.90 - 12.70 K/uL    RBC 3.47 (L) 4.00 - 5.40 M/uL     Hemoglobin 9.4 (L) 12.0 - 16.0 g/dL    Hematocrit 32.4 (L) 37.0 - 48.5 %    Mean Corpuscular Volume 93 82 - 98 fL    Mean Corpuscular Hemoglobin 27.1 27.0 - 31.0 pg    Mean Corpuscular Hemoglobin Conc 29.0 (L) 32.0 - 36.0 g/dL    RDW 15.2 (H) 11.5 - 14.5 %    Platelets 216 150 - 350 K/uL    MPV 10.5 9.2 - 12.9 fL    Immature Granulocytes 1.3 (H) 0.0 - 0.5 %    Gran # (ANC) 3.6 1.8 - 7.7 K/uL    Immature Grans (Abs) 0.06 (H) 0.00 - 0.04 K/uL    Lymph # 0.4 (L) 1.0 - 4.8 K/uL    Mono # 0.4 0.3 - 1.0 K/uL    Eos # 0.1 0.0 - 0.5 K/uL    Baso # 0.01 0.00 - 0.20 K/uL    nRBC 0 0 /100 WBC    Gran% 81.3 (H) 38.0 - 73.0 %    Lymph% 8.1 (L) 18.0 - 48.0 %    Mono% 7.8 4.0 - 15.0 %    Eosinophil% 1.3 0.0 - 8.0 %    Basophil% 0.2 0.0 - 1.9 %    Differential Method Automated    Comprehensive metabolic panel    Collection Time: 07/22/19 10:17 AM   Result Value Ref Range    Sodium 138 136 - 145 mmol/L    Potassium 2.8 (L) 3.5 - 5.1 mmol/L    Chloride 86 (L) 95 - 110 mmol/L    CO2 35 (H) 23 - 29 mmol/L    Glucose 95 70 - 110 mg/dL    BUN, Bld 8 8 - 23 mg/dL    Creatinine 0.7 0.5 - 1.4 mg/dL    Calcium 9.6 8.7 - 10.5 mg/dL    Total Protein 8.3 6.0 - 8.4 g/dL    Albumin 2.5 (L) 3.5 - 5.2 g/dL    Total Bilirubin 0.3 0.1 - 1.0 mg/dL    Alkaline Phosphatase 75 55 - 135 U/L    AST 14 10 - 40 U/L    ALT 7 (L) 10 - 44 U/L    Anion Gap 17 (H) 8 - 16 mmol/L    eGFR if African American >60.0 >60 mL/min/1.73 m^2    eGFR if non African American >60.0 >60 mL/min/1.73 m^2   Blood culture #1 **CANNOT BE ORDERED STAT**    Collection Time: 07/22/19 10:17 AM   Result Value Ref Range    Blood Culture, Routine No Growth to date     Blood Culture, Routine No Growth to date    Blood culture #2 **CANNOT BE ORDERED STAT**    Collection Time: 07/22/19 10:17 AM   Result Value Ref Range    Blood Culture, Routine No Growth to date     Blood Culture, Routine No Growth to date    Magnesium    Collection Time: 07/22/19 10:17 AM   Result Value Ref Range     Magnesium 1.1 (L) 1.6 - 2.6 mg/dL   Lipase    Collection Time: 07/22/19 10:17 AM   Result Value Ref Range    Lipase 18 4 - 60 U/L   Troponin I    Collection Time: 07/22/19 10:17 AM   Result Value Ref Range    Troponin I <0.006 0.000 - 0.026 ng/mL   Lactic acid, plasma    Collection Time: 07/22/19 10:39 AM   Result Value Ref Range    Lactate (Lactic Acid) 0.9 0.5 - 2.2 mmol/L   POCT glucose    Collection Time: 07/23/19 12:56 AM   Result Value Ref Range    POCT Glucose 106 70 - 110 mg/dL   Comprehensive Metabolic Panel (CMP)    Collection Time: 07/23/19  3:17 AM   Result Value Ref Range    Sodium 143 136 - 145 mmol/L    Potassium 3.3 (L) 3.5 - 5.1 mmol/L    Chloride 95 95 - 110 mmol/L    CO2 37 (H) 23 - 29 mmol/L    Glucose 95 70 - 110 mg/dL    BUN, Bld 5 (L) 8 - 23 mg/dL    Creatinine 0.7 0.5 - 1.4 mg/dL    Calcium 8.4 (L) 8.7 - 10.5 mg/dL    Total Protein 6.5 6.0 - 8.4 g/dL    Albumin 2.0 (L) 3.5 - 5.2 g/dL    Total Bilirubin 0.2 0.1 - 1.0 mg/dL    Alkaline Phosphatase 67 55 - 135 U/L    AST 13 10 - 40 U/L    ALT 6 (L) 10 - 44 U/L    Anion Gap 11 8 - 16 mmol/L    eGFR if African American >60.0 >60 mL/min/1.73 m^2    eGFR if non African American >60.0 >60 mL/min/1.73 m^2   Magnesium    Collection Time: 07/23/19  3:17 AM   Result Value Ref Range    Magnesium 1.5 (L) 1.6 - 2.6 mg/dL   Phosphorus    Collection Time: 07/23/19  3:17 AM   Result Value Ref Range    Phosphorus 2.6 (L) 2.7 - 4.5 mg/dL   CBC auto differential    Collection Time: 07/23/19  3:17 AM   Result Value Ref Range    WBC 4.73 3.90 - 12.70 K/uL    RBC 2.65 (L) 4.00 - 5.40 M/uL    Hemoglobin 7.1 (L) 12.0 - 16.0 g/dL    Hematocrit 24.6 (L) 37.0 - 48.5 %    Mean Corpuscular Volume 93 82 - 98 fL    Mean Corpuscular Hemoglobin 26.8 (L) 27.0 - 31.0 pg    Mean Corpuscular Hemoglobin Conc 28.9 (L) 32.0 - 36.0 g/dL    RDW 15.4 (H) 11.5 - 14.5 %    Platelets 168 150 - 350 K/uL    MPV 11.4 9.2 - 12.9 fL    Immature Granulocytes 1.5 (H) 0.0 - 0.5 %    Gran # (ANC)  3.5 1.8 - 7.7 K/uL    Immature Grans (Abs) 0.07 (H) 0.00 - 0.04 K/uL    Lymph # 0.5 (L) 1.0 - 4.8 K/uL    Mono # 0.6 0.3 - 1.0 K/uL    Eos # 0.1 0.0 - 0.5 K/uL    Baso # 0.02 0.00 - 0.20 K/uL    nRBC 0 0 /100 WBC    Gran% 74.2 (H) 38.0 - 73.0 %    Lymph% 9.9 (L) 18.0 - 48.0 %    Mono% 12.7 4.0 - 15.0 %    Eosinophil% 1.3 0.0 - 8.0 %    Basophil% 0.4 0.0 - 1.9 %    Differential Method Automated    POCT glucose    Collection Time: 07/23/19  6:26 AM   Result Value Ref Range    POCT Glucose 109 70 - 110 mg/dL   Urinalysis, Reflex to Urine Culture Urine, Clean Catch    Collection Time: 07/23/19  6:31 AM   Result Value Ref Range    Specimen UA Urine, Clean Catch     Color, UA Yellow Yellow, Straw, Kym    Appearance, UA Cloudy (A) Clear    pH, UA 6.0 5.0 - 8.0    Specific Gravity, UA >1.030 (A) 1.005 - 1.030    Protein, UA 1+ (A) Negative    Glucose, UA Negative Negative    Ketones, UA 1+ (A) Negative    Bilirubin (UA) Negative Negative    Occult Blood UA 1+ (A) Negative    Nitrite, UA Negative Negative    Leukocytes, UA Trace (A) Negative   Urinalysis Microscopic    Collection Time: 07/23/19  6:31 AM   Result Value Ref Range    RBC, UA 5 (H) 0 - 4 /hpf    WBC, UA 8 (H) 0 - 5 /hpf    Bacteria Few (A) None-Occ /hpf    Squam Epithel, UA 5 /hpf    Hyaline Casts, UA 0 0-1/lpf /lpf    Uric Acid Shirlene, UA Rare None-Moderate    Microscopic Comment SEE COMMENT    POCT glucose    Collection Time: 07/23/19 11:43 AM   Result Value Ref Range    POCT Glucose 120 (H) 70 - 110 mg/dL   CBC auto differential    Collection Time: 07/23/19  3:52 PM   Result Value Ref Range    WBC 4.28 3.90 - 12.70 K/uL    RBC 2.71 (L) 4.00 - 5.40 M/uL    Hemoglobin 7.3 (L) 12.0 - 16.0 g/dL    Hematocrit 25.0 (L) 37.0 - 48.5 %    Mean Corpuscular Volume 92 82 - 98 fL    Mean Corpuscular Hemoglobin 26.9 (L) 27.0 - 31.0 pg    Mean Corpuscular Hemoglobin Conc 29.2 (L) 32.0 - 36.0 g/dL    RDW 15.4 (H) 11.5 - 14.5 %    Platelets 167 150 - 350 K/uL    MPV 9.6 9.2 -  12.9 fL    Immature Granulocytes 1.4 (H) 0.0 - 0.5 %    Gran # (ANC) 3.2 1.8 - 7.7 K/uL    Immature Grans (Abs) 0.06 (H) 0.00 - 0.04 K/uL    Lymph # 0.4 (L) 1.0 - 4.8 K/uL    Mono # 0.6 0.3 - 1.0 K/uL    Eos # 0.1 0.0 - 0.5 K/uL    Baso # 0.01 0.00 - 0.20 K/uL    nRBC 1 (A) 0 /100 WBC    Gran% 74.3 (H) 38.0 - 73.0 %    Lymph% 9.6 (L) 18.0 - 48.0 %    Mono% 12.9 4.0 - 15.0 %    Eosinophil% 1.6 0.0 - 8.0 %    Basophil% 0.2 0.0 - 1.9 %    Differential Method Automated    Lactate dehydrogenase    Collection Time: 07/23/19  3:52 PM   Result Value Ref Range     110 - 260 U/L   Direct antiglobulin test    Collection Time: 07/23/19  3:52 PM   Result Value Ref Range    Direct Herman (MARIAM) NEG    Reticulocytes    Collection Time: 07/23/19  3:52 PM   Result Value Ref Range    Retic 1.7 0.5 - 2.5 %   POCT glucose    Collection Time: 07/23/19  6:06 PM   Result Value Ref Range    POCT Glucose 131 (H) 70 - 110 mg/dL   CBC auto differential    Collection Time: 07/23/19  8:04 PM   Result Value Ref Range    WBC 6.55 3.90 - 12.70 K/uL    RBC 2.76 (L) 4.00 - 5.40 M/uL    Hemoglobin 7.4 (L) 12.0 - 16.0 g/dL    Hematocrit 26.1 (L) 37.0 - 48.5 %    Mean Corpuscular Volume 95 82 - 98 fL    Mean Corpuscular Hemoglobin 26.8 (L) 27.0 - 31.0 pg    Mean Corpuscular Hemoglobin Conc 28.4 (L) 32.0 - 36.0 g/dL    RDW 15.6 (H) 11.5 - 14.5 %    Platelets 160 150 - 350 K/uL    MPV 9.9 9.2 - 12.9 fL    Immature Granulocytes 1.5 (H) 0.0 - 0.5 %    Gran # (ANC) 5.1 1.8 - 7.7 K/uL    Immature Grans (Abs) 0.10 (H) 0.00 - 0.04 K/uL    Lymph # 0.4 (L) 1.0 - 4.8 K/uL    Mono # 0.9 0.3 - 1.0 K/uL    Eos # 0.1 0.0 - 0.5 K/uL    Baso # 0.01 0.00 - 0.20 K/uL    nRBC 1 (A) 0 /100 WBC    Gran% 77.4 (H) 38.0 - 73.0 %    Lymph% 6.6 (L) 18.0 - 48.0 %    Mono% 13.1 4.0 - 15.0 %    Eosinophil% 1.2 0.0 - 8.0 %    Basophil% 0.2 0.0 - 1.9 %    Differential Method Automated    Comprehensive Metabolic Panel (CMP)    Collection Time: 07/24/19  3:47 AM   Result Value Ref  Range    Sodium 141 136 - 145 mmol/L    Potassium 4.1 3.5 - 5.1 mmol/L    Chloride 98 95 - 110 mmol/L    CO2 33 (H) 23 - 29 mmol/L    Glucose 129 (H) 70 - 110 mg/dL    BUN, Bld 5 (L) 8 - 23 mg/dL    Creatinine 0.7 0.5 - 1.4 mg/dL    Calcium 8.6 (L) 8.7 - 10.5 mg/dL    Total Protein 6.7 6.0 - 8.4 g/dL    Albumin 2.1 (L) 3.5 - 5.2 g/dL    Total Bilirubin 0.1 0.1 - 1.0 mg/dL    Alkaline Phosphatase 71 55 - 135 U/L    AST 13 10 - 40 U/L    ALT 6 (L) 10 - 44 U/L    Anion Gap 10 8 - 16 mmol/L    eGFR if African American >60.0 >60 mL/min/1.73 m^2    eGFR if non African American >60.0 >60 mL/min/1.73 m^2   Magnesium    Collection Time: 07/24/19  3:47 AM   Result Value Ref Range    Magnesium 1.6 1.6 - 2.6 mg/dL   Phosphorus    Collection Time: 07/24/19  3:47 AM   Result Value Ref Range    Phosphorus 2.4 (L) 2.7 - 4.5 mg/dL   CBC auto differential    Collection Time: 07/24/19  3:47 AM   Result Value Ref Range    WBC 5.99 3.90 - 12.70 K/uL    RBC 2.81 (L) 4.00 - 5.40 M/uL    Hemoglobin 7.5 (L) 12.0 - 16.0 g/dL    Hematocrit 26.4 (L) 37.0 - 48.5 %    Mean Corpuscular Volume 94 82 - 98 fL    Mean Corpuscular Hemoglobin 26.7 (L) 27.0 - 31.0 pg    Mean Corpuscular Hemoglobin Conc 28.4 (L) 32.0 - 36.0 g/dL    RDW 15.6 (H) 11.5 - 14.5 %    Platelets 154 150 - 350 K/uL    MPV 11.3 9.2 - 12.9 fL    Immature Granulocytes 1.2 (H) 0.0 - 0.5 %    Gran # (ANC) 4.5 1.8 - 7.7 K/uL    Immature Grans (Abs) 0.07 (H) 0.00 - 0.04 K/uL    Lymph # 0.6 (L) 1.0 - 4.8 K/uL    Mono # 0.7 0.3 - 1.0 K/uL    Eos # 0.0 0.0 - 0.5 K/uL    Baso # 0.03 0.00 - 0.20 K/uL    nRBC 1 (A) 0 /100 WBC    Gran% 75.6 (H) 38.0 - 73.0 %    Lymph% 9.8 (L) 18.0 - 48.0 %    Mono% 12.2 4.0 - 15.0 %    Eosinophil% 0.7 0.0 - 8.0 %    Basophil% 0.5 0.0 - 1.9 %    Differential Method Automated    POCT glucose    Collection Time: 07/24/19  5:25 AM   Result Value Ref Range    POCT Glucose 158 (H) 70 - 110 mg/dL   ISTAT PROCEDURE    Collection Time: 07/24/19  5:38 AM   Result Value  Ref Range    POC PH 7.398 7.35 - 7.45    POC PCO2 59.0 (HH) 35 - 45 mmHg    POC PO2 75 (L) 80 - 100 mmHg    POC HCO3 36.4 (H) 24 - 28 mmol/L    POC BE 12 -2 to 2 mmol/L    POC SATURATED O2 94 (L) 95 - 100 %    POC TCO2 38 (H) 23 - 27 mmol/L    Sample ARTERIAL     Site LR     Allens Test Pass     DelSys Nasal Can     Mode SPONT     Flow 2     Sp02 94    Lactic acid, plasma    Collection Time: 07/24/19  6:09 AM   Result Value Ref Range    Lactate (Lactic Acid) 1.5 0.5 - 2.2 mmol/L   ISTAT PROCEDURE    Collection Time: 07/24/19  8:33 AM   Result Value Ref Range    POC PH 7.381 7.35 - 7.45    POC PCO2 61.9 (H) 35 - 45 mmHg    POC PO2 47 40 - 60 mmHg    POC HCO3 36.7 (H) 24 - 28 mmol/L    POC BE 12 -2 to 2 mmol/L    POC SATURATED O2 80 (L) 95 - 100 %    POC TCO2 39 (H) 24 - 29 mmol/L    Rate 18     Sample VENOUS     Site Other     Allens Test N/A     DelSys CPAP/BiPAP     Mode BiPAP     FiO2 28     Spont Rate 50     Min Vol 10.3     Sp02 98     IP 12     EP 5        Diagnostic Results:  Chest X-ray- shows worse opacification of the left lung and increased markings of the pulmonary vasculature in the right lung

## 2019-07-24 NOTE — PROGRESS NOTES
Called to room by nurse at this time.  Patient was off oxygen and confused.  Placed oxygen on patient and increased to 6L to bring oxygen saturation above 95% at this time.  Weaned oxygen back down to 2L within a few minutes.  Dr. Rodarte to bedside at this time.  ABG and EKG performed.  Dr. Rodarte ordered bipap after seeing the ABG.  Patient more arousable but still slightly confused at this time.  Respirations down to 22-26 on bipap when asleep.  Will continue to monitor.

## 2019-07-24 NOTE — PLAN OF CARE
Problem: Adult Inpatient Plan of Care  Goal: Plan of Care Review  Outcome: Ongoing (interventions implemented as appropriate)  Last day of inpatient XRT to the esophagus. Tolerated treatment. Pt on O2/NC at 2%. No distress noted.

## 2019-07-25 NOTE — PLAN OF CARE
Problem: Adult Inpatient Plan of Care  Goal: Plan of Care Review  Outcome: Ongoing (interventions implemented as appropriate)  AAOX4.  VSS.  No complaints of pain so far this shift.  Pt has chaney draining clear yellow urine.  Accuchecks Q6.  DNR.  Pt remains on comfort flow, SPO2 is in the mid 90's.  Family is at the bedside.  Bed is in lowest position, call bell is in reach, and pt instructed to call nurse when needing assistance.  Will continue to monitor.

## 2019-07-25 NOTE — PROGRESS NOTES
Pt took bipap off and was hypoxic satting 67%. Put bipap back on. Pulled pt back up in bed. Called Rapid team and Med onc Dr. Way to inform of this. Pt now satting low 90s on bipap. See new orders.

## 2019-07-25 NOTE — PLAN OF CARE
Problem: Adult Inpatient Plan of Care  Goal: Plan of Care Review  Plan of care reviewed with patient at beginning of shift. On arrival pt was pulling off BIPAP, pulling out dentures and crying from pain. Charge nurse was notified and Dr. Way came to the bedside. IV was infiltrated so it was removed and a R 20 g in the wrist was inserted. Patient had dilaudid for pain and zofran for nausea. Patient was not responding, tacypneic, and tachycardic. Lasix was given as well. A silicone chaney was inserted and 800ml of urine was initially Pt is on tele with continuous pulse ox. Pt is a DNR and has been signed by the attending. Q1h. Afebrile. Bed alarm set. Bed locked in lowest position. Side rails up x2. Call bell within reach. BADL within reach. Rounding Q1H. Will continue to monitor.

## 2019-07-25 NOTE — CARE UPDATE
Rapid Response Respiratory Therapy Proactive Rounding Note      Time of visit: 925    Code Status: DNR   : 1945  Age: 73 y.o.  Weight:   Wt Readings from Last 1 Encounters:   19 85.5 kg (188 lb 7.9 oz)     Sex: female  Race: Black or    Bed: 808/808A:   MRN: [id number]    SITUATION     Evaluated patient for: O2 status    BACKGROUND     Patient has a past medical history of Chronic obstructive pulmonary disease, Encounter for blood transfusion, GERD (gastroesophageal reflux disease), HTN (hypertension), Rheumatoid arthritis, Rheumatoid arthritis(714.0), and Squamous cell lung cancer, left.  Pulmonary Hx: COPD  Clinically Significant Surgical Hx: None    ASSESSMENT     Pulse: Pulse: 85 Respiratory rate: Resp: 17 Temperature: Temp: 98.7 °F (37.1 °C) BP: BP: 116/64 SpO2:SpO2: 96 %   Level of Consciousness: Level of Consciousness (AVPU): alert  Respiratory Effort: Respiratory Effort: Normal, Unlabored  Expansion/Accessory Muscle Usage: Expansion/Accessory Muscles/Retractions: expansion symmetric, no retractions, no use of accessory muscles  All Lung Field Breath Sounds: All Lung Fields Breath Sounds: Anterior:, Lateral:, diminished  Cough Type: Cough Type: nonproductive  Mobility at time of assessment: General Mobility: generalized weakness  O2 Device/Concentration: O2 Device (Oxygen Therapy): Comfort Flow   Flow (L/min): 10 Oxygen Concentration (%): 45  Most recent blood gas:   Recent Labs     19  0833   PH 7.381   PCO2 61.9*   PO2 47   HCO3 36.7*   POCSATURATED 80*   BE 12       Current Respiratory Care Orders: O2, Tx,   Ambu at bedside: Ambu bag with the patient?: Yes, Adult Ambu    INTERVENTIONS/RECOMMENDATIONS   Pt on comfort flow.  Sats are stable and the RT assigned is weaning her throughout the day.  Will continue to monitor      FOLLOW-UP     Please call back the Rapid Response RT, Alissa Julien, RRT at x [phone] for any questions or concerns.

## 2019-07-25 NOTE — SUBJECTIVE & OBJECTIVE
Interval History: Patient doing much better today. Is breathing easier and is more alert. Was able to eat a lot yesterday. Today she complains of some nausea but overall is doing much better.     Oncology Treatment Plan:   OP ATEZOLIZUMAB Q3W    Medications:  Continuous Infusions:  Scheduled Meds:   ipratropium  0.5 mg Nebulization QID WAKE    methylPREDNISolone sodium succinate  50 mg Intravenous Q8H    metoprolol succinate  50 mg Oral Daily    ondansetron  8 mg Intravenous Once     PRN Meds:Dextrose 10% Bolus, Dextrose 10% Bolus, glucagon (human recombinant), glucose, glucose, HYDROmorphone, metoclopramide HCl, ondansetron, oxyCODONE, sodium chloride 0.9%     Review of Systems  Objective:     Vital Signs (Most Recent):  Temp: 98.1 °F (36.7 °C) (07/25/19 0345)  Pulse: 103 (07/25/19 0534)  Resp: 20 (07/25/19 0345)  BP: 135/80 (07/25/19 0345)  SpO2: 98 % (07/25/19 0534) Vital Signs (24h Range):  Temp:  [97.7 °F (36.5 °C)-99.5 °F (37.5 °C)] 98.1 °F (36.7 °C)  Pulse:  [] 103  Resp:  [19-38] 20  SpO2:  [67 %-100 %] 98 %  BP: ()/(62-80) 135/80     Weight: 85.5 kg (188 lb 7.9 oz)  Body mass index is 31.37 kg/m².  Body surface area is 1.98 meters squared.      Intake/Output Summary (Last 24 hours) at 7/25/2019 0723  Last data filed at 7/25/2019 0500  Gross per 24 hour   Intake 1680 ml   Output 2350 ml   Net -670 ml       Physical Exam   Constitutional: She is oriented to person, place, and time. She appears well-developed and well-nourished.   HENT:   Head: Normocephalic and atraumatic.   Eyes: Conjunctivae and EOM are normal.   Neck: Normal range of motion. No tracheal deviation present.   Cardiovascular: Normal rate and regular rhythm. Exam reveals no gallop and no friction rub.   No murmur heard.  Pulmonary/Chest: Effort normal. No respiratory distress.   No breath sounds on the Left lung   Abdominal: Soft. She exhibits no distension. There is no tenderness. There is no rebound and no guarding.    Musculoskeletal: Normal range of motion. She exhibits no edema.   Neurological: She is alert and oriented to person, place, and time. No cranial nerve deficit.   Skin: Skin is warm and dry.   Psychiatric: She has a normal mood and affect. Her behavior is normal.   Vitals reviewed.      Significant Labs:   Recent Results (from the past 48 hour(s))   POCT glucose    Collection Time: 07/23/19 11:43 AM   Result Value Ref Range    POCT Glucose 120 (H) 70 - 110 mg/dL   CBC auto differential    Collection Time: 07/23/19  3:52 PM   Result Value Ref Range    WBC 4.28 3.90 - 12.70 K/uL    RBC 2.71 (L) 4.00 - 5.40 M/uL    Hemoglobin 7.3 (L) 12.0 - 16.0 g/dL    Hematocrit 25.0 (L) 37.0 - 48.5 %    Mean Corpuscular Volume 92 82 - 98 fL    Mean Corpuscular Hemoglobin 26.9 (L) 27.0 - 31.0 pg    Mean Corpuscular Hemoglobin Conc 29.2 (L) 32.0 - 36.0 g/dL    RDW 15.4 (H) 11.5 - 14.5 %    Platelets 167 150 - 350 K/uL    MPV 9.6 9.2 - 12.9 fL    Immature Granulocytes 1.4 (H) 0.0 - 0.5 %    Gran # (ANC) 3.2 1.8 - 7.7 K/uL    Immature Grans (Abs) 0.06 (H) 0.00 - 0.04 K/uL    Lymph # 0.4 (L) 1.0 - 4.8 K/uL    Mono # 0.6 0.3 - 1.0 K/uL    Eos # 0.1 0.0 - 0.5 K/uL    Baso # 0.01 0.00 - 0.20 K/uL    nRBC 1 (A) 0 /100 WBC    Gran% 74.3 (H) 38.0 - 73.0 %    Lymph% 9.6 (L) 18.0 - 48.0 %    Mono% 12.9 4.0 - 15.0 %    Eosinophil% 1.6 0.0 - 8.0 %    Basophil% 0.2 0.0 - 1.9 %    Differential Method Automated    Lactate dehydrogenase    Collection Time: 07/23/19  3:52 PM   Result Value Ref Range     110 - 260 U/L   Direct antiglobulin test    Collection Time: 07/23/19  3:52 PM   Result Value Ref Range    Direct Herman (MARIAM) NEG    Reticulocytes    Collection Time: 07/23/19  3:52 PM   Result Value Ref Range    Retic 1.7 0.5 - 2.5 %   POCT glucose    Collection Time: 07/23/19  6:06 PM   Result Value Ref Range    POCT Glucose 131 (H) 70 - 110 mg/dL   CBC auto differential    Collection Time: 07/23/19  8:04 PM   Result Value Ref Range    WBC  6.55 3.90 - 12.70 K/uL    RBC 2.76 (L) 4.00 - 5.40 M/uL    Hemoglobin 7.4 (L) 12.0 - 16.0 g/dL    Hematocrit 26.1 (L) 37.0 - 48.5 %    Mean Corpuscular Volume 95 82 - 98 fL    Mean Corpuscular Hemoglobin 26.8 (L) 27.0 - 31.0 pg    Mean Corpuscular Hemoglobin Conc 28.4 (L) 32.0 - 36.0 g/dL    RDW 15.6 (H) 11.5 - 14.5 %    Platelets 160 150 - 350 K/uL    MPV 9.9 9.2 - 12.9 fL    Immature Granulocytes 1.5 (H) 0.0 - 0.5 %    Gran # (ANC) 5.1 1.8 - 7.7 K/uL    Immature Grans (Abs) 0.10 (H) 0.00 - 0.04 K/uL    Lymph # 0.4 (L) 1.0 - 4.8 K/uL    Mono # 0.9 0.3 - 1.0 K/uL    Eos # 0.1 0.0 - 0.5 K/uL    Baso # 0.01 0.00 - 0.20 K/uL    nRBC 1 (A) 0 /100 WBC    Gran% 77.4 (H) 38.0 - 73.0 %    Lymph% 6.6 (L) 18.0 - 48.0 %    Mono% 13.1 4.0 - 15.0 %    Eosinophil% 1.2 0.0 - 8.0 %    Basophil% 0.2 0.0 - 1.9 %    Differential Method Automated    Comprehensive Metabolic Panel (CMP)    Collection Time: 07/24/19  3:47 AM   Result Value Ref Range    Sodium 141 136 - 145 mmol/L    Potassium 4.1 3.5 - 5.1 mmol/L    Chloride 98 95 - 110 mmol/L    CO2 33 (H) 23 - 29 mmol/L    Glucose 129 (H) 70 - 110 mg/dL    BUN, Bld 5 (L) 8 - 23 mg/dL    Creatinine 0.7 0.5 - 1.4 mg/dL    Calcium 8.6 (L) 8.7 - 10.5 mg/dL    Total Protein 6.7 6.0 - 8.4 g/dL    Albumin 2.1 (L) 3.5 - 5.2 g/dL    Total Bilirubin 0.1 0.1 - 1.0 mg/dL    Alkaline Phosphatase 71 55 - 135 U/L    AST 13 10 - 40 U/L    ALT 6 (L) 10 - 44 U/L    Anion Gap 10 8 - 16 mmol/L    eGFR if African American >60.0 >60 mL/min/1.73 m^2    eGFR if non African American >60.0 >60 mL/min/1.73 m^2   Magnesium    Collection Time: 07/24/19  3:47 AM   Result Value Ref Range    Magnesium 1.6 1.6 - 2.6 mg/dL   Phosphorus    Collection Time: 07/24/19  3:47 AM   Result Value Ref Range    Phosphorus 2.4 (L) 2.7 - 4.5 mg/dL   CBC auto differential    Collection Time: 07/24/19  3:47 AM   Result Value Ref Range    WBC 5.99 3.90 - 12.70 K/uL    RBC 2.81 (L) 4.00 - 5.40 M/uL    Hemoglobin 7.5 (L) 12.0 - 16.0 g/dL     Hematocrit 26.4 (L) 37.0 - 48.5 %    Mean Corpuscular Volume 94 82 - 98 fL    Mean Corpuscular Hemoglobin 26.7 (L) 27.0 - 31.0 pg    Mean Corpuscular Hemoglobin Conc 28.4 (L) 32.0 - 36.0 g/dL    RDW 15.6 (H) 11.5 - 14.5 %    Platelets 154 150 - 350 K/uL    MPV 11.3 9.2 - 12.9 fL    Immature Granulocytes 1.2 (H) 0.0 - 0.5 %    Gran # (ANC) 4.5 1.8 - 7.7 K/uL    Immature Grans (Abs) 0.07 (H) 0.00 - 0.04 K/uL    Lymph # 0.6 (L) 1.0 - 4.8 K/uL    Mono # 0.7 0.3 - 1.0 K/uL    Eos # 0.0 0.0 - 0.5 K/uL    Baso # 0.03 0.00 - 0.20 K/uL    nRBC 1 (A) 0 /100 WBC    Gran% 75.6 (H) 38.0 - 73.0 %    Lymph% 9.8 (L) 18.0 - 48.0 %    Mono% 12.2 4.0 - 15.0 %    Eosinophil% 0.7 0.0 - 8.0 %    Basophil% 0.5 0.0 - 1.9 %    Differential Method Automated    POCT glucose    Collection Time: 07/24/19  5:25 AM   Result Value Ref Range    POCT Glucose 158 (H) 70 - 110 mg/dL   ISTAT PROCEDURE    Collection Time: 07/24/19  5:38 AM   Result Value Ref Range    POC PH 7.398 7.35 - 7.45    POC PCO2 59.0 (HH) 35 - 45 mmHg    POC PO2 75 (L) 80 - 100 mmHg    POC HCO3 36.4 (H) 24 - 28 mmol/L    POC BE 12 -2 to 2 mmol/L    POC SATURATED O2 94 (L) 95 - 100 %    POC TCO2 38 (H) 23 - 27 mmol/L    Sample ARTERIAL     Site LR     Allens Test Pass     DelSys Nasal Can     Mode SPONT     Flow 2     Sp02 94    Lactic acid, plasma    Collection Time: 07/24/19  6:09 AM   Result Value Ref Range    Lactate (Lactic Acid) 1.5 0.5 - 2.2 mmol/L   ISTAT PROCEDURE    Collection Time: 07/24/19  8:33 AM   Result Value Ref Range    POC PH 7.381 7.35 - 7.45    POC PCO2 61.9 (H) 35 - 45 mmHg    POC PO2 47 40 - 60 mmHg    POC HCO3 36.7 (H) 24 - 28 mmol/L    POC BE 12 -2 to 2 mmol/L    POC SATURATED O2 80 (L) 95 - 100 %    POC TCO2 39 (H) 24 - 29 mmol/L    Rate 18     Sample VENOUS     Site Other     Allens Test N/A     DelSys CPAP/BiPAP     Mode BiPAP     FiO2 28     Spont Rate 50     Min Vol 10.3     Sp02 98     IP 12     EP 5    POCT glucose    Collection Time: 07/24/19   4:01 PM   Result Value Ref Range    POCT Glucose 178 (H) 70 - 110 mg/dL   CBC auto differential    Collection Time: 07/24/19  7:57 PM   Result Value Ref Range    WBC 6.81 3.90 - 12.70 K/uL    RBC 2.73 (L) 4.00 - 5.40 M/uL    Hemoglobin 7.3 (L) 12.0 - 16.0 g/dL    Hematocrit 25.3 (L) 37.0 - 48.5 %    Mean Corpuscular Volume 93 82 - 98 fL    Mean Corpuscular Hemoglobin 26.7 (L) 27.0 - 31.0 pg    Mean Corpuscular Hemoglobin Conc 28.9 (L) 32.0 - 36.0 g/dL    RDW 15.6 (H) 11.5 - 14.5 %    Platelets 179 150 - 350 K/uL    MPV 10.7 9.2 - 12.9 fL    Immature Granulocytes 1.5 (H) 0.0 - 0.5 %    Gran # (ANC) 6.2 1.8 - 7.7 K/uL    Immature Grans (Abs) 0.10 (H) 0.00 - 0.04 K/uL    Lymph # 0.3 (L) 1.0 - 4.8 K/uL    Mono # 0.2 (L) 0.3 - 1.0 K/uL    Eos # 0.0 0.0 - 0.5 K/uL    Baso # 0.01 0.00 - 0.20 K/uL    nRBC 0 0 /100 WBC    Gran% 90.9 (H) 38.0 - 73.0 %    Lymph% 4.6 (L) 18.0 - 48.0 %    Mono% 2.9 (L) 4.0 - 15.0 %    Eosinophil% 0.0 0.0 - 8.0 %    Basophil% 0.1 0.0 - 1.9 %    Differential Method Automated    POCT glucose    Collection Time: 07/24/19 11:26 PM   Result Value Ref Range    POCT Glucose 171 (H) 70 - 110 mg/dL   Comprehensive Metabolic Panel (CMP)    Collection Time: 07/25/19  4:38 AM   Result Value Ref Range    Sodium 136 136 - 145 mmol/L    Potassium 4.3 3.5 - 5.1 mmol/L    Chloride 93 (L) 95 - 110 mmol/L    CO2 35 (H) 23 - 29 mmol/L    Glucose 135 (H) 70 - 110 mg/dL    BUN, Bld 8 8 - 23 mg/dL    Creatinine 0.8 0.5 - 1.4 mg/dL    Calcium 8.7 8.7 - 10.5 mg/dL    Total Protein 6.8 6.0 - 8.4 g/dL    Albumin 2.1 (L) 3.5 - 5.2 g/dL    Total Bilirubin 0.2 0.1 - 1.0 mg/dL    Alkaline Phosphatase 65 55 - 135 U/L    AST 17 10 - 40 U/L    ALT 8 (L) 10 - 44 U/L    Anion Gap 8 8 - 16 mmol/L    eGFR if African American >60.0 >60 mL/min/1.73 m^2    eGFR if non African American >60.0 >60 mL/min/1.73 m^2   Magnesium    Collection Time: 07/25/19  4:38 AM   Result Value Ref Range    Magnesium 1.3 (L) 1.6 - 2.6 mg/dL   Phosphorus     Collection Time: 07/25/19  4:38 AM   Result Value Ref Range    Phosphorus 2.9 2.7 - 4.5 mg/dL   CBC auto differential    Collection Time: 07/25/19  4:38 AM   Result Value Ref Range    WBC 6.39 3.90 - 12.70 K/uL    RBC 2.61 (L) 4.00 - 5.40 M/uL    Hemoglobin 6.9 (L) 12.0 - 16.0 g/dL    Hematocrit 23.2 (L) 37.0 - 48.5 %    Mean Corpuscular Volume 89 82 - 98 fL    Mean Corpuscular Hemoglobin 26.4 (L) 27.0 - 31.0 pg    Mean Corpuscular Hemoglobin Conc 29.7 (L) 32.0 - 36.0 g/dL    RDW 15.3 (H) 11.5 - 14.5 %    Platelets 170 150 - 350 K/uL    MPV 10.7 9.2 - 12.9 fL    Immature Granulocytes 1.4 (H) 0.0 - 0.5 %    Gran # (ANC) 5.5 1.8 - 7.7 K/uL    Immature Grans (Abs) 0.09 (H) 0.00 - 0.04 K/uL    Lymph # 0.4 (L) 1.0 - 4.8 K/uL    Mono # 0.4 0.3 - 1.0 K/uL    Eos # 0.0 0.0 - 0.5 K/uL    Baso # 0.00 0.00 - 0.20 K/uL    nRBC 0 0 /100 WBC    Gran% 86.6 (H) 38.0 - 73.0 %    Lymph% 5.9 (L) 18.0 - 48.0 %    Mono% 6.1 4.0 - 15.0 %    Eosinophil% 0.0 0.0 - 8.0 %    Basophil% 0.0 0.0 - 1.9 %    Differential Method Automated    POCT glucose    Collection Time: 07/25/19  5:54 AM   Result Value Ref Range    POCT Glucose 132 (H) 70 - 110 mg/dL   {    Diagnostic Results:  I have reviewed all pertinent imaging results/findings within the past 24 hours.

## 2019-07-25 NOTE — CODE/ RAPID DOCUMENTATION
Rapid Response Nurse Chart Check     Chart check completed, abnormal VS noted, Charge Nurse Jenni RIOS,  contacted, no concerns   verbalized at this time, instructed to call [phone] for further concerns or assistance.

## 2019-07-25 NOTE — PROGRESS NOTES
[facility]  Hematology/Oncology  Progress Note    Patient Name: Silvia Capellan  Admission Date: 7/22/2019  Hospital Length of Stay: 3 days  Code Status: DNR     Subjective:     HPI:  Ms Capellan is a 72 yo F w PMHx significant for rheumatoid arthritis, HTN, and squamous cell carcinoma (unknown primary) resulting in lung and mediastinal masses s/p six cycles of palliative carboplatin/paclitaxel who presents with a chief complaint of shortness of breath. Patient states it started about a month but it has progressively gotten worse. Yesterday she couldn't sleep and was very uncofmortable. She does well at rest but walking is what brings it on. She is on 2l of oxygen at home. Doesn't get worse laying down. She also has this chest pain on her right side that moves around. She says it is similar to her acid reflux pain. She has also had poor PO intake for over a month. Had a esophageal stent placed about 2 weeks ago to help, but is still having poor intake. Complains of nausea, poor appetite and vomitting. She has lost over 50 pounds in the past few months. She has not had chemo in over a year but is currently undergoing radiation therapy.     In the ED, patient was found to have low potassium and Magnesium. They started repleting those. She was having sats in the lower 80s off of oxygen but with 2L she was in the 90s. ED got a ct scan and it showed no PE.   Oncology History per Dr Wong's most recent clinic note (6/7):     Diagnosis: Squamous cell carcinoma , primary site unknown    Molecular/genetic testing: p16 negative; PD L1 could not be run due to inadequate tissue   Stage:        Stage 4   Treatment: Carboplatin/paclitaxel x  6 cycles (2/16/18 - 6/8/18)     HPI:  is a 71 y/o female who underwent bronchoscopy/EBUS evaluation on 8/31/17 for abnormal mediastinal adenopathy  and a 1.2 cm MORRO mass.     Bronchoscopy findings:    The oropharynx appears normal. The larynx appears  normal. The vocal cords appear normal. The subglottic space is normal. The trachea is of normal caliber. The otf is sharp. The tracheobronchial tree was examined to at least the first subsegmental level. Bronchial mucosa and anatomy are normal; there are no endobronchial lesions, and no secretions.    Lymph Nodes: Lymph node sizing was performed via endobronchial ultrasound for suspected lung cancer. Sampling by transbronchial needle aspiration was also performed using an Olympus EBUS-TBNA 22  gauge needle in the subcarinal mediastinum (level 7) and sent for routine cytology.       - The 7 (subcarinal) node was 30 mm by EBUS. Three samples with the needle were obtained. The patient's condition was unchanged after the intervention.      Her treatment got delayed as she cancelled several appointments due to some issues at home. She started Caroplatin/paclitaxel palliatively. She had left thoracentesis on 2/9/18. There were atypical cells in the pleural fluid, highly suspicious for malignancy.  She completed cycles of Carboplatin/Paclitaxel, last treatment on 6/8/18.     She had repeat left pleural mass FNA in [facility] 2019. It was negative for malignancy, but showed lympho plasmacytic infiltration.      PET CT on 3/13/19 showed increased  hypermetabolism of the left pleural effusion.Thoracic vertebral body hypermetabolism was noted, new from the prior exam (compared with PET from June 2018) without underlying CT changes which may represent red marrow hyperplasia or new site of neoplastic disease.  There was new hypermetabolism in the right L5-S1 facet joints which appeared to be centered at the facet joint not within the bone and is favored to represent degenerative change or neoplasm.  She has been non -compliant with her appointments here.        Interval history: She is here for a follow up visit. She has fatigue, dyspnea. She has intermittent nausea from the past 2 weeks, with emesis. She was evaluated She has  worsening dyspnea. No weight loss. No headache.   She was evaluated on 5/8/19 for nausea and abdominal pain at ER and received IV fluids. On 5/13/19 she was again in the ER , this time for dyspnea. She was evaluated with chest X ray and thoracentesis was attempted, but the fluid volume was very little and was not done.  She has no constipation or diarrhea. No fever.   She was in ER again on 6/3/19 for nausea and emesis. She was started on reglan. She was given iv fluids. She has persistent nausea and emesis.    Interval History: Patient doing much better today. Is breathing easier and is more alert. Was able to eat a lot yesterday. Today she complains of some nausea but overall is doing much better.     Oncology Treatment Plan:   OP ATEZOLIZUMAB Q3W    Medications:  Continuous Infusions:  Scheduled Meds:   ipratropium  0.5 mg Nebulization QID WAKE    methylPREDNISolone sodium succinate  50 mg Intravenous Q8H    metoprolol succinate  50 mg Oral Daily    ondansetron  8 mg Intravenous Once     PRN Meds:Dextrose 10% Bolus, Dextrose 10% Bolus, glucagon (human recombinant), glucose, glucose, HYDROmorphone, metoclopramide HCl, ondansetron, oxyCODONE, sodium chloride 0.9%     Review of Systems  Objective:     Vital Signs (Most Recent):  Temp: 98.1 °F (36.7 °C) (07/25/19 0345)  Pulse: 103 (07/25/19 0534)  Resp: 20 (07/25/19 0345)  BP: 135/80 (07/25/19 0345)  SpO2: 98 % (07/25/19 0534) Vital Signs (24h Range):  Temp:  [97.7 °F (36.5 °C)-99.5 °F (37.5 °C)] 98.1 °F (36.7 °C)  Pulse:  [] 103  Resp:  [19-38] 20  SpO2:  [67 %-100 %] 98 %  BP: ()/(62-80) 135/80     Weight: 85.5 kg (188 lb 7.9 oz)  Body mass index is 31.37 kg/m².  Body surface area is 1.98 meters squared.      Intake/Output Summary (Last 24 hours) at 7/25/201925/2019 0723  Last data filed at 7/25/2019 0500  Gross per 24 hour   Intake 1680 ml   Output 2350 ml   Net -670 ml       Physical Exam   Constitutional: She is oriented to person, place, and time. She  appears well-developed and well-nourished.   HENT:   Head: Normocephalic and atraumatic.   Eyes: Conjunctivae and EOM are normal.   Neck: Normal range of motion. No tracheal deviation present.   Cardiovascular: Normal rate and regular rhythm. Exam reveals no gallop and no friction rub.   No murmur heard.  Pulmonary/Chest: Effort normal. No respiratory distress.   No breath sounds on the Left lung   Abdominal: Soft. She exhibits no distension. There is no tenderness. There is no rebound and no guarding.   Musculoskeletal: Normal range of motion. She exhibits no edema.   Neurological: She is alert and oriented to person, place, and time. No cranial nerve deficit.   Skin: Skin is warm and dry.   Psychiatric: She has a normal mood and affect. Her behavior is normal.   Vitals reviewed.      Significant Labs:   Recent Results (from the past 48 hour(s))   POCT glucose    Collection Time: 07/23/19 11:43 AM   Result Value Ref Range    POCT Glucose 120 (H) 70 - 110 mg/dL   CBC auto differential    Collection Time: 07/23/19  3:52 PM   Result Value Ref Range    WBC 4.28 3.90 - 12.70 K/uL    RBC 2.71 (L) 4.00 - 5.40 M/uL    Hemoglobin 7.3 (L) 12.0 - 16.0 g/dL    Hematocrit 25.0 (L) 37.0 - 48.5 %    Mean Corpuscular Volume 92 82 - 98 fL    Mean Corpuscular Hemoglobin 26.9 (L) 27.0 - 31.0 pg    Mean Corpuscular Hemoglobin Conc 29.2 (L) 32.0 - 36.0 g/dL    RDW 15.4 (H) 11.5 - 14.5 %    Platelets 167 150 - 350 K/uL    MPV 9.6 9.2 - 12.9 fL    Immature Granulocytes 1.4 (H) 0.0 - 0.5 %    Gran # (ANC) 3.2 1.8 - 7.7 K/uL    Immature Grans (Abs) 0.06 (H) 0.00 - 0.04 K/uL    Lymph # 0.4 (L) 1.0 - 4.8 K/uL    Mono # 0.6 0.3 - 1.0 K/uL    Eos # 0.1 0.0 - 0.5 K/uL    Baso # 0.01 0.00 - 0.20 K/uL    nRBC 1 (A) 0 /100 WBC    Gran% 74.3 (H) 38.0 - 73.0 %    Lymph% 9.6 (L) 18.0 - 48.0 %    Mono% 12.9 4.0 - 15.0 %    Eosinophil% 1.6 0.0 - 8.0 %    Basophil% 0.2 0.0 - 1.9 %    Differential Method Automated    Lactate dehydrogenase    Collection  Time: 07/23/19  3:52 PM   Result Value Ref Range     110 - 260 U/L   Direct antiglobulin test    Collection Time: 07/23/19  3:52 PM   Result Value Ref Range    Direct Herman (MARIAM) NEG    Reticulocytes    Collection Time: 07/23/19  3:52 PM   Result Value Ref Range    Retic 1.7 0.5 - 2.5 %   POCT glucose    Collection Time: 07/23/19  6:06 PM   Result Value Ref Range    POCT Glucose 131 (H) 70 - 110 mg/dL   CBC auto differential    Collection Time: 07/23/19  8:04 PM   Result Value Ref Range    WBC 6.55 3.90 - 12.70 K/uL    RBC 2.76 (L) 4.00 - 5.40 M/uL    Hemoglobin 7.4 (L) 12.0 - 16.0 g/dL    Hematocrit 26.1 (L) 37.0 - 48.5 %    Mean Corpuscular Volume 95 82 - 98 fL    Mean Corpuscular Hemoglobin 26.8 (L) 27.0 - 31.0 pg    Mean Corpuscular Hemoglobin Conc 28.4 (L) 32.0 - 36.0 g/dL    RDW 15.6 (H) 11.5 - 14.5 %    Platelets 160 150 - 350 K/uL    MPV 9.9 9.2 - 12.9 fL    Immature Granulocytes 1.5 (H) 0.0 - 0.5 %    Gran # (ANC) 5.1 1.8 - 7.7 K/uL    Immature Grans (Abs) 0.10 (H) 0.00 - 0.04 K/uL    Lymph # 0.4 (L) 1.0 - 4.8 K/uL    Mono # 0.9 0.3 - 1.0 K/uL    Eos # 0.1 0.0 - 0.5 K/uL    Baso # 0.01 0.00 - 0.20 K/uL    nRBC 1 (A) 0 /100 WBC    Gran% 77.4 (H) 38.0 - 73.0 %    Lymph% 6.6 (L) 18.0 - 48.0 %    Mono% 13.1 4.0 - 15.0 %    Eosinophil% 1.2 0.0 - 8.0 %    Basophil% 0.2 0.0 - 1.9 %    Differential Method Automated    Comprehensive Metabolic Panel (CMP)    Collection Time: 07/24/19  3:47 AM   Result Value Ref Range    Sodium 141 136 - 145 mmol/L    Potassium 4.1 3.5 - 5.1 mmol/L    Chloride 98 95 - 110 mmol/L    CO2 33 (H) 23 - 29 mmol/L    Glucose 129 (H) 70 - 110 mg/dL    BUN, Bld 5 (L) 8 - 23 mg/dL    Creatinine 0.7 0.5 - 1.4 mg/dL    Calcium 8.6 (L) 8.7 - 10.5 mg/dL    Total Protein 6.7 6.0 - 8.4 g/dL    Albumin 2.1 (L) 3.5 - 5.2 g/dL    Total Bilirubin 0.1 0.1 - 1.0 mg/dL    Alkaline Phosphatase 71 55 - 135 U/L    AST 13 10 - 40 U/L    ALT 6 (L) 10 - 44 U/L    Anion Gap 10 8 - 16 mmol/L    eGFR if  African American >60.0 >60 mL/min/1.73 m^2    eGFR if non African American >60.0 >60 mL/min/1.73 m^2   Magnesium    Collection Time: 07/24/19  3:47 AM   Result Value Ref Range    Magnesium 1.6 1.6 - 2.6 mg/dL   Phosphorus    Collection Time: 07/24/19  3:47 AM   Result Value Ref Range    Phosphorus 2.4 (L) 2.7 - 4.5 mg/dL   CBC auto differential    Collection Time: 07/24/19  3:47 AM   Result Value Ref Range    WBC 5.99 3.90 - 12.70 K/uL    RBC 2.81 (L) 4.00 - 5.40 M/uL    Hemoglobin 7.5 (L) 12.0 - 16.0 g/dL    Hematocrit 26.4 (L) 37.0 - 48.5 %    Mean Corpuscular Volume 94 82 - 98 fL    Mean Corpuscular Hemoglobin 26.7 (L) 27.0 - 31.0 pg    Mean Corpuscular Hemoglobin Conc 28.4 (L) 32.0 - 36.0 g/dL    RDW 15.6 (H) 11.5 - 14.5 %    Platelets 154 150 - 350 K/uL    MPV 11.3 9.2 - 12.9 fL    Immature Granulocytes 1.2 (H) 0.0 - 0.5 %    Gran # (ANC) 4.5 1.8 - 7.7 K/uL    Immature Grans (Abs) 0.07 (H) 0.00 - 0.04 K/uL    Lymph # 0.6 (L) 1.0 - 4.8 K/uL    Mono # 0.7 0.3 - 1.0 K/uL    Eos # 0.0 0.0 - 0.5 K/uL    Baso # 0.03 0.00 - 0.20 K/uL    nRBC 1 (A) 0 /100 WBC    Gran% 75.6 (H) 38.0 - 73.0 %    Lymph% 9.8 (L) 18.0 - 48.0 %    Mono% 12.2 4.0 - 15.0 %    Eosinophil% 0.7 0.0 - 8.0 %    Basophil% 0.5 0.0 - 1.9 %    Differential Method Automated    POCT glucose    Collection Time: 07/24/19  5:25 AM   Result Value Ref Range    POCT Glucose 158 (H) 70 - 110 mg/dL   ISTAT PROCEDURE    Collection Time: 07/24/19  5:38 AM   Result Value Ref Range    POC PH 7.398 7.35 - 7.45    POC PCO2 59.0 (HH) 35 - 45 mmHg    POC PO2 75 (L) 80 - 100 mmHg    POC HCO3 36.4 (H) 24 - 28 mmol/L    POC BE 12 -2 to 2 mmol/L    POC SATURATED O2 94 (L) 95 - 100 %    POC TCO2 38 (H) 23 - 27 mmol/L    Sample ARTERIAL     Site LR     Allens Test Pass     DelSys Nasal Can     Mode SPONT     Flow 2     Sp02 94    Lactic acid, plasma    Collection Time: 07/24/19  6:09 AM   Result Value Ref Range    Lactate (Lactic Acid) 1.5 0.5 - 2.2 mmol/L   ISTAT PROCEDURE     Collection Time: 07/24/19  8:33 AM   Result Value Ref Range    POC PH 7.381 7.35 - 7.45    POC PCO2 61.9 (H) 35 - 45 mmHg    POC PO2 47 40 - 60 mmHg    POC HCO3 36.7 (H) 24 - 28 mmol/L    POC BE 12 -2 to 2 mmol/L    POC SATURATED O2 80 (L) 95 - 100 %    POC TCO2 39 (H) 24 - 29 mmol/L    Rate 18     Sample VENOUS     Site Other     Allens Test N/A     DelSys CPAP/BiPAP     Mode BiPAP     FiO2 28     Spont Rate 50     Min Vol 10.3     Sp02 98     IP 12     EP 5    POCT glucose    Collection Time: 07/24/19  4:01 PM   Result Value Ref Range    POCT Glucose 178 (H) 70 - 110 mg/dL   CBC auto differential    Collection Time: 07/24/19  7:57 PM   Result Value Ref Range    WBC 6.81 3.90 - 12.70 K/uL    RBC 2.73 (L) 4.00 - 5.40 M/uL    Hemoglobin 7.3 (L) 12.0 - 16.0 g/dL    Hematocrit 25.3 (L) 37.0 - 48.5 %    Mean Corpuscular Volume 93 82 - 98 fL    Mean Corpuscular Hemoglobin 26.7 (L) 27.0 - 31.0 pg    Mean Corpuscular Hemoglobin Conc 28.9 (L) 32.0 - 36.0 g/dL    RDW 15.6 (H) 11.5 - 14.5 %    Platelets 179 150 - 350 K/uL    MPV 10.7 9.2 - 12.9 fL    Immature Granulocytes 1.5 (H) 0.0 - 0.5 %    Gran # (ANC) 6.2 1.8 - 7.7 K/uL    Immature Grans (Abs) 0.10 (H) 0.00 - 0.04 K/uL    Lymph # 0.3 (L) 1.0 - 4.8 K/uL    Mono # 0.2 (L) 0.3 - 1.0 K/uL    Eos # 0.0 0.0 - 0.5 K/uL    Baso # 0.01 0.00 - 0.20 K/uL    nRBC 0 0 /100 WBC    Gran% 90.9 (H) 38.0 - 73.0 %    Lymph% 4.6 (L) 18.0 - 48.0 %    Mono% 2.9 (L) 4.0 - 15.0 %    Eosinophil% 0.0 0.0 - 8.0 %    Basophil% 0.1 0.0 - 1.9 %    Differential Method Automated    POCT glucose    Collection Time: 07/24/19 11:26 PM   Result Value Ref Range    POCT Glucose 171 (H) 70 - 110 mg/dL   Comprehensive Metabolic Panel (CMP)    Collection Time: 07/25/19  4:38 AM   Result Value Ref Range    Sodium 136 136 - 145 mmol/L    Potassium 4.3 3.5 - 5.1 mmol/L    Chloride 93 (L) 95 - 110 mmol/L    CO2 35 (H) 23 - 29 mmol/L    Glucose 135 (H) 70 - 110 mg/dL    BUN, Bld 8 8 - 23 mg/dL    Creatinine 0.8  0.5 - 1.4 mg/dL    Calcium 8.7 8.7 - 10.5 mg/dL    Total Protein 6.8 6.0 - 8.4 g/dL    Albumin 2.1 (L) 3.5 - 5.2 g/dL    Total Bilirubin 0.2 0.1 - 1.0 mg/dL    Alkaline Phosphatase 65 55 - 135 U/L    AST 17 10 - 40 U/L    ALT 8 (L) 10 - 44 U/L    Anion Gap 8 8 - 16 mmol/L    eGFR if African American >60.0 >60 mL/min/1.73 m^2    eGFR if non African American >60.0 >60 mL/min/1.73 m^2   Magnesium    Collection Time: 07/25/19  4:38 AM   Result Value Ref Range    Magnesium 1.3 (L) 1.6 - 2.6 mg/dL   Phosphorus    Collection Time: 07/25/19  4:38 AM   Result Value Ref Range    Phosphorus 2.9 2.7 - 4.5 mg/dL   CBC auto differential    Collection Time: 07/25/19  4:38 AM   Result Value Ref Range    WBC 6.39 3.90 - 12.70 K/uL    RBC 2.61 (L) 4.00 - 5.40 M/uL    Hemoglobin 6.9 (L) 12.0 - 16.0 g/dL    Hematocrit 23.2 (L) 37.0 - 48.5 %    Mean Corpuscular Volume 89 82 - 98 fL    Mean Corpuscular Hemoglobin 26.4 (L) 27.0 - 31.0 pg    Mean Corpuscular Hemoglobin Conc 29.7 (L) 32.0 - 36.0 g/dL    RDW 15.3 (H) 11.5 - 14.5 %    Platelets 170 150 - 350 K/uL    MPV 10.7 9.2 - 12.9 fL    Immature Granulocytes 1.4 (H) 0.0 - 0.5 %    Gran # (ANC) 5.5 1.8 - 7.7 K/uL    Immature Grans (Abs) 0.09 (H) 0.00 - 0.04 K/uL    Lymph # 0.4 (L) 1.0 - 4.8 K/uL    Mono # 0.4 0.3 - 1.0 K/uL    Eos # 0.0 0.0 - 0.5 K/uL    Baso # 0.00 0.00 - 0.20 K/uL    nRBC 0 0 /100 WBC    Gran% 86.6 (H) 38.0 - 73.0 %    Lymph% 5.9 (L) 18.0 - 48.0 %    Mono% 6.1 4.0 - 15.0 %    Eosinophil% 0.0 0.0 - 8.0 %    Basophil% 0.0 0.0 - 1.9 %    Differential Method Automated    POCT glucose    Collection Time: 07/25/19  5:54 AM   Result Value Ref Range    POCT Glucose 132 (H) 70 - 110 mg/dL   {    Diagnostic Results:  I have reviewed all pertinent imaging results/findings within the past 24 hours.    Assessment/Plan:     Acute hypercapnic respiratory failure  On 7/23 at night patient started to desat. Her heart rate increased to 130s. 02 sats were in the 70s and she was altered.  Sats increased to the 90s with 6l o2 nasal canula but still altered. ABG revealed a CO2 of 59. Bipap was put on the patient. Chest x-ray showed worse opacification of the left lung and increased pulmonary vasculature in the right lung. This could be due to fluid overload, infection, or worsening of her cancer. Lactate is within normal limits and she has been afebrile during hospital stay. On 7/24 patient received lasix and steroids. Breathing improved significantly. She no longer requires bipap. Her vitals were also stable throughout the day.       -lasix as needed   -Continue 40 mg solumderol TID       Shortness of breath  Ms Capellan is a 74 yo F w PMHx significant for rheumatoid arthritis, HTN, and squamous cell carcinoma (unknown primary) resulting in lung and mediastinal masses s/p six cycles of palliative carboplatin/paclitaxel who presents with a chief complaint of shortness of breath. CT scan showed opacification of the left lung that is thought to be cancer  There maybe a small pleural effusion but not thought to be tapable at this time. . Patient is sating well on 2L of oxygen. After more discussion with the patient seems like the problem is with her pain regimen. She just feels uncomfortable taking breaths.     - Continue Nasal canula oxygen as needed  - Q4 PRN Percocet       Nausea  -Zofran first choice PRN  -Promethazine second choice PRN  -Metclopramide also available     Hypomagnesemia  Magnesium 1.1 on admission. Received 2g in the ER    -Replete as necessary     Hypokalemia  K+ 2.8 on admission. Received 60meq in the ED    -Replete K as necessary     Secondary and unspecified malignant neoplasm of intrathoracic lymph nodes  Pt of Dr Wong with history of squamous cell carcinoma with unknown primary. As such, she is to be treated as metastatic SCC. She completed cycles of Carboplatin/Paclitaxel, last treatment on 6/8/18. Currently receiving radiation therapy     Reflux esophagitis  Patient is  complaining of some chest pain which is most likely due to her reflux.    -Protonix 40 mg daily    Essential hypertension  -Metoprolol (torprol-xl) 50mg oral daily     Insomnia, unspecified  -Ambien 5mg PRN     Non-seasonal Rhinitis  -Cetirizine 5mg             Angelito Singer MD  Hematology/Oncology  [facility]    ATTENDING NOTE, ONCOLOGY INPATIENT TEAM    As above; events of last 24 hours noted.  Patient seen and examined, chart reviewed.  She is much improved compared to yesterday.  Still on high flow O2.  Appears comfortable, in NAD.  Lungs with scattered ronchi.  Abdomen is soft, nontender.  Labs reviewed.    PLAN  Follow CBC and electrolytes daily  Replace Mg.  Continue steroids for now.  Will transfuse one unit of PRBCs.  Continue diuresis for one more day, and reassess the need in am.  Restart enoxaparin for DVT prophylaxis.  If she continues to improve, she may be discharged home in the next 24-48 hours.  We will follow.        Winston Quan MD

## 2019-07-26 PROBLEM — K59.00 CONSTIPATION: Status: ACTIVE | Noted: 2019-01-01

## 2019-07-26 PROBLEM — E87.6 HYPOKALEMIA: Status: RESOLVED | Noted: 2019-01-01 | Resolved: 2019-01-01

## 2019-07-26 PROBLEM — J96.02 ACUTE HYPERCAPNIC RESPIRATORY FAILURE: Status: RESOLVED | Noted: 2019-01-01 | Resolved: 2019-01-01

## 2019-07-26 PROBLEM — E83.42 HYPOMAGNESEMIA: Status: RESOLVED | Noted: 2019-01-01 | Resolved: 2019-01-01

## 2019-07-26 NOTE — PLAN OF CARE
Problem: Adult Inpatient Plan of Care  Goal: Plan of Care Review  Outcome: Ongoing (interventions implemented as appropriate)  Plan of care reviewed with patient and family this shift. Pain controlled with PRNs; 1 time episode of nausea relieved by zofran. VS stable. Maintaining saturations on 4L NC. Telemetry and continuous pulse ox monitoring maintained. All questions and concerns addressed. Will continue to monitor.

## 2019-07-26 NOTE — PROGRESS NOTES
[facility]  Hematology/Oncology  Progress Note    Patient Name: Silvia Capellan  Admission Date: 7/22/2019  Hospital Length of Stay: 4 days  Code Status: DNR     Subjective:     HPI:  Ms Capellan is a 74 yo F w PMHx significant for rheumatoid arthritis, HTN, and squamous cell carcinoma (unknown primary) resulting in lung and mediastinal masses s/p six cycles of palliative carboplatin/paclitaxel who presents with a chief complaint of shortness of breath. Patient states it started about a month but it has progressively gotten worse. Yesterday she couldn't sleep and was very uncofmortable. She does well at rest but walking is what brings it on. She is on 2l of oxygen at home. Doesn't get worse laying down. She also has this chest pain on her right side that moves around. She says it is similar to her acid reflux pain. She has also had poor PO intake for over a month. Had a esophageal stent placed about 2 weeks ago to help, but is still having poor intake. Complains of nausea, poor appetite and vomitting. She has lost over 50 pounds in the past few months. She has not had chemo in over a year but is currently undergoing radiation therapy.     In the ED, patient was found to have low potassium and Magnesium. They started repleting those. She was having sats in the lower 80s off of oxygen but with 2L she was in the 90s. ED got a ct scan and it showed no PE.     Oncology History per Dr Wong's most recent clinic note (6/7):     Diagnosis: Squamous cell carcinoma , primary site unknown    Molecular/genetic testing: p16 negative; PD L1 could not be run due to inadequate tissue   Stage:        Stage 4   Treatment: Carboplatin/paclitaxel x  6 cycles (2/16/18 - 6/8/18)  Bronchoscopy/EBUS evaluation on 8/31/17 for abnormal mediastinal adenopathy  and a 1.2 cm MORRO mass.  - started Caroplatin/paclitaxel palliatively. She had left thoracentesis on 2/9/18. There were atypical cells in the pleural fluid,  highly suspicious for malignancy.  - She completed cycles of Carboplatin/Paclitaxel, last treatment on 6/8/18.  - She had repeat left pleural mass FNA in [facility] 2019. It was negative for malignancy, but showed lympho plasmacytic infiltration.    - PET CT on 3/13/19 showed increased  hypermetabolism of the left pleural effusion.Thoracic vertebral body, L5-S1 hypermetabolism which was possible metastases          Interval History: She is on 8 L of O2 by NC, better than 15 L and 40% high flow she was on 2 days before. She has been getting steroids and diuresis over past two days. Complains of some pain in R lateral abdominal wall but also states that this has been chronic. Pt has not had a BM since last week.     Oncology Treatment Plan:   OP ATEZOLIZUMAB Q3W    Medications:  Continuous Infusions:  Scheduled Meds:   enoxaparin  40 mg Subcutaneous Daily    famotidine  20 mg Oral BID    ipratropium  0.5 mg Nebulization QID WAKE    methylPREDNISolone sodium succinate  50 mg Intravenous Q8H    metoprolol succinate  50 mg Oral Daily    polyethylene glycol  17 g Oral Daily    senna  8.6 mg Oral Daily     PRN Meds:sodium chloride, bismuth subsalicylate, Dextrose 10% Bolus, Dextrose 10% Bolus, diphenhydrAMINE, glucagon (human recombinant), glucose, glucose, HYDROmorphone, metoclopramide HCl, ondansetron, oxyCODONE, ramelteon, sodium chloride 0.9%     Review of Systems   Constitutional: Negative for activity change, appetite change, chills, fatigue, fever and unexpected weight change.   HENT: Negative for congestion and ear pain.    Eyes: Negative for pain and visual disturbance.   Respiratory: Positive for shortness of breath. Negative for cough and wheezing.    Cardiovascular: Negative for palpitations.   Gastrointestinal: Positive for abdominal pain.   Endocrine: Negative for cold intolerance and heat intolerance.   Genitourinary: Negative for difficulty urinating and hematuria.   Musculoskeletal: Negative for  arthralgias, joint swelling and neck stiffness.   Skin: Negative for rash and wound.   Allergic/Immunologic: Negative for food allergies.   Neurological: Negative for seizures, weakness, light-headedness, numbness and headaches.   Hematological: Negative for adenopathy.   Psychiatric/Behavioral: Negative for agitation, confusion and hallucinations. The patient is not nervous/anxious.      Objective:     Vital Signs (Most Recent):  Temp: 98.6 °F (37 °C) (07/26/19 1109)  Pulse: 89 (07/26/19 1109)  Resp: 18 (07/26/19 1109)  BP: (!) 146/87 (07/26/19 1109)  SpO2: 100 % (07/26/19 1109) Vital Signs (24h Range):  Temp:  [97.5 °F (36.4 °C)-98.7 °F (37.1 °C)] 98.6 °F (37 °C)  Pulse:  [81-94] 89  Resp:  [16-20] 18  SpO2:  [94 %-100 %] 100 %  BP: (116-147)/(58-93) 146/87     Weight: 85.5 kg (188 lb 7.9 oz)  Body mass index is 31.37 kg/m².  Body surface area is 1.98 meters squared.      Intake/Output Summary (Last 24 hours) at 7/26/2019 1153  Last data filed at 7/26/2019 1015  Gross per 24 hour   Intake 1018.75 ml   Output 2675 ml   Net -1656.25 ml       Physical Exam   Constitutional: She is oriented to person, place, and time. She appears well-developed and well-nourished.   HENT:   Head: Normocephalic and atraumatic.   Eyes: Conjunctivae and EOM are normal.   Neck: Normal range of motion. No tracheal deviation present.   Cardiovascular: Normal rate and regular rhythm. Exam reveals no gallop and no friction rub.   No murmur heard.  Pulmonary/Chest: Effort normal. No respiratory distress.   Severely diminshed breath sounds on the Left lung   Abdominal: Soft. She exhibits no distension. There is no tenderness. There is no rebound and no guarding.   Musculoskeletal: Normal range of motion. She exhibits no edema.   Neurological: She is alert and oriented to person, place, and time. No cranial nerve deficit.   Skin: Skin is warm and dry.   Psychiatric: She has a normal mood and affect. Her behavior is normal.   Vitals  reviewed.      Significant Labs:   CBC:   Recent Labs   Lab 07/25/19  0438 07/25/19 2009 07/26/19  0400   WBC 6.39 7.92 6.97   HGB 6.9* 8.6* 8.3*   HCT 23.2* 27.2* 27.4*    182 172   , CMP:   Recent Labs   Lab 07/25/19  0438 07/26/19  0400    129*   K 4.3 3.8   CL 93* 85*   CO2 35* 34*   * 117*   BUN 8 11   CREATININE 0.8 0.7   CALCIUM 8.7 8.8   PROT 6.8 7.0   ALBUMIN 2.1* 2.2*   BILITOT 0.2 0.2   ALKPHOS 65 68   AST 17 17   ALT 8* 8*   ANIONGAP 8 10   EGFRNONAA >60.0 >60.0    and All pertinent labs from the last 24 hours have been reviewed.    Diagnostic Results:  I have reviewed all pertinent imaging results/findings within the past 24 hours.    Assessment/Plan:     Constipation  - start senna, miralax once daily and assess    Nausea  -Zofran 4 mg q6H first choice PRN  -Metclopramide 10 q6H also available for PRN use    Shortness of breath  -  on 8 L of O2 by NC  - continue solumedrol 50 TID and do lasix PRN  -  Discuss with throacic surgery about stent placement endobronchially       Secondary and unspecified malignant neoplasm of intrathoracic lymph nodes  Pt of Dr Wong with history of squamous cell carcinoma with unknown primary. As such, she is to be treated as metastatic SCC. She completed cycles of Carboplatin/Paclitaxel, last treatment on 6/8/18. Currently receiving radiation therapy     Stage 4 Squamous cell carcinoma of unknown origin  Diagnosis: Squamous cell carcinoma , primary site unknown    Molecular/genetic testing: p16 negative; PD L1 could not be run due to inadequate tissue   Stage:        Stage 4   Treatment: Carboplatin/paclitaxel x  6 cycles (2/16/18 - 6/8/18)  Bronchoscopy/EBUS evaluation on 8/31/17 for abnormal mediastinal adenopathy  and a 1.2 cm MORRO mass.  - started Caroplatin/paclitaxel palliatively. She had left thoracentesis on 2/9/18. There were atypical cells in the pleural fluid, highly suspicious for malignancy.  - She completed cycles of  Carboplatin/Paclitaxel, last treatment on 6/8/18.  - had prior XRT to esophagus  - She had repeat left pleural mass FNA in [place] 2019. It was negative for malignancy, but showed lympho plasmacytic infiltration.   - PET CT on 3/13/19 showed increased  hypermetabolism of the left pleural effusion.Thoracic vertebral body, L5-S1 hypermetabolism which was possible metastases    Reflux esophagitis  Patient is complaining of some chest pain which is most likely due to her reflux.  - esophageal stent placed on 6/25/19  -Protonix 40 mg daily    Essential hypertension  -Metoprolol (torprol-xl) 50mg oral daily     Insomnia, unspecified  -Ambien 5mg PRN     Non-seasonal Rhinitis  -Cetirizine 5mg      Discussed with Dr. Chuckie Way MD  Hematology/Oncology Fellow, PGY V  [facility]

## 2019-07-26 NOTE — PLAN OF CARE
Problem: Adult Inpatient Plan of Care  Goal: Plan of Care Review  Outcome: Ongoing (interventions implemented as appropriate). Pt has a chaney for urinary retention. On telemetry and continuous pulse ox. On comfort flow at 10L/40%. Denies any pain. Had 1 episode of nausea at the beginning of the shift; zofran given.  Plan of care reviewed with pt. Educated pt on any medications, and procedures that the patient would be receiving. Discussed vital sign and I&O routine. Allowed time for pt to ask any questions, and told the patient to call for any assistance. Bed low & locked, call light and personal belongings within reach.  Progressing towards discharge.

## 2019-07-26 NOTE — HPI
Ms Capellan is a 72 yo F w PMHx significant for rheumatoid arthritis, HTN, and squamous cell carcinoma (unknown primary) resulting in lung and mediastinal masses s/p six cycles of palliative carboplatin/paclitaxel who presents with a chief complaint of shortness of breath. Patient states it started about a month but it has progressively gotten worse    Carboplatin/paclitaxel x  6 cycles (2/16/18 - 6/8/18), left pleural FNA in Jan 2019 which was negative for malignancy but showed lymphoplasmacytic infiltration. PET in March 2019 showed avidity in pleural effusion.

## 2019-07-26 NOTE — CARE UPDATE
Rapid Response Respiratory Therapy Note     Followed up with patient for proactive rounding. Pt on 8 lpm oxygen sat 100% weaned pt to 6 lpm HF and oxygen sats 95-96%    No acute issues at this time. Plan of care reviewed with primary RT, Arjun CRT  Please call Rapid Response RT, Pavithra Sheehan, RRT at [phone] with any questions or concerns.

## 2019-07-26 NOTE — CARE UPDATE
Rapid Response Respiratory Therapy Proactive Rounding Note      Time of visit: 704    Code Status: DNR   : 1945  Age: 73 y.o.  Weight:   Wt Readings from Last 1 Encounters:   19 85.5 kg (188 lb 7.9 oz)     Sex: female  Race: Black or    Bed: 808/808A:   MRN: [id number]    SITUATION     Evaluated patient for:respiratory status    BACKGROUND     Patient has a past medical history of Chronic obstructive pulmonary disease, Encounter for blood transfusion, GERD (gastroesophageal reflux disease), HTN (hypertension), Rheumatoid arthritis, Rheumatoid arthritis(714.0), and Squamous cell lung cancer, left.  Pulmonary Hx: COPD  Clinically Significant Surgical Hx: None    ASSESSMENT     Pulse: 88 Respiratory rate: Resp: 20 Temperature: Temp: 97.8 °F (36.6 °C) BP: BP: 119/70 SpO2: 97   Level of Consciousness: Level of Consciousness (AVPU): alert  Respiratory Effort: Respiratory Effort: Normal, Unlabored  Expansion/Accessory Muscle Usage: Expansion/Accessory Muscles/Retractions: no use of accessory muscles, no retractions, expansion symmetric  All Lung Field Breath Sounds: All Lung Fields Breath Sounds: Anterior:, Lateral:, clear, equal bilaterally  Cough Type: Cough Type: nonproductive  Mobility at time of assessment: General Mobility: generalized weakness  O2 Device/Concentration: O2 Device (Oxygen Therapy): Comfort Flow   Flow (L/min): 10 Oxygen Concentration (%): 40  Most recent blood gas:   Recent Labs     19  0833   PH 7.381   PCO2 61.9*   PO2 47   HCO3 36.7*   POCSATURATED 80*   BE 12     PF Ratio Calculator  Current Respiratory Care Orders: HFNC, Q6 WA txs  NIPPV: No  Surgical airway: No  Ambu at bedside: Ambu bag with the patient?: Yes, Adult Ambu    INTERVENTIONS/RECOMMENDATIONS   ?  Went to assess pt. Pt appears comfortable on HFNC 10lpm 40%. Will try to transition pt to a HFNC bubble this afternoon.     Discussed plan of care with primary RT, Arjun CRT.     FOLLOW-UP     Please  call back the Rapid Response RT, Pavithra Sheehan, RRT at x [phone] for any questions or concerns.

## 2019-07-26 NOTE — SUBJECTIVE & OBJECTIVE
Interval History: She is on 8 L of O2 by NC, better than 15 L and 40% high flow she was on 2 days before. She has been getting steroids and diuresis over past two days. Complains of some pain in R lateral abdominal wall but also states that this has been chronic. Pt has not had a BM since last week.     Oncology Treatment Plan:   OP ATEZOLIZUMAB Q3W    Medications:  Continuous Infusions:  Scheduled Meds:   enoxaparin  40 mg Subcutaneous Daily    famotidine  20 mg Oral BID    ipratropium  0.5 mg Nebulization QID WAKE    methylPREDNISolone sodium succinate  50 mg Intravenous Q8H    metoprolol succinate  50 mg Oral Daily    polyethylene glycol  17 g Oral Daily    senna  8.6 mg Oral Daily     PRN Meds:sodium chloride, bismuth subsalicylate, Dextrose 10% Bolus, Dextrose 10% Bolus, diphenhydrAMINE, glucagon (human recombinant), glucose, glucose, HYDROmorphone, metoclopramide HCl, ondansetron, oxyCODONE, ramelteon, sodium chloride 0.9%     Review of Systems   Constitutional: Negative for activity change, appetite change, chills, fatigue, fever and unexpected weight change.   HENT: Negative for congestion and ear pain.    Eyes: Negative for pain and visual disturbance.   Respiratory: Positive for shortness of breath. Negative for cough and wheezing.    Cardiovascular: Negative for palpitations.   Gastrointestinal: Positive for abdominal pain.   Endocrine: Negative for cold intolerance and heat intolerance.   Genitourinary: Negative for difficulty urinating and hematuria.   Musculoskeletal: Negative for arthralgias, joint swelling and neck stiffness.   Skin: Negative for rash and wound.   Allergic/Immunologic: Negative for food allergies.   Neurological: Negative for seizures, weakness, light-headedness, numbness and headaches.   Hematological: Negative for adenopathy.   Psychiatric/Behavioral: Negative for agitation, confusion and hallucinations. The patient is not nervous/anxious.      Objective:     Vital Signs  (Most Recent):  Temp: 98.6 °F (37 °C) (07/26/19 1109)  Pulse: 89 (07/26/19 1109)  Resp: 18 (07/26/19 1109)  BP: (!) 146/87 (07/26/19 1109)  SpO2: 100 % (07/26/19 1109) Vital Signs (24h Range):  Temp:  [97.5 °F (36.4 °C)-98.7 °F (37.1 °C)] 98.6 °F (37 °C)  Pulse:  [81-94] 89  Resp:  [16-20] 18  SpO2:  [94 %-100 %] 100 %  BP: (116-147)/(58-93) 146/87     Weight: 85.5 kg (188 lb 7.9 oz)  Body mass index is 31.37 kg/m².  Body surface area is 1.98 meters squared.      Intake/Output Summary (Last 24 hours) at 7/26/2019 1153  Last data filed at 7/26/2019 1015  Gross per 24 hour   Intake 1018.75 ml   Output 2675 ml   Net -1656.25 ml       Physical Exam   Constitutional: She is oriented to person, place, and time. She appears well-developed and well-nourished.   HENT:   Head: Normocephalic and atraumatic.   Eyes: Conjunctivae and EOM are normal.   Neck: Normal range of motion. No tracheal deviation present.   Cardiovascular: Normal rate and regular rhythm. Exam reveals no gallop and no friction rub.   No murmur heard.  Pulmonary/Chest: Effort normal. No respiratory distress.   Severely diminshed breath sounds on the Left lung   Abdominal: Soft. She exhibits no distension. There is no tenderness. There is no rebound and no guarding.   Musculoskeletal: Normal range of motion. She exhibits no edema.   Neurological: She is alert and oriented to person, place, and time. No cranial nerve deficit.   Skin: Skin is warm and dry.   Psychiatric: She has a normal mood and affect. Her behavior is normal.   Vitals reviewed.      Significant Labs:   CBC:   Recent Labs   Lab 07/25/19  0438 07/25/19 2009 07/26/19  0400   WBC 6.39 7.92 6.97   HGB 6.9* 8.6* 8.3*   HCT 23.2* 27.2* 27.4*    182 172   , CMP:   Recent Labs   Lab 07/25/19  0438 07/26/19  0400    129*   K 4.3 3.8   CL 93* 85*   CO2 35* 34*   * 117*   BUN 8 11   CREATININE 0.8 0.7   CALCIUM 8.7 8.8   PROT 6.8 7.0   ALBUMIN 2.1* 2.2*   BILITOT 0.2 0.2   ALKPHOS 65  68   AST 17 17   ALT 8* 8*   ANIONGAP 8 10   EGFRNONAA >60.0 >60.0    and All pertinent labs from the last 24 hours have been reviewed.    Diagnostic Results:  I have reviewed all pertinent imaging results/findings within the past 24 hours.

## 2019-07-26 NOTE — PLAN OF CARE
Problem: Adult Inpatient Plan of Care  Goal: Plan of Care Review  Plan of care reviewed with patient at beginning of shift. Patient C/O of indigestion and did not want to eat. Tums were tried and did not help and so pepto was given and has also not helped. Pt was given 1 u of blood.Pt had 2 doses of magnesium. Patient also received 2 one time doses of lasix. Urinalysis was done and results were abnormal. Pt was started on enoxaprin. Q1h rounding VSS. Afebrile Bed locked in lowest position. Side rails up x2. Call bell within reach. BADL within reach. Rounding Q1H. Will continue to monitor.

## 2019-07-27 PROBLEM — R30.9 PAIN PASSING URINE: Status: ACTIVE | Noted: 2019-01-01

## 2019-07-27 NOTE — PROGRESS NOTES
[facility]  Hematology/Oncology  Progress Note    Patient Name: Silvia Capellan  Admission Date: 7/22/2019  Hospital Length of Stay: 5 days  Code Status: DNR     Subjective:     HPI:  Ms Capellan is a 72 yo F w PMHx significant for rheumatoid arthritis, HTN, and squamous cell carcinoma (unknown primary) resulting in lung and mediastinal masses s/p six cycles of palliative carboplatin/paclitaxel who presents with a chief complaint of shortness of breath. Patient states it started about a month but it has progressively gotten worse. Yesterday she couldn't sleep and was very uncofmortable. She does well at rest but walking is what brings it on. She is on 2l of oxygen at home. Doesn't get worse laying down. She also has this chest pain on her right side that moves around. She says it is similar to her acid reflux pain. She has also had poor PO intake for over a month. Had a esophageal stent placed about 2 weeks ago to help, but is still having poor intake. Complains of nausea, poor appetite and vomitting. She has lost over 50 pounds in the past few months. She has not had chemo in over a year but is currently undergoing radiation therapy.     In the ED, patient was found to have low potassium and Magnesium. They started repleting those. She was having sats in the lower 80s off of oxygen but with 2L she was in the 90s. ED got a ct scan and it showed no PE.     Oncology History per Dr Wong's most recent clinic note (6/7):     Diagnosis: Squamous cell carcinoma , primary site unknown    Molecular/genetic testing: p16 negative; PD L1 could not be run due to inadequate tissue   Stage:        Stage 4   Treatment: Carboplatin/paclitaxel x  6 cycles (2/16/18 - 6/8/18)  Bronchoscopy/EBUS evaluation on 8/31/17 for abnormal mediastinal adenopathy  and a 1.2 cm MORRO mass.  - started Caroplatin/paclitaxel palliatively. She had left thoracentesis on 2/9/18. There were atypical cells in the pleural fluid,  highly suspicious for malignancy.  - She completed cycles of Carboplatin/Paclitaxel, last treatment on 6/8/18.  - She had repeat left pleural mass FNA in [facility] 2019. It was negative for malignancy, but showed lympho plasmacytic infiltration.    - PET CT on 3/13/19 showed increased  hypermetabolism of the left pleural effusion.Thoracic vertebral body, L5-S1 hypermetabolism which was possible metastases          Interval History: Patient doing well this morning. Currently on 4l NC. She says her pain has been well controlled and she is breathing easier. Nursing said she did act strangely over night. Pulling off her leads, clothes, and chaney. This morning though she is sitting in bed comfortable expressing a desire to go home.     Oncology Treatment Plan:   OP ATEZOLIZUMAB Q3W    Medications:  Continuous Infusions:  Scheduled Meds:   enoxaparin  40 mg Subcutaneous Daily    famotidine  20 mg Oral BID    ipratropium  0.5 mg Nebulization QID WAKE    methylPREDNISolone sodium succinate  50 mg Intravenous Q8H    metoprolol succinate  50 mg Oral Daily    polyethylene glycol  17 g Oral Daily    senna  8.6 mg Oral Daily     PRN Meds:sodium chloride, bismuth subsalicylate, Dextrose 10% Bolus, Dextrose 10% Bolus, diphenhydrAMINE, glucagon (human recombinant), glucose, glucose, HYDROmorphone, metoclopramide HCl, ondansetron, oxyCODONE, ramelteon, sodium chloride 0.9%     Review of Systems  Objective:     Vital Signs (Most Recent):  Temp: 97.9 °F (36.6 °C) (07/27/19 0448)  Pulse: 81 (07/27/19 0701)  Resp: 16 (07/27/19 0701)  BP: 127/67 (07/27/19 0448)  SpO2: 99 % (07/27/19 0701) Vital Signs (24h Range):  Temp:  [97 °F (36.1 °C)-99.1 °F (37.3 °C)] 97.9 °F (36.6 °C)  Pulse:  [73-90] 81  Resp:  [16-28] 16  SpO2:  [95 %-100 %] 99 %  BP: (121-146)/(65-87) 127/67     Weight: 88.3 kg (194 lb 10.7 oz)  Body mass index is 32.39 kg/m².  Body surface area is 2.01 meters squared.      Intake/Output Summary (Last 24 hours) at 7/27/2019  0729  Last data filed at 7/27/2019 0509  Gross per 24 hour   Intake 240 ml   Output 575 ml   Net -335 ml       Physical Exam   Constitutional: She is oriented to person, place, and time. She appears well-developed and well-nourished.   HENT:   Head: Normocephalic and atraumatic.   Eyes: Conjunctivae and EOM are normal.   Neck: Normal range of motion. No tracheal deviation present.   Cardiovascular: Normal rate and regular rhythm. Exam reveals no gallop and no friction rub.   No murmur heard.  Pulmonary/Chest: Effort normal. No respiratory distress.   No breath sounds on the Left lung   Abdominal: Soft. She exhibits no distension. There is no tenderness. There is no rebound and no guarding.   Musculoskeletal: Normal range of motion. She exhibits no edema.   Neurological: She is alert and oriented to person, place, and time. No cranial nerve deficit.   Skin: Skin is warm and dry.   Psychiatric: She has a normal mood and affect. Her behavior is normal.   Vitals reviewed.      Significant Labs:   Recent Results (from the past 48 hour(s))   Urinalysis, Reflex to Urine Culture Urine, Clean Catch    Collection Time: 07/25/19 11:45 AM   Result Value Ref Range    Specimen UA Urine, Catheterized     Color, UA Yellow Yellow, Straw, Kym    Appearance, UA Hazy (A) Clear    pH, UA 7.0 5.0 - 8.0    Specific Gravity, UA 1.020 1.005 - 1.030    Protein, UA 1+ (A) Negative    Glucose, UA Negative Negative    Ketones, UA Negative Negative    Bilirubin (UA) Negative Negative    Occult Blood UA 2+ (A) Negative    Nitrite, UA Negative Negative    Leukocytes, UA 2+ (A) Negative   Urinalysis Microscopic    Collection Time: 07/25/19 11:45 AM   Result Value Ref Range    RBC, UA 19 (H) 0 - 4 /hpf    WBC, UA 11 (H) 0 - 5 /hpf    Bacteria Occasional None-Occ /hpf    Squam Epithel, UA 0 /hpf    Non-Squam Epith <1 <1/hpf /hpf    Hyaline Casts, UA 3 (A) 0-1/lpf /lpf    Microscopic Comment SEE COMMENT    Urine culture    Collection Time: 07/25/19  11:45 AM   Result Value Ref Range    Urine Culture, Routine No growth    Type & Screen    Collection Time: 07/25/19  2:02 PM   Result Value Ref Range    Group & Rh O POS     Indirect Herman NEG    Prepare RBC 1 Unit    Collection Time: 07/25/19  2:02 PM   Result Value Ref Range    UNIT NUMBER [id number]     Product Code F4611C48     DISPENSE STATUS TRANSFUSED     CODING SYSTEM [id number]     Unit Blood Type Code 5100     Unit Blood Type O POS     Unit Expiration [id number]    POCT glucose    Collection Time: 07/25/19  3:55 PM   Result Value Ref Range    POCT Glucose 131 (H) 70 - 110 mg/dL   CBC auto differential    Collection Time: 07/25/19  8:09 PM   Result Value Ref Range    WBC 7.92 3.90 - 12.70 K/uL    RBC 3.08 (L) 4.00 - 5.40 M/uL    Hemoglobin 8.6 (L) 12.0 - 16.0 g/dL    Hematocrit 27.2 (L) 37.0 - 48.5 %    Mean Corpuscular Volume 88 82 - 98 fL    Mean Corpuscular Hemoglobin 27.9 27.0 - 31.0 pg    Mean Corpuscular Hemoglobin Conc 31.6 (L) 32.0 - 36.0 g/dL    RDW 14.9 (H) 11.5 - 14.5 %    Platelets 182 150 - 350 K/uL    MPV 10.6 9.2 - 12.9 fL    Immature Granulocytes 1.8 (H) 0.0 - 0.5 %    Gran # (ANC) 6.8 1.8 - 7.7 K/uL    Immature Grans (Abs) 0.14 (H) 0.00 - 0.04 K/uL    Lymph # 0.3 (L) 1.0 - 4.8 K/uL    Mono # 0.7 0.3 - 1.0 K/uL    Eos # 0.0 0.0 - 0.5 K/uL    Baso # 0.01 0.00 - 0.20 K/uL    nRBC 1 (A) 0 /100 WBC    Gran% 85.5 (H) 38.0 - 73.0 %    Lymph% 4.3 (L) 18.0 - 48.0 %    Mono% 8.3 4.0 - 15.0 %    Eosinophil% 0.0 0.0 - 8.0 %    Basophil% 0.1 0.0 - 1.9 %    Differential Method Automated    Comprehensive Metabolic Panel (CMP)    Collection Time: 07/26/19  4:00 AM   Result Value Ref Range    Sodium 129 (L) 136 - 145 mmol/L    Potassium 3.8 3.5 - 5.1 mmol/L    Chloride 85 (L) 95 - 110 mmol/L    CO2 34 (H) 23 - 29 mmol/L    Glucose 117 (H) 70 - 110 mg/dL    BUN, Bld 11 8 - 23 mg/dL    Creatinine 0.7 0.5 - 1.4 mg/dL    Calcium 8.8 8.7 - 10.5 mg/dL    Total Protein 7.0 6.0 - 8.4 g/dL    Albumin 2.2 (L) 3.5  - 5.2 g/dL    Total Bilirubin 0.2 0.1 - 1.0 mg/dL    Alkaline Phosphatase 68 55 - 135 U/L    AST 17 10 - 40 U/L    ALT 8 (L) 10 - 44 U/L    Anion Gap 10 8 - 16 mmol/L    eGFR if African American >60.0 >60 mL/min/1.73 m^2    eGFR if non African American >60.0 >60 mL/min/1.73 m^2   Magnesium    Collection Time: 07/26/19  4:00 AM   Result Value Ref Range    Magnesium 2.1 1.6 - 2.6 mg/dL   Phosphorus    Collection Time: 07/26/19  4:00 AM   Result Value Ref Range    Phosphorus 3.1 2.7 - 4.5 mg/dL   CBC auto differential    Collection Time: 07/26/19  4:00 AM   Result Value Ref Range    WBC 6.97 3.90 - 12.70 K/uL    RBC 3.06 (L) 4.00 - 5.40 M/uL    Hemoglobin 8.3 (L) 12.0 - 16.0 g/dL    Hematocrit 27.4 (L) 37.0 - 48.5 %    Mean Corpuscular Volume 90 82 - 98 fL    Mean Corpuscular Hemoglobin 27.1 27.0 - 31.0 pg    Mean Corpuscular Hemoglobin Conc 30.3 (L) 32.0 - 36.0 g/dL    RDW 14.7 (H) 11.5 - 14.5 %    Platelets 172 150 - 350 K/uL    MPV 11.6 9.2 - 12.9 fL    Immature Granulocytes 1.3 (H) 0.0 - 0.5 %    Gran # (ANC) 6.0 1.8 - 7.7 K/uL    Immature Grans (Abs) 0.09 (H) 0.00 - 0.04 K/uL    Lymph # 0.4 (L) 1.0 - 4.8 K/uL    Mono # 0.6 0.3 - 1.0 K/uL    Eos # 0.0 0.0 - 0.5 K/uL    Baso # 0.01 0.00 - 0.20 K/uL    nRBC 1 (A) 0 /100 WBC    Gran% 85.4 (H) 38.0 - 73.0 %    Lymph% 5.2 (L) 18.0 - 48.0 %    Mono% 8.0 4.0 - 15.0 %    Eosinophil% 0.0 0.0 - 8.0 %    Basophil% 0.1 0.0 - 1.9 %    Differential Method Automated    POCT glucose    Collection Time: 07/26/19  6:19 AM   Result Value Ref Range    POCT Glucose 119 (H) 70 - 110 mg/dL   POCT glucose    Collection Time: 07/26/19 12:20 PM   Result Value Ref Range    POCT Glucose 108 70 - 110 mg/dL   CBC auto differential    Collection Time: 07/26/19  9:33 PM   Result Value Ref Range    WBC 6.92 3.90 - 12.70 K/uL    RBC 3.15 (L) 4.00 - 5.40 M/uL    Hemoglobin 8.6 (L) 12.0 - 16.0 g/dL    Hematocrit 28.4 (L) 37.0 - 48.5 %    Mean Corpuscular Volume 90 82 - 98 fL    Mean Corpuscular  Hemoglobin 27.3 27.0 - 31.0 pg    Mean Corpuscular Hemoglobin Conc 30.3 (L) 32.0 - 36.0 g/dL    RDW 15.0 (H) 11.5 - 14.5 %    Platelets 184 150 - 350 K/uL    MPV 10.3 9.2 - 12.9 fL    Immature Granulocytes 2.2 (H) 0.0 - 0.5 %    Gran # (ANC) 5.7 1.8 - 7.7 K/uL    Immature Grans (Abs) 0.15 (H) 0.00 - 0.04 K/uL    Lymph # 0.3 (L) 1.0 - 4.8 K/uL    Mono # 0.8 0.3 - 1.0 K/uL    Eos # 0.0 0.0 - 0.5 K/uL    Baso # 0.00 0.00 - 0.20 K/uL    nRBC 1 (A) 0 /100 WBC    Gran% 81.8 (H) 38.0 - 73.0 %    Lymph% 4.0 (L) 18.0 - 48.0 %    Mono% 12.0 4.0 - 15.0 %    Eosinophil% 0.0 0.0 - 8.0 %    Basophil% 0.0 0.0 - 1.9 %    Differential Method Automated    Comprehensive Metabolic Panel (CMP)    Collection Time: 07/27/19  4:26 AM   Result Value Ref Range    Sodium 135 (L) 136 - 145 mmol/L    Potassium 4.1 3.5 - 5.1 mmol/L    Chloride 90 (L) 95 - 110 mmol/L    CO2 35 (H) 23 - 29 mmol/L    Glucose 109 70 - 110 mg/dL    BUN, Bld 13 8 - 23 mg/dL    Creatinine 0.7 0.5 - 1.4 mg/dL    Calcium 8.9 8.7 - 10.5 mg/dL    Total Protein 6.6 6.0 - 8.4 g/dL    Albumin 2.1 (L) 3.5 - 5.2 g/dL    Total Bilirubin 0.2 0.1 - 1.0 mg/dL    Alkaline Phosphatase 63 55 - 135 U/L    AST 16 10 - 40 U/L    ALT 8 (L) 10 - 44 U/L    Anion Gap 10 8 - 16 mmol/L    eGFR if African American >60.0 >60 mL/min/1.73 m^2    eGFR if non African American >60.0 >60 mL/min/1.73 m^2   Magnesium    Collection Time: 07/27/19  4:26 AM   Result Value Ref Range    Magnesium 1.7 1.6 - 2.6 mg/dL   Phosphorus    Collection Time: 07/27/19  4:26 AM   Result Value Ref Range    Phosphorus 3.3 2.7 - 4.5 mg/dL   CBC auto differential    Collection Time: 07/27/19  4:26 AM   Result Value Ref Range    WBC 6.78 3.90 - 12.70 K/uL    RBC 3.10 (L) 4.00 - 5.40 M/uL    Hemoglobin 8.4 (L) 12.0 - 16.0 g/dL    Hematocrit 27.4 (L) 37.0 - 48.5 %    Mean Corpuscular Volume 88 82 - 98 fL    Mean Corpuscular Hemoglobin 27.1 27.0 - 31.0 pg    Mean Corpuscular Hemoglobin Conc 30.7 (L) 32.0 - 36.0 g/dL    RDW 15.0  (H) 11.5 - 14.5 %    Platelets 183 150 - 350 K/uL    MPV 10.2 9.2 - 12.9 fL    Immature Granulocytes 2.1 (H) 0.0 - 0.5 %    Gran # (ANC) 5.7 1.8 - 7.7 K/uL    Immature Grans (Abs) 0.14 (H) 0.00 - 0.04 K/uL    Lymph # 0.3 (L) 1.0 - 4.8 K/uL    Mono # 0.7 0.3 - 1.0 K/uL    Eos # 0.0 0.0 - 0.5 K/uL    Baso # 0.01 0.00 - 0.20 K/uL    nRBC 1 (A) 0 /100 WBC    Gran% 83.4 (H) 38.0 - 73.0 %    Lymph% 4.1 (L) 18.0 - 48.0 %    Mono% 10.3 4.0 - 15.0 %    Eosinophil% 0.0 0.0 - 8.0 %    Basophil% 0.1 0.0 - 1.9 %    Differential Method Automated    {    Diagnostic Results:  I have reviewed all pertinent imaging results/findings within the past 24 hours.\    Assessment/Plan:     Shortness of breath  -  on 4L of O2 by NC  - Decrease to solumedrol 50 BID and do lasix PRN  -  Discussed with throacic surgery about stent placement endobronchially. Unable to perform surgery  - Consult to PT/OT to work to get patient back to baseline status        Pain passing urine  -UA and Culture ordered 7/27/19    Constipation  - start senna, miralax once daily and assess    Nausea  -Zofran 4 mg q6H first choice PRN  -Metclopramide 10 q6H also available for PRN use    Secondary and unspecified malignant neoplasm of intrathoracic lymph nodes  Pt of Dr Wong with history of squamous cell carcinoma with unknown primary. As such, she is to be treated as metastatic SCC. She completed cycles of Carboplatin/Paclitaxel, last treatment on 6/8/18. Currently receiving radiation therapy     Stage 4 Squamous cell carcinoma of unknown origin  Diagnosis: Squamous cell carcinoma , primary site unknown    Molecular/genetic testing: p16 negative; PD L1 could not be run due to inadequate tissue   Stage:        Stage 4   Treatment: Carboplatin/paclitaxel x  6 cycles (2/16/18 - 6/8/18)  Bronchoscopy/EBUS evaluation on 8/31/17 for abnormal mediastinal adenopathy  and a 1.2 cm MORRO mass.  - started Caroplatin/paclitaxel palliatively. She had left thoracentesis on 2/9/18.  There were atypical cells in the pleural fluid, highly suspicious for malignancy.  - She completed cycles of Carboplatin/Paclitaxel, last treatment on 6/8/18.  - had prior XRT to esophagus  - She had repeat left pleural mass FNA in [facility] 2019. It was negative for malignancy, but showed lympho plasmacytic infiltration.   - PET CT on 3/13/19 showed increased  hypermetabolism of the left pleural effusion.Thoracic vertebral body, L5-S1 hypermetabolism which was possible metastases    Reflux esophagitis  Patient is complaining of some chest pain which is most likely due to her reflux.  - esophageal stent placed on 6/25/19  -Protonix 40 mg daily    Essential hypertension  -Metoprolol (torprol-xl) 50mg oral daily     Insomnia, unspecified  -Ambien 5mg PRN     Non-seasonal Rhinitis  -Cetirizine 5mg             Angelito Singer MD  Hematology/Oncology  [facility]    Attending Addendum:  The patient was seen, examined, and discussed on rounds with the team.  I agree with the assessment and plan as outlined for Silvia Capellan.  Still with increased O2 requirements.  Will give 1 more dose Lasix today.  Check UA and culture and discontinue Rivas catheter.  Physical therapy to see.    Nelson Love DO, FACP  Hematology & Oncology  [address] [place] [address]  ph. [phone]  Fax. [phone]

## 2019-07-27 NOTE — SUBJECTIVE & OBJECTIVE
Interval History: Patient doing well this morning. Currently on 4l NC. She says her pain has been well controlled and she is breathing easier. Nursing said she did act strangely over night. Pulling off her leads, clothes, and chaney. This morning though she is sitting in bed comfortable expressing a desire to go home.     Oncology Treatment Plan:   OP ATEZOLIZUMAB Q3W    Medications:  Continuous Infusions:  Scheduled Meds:   enoxaparin  40 mg Subcutaneous Daily    famotidine  20 mg Oral BID    ipratropium  0.5 mg Nebulization QID WAKE    methylPREDNISolone sodium succinate  50 mg Intravenous Q8H    metoprolol succinate  50 mg Oral Daily    polyethylene glycol  17 g Oral Daily    senna  8.6 mg Oral Daily     PRN Meds:sodium chloride, bismuth subsalicylate, Dextrose 10% Bolus, Dextrose 10% Bolus, diphenhydrAMINE, glucagon (human recombinant), glucose, glucose, HYDROmorphone, metoclopramide HCl, ondansetron, oxyCODONE, ramelteon, sodium chloride 0.9%     Review of Systems  Objective:     Vital Signs (Most Recent):  Temp: 97.9 °F (36.6 °C) (07/27/19 0448)  Pulse: 81 (07/27/19 0701)  Resp: 16 (07/27/19 0701)  BP: 127/67 (07/27/19 0448)  SpO2: 99 % (07/27/19 0701) Vital Signs (24h Range):  Temp:  [97 °F (36.1 °C)-99.1 °F (37.3 °C)] 97.9 °F (36.6 °C)  Pulse:  [73-90] 81  Resp:  [16-28] 16  SpO2:  [95 %-100 %] 99 %  BP: (121-146)/(65-87) 127/67     Weight: 88.3 kg (194 lb 10.7 oz)  Body mass index is 32.39 kg/m².  Body surface area is 2.01 meters squared.      Intake/Output Summary (Last 24 hours) at 7/27/2019 0729  Last data filed at 7/27/2019 0509  Gross per 24 hour   Intake 240 ml   Output 575 ml   Net -335 ml       Physical Exam   Constitutional: She is oriented to person, place, and time. She appears well-developed and well-nourished.   HENT:   Head: Normocephalic and atraumatic.   Eyes: Conjunctivae and EOM are normal.   Neck: Normal range of motion. No tracheal deviation present.   Cardiovascular: Normal rate  and regular rhythm. Exam reveals no gallop and no friction rub.   No murmur heard.  Pulmonary/Chest: Effort normal. No respiratory distress.   No breath sounds on the Left lung   Abdominal: Soft. She exhibits no distension. There is no tenderness. There is no rebound and no guarding.   Musculoskeletal: Normal range of motion. She exhibits no edema.   Neurological: She is alert and oriented to person, place, and time. No cranial nerve deficit.   Skin: Skin is warm and dry.   Psychiatric: She has a normal mood and affect. Her behavior is normal.   Vitals reviewed.      Significant Labs:   Recent Results (from the past 48 hour(s))   Urinalysis, Reflex to Urine Culture Urine, Clean Catch    Collection Time: 07/25/19 11:45 AM   Result Value Ref Range    Specimen UA Urine, Catheterized     Color, UA Yellow Yellow, Straw, Kym    Appearance, UA Hazy (A) Clear    pH, UA 7.0 5.0 - 8.0    Specific Gravity, UA 1.020 1.005 - 1.030    Protein, UA 1+ (A) Negative    Glucose, UA Negative Negative    Ketones, UA Negative Negative    Bilirubin (UA) Negative Negative    Occult Blood UA 2+ (A) Negative    Nitrite, UA Negative Negative    Leukocytes, UA 2+ (A) Negative   Urinalysis Microscopic    Collection Time: 07/25/19 11:45 AM   Result Value Ref Range    RBC, UA 19 (H) 0 - 4 /hpf    WBC, UA 11 (H) 0 - 5 /hpf    Bacteria Occasional None-Occ /hpf    Squam Epithel, UA 0 /hpf    Non-Squam Epith <1 <1/hpf /hpf    Hyaline Casts, UA 3 (A) 0-1/lpf /lpf    Microscopic Comment SEE COMMENT    Urine culture    Collection Time: 07/25/19 11:45 AM   Result Value Ref Range    Urine Culture, Routine No growth    Type & Screen    Collection Time: 07/25/19  2:02 PM   Result Value Ref Range    Group & Rh O POS     Indirect Herman NEG    Prepare RBC 1 Unit    Collection Time: 07/25/19  2:02 PM   Result Value Ref Range    UNIT NUMBER [id number]     Product Code [id number]     DISPENSE STATUS TRANSFUSED     CODING SYSTEM [id number]     Unit Blood Type  Code 5100     Unit Blood Type O POS     Unit Expiration [id number]    POCT glucose    Collection Time: 07/25/19  3:55 PM   Result Value Ref Range    POCT Glucose 131 (H) 70 - 110 mg/dL   CBC auto differential    Collection Time: 07/25/19  8:09 PM   Result Value Ref Range    WBC 7.92 3.90 - 12.70 K/uL    RBC 3.08 (L) 4.00 - 5.40 M/uL    Hemoglobin 8.6 (L) 12.0 - 16.0 g/dL    Hematocrit 27.2 (L) 37.0 - 48.5 %    Mean Corpuscular Volume 88 82 - 98 fL    Mean Corpuscular Hemoglobin 27.9 27.0 - 31.0 pg    Mean Corpuscular Hemoglobin Conc 31.6 (L) 32.0 - 36.0 g/dL    RDW 14.9 (H) 11.5 - 14.5 %    Platelets 182 150 - 350 K/uL    MPV 10.6 9.2 - 12.9 fL    Immature Granulocytes 1.8 (H) 0.0 - 0.5 %    Gran # (ANC) 6.8 1.8 - 7.7 K/uL    Immature Grans (Abs) 0.14 (H) 0.00 - 0.04 K/uL    Lymph # 0.3 (L) 1.0 - 4.8 K/uL    Mono # 0.7 0.3 - 1.0 K/uL    Eos # 0.0 0.0 - 0.5 K/uL    Baso # 0.01 0.00 - 0.20 K/uL    nRBC 1 (A) 0 /100 WBC    Gran% 85.5 (H) 38.0 - 73.0 %    Lymph% 4.3 (L) 18.0 - 48.0 %    Mono% 8.3 4.0 - 15.0 %    Eosinophil% 0.0 0.0 - 8.0 %    Basophil% 0.1 0.0 - 1.9 %    Differential Method Automated    Comprehensive Metabolic Panel (CMP)    Collection Time: 07/26/19  4:00 AM   Result Value Ref Range    Sodium 129 (L) 136 - 145 mmol/L    Potassium 3.8 3.5 - 5.1 mmol/L    Chloride 85 (L) 95 - 110 mmol/L    CO2 34 (H) 23 - 29 mmol/L    Glucose 117 (H) 70 - 110 mg/dL    BUN, Bld 11 8 - 23 mg/dL    Creatinine 0.7 0.5 - 1.4 mg/dL    Calcium 8.8 8.7 - 10.5 mg/dL    Total Protein 7.0 6.0 - 8.4 g/dL    Albumin 2.2 (L) 3.5 - 5.2 g/dL    Total Bilirubin 0.2 0.1 - 1.0 mg/dL    Alkaline Phosphatase 68 55 - 135 U/L    AST 17 10 - 40 U/L    ALT 8 (L) 10 - 44 U/L    Anion Gap 10 8 - 16 mmol/L    eGFR if African American >60.0 >60 mL/min/1.73 m^2    eGFR if non African American >60.0 >60 mL/min/1.73 m^2   Magnesium    Collection Time: 07/26/19  4:00 AM   Result Value Ref Range    Magnesium 2.1 1.6 - 2.6 mg/dL   Phosphorus     Collection Time: 07/26/19  4:00 AM   Result Value Ref Range    Phosphorus 3.1 2.7 - 4.5 mg/dL   CBC auto differential    Collection Time: 07/26/19  4:00 AM   Result Value Ref Range    WBC 6.97 3.90 - 12.70 K/uL    RBC 3.06 (L) 4.00 - 5.40 M/uL    Hemoglobin 8.3 (L) 12.0 - 16.0 g/dL    Hematocrit 27.4 (L) 37.0 - 48.5 %    Mean Corpuscular Volume 90 82 - 98 fL    Mean Corpuscular Hemoglobin 27.1 27.0 - 31.0 pg    Mean Corpuscular Hemoglobin Conc 30.3 (L) 32.0 - 36.0 g/dL    RDW 14.7 (H) 11.5 - 14.5 %    Platelets 172 150 - 350 K/uL    MPV 11.6 9.2 - 12.9 fL    Immature Granulocytes 1.3 (H) 0.0 - 0.5 %    Gran # (ANC) 6.0 1.8 - 7.7 K/uL    Immature Grans (Abs) 0.09 (H) 0.00 - 0.04 K/uL    Lymph # 0.4 (L) 1.0 - 4.8 K/uL    Mono # 0.6 0.3 - 1.0 K/uL    Eos # 0.0 0.0 - 0.5 K/uL    Baso # 0.01 0.00 - 0.20 K/uL    nRBC 1 (A) 0 /100 WBC    Gran% 85.4 (H) 38.0 - 73.0 %    Lymph% 5.2 (L) 18.0 - 48.0 %    Mono% 8.0 4.0 - 15.0 %    Eosinophil% 0.0 0.0 - 8.0 %    Basophil% 0.1 0.0 - 1.9 %    Differential Method Automated    POCT glucose    Collection Time: 07/26/19  6:19 AM   Result Value Ref Range    POCT Glucose 119 (H) 70 - 110 mg/dL   POCT glucose    Collection Time: 07/26/19 12:20 PM   Result Value Ref Range    POCT Glucose 108 70 - 110 mg/dL   CBC auto differential    Collection Time: 07/26/19  9:33 PM   Result Value Ref Range    WBC 6.92 3.90 - 12.70 K/uL    RBC 3.15 (L) 4.00 - 5.40 M/uL    Hemoglobin 8.6 (L) 12.0 - 16.0 g/dL    Hematocrit 28.4 (L) 37.0 - 48.5 %    Mean Corpuscular Volume 90 82 - 98 fL    Mean Corpuscular Hemoglobin 27.3 27.0 - 31.0 pg    Mean Corpuscular Hemoglobin Conc 30.3 (L) 32.0 - 36.0 g/dL    RDW 15.0 (H) 11.5 - 14.5 %    Platelets 184 150 - 350 K/uL    MPV 10.3 9.2 - 12.9 fL    Immature Granulocytes 2.2 (H) 0.0 - 0.5 %    Gran # (ANC) 5.7 1.8 - 7.7 K/uL    Immature Grans (Abs) 0.15 (H) 0.00 - 0.04 K/uL    Lymph # 0.3 (L) 1.0 - 4.8 K/uL    Mono # 0.8 0.3 - 1.0 K/uL    Eos # 0.0 0.0 - 0.5 K/uL     Baso # 0.00 0.00 - 0.20 K/uL    nRBC 1 (A) 0 /100 WBC    Gran% 81.8 (H) 38.0 - 73.0 %    Lymph% 4.0 (L) 18.0 - 48.0 %    Mono% 12.0 4.0 - 15.0 %    Eosinophil% 0.0 0.0 - 8.0 %    Basophil% 0.0 0.0 - 1.9 %    Differential Method Automated    Comprehensive Metabolic Panel (CMP)    Collection Time: 07/27/19  4:26 AM   Result Value Ref Range    Sodium 135 (L) 136 - 145 mmol/L    Potassium 4.1 3.5 - 5.1 mmol/L    Chloride 90 (L) 95 - 110 mmol/L    CO2 35 (H) 23 - 29 mmol/L    Glucose 109 70 - 110 mg/dL    BUN, Bld 13 8 - 23 mg/dL    Creatinine 0.7 0.5 - 1.4 mg/dL    Calcium 8.9 8.7 - 10.5 mg/dL    Total Protein 6.6 6.0 - 8.4 g/dL    Albumin 2.1 (L) 3.5 - 5.2 g/dL    Total Bilirubin 0.2 0.1 - 1.0 mg/dL    Alkaline Phosphatase 63 55 - 135 U/L    AST 16 10 - 40 U/L    ALT 8 (L) 10 - 44 U/L    Anion Gap 10 8 - 16 mmol/L    eGFR if African American >60.0 >60 mL/min/1.73 m^2    eGFR if non African American >60.0 >60 mL/min/1.73 m^2   Magnesium    Collection Time: 07/27/19  4:26 AM   Result Value Ref Range    Magnesium 1.7 1.6 - 2.6 mg/dL   Phosphorus    Collection Time: 07/27/19  4:26 AM   Result Value Ref Range    Phosphorus 3.3 2.7 - 4.5 mg/dL   CBC auto differential    Collection Time: 07/27/19  4:26 AM   Result Value Ref Range    WBC 6.78 3.90 - 12.70 K/uL    RBC 3.10 (L) 4.00 - 5.40 M/uL    Hemoglobin 8.4 (L) 12.0 - 16.0 g/dL    Hematocrit 27.4 (L) 37.0 - 48.5 %    Mean Corpuscular Volume 88 82 - 98 fL    Mean Corpuscular Hemoglobin 27.1 27.0 - 31.0 pg    Mean Corpuscular Hemoglobin Conc 30.7 (L) 32.0 - 36.0 g/dL    RDW 15.0 (H) 11.5 - 14.5 %    Platelets 183 150 - 350 K/uL    MPV 10.2 9.2 - 12.9 fL    Immature Granulocytes 2.1 (H) 0.0 - 0.5 %    Gran # (ANC) 5.7 1.8 - 7.7 K/uL    Immature Grans (Abs) 0.14 (H) 0.00 - 0.04 K/uL    Lymph # 0.3 (L) 1.0 - 4.8 K/uL    Mono # 0.7 0.3 - 1.0 K/uL    Eos # 0.0 0.0 - 0.5 K/uL    Baso # 0.01 0.00 - 0.20 K/uL    nRBC 1 (A) 0 /100 WBC    Gran% 83.4 (H) 38.0 - 73.0 %    Lymph% 4.1 (L)  18.0 - 48.0 %    Mono% 10.3 4.0 - 15.0 %    Eosinophil% 0.0 0.0 - 8.0 %    Basophil% 0.1 0.0 - 1.9 %    Differential Method Automated    {    Diagnostic Results:  I have reviewed all pertinent imaging results/findings within the past 24 hours.\

## 2019-07-27 NOTE — PLAN OF CARE
Problem: Adult Inpatient Plan of Care  Goal: Plan of Care Review  Outcome: Ongoing (interventions implemented as appropriate)  Pt aox3. On room air at 4 liters via nasal cannula. Afebrile. VSS. sats range from 94-96%. Rivas catheter in place. Pt c/o pain to right flank, oxy IR 5mg given. Plan of care reviewed with pt. Educated pt on any medications, and procedures that the patient would be receiving. Discussed vital sign and I&O routine. Allowed time for pt to ask any questions, and told the patient to call for any assistance. Bed low & locked, call light and personal belongings within reach.  Progressing towards discharge.

## 2019-07-28 NOTE — SUBJECTIVE & OBJECTIVE
Interval History: Patient doing well this morning. She is currently on 5l of 02. Says the burning with urination has gone away. Her chaney is out and she has been walking herself to the bathroom. She has not had a bowel movement in three days    Oncology Treatment Plan:   OP ATEZOLIZUMAB Q3W    Medications:  Continuous Infusions:  Scheduled Meds:   enoxaparin  40 mg Subcutaneous Daily    famotidine  20 mg Oral BID    ipratropium  0.5 mg Nebulization QID WAKE    methylPREDNISolone sodium succinate  50 mg Intravenous BID    metoprolol succinate  50 mg Oral Daily    polyethylene glycol  17 g Oral Daily    senna  8.6 mg Oral Daily     PRN Meds:sodium chloride, bismuth subsalicylate, Dextrose 10% Bolus, Dextrose 10% Bolus, diphenhydrAMINE, glucagon (human recombinant), glucose, glucose, HYDROmorphone, metoclopramide HCl, ondansetron, oxyCODONE, ramelteon, sodium chloride 0.9%     Review of Systems  Objective:     Vital Signs (Most Recent):  Temp: 98 °F (36.7 °C) (07/28/19 0802)  Pulse: 90 (07/28/19 0802)  Resp: 20 (07/28/19 0802)  BP: 110/65 (07/28/19 0802)  SpO2: 97 % (07/28/19 0802) Vital Signs (24h Range):  Temp:  [97.9 °F (36.6 °C)-98.4 °F (36.9 °C)] 98 °F (36.7 °C)  Pulse:  [] 90  Resp:  [16-20] 20  SpO2:  [96 %-100 %] 97 %  BP: (108-128)/(58-71) 110/65     Weight: 89.7 kg (197 lb 12 oz)  Body mass index is 32.91 kg/m².  Body surface area is 2.03 meters squared.      Intake/Output Summary (Last 24 hours) at 7/28/2019 0826  Last data filed at 7/28/2019 0359  Gross per 24 hour   Intake 960 ml   Output 2078 ml   Net -1118 ml       Physical Exam   Constitutional: She is oriented to person, place, and time. She appears well-developed and well-nourished.   HENT:   Head: Normocephalic and atraumatic.   Eyes: Conjunctivae and EOM are normal.   Neck: Normal range of motion. No tracheal deviation present.   Cardiovascular: Normal rate and regular rhythm. Exam reveals no gallop and no friction rub.   No murmur  heard.  Pulmonary/Chest: Effort normal. No respiratory distress.   No breath sounds on the Left lung   Abdominal: Soft. She exhibits no distension. There is no tenderness. There is no rebound and no guarding.   Musculoskeletal: Normal range of motion. She exhibits no edema.   Neurological: She is alert and oriented to person, place, and time. No cranial nerve deficit.   Skin: Skin is warm and dry.   Psychiatric: She has a normal mood and affect. Her behavior is normal.   Vitals reviewed.      Significant Labs:   Recent Results (from the past 48 hour(s))   POCT glucose    Collection Time: 07/26/19 12:20 PM   Result Value Ref Range    POCT Glucose 108 70 - 110 mg/dL   CBC auto differential    Collection Time: 07/26/19  9:33 PM   Result Value Ref Range    WBC 6.92 3.90 - 12.70 K/uL    RBC 3.15 (L) 4.00 - 5.40 M/uL    Hemoglobin 8.6 (L) 12.0 - 16.0 g/dL    Hematocrit 28.4 (L) 37.0 - 48.5 %    Mean Corpuscular Volume 90 82 - 98 fL    Mean Corpuscular Hemoglobin 27.3 27.0 - 31.0 pg    Mean Corpuscular Hemoglobin Conc 30.3 (L) 32.0 - 36.0 g/dL    RDW 15.0 (H) 11.5 - 14.5 %    Platelets 184 150 - 350 K/uL    MPV 10.3 9.2 - 12.9 fL    Immature Granulocytes 2.2 (H) 0.0 - 0.5 %    Gran # (ANC) 5.7 1.8 - 7.7 K/uL    Immature Grans (Abs) 0.15 (H) 0.00 - 0.04 K/uL    Lymph # 0.3 (L) 1.0 - 4.8 K/uL    Mono # 0.8 0.3 - 1.0 K/uL    Eos # 0.0 0.0 - 0.5 K/uL    Baso # 0.00 0.00 - 0.20 K/uL    nRBC 1 (A) 0 /100 WBC    Gran% 81.8 (H) 38.0 - 73.0 %    Lymph% 4.0 (L) 18.0 - 48.0 %    Mono% 12.0 4.0 - 15.0 %    Eosinophil% 0.0 0.0 - 8.0 %    Basophil% 0.0 0.0 - 1.9 %    Differential Method Automated    Comprehensive Metabolic Panel (CMP)    Collection Time: 07/27/19  4:26 AM   Result Value Ref Range    Sodium 135 (L) 136 - 145 mmol/L    Potassium 4.1 3.5 - 5.1 mmol/L    Chloride 90 (L) 95 - 110 mmol/L    CO2 35 (H) 23 - 29 mmol/L    Glucose 109 70 - 110 mg/dL    BUN, Bld 13 8 - 23 mg/dL    Creatinine 0.7 0.5 - 1.4 mg/dL    Calcium 8.9 8.7  - 10.5 mg/dL    Total Protein 6.6 6.0 - 8.4 g/dL    Albumin 2.1 (L) 3.5 - 5.2 g/dL    Total Bilirubin 0.2 0.1 - 1.0 mg/dL    Alkaline Phosphatase 63 55 - 135 U/L    AST 16 10 - 40 U/L    ALT 8 (L) 10 - 44 U/L    Anion Gap 10 8 - 16 mmol/L    eGFR if African American >60.0 >60 mL/min/1.73 m^2    eGFR if non African American >60.0 >60 mL/min/1.73 m^2   Magnesium    Collection Time: 07/27/19  4:26 AM   Result Value Ref Range    Magnesium 1.7 1.6 - 2.6 mg/dL   Phosphorus    Collection Time: 07/27/19  4:26 AM   Result Value Ref Range    Phosphorus 3.3 2.7 - 4.5 mg/dL   CBC auto differential    Collection Time: 07/27/19  4:26 AM   Result Value Ref Range    WBC 6.78 3.90 - 12.70 K/uL    RBC 3.10 (L) 4.00 - 5.40 M/uL    Hemoglobin 8.4 (L) 12.0 - 16.0 g/dL    Hematocrit 27.4 (L) 37.0 - 48.5 %    Mean Corpuscular Volume 88 82 - 98 fL    Mean Corpuscular Hemoglobin 27.1 27.0 - 31.0 pg    Mean Corpuscular Hemoglobin Conc 30.7 (L) 32.0 - 36.0 g/dL    RDW 15.0 (H) 11.5 - 14.5 %    Platelets 183 150 - 350 K/uL    MPV 10.2 9.2 - 12.9 fL    Immature Granulocytes 2.1 (H) 0.0 - 0.5 %    Gran # (ANC) 5.7 1.8 - 7.7 K/uL    Immature Grans (Abs) 0.14 (H) 0.00 - 0.04 K/uL    Lymph # 0.3 (L) 1.0 - 4.8 K/uL    Mono # 0.7 0.3 - 1.0 K/uL    Eos # 0.0 0.0 - 0.5 K/uL    Baso # 0.01 0.00 - 0.20 K/uL    nRBC 1 (A) 0 /100 WBC    Gran% 83.4 (H) 38.0 - 73.0 %    Lymph% 4.1 (L) 18.0 - 48.0 %    Mono% 10.3 4.0 - 15.0 %    Eosinophil% 0.0 0.0 - 8.0 %    Basophil% 0.1 0.0 - 1.9 %    Differential Method Automated    POCT glucose    Collection Time: 07/27/19  8:37 AM   Result Value Ref Range    POCT Glucose 110 70 - 110 mg/dL   Urinalysis    Collection Time: 07/27/19 10:46 AM   Result Value Ref Range    Specimen UA Urine, Catheterized     Color, UA Yellow Yellow, Straw, Kym    Appearance, UA Hazy (A) Clear    pH, UA 7.0 5.0 - 8.0    Specific Gravity, UA 1.020 1.005 - 1.030    Protein, UA 1+ (A) Negative    Glucose, UA Negative Negative    Ketones, UA  Trace (A) Negative    Bilirubin (UA) Negative Negative    Occult Blood UA Negative Negative    Nitrite, UA Negative Negative    Leukocytes, UA Trace (A) Negative   Urinalysis Microscopic    Collection Time: 07/27/19 10:46 AM   Result Value Ref Range    RBC, UA 2 0 - 4 /hpf    WBC, UA 5 0 - 5 /hpf    Bacteria Occasional None-Occ /hpf    Squam Epithel, UA 0 /hpf    Non-Squam Epith <1 <1/hpf /hpf    Hyaline Casts, UA 1 0-1/lpf /lpf    Microscopic Comment SEE COMMENT    POCT glucose    Collection Time: 07/27/19 12:23 PM   Result Value Ref Range    POCT Glucose 113 (H) 70 - 110 mg/dL   POCT glucose    Collection Time: 07/27/19  5:37 PM   Result Value Ref Range    POCT Glucose 99 70 - 110 mg/dL   POCT glucose    Collection Time: 07/27/19  8:57 PM   Result Value Ref Range    POCT Glucose 101 70 - 110 mg/dL   Comprehensive Metabolic Panel (CMP)    Collection Time: 07/28/19  3:48 AM   Result Value Ref Range    Sodium 135 (L) 136 - 145 mmol/L    Potassium 3.9 3.5 - 5.1 mmol/L    Chloride 89 (L) 95 - 110 mmol/L    CO2 35 (H) 23 - 29 mmol/L    Glucose 123 (H) 70 - 110 mg/dL    BUN, Bld 15 8 - 23 mg/dL    Creatinine 0.7 0.5 - 1.4 mg/dL    Calcium 8.7 8.7 - 10.5 mg/dL    Total Protein 6.7 6.0 - 8.4 g/dL    Albumin 2.2 (L) 3.5 - 5.2 g/dL    Total Bilirubin 0.2 0.1 - 1.0 mg/dL    Alkaline Phosphatase 63 55 - 135 U/L    AST 16 10 - 40 U/L    ALT 9 (L) 10 - 44 U/L    Anion Gap 11 8 - 16 mmol/L    eGFR if African American >60.0 >60 mL/min/1.73 m^2    eGFR if non African American >60.0 >60 mL/min/1.73 m^2   Magnesium    Collection Time: 07/28/19  3:48 AM   Result Value Ref Range    Magnesium 1.5 (L) 1.6 - 2.6 mg/dL   Phosphorus    Collection Time: 07/28/19  3:48 AM   Result Value Ref Range    Phosphorus 3.7 2.7 - 4.5 mg/dL   CBC auto differential    Collection Time: 07/28/19  3:48 AM   Result Value Ref Range    WBC 6.76 3.90 - 12.70 K/uL    RBC 3.31 (L) 4.00 - 5.40 M/uL    Hemoglobin 9.1 (L) 12.0 - 16.0 g/dL    Hematocrit 30.2 (L) 37.0  "- 48.5 %    Mean Corpuscular Volume 91 82 - 98 fL    Mean Corpuscular Hemoglobin 27.5 27.0 - 31.0 pg    Mean Corpuscular Hemoglobin Conc 30.1 (L) 32.0 - 36.0 g/dL    RDW 15.1 (H) 11.5 - 14.5 %    Platelets 191 150 - 350 K/uL    MPV 9.9 9.2 - 12.9 fL    Immature Granulocytes 2.2 (H) 0.0 - 0.5 %    Gran # (ANC) 5.7 1.8 - 7.7 K/uL    Immature Grans (Abs) 0.15 (H) 0.00 - 0.04 K/uL    Lymph # 0.3 (L) 1.0 - 4.8 K/uL    Mono # 0.6 0.3 - 1.0 K/uL    Eos # 0.0 0.0 - 0.5 K/uL    Baso # 0.02 0.00 - 0.20 K/uL    nRBC 0 0 /100 WBC    Gran% 83.6 (H) 38.0 - 73.0 %    Lymph% 4.7 (L) 18.0 - 48.0 %    Mono% 9.2 4.0 - 15.0 %    Eosinophil% 0.0 0.0 - 8.0 %    Basophil% 0.3 0.0 - 1.9 %    Differential Method Automated    "    Diagnostic Results:  I have reviewed all pertinent imaging results/findings within the past 24 hours.  "

## 2019-07-28 NOTE — PLAN OF CARE
Problem: Adult Inpatient Plan of Care  Goal: Plan of Care Review  Outcome: Ongoing (interventions implemented as appropriate)  Pt afebrile over night. VSS. On nasal cannula at 4L/hr. Up to BSC. On telemetry with continuous pulse ox in the 96-99% range. Oxy IR given for right chest pain. tums given for c/o indigestion.

## 2019-07-28 NOTE — PT/OT/SLP PROGRESS
Occupational Therapy      Patient Name:  Silvia Capellan   MRN:  [id number]    Patient not seen today secondary to requesting to wait until tomorrow for therapy. Will follow-up on 7/29.    MITZI Campos  7/28/2019

## 2019-07-28 NOTE — PROGRESS NOTES
[facility]  Hematology/Oncology  Progress Note    Patient Name: Silvia Capellan  Admission Date: 7/22/2019  Hospital Length of Stay: 6 days  Code Status: DNR     Subjective:     HPI:  Ms Capellan is a 72 yo F w PMHx significant for rheumatoid arthritis, HTN, and squamous cell carcinoma (unknown primary) resulting in lung and mediastinal masses s/p six cycles of palliative carboplatin/paclitaxel who presents with a chief complaint of shortness of breath. Patient states it started about a month but it has progressively gotten worse. Yesterday she couldn't sleep and was very uncofmortable. She does well at rest but walking is what brings it on. She is on 2l of oxygen at home. Doesn't get worse laying down. She also has this chest pain on her right side that moves around. She says it is similar to her acid reflux pain. She has also had poor PO intake for over a month. Had a esophageal stent placed about 2 weeks ago to help, but is still having poor intake. Complains of nausea, poor appetite and vomitting. She has lost over 50 pounds in the past few months. She has not had chemo in over a year but is currently undergoing radiation therapy.     In the ED, patient was found to have low potassium and Magnesium. They started repleting those. She was having sats in the lower 80s off of oxygen but with 2L she was in the 90s. ED got a ct scan and it showed no PE.     Oncology History per Dr Wong's most recent clinic note (6/7):     Diagnosis: Squamous cell carcinoma , primary site unknown    Molecular/genetic testing: p16 negative; PD L1 could not be run due to inadequate tissue   Stage:        Stage 4   Treatment: Carboplatin/paclitaxel x  6 cycles (2/16/18 - 6/8/18)  Bronchoscopy/EBUS evaluation on 8/31/17 for abnormal mediastinal adenopathy  and a 1.2 cm MORRO mass.  - started Caroplatin/paclitaxel palliatively. She had left thoracentesis on 2/9/18. There were atypical cells in the pleural fluid,  highly suspicious for malignancy.  - She completed cycles of Carboplatin/Paclitaxel, last treatment on 6/8/18.  - She had repeat left pleural mass FNA in [facility] 2019. It was negative for malignancy, but showed lympho plasmacytic infiltration.    - PET CT on 3/13/19 showed increased  hypermetabolism of the left pleural effusion.Thoracic vertebral body, L5-S1 hypermetabolism which was possible metastases          Interval History: Patient doing well this morning. She is currently on 5l of 02. Says the burning with urination has gone away. Her chaney is out and she has been walking herself to the bathroom. She has not had a bowel movement in three days    Oncology Treatment Plan:   OP ATEZOLIZUMAB Q3W    Medications:  Continuous Infusions:  Scheduled Meds:   enoxaparin  40 mg Subcutaneous Daily    famotidine  20 mg Oral BID    ipratropium  0.5 mg Nebulization QID WAKE    methylPREDNISolone sodium succinate  50 mg Intravenous BID    metoprolol succinate  50 mg Oral Daily    polyethylene glycol  17 g Oral Daily    senna  8.6 mg Oral Daily     PRN Meds:sodium chloride, bismuth subsalicylate, Dextrose 10% Bolus, Dextrose 10% Bolus, diphenhydrAMINE, glucagon (human recombinant), glucose, glucose, HYDROmorphone, metoclopramide HCl, ondansetron, oxyCODONE, ramelteon, sodium chloride 0.9%     Review of Systems  Objective:     Vital Signs (Most Recent):  Temp: 98 °F (36.7 °C) (07/28/19 0802)  Pulse: 90 (07/28/19 0802)  Resp: 20 (07/28/19 0802)  BP: 110/65 (07/28/19 0802)  SpO2: 97 % (07/28/19 0802) Vital Signs (24h Range):  Temp:  [97.9 °F (36.6 °C)-98.4 °F (36.9 °C)] 98 °F (36.7 °C)  Pulse:  [] 90  Resp:  [16-20] 20  SpO2:  [96 %-100 %] 97 %  BP: (108-128)/(58-71) 110/65     Weight: 89.7 kg (197 lb 12 oz)  Body mass index is 32.91 kg/m².  Body surface area is 2.03 meters squared.      Intake/Output Summary (Last 24 hours) at 7/28/2019 0826  Last data filed at 7/28/2019 0359  Gross per 24 hour   Intake 960 ml    Output 2078 ml   Net -1118 ml       Physical Exam   Constitutional: She is oriented to person, place, and time. She appears well-developed and well-nourished.   HENT:   Head: Normocephalic and atraumatic.   Eyes: Conjunctivae and EOM are normal.   Neck: Normal range of motion. No tracheal deviation present.   Cardiovascular: Normal rate and regular rhythm. Exam reveals no gallop and no friction rub.   No murmur heard.  Pulmonary/Chest: Effort normal. No respiratory distress.   No breath sounds on the Left lung   Abdominal: Soft. She exhibits no distension. There is no tenderness. There is no rebound and no guarding.   Musculoskeletal: Normal range of motion. She exhibits no edema.   Neurological: She is alert and oriented to person, place, and time. No cranial nerve deficit.   Skin: Skin is warm and dry.   Psychiatric: She has a normal mood and affect. Her behavior is normal.   Vitals reviewed.      Significant Labs:   Recent Results (from the past 48 hour(s))   POCT glucose    Collection Time: 07/26/19 12:20 PM   Result Value Ref Range    POCT Glucose 108 70 - 110 mg/dL   CBC auto differential    Collection Time: 07/26/19  9:33 PM   Result Value Ref Range    WBC 6.92 3.90 - 12.70 K/uL    RBC 3.15 (L) 4.00 - 5.40 M/uL    Hemoglobin 8.6 (L) 12.0 - 16.0 g/dL    Hematocrit 28.4 (L) 37.0 - 48.5 %    Mean Corpuscular Volume 90 82 - 98 fL    Mean Corpuscular Hemoglobin 27.3 27.0 - 31.0 pg    Mean Corpuscular Hemoglobin Conc 30.3 (L) 32.0 - 36.0 g/dL    RDW 15.0 (H) 11.5 - 14.5 %    Platelets 184 150 - 350 K/uL    MPV 10.3 9.2 - 12.9 fL    Immature Granulocytes 2.2 (H) 0.0 - 0.5 %    Gran # (ANC) 5.7 1.8 - 7.7 K/uL    Immature Grans (Abs) 0.15 (H) 0.00 - 0.04 K/uL    Lymph # 0.3 (L) 1.0 - 4.8 K/uL    Mono # 0.8 0.3 - 1.0 K/uL    Eos # 0.0 0.0 - 0.5 K/uL    Baso # 0.00 0.00 - 0.20 K/uL    nRBC 1 (A) 0 /100 WBC    Gran% 81.8 (H) 38.0 - 73.0 %    Lymph% 4.0 (L) 18.0 - 48.0 %    Mono% 12.0 4.0 - 15.0 %    Eosinophil% 0.0 0.0  - 8.0 %    Basophil% 0.0 0.0 - 1.9 %    Differential Method Automated    Comprehensive Metabolic Panel (CMP)    Collection Time: 07/27/19  4:26 AM   Result Value Ref Range    Sodium 135 (L) 136 - 145 mmol/L    Potassium 4.1 3.5 - 5.1 mmol/L    Chloride 90 (L) 95 - 110 mmol/L    CO2 35 (H) 23 - 29 mmol/L    Glucose 109 70 - 110 mg/dL    BUN, Bld 13 8 - 23 mg/dL    Creatinine 0.7 0.5 - 1.4 mg/dL    Calcium 8.9 8.7 - 10.5 mg/dL    Total Protein 6.6 6.0 - 8.4 g/dL    Albumin 2.1 (L) 3.5 - 5.2 g/dL    Total Bilirubin 0.2 0.1 - 1.0 mg/dL    Alkaline Phosphatase 63 55 - 135 U/L    AST 16 10 - 40 U/L    ALT 8 (L) 10 - 44 U/L    Anion Gap 10 8 - 16 mmol/L    eGFR if African American >60.0 >60 mL/min/1.73 m^2    eGFR if non African American >60.0 >60 mL/min/1.73 m^2   Magnesium    Collection Time: 07/27/19  4:26 AM   Result Value Ref Range    Magnesium 1.7 1.6 - 2.6 mg/dL   Phosphorus    Collection Time: 07/27/19  4:26 AM   Result Value Ref Range    Phosphorus 3.3 2.7 - 4.5 mg/dL   CBC auto differential    Collection Time: 07/27/19  4:26 AM   Result Value Ref Range    WBC 6.78 3.90 - 12.70 K/uL    RBC 3.10 (L) 4.00 - 5.40 M/uL    Hemoglobin 8.4 (L) 12.0 - 16.0 g/dL    Hematocrit 27.4 (L) 37.0 - 48.5 %    Mean Corpuscular Volume 88 82 - 98 fL    Mean Corpuscular Hemoglobin 27.1 27.0 - 31.0 pg    Mean Corpuscular Hemoglobin Conc 30.7 (L) 32.0 - 36.0 g/dL    RDW 15.0 (H) 11.5 - 14.5 %    Platelets 183 150 - 350 K/uL    MPV 10.2 9.2 - 12.9 fL    Immature Granulocytes 2.1 (H) 0.0 - 0.5 %    Gran # (ANC) 5.7 1.8 - 7.7 K/uL    Immature Grans (Abs) 0.14 (H) 0.00 - 0.04 K/uL    Lymph # 0.3 (L) 1.0 - 4.8 K/uL    Mono # 0.7 0.3 - 1.0 K/uL    Eos # 0.0 0.0 - 0.5 K/uL    Baso # 0.01 0.00 - 0.20 K/uL    nRBC 1 (A) 0 /100 WBC    Gran% 83.4 (H) 38.0 - 73.0 %    Lymph% 4.1 (L) 18.0 - 48.0 %    Mono% 10.3 4.0 - 15.0 %    Eosinophil% 0.0 0.0 - 8.0 %    Basophil% 0.1 0.0 - 1.9 %    Differential Method Automated    POCT glucose    Collection  Time: 07/27/19  8:37 AM   Result Value Ref Range    POCT Glucose 110 70 - 110 mg/dL   Urinalysis    Collection Time: 07/27/19 10:46 AM   Result Value Ref Range    Specimen UA Urine, Catheterized     Color, UA Yellow Yellow, Straw, Kym    Appearance, UA Hazy (A) Clear    pH, UA 7.0 5.0 - 8.0    Specific Gravity, UA 1.020 1.005 - 1.030    Protein, UA 1+ (A) Negative    Glucose, UA Negative Negative    Ketones, UA Trace (A) Negative    Bilirubin (UA) Negative Negative    Occult Blood UA Negative Negative    Nitrite, UA Negative Negative    Leukocytes, UA Trace (A) Negative   Urinalysis Microscopic    Collection Time: 07/27/19 10:46 AM   Result Value Ref Range    RBC, UA 2 0 - 4 /hpf    WBC, UA 5 0 - 5 /hpf    Bacteria Occasional None-Occ /hpf    Squam Epithel, UA 0 /hpf    Non-Squam Epith <1 <1/hpf /hpf    Hyaline Casts, UA 1 0-1/lpf /lpf    Microscopic Comment SEE COMMENT    POCT glucose    Collection Time: 07/27/19 12:23 PM   Result Value Ref Range    POCT Glucose 113 (H) 70 - 110 mg/dL   POCT glucose    Collection Time: 07/27/19  5:37 PM   Result Value Ref Range    POCT Glucose 99 70 - 110 mg/dL   POCT glucose    Collection Time: 07/27/19  8:57 PM   Result Value Ref Range    POCT Glucose 101 70 - 110 mg/dL   Comprehensive Metabolic Panel (CMP)    Collection Time: 07/28/19  3:48 AM   Result Value Ref Range    Sodium 135 (L) 136 - 145 mmol/L    Potassium 3.9 3.5 - 5.1 mmol/L    Chloride 89 (L) 95 - 110 mmol/L    CO2 35 (H) 23 - 29 mmol/L    Glucose 123 (H) 70 - 110 mg/dL    BUN, Bld 15 8 - 23 mg/dL    Creatinine 0.7 0.5 - 1.4 mg/dL    Calcium 8.7 8.7 - 10.5 mg/dL    Total Protein 6.7 6.0 - 8.4 g/dL    Albumin 2.2 (L) 3.5 - 5.2 g/dL    Total Bilirubin 0.2 0.1 - 1.0 mg/dL    Alkaline Phosphatase 63 55 - 135 U/L    AST 16 10 - 40 U/L    ALT 9 (L) 10 - 44 U/L    Anion Gap 11 8 - 16 mmol/L    eGFR if African American >60.0 >60 mL/min/1.73 m^2    eGFR if non African American >60.0 >60 mL/min/1.73 m^2   Magnesium     "Collection Time: 07/28/19  3:48 AM   Result Value Ref Range    Magnesium 1.5 (L) 1.6 - 2.6 mg/dL   Phosphorus    Collection Time: 07/28/19  3:48 AM   Result Value Ref Range    Phosphorus 3.7 2.7 - 4.5 mg/dL   CBC auto differential    Collection Time: 07/28/19  3:48 AM   Result Value Ref Range    WBC 6.76 3.90 - 12.70 K/uL    RBC 3.31 (L) 4.00 - 5.40 M/uL    Hemoglobin 9.1 (L) 12.0 - 16.0 g/dL    Hematocrit 30.2 (L) 37.0 - 48.5 %    Mean Corpuscular Volume 91 82 - 98 fL    Mean Corpuscular Hemoglobin 27.5 27.0 - 31.0 pg    Mean Corpuscular Hemoglobin Conc 30.1 (L) 32.0 - 36.0 g/dL    RDW 15.1 (H) 11.5 - 14.5 %    Platelets 191 150 - 350 K/uL    MPV 9.9 9.2 - 12.9 fL    Immature Granulocytes 2.2 (H) 0.0 - 0.5 %    Gran # (ANC) 5.7 1.8 - 7.7 K/uL    Immature Grans (Abs) 0.15 (H) 0.00 - 0.04 K/uL    Lymph # 0.3 (L) 1.0 - 4.8 K/uL    Mono # 0.6 0.3 - 1.0 K/uL    Eos # 0.0 0.0 - 0.5 K/uL    Baso # 0.02 0.00 - 0.20 K/uL    nRBC 0 0 /100 WBC    Gran% 83.6 (H) 38.0 - 73.0 %    Lymph% 4.7 (L) 18.0 - 48.0 %    Mono% 9.2 4.0 - 15.0 %    Eosinophil% 0.0 0.0 - 8.0 %    Basophil% 0.3 0.0 - 1.9 %    Differential Method Automated    "    Diagnostic Results:  I have reviewed all pertinent imaging results/findings within the past 24 hours.    Assessment/Plan:     Shortness of breath  -  on 5L of O2 by NC  - Decrease to solumedrol 50 BID and do lasix PRN  -  Discussed with throacic surgery about stent placement endobronchially. Unable to perform surgery  - Consult to PT/OT to work to get patient back to baseline status        Pain passing urine  -UA and Culture ordered 7/27/19    Constipation  - start senna, miralax once daily and assess    Nausea  -Zofran 4 mg q6H first choice PRN  -Metclopramide 10 q6H also available for PRN use    Secondary and unspecified malignant neoplasm of intrathoracic lymph nodes  Pt of Dr Wong with history of squamous cell carcinoma with unknown primary. As such, she is to be treated as metastatic SCC. She " completed cycles of Carboplatin/Paclitaxel, last treatment on 6/8/18. Currently receiving radiation therapy     Stage 4 Squamous cell carcinoma of unknown origin  Diagnosis: Squamous cell carcinoma , primary site unknown    Molecular/genetic testing: p16 negative; PD L1 could not be run due to inadequate tissue   Stage:        Stage 4   Treatment: Carboplatin/paclitaxel x  6 cycles (2/16/18 - 6/8/18)  Bronchoscopy/EBUS evaluation on 8/31/17 for abnormal mediastinal adenopathy  and a 1.2 cm MORRO mass.  - started Caroplatin/paclitaxel palliatively. She had left thoracentesis on 2/9/18. There were atypical cells in the pleural fluid, highly suspicious for malignancy.  - She completed cycles of Carboplatin/Paclitaxel, last treatment on 6/8/18.  - had prior XRT to esophagus  - She had repeat left pleural mass FNA in [place] 2019. It was negative for malignancy, but showed lympho plasmacytic infiltration.   - PET CT on 3/13/19 showed increased  hypermetabolism of the left pleural effusion.Thoracic vertebral body, L5-S1 hypermetabolism which was possible metastases    Reflux esophagitis  Patient is complaining of some chest pain which is most likely due to her reflux.  - esophageal stent placed on 6/25/19  -famotidine 20mg bid  -various PRNs available for more discomfort      Essential hypertension  -Metoprolol (torprol-xl) 50mg oral daily     Insomnia, unspecified  -Ambien 5mg PRN     Non-seasonal Rhinitis  -Cetirizine 5mg             Angelito Singer MD  Hematology/Oncology  [facility]      Attending Addendum:  The patient was seen, examined, and discussed on rounds with the team.  I agree with the assessment and plan as outlined for Silvia Capellan.  Overall improving.  Will give another dose of Lasix today.    Nelson Love DO, FACP  Hematology & Oncology  [address] [place] [address]  ph. [phone]  Fax. [phone]

## 2019-07-28 NOTE — PLAN OF CARE
Problem: Adult Inpatient Plan of Care  Goal: Plan of Care Review  Plan of care reviewed with patient at beginning of shift. Patient C/O of indigestion. Bismuth was given, but pt stated she  Had little relief. Pt complained of R Flank pain and Oxy was given. Pt received 40 mg of Lasix. Rivas was removed. Patient urinated 600ml in 5 hours. Bladder scan showed 28ml residual. A UA and culture was obtained today. Pt is now up to bedside commode. Q1H rounding. Accuchecks ACHS no coverage. VSS. Afebrile. Bed locked in lowest position. Side rails up x2. Call bell within reach. BADL within reach. Rounding Q1H. Will continue to monitor.

## 2019-07-28 NOTE — HOSPITAL COURSE
Patient admitted on 7/22 for a chief complaint of SOB. The problem was thought to be more pain related so the goal was to get her on an effective pain regimen. We were monitoring her pain medicine requirements. On hospital day 3 the patient decompensated rapidly. SATs were in the 70s, tachy, and altered. C02 was elevated. Patient placed on bipap. Chest x-ray ordered and showed worsening opacification of the left hemithorax and increased pulmonary vasculature in the right lung. With steroids and diuresis the patient continued to improve in terms of her shortness of breath.  She did much better over the next few days. She was transitioned from BIPAP and weaned to 5L NC(her baseline at home is 2L) with continuation of breathing treatments QID.  She was able to be weaned down back down to 2L and is only requiring oxygen with exertion.  Since she was responding to diuresis PRN she was started on low dose oral furosemide 20mg daily and has been tolerating.  Her IV solumedrol was tapered to oral prednisone 60mg daily with plans to further taper outpatient. While in the hospital the patient was having significant constipation and did not have a bowel movement despite miralax and senna-docusate.  KUB was ordered due to concern for obstruction although patient did not have concerning exam findings.  KUB negative for obstruction.  She was finally able to have a bowel movement with addition of multiple doses of lactulose which we have discharged her with.  She will follow up as outpatient with Dr. Wong.

## 2019-07-28 NOTE — PLAN OF CARE
Problem: Adult Inpatient Plan of Care  Goal: Plan of Care Review  Plan of care reviewed with patient at beginning of shift. Patient C/O of indigestion and pain when swallowing. Pepto and a GI cocktail was given, but with no relief. Patient also received 40 mg of Lasix. Patient had an episode where she felt like she was short of breath. Patient was on 3L and at 100% O2, but I raised pt to 4L for comfort. Patient has had no BM this shift. VSS Afebrile Q1H rounding Bed locked in lowest position. Side rails up x2. Call bell within reach. BADL within reach. Rounding Q1H. Will continue to monitor.

## 2019-07-29 NOTE — PLAN OF CARE
Problem: Adult Inpatient Plan of Care  Goal: Plan of Care Review  Outcome: Ongoing (interventions implemented as appropriate)  Uneventful shift. Pt pain controlled by po prn pain medication. Pt remains on cardiac monitoring. Pt currently on 2.5L O2. Blood sugars monitored ac/hs. Pt has remained free from injury this shift. Bed in low locked position. Call light and personal belongings within reach. Side rails up x2. Nonskid socks in place. Pt instructed to call with any needs. Will continue to monitor.

## 2019-07-29 NOTE — PT/OT/SLP EVAL
Occupational Therapy   Evaluation    Name: Silvia Capellan  MRN: [id number]  Admitting Diagnosis:  Shortness of breath      Recommendations:     Discharge Recommendations: home with home health  Discharge Equipment Recommendations:  Tub bench  Barriers to discharge:  None    Assessment:     Silvia Capellan is a 73 y.o. female with a medical diagnosis of Shortness of breath. Pt. currently demonstrates decreased (I) with ADLs, functional mobility & t/fs as well as decreased overall strength, ROM, endurance and balance and would benefit from skilled OT services to address these deficits and to facilitate improving (I) with daily tasks. Performance deficits affecting function: weakness, impaired endurance, impaired self care skills, impaired functional mobilty, gait instability.      Rehab Prognosis: Good; patient would benefit from acute skilled OT services to address these deficits and reach maximum level of function.       Plan:     Patient to be seen 2 x/week to address the above listed problems via self-care/home management, therapeutic activities, therapeutic exercises  · Plan of Care Expires: 08/28/19  · Plan of Care Reviewed with: patient    Subjective     Occupational Profile:  Living Environment: Pt was living alone in a [facility] with 3 steps and BHRs and a tub setup in place.  Previous level of function: (I) with ADLs/IADLs/walking. Reports difficulty with tub t/fs.  Equipment Used at Home:  shower chair, oxygen  Assistance upon Discharge: Plans to stay with her daughter.    Pain/Comfort:  · Pain Rating 1: 0/10    Patients cultural, spiritual, Nondenominational conflicts given the current situation:      Objective:     Communicated with: rn prior to session.  Patient found supine with oxygen upon OT entry to room.    General Precautions: Standard, fall   Orthopedic Precautions:    Braces:       Occupational Performance:    Bed Mobility:    · Patient completed Rolling/Turning to Left with  modified  independence  · Patient completed Scooting/Bridging with supervision  · Patient completed Supine to Sit with supervision  · Patient completed Sit to Supine with supervision    Functional Mobility/Transfers:  · Patient completed Sit <> Stand Transfer with stand by assistance  with  no assistive device   · Patient completed Bed <> Chair Transfer using Step Transfer technique with stand by assistance with no assistive device  · Functional Mobility: Declined by pt.    Activities of Daily Living:  · Grooming: supervision seated  · Upper Body Dressing: supervision   · Lower Body Dressing: supervision to don slippers.    Cognitive/Visual Perceptual:  Cognitive/Psychosocial Skills:     -       Oriented to: Person, Place, Time and Situation   -       Safety awareness/insight to disability: intact     Physical Exam:  BUE AROM/MMT: WNL    AMPAC 6 Click ADL:  AMPAC Total Score: 21    Treatment & Education:  UE ROM/MMT  Bed mobility training / assessment  Functional mobility assessment  Sit/standing balance assessment  Educated on importance of sitting OOB in bedside chair to promote increased strength, endurance & breathing.  Discussed OT POC / Post-acute plan  Education:    Patient left supine with all lines intact and call button in reach    GOALS:   Multidisciplinary Problems     Occupational Therapy Goals        Problem: Occupational Therapy Goal    Goal Priority Disciplines Outcome Interventions   Occupational Therapy Goal     OT, PT/OT Ongoing (interventions implemented as appropriate)    Description:  Goals to be met by: 8/5/19     Patient will increase functional independence with ADLs by performing:    Grooming while standing at sink with Set-up Assistance.  Toileting from toilet with Set-up Assistance for hygiene and clothing management.   Supine to sit with [place].  Toilet transfer to toilet with Supervision.                      History:     Past Medical History:   Diagnosis Date    Chronic obstructive  pulmonary disease 5/14/2019    Encounter for blood transfusion     GERD (gastroesophageal reflux disease)     HTN (hypertension)     Rheumatoid arthritis     Rheumatoid arthritis(714.0)     Squamous cell lung cancer, left 2/15/2018       Past Surgical History:   Procedure Laterality Date    APPENDECTOMY      Ipouvo-yzloll-fj--lung N/A 1/4/2019    Performed by [facility] Diagnostic Provider at [facility] OR 2ND FLR    BRONCHOSCOPY N/A 8/28/2017    Performed by [facility] Diagnostic Provider at [facility] OR 2ND FLR    CATARACT EXTRACTION Bilateral 2018    Dr. Keith     COLONOSCOPY      COLONOSCOPY N/A 10/30/2017    Performed by Quentin Coppola MD at [facility] ENDO (4TH FLR)    COLONOSCOPY N/A 1/17/2014    Performed by Feliciano Duran MD at [facility] ENDO    EGD (ESOPHAGOGASTRODUODENOSCOPY) WITH FLUOROSCOPY N/A 6/25/2019    Performed by Aime Huff MD at [facility] ENDO (2ND FLR)    ESOPHAGOGASTRODUODENOSCOPY (EGD) N/A 10/30/2017    Performed by Quentin Coppola MD at [facility] ENDO (4TH FLR)    FOOT SURGERY Left     HYSTERECTOMY      INSERTION, IOL PROSTHESIS Left 11/8/2018    Performed by Susan Keith MD at [facility] OR 1ST FLR    INSERTION, IOL PROSTHESIS Right 10/18/2018    Performed by Susan Keith MD at [facility] OR 1ST FLR    INSERTION-PORT N/A 11/14/2017    Performed by [facility] Diagnostic Provider at [facility] OR 2ND FLR    PHACOEMULSIFICATION, CATARACT Left 11/8/2018    Performed by Susan Keith MD at [facility] OR 1ST FLR    PHACOEMULSIFICATION, CATARACT Right 10/18/2018    Performed by Susan Keith MD at [facility] OR 1ST FLR    SKIN BIOPSY      TOTAL KNEE ARTHROPLASTY      right       Time Tracking:     OT Date of Treatment: 07/29/19  OT Start Time: 1114  OT Stop Time: 1129  OT Total Time (min): 15 min    Billable Minutes:Evaluation 15    MITZI Campos  7/29/2019

## 2019-07-29 NOTE — SUBJECTIVE & OBJECTIVE
Interval History: Ms. Capellan is feeling better today compared to yesterday. She is have less shortness of breath.  She is only complaining of some constipation as she has not had a bowel movement since admission.  She has some associated abdominal discomfort.  She denies nausea, vomiting, fever, chills, chest pain.     Oncology Treatment Plan:   OP ATEZOLIZUMAB Q3W    Medications:  Continuous Infusions:  Scheduled Meds:   enoxaparin  40 mg Subcutaneous Daily    famotidine  20 mg Oral BID    furosemide  20 mg Oral Daily    ipratropium  0.5 mg Nebulization QID WAKE    lactulose  20 g Oral Once    metoprolol succinate  50 mg Oral Daily    polyethylene glycol  17 g Oral BID    predniSONE  60 mg Oral Daily    senna-docusate 8.6-50 mg  2 tablet Oral BID     PRN Meds:sodium chloride, aluminum & magnesium hydroxide-simethicone, bismuth subsalicylate, Dextrose 10% Bolus, Dextrose 10% Bolus, diphenhydrAMINE, glucagon (human recombinant), glucose, glucose, heparin, porcine (PF), HYDROmorphone, metoclopramide HCl, ondansetron, oxyCODONE, ramelteon, sodium chloride 0.9%     Review of Systems   All other systems reviewed and are negative.    Objective:     Vital Signs (Most Recent):  Temp: 98.4 °F (36.9 °C) (07/29/19 1122)  Pulse: 83 (07/29/19 1122)  Resp: 18 (07/29/19 1122)  BP: (!) 153/78 (07/29/19 1122)  SpO2: 99 % (07/29/19 1122) Vital Signs (24h Range):  Temp:  [97.5 °F (36.4 °C)-98.4 °F (36.9 °C)] 98.4 °F (36.9 °C)  Pulse:  [] 83  Resp:  [16-20] 18  SpO2:  [84 %-100 %] 99 %  BP: ()/(60-78) 153/78     Weight: 89.4 kg (197 lb 1.5 oz)  Body mass index is 32.8 kg/m².  Body surface area is 2.02 meters squared.      Intake/Output Summary (Last 24 hours) at 7/29/2019 1138  Last data filed at 7/29/2019 1053  Gross per 24 hour   Intake 1200 ml   Output 2200 ml   Net -1000 ml       Physical Exam   Constitutional: She is oriented to person, place, and time. She appears well-developed and well-nourished. No  distress.   HENT:   Head: Normocephalic and atraumatic.   Eyes: Pupils are equal, round, and reactive to light. No scleral icterus.   Neck: Normal range of motion. Neck supple.   Cardiovascular: Normal rate, regular rhythm and normal heart sounds. Exam reveals no gallop and no friction rub.   No murmur heard.  Pulmonary/Chest: Effort normal and breath sounds normal. No respiratory distress. She has no wheezes. She has no rales.   On 3 L NC   Abdominal: Soft. Bowel sounds are normal. She exhibits no distension. There is no tenderness.   Musculoskeletal: She exhibits no edema.   Neurological: She is alert and oriented to person, place, and time.   Skin: Skin is warm and dry.       Significant Labs:   CBC:   Recent Labs   Lab 19  0348 19  1523 19  0305   WBC 6.76 8.60 7.31   HGB 9.1* 9.6* 10.0*   HCT 30.2* 31.0* 33.1*    205 202    and CMP:   Recent Labs   Lab 19  0348 19  0305   * 134*   K 3.9 3.8   CL 89* 87*   CO2 35* 38*   * 141*   BUN 15 17   CREATININE 0.7 0.8   CALCIUM 8.7 8.8   PROT 6.7 7.4   ALBUMIN 2.2* 2.5*   BILITOT 0.2 0.3   ALKPHOS 63 70   AST 16 15   ALT 9* 12   ANIONGAP 11 9   EGFRNONAA >60.0 >60.0     M.6  Phosph: 3.1    Diagnostic Results:  None

## 2019-07-29 NOTE — PLAN OF CARE
Problem: Physical Therapy Goal  Goal: Physical Therapy Goal  Outcome: Ongoing (interventions implemented as appropriate)  Initial eval completed.  Results, POC, and therapy recommendations discussed with patient.   Complete evaluation documentation to follow.    Mobility Recommendations: walk with 1 person assistance  D/c recommendations:  PT (at daughter's house)     Renetta Ramirez, PT  7/29/2019  [phone] (pager)

## 2019-07-29 NOTE — PLAN OF CARE
Problem: Adult Inpatient Plan of Care  Goal: Plan of Care Review  Outcome: Ongoing (interventions implemented as appropriate)  Patient AAOx4, VSS, afebrile, and without injury. Fall precautions maintained. Patient instructed on how to contact the nurse. Patient on 3LNC without distress; patient on regular diet with poor appetite. POCTs continued. Patient up with standby to the bedside commode; adequate output. Lactulose administered; no BM at this time. No complaints of nausea; complaints of pain addressed with PRN. Patient seen by PT/OT. Questions and concerns have been addressed; will continue to monitor.

## 2019-07-29 NOTE — PLAN OF CARE
Problem: Occupational Therapy Goal  Goal: Occupational Therapy Goal  Goals to be met by: 8/5/19     Patient will increase functional independence with ADLs by performing:    Grooming while standing at sink with Set-up Assistance.  Toileting from toilet with Set-up Assistance for hygiene and clothing management.   Supine to sit with [place].  Toilet transfer to toilet with Supervision.    Outcome: Ongoing (interventions implemented as appropriate)  Evaluation completed and POC established.    MITZI Hanson

## 2019-07-29 NOTE — PROGRESS NOTES
[facility]  Hematology/Oncology  Progress Note    Patient Name: Silvia Capellan  Admission Date: 7/22/2019  Hospital Length of Stay: 7 days  Code Status: DNR     Subjective:     HPI:  Ms Capellan is a 72 yo F w PMHx significant for rheumatoid arthritis, HTN, and squamous cell carcinoma (unknown primary) resulting in lung and mediastinal masses s/p six cycles of palliative carboplatin/paclitaxel who presents with a chief complaint of shortness of breath. Patient states it started about a month but it has progressively gotten worse. Yesterday she couldn't sleep and was very uncofmortable. She does well at rest but walking is what brings it on. She is on 2l of oxygen at home. Doesn't get worse laying down. She also has this chest pain on her right side that moves around. She says it is similar to her acid reflux pain. She has also had poor PO intake for over a month. Had a esophageal stent placed about 2 weeks ago to help, but is still having poor intake. Complains of nausea, poor appetite and vomitting. She has lost over 50 pounds in the past few months. She has not had chemo in over a year but is currently undergoing radiation therapy.     In the ED, patient was found to have low potassium and Magnesium. They started repleting those. She was having sats in the lower 80s off of oxygen but with 2L she was in the 90s. ED got a ct scan and it showed no PE.     Oncology History per Dr Wong's most recent clinic note (6/7):     Diagnosis: Squamous cell carcinoma , primary site unknown    Molecular/genetic testing: p16 negative; PD L1 could not be run due to inadequate tissue   Stage:        Stage 4   Treatment: Carboplatin/paclitaxel x  6 cycles (2/16/18 - 6/8/18)  Bronchoscopy/EBUS evaluation on 8/31/17 for abnormal mediastinal adenopathy  and a 1.2 cm MORRO mass.  - started Caroplatin/paclitaxel palliatively. She had left thoracentesis on 2/9/18. There were atypical cells in the pleural fluid,  highly suspicious for malignancy.  - She completed cycles of Carboplatin/Paclitaxel, last treatment on 6/8/18.  - She had repeat left pleural mass FNA in [facility] 2019. It was negative for malignancy, but showed lympho plasmacytic infiltration.    - PET CT on 3/13/19 showed increased  hypermetabolism of the left pleural effusion.Thoracic vertebral body, L5-S1 hypermetabolism which was possible metastases          Hospital Course:   Patient admitted on 7/22 for a chief complaint of SOB. The problem was thought to be more pain related so the goal was to get her on an effective pain regimen. Were monitoring her pain medicine requirements. On 7/25 at night the patient decompensated rapidly. SATs were in the 70s, tachy, and altered. C02 was elevated. Patient placed on bipap. Chest x-ray ordered and showed worsening opacification of the left hemithorax and increased pulmonary vasculature in the right lung. Started steroids and diuresing the patient. She did much better over the next few days. Oxygen requirement kept decreasing and has been at 5L nasal canula the past couple days (baseline at home is 2L)     Interval History: Ms. Capellan is feeling better today compared to yesterday. She is have less shortness of breath.  She is only complaining of some constipation as she has not had a bowel movement since admission.  She has some associated abdominal discomfort.  She denies nausea, vomiting, fever, chills, chest pain.     Oncology Treatment Plan:   OP ATEZOLIZUMAB Q3W    Medications:  Continuous Infusions:  Scheduled Meds:   enoxaparin  40 mg Subcutaneous Daily    famotidine  20 mg Oral BID    furosemide  20 mg Oral Daily    ipratropium  0.5 mg Nebulization QID WAKE    lactulose  20 g Oral Once    metoprolol succinate  50 mg Oral Daily    polyethylene glycol  17 g Oral BID    predniSONE  60 mg Oral Daily    senna-docusate 8.6-50 mg  2 tablet Oral BID     PRN Meds:sodium chloride, aluminum & magnesium  hydroxide-simethicone, bismuth subsalicylate, Dextrose 10% Bolus, Dextrose 10% Bolus, diphenhydrAMINE, glucagon (human recombinant), glucose, glucose, heparin, porcine (PF), HYDROmorphone, metoclopramide HCl, ondansetron, oxyCODONE, ramelteon, sodium chloride 0.9%     Review of Systems   All other systems reviewed and are negative.    Objective:     Vital Signs (Most Recent):  Temp: 98.4 °F (36.9 °C) (07/29/19 1122)  Pulse: 83 (07/29/19 1122)  Resp: 18 (07/29/19 1122)  BP: (!) 153/78 (07/29/19 1122)  SpO2: 99 % (07/29/19 1122) Vital Signs (24h Range):  Temp:  [97.5 °F (36.4 °C)-98.4 °F (36.9 °C)] 98.4 °F (36.9 °C)  Pulse:  [] 83  Resp:  [16-20] 18  SpO2:  [84 %-100 %] 99 %  BP: ()/(60-78) 153/78     Weight: 89.4 kg (197 lb 1.5 oz)  Body mass index is 32.8 kg/m².  Body surface area is 2.02 meters squared.      Intake/Output Summary (Last 24 hours) at 7/29/2019 1138  Last data filed at 7/29/2019 1053  Gross per 24 hour   Intake 1200 ml   Output 2200 ml   Net -1000 ml       Physical Exam   Constitutional: She is oriented to person, place, and time. She appears well-developed and well-nourished. No distress.   HENT:   Head: Normocephalic and atraumatic.   Eyes: Pupils are equal, round, and reactive to light. No scleral icterus.   Neck: Normal range of motion. Neck supple.   Cardiovascular: Normal rate, regular rhythm and normal heart sounds. Exam reveals no gallop and no friction rub.   No murmur heard.  Pulmonary/Chest: Effort normal and breath sounds normal. No respiratory distress. She has no wheezes. She has no rales.   On 3 L NC   Abdominal: Soft. Bowel sounds are normal. She exhibits no distension. There is no tenderness.   Musculoskeletal: She exhibits no edema.   Neurological: She is alert and oriented to person, place, and time.   Skin: Skin is warm and dry.       Significant Labs:   CBC:   Recent Labs   Lab 07/28/19  0348 07/28/19  1523 07/29/19  0305   WBC 6.76 8.60 7.31   HGB 9.1* 9.6* 10.0*    HCT 30.2* 31.0* 33.1*    205 202    and CMP:   Recent Labs   Lab 19  0348 19  0305   * 134*   K 3.9 3.8   CL 89* 87*   CO2 35* 38*   * 141*   BUN 15 17   CREATININE 0.7 0.8   CALCIUM 8.7 8.8   PROT 6.7 7.4   ALBUMIN 2.2* 2.5*   BILITOT 0.2 0.3   ALKPHOS 63 70   AST 16 15   ALT 9* 12   ANIONGAP 11 9   EGFRNONAA >60.0 >60.0     M.6  Phosph: 3.1    Diagnostic Results:  None    Assessment/Plan:     * Shortness of breath  Patient's baseline O2 requirements are 2L NC and has been requiring 5L while inpatient, able to be weaned down to 3L and satting well.  Ddx includes fluid overload vs malignant effusion or metastasis(recent PET CT 3/2019 with hypermetabolism of left pleural effusion) vs other inflammation. Patient continues to improve with a combination of steroids and diuresis.  -Switched from IV Solumedrol 50mg BID to oral prednisone 60mg daily-continue to taper  -continue pepcid 20 BID for GI ppx  -Continue to diurese. Started lasix 20 mg daily -monitor urine output with this new daily dose  -Patient OOB today, PT/OT consulted over the weekend. Follow up their recommendations but she will likely benefit from home health.     Stage 4 Squamous cell carcinoma of unknown origin  Diagnosis: Squamous cell carcinoma , primary site unknown    Molecular/genetic testing: p16 negative; PD L1 could not be run due to inadequate tissue   Stage:        Stage 4   Treatment: Carboplatin/paclitaxel x  6 cycles (18 - 18)  Bronchoscopy/EBUS evaluation on 17 for abnormal mediastinal adenopathy  and a 1.2 cm MORRO mass.  - started Caroplatin/paclitaxel palliatively. She had left thoracentesis on 18. There were atypical cells in the pleural fluid, highly suspicious for malignancy.  - She completed cycles of Carboplatin/Paclitaxel, last treatment on 18.  - had prior XRT to esophagus  - She had repeat left pleural mass FNA in [facility] . It was negative for malignancy, but showed  lympho plasmacytic infiltration.   - PET CT on 3/13/19 showed increased  hypermetabolism of the left pleural effusion.Thoracic vertebral body, L5-S1 hypermetabolism which was possible metastases    Pain passing urine  UA only with trace leukocytes and UCx No growth x 2d    Constipation  Patient has not had a bowel movement this admission despite addition of Miralax and senna  -Will try lactulose and GI cocktail  -continue Miralax BID and senna-docusate BID    Nausea  -Zofran 4 mg q6H first choice PRN  -Metclopramide 10 q6H also available for PRN use    Secondary and unspecified malignant neoplasm of intrathoracic lymph nodes  Pt of Dr Wong with history of squamous cell carcinoma with unknown primary. As such, she is to be treated as metastatic SCC. She completed cycles of Carboplatin/Paclitaxel, last treatment on 6/8/18. Currently receiving radiation therapy     Reflux esophagitis  Patient is complaining of some chest pain which is most likely due to her reflux.  - esophageal stent placed on 6/25/19  -famotidine 20mg bid  -various PRNs available for more discomfort      Essential hypertension  -Metoprolol (torprol-xl) 50mg oral daily   -discontinue telemetry    Insomnia, unspecified  -Continue Ramelteon 8mg qhs PRN         Paul Whitaker MD, PGY-1  Hematology/Oncology  [facility]

## 2019-07-29 NOTE — PT/OT/SLP EVAL
Physical Therapy Evaluation     Patient Name: Silvia Capellan  MRN: [id number]   Diagnosis: Shortness of breath    Recommendations:   Discharge Recommendations:  home health PT(at daughter's home (did not want SNF))   Discharge Equipment Recommendations: (TBD Rw vs rollator walker; shower chair)   Barriers to Discharge: decreased caregiver assistance (lives alone), inaccessible home environment, fall risk    Assessment:   Silvia Capellan is a 73 y.o. female admitted with a medical diagnosis of Shortness of breath.  Prior to admit she lived alone and did not require an assistive device for mobility.  She endorses using furniture to walk in her home environment over the past 3 weeks.  she now presents with the following impairments/functional limitations: weakness, gait instability, decreased lower extremity function, impaired balance, impaired endurance, impaired cardiopulmonary response to activity, impaired self care skills, impaired functional mobilty.  She became SOB and fatigued after walking 40 feet, but SPO2 remained 97% on 3L.   Due to these impairments, she now requires physical assistance to return to her home environment. She would benefit from SS SNF to improve her endurance, independence, and mobility.  PT discussed this recommendation with the patient who reported she would rather go and live with her daughter and son in law until she became stronger.        Problem List:  weakness, gait instability, decreased lower extremity function, impaired balance, impaired endurance, impaired cardiopulmonary response to activity, impaired self care skills, impaired functional mobilty  Rehab Prognosis:  good.  The patient would benefit from acute skilled PT services to address these deficits and maximize their functional independence.    History:     Past Medical History:   Diagnosis Date    Chronic obstructive pulmonary disease 5/14/2019    Encounter for blood transfusion     GERD (gastroesophageal reflux  "disease)     HTN (hypertension)     Rheumatoid arthritis     Rheumatoid arthritis(714.0)     Squamous cell lung cancer, left 2/15/2018       Past Surgical History:   Procedure Laterality Date    APPENDECTOMY      Eyzucq-euwoea-ch--lung N/A 1/4/2019    Performed by [facility] OR 2ND FLR    BRONCHOSCOPY N/A 8/28/2017    Performed by [facility] OR 2ND [facility]LR    CATARACT EXTRACTION Bilateral 2018    Dr. Keith     COLONOSCOPY      COLONOSCOPY N/A 10/30/2017    Performed by Quentin Coppola [facility] at [facility] ENDO (4TH FLR)    COLONOSCOPY N/A 1/17/2014    [facility]    EGD (ESOPHAGOGASTRODUODENOSCOPY) WITH FLUOROSCOPY N/A 6/25/2019    Performed by Aime Huff MD at [facility] ENDO (2ND FLR)    ESOPHAGOGASTRODUODENOSCOPY (EGD) N/A 10/30/2017    Performed by [facility]ola [facility] at [facility] ENDO (4TH FLR)    FOOT SURGERY Left     HYSTERECTOMY      INSERTION, IOL PROSTHESIS Left 11/8/2018    Performed by Susan Keith MD at [facility] OR 1ST FLR    INSERTION, IOL PROSTHESIS Right 10/18/2018    Performed by Susan Keith MD at [facility] OR 1ST FLR    INSERTION-PORT N/A 11/14/2017    Performed by [facility] OR 2ND FLR    PHACOEMULSIFICATION, CATARACT Left 11/8/2018    Performed by Susan Keith MD at [facility] OR 1ST FLR    PHACOEMULSIFICATION, CATARACT Right 10/18/2018    Performed by Susan Keith MD at [facility] OR 1ST FLR    SKIN BIOPSY      TOTAL KNEE ARTHROPLASTY      right       Subjective   Patient comments/goals: "I can't walk from the bedroom to the bathroom or the door.  I have never been like this before. "  Pain/Comfort:  ·  Pain Rating 1: 0/10  · Pain Rating Post-Intervention 1: 0/10     Recent Vital Signs: (Last documentation)  Pulse: 83 (07/29/19 1122)  BP: (!) 153/78 (07/29/19 1122)  SpO2: 99 % (07/29/19 1122)     Living Environment:  Home: The patient lives alone in a 1 story home with 4 steps and bilateral " "rails to enter. Tub/shower combo with no seat   PLOF:  Independent with household mobility, oxygen dependent, reports acute decline in mobility over the past 3 weeks, "furniture walking"  DME owned: oxygen    Assistance Available: Upon discharge, patient will have assistance from her daughter and son in law (home during the day).    Objective:   The patient had oxygen    General Precautions: fall  Recent Surgery: * No surgery found *    Recent Vital Signs:   Pulse: 83 (07/29/19 1122)  BP: (!) 153/78 (07/29/19 1122)  SpO2: 99 % (07/29/19 1122)    Physical Examination:   The patient was found supine in bed in NAD.     Cognitive Function:  - Oriented to: person, place, time and situation  - Level of Alertness: awake and alert  - Follows Commands/attention: Follows multistep  commands  - Communication: clear/fluent  - Safety awareness/insight to disability: intact  Musculoskeletal System  Upper Extremities:   PROM: WFL  Strength: WFL  Lower Extremities:  PROM: WFL  Strength:  Muscle Group R LE L LE Comments   Hip ext 3/5 3/5    Hip flexion  3/5 3/5       Knee flexion  5/5 5/5       Knee ext. 5/5 5/5    Ankle DF 5/5 5/5    Ankle PF 5/5 5/5    Cardiopulmonary System:   · SPO2 99% on 3L at rest  · SpO2: 97%% on 3L after gait  Neuromuscular System:  · Sensation: intact LT  · Coordination:    Finger to thumb opposition: intact    Finger to nose: grossly intact; decreased command following   Heel to shin: NT  Posture and gross symmetry: fwd flexed gait pattern  Vision:  NT    BALANCE:  Sitting: independent  Standing: min assistance with no AD; wide MARILOU and instability   ·     FUNCTIONAL MOBILITY ASSESSMENT:  Bed Mobility: performed with HOB flat  · Rolling/Turning R: NT  · Rolling/Turning L: Mod I with railing  · Supine > sit: mod I with railing  · Sit > supine: NT  · Scooting EOB: SBA    Transfers:  · Sit <> stand transfer: SBA from bed  · CGA from low chair height  · Instability in initial standing   · Bed <> chair " "transfer: CGA with no AD; dependent on UEs for balance    Gait:   Gait x 40 feet min assistance with instability present  · Patient demonstrated "furniture walking" behavior and was dependent on UEs for balance and stability during gait.   · PT facilitated: balance, safety, encouragement to ambulate without holding the wall/door    Therapeutic Activities, Education, or Exercises:  Therapist educated the patient on the role of PT, POC, progress with mobility, goals, discharge planning, and level of assistance with transfers and Importance of progressive mobilization, out of bed positioning, and participation in therapy.  PT discussed d/c recommendation of SS SNF, reasons, goals, and current living environment.  The patient voiced preference to live with her dtr and son in law instead of SNF at discharge. The therapist discussed the patients current mobility status, deficits, and level of assistance with RN.  Time was also provided for active listening,  therapeutic counseling and discussion of health disposition. The therapist answered questions to patient/familys satisfaction within scope of practice.   White board updated to reflect current level of assistance.     FUNCTIONAL OUTCOME MEASURES:  [facility]  Turning over in bed (including adjusting bedclothes, sheets and blankets)?: 4  Sitting down on and standing up from a chair with arms (e.g., wheelchair, bedside commode, etc.): 3  Moving from lying on back to sitting on the side of the bed?: 3  Moving to and from a bed to a chair (including a wheelchair)?: 3  Need to walk in hospital room?: 3  Climbing 3-5 steps with a railing?: 2  Basic Mobility Total Score: 18    Goals:     Multidisciplinary Problems     Physical Therapy Goals        Problem: Physical Therapy Goal    Goal Priority Disciplines Outcome Goal Variances Interventions   Physical Therapy Goal     PT, PT/OT Ongoing (interventions implemented as appropriate)     Description:    Goals to be met by " 8/9/2019    1. Pt will perform supine to sit from both sides of the bed with independence.  2. Pt will perform sit to supine with independence.  3. Pt will perform sit to stand transfers with independence with and without AD.    4. Pt will perform bed <> chair transfers with modified independence with least restrictive AD.  5. Pt will perform gait x 150 feet with modified independence using rollator walker for balance/endurance  6. Pt will ascend and descend 4 steps with bilateral rails to safely access home environment                      Plan:   During this hospitalization, patient will be seen 4 x/week for gait training, therapeutic activities, therapeutic exercises, neuromuscular re-education to address impairments and functional mobility deficits.   · Plan of Care Expires: 08/27/19   Plan of Care Reviewed with: patient    This plan of care has been discussed with the patient and/or family who were involved in its development and are in agreement with the identified goals and treatment plan.     Clinical Decision Making:   COMPLEXITY OF PT EXAMINATION:  HISTORY  - Comorbidities that affect the PT plan of care or the patient's ability to participate in/progress with therapy (see above)  - Personal Factors: Time since onset of injury, illness, diagnosis, or exacerbation, Patient's age, Prior level of function and Home environment and family support  EXAMINATION  - Body Systems: Cognition: a gross assessment of communication ability, orientation, level of consciousness, awareness of deficits, language, assessment of ability to make needs known, expected emotional or behavioral responses, learning style or preferences, Neuromuscular System: a general assessment of gross coordinated movement (ie. coordination, gait, balance, transitions, transfers), motor control, motor learning, or visual-perceptual skills , Musculoskeletal System: the assessment of gross symmetry/posture, ROM, strength, spasticity, height, or  weight and Cardiovascular System: the assessment of heart rate, blood pressure, respiratory rate, SPO2, endurance, or edema  - Activity or participation limitations: Status of current condition   CLINICAL PRESENTATION: Evolving/changing characteristics  - LEVEL OF COMPLEXITY: Moderate Complexity: (1 or more apply) the patient has at least 1-2 personal factors or comorbidities that impact the plan of care; the examination addressed at least 3 body structures, functions, activity limitations, and/or participation restrictions; and the clinical presentation had evolving or changing characteristics    Time Tracking:   PT Received On:  07/29/19  PT Start Time:   1335    PT Stop Time:  1400  PT Total Time (min): 25 min      Billable Minutes: Evaluation 17 and Therapeutic Activity 8    Renetta Ramirez, PT  7/29/2019  [phone] (pager)

## 2019-07-30 PROBLEM — R30.9 PAIN PASSING URINE: Status: RESOLVED | Noted: 2019-01-01 | Resolved: 2019-01-01

## 2019-07-30 NOTE — DISCHARGE SUMMARY
[facility]  Hematology/Oncology  Discharge Summary      Patient Name: Silvia Capellan  MRN: [id number]  Admission Date: 7/22/2019  Hospital Length of Stay: 8 days  Discharge Date and Time:  07/30/2019 5:05 PM  Attending Physician: Dragan Noguera MD   Discharging Provider: Paul Whitaker MD  Primary Care Provider: Ganga Krause MD    HPI: Ms Capellan is a 72 yo F w PMHx significant for rheumatoid arthritis, HTN, and squamous cell carcinoma (unknown primary) resulting in lung and mediastinal masses s/p six cycles of palliative carboplatin/paclitaxel who presents with a chief complaint of shortness of breath. Patient states it started about a month but it has progressively gotten worse. Yesterday she couldn't sleep and was very uncofmortable. She does well at rest but walking is what brings it on. She is on 2l of oxygen at home. Doesn't get worse laying down. She also has this chest pain on her right side that moves around. She says it is similar to her acid reflux pain. She has also had poor PO intake for over a month. Had a esophageal stent placed about 2 weeks ago to help, but is still having poor intake. Complains of nausea, poor appetite and vomitting. She has lost over 50 pounds in the past few months. She has not had chemo in over a year but is currently undergoing radiation therapy.     In the ED, patient was found to have low potassium and Magnesium. They started repleting those. She was having sats in the lower 80s off of oxygen but with 2L she was in the 90s. ED got a ct scan and it showed no PE.     Oncology History per Dr Wong's most recent clinic note (6/7):     Diagnosis: Squamous cell carcinoma , primary site unknown    Molecular/genetic testing: p16 negative; PD L1 could not be run due to inadequate tissue   Stage:        Stage 4   Treatment: Carboplatin/paclitaxel x  6 cycles (2/16/18 - 6/8/18)  Bronchoscopy/EBUS evaluation on 8/31/17 for abnormal mediastinal adenopathy  and a  1.2 cm MORRO mass.  - started Caroplatin/paclitaxel palliatively. She had left thoracentesis on 2/9/18. There were atypical cells in the pleural fluid, highly suspicious for malignancy.  - She completed cycles of Carboplatin/Paclitaxel, last treatment on 6/8/18.  - She had repeat [facility] pleural [facility]. It was negative for malignancy, but showed lympho plasmacytic infiltration.    - PET CT on 3/13/19 showed increased  hypermetabolism of the left pleural effusion.Thoracic vertebral body, L5-S1 hypermetabolism which was possible metastases          * No surgery found *     Hospital Course: Patient admitted on 7/22 for a chief complaint of SOB. The problem was thought to be more pain related so the goal was to get her on an effective pain regimen. We were monitoring her pain medicine requirements. On hospital day 3 the patient decompensated rapidly. SATs were in the 70s, tachy, and altered. C02 was elevated. Patient placed on bipap. Chest x-ray ordered and showed worsening opacification of the left hemithorax and increased pulmonary vasculature in the right lung. With steroids and diuresis the patient continued to improve in terms of her shortness of breath.  She did much better over the next few days. She was transitioned from BIPAP and weaned to 5L NC(her baseline at home is 2L) with continuation of breathing treatments QID.  She was able to be weaned down back down to 2L and is only requiring oxygen with exertion.  Since she was responding to diuresis PRN she was started on low dose oral furosemide 20mg daily and has been tolerating.  Her IV solumedrol was tapered to oral prednisone 60mg daily with plans to further taper outpatient. While in the hospital the patient was having significant constipation and did not have a bowel movement despite miralax and senna-docusate.  KUB was ordered due to concern for obstruction although patient did not have concerning exam findings.  KUB negative for obstruction.   "She was finally able to have a bowel movement with addition of multiple doses of lactulose which we have discharged her with.  She will follow up as outpatient with Dr. Wong.    Consults:   Consults (From admission, onward)        Status Ordering Provider     Inpatient consult to Hematology/Oncology  Once     Provider:  (Not yet assigned)    Completed JONNATHAN PAGAN consult to case management  Once     Provider:  (Not yet assigned)    Completed JULIO CÉSAR MONAE          Significant Diagnostic Studies:       Pending Diagnostic Studies:     None        Final Active Diagnoses:    Diagnosis Date Noted POA    PRINCIPAL PROBLEM:  Shortness of breath [R06.02] 07/22/2019 Yes    Stage 4 Squamous cell carcinoma of unknown origin [C44.92] 09/11/2017 Yes    Constipation [K59.00] 07/26/2019 No    Nausea [R11.0] 07/22/2019 Yes    Essential hypertension [I10] 10/28/2013 Yes    Insomnia, unspecified [G47.00] 10/28/2013 Yes    Reflux esophagitis [K21.0] 10/28/2013 Yes      Problems Resolved During this Admission:    Diagnosis Date Noted Date Resolved POA    Pain passing urine [R30.9] 07/27/2019 07/30/2019 No    Acute hypercapnic respiratory failure [J96.02] 07/24/2019 07/26/2019 No    Hypomagnesemia [E83.42] 07/22/2019 07/26/2019 No    Hypokalemia [E87.6] 06/24/2019 07/26/2019 Yes      Discharged Condition: fair    [place] [facility]    [place] Up:  Follow-up Information     Shayne Wong MD.    Specialty:  Hematology and Oncology  Why:  Follow-Up Appointment as scheduled by the clinic  Contact information:  [address] [facility] [address]  [phone]                 Patient Instructions:      NEBULIZER KIT (SUPPLIES) FOR HOME USE     Order Specific Question Answer Comments   Height: 5' 5" (1.651 m)    Weight: 89.4 kg (197 lb 1.5 oz)    Does patient have medical equipment at home? shower chair    Does patient have medical equipment at home? oxygen    Length of need (1-99 months): 1  " "  Mask or Mouthpiece? Mouthpiece      WALKER FOR HOME USE     Order Specific Question Answer Comments   Type of Walker: Adult (5'4"-6'6")    With wheels? Yes    Height: 5' 5" (1.651 m)    Weight: 89.4 kg (197 lb 1.5 oz)    Length of need (1-99 months): 99    Does patient have medical equipment at home? shower chair    Does patient have medical equipment at home? oxygen    Please check all that apply: Patient needs help to get in and out of chair.    Please check all that apply: Walker will be used for gait training.      NEBULIZER FOR HOME USE     Order Specific Question Answer Comments   Height: 5' 5" (1.651 m)    Weight: 89.4 kg (197 lb 1.5 oz)    Does patient have medical equipment at home? shower chair    Does patient have medical equipment at home? oxygen    Length of need (1-99 months): 1      Medications:  Reconciled Home Medications:      Medication List      START taking these medications    furosemide 20 MG tablet  Commonly known as:  LASIX  Take 1 tablet (20 mg total) by mouth once daily.     ipratropium 0.02 % nebulizer solution  Commonly known as:  ATROVENT  Take 2.5 mLs (500 mcg total) by nebulization 4 (four) times daily as needed for Wheezing. Rescue     lactulose 10 gram/15 mL solution  Commonly known as:  CHRONULAC  Take 30 mLs (20 g total) by mouth every 6 (six) hours as needed (Constipation).     oxyCODONE 10 mg Tab immediate release tablet  Commonly known as:  ROXICODONE  Take 1 tablet (10 mg total) by mouth every 4 (four) hours as needed.     predniSONE 20 MG tablet  Commonly known as:  DELTASONE  Take 3 tablets (60 mg total) by mouth once daily. X 3 days. Then take 2 tablets (total of 40 mg) daily until you see Dr. Wong        CHANGE how you take these medications    gabapentin 300 MG capsule  Commonly known as:  NEURONTIN  Take 1 capsule (300 mg total) by mouth every evening.  What changed:  how much to take     hydroCHLOROthiazide 25 MG tablet  Commonly known as:  HYDRODIURIL  Take 1 " tablet (25 mg total) by mouth once daily. Hold until MD says to restart  What changed:  additional instructions     losartan 100 MG tablet  Commonly known as:  COZAAR  Take 1 tablet (100 mg total) by mouth once daily. Hold until MD says to restart  What changed:    · how much to take  · how to take this  · when to take this  · additional instructions     metoclopramide HCl 10 MG tablet  Commonly known as:  REGLAN  Take 1 tablet (10 mg total) by mouth every 6 (six) hours as needed.  What changed:    · when to take this  · reasons to take this     * potassium chloride 5mEq / 5 ml Liqd  Take 20 mLs (20 mEq total) by mouth 2 (two) times daily.  What changed:  Another medication with the same name was changed. Make sure you understand how and when to take each.     * potassium chloride 10% 20 mEq/15 mL oral solution  Commonly known as:  KAYCIEL  Take 15 mLs (20 mEq total) by mouth 2 (two) times daily.  What changed:    · how much to take  · how to take this  · when to take this         * This list has 2 medication(s) that are the same as other medications prescribed for you. Read the directions carefully, and ask your doctor or other care provider to review them with you.            CONTINUE taking these medications    cyproheptadine 4 mg tablet  Commonly known as:  PERIACTIN  Take 1 tablet (4 mg total) by mouth 3 (three) times daily as needed.     food supplemt, lactose-reduced Liqd  Commonly known as:  ENSURE ORIGINAL  Take 118 mLs by mouth 3 (three) times daily with meals.     levocetirizine 5 MG tablet  Commonly known as:  XYZAL  Take 1 tablet (5 mg total) by mouth every evening.     LORazepam 1 MG tablet  Commonly known as:  ATIVAN  TAKE 3 TABLETS BY MOUTH 1&1/2 HOURS BEFORE APPT. ON A EMPTY STOMACH     metoprolol succinate 50 MG 24 hr tablet  Commonly known as:  TOPROL-XL  Take 1 tablet (50 mg total) by mouth once daily.     pantoprazole 40 MG tablet  Commonly known as:  PROTONIX  TAKE 1 TABLET (40 MG TOTAL) BY  MOUTH ONCE DAILY.     promethazine 12.5 MG Tab  Commonly known as:  PHENERGAN  Take 1 tablet (12.5 mg total) by mouth every 6 (six) hours as needed (nausea or vomiting).     senna-docusate 8.6-50 mg 8.6-50 mg per tablet  Commonly known as:  PERICOLACE  Take 1 tablet by mouth 2 (two) times daily.     traZODone 50 MG tablet  Commonly known as:  DESYREL  Take 50 mg by mouth every evening.        STOP taking these medications    oxyCODONE-acetaminophen 7.5-325 mg per tablet  Commonly known as:  PERCOCET            Paul Whitaker MD, PGY-1  Hematology/Oncology  [facility]

## 2019-07-30 NOTE — PROGRESS NOTES
Road Test  Oxygen-Patient tolerating room air, no distress.  Ambulation-Patient up with standby assistance.  Devices-Patient going home with no devices  Tolerating-Regular diet.  Elimination-Patient voiding without difficulty.  Self Care-Performs self care with assistance.  Teaching-Verbal and written discharge teaching given.     Patient tolerates room air, ambulates with assistance (walker at home), regular diet, voiding without difficulty, and is going home with no devices. PAC instilled with heparin; needle removed. No bleeding present. Dressed with a band-aid. Patient going home with daughter. Discharge paperwork discussed. Medications reviewed. Patient has all belongings and has no questions at this time.

## 2019-07-30 NOTE — PLAN OF CARE
BARBARA requested a hospital follow-up appointment with Dr. Wong's clinic.    Marybel Solares, RN, BSN, CM  [facility]  Nurse - Med Onc/Gyn Onc

## 2019-07-30 NOTE — DISCHARGE INSTRUCTIONS
Please follow up with your oncologist within one week.     Please hold off on taking your prescribed losartan and hydrochlorathiazide until you see your doctor.      I will SWITCH the dose or number of times a day I take the medications listed below when I get home from the hospital:  None

## 2019-07-30 NOTE — SUBJECTIVE & OBJECTIVE
Interval History:  No events overnight. Patient is having some abdominal discomfort that she describes as achy colicky pain in epigastric area. She had similar pain yesterday improved with GI cocktail. It is associated with some nausea somewhat relieved by zofran.  She was able to have a bowel movement with the addition of lactulose to her regimen.     Oncology Treatment Plan:   OP ATEZOLIZUMAB Q3W    Medications:  Continuous Infusions:  Scheduled Meds:   enoxaparin  40 mg Subcutaneous Daily    famotidine  20 mg Oral BID    furosemide  20 mg Oral Daily    GI cocktail (mylanta 30 mL, lidocaine 2 % viscous 10 mL, dicyclomine 10 mL) 50 mL   Oral Once    ipratropium  0.5 mg Nebulization QID WAKE    magnesium sulfate IVPB  2 g Intravenous Once    metoprolol succinate  50 mg Oral Daily    polyethylene glycol  17 g Oral BID    potassium chloride  40 mEq Oral Q2H    predniSONE  60 mg Oral Daily    senna-docusate 8.6-50 mg  2 tablet Oral BID     PRN Meds:sodium chloride, aluminum & magnesium hydroxide-simethicone, bismuth subsalicylate, Dextrose 10% Bolus, Dextrose 10% Bolus, diphenhydrAMINE, glucagon (human recombinant), glucose, glucose, heparin, porcine (PF), HYDROmorphone, lactulose, metoclopramide HCl, ondansetron, oxyCODONE, promethazine (PHENERGAN) IVPB, ramelteon, sodium chloride 0.9%     Review of Systems   All other systems reviewed and are negative.    Objective:     Vital Signs (Most Recent):  Temp: 98.3 °F (36.8 °C) (07/30/19 0754)  Pulse: 93 (07/30/19 0754)  Resp: 18 (07/30/19 0754)  BP: 112/62 (07/30/19 0754)  SpO2: (!) 93 % (07/30/19 0754) Vital Signs (24h Range):  Temp:  [98 °F (36.7 °C)-98.5 °F (36.9 °C)] 98.3 °F (36.8 °C)  Pulse:  [77-96] 93  Resp:  [16-20] 18  SpO2:  [93 %-100 %] 93 %  BP: (110-153)/(58-78) 112/62     Weight: 89.4 kg (197 lb 1.5 oz)  Body mass index is 32.8 kg/m².  Body surface area is 2.02 meters squared.      Intake/Output Summary (Last 24 hours) at 7/30/2019 0855  Last data  filed at 7/29/2019 1514  Gross per 24 hour   Intake 480 ml   Output 400 ml   Net 80 ml       Physical Exam    Significant Labs:   CBC:   Recent Labs   Lab 07/29/19  0305 07/29/19  1638 07/30/19  0300   WBC 7.31 8.20 6.60   HGB 10.0* 9.8* 9.6*   HCT 33.1* 32.4* 31.6*    222 207    and CMP:   Recent Labs   Lab 07/29/19  0305 07/30/19  0300   * 135*   K 3.8 3.2*   CL 87* 89*   CO2 38* 37*   * 90   BUN 17 17   CREATININE 0.8 0.8   CALCIUM 8.8 8.8   PROT 7.4 6.7   ALBUMIN 2.5* 2.4*   BILITOT 0.3 0.3   ALKPHOS 70 66   AST 15 17   ALT 12 13   ANIONGAP 9 9   EGFRNONAA >60.0 >60.0       Diagnostic Results:

## 2019-07-30 NOTE — PLAN OF CARE
Hospital follow-up appointment with repeat lab work scheduled as listed below.    Future Appointments   Date Time Provider Department Center   8/5/2019 12:45 PM LAB, HEMONC SAME DAY [facility] LAB JUMANA Wilson   8/5/2019  1:40 PM Shayne Wong MD [facility] Giovani Solares, RN, BSN, CM  [facility]  Nurse - Med Onc/Gyn Onc

## 2019-07-30 NOTE — PLAN OF CARE
MDRs completed with Dr. Noguera and the team. Plans for patient to discharge home today with home health services. Patient previously on home oxygen. Medications to be delivered bedside prior to discharge. SW to assist patient with home health arrangements. Family present at the bedside. CM requested a follow-up appointment for patient with Dr. Wong's clinic. MD discussed the discharge plan with the patient. Discharge and follow-up instructions to be completed by the bedside nurse.    Future Appointments   Date Time Provider Department Center   8/20/2019  9:00 AM Ganga Krause MD [facility]        07/30/19 1014   Final Note   Assessment Type Final Discharge Note   Anticipated Discharge Disposition Home-Health   What phone number can be called within the next 1-3 days to see how you are doing after discharge?   ([phone])   Hospital Follow Up  Appt(s) scheduled? Yes   Discharge plans and expectations educations in teach back method with documentation complete? Yes  (per bedside nurse)

## 2019-07-30 NOTE — PROGRESS NOTES
Spoke to the patient about her discharge arrangements: She stated that she was going to stay with her daughter, Joanie Belle at [address] [place] [address], [place] [address] after discharge today (Left voice message for the patient's daughter).  Renetta ( the patient's nurse)   confirmed that she informed the patient's daughter of discharge.  Referral for home health care made to [employer](the patient was still in active care with this home health company at time of her hospitalization) through [facility] and accepted by AXEL LAMBERT. Also sent a Form of Medical Necessity to [employer] - the patient's insurance company. The patient confirmed that she has oxygen at home. Noted that a walker and nebulizer kit were ordered by the team and sent to [facility]. Spoke to Shannan at [facility] ([id number]) - she informed that the patient was not eligible for a walker since she received one on 6/28/19. Spoke to the Oncology team about the order for a nebulizer kit - they agreed to also order a nebulizer for the patient. Shannan at [facility] informed that they would obtain authorization for the Nebulizer and the Nebulizer kit from the patient's insurance company and would deliver it to the patient after they received authorization.

## 2019-07-30 NOTE — PT/OT/SLP PROGRESS
Physical Therapy      Patient Name:  Silvia Capellan   MRN:  [id number]    Attempted to visit pt in AM - pt request PT to return later in day d/t fatigue.  PM - pt refused therapy d/t fatigue.  Pt educated on importance of mobility and effects of prolonged bed rest.  Pt also educated on energy conservation and safe ambulating c assistance from RN/PCT.  Pt repositioned for comfort and allowed time to voice any questions/concerns. Will follow-up next scheduled date.      Treatment time: 6515-4163 (10min)  Charge: 1 unit Therapeutic Activity (education)    Reyes Torres, PT   7/30/2019

## 2019-07-30 NOTE — PLAN OF CARE
Problem: Adult Inpatient Plan of Care  Goal: Plan of Care Review  Outcome: Ongoing (interventions implemented as appropriate)  Uneventful shift. Pt pain controlled by po prn pain medication. Pt had large BM at beginning of shift. Pt given prn sleep aid. Pt nausea controlled by po prn zofran. Pt currently on room air sating 97%.  Pt has remained free from injury this shift. Bed in low locked position. Call light and personal belongings within reach. Side rails up x2. Nonskid socks in place. Pt instructed to call with any needs. Will continue to monitor.

## 2019-07-30 NOTE — PLAN OF CARE
[facility]    HOME HEALTH ORDERS  FACE TO FACE ENCOUNTER    Patient Name: Silvia Capellan  YOB: 1945    PCP: Ganga Krause MD   PCP Address: [place] [address]  PCP Phone Number: [phone]  PCP Fax: [phone]    Encounter Date: 07/30/2019    Admit to Home Health    Diagnoses:  Active Hospital Problems    Diagnosis  POA    *Shortness of breath [R06.02]  Yes     Priority: 1 - High    Stage 4 Squamous cell carcinoma of unknown origin [C44.92]  Yes     Priority: 2     Constipation [K59.00]  No    Nausea [R11.0]  Unknown    Essential hypertension [I10]  Yes    Insomnia, unspecified [G47.00]  Yes    Reflux esophagitis [K21.0]  Yes      Resolved Hospital Problems    Diagnosis Date Resolved POA    Pain passing urine [R30.9] 07/30/2019 No     -UA and Culture ordered 7/27       Acute hypercapnic respiratory failure [J96.02] 07/26/2019 No    Hypomagnesemia [E83.42] 07/26/2019 No    Hypokalemia [E87.6] 07/26/2019 Yes       Future Appointments   Date Time Provider Department Center   8/5/2019 12:45 PM LAB, HEMONC SAME DAY [facility] LAB HO [place] Katie   8/5/2019  1:40 PM Shayne Wong MD [facility] FERRARA Giovani Wilson   8/20/2019  9:00 AM Ganga Krause MD [facility]     Follow-up Information     Shayne Wong MD.    Specialty:  Hematology and Oncology  Why:  Follow-Up Appointment as scheduled by the clinic  Contact information:  [address]  [facility] [address]  [phone]                     I have seen and examined this patient face to face today. My clinical findings that support the need for the home health skilled services and home bound status are the following:  Weakness/numbness causing balance and gait disturbance due to Weakness/Debility and Malignancy/Cancer making it taxing to leave home.  Requiring assistive device to leave home due to unsteady gait caused by  Weakness/Debility and Malignancy/Cancer.    Allergies:  Review of  patient's allergies indicates:   Allergen Reactions    Latex, natural rubber Rash    Codeine Hallucinations    Cortisporin [neomycin-bacitracin-poly-hc] Rash    Latex, natural rubber Rash       Diet: regular diet    Activities: activity as tolerated    Nursing:   SN to complete comprehensive assessment including routine vital signs. Instruct on disease process and s/s of complications to report to MD. Review/verify medication list sent home with the patient at time of discharge  and instruct patient/caregiver as needed. Frequency may be adjusted depending on start of care date.    Notify MD if SBP > 160 or < 90; DBP > 90 or < 50; HR > 120 or < 50; Temp > 101; Other:   N/A      CONSULTS:    Physical Therapy to evaluate and treat. Evaluate for home safety and equipment needs; Establish/upgrade home exercise program. Perform / instruct on therapeutic exercises, gait training, transfer training, and Range of Motion.  Occupational Therapy to evaluate and treat. Evaluate home environment for safety and equipment needs. Perform/Instruct on transfers, ADL training, ROM, and therapeutic exercises.    MISCELLANEOUS CARE:  Home Oxygen:  No change    WOUND CARE ORDERS  n/a      Medications: Review discharge medications with patient and family and provide education.      Current Discharge Medication List      START taking these medications    Details   furosemide (LASIX) 20 MG tablet Take 1 tablet (20 mg total) by mouth once daily.  Qty: 30 tablet, Refills: 0      ipratropium (ATROVENT) 0.02 % nebulizer solution Take 2.5 mLs (500 mcg total) by nebulization 4 (four) times daily as needed for Wheezing. Rescue  Qty: 62.5 mL, Refills: 0    Associated Diagnoses: Squamous cell carcinoma of unknown origin; Secondary and unspecified malignant neoplasm of intrathoracic lymph nodes      lactulose (CHRONULAC) 10 gram/15 mL solution Take 30 mLs (20 g total) by mouth every 6 (six) hours as needed (Constipation).  Qty: 3600 mL, Refills: 0       oxyCODONE (ROXICODONE) 10 mg Tab immediate release tablet Take 1 tablet (10 mg total) by mouth every 4 (four) hours as needed.  Qty: 60 tablet, Refills: 0      predniSONE (DELTASONE) 20 MG tablet Take 3 tablets (60 mg total) by mouth once daily. X 3 days. Then take 2 tablets (total of 40 mg) daily until you see Dr. Wong  Qty: 29 tablet, Refills: 0         CONTINUE these medications which have CHANGED    Details   hydroCHLOROthiazide (HYDRODIURIL) 25 MG tablet Take 1 tablet (25 mg total) by mouth once daily. Hold until MD says to restart  Refills: 1      losartan (COZAAR) 100 MG tablet Take 1 tablet (100 mg total) by mouth once daily. Hold until MD says to restart      metoclopramide HCl (REGLAN) 10 MG tablet Take 1 tablet (10 mg total) by mouth every 6 (six) hours as needed.  Qty: 24 tablet, Refills: 1      potassium chloride 10% (KAYCIEL) 20 mEq/15 mL oral solution Take 15 mLs (20 mEq total) by mouth 2 (two) times daily.  Qty: 473 mL, Refills: 0         CONTINUE these medications which have NOT CHANGED    Details   gabapentin (NEURONTIN) 300 MG capsule Take 1 capsule (300 mg total) by mouth every evening.  Qty: 90 capsule, Refills: 1    Associated Diagnoses: Central stenosis of spinal canal      cyproheptadine (PERIACTIN) 4 mg tablet Take 1 tablet (4 mg total) by mouth 3 (three) times daily as needed.  Qty: 90 tablet, Refills: 2    Associated Diagnoses: Squamous cell carcinoma of unknown origin      food supplemt, lactose-reduced (ENSURE ORIGINAL) Liqd Take 118 mLs by mouth 3 (three) times daily with meals.  Qty: 90 Can, Refills: 6    Associated Diagnoses: Anorexia      levocetirizine (XYZAL) 5 MG tablet Take 1 tablet (5 mg total) by mouth every evening.  Qty: 30 tablet, Refills: 11    Associated Diagnoses: Seasonal allergic rhinitis, unspecified trigger      LORazepam (ATIVAN) 1 MG tablet TAKE 3 TABLETS BY MOUTH 1&1/2 HOURS BEFORE APPT. ON A EMPTY STOMACH  Refills: 0      metoprolol succinate (TOPROL-XL) 50  MG 24 hr tablet Take 1 tablet (50 mg total) by mouth once daily.  Qty: 30 tablet, Refills: 1      pantoprazole (PROTONIX) 40 MG tablet TAKE 1 TABLET (40 MG TOTAL) BY MOUTH ONCE DAILY.  Qty: 90 tablet, Refills: 1    Associated Diagnoses: Gastroesophageal reflux disease, esophagitis presence not specified      potassium chloride 5mEq / 5 ml Liqd Take 20 mLs (20 mEq total) by mouth 2 (two) times daily.  Qty: 500 mL, Refills: 3    Associated Diagnoses: Hypokalemia      promethazine (PHENERGAN) 12.5 MG Tab Take 1 tablet (12.5 mg total) by mouth every 6 (six) hours as needed (nausea or vomiting).  Qty: 20 tablet, Refills: 0    Associated Diagnoses: Nausea      senna-docusate 8.6-50 mg (PERICOLACE) 8.6-50 mg per tablet Take 1 tablet by mouth 2 (two) times daily.      traZODone (DESYREL) 50 MG tablet Take 50 mg by mouth every evening.          STOP taking these medications       oxyCODONE-acetaminophen (PERCOCET) 7.5-325 mg per tablet Comments:   Reason for Stopping:               I certify that this patient is confined to her home and needs intermittent skilled nursing care, physical therapy and occupational therapy.

## 2019-07-30 NOTE — PROGRESS NOTES
Nurse reached out to patient's daughter, Joanie, with information on facesheet. Unable to reach daughter. Left message on voicemail and contacted Carolyn . Will continue to monitor for discharge.

## 2019-08-02 NOTE — TELEPHONE ENCOUNTER
----- Message from Erika Stallworth sent at 8/2/2019 11:41 AM CDT -----  Contact: Sera Perez            Name of Who is Calling: Sera Perez      What is the request in detail: Sera states she is requesting an order and a medical neccesity form for a bedside commode for the pt. Sera states the fax number is on the [facility]. Please contact to further discuss and advise.        Can the clinic reply by [facility]: n      What Number to Call Back if not in [facility]: [phone]

## 2019-08-05 PROBLEM — R09.89 UPPER RESPIRATORY SYMPTOM: Status: ACTIVE | Noted: 2019-01-01

## 2019-08-05 PROBLEM — I95.9 HYPOTENSION: Status: ACTIVE | Noted: 2019-01-01

## 2019-08-05 PROBLEM — J91.0 MALIGNANT PLEURAL EFFUSION: Status: ACTIVE | Noted: 2017-06-08

## 2019-08-05 NOTE — H&P
[facility]  Hematology/Oncology  H&P    Patient Name: Silvia Capellan  MRN: [id number]  Admission Date: 8/5/2019  Code Status: Full Code   Attending Provider: Estefanía Huitron MD  Primary Care Physician: Ganga Krause MD  Principal Problem:Esophageal obstruction    Subjective:     HPI: Ms. Capellan is a 74 yo female with history of HTN, RA, and SCC (unknown primary) with likely mediastinal and lung mets s/p six cycles of palliative Carboplatin and Paclitaxel(2/16/18 - 6/8/18) who presents today with chest pain.  It is located on the right side beneath right breast, it is non-radiating, and is sharp in nature.  It is intermittent and is made worse by eating and drinking.  She says she has had decreased PO intake over last few days because of this.  She is also having increased malaise, fatigue, nausea, 1 x episode of nonbloody vomiting.  She is also have what sounds like general URI symptoms with increased runny nose, sore throat, and ear pain.  She denies shortness of breath, diarrhea, constipation, fever, chills. The patient was found to be afebrile, tachypneic satting 88% on RA, hypotensive with SBP 82/59 and tachycardic with .  She was given 1L NS with improvement in pressure and heart rate and placed on 3L NC with improvement in saturations.  Labs noteable for lactate 2, K 3.4, Mg 1.4.  EKG without abnormality.  CXR with opacification of L lung, nearly unchanged from last imaging.     Of note the patient was recently admitted and discharged from our service(7/22-7/30) with complaints of shortness of breath. CT at the time without evidence of PE but with stable 7.4cm subcarinal mass and multiple L sided pleural masses. She was treated with a combination of diuresis and IV to oral steroids with improvement in shortness of breath.  She was able to be weaned off oxygen and was sent home with low dose furosemide 20mg daily and prednisone(60mgx3 days and then 40mg daily) with follow up  appointment set up 8/5 with oncologist Dr. Wong.      Oncology History:  This is a patient of Dr. Wong's last seen by her on 7/1/2019.   Diagnosis: Squamous cell carcinoma , primary site unknown    Molecular/genetic testing: p16 negative; PD L1 could not be run due to inadequate tissue   Stage:        Stage 4   Treatment: Carboplatin/paclitaxel x  6 cycles (2/16/18 - 6/8/18)  Bronchoscopy/EBUS evaluation on 8/31/17 for abnormal mediastinal adenopathy  and a 1.2 cm MORRO mass.  - started Caroplatin/paclitaxel palliatively. She had left thoracentesis on 2/9/18. There were atypical cells in the pleural fluid, highly suspicious for malignancy.  - She completed cycles of Carboplatin/Paclitaxel, last treatment on 6/8/18.  - She had repeat left pleural mass FNA in Jnauary 2019. It was negative for malignancy, but showed lympho plasmacytic infiltration.  -PET CT on 3/13/19 showed increased  hypermetabolism of the left pleural effusion.Thoracic vertebral body hypermetabolism was noted, new from the prior exam (compared with PET from June 2018) without underlying CT changes which may represent red marrow hyperplasia or new site of neoplastic disease.There was new hypermetabolism in the right L5-S1 facet joints which appeared to be centered at the facet joint not within the bone and is favored to represent degenerative change or neoplasm.  -Repeat CT on 5/31/19 demonstrated an increase in the mediastinal and left pleural masses.  There was also increase in the volume of loculated left pleural fluid.  -6/25/19 patient underwent esophageal stenting for obstruction likely 2/2 malinancy  -Received 10 sessions of palliative XRT to esophagus   -Dr. Wong planned to start Atezolizumab on 7/18/19 once she completed palliative RT for mediastinal mass.          Oncology Treatment Plan:   OP ATEZOLIZUMAB Q3W    Medications:  Continuous Infusions:  Scheduled Meds:   [START ON 8/6/2019] cetirizine  5 mg Oral Daily    enoxaparin  40 mg  Subcutaneous Daily    pantoprazole  40 mg Oral BID AC    polyethylene glycol  17 g Oral BID    sodium chloride 0.9%  30 mL/kg Intravenous ED 1 Time     PRN Meds:albuterol-ipratropium, Dextrose 10% Bolus, Dextrose 10% Bolus, glucagon (human recombinant), glucose, glucose, LORazepam, ondansetron, oxyCODONE, oxyCODONE, promethazine, ramelteon, sodium chloride 0.9%, sodium chloride 0.9%     Review of patient's allergies indicates:   Allergen Reactions    Latex, natural rubber Rash    Codeine Hallucinations    Cortisporin [neomycin-bacitracin-poly-hc] Rash    Latex, natural rubber Rash        Past Medical History:   Diagnosis Date    Chronic obstructive pulmonary disease 5/14/2019    Encounter for blood transfusion     GERD (gastroesophageal reflux disease)     HTN (hypertension)     Rheumatoid arthritis     Rheumatoid arthritis(714.0)     Squamous cell lung cancer, left 2/15/2018     Past Surgical History:   Procedure Laterality Date    APPENDECTOMY      Lbciut-fanaig-xz--lung N/A 1/4/2019    Performed by [facility] OR 2ND FLR    BRONCHOSCOPY N/A 8/28/2017    Performed by [facility] OR 2ND FLR    CATARACT EXTRACTION Bilateral 2018    Dr. Keith     COLONOSCOPY      COLONOSCOPY N/A 10/30/2017    Performed by Quentin Coppola [facility] at [facility] ENDO (4TH FLR)    COLONOSCOPY N/A 1/17/2014    Per[facility] at [facility]    EGD (ESOPHAGOGASTRODUODENOSCOPY) WITH FLUOROSCOPY N/A 6/25/2019    Performed by Aime Huff MD at [facility] ENDO (2ND FLR)    ESOPHAGOGASTRODUODENOSCOPY (EGD) N/A 10/30/2017    Performed by Qu[facility] at [facility] ENDO (4TH FLR)    FOOT SURGERY Left     HYSTERECTOMY      INSERTION, IOL PROSTHESIS Left 11/8/2018    Performed by Susan Keith [facility] at [facility] OR 1ST FLR    INSERTION, IOL PROSTHESIS Right 10/18/2018    Performed by Susan [facility] at [facility] OR 1ST FLR    INSERTION-PORT N/A 11/14/2017    Performed by [facility] at [facility] OR 2ND FLR    PHACOEMULSIFICATION, CATARACT Left 11/8/2018    Performed by [facility] at [facility] OR 1ST FLR    PHACOEMULSIFICATION, CATARACT Right 10/18/2018    Performed by Susan [facility] at [facility] OR 1ST FLR    SKIN BIOPSY      TOTAL KNEE ARTHROPLASTY      right     Family History     Problem Relation (Age of Onset)    Glaucoma Mother, Sister, Brother    Hypertension     Kidney disease     No Known Problems Father, Maternal Aunt, Maternal Uncle, Paternal Aunt, Paternal Uncle, Maternal Grandmother, Maternal Grandfather, Paternal Grandmother, Paternal Grandfather        Tobacco Use    Smoking status: Former Smoker    Smokeless tobacco: Never Used   Substance and Sexual Activity    Alcohol use: Yes     Comment: Occasionally    Drug use: No    Sexual activity: Not Currently       Review of Systems   Constitutional: Positive for activity change, appetite change and fatigue. Negative for chills and fever.   HENT: Positive for congestion, ear pain, rhinorrhea, sore throat and trouble swallowing. Negative for ear discharge and hearing loss.    Eyes: Negative for pain, discharge and visual disturbance.   Respiratory: Positive for cough. Negative for chest tightness, shortness of breath and wheezing.    Cardiovascular: Positive for chest pain. Negative for palpitations.   Gastrointestinal: Positive for nausea and vomiting. Negative for abdominal pain, blood in stool, constipation and diarrhea.   Genitourinary: Negative.    Skin: Negative for rash.     Objective:     Vital Signs (Most Recent):  Temp: 98.9 °F (37.2 °C) (08/05/19 1412)  Pulse: (!) 111 (08/05/19 1630)  Resp: 20 (08/05/19 1630)  BP: (!) 113/55 (08/05/19 1630)  SpO2: 96 % (08/05/19 1630) Vital Signs (24h Range):  Temp:  [98.9 °F (37.2 °C)] 98.9 °F (37.2 °C)  Pulse:  [111-138] 111  Resp:  [19-22] 20  SpO2:  [88 %-100 %] 96 %  BP: ()/(55-76) 113/55     Weight: 83.9 kg (185 lb)  Body mass index is 28.55  kg/m².  Body surface area is 2 meters squared.      Intake/Output Summary (Last 24 hours) at 2019 1709  Last data filed at 2019 1331  Gross per 24 hour   Intake 1000 ml   Output --   Net 1000 ml       Physical Exam   Constitutional: She is oriented to person, place, and time. She appears well-developed and well-nourished. She appears distressed (Moderate).   Appears uncomfortable   HENT:   Head: Normocephalic and atraumatic.   Eyes: Pupils are equal, round, and reactive to light. Conjunctivae are normal. No scleral icterus.   Neck: Normal range of motion. Neck supple. No JVD present.   Cardiovascular: Regular rhythm, normal heart sounds and intact distal pulses. Exam reveals no gallop and no friction rub.   No murmur heard.  Tachycardic   Pulmonary/Chest: Effort normal. No respiratory distress. She has no wheezes. She has no rales.   Decreased breath sounds on L side with dullness to percussion   Abdominal: Soft. Bowel sounds are normal. She exhibits no distension. There is no tenderness.   Musculoskeletal: She exhibits tenderness (reproducible pain in inframammary are). She exhibits no edema.   Neurological: She is alert and oriented to person, place, and time.   Skin: Skin is warm and dry.       Significant Labs:   CBC:   Recent Labs   Lab 19  1150   WBC 14.13*   HGB 11.9*   HCT 38.3       and CMP:   Recent Labs   Lab 19  1241      K 3.4*   CL 99   CO2 26   *   BUN 14   CREATININE 0.8   CALCIUM 8.4*   PROT 6.4   ALBUMIN 2.2*   BILITOT 0.5   ALKPHOS 76   AST 15   ALT 11   ANIONGAP 12   EGFRNONAA >60.0     M.4  Phosph: 3.4    Lactate: 2  Diagnostic Results:  EKG without significant abnormality    CXR   FINDINGS:  Right chest port and catheter as well as monitoring leads are in place.  Near complete opacification of the left hemithorax.  There is diffuse increase in the pulmonary vascularity and interstitial markings on the right.  No pneumothorax.  Stent extending from the  level of the midthoracic spine to the left upper quadrant presumably in the esophagus and stomach.  No pneumothorax.      Impression       Abnormal study as above.  There is near complete opacification of the left hemithorax.  Further evaluation suggested.         Assessment/Plan:     * Esophageal obstruction  Patient with history of esophageal obstruction likely 2/2 malignancy.  She is s/p stent placement on 6/25/2019-placed on Liquid diet and placed on PPI and 10x sessions of palliative XRT. Patient now with symptoms of chest pain worse with eating and drinking, decreased PO intake, and nausea/vomiting.  Concern for esophagitis 2/2 XRT(last session 7/24/2019) vs esophageal obstruction 2/2 malignancy vs stent issues.   Plan:  -Consult placed to [facility] who will see patient in morning  -Made NPO for potential scope tomorrow  -pantaprazole 40mg BID   -Will hold steroids for now as this could be contributing to patient's symptoms    Acute hypoxemic respiratory failure  Patient presented with no SOB, but satting 88% on RA. Now satting >90% on NC.   Likely 2/2 pleural masses/effusion.  Patient with similar presentation on last hospital stay and responded to diuresis and steroids  Plan:  -Wean O2 as tolerated   -hold off on diuresis for now as patient with signs/symptoms of volume depletion  -hold of on steroids as could be contributing to GI symptoms    Malignant pleural effusion  CTA on 7/22 with stable left pleural masses measuring 8 cm.  Possible associated effusion. Patient had left thoracocentesis in 2/2018 which revealed high suspicion for malignant cells.  Spoke with Pulmonology who believe there could be fluid to drain.  -Consult placed with pulmonology for L thoracocentesis(which could take place as early as tomorrow afternoon) and possible pleurX    Upper respiratory symptom  Patient with symptoms of runny nose, ear pain, sore throat, malaise. No findings on physical exam although ear  impacted with cerumen  -Viral respiratory panel ordered  -Will treat symptomatically     Stage 4 Squamous cell carcinoma of unknown origin  Patient of Dr. Wong. SCC of unknown origin with masses in L pleura, lung, mediastinum and causing esopageal obstruction. S/p Carboplatin and Paclitaxel x 6 cycles completed in 6/2018 and 10 sessions of palliative RT completed 7/24/2019.  -Will need follow up with Dr. Wong on discharge  -Consider consultation of palliative care for hospice discussions    Hypotension  Hypotensive and tachycardic likely 2/2 volume depletion with decreased PO intake and vomiting. Also responsive to 1L bolus. Normal lactate, however sepsis not ruled out.  -Continue to monitor  -Follow up blood cultures obtained in ED    Nausea  First line: Zofran 8mg q8 PRN  Second line: Phenergan 12.5 mg q6 PRN    Hypomagnesemia  Magnesium 1.4 on admission  -Replete as needed    Hypokalemia  On admission K of 3.4  -Replacing orally     Anemia in neoplastic disease  H/H stable.  -Continue to monitor for daily CBC  -Transufse H/H <7    Paul Whitaker MD, PGY-1  Hematology/Oncology  [facility]

## 2019-08-05 NOTE — HPI
Ms. Capellan is a 72 yo female with history of HTN, RA, and SCC (unknown primary) with likely mediastinal and lung mets s/p six cycles of palliative Carboplatin and Paclitaxel(2/16/18 - 6/8/18) who presents today with chest pain.  It is located on the right side beneath right breast, it is non-radiating, and is sharp in nature.  It is intermittent and is made worse by eating and drinking.  She says she has had decreased PO intake over last few days because of this.  She is also having increased malaise, fatigue, nausea, 1 x episode of nonbloody vomiting.  She is also have what sounds like general URI symptoms with increased runny nose, sore throat, and ear pain.  She denies shortness of breath, diarrhea, constipation, fever, chills. The patient was found to be afebrile, tachypneic satting 88% on RA, hypotensive with SBP 82/59 and tachycardic with .  She was given 1L NS with improvement in pressure and heart rate and placed on 3L NC with improvement in saturations.  Labs noteable for lactate 2, K 3.4, Mg 1.4.  EKG without abnormality.  CXR with opacification of L lung, nearly unchanged from last imaging.     Of note the patient was recently admitted and discharged from our service(7/22-7/30) with complaints of shortness of breath. CT at the time without evidence of PE but with stable 7.4cm subcarinal mass and multiple L sided pleural masses. She was treated with a combination of diuresis and IV to oral steroids with improvement in shortness of breath.  She was able to be weaned off oxygen and was sent home with low dose furosemide 20mg daily and prednisone(60mgx3 days and then 40mg daily) with follow up appointment set up 8/5 with oncologist Dr. Wong.      Oncology History:  This is a patient of Dr. Wong's last seen by her on 7/1/2019.   Diagnosis: Squamous cell carcinoma , primary site unknown    Molecular/genetic testing: p16 negative; PD L1 could not be run due to inadequate tissue   Stage:        Stage  4   Treatment: Carboplatin/paclitaxel x  6 cycles (2/16/18 - 6/8/18)  Bronchoscopy/EBUS evaluation on 8/31/17 for abnormal mediastinal adenopathy  and a 1.2 cm MORRO mass.  - started Caroplatin/paclitaxel palliatively. She had left thoracentesis on 2/9/18. There were atypical cells in the pleural fluid, highly suspicious for malignancy.  - She completed cycles of Carboplatin/Paclitaxel, last treatment on 6/8/18.  - She had repeat left pleural mass FNA in [facility] 2019. It was negative for malignancy, but showed lympho plasmacytic infiltration.  -PET CT on 3/13/19 showed increased  hypermetabolism of the left pleural effusion.Thoracic vertebral body hypermetabolism was noted, new from the prior exam (compared with PET from June 2018) without underlying CT changes which may represent red marrow hyperplasia or new site of neoplastic disease.There was new hypermetabolism in the right L5-S1 facet joints which appeared to be centered at the facet joint not within the bone and is favored to represent degenerative change or neoplasm.  -Repeat CT on 5/31/19 demonstrated an increase in the mediastinal and left pleural masses.  There was also increase in the volume of loculated left pleural fluid.  -6/25/19 patient underwent esophageal stenting for obstruction likely 2/2 malinancy  -Received 10 sessions of palliative XRT to esophagus   -Dr. Wong planned to start Atezolizumab on 7/18/19 once she completed palliative RT for mediastinal mass.

## 2019-08-05 NOTE — SUBJECTIVE & OBJECTIVE
Oncology Treatment Plan:   OP ATEZOLIZUMAB Q3W    Medications:  Continuous Infusions:  Scheduled Meds:   [START ON 8/6/2019] cetirizine  5 mg Oral Daily    enoxaparin  40 mg Subcutaneous Daily    pantoprazole  40 mg Oral BID AC    polyethylene glycol  17 g Oral BID    sodium chloride 0.9%  30 mL/kg Intravenous ED 1 Time     PRN Meds:albuterol-ipratropium, Dextrose 10% Bolus, Dextrose 10% Bolus, glucagon (human recombinant), glucose, glucose, LORazepam, ondansetron, oxyCODONE, oxyCODONE, promethazine, ramelteon, sodium chloride 0.9%, sodium chloride 0.9%     Review of patient's allergies indicates:   Allergen Reactions    Latex, natural rubber Rash    Codeine Hallucinations    Cortisporin [neomycin-bacitracin-poly-hc] Rash    Latex, natural rubber Rash        Past Medical History:   Diagnosis Date    Chronic obstructive pulmonary disease 5/14/2019    Encounter for blood transfusion     GERD (gastroesophageal reflux disease)     HTN (hypertension)     Rheumatoid arthritis     Rheumatoid arthritis(714.0)     Squamous cell lung cancer, left 2/15/2018     Past Surgical History:   Procedure Laterality Date    APPENDECTOMY      Gsrbdk-yrbrsu-mj--lung N/A 1/4/2019    Performed by [facility] at [facility] OR 2ND FLR    BRONCHOSCOPY N/A 8/28/2017    Performed by [facility] at [facility] OR 2ND FLR    CATARACT EXTRACTION Bilateral 2018    Dr. Keith     COLONOSCOPY      COLONOSCOPY N/A 10/30/2017    Performed by Qu[facility] Co[facility]ola [facility] at [facility] ENDO (4TH FLR)    COLONOSCOPY N/A 1/17/2014    Per[facility] at [facility] ENDO    EGD (ESOPHAGOGASTRODUODENOSCOPY) WITH FLUOROSCOPY N/A 6/25/2019    Performed by Aime Huff MD at [facility] ENDO (2ND FLR)    ESOPHAGOGASTRODUODENOSCOPY (EGD) N/A 10/30/2017    Performed by Quentin [facility] at [facility] ENDO (4TH FLR)    FOOT SURGERY Left     HYSTERECTOMY      INSERTION, IOL PROSTHESIS Left 11/8/2018    Performed by Susan Keith MD  at [facility] OR 1ST FLR    INSERTION, IOL PROSTHESIS Right 10/18/2018    Performed by [facility] at [facility] OR 1ST FLR    INSERTION-PORT N/A 11/14/2017    Performed by [facility] Provider at [facility] OR 2ND FLR    PHACOEMULSIFICATION, CATARACT Left 11/8/2018    Performed by [facility] MD at [facility] OR 1ST FLR    PHACOEMULSIFICATION, CATARACT Right 10/18/2018    Performed by [facility] MD at [facility] OR 1ST FLR    SKIN BIOPSY      TOTAL KNEE ARTHROPLASTY      right     Family History     Problem Relation (Age of Onset)    Glaucoma Mother, Sister, Brother    Hypertension     Kidney disease     No Known Problems Father, Maternal Aunt, Maternal Uncle, Paternal Aunt, Paternal Uncle, Maternal Grandmother, Maternal Grandfather, Paternal Grandmother, Paternal Grandfather        Tobacco Use    Smoking status: Former Smoker    Smokeless tobacco: Never Used   Substance and Sexual Activity    Alcohol use: Yes     Comment: Occasionally    Drug use: No    Sexual activity: Not Currently       Review of Systems   Constitutional: Positive for activity change, appetite change and fatigue. Negative for chills and fever.   HENT: Positive for congestion, ear pain, rhinorrhea, sore throat and trouble swallowing. Negative for ear discharge and hearing loss.    Eyes: Negative for pain, discharge and visual disturbance.   Respiratory: Positive for cough. Negative for chest tightness, shortness of breath and wheezing.    Cardiovascular: Positive for chest pain. Negative for palpitations.   Gastrointestinal: Positive for nausea and vomiting. Negative for abdominal pain, blood in stool, constipation and diarrhea.   Genitourinary: Negative.    Skin: Negative for rash.     Objective:     Vital Signs (Most Recent):  Temp: 98.9 °F (37.2 °C) (08/05/19 1412)  Pulse: (!) 111 (08/05/19 1630)  Resp: 20 (08/05/19 1630)  BP: (!) 113/55 (08/05/19 1630)  SpO2: 96 % (08/05/19 1630) Vital Signs (24h Range):  Temp:  [98.9 °F (37.2 °C)]  98.9 °F (37.2 °C)  Pulse:  [111-138] 111  Resp:  [19-22] 20  SpO2:  [88 %-100 %] 96 %  BP: ()/(55-76) 113/55     Weight: 83.9 kg (185 lb)  Body mass index is 28.55 kg/m².  Body surface area is 2 meters squared.      Intake/Output Summary (Last 24 hours) at 2019 1709  Last data filed at 2019 1331  Gross per 24 hour   Intake 1000 ml   Output --   Net 1000 ml       Physical Exam   Constitutional: She is oriented to person, place, and time. She appears well-developed and well-nourished. She appears distressed (Moderate).   Appears uncomfortable   HENT:   Head: Normocephalic and atraumatic.   Eyes: Pupils are equal, round, and reactive to light. Conjunctivae are normal. No scleral icterus.   Neck: Normal range of motion. Neck supple. No JVD present.   Cardiovascular: Regular rhythm, normal heart sounds and intact distal pulses. Exam reveals no gallop and no friction rub.   No murmur heard.  Tachycardic   Pulmonary/Chest: Effort normal. No respiratory distress. She has no wheezes. She has no rales.   Decreased breath sounds on L side with dullness to percussion   Abdominal: Soft. Bowel sounds are normal. She exhibits no distension. There is no tenderness.   Musculoskeletal: She exhibits tenderness (reproducible pain in inframammary are). She exhibits no edema.   Neurological: She is alert and oriented to person, place, and time.   Skin: Skin is warm and dry.       Significant Labs:   CBC:   Recent Labs   Lab 19  1150   WBC 14.13*   HGB 11.9*   HCT 38.3       and CMP:   Recent Labs   Lab 19  1241      K 3.4*   CL 99   CO2 26   *   BUN 14   CREATININE 0.8   CALCIUM 8.4*   PROT 6.4   ALBUMIN 2.2*   BILITOT 0.5   ALKPHOS 76   AST 15   ALT 11   ANIONGAP 12   EGFRNONAA >60.0     M.4  Phosph: 3.4    Lactate: 2  Diagnostic Results:  EKG without significant abnormality    CXR   FINDINGS:  Right chest port and catheter as well as monitoring leads are in place.  Near complete  opacification of the left hemithorax.  There is diffuse increase in the pulmonary vascularity and interstitial markings on the right.  No pneumothorax.  Stent extending from the level of the midthoracic spine to the left upper quadrant presumably in the esophagus and stomach.  No pneumothorax.      Impression       Abnormal study as above.  There is near complete opacification of the left hemithorax.  Further evaluation suggested.

## 2019-08-05 NOTE — ASSESSMENT & PLAN NOTE
Patient with symptoms of runny nose, ear pain, sore throat, malaise. No findings on physical exam although ear impacted with cerumen  -Viral respiratory panel ordered  -Will treat symptomatically

## 2019-08-05 NOTE — ED PROVIDER NOTES
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 Patient states that she woke at 3:00 a.m. coughing.  She began to have chest pain on the right side of her chest.  She has also had this pain since she left the hospital which was a few days ago.  She was recently admitted with similar symptoms as well as shortness of breath. Pain is worse with eating.  She reports that she has been eating less because of the nausea and the pain and has noticed that her urine is more concentrated.  She denies fevers but has been having chills. Denies any abdominal pain or diarrhea.        Review of patient's allergies indicates:   Allergen Reactions    Latex, natural rubber Rash    Codeine Hallucinations    Cortisporin [neomycin-bacitracin-poly-hc] Rash    Latex, natural rubber Rash     Past Medical History:   Diagnosis Date    Chronic obstructive pulmonary disease 5/14/2019    Encounter for blood transfusion     GERD (gastroesophageal reflux disease)     HTN (hypertension)     Rheumatoid arthritis     Rheumatoid arthritis(714.0)     Squamous cell lung cancer, left 2/15/2018     Past Surgical History:   Procedure Laterality Date    APPENDECTOMY      Urplmw-mnwnjk-ax--lung N/A 1/4/2019    Performed by [facility] OR 2ND FLR    BRONCHOSCOPY N/A 8/28/2017    Performed by [facility] OR 2ND FLR    CATARACT EXTRACTION Bilateral 2018    Dr. Keith     COLONOSCOPY      COLONOSCOPY N/A 10/30/2017    Performed by Quentin Coppola MD at [facility] ENDO (4TH FLR)    COLONOSCOPY N/A 1/17/2014    Performed by [facility] at [facility]O    EGD (ESOPHAGOGASTRODUODENOSCOPY) WITH FLUOROSCOPY N/A 6/25/2019    Performed  by Aime Huff [facility] at [facility] ENDO (2ND FLR)    ESOPHAGOGASTRODUODENOSCOPY (EGD) N/A 10/30/2017    Performed by [facility] at [facility] ENDO (4TH FLR)    FOOT SURGERY Left     HYSTERECTOMY      INSERTION, IOL PROSTHESIS Left 11/8/2018    Performed by Susan Keith MD at [facility] OR 1ST FLR    INSERTION, IOL PROSTHESIS Right 10/18/2018    Performed by Susan Keith MD at [facility] OR 1ST FLR    INSERTION-PORT N/A 11/14/2017    Performed by [facility] at [facility] OR 2ND FLR    PHACOEMULSIFICATION, CATARACT Left 11/8/2018    Performed by Susan Keith MD at [facility] OR 1ST FLR    PHACOEMULSIFICATION, CATARACT Right 10/18/2018    Performed by Susan [facility] at [facility] OR 1ST FLR    SKIN BIOPSY      TOTAL KNEE ARTHROPLASTY      right     Family History   Problem Relation Age of Onset    Glaucoma Mother     No Known Problems Father     Hypertension Unknown     Kidney disease Unknown     Glaucoma Sister     Glaucoma Brother     No Known Problems Maternal Aunt     No Known Problems Maternal Uncle     No Known Problems Paternal Aunt     No Known Problems Paternal Uncle     No Known Problems Maternal Grandmother     No Known Problems Maternal Grandfather     No Known Problems Paternal Grandmother     No Known Problems Paternal Grandfather     Colon polyps Neg Hx     Colon cancer Neg Hx     Esophageal cancer Neg Hx     Irritable bowel syndrome Neg Hx     Inflammatory bowel disease Neg Hx     Stomach cancer Neg Hx     Blindness Neg Hx     Macular degeneration Neg Hx     Retinal detachment Neg Hx     Amblyopia Neg Hx     Cancer Neg Hx     Cataracts Neg Hx     Diabetes Neg Hx     Strabismus Neg Hx     Stroke Neg Hx     Thyroid disease Neg Hx      Social History     Tobacco Use    Smoking status: Former Smoker    Smokeless tobacco: Never Used   Substance Use Topics    Alcohol use: Yes     Comment: Occasionally    Drug use: No     Review of Systems   Constitutional:  Positive for appetite change and chills. Negative for fever.   HENT: Negative for congestion and sore throat.    Eyes: Negative for visual disturbance.   Respiratory: Positive for shortness of breath.    Cardiovascular: Positive for chest pain.   Gastrointestinal: Positive for constipation, nausea and vomiting. Negative for abdominal pain and diarrhea.   Genitourinary: Negative for decreased urine volume, difficulty urinating and dysuria.   Skin: Negative for rash.   Neurological: Positive for weakness.       Physical Exam     Initial Vitals [08/05/19 1125]   BP Pulse Resp Temp SpO2   (!) 82/59 (!) 135 (!) 22 98.9 °F (37.2 °C) (!) 91 %      MAP       --         Physical Exam    Constitutional: She appears well-developed and well-nourished. She appears distressed.   Patient appears pale.  Patient appears uncomfortable.   HENT:   Head: Normocephalic and atraumatic.   Mouth/Throat: Oropharynx is clear and moist.   Eyes: EOM are normal. Pupils are equal, round, and reactive to light.   Neck: Neck supple. No JVD present.   Cardiovascular: Regular rhythm, normal heart sounds and intact distal pulses. Exam reveals no gallop and no friction rub.    No murmur heard.  Patient is tachycardic.   Pulmonary/Chest: No respiratory distress. She exhibits tenderness.   Right side of the anterior chest wall is tender but palpation of this area does not reproduce her pain. Right lung is clear to auscultation. Left side exhibits decreased breath sounds.   Abdominal: Soft. Bowel sounds are normal. She exhibits no distension and no mass. There is no tenderness.   Musculoskeletal: Normal range of motion.   Neurological: She is alert and oriented to person, place, and time. She has normal strength. No cranial nerve deficit.   Skin: Skin is warm and dry. No rash noted. There is pallor.   Psychiatric: She has a normal mood and affect.         ED Course   Procedures  Labs Reviewed   CBC W/ AUTO DIFFERENTIAL - Abnormal; Notable for the  following components:       Result Value    WBC 14.13 (*)     Hemoglobin 11.9 (*)     Mean Corpuscular Hemoglobin Conc 31.1 (*)     RDW 17.1 (*)     Immature Granulocytes 0.8 (*)     Gran # (ANC) 13.0 (*)     Immature Grans (Abs) 0.12 (*)     Lymph # 0.7 (*)     Gran% 91.8 (*)     Lymph% 4.8 (*)     Mono% 2.4 (*)     All other components within normal limits   CULTURE, BLOOD   CULTURE, BLOOD   COMPREHENSIVE METABOLIC PANEL   URINALYSIS, REFLEX TO URINE CULTURE   MAGNESIUM   PHOSPHORUS   LACTIC ACID, PLASMA   LACTIC ACID, PLASMA          Imaging Results    None          Medical Decision Making:   History:   I obtained history from: someone other than patient.  Old Medical Records: I decided to obtain old medical records.  Old Records Summarized: records from previous admission(s).       <> Summary of Records: This patient was recently admitted to the hospital.  She has a history of squamous cell carcinoma of unknown primary with mets to the lung and mediastinum.    Initial Assessment:   73-year-old woman with history of metastatic cancer recently admitted for electrolyte abnormality and acute on chronic respiratory failure presents with right-sided chest pain, nausea, vomiting. Differential diagnosis includes pneumonia, a KI, CHF exacerbation, pneumothorax, PE, ACS, sepsis.  Will give 30 cc/kilos IV fluids, check labs, chest chest x-ray.  Clinical Tests:   Lab Tests: Ordered and Reviewed  The following lab test(s) were unremarkable: CBC and CMP       <> Summary of Lab: White blood cell count is mildly elevated at 14 with a left shift.  Magnesium is decreased at 1.4.  Radiological Study: Ordered and Reviewed  ED Management:  I initially wrote for 30 cc/kilos bolus however upon further chart review I see that patient was fluid resuscitated on her previous admission and then developed acute respiratory failure and required diuresis.  Therefore when her blood pressure improved with 1 L IV fluid resuscitation and her  chest x-ray showed some sign of increased vascular markings on the right lung I decided to stop at 1 L normal saline.  Her pulse ox has remained in the upper 90s on nasal cannula.   CHART REVIEW: (continued): She was admitted for 8 days on July 22nd.  She status post 6 type cycles of palliative carboplatin and paclitaxel.  She was admitted for shortness of breath as well as nausea and vomiting and was found to have hypomagnesemia, hypokalemia.  She also developed acute hypercapnic respiratory failure requiring BiPAP and diuresis.            Attending Attestation:         Attending Critical Care:   Critical Care Times:   Direct Patient Care (initial evaluation, reassessments, and time considering the case)................................................................25 minutes.   Additional History from reviewing old medical records or taking additional history from the family, EMS, PCP, etc.......................5 minutes.   Ordering, Reviewing, and Interpreting Diagnostic Studies...............................................................................................................5 minutes.   Documentation..................................................................................................................................................................................5 minutes.   Consultation with other Physicians. .................................................................................................................................................5 minutes.   ==============================================================  · Total Critical Care Time - exclusive of procedural time: 45 minutes.  ==============================================================  Critical Care Condition: potentially life-threatening   Critical Care Comments: Critical care time required in this patient who presented hypotensive and tachycardic who required fluid resuscitation.  Patient's blood pressure  improved however she remained tachycardic and with chest pain. Patient had the potential for deterioration in her condition at any time.       Attending ED Notes:   1:57 PM  Discussed case with Heme-Onc fellow, Dr. Whitaker, who advised they will come evaluate the patient.    2:09 PM  I am giving morphine and Zofran for her chest pain and nausea.  I have added on a troponin as her chest pain appears to be worsening and I do not have a clear etiology for chest pain. Chest x-ray, independently interpreted by me, shows her stent has good placement in the esophagus.  There are increased vascular markings in the right lung but her left lung has complete opacification.  This could be a result of the cancer as well as a large pleural effusion.  I have advised the patient and her daughter of these results.    Hem/onc plans to admit.             Clinical Impression:       ICD-10-CM ICD-9-CM   1. Hypotension, unspecified hypotension type I95.9 458.9   2. Tachycardia R00.0 785.0   3. Chest pain R07.9 786.50   4. Odynophagia R13.10 787.20   5. Nausea R11.0 787.02   6. Goals of care, counseling/discussion Z71.89 V65.49   7. Palliative care encounter Z51.5 V66.7   8. Anorexia R63.0 783.0   9. Stage 4 Squamous cell carcinoma of unknown origin C44.92 199.1   10. 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 Malignant pleural effusion J91.0 511.81   12. Upper respiratory symptom R09.89 786.9   13. Reflux esophagitis K21.0 530.11   14. Obesity (BMI 30-39.9) E66.9 278.00                                Love Mcmullen MD  08/07/19 0981

## 2019-08-05 NOTE — ASSESSMENT & PLAN NOTE
Patient presented with no SOB, but satting 88% on RA. Now satting >90% on NC.   Likely 2/2 pleural masses/effusion.  Patient with similar presentation on last hospital stay and responded to diuresis and steroids  Plan:  -Wean O2 as tolerated   -hold off on diuresis for now as patient with signs/symptoms of volume depletion  -hold of on steroids as could be contributing to GI symptoms

## 2019-08-05 NOTE — ASSESSMENT & PLAN NOTE
Patient of Dr. Wong. SCC of unknown origin with masses in L pleura, lung, mediastinum and causing esopageal obstruction. S/p Carboplatin and Paclitaxel x 6 cycles completed in 6/2018 and 10 sessions of palliative RT completed 7/24/2019.  -Will need follow up with Dr. Wong on discharge  -Consider consultation of palliative care for hospice discussions

## 2019-08-05 NOTE — ASSESSMENT & PLAN NOTE
Hypotensive and tachycardic likely 2/2 volume depletion with decreased PO intake and vomiting. Also responsive to 1L bolus. Normal lactate, however sepsis not ruled out.  -Continue to monitor  -Follow up blood cultures obtained in ED

## 2019-08-05 NOTE — ASSESSMENT & PLAN NOTE
Patient with history of esophageal obstruction likely 2/2 malignancy.  She is s/p stent placement on 6/25/2019-placed on Liquid diet and placed on PPI and 10x sessions of palliative XRT. Patient now with symptoms of chest pain worse with eating and drinking, decreased PO intake, and nausea/vomiting.  Concern for esophagitis 2/2 XRT(last session 7/24/2019) vs esophageal obstruction 2/2 malignancy vs stent issues.   Plan:  -Consult placed to [facility] who will see patient in morning  -Made NPO for potential scope tomorrow  -pantaprazole 40mg BID   -Will hold steroids for now as this could be contributing to patient's symptoms

## 2019-08-05 NOTE — ASSESSMENT & PLAN NOTE
CTA on 7/22 with stable left pleural masses measuring 8 cm.  Possible associated effusion. Patient had left thoracocentesis in 2/2018 which revealed high suspicion for malignant cells.  Spoke with Pulmonology who believe there could be fluid to drain.  -Consult placed with pulmonology for L thoracocentesis(which could take place as early as tomorrow afternoon) and possible pleurX

## 2019-08-05 NOTE — ED NOTES
Pt on cardiac monitor, bp cuff and continuous pulse ox. Pt placed on 3 L nc for oxygen of 84%. Pt oxygen increased to 94%. Daughter at bedside.

## 2019-08-05 NOTE — ED TRIAGE NOTES
Pt presents with c/o shortness of breath, right sided chest pain. Pt has lung cancer. Pt reports she was recently admitted to the hospital.

## 2019-08-05 NOTE — ED NOTES
Pt reports at 3 am she woke up coughing and feeling bad. Pt reports she has home oxygen but has not been using it. Pt reports right chest wall pain. Pt reports indigestion and has been taking gas ex with eating for the last week.

## 2019-08-06 PROBLEM — R13.10 ODYNOPHAGIA: Status: ACTIVE | Noted: 2019-01-01

## 2019-08-06 PROBLEM — E88.09 HYPOALBUMINEMIA: Status: ACTIVE | Noted: 2019-01-01

## 2019-08-06 PROBLEM — Z51.5 PALLIATIVE CARE ENCOUNTER: Status: ACTIVE | Noted: 2019-01-01

## 2019-08-06 PROBLEM — E83.39 HYPOPHOSPHATEMIA: Status: ACTIVE | Noted: 2019-01-01

## 2019-08-06 NOTE — PROGRESS NOTES
Admit Assessment    Patient Identification  Silvia Capellan   :  1945  Admit Date:  2019  Attending Provider:  Estefanía Huitron MD              Referral:   Pt has a diagnosis of Squamous Cell Carcinoma - unknown primary and was admitted this hospital stay due to Esophageal obstruction, and was admitted this hospital stay due to Tachycardia [R00.0]  Chest pain [R07.9]  Hypotension, unspecified hypotension type [I95.9].  Oncology social worker is involved for psychosocial services.  Patient presents as a 73 y.o. year old  female.    Persons interviewed: Patient    Living Situation:     Lives alone at  [address] [place] [address] , phone: [phone] (home).    (The patient was staying with her daughter, Joanie Belle at [address] [place] [address] after her previous discharge, but she indicated that she would like to be discharged to her own home this time)    (RETIRED) Functional Status Prior  Ambulation Prior: 0-->independent  Transferrin-->independent  Toiletin-->independent    Current or Past Agencies and Description of Services/Supplies    DME  Agency [facility]  Agency Phone Number:   (Cane, Walker, Oxygen, Nebulizer)    Home Health  Agency Name: [email or web address]  Agency Phone Number: [phone]  Services: Skilled Nursing, PT/OT    IV Infusion  None    Nutrition: Oral    Outpatient Pharmacy:     [facility]/pharmacy #[id number] - [place], [place] - [address]   [facility] [address]  Phone: [phone] Fax: [phone]      Patient Preference of agencies include: As stated above    Patient/Caregiver informed of right to choose providers or agencies.  Patient provides permission to release any necessary information to [facility] and to Non[facility] agencies as needed to facilitate patient care, treatment planning, and patient discharge planning.  Written and verbal resources provided.            Adjustment to  "Diagnosis and Treatment  The patient has very good support from her children and her brother living in town. She was staying with her daughter after her previous discharge. Discussed end of life goals with the patient and she indicated that she would like to stay at her own home (she already discussed it with her children) and she was hoping that she would be able to have a few more good meals (crab cakes). She stated that she was not afraid to die. She took care of her mother "to the very end". She was familiar with hospice services since they used hospice services for her mother.She expressed willingness to go home with hospice services in place - "I had a good long life and would like to spend time with my family and great grand children"            History/Current Symptoms of Anxiety/Depression: No  History/Current Substance Use:   Social History     Tobacco Use    Smoking status: Former Smoker    Smokeless tobacco: Never Used   Substance and Sexual Activity    Alcohol use: Yes     Comment: Occasionally    Drug use: No    Sexual activity: Not Currently       Indications of Abuse/Neglect: No      Financial:  Payor/Plan Subscr  Sex Relation Sub. Ins. ID Effective Group Num   1. TOOTIE DALLAS* GAYE CARRERO* 1945 Female  [id number]                                   [address]                            Other identified concerns/needs: None    Plan:    Interventions/Referrals: Offered services to the patient and provided her with contact information. Will follow as needed for supportive counseling and discharge arrangements.    Patient/caregiver engaged in treatment planning process.     providing psychosocial and supportive counseling, resources, education, assistance and discharge planning as appropriate.  Patient/caregiver state understanding of  available resources,  following, remains available.                           "

## 2019-08-06 NOTE — PLAN OF CARE
Problem: Adult Inpatient Plan of Care  Goal: Plan of Care Review  Outcome: Ongoing (interventions implemented as appropriate)  Patient is AAOX4. Up with assist to bedside commode. Telemetry monitoring; sinus tachycardia. Complaints of pain; PRN Hydromorphone administered as ordered. On a clear liquid diet; NPO @ for EDG tomorrow. Right chest port flushed and capped. Urine culture, type and screen,and respiratory viral panel collected and sent to laboratory. Electrolytes replaced. N/V oncoming nurse sent request to pharmacy for PRN Phenergan. Patient is stable. Will continue to monitor.

## 2019-08-06 NOTE — PLAN OF CARE
Problem: Adult Inpatient Plan of Care  Goal: Plan of Care Review  Outcome: Ongoing (interventions implemented as appropriate)  Pt admitted from ED overnight. NPO at this time. Pt reports pain and difficulty when swallowing. Oxycodone given for pain. Pt with cough. Tele maintained, pt running tachycardic. PAC accessed with positive blood return. No skin breakdown. Instructed patient to call for assistance, bed low and locked, call bell within reach, nonskid socks on, patient verbalized understanding.

## 2019-08-06 NOTE — HPI
Pt is a 72 y/o female with history of HTN, RA, and SCC (unknown primary) with likely mediastinal and lung mets s/p six cycles of palliative Carboplatin and Paclitaxel(2/16/18 - 6/8/18) who presented today with chest pain.  Per chart review, it is located on the right side beneath right breast, it is non-radiating, and is sharp in nature.  It is intermittent and is made worse by eating and drinking.  She says she has had decreased PO intake over last few days because of this.  She is also having increased malaise, fatigue, nausea, 1 x episode of nonbloody vomiting.  She also has what sounds like general URI symptoms with increased runny nose, sore throat, and ear pain.  She denied shortness of breath, diarrhea, constipation, fever, chills. The patient was found to be afebrile, tachypneic satting 88% on RA, hypotensive with SBP 82/59 and tachycardic with .  She was given 1L NS with improvement in pressure and heart rate and placed on 3L NC with improvement in saturations.  Labs noteable for lactate 2, K 3.4, Mg 1.4.  EKG without abnormality.  CXR with opacification of L lung, nearly unchanged from last imaging.      Of note the patient was recently admitted and discharged from our service(7/22-7/30) with complaints of shortness of breath. CT at the time without evidence of PE but with stable 7.4cm subcarinal mass and multiple L sided pleural masses. She was treated with a combination of diuresis and IV to oral steroids with improvement in shortness of breath.  She was able to be weaned off oxygen and was sent home with low dose furosemide 20mg daily and prednisone(60mgx3 days and then 40mg daily) with follow up appointment set up 8/5 with oncologist Dr. Wong    Pt made DNR 8/6/19

## 2019-08-06 NOTE — ASSESSMENT & PLAN NOTE
73 year old female with history of HTN, RA, and SCC (unknown primary) with likely mediastinal and lung mets s/p chemotherapy and radiation on who [facility] is being consulted for odynophagia.    We suspect that her odynophagia could be due to esophagitis from radiation vs candida/HSV in the setting of chemotherapy and unlike due to stent malfunction. We will proceed with EGD tomorrow but in the interm feel patient would benefit from thoracentesis prior to sedation/procedure.    Recommendations:  --Clear liquid diet and NPO after MN for EGD tomorrow  --Daily CMP and CBC  --Protonix 40 IV BID  --Carafate suspension with meals  --GI cocktail as needed   --Pulmonary Medicine recs appreciate (prior to sedation or endoscopic procedure patient should have thoracentesis)  --Further recommendations post procedure

## 2019-08-06 NOTE — CONSULTS
[facility]  Gastroenterology  Consult Note    Patient Name: Silvia Capellan  MRN: [id number]  Admission Date: 8/5/2019  Hospital Length of Stay: 1 days  Code Status: DNR   Attending Provider: Estefanía Huitron MD   Consulting Provider: Milly Hills MD  Primary Care Physician: Ganga Krause MD  Principal Problem:Esophageal obstruction    Inpatient consult to [facility])  Consult performed by: Milly Hills MD  Consult ordered by: Paul Whitaker MD        Subjective:     HPI:  73 year old female with history of HTN, RA, and SCC (unknown primary) with likely mediastinal and lung mets s/p chemotherapy and radiation on who [facility] is being consulted for odynophagia.    On 6/25/19 patient had an esophageal stent place. She has not completed chemotherapy and radiation. She reports that she initially did well with the stent but that in the past 2 weeks she has had significant odynophagia with solids and liquids (even drinking water hurts). Her odynophagia has led to significant decrease in PO. She also reports associated nausea with bilious emesis. Upon arrrival patient had low sats with CXR showing complete opacification of the left lung (pulmonary has been consulted) with esophageal stent in place.    Past Medical History:   Diagnosis Date    Chronic obstructive pulmonary disease 5/14/2019    Encounter for blood transfusion     GERD (gastroesophageal reflux disease)     HTN (hypertension)     Rheumatoid arthritis     Rheumatoid arthritis(714.0)     Squamous cell lung cancer, left 2/15/2018       Past Surgical History:   Procedure Laterality Date    APPENDECTOMY      Ubaaxc-kdwqxy-ez--lung N/A 1/4/2019    Performed by [facility]    BRONCHOSCOPY N/A 8/28/2017    Performed by [facility] OR 2ND FLR    CATARACT EXTRACTION Bilateral 2018    Dr. Keith     COLONOSCOPY      COLONOSCOPY N/A 10/30/2017    Performed by Quentin  PARESH Coppola MD at [facility] ENDO (4TH FLR)    COLONOSCOPY N/A 1/17/2014    Performed by Feliciano [facility]an [facility] at [facility] ENDO    EGD (ESOPHAGOGASTRODUODENOSCOPY) WITH FLUOROSCOPY N/A 6/25/2019    Performed by Aime Huff MD at [facility] ENDO (2ND FLR)    ESOPHAGOGASTRODUODENOSCOPY (EGD) N/A 10/30/2017    Performed by Quentin Coppola MD at [facility] ENDO (4TH FLR)    FOOT SURGERY Left     HYSTERECTOMY      INSERTION, IOL PROSTHESIS Left 11/8/2018    Performed by Susan Keith MD at [facility] OR 1ST FLR    INSERTION, IOL PROSTHESIS Right 10/18/2018    Performed by Susan Keith MD at [facility] OR 1ST FLR    INSERTION-PORT N/A 11/14/2017    Performed by [facility] Diagnostic Provider at [facility] OR 2ND FLR    PHACOEMULSIFICATION, CATARACT Left 11/8/2018    Performed by Susan Keith MD at [facility] OR 1ST FLR    PHACOEMULSIFICATION, CATARACT Right 10/18/2018    Performed by Susan Keith MD at [facility] OR 1ST FLR    SKIN BIOPSY      TOTAL KNEE ARTHROPLASTY      right       Review of patient's allergies indicates:   Allergen Reactions    Latex, natural rubber Rash    Codeine Hallucinations    Cortisporin [neomycin-bacitracin-poly-hc] Rash    Latex, natural rubber Rash     Family History     Problem Relation (Age of Onset)    Glaucoma Mother, Sister, Brother    Hypertension     Kidney disease     No Known Problems Father, Maternal Aunt, Maternal Uncle, Paternal Aunt, Paternal Uncle, Maternal Grandmother, Maternal Grandfather, Paternal Grandmother, Paternal Grandfather        Tobacco Use    Smoking status: Former Smoker    Smokeless tobacco: Never Used   Substance and Sexual Activity    Alcohol use: Yes     Comment: Occasionally    Drug use: No    Sexual activity: Not Currently     Review of Systems   Constitutional: Positive for activity change, appetite change, fatigue and unexpected weight change. Negative for chills, diaphoresis and fever.   HENT: Positive for trouble swallowing. Negative for voice change.    Eyes: Negative  for photophobia, pain, discharge, redness, itching and visual disturbance.   Respiratory: Positive for shortness of breath. Negative for cough.    Cardiovascular: Negative for chest pain and palpitations.   Gastrointestinal: Positive for nausea and vomiting. Negative for abdominal distention, abdominal pain, anal bleeding, blood in stool, constipation, diarrhea and rectal pain.   Endocrine: Negative for cold intolerance, heat intolerance, polydipsia, polyphagia and polyuria.   Genitourinary: Negative for dysuria, frequency, hematuria and urgency.   Musculoskeletal: Negative for back pain and neck pain.   Neurological: Positive for weakness. Negative for dizziness, syncope and light-headedness.     Objective:     Vital Signs (Most Recent):  Temp: 98.3 °F (36.8 °C) (08/06/19 0740)  Pulse: 105 (08/06/19 1054)  Resp: 18 (08/06/19 1054)  BP: 110/67 (08/06/19 0740)  SpO2: 98 % (08/06/19 1054) Vital Signs (24h Range):  Temp:  [98 °F (36.7 °C)-98.9 °F (37.2 °C)] 98.3 °F (36.8 °C)  Pulse:  [103-138] 105  Resp:  [16-22] 18  SpO2:  [88 %-100 %] 98 %  BP: ()/(55-76) 110/67     Weight: 81.2 kg (179 lb) (08/06/19 0610)  Body mass index is 27.62 kg/m².      Intake/Output Summary (Last 24 hours) at 8/6/2019 1108  Last data filed at 8/6/2019 1032  Gross per 24 hour   Intake 1100 ml   Output 225 ml   Net 875 ml       Lines/Drains/Airways     Central Venous Catheter Line                 Port A Cath Single Lumen right atrial -- days          Peripheral Intravenous Line                 Peripheral IV - Single Lumen 08/05/19 1222 22 G Left Antecubital less than 1 day                Physical Exam   Constitutional: She is oriented to person, place, and time. No distress.   HENT:   Head: Normocephalic and atraumatic.   Eyes: No scleral icterus.   Cardiovascular:   Sinus tachycardia   Pulmonary/Chest:   On 2L NC with no breath sounds along the left lung field   Abdominal: Soft. Bowel sounds are normal. She exhibits no distension and no  mass. There is no tenderness. There is no rebound and no guarding. No hernia.   Musculoskeletal: She exhibits no edema.   Neurological: She is oriented to person, place, and time.   Skin: She is not diaphoretic.       Significant Labs:  CBC:   Recent Labs   Lab 08/05/19  1150 08/06/19  0421 08/06/19  0609   WBC 14.13* 12.13  --    HGB 11.9* 9.4*  --    HCT 38.3 30.3*  --     SEE COMMENT 138*     CMP:   Recent Labs   Lab 08/06/19  0421   *   CALCIUM 8.8   ALBUMIN 2.0*   PROT 6.1      K 4.4   CO2 29      BUN 17   CREATININE 0.7   ALKPHOS 78   ALT 9*   AST 11   BILITOT 0.4     Coagulation: No results for input(s): PT, INR, APTT in the last 48 hours.    Significant Imaging:  Imaging results within the past 24 hours have been reviewed.    Assessment/Plan:     Odynophagia  73 year old female with history of HTN, RA, and SCC (unknown primary) with likely mediastinal and lung mets s/p chemotherapy and radiation on who [facility] is being consulted for odynophagia.    We suspect that her odynophagia could be due to esophagitis from radiation vs candida/HSV in the setting of chemotherapy and unlike due to stent malfunction. We will proceed with EGD tomorrow but in the interm feel patient would benefit from thoracentesis prior to sedation/procedure.    Recommendations:  --Clear liquid diet and NPO after MN for EGD tomorrow  --Daily CMP and CBC  --Protonix 40 IV BID  --Carafate suspension with meals  --GI cocktail as needed   --Pulmonary Medicine recs appreciate (prior to sedation or endoscopic procedure patient should have thoracentesis)  --Further recommendations post procedure        Thank you for your consult. I will follow-up with patient. Please contact us if you have any additional questions.    Milly Hills M.D.  Gastroenterology Fellow, PGY-VI  Pager: [phone]  [facility]

## 2019-08-06 NOTE — CONSULTS
"[facility]  Palliative Medicine  Consult Note    Patient Name: Silvia Capellan  MRN: [id number]  Admission Date: 8/5/2019  Hospital Length of Stay: 1 days  Code Status: DNR   Attending Provider: Estefanía Huitron MD  Consulting Provider: OTIS Mejia  Primary Care Physician: Ganga Krause MD  Principal Problem:Esophageal obstruction    Patient information was obtained from patient and ER records.      Inpatient consult to Palliative Care  Consult performed by: OTIS Sibley  Consult ordered by: Paul Whitaker MD        Assessment/Plan:     Palliative care encounter  Impression: Pt is a 72 y/o female with history of HTN, RA, and SCC (unknown primary) witOf note the patient was recently admitted and discharged from our service(7/22-7/30) with complaints of shortness of breath. CT at the time without evidence of PE per chart review.  7.4cm subcarinal mass and multiple L sided pleural masses with  likely mediastinal and lung mets s/p six cycles of palliative Carboplatin and Paclitaxel(2/16/18 - 6/8/18) who presented with chest pain. She is s/p stent placement on 6/25/2019-placed on Liquid diet and placed on PPI and 10x sessions of palliative XRT. Patient now with nausea with PO intake. Per chart review, concern for esophagitis 2/2 XRT(last session 7/24/2019) vs esophageal obstruction 2/2 malignancy vs stent issues. Pt to have GI scope tomorrow. Pt made DNR today per primary team. Pt is alert and oriented to person, place, and situation. Pt is cooperative. Pt reports 50 plus pound weight lose in past few months.     Reason for consult: [facility]- EOL/hospcie     Goals of care: Met with pt who has good insight into her disease process. Per pt, her goals at this time are to be at home with family and have her symptoms managed. Per pt, she verbalized that she is "not afraid to die." Pt open to home hospice care once discharged form hospital.    Hospice education done with pt. Per pt, her " mother had hospice care before she .      Per pt, she is living with her daughter, son-in-law, adult grandchild, and sometimes great-grandchild. Per pt, son-in-law is home during the day. Per pt, her brother visits her often.       Per pt, she is not sure if daughter will be coming to visit today. Let pt know, [facility] care can meet with her  and her daughter to help with plan of care.     Nausea/Vomitting:   Pt on Zofran 8 mg IVP q 8hr prn.   Recs: Consider scheduling Zofran IVP q 8 hrs.   Pt reports ongoing nausea/vomitting.     Pt on Phenergan 12.5 mg q hrs prn nausea/vomiting.     Pain:   Pt on Dilaudid 1 mg IVP q 6hrs prn pain.   Pt reports she is comfortable at this time.     Plan:  Pt made DNR today.   Pt open to home hospice care.   Will meet with pt's daughter when she visits. Pt unsure if she will make it to hospital today. Will reach out to pt's daughter.   Pt to have GI scope tomorrow.   Will follow.          Thank you for your consult. I will follow-up with patient. Please contact us if you have any additional questions.    Subjective:     HPI:   Pt is a 74 y/o female with history of HTN, RA, and SCC (unknown primary) with likely mediastinal and lung mets s/p six cycles of palliative Carboplatin and Paclitaxel(18 - 18) who presented today with chest pain.   Per chart review, it is located on the right side beneath right breast, it is non-radiating, and is sharp in nature.  It is intermittent and is made worse by eating and drinking.  She says she has had decreased PO intake over last few days because of this.  She is also having increased malaise, fatigue, nausea, 1 x episode of nonbloody vomiting.  She  also has what sounds like general URI symptoms with increased runny nose, sore throat, and ear pain.  She denied shortness of breath, diarrhea, constipation, fever, chills. The patient was found to be afebrile, tachypneic satting 88% on RA, hypotensive with SBP 82/59 and tachycardic with .   She was given 1L NS with improvement in pressure and heart rate and placed on 3L NC with improvement in saturations.  Labs noteable for lactate 2, K 3.4, Mg 1.4.  EKG without abnormality.  CXR with opacification of L lung, nearly unchanged from last imaging.      Of note the patient was recently admitted and discharged from our service(7/22-7/30) with complaints of shortness of breath. CT at the time without evidence of PE but with stable 7.4cm subcarinal mass and multiple L sided pleural masses. She was treated with a combination of diuresis and IV to oral steroids with improvement in shortness of breath.  She was able to be weaned off oxygen and was sent home with low dose furosemide 20mg daily and prednisone(60mgx3 days and then 40mg daily) with follow up appointment set up 8/5 with oncologist Dr. Wong    Pt made DNR 8/6/19    Hospital Course:  No notes on file    Interval History: Pt made DNR.    Past Medical History:   Diagnosis Date    Chronic obstructive pulmonary disease 5/14/2019    Encounter for blood transfusion     GERD (gastroesophageal reflux disease)     HTN (hypertension)     Rheumatoid arthritis     Rheumatoid arthritis(714.0)     Squamous cell lung cancer, left 2/15/2018       Past Surgical History:   Procedure Laterality Date    APPENDECTOMY      Cncckt-emnkjo-ew--lung N/A 1/4/2019    Performed by [facility] Provider at [facility] OR 2ND FLR    BRONCHOSCOPY N/A 8/28/2017    Performed by [facility] Diagnostic Provider at [facility] OR 2ND FLR    CATARACT EXTRACTION Bilateral 2018    Dr. Keith     COLONOSCOPY      COLONOSCOPY N/A 10/30/2017    Performed by Quentin Coppola MD at [facility] ENDO (4TH FLR)    COLONOSCOPY N/A 1/17/2014    [facility] at [facility]    EGD (ESOPHAGOGASTRODUODENOSCOPY) WITH FLUOROSCOPY N/A 6/25/2019    Performed by Aime Huff MD at [facility] ENDO (2ND FLR)    ESOPHAGOGASTRODUODENOSCOPY (EGD) N/A 10/30/2017    Performed by Quentin Coppola MD at [facility]  ENDO (4TH FLR)    FOOT SURGERY Left     HYSTERECTOMY      INSERTION, IOL PROSTHESIS Left 11/8/2018    Performed by [facility] at [facility] OR 1ST FLR    INSERTION, IOL PROSTHESIS Right 10/18/2018    Performed by Susan [facility] MD at [facility] OR 1ST FLR    INSERTION-PORT N/A 11/14/2017    Performed by [facility] Provider at [facility] OR 2ND FLR    PHACOEMULSIFICATION, CATARACT Left 11/8/2018    Performed by Susan Keith MD at [facility] OR 1ST FLR    PHACOEMULSIFICATION, CATARACT Right 10/18/2018    Performed by Susan Keith MD at [facility] OR 1ST FLR    SKIN BIOPSY      TOTAL KNEE ARTHROPLASTY      right       Review of patient's allergies indicates:   Allergen Reactions    Latex, natural rubber Rash    Codeine Hallucinations    Cortisporin [neomycin-bacitracin-poly-hc] Rash    Latex, natural rubber Rash       Medications:  Continuous Infusions:  Scheduled Meds:   enoxaparin  40 mg Subcutaneous Daily    pantoprozole (PROTONIX) IV  40 mg Intravenous BID    polyethylene glycol  17 g Oral BID    sucralfate  1 g Oral TID WM     PRN Meds:albuterol-ipratropium, Dextrose 10% Bolus, Dextrose 10% Bolus, GI cocktail (mylanta 30 mL, lidocaine 2 % viscous 10 mL, dicyclomine 10 mL) 50 mL, glucagon (human recombinant), glucose, glucose, HYDROmorphone, LORazepam, ondansetron, promethazine (PHENERGAN) IVPB, ramelteon, sodium chloride 0.9%, sodium chloride 0.9%    Family History     Problem Relation (Age of Onset)    Glaucoma Mother, Sister, Brother    Hypertension     Kidney disease     No Known Problems Father, Maternal Aunt, Maternal Uncle, Paternal Aunt, Paternal Uncle, Maternal Grandmother, Maternal Grandfather, Paternal Grandmother, Paternal Grandfather        Tobacco Use    Smoking status: Former Smoker    Smokeless tobacco: Never Used   Substance and Sexual Activity    Alcohol use: Yes     Comment: Occasionally    Drug use: No    Sexual activity: Not Currently       Review of Systems    Constitutional: Positive for activity change, fatigue and unexpected weight change.   HENT: Positive for trouble swallowing.    Respiratory: Negative for chest tightness, shortness of breath and stridor.    Gastrointestinal: Positive for nausea. Negative for abdominal pain.   Neurological: Positive for weakness.     Objective:     Vital Signs (Most Recent):  Temp: 98 °F (36.7 °C) (08/06/19 1153)  Pulse: 107 (08/06/19 1200)  Resp: 16 (08/06/19 1153)  BP: 118/80 (08/06/19 1153)  SpO2: 100 % (08/06/19 1153) Vital Signs (24h Range):  Temp:  [98 °F (36.7 °C)-98.3 °F (36.8 °C)] 98 °F (36.7 °C)  Pulse:  [102-116] 107  Resp:  [16-20] 16  SpO2:  [95 %-100 %] 100 %  BP: ()/(55-80) 118/80     Weight: 81.2 kg (179 lb)  Body mass index is 27.62 kg/m².    Review of Symptoms  Symptom Assessment (ESAS 0-10 scale)   ESAS 0 1 2 3 4 5 6 7 8 9 10   Pain X             Dyspnea              Anxiety              Nausea      X        Depression               Anorexia      X        Fatigue       X       Insomnia              Restlessness               Agitation X             CAM / Delirium __ --  ___+   Constipation     __ --  ___+   Diarrhea           __ --  ___+  Bowel Management Plan (BMP): Yes    Comments:     Pain Assessment: Pt reports no pain at this time. Pt is nauseated. Pt able to have liquid diet at this time. Pt was NPO prior.     Performance Status: 40    ECOG Performance Status Grade: 3 - Confined to bed or chair 50% of waking hours    Physical Exam    Significant Labs: All pertinent labs within the past 24 hours have been reviewed.  CBC:   Recent Labs   Lab 08/06/19  0421 08/06/19  0609   WBC 12.13  --    HGB 9.4*  --    HCT 30.3*  --    MCV 89  --    PLT SEE COMMENT 138*     BMP:  Recent Labs   Lab 08/06/19 0421   *      K 4.4      CO2 29   BUN 17   CREATININE 0.7   CALCIUM 8.8   MG 2.0     LFT:  Lab Results   Component Value Date    AST 11 08/06/2019    ALKPHOS 78 08/06/2019    BILITOT 0.4  08/06/2019     Albumin:   Albumin   Date Value Ref Range Status   08/06/2019 2.0 (L) 3.5 - 5.2 g/dL Final     Protein:   Total Protein   Date Value Ref Range Status   08/06/2019 6.1 6.0 - 8.4 g/dL Final     Lactic acid:   Lab Results   Component Value Date    LACTATE 1.3 08/05/2019    LACTATE 2.0 08/05/2019       Significant Imaging: I have reviewed all pertinent imaging results/findings within the past 24 hours.    Advance Care Planning   Advanced Directives::  LaPOST: No  Do Not Resuscitate Status: Yes  Medical Power of : Pt has four children. Pt has one daughter, Joanie and three sons.     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 Pt has one daughter and three sons. Pt lives with daughter, Joanie, son-in-law, adult grandchildren.            > 50% of 70 min visit spent in chart review, face to face discussion of goals of care,  symptom assessment, coordination of care and emotional support.    Mary Kate Wild, CNS  Palliative Medicine  [facility]

## 2019-08-06 NOTE — ASSESSMENT & PLAN NOTE
Patient with history of reccurent L sided effusions. Thoracocentesis 2/2018 path with atypical cells highly suspicious for malignancy. CTA on 7/22 with stable left pleural masses measuring 8 cm.  Possible associated effusion.  Spoke with Pulmonology who believe there could be fluid to drain.  -Pulmonary seeing patient this afternoon for possible thoracocentesis and eval for pleurX

## 2019-08-06 NOTE — HPI
73 year old female with history of HTN, RA, and SCC (unknown primary) with likely mediastinal and lung mets s/p chemotherapy and radiation on who [facility] is being consulted for odynophagia.    On 6/25/19 patient had an esophageal stent place. She has not completed chemotherapy and radiation. She reports that she initially did well with the stent but that in the past 2 weeks she has had significant odynophagia with solids and liquids (even drinking water hurts). Her odynophagia has led to significant decrease in PO. She also reports associated nausea with bilious emesis. Upon arrrival patient had low sats with CXR showing complete opacification of the left lung (pulmonary has been consulted) with esophageal stent in place.

## 2019-08-06 NOTE — SUBJECTIVE & OBJECTIVE
Interval History: No events overnight.  Afebrile, VSS.  Patient unable to tolerate oral medications 2/2 pain when swallowing.  Still having some intermittent nausea without much vomiting, malaise, fatigue. She denies shortness of breath, fever, chills, diarrhea, constipation.     Oncology Treatment Plan:   OP ATEZOLIZUMAB Q3W    Medications:  Continuous Infusions:  Scheduled Meds:   enoxaparin  40 mg Subcutaneous Daily    famotidine (PF)  20 mg Intravenous BID    polyethylene glycol  17 g Oral BID     PRN Meds:albuterol-ipratropium, Dextrose 10% Bolus, Dextrose 10% Bolus, glucagon (human recombinant), glucose, glucose, LORazepam, morphine, morphine, ondansetron, promethazine (PHENERGAN) IVPB, ramelteon, sodium chloride 0.9%, sodium chloride 0.9%     Review of Systems   All other systems reviewed and are negative.    Objective:     Vital Signs (Most Recent):  Temp: 98.3 °F (36.8 °C) (08/06/19 0403)  Pulse: 105 (08/06/19 0403)  Resp: 16 (08/06/19 0403)  BP: 118/73 (08/06/19 0644)  SpO2: 95 % (08/06/19 0403) Vital Signs (24h Range):  Temp:  [98 °F (36.7 °C)-98.9 °F (37.2 °C)] 98.3 °F (36.8 °C)  Pulse:  [103-138] 105  Resp:  [16-22] 16  SpO2:  [88 %-100 %] 95 %  BP: ()/(55-76) 118/73     Weight: 81.2 kg (179 lb)  Body mass index is 27.62 kg/m².  Body surface area is 1.97 meters squared.      Intake/Output Summary (Last 24 hours) at 8/6/2019 0717  Last data filed at 8/5/2019 1331  Gross per 24 hour   Intake 1000 ml   Output --   Net 1000 ml       Physical Exam   Constitutional: She is oriented to person, place, and time. She appears well-developed and well-nourished. She appears distressed.   Appears uncomfortable    HENT:   Head: Normocephalic and atraumatic.   Mouth/Throat: No oropharyngeal exudate.   Eyes: Pupils are equal, round, and reactive to light. No scleral icterus.   Neck: Normal range of motion. Neck supple. No JVD present.   Cardiovascular: Regular rhythm and normal heart sounds. Exam reveals no  gallop and no friction rub.   No murmur heard.  tachycardic   Pulmonary/Chest: Effort normal and breath sounds normal. No respiratory distress. She has no wheezes. She has no rales.   On 3L NC   Abdominal: Soft. Bowel sounds are normal. She exhibits no distension. There is no tenderness.   Musculoskeletal: She exhibits no edema.   Neurological: She is alert and oriented to person, place, and time.   Skin: Skin is warm and dry.       Significant Labs:   CBC:   Recent Labs   Lab 19  1150 19  0421 19  0609   WBC 14.13* 12.13  --    HGB 11.9* 9.4*  --    HCT 38.3 30.3*  --     SEE COMMENT 138*    and CMP:   Recent Labs   Lab 19  1241 19  0421    136   K 3.4* 4.4   CL 99 100   CO2 26 29   * 111*   BUN 14 17   CREATININE 0.8 0.7   CALCIUM 8.4* 8.8   PROT 6.4 6.1   ALBUMIN 2.2* 2.0*   BILITOT 0.5 0.4   ALKPHOS 76 78   AST 15 11   ALT 11 9*   ANIONGAP 12 7*   EGFRNONAA >60.0 >60.0   Phosph: 2.7  M.1  Blood culture no growth x 1 day      Diagnostic Results:  None

## 2019-08-06 NOTE — ASSESSMENT & PLAN NOTE
Hypotensive and tachycardic likely 2/2 volume depletion with decreased PO intake and vomiting. Also responsive to 1L bolus. Normal lactate, however sepsis not ruled out.  Blood cultures no growth x 1 day.  VSS overnight.  -Continue to monitor  -Follow up blood cultures obtained in ED

## 2019-08-06 NOTE — SUBJECTIVE & OBJECTIVE
Interval History: Pt made DNR.    Past Medical History:   Diagnosis Date    Chronic obstructive pulmonary disease 5/14/2019    Encounter for blood transfusion     GERD (gastroesophageal reflux disease)     HTN (hypertension)     Rheumatoid arthritis     Rheumatoid arthritis(714.0)     Squamous cell lung cancer, left 2/15/2018       Past Surgical History:   Procedure Laterality Date    APPENDECTOMY      Fekrcm-onomst-co--lung N/A 1/4/2019    Performed by [facility] Diagnostic Provider at [facility] OR 2ND FLR    BRONCHOSCOPY N/A 8/28/2017    Performed by [facility] Diagnostic Provider at [facility] OR 2ND FLR    CATARACT EXTRACTION Bilateral 2018    Dr. Keith     COLONOSCOPY      COLONOSCOPY N/A 10/30/2017    Performed by Quentin Coppola MD at [facility] ENDO (4TH FLR)    COLONOSCOPY N/A 1/17/2014    Performed by Feliciano Duran MD at [facility] ENDO    EGD (ESOPHAGOGASTRODUODENOSCOPY) WITH FLUOROSCOPY N/A 6/25/2019    Performed by Aime Huff MD at [facility] ENDO (2ND FLR)    ESOPHAGOGASTRODUODENOSCOPY (EGD) N/A 10/30/2017    Performed by Quentin Coppola MD at [facility] ENDO (4TH FLR)    FOOT SURGERY Left     HYSTERECTOMY      INSERTION, IOL PROSTHESIS Left 11/8/2018    Performed by Susan Keith MD at [facility] OR 1ST FLR    INSERTION, IOL PROSTHESIS Right 10/18/2018    Performed by Susan Keith MD at [facility] OR 1ST FLR    INSERTION-PORT N/A 11/14/2017    Performed by [facility] Diagnostic Provider at [facility] OR 2ND FLR    PHACOEMULSIFICATION, CATARACT Left 11/8/2018    Performed by Susan Keith MD at [facility] OR 1ST FLR    PHACOEMULSIFICATION, CATARACT Right 10/18/2018    Performed by Susan Keith MD at [facility] OR 1ST FLR    SKIN BIOPSY      TOTAL KNEE ARTHROPLASTY      right       Review of patient's allergies indicates:   Allergen Reactions    Latex, natural rubber Rash    Codeine Hallucinations    Cortisporin [neomycin-bacitracin-poly-hc] Rash    Latex, natural rubber Rash       Medications:  Continuous  Infusions:  Scheduled Meds:   enoxaparin  40 mg Subcutaneous Daily    pantoprozole (PROTONIX) IV  40 mg Intravenous BID    polyethylene glycol  17 g Oral BID    sucralfate  1 g Oral TID WM     PRN Meds:albuterol-ipratropium, Dextrose 10% Bolus, Dextrose 10% Bolus, GI cocktail (mylanta 30 mL, lidocaine 2 % viscous 10 mL, dicyclomine 10 mL) 50 mL, glucagon (human recombinant), glucose, glucose, HYDROmorphone, LORazepam, ondansetron, promethazine (PHENERGAN) IVPB, ramelteon, sodium chloride 0.9%, sodium chloride 0.9%    Family History     Problem Relation (Age of Onset)    Glaucoma Mother, Sister, Brother    Hypertension     Kidney disease     No Known Problems Father, Maternal Aunt, Maternal Uncle, Paternal Aunt, Paternal Uncle, Maternal Grandmother, Maternal Grandfather, Paternal Grandmother, Paternal Grandfather        Tobacco Use    Smoking status: Former Smoker    Smokeless tobacco: Never Used   Substance and Sexual Activity    Alcohol use: Yes     Comment: Occasionally    Drug use: No    Sexual activity: Not Currently       Review of Systems   Constitutional: Positive for activity change, fatigue and unexpected weight change.   HENT: Positive for trouble swallowing.    Respiratory: Negative for chest tightness, shortness of breath and stridor.    Gastrointestinal: Positive for nausea. Negative for abdominal pain.   Neurological: Positive for weakness.     Objective:     Vital Signs (Most Recent):  Temp: 98 °F (36.7 °C) (08/06/19 1153)  Pulse: 107 (08/06/19 1200)  Resp: 16 (08/06/19 1153)  BP: 118/80 (08/06/19 1153)  SpO2: 100 % (08/06/19 1153) Vital Signs (24h Range):  Temp:  [98 °F (36.7 °C)-98.3 °F (36.8 °C)] 98 °F (36.7 °C)  Pulse:  [102-116] 107  Resp:  [16-20] 16  SpO2:  [95 %-100 %] 100 %  BP: ()/(55-80) 118/80     Weight: 81.2 kg (179 lb)  Body mass index is 27.62 kg/m².    Review of Symptoms  Symptom Assessment (ESAS 0-10 scale)   ESAS 0 1 2 3 4 5 6 7 8 9 10   Pain X             Dyspnea               Anxiety              Nausea      X        Depression               Anorexia      X        Fatigue       X       Insomnia              Restlessness               Agitation X             CAM / Delirium __ --  ___+   Constipation     __ --  ___+   Diarrhea           __ --  ___+  Bowel Management Plan (BMP): Yes    Comments:     Pain Assessment: Pt reports no pain at this time. Pt is nauseated. Pt able to have liquid diet at this time. Pt was NPO prior.     Performance Status: 40    ECOG Performance Status Grade: 3 - Confined to bed or chair 50% of waking hours    Physical Exam    Significant Labs: All pertinent labs within the past 24 hours have been reviewed.  CBC:   Recent Labs   Lab 08/06/19 0421 08/06/19 0609   WBC 12.13  --    HGB 9.4*  --    HCT 30.3*  --    MCV 89  --    PLT SEE COMMENT 138*     BMP:  Recent Labs   Lab 08/06/19 0421   *      K 4.4      CO2 29   BUN 17   CREATININE 0.7   CALCIUM 8.8   MG 2.0     LFT:  Lab Results   Component Value Date    AST 11 08/06/2019    ALKPHOS 78 08/06/2019    BILITOT 0.4 08/06/2019     Albumin:   Albumin   Date Value Ref Range Status   08/06/2019 2.0 (L) 3.5 - 5.2 g/dL Final     Protein:   Total Protein   Date Value Ref Range Status   08/06/2019 6.1 6.0 - 8.4 g/dL Final     Lactic acid:   Lab Results   Component Value Date    LACTATE 1.3 08/05/2019    LACTATE 2.0 08/05/2019       Significant Imaging: I have reviewed all pertinent imaging results/findings within the past 24 hours.    Advance Care Planning   Advanced Directives::  [place]POST: No  Do Not Resuscitate Status: Yes  Medical Power of : Pt has four children. Pt has one daughter, Joanie and three sons.     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 Pt has one daughter and three sons. Pt lives with daughter, Joanie, son-in-law, adult  grandchildren.

## 2019-08-06 NOTE — PLAN OF CARE
MDRs completed with Dr. Huitron and the team. Patient of Dr. Wong with a history of squamous cell carcinoma (unknown primary) with mets to the lung and mediastinal masses s/p 6 cycles of palliative carboplatin/paclitaxel. Patient recently admitted on 7/22/19 for c/o SOB and pain and discharged home on 7/30/19. Patient readmitted on 8/5/19 with c/o chest pain. Patient being treated for an esophageal obstruction related to malignancy. Current VSS. Patient is afebrile. O2 sats % on 2L per nasal cannula. Patient unable to tolerate PO medications secondary to pain when swallowing. AES consulted and following. Possible scope tomorrow. MD discussed goals of care with the patient during rounds. Plans to have Palliative Care speak with the patient and her daughter today. MD also discussed the plan of care with the patient. Discharge needs to be determined. CM to continue to follow with the team.    Marybel Solares, RN, BSN, CM  [facility]  Nurse - Med Onc/Gyn Onc

## 2019-08-06 NOTE — ASSESSMENT & PLAN NOTE
First line: IV Zofran 8mg q8 PRN  Second line: IV Phenergan 12.5 mg q6 PRN  Patient unable to tolerate PO at this time

## 2019-08-06 NOTE — ASSESSMENT & PLAN NOTE
Patient of Dr. Wong. SCC of unknown origin with masses in L pleura, lung, mediastinum and causing esopageal obstruction. S/p Carboplatin and Paclitaxel x 6 cycles completed in 6/2018 and 10 sessions of palliative RT completed 7/24/2019.  -Will need follow up with Dr. Wong on discharge  -Will consider consult to palliative care medicine

## 2019-08-06 NOTE — ASSESSMENT & PLAN NOTE
Patient presented with no SOB, but satting 88% on RA. Now satting >90% on NC.   Likely 2/2 pleural masses/effusion.  Patient with similar presentation on last hospital stay and responded to diuresis and steroids  Plan:  -Wean O2 as tolerated   -Pulm to assess this afternoon with possible thoracocentesis of malignant effusion  -hold off on diuresis for now as patient with signs/symptoms of volume depletion on admission  -hold of on steroids as could be contributing to GI symptoms

## 2019-08-06 NOTE — ASSESSMENT & PLAN NOTE
Patient with history of esophageal obstruction likely 2/2 malignancy.  She is s/p stent placement on 6/25/2019-placed on Liquid diet and placed on PPI and 10x sessions of palliative XRT. Patient now with symptoms of chest pain worse with eating and drinking, decreased PO intake, and nausea/vomiting.  Concern for esophagitis 2/2 XRT(last session 7/24/2019) vs esophageal obstruction 2/2 malignancy vs stent issues.   Plan:  -Consult placed to [facility] who will see patient this morning to evaluate need for re-stenting  -NPO for potential scope  -IV Pantoprazole 40mg BID as patient not tolerating PO  -Will hold steroids for now as this could be contributing to patient's symptoms

## 2019-08-06 NOTE — PROGRESS NOTES
[facility]  Hematology/Oncology  Progress Note    Patient Name: Silvia Capellan  Admission Date: 8/5/2019  Hospital Length of Stay: 1 days  Code Status: Full Code     Subjective:     HPI:  Ms. Capellan is a 74 yo female with history of HTN, RA, and SCC (unknown primary) with likely mediastinal and lung mets s/p six cycles of palliative Carboplatin and Paclitaxel(2/16/18 - 6/8/18) who presents today with chest pain.  It is located on the right side beneath right breast, it is non-radiating, and is sharp in nature.  It is intermittent and is made worse by eating and drinking.  She says she has had decreased PO intake over last few days because of this.  She is also having increased malaise, fatigue, nausea, 1 x episode of nonbloody vomiting.  She is also have what sounds like general URI symptoms with increased runny nose, sore throat, and ear pain.  She denies shortness of breath, diarrhea, constipation, fever, chills. The patient was found to be afebrile, tachypneic satting 88% on RA, hypotensive with SBP 82/59 and tachycardic with .  She was given 1L NS with improvement in pressure and heart rate and placed on 3L NC with improvement in saturations.  Labs noteable for lactate 2, K 3.4, Mg 1.4.  EKG without abnormality.  CXR with opacification of L lung, nearly unchanged from last imaging.     Of note the patient was recently admitted and discharged from our service(7/22-7/30) with complaints of shortness of breath. CT at the time without evidence of PE but with stable 7.4cm subcarinal mass and multiple L sided pleural masses. She was treated with a combination of diuresis and IV to oral steroids with improvement in shortness of breath.  She was able to be weaned off oxygen and was sent home with low dose furosemide 20mg daily and prednisone(60mgx3 days and then 40mg daily) with follow up appointment set up 8/5 with oncologist Dr. Wong.      Oncology History:  This is a patient of   Marvin's last seen by her on 7/1/2019.   Diagnosis: Squamous cell carcinoma , primary site unknown    Molecular/genetic testing: p16 negative; PD L1 could not be run due to inadequate tissue   Stage:        Stage 4   Treatment: Carboplatin/paclitaxel x  6 cycles (2/16/18 - 6/8/18)  Bronchoscopy/EBUS evaluation on 8/31/17 for abnormal mediastinal adenopathy  and a 1.2 cm MORRO mass.  - started Caroplatin/paclitaxel palliatively. She had left thoracentesis on 2/9/18. There were atypical cells in the pleural fluid, highly suspicious for malignancy.  - She completed cycles of Carboplatin/Paclitaxel, last treatment on 6/8/18.  - She had repeat left pleural mass FNA in [facility] 2019. It was negative for malignancy, but showed lympho plasmacytic infiltration.  -PET CT on 3/13/19 showed increased  hypermetabolism of the left pleural effusion.Thoracic vertebral body hypermetabolism was noted, new from the prior exam (compared with PET from June 2018) without underlying CT changes which may represent red marrow hyperplasia or new site of neoplastic disease.There was new hypermetabolism in the right L5-S1 facet joints which appeared to be centered at the facet joint not within the bone and is favored to represent degenerative change or neoplasm.  -Repeat CT on 5/31/19 demonstrated an increase in the mediastinal and left pleural masses.  There was also increase in the volume of loculated left pleural fluid.  -6/25/19 patient underwent esophageal stenting for obstruction likely 2/2 malinancy  -Received 10 sessions of palliative XRT to esophagus   -Dr. Wong planned to start Atezolizumab on 7/18/19 once she completed palliative RT for mediastinal mass.       Hospital Course: Admitted to oncology service on 8/6 for evaluation of possible esophageal obstruction 2/2 malignancy vs esophagitis 2/2 RT.  AES service consulted.  Patient also with history of L pleural masses and associated effusions.  Pulmonology consulted to evaluate  patient for L thoracocentesis.     Interval History: No events overnight.  Afebrile, VSS.  Patient unable to tolerate oral medications 2/2 pain when swallowing.  Still having some intermittent nausea without much vomiting, malaise, fatigue. She denies shortness of breath, fever, chills, diarrhea, constipation.     Oncology Treatment Plan:   OP ATEZOLIZUMAB Q3W    Medications:  Continuous Infusions:  Scheduled Meds:   enoxaparin  40 mg Subcutaneous Daily    famotidine (PF)  20 mg Intravenous BID    polyethylene glycol  17 g Oral BID     PRN Meds:albuterol-ipratropium, Dextrose 10% Bolus, Dextrose 10% Bolus, glucagon (human recombinant), glucose, glucose, LORazepam, morphine, morphine, ondansetron, promethazine (PHENERGAN) IVPB, ramelteon, sodium chloride 0.9%, sodium chloride 0.9%     Review of Systems   All other systems reviewed and are negative.    Objective:     Vital Signs (Most Recent):  Temp: 98.3 °F (36.8 °C) (08/06/19 0403)  Pulse: 105 (08/06/19 0403)  Resp: 16 (08/06/19 0403)  BP: 118/73 (08/06/19 0644)  SpO2: 95 % (08/06/19 0403) Vital Signs (24h Range):  Temp:  [98 °F (36.7 °C)-98.9 °F (37.2 °C)] 98.3 °F (36.8 °C)  Pulse:  [103-138] 105  Resp:  [16-22] 16  SpO2:  [88 %-100 %] 95 %  BP: ()/(55-76) 118/73     Weight: 81.2 kg (179 lb)  Body mass index is 27.62 kg/m².  Body surface area is 1.97 meters squared.      Intake/Output Summary (Last 24 hours) at 8/6/2019 0717  Last data filed at 8/5/2019 1331  Gross per 24 hour   Intake 1000 ml   Output --   Net 1000 ml       Physical Exam   Constitutional: She is oriented to person, place, and time. She appears well-developed and well-nourished. She appears distressed.   Appears uncomfortable    HENT:   Head: Normocephalic and atraumatic.   Mouth/Throat: No oropharyngeal exudate.   Eyes: Pupils are equal, round, and reactive to light. No scleral icterus.   Neck: Normal range of motion. Neck supple. No JVD present.   Cardiovascular: Regular rhythm and  normal heart sounds. Exam reveals no gallop and no friction rub.   No murmur heard.  tachycardic   Pulmonary/Chest: Effort normal and breath sounds normal. No respiratory distress. She has no wheezes. She has no rales.   On 3L NC   Abdominal: Soft. Bowel sounds are normal. She exhibits no distension. There is no tenderness.   Musculoskeletal: She exhibits no edema.   Neurological: She is alert and oriented to person, place, and time.   Skin: Skin is warm and dry.       Significant Labs:   CBC:   Recent Labs   Lab 19  1150 19  0421 19  0609   WBC 14.13* 12.13  --    HGB 11.9* 9.4*  --    HCT 38.3 30.3*  --     SEE COMMENT 138*    and CMP:   Recent Labs   Lab 19  1241 19  0421    136   K 3.4* 4.4   CL 99 100   CO2 26 29   * 111*   BUN 14 17   CREATININE 0.8 0.7   CALCIUM 8.4* 8.8   PROT 6.4 6.1   ALBUMIN 2.2* 2.0*   BILITOT 0.5 0.4   ALKPHOS 76 78   AST 15 11   ALT 11 9*   ANIONGAP 12 7*   EGFRNONAA >60.0 >60.0   Phosph: 2.7  M.1  Blood culture no growth x 1 day      Diagnostic Results:  None    Assessment/Plan:     Odynophagia  Patient with history of esophageal obstruction likely 2/2 malignancy.  She is s/p stent placement on 2019-placed on Liquid diet and placed on PPI and 10x sessions of palliative XRT. Patient now with symptoms of chest pain worse with eating and drinking, decreased PO intake, and nausea/vomiting.  Concern for esophagitis 2/2 XRT(last session 2019) vs esophageal obstruction 2/2 malignancy vs stent issues. AES has seen patient and think this could be due to odynophagia from radiation or candida/HSV in setting of chemo  Plan:  -AES recommendations as follows:   -Scope tomorrow likely, NPO at midnight   -IV Pantoprazole 40mg BID   -Carafate with meals   -GI cocktail as needed    -further recs following scope  -Will hold steroids for now as this could be contributing to patient's symptoms  -Tx pain with IV dilaudid PRN    Stage 4  Squamous cell carcinoma of unknown origin  Patient of Dr. Wong. SCC of unknown origin with masses in L pleura, lung, mediastinum and causing esopageal obstruction. S/p Carboplatin and Paclitaxel x 6 cycles completed in 6/2018 and 10 sessions of palliative RT completed 7/24/2019.  -Will need follow up with Dr. Wong on discharge    Goals of Care Discussion  -Goals of care discussion today and is okay with palliative care seeing her regarding end of life care and hospice discussions  -She would also like to be made DNR-orders signed     Malignant pleural effusion  Patient with history of reccurent L sided effusions. Thoracocentesis 2/2018 path with atypical cells highly suspicious for malignancy. CTA on 7/22 with stable left pleural masses measuring 8 cm.  Possible associated effusion.  Pulmonary ultrasounded L side this morning and found nothing to drain.  This is likely all Mass causing atelectasis.   -Continue to treat symptomatically with O2  -Pain management with IV dilaudid  Hypotension  Hypotensive and tachycardic likely 2/2 volume depletion with decreased PO intake and vomiting. Also responsive to 1L bolus. Normal lactate, however sepsis not ruled out.  Blood cultures no growth x 1 day.  VSS overnight.  -Continue to monitor  -Follow up blood cultures    Acute hypoxemic respiratory failure  Patient presented with no SOB, but satting 88% on RA. Now satting >90% on NC.   Likely 2/2 pleural masses/effusion.  Patient with similar presentation on last hospital stay and responded to diuresis and steroids  Plan:  -Pulm saw this AM: no fluid to drain on ultrasound  -Will continue O2 for comfort measures   -hold of on steroids as could be contributing to GI symptoms    Upper respiratory symptom  Patient with symptoms of runny nose, ear pain, sore throat, malaise. No findings on physical exam although ear impacted with cerumen  -Viral respiratory panel ordered  -Will treat symptomatically     Nausea  First line: IV  Zofran 8mg q8 PRN  Second line: IV Phenergan 12.5 mg q6 PRN  Patient unable to tolerate PO at this time    Anemia in neoplastic disease  H/H stable.  -Continue to monitor with daily CBC  -Transufse H/H <7    Hypophosphatemia  Repleting with IV sodium phosphate    Hypoalbuminemia  Without signs of anasarca  -Continue to monitor    Hypomagnesemia  Magnesium 1.4 on admission  -Replete as needed    Hypokalemia  On admission K of 3.4  -Replace orally as needed      Paul Whitaker MD, PGY-1  Hematology/Oncology  [facility]

## 2019-08-06 NOTE — ASSESSMENT & PLAN NOTE
"Impression: Pt is a 72 y/o female with history of HTN, RA, and SCC (unknown primary) witOf note the patient was recently admitted and discharged from our service(-) with complaints of shortness of breath. CT at the time without evidence of PE per chart review.  7.4cm subcarinal mass and multiple L sided pleural masses with  likely mediastinal and lung mets s/p six cycles of palliative Carboplatin and Paclitaxel(18 - 18) who presented with chest pain. She is s/p stent placement on 2019-placed on Liquid diet and placed on PPI and 10x sessions of palliative XRT. Patient now with nausea with PO intake. Per chart review, concern for esophagitis 2/2 XRT(last session 2019) vs esophageal obstruction 2/2 malignancy vs stent issues. Pt to have GI scope tomorrow. Pt made DNR today per primary team. Pt is alert and oriented to person, place, and situation. Pt is cooperative. Pt reports 50 plus pound weight lose in past few months.     Reason for consult: [facility]- EOL/hospcie     Goals of care: Met with pt who has good insight into her disease process. Per pt, her goals at this time are to be at home with family and have her symptoms managed. Per pt, she verbalized that she is "not afraid to die." Pt open to home hospice care once discharged form hospital.    Hospice education done with pt. Per pt, her mother had hospice care before she .      Per pt, she is living with her daughter, son-in-law, adult grandchild, and sometimes great-grandchild. Per pt, son-in-law is home during the day. Per pt, her brother visits her often.       Per pt, she is not sure if daughter will be coming to visit today. Let pt know, [facility] care can meet with her  and her daughter to help with plan of care.     Nausea/Vomitting:   Pt on Zofran 8 mg IVP q 8hr prn.   Recs: Consider scheduling Zofran IVP q 8 hrs.   Pt reports ongoing nausea/vomitting.     Pt on Phenergan 12.5 mg q hrs prn nausea/vomiting.     Pain:   Pt on Dilaudid 1 " mg IVP q 6hrs prn pain.   Pt reports she is comfortable at this time.     Plan:  Pt made DNR today.   Pt open to home hospice care.   Will meet with pt's daughter when she visits. Pt unsure if she will make it to hospital today. Will reach out to pt's daughter.   Pt to have GI scope tomorrow.   Will follow.

## 2019-08-06 NOTE — SUBJECTIVE & OBJECTIVE
Past Medical History:   Diagnosis Date    Chronic obstructive pulmonary disease 5/14/2019    Encounter for blood transfusion     GERD (gastroesophageal reflux disease)     HTN (hypertension)     Rheumatoid arthritis     Rheumatoid arthritis(714.0)     Squamous cell lung cancer, left 2/15/2018       Past Surgical History:   Procedure Laterality Date    APPENDECTOMY      Vcnuxo-jtjwnu-zr--lung N/A 1/4/2019    Performed by [facility] at [facility] OR 2ND FLR    BRONCHOSCOPY N/A 8/28/2017    Performed by [facility] at [facility] OR 2ND FLR    CATARACT EXTRACTION Bilateral 2018    Dr. Keith     COLONOSCOPY      COLONOSCOPY N/A 10/30/2017    Performed by Quentin Coppola [facility] at [facility] ENDO (4TH FLR)    COLONOSCOPY N/A 1/17/2014    Performed by [facility]an [facility] at [facility] ENDO    EGD (ESOPHAGOGASTRODUODENOSCOPY) WITH FLUOROSCOPY N/A 6/25/2019    Performed by Aime Huff MD at [facility] ENDO (2ND FLR)    ESOPHAGOGASTRODUODENOSCOPY (EGD) N/A 10/30/2017    Performed by [facility] ENDO (4TH FLR)    FOOT SURGERY Left     HYSTERECTOMY      INSERTION, IOL PROSTHESIS Left 11/8/2018    Performed by Susan Keith [facility] at [facility] OR 1ST FLR    INSERTION, IOL PROSTHESIS Right 10/18/2018    Performed by Susan [facility] at [facility] OR 1ST FLR    INSERTION-PORT N/A 11/14/2017    Performed by [facility] at [facility] OR 2ND FLR    PHACOEMULSIFICATION, CATARACT Left 11/8/2018    Performed by [facility] at [facility] OR 1ST FLR    PHACOEMULSIFICATION, CATARACT Right 10/18/2018    Performed by [facility] at [facility] OR 1ST FLR    SKIN BIOPSY      TOTAL KNEE ARTHROPLASTY      right       Review of patient's allergies indicates:   Allergen Reactions    Latex, natural rubber Rash    Codeine Hallucinations    Cortisporin [neomycin-bacitracin-poly-hc] Rash    Latex, natural rubber Rash     Family History     Problem Relation (Age of Onset)    Glaucoma Mother, Sister,  Brother    Hypertension     Kidney disease     No Known Problems Father, Maternal Aunt, Maternal Uncle, Paternal Aunt, Paternal Uncle, Maternal Grandmother, Maternal Grandfather, Paternal Grandmother, Paternal Grandfather        Tobacco Use    Smoking status: Former Smoker    Smokeless tobacco: Never Used   Substance and Sexual Activity    Alcohol use: Yes     Comment: Occasionally    Drug use: No    Sexual activity: Not Currently     Review of Systems   Constitutional: Positive for activity change, appetite change, fatigue and unexpected weight change. Negative for chills, diaphoresis and fever.   HENT: Positive for trouble swallowing. Negative for voice change.    Eyes: Negative for photophobia, pain, discharge, redness, itching and visual disturbance.   Respiratory: Positive for shortness of breath. Negative for cough.    Cardiovascular: Negative for chest pain and palpitations.   Gastrointestinal: Positive for nausea and vomiting. Negative for abdominal distention, abdominal pain, anal bleeding, blood in stool, constipation, diarrhea and rectal pain.   Endocrine: Negative for cold intolerance, heat intolerance, polydipsia, polyphagia and polyuria.   Genitourinary: Negative for dysuria, frequency, hematuria and urgency.   Musculoskeletal: Negative for back pain and neck pain.   Neurological: Positive for weakness. Negative for dizziness, syncope and light-headedness.     Objective:     Vital Signs (Most Recent):  Temp: 98.3 °F (36.8 °C) (08/06/19 0740)  Pulse: 105 (08/06/19 1054)  Resp: 18 (08/06/19 1054)  BP: 110/67 (08/06/19 0740)  SpO2: 98 % (08/06/19 1054) Vital Signs (24h Range):  Temp:  [98 °F (36.7 °C)-98.9 °F (37.2 °C)] 98.3 °F (36.8 °C)  Pulse:  [103-138] 105  Resp:  [16-22] 18  SpO2:  [88 %-100 %] 98 %  BP: ()/(55-76) 110/67     Weight: 81.2 kg (179 lb) (08/06/19 0610)  Body mass index is 27.62 kg/m².      Intake/Output Summary (Last 24 hours) at 8/6/2019 1108  Last data filed at 8/6/2019  1032  Gross per 24 hour   Intake 1100 ml   Output 225 ml   Net 875 ml       Lines/Drains/Airways     Central Venous Catheter Line                 Port A Cath Single Lumen right atrial -- days          Peripheral Intravenous Line                 Peripheral IV - Single Lumen 08/05/19 1222 22 G Left Antecubital less than 1 day                Physical Exam   Constitutional: She is oriented to person, place, and time. No distress.   HENT:   Head: Normocephalic and atraumatic.   Eyes: No scleral icterus.   Cardiovascular:   Sinus tachycardia   Pulmonary/Chest:   On 2L NC with no breath sounds along the left lung field   Abdominal: Soft. Bowel sounds are normal. She exhibits no distension and no mass. There is no tenderness. There is no rebound and no guarding. No hernia.   Musculoskeletal: She exhibits no edema.   Neurological: She is oriented to person, place, and time.   Skin: She is not diaphoretic.       Significant Labs:  CBC:   Recent Labs   Lab 08/05/19  1150 08/06/19  0421 08/06/19  0609   WBC 14.13* 12.13  --    HGB 11.9* 9.4*  --    HCT 38.3 30.3*  --     SEE COMMENT 138*     CMP:   Recent Labs   Lab 08/06/19  0421   *   CALCIUM 8.8   ALBUMIN 2.0*   PROT 6.1      K 4.4   CO2 29      BUN 17   CREATININE 0.7   ALKPHOS 78   ALT 9*   AST 11   BILITOT 0.4     Coagulation: No results for input(s): PT, INR, APTT in the last 48 hours.    Significant Imaging:  Imaging results within the past 24 hours have been reviewed.

## 2019-08-07 NOTE — TREATMENT PLAN
Addendum  08/07/2019  6:40 PM    Met with patient, daughter (in person), and son (via phone) at length.  Discussed Mrs. Capellan current condition, yield of procedure, and risk associated with procedure.  Consensus was to avoid procedure and focus on comfort.     Milly Hills M.D.  Gastroenterology Fellow, PGY-VI  Pager: [phone]  [facility]    AES Treatment Plan  08/07/2019  3:51 PM    Need to meet with daughter and patient prior to any endoscopic interview in order to discuss current situation. OK to let her have liquids today and place her NPO after MN for possible procedure in the morning pending conversation.    Milly Hills M.D.  Gastroenterology Fellow, PGY-VI  Pager: [phone]  [facility]

## 2019-08-07 NOTE — PROGRESS NOTES
[facility]  Palliative Medicine  Progress Note    Patient Name: Silvia Capellan  MRN: [id number]  Admission Date: 8/5/2019  Hospital Length of Stay: 2 days  Code Status: DNR   Attending Provider: Estefanía Huitron MD  Consulting Provider: OTIS Mejia  Primary Care Physician: Ganga Krause MD  Principal Problem:<principal problem not specified>    Patient information was obtained from patient and ER records.      Assessment/Plan:     Palliative care encounter  Impression: Pt is a 72 y/o female with history of HTN, RA, and SCC (unknown primary) witOf note the patient was recently admitted and discharged from our service(7/22-7/30) with complaints of shortness of breath. CT at the time without evidence of PE per chart review.  7.4cm subcarinal mass and multiple L sided pleural masses with  likely mediastinal and lung mets s/p six cycles of palliative Carboplatin and Paclitaxel(2/16/18 - 6/8/18) who presented with chest pain. She is s/p stent placement on 6/25/2019-placed on Liquid diet and placed on PPI and 10x sessions of palliative XRT. Patient now with nausea with PO intake. Per chart review, concern for esophagitis 2/2 XRT(last session 7/24/2019) vs esophageal obstruction 2/2 malignancy vs stent issues. Pt to have EGD scope today. Pt made DNR today per primary team. Pt is alert and oriented to person, place, and situation. Pt is cooperative. Pt reports 50 plus pound weight lose in past few months.     Reason for consult: GOC- EOL/hospcie     Goals of care:    Today: Pt awaiting EGD. Pt NPO. Per pt, daughter visited last night. Per pt,  She had other company also in room and did not discuss home hospice with daughter. Per pt, she will be speaking to her daughter around 100 today on phone. Per pt, daughter may be visiting today or tomorrow.       8-6-19  Met with pt who has good insight into her disease process. Per pt, her goals at this time are to be at home with family and have her  "symptoms managed. Per pt, she verbalized that she is "not afraid to die." Pt open to home hospice care once discharged form hospital.    Hospice education done with pt. Per pt, her mother had hospice care before she .      Per pt, she is living with her daughter, son-in-law, adult grandchild, and sometimes great-grandchild. Per pt, son-in-law is home during the day. Per pt, her brother visits her often.       Per pt, she is not sure if daughter will be coming to visit today. Let pt know, [facility] care can meet with her  and her daughter to help with plan of care.     Nausea/Vomitting:    Pt on Zofran IVP q 8 hrs.   Pt reports nausea better than yesterday. Pt is NPO at this time due to EGD.     Pt on Phenergan 12.5 mg IVP q hrs prn nausea/vomiting.     Pain:   Pt on Dilaudid 1 mg IVP q 6hrs prn pain.   Pt reports she is comfortable at this time.     Plan:  Pt DNR  Pt open to home hospice care.   Per pt, she is going to discuss home hospice care with her today. Per pt, daughter may visit today or tomorrow.   Pt to have EGD today.   Will follow.          I will follow-up with patient. Please contact us if you have any additional questions.    Subjective:     Chief Complaint:   Chief Complaint   Patient presents with    Fatigue     since 3 am this morning       HPI:   Pt is a 72 y/o female with history of HTN, RA, and SCC (unknown primary) with likely mediastinal and lung mets s/p six cycles of palliative Carboplatin and Paclitaxel(18 - 18) who presented today with chest pain.   Per chart review, it is located on the right side beneath right breast, it is non-radiating, and is sharp in nature.  It is intermittent and is made worse by eating and drinking.  She says she has had decreased PO intake over last few days because of this.  She is also having increased malaise, fatigue, nausea, 1 x episode of nonbloody vomiting.  She  also has what sounds like general URI symptoms with increased runny nose, sore throat, " and ear pain.  She denied shortness of breath, diarrhea, constipation, fever, chills. The patient was found to be afebrile, tachypneic satting 88% on RA, hypotensive with SBP 82/59 and tachycardic with .  She was given 1L NS with improvement in pressure and heart rate and placed on 3L NC with improvement in saturations.  Labs noteable for lactate 2, K 3.4, Mg 1.4.  EKG without abnormality.  CXR with opacification of L lung, nearly unchanged from last imaging.      Of note the patient was recently admitted and discharged from our service(7/22-7/30) with complaints of shortness of breath. CT at the time without evidence of PE but with stable 7.4cm subcarinal mass and multiple L sided pleural masses. She was treated with a combination of diuresis and IV to oral steroids with improvement in shortness of breath.  She was able to be weaned off oxygen and was sent home with low dose furosemide 20mg daily and prednisone(60mgx3 days and then 40mg daily) with follow up appointment set up 8/5 with oncologist Dr. Wong    Pt made DNR 8/6/19    Hospital Course:  No notes on file    Interval History: Pt made DNR.    Past Medical History:   Diagnosis Date    Chronic obstructive pulmonary disease 5/14/2019    Encounter for blood transfusion     GERD (gastroesophageal reflux disease)     HTN (hypertension)     Rheumatoid arthritis     Rheumatoid arthritis(714.0)     Squamous cell lung cancer, left 2/15/2018       Past Surgical History:   Procedure Laterality Date    APPENDECTOMY      Tqyezm-dulqpu-ap--lung N/A 1/4/2019    Performed by [facility] at [facility] OR 2ND FLR    BRONCHOSCOPY N/A 8/28/2017    Performed by [facility] at [facility] OR 2ND FLR    CATARACT EXTRACTION Bilateral 2018    Dr. Keith     COLONOSCOPY      COLONOSCOPY N/A 10/30/2017    Performed by [facility] at [facility] ENDO (4TH FLR)    COLONOSCOPY N/A 1/17/2014    [facility]    EGD  (ESOPHAGOGASTRODUODENOSCOPY) WITH FLUOROSCOPY N/A 6/25/2019    Performed by Aime Huff [facility] at [facility] ENDO (2ND FLR)    ESOPHAGOGASTRODUODENOSCOPY (EGD) N/A 10/30/2017    Performed by [facility] at [facility] ENDO (4TH FLR)    FOOT SURGERY Left     HYSTERECTOMY      INSERTION, IOL PROSTHESIS Left 11/8/2018    [facility] OR 1ST FLR    INSERTION, IOL PROSTHESIS Right 10/18/2018    [facility] OR 1ST FLR    INSERTION-PORT N/A 11/14/2017    Performed by [facility] at [facility] OR 2ND FLR    PHACOEMULSIFICATION, CATARACT Left 11/8/2018    Performed by [facility] OR 1ST FLR    PHACOEMULSIFICATION, CATARACT Right 10/18/2018    [facility] OR 1ST FLR    SKIN BIOPSY      TOTAL KNEE ARTHROPLASTY      right       Review of patient's allergies indicates:   Allergen Reactions    Latex, natural rubber Rash    Codeine Hallucinations    Cortisporin [neomycin-bacitracin-poly-hc] Rash    Latex, natural rubber Rash       Medications:  Continuous Infusions:  Scheduled Meds:   ondansetron  8 mg Intravenous Q8H    pantoprazole  40 mg Oral BID AC    polyethylene glycol  17 g Oral BID    sodium phosphate IVPB  15 mmol Intravenous Once    sucralfate  1 g Oral TID WM     PRN Meds:albuterol-ipratropium, Dextrose 10% Bolus, Dextrose 10% Bolus, GI cocktail (mylanta 30 mL, lidocaine 2 % viscous 10 mL, dicyclomine 10 mL) 50 mL, glucagon (human recombinant), glucose, glucose, HYDROmorphone, LORazepam, morphine, promethazine (PHENERGAN) IVPB, ramelteon, sodium chloride 0.9%, sodium chloride 0.9%, sodium chloride 0.9%    Family History     Problem Relation (Age of Onset)    Glaucoma Mother, Sister, Brother    Hypertension     Kidney disease     No Known Problems Father, Maternal Aunt, Maternal Uncle, Paternal Aunt, Paternal Uncle, Maternal Grandmother, Maternal Grandfather, Paternal Grandmother, Paternal  Grandfather        Tobacco Use    Smoking status: Former Smoker    Smokeless tobacco: Never Used   Substance and Sexual Activity    Alcohol use: Yes     Comment: Occasionally    Drug use: No    Sexual activity: Not Currently       Review of Systems   Constitutional: Positive for activity change, fatigue and unexpected weight change.   HENT: Positive for trouble swallowing.    Respiratory: Negative for chest tightness, shortness of breath and stridor.    Gastrointestinal: Positive for nausea. Negative for abdominal pain.   Neurological: Positive for weakness.     Objective:     Vital Signs (Most Recent):  Temp: 98 °F (36.7 °C) (08/07/19 0741)  Pulse: (!) 112 (08/07/19 0909)  Resp: 20 (08/07/19 0909)  BP: 113/74 (08/07/19 0741)  SpO2: 99 % (08/07/19 0909) Vital Signs (24h Range):  Temp:  [97.9 °F (36.6 °C)-99.2 °F (37.3 °C)] 98 °F (36.7 °C)  Pulse:  [] 112  Resp:  [16-20] 20  SpO2:  [90 %-100 %] 99 %  BP: ()/(55-80) 113/74     Weight: 82 kg (180 lb 12.4 oz)  Body mass index is 27.9 kg/m².    Review of Symptoms  Symptom Assessment (ESAS 0-10 scale)   ESAS 0 1 2 3 4 5 6 7 8 9 10   Pain X             Dyspnea              Anxiety              Nausea   x           Depression               Anorexia      X        Fatigue       X       Insomnia              Restlessness               Agitation X             CAM / Delirium __ --  ___+   Constipation     __ --  ___+   Diarrhea           __ --  ___+  Bowel Management Plan (BMP): Yes    Comments:     Pain Assessment: Pt reports no pain at this time. Pt is nauseated. Pt able to have liquid diet at this time. Pt was NPO prior.     Performance Status: 40    ECOG Performance Status Grade: 3 - Confined to bed or chair 50% of waking hours    Physical Exam    Significant Labs: All pertinent labs within the past 24 hours have been reviewed.  CBC:   Recent Labs   Lab 08/07/19  0410   WBC 9.20   HGB 8.6*   HCT 28.6*   MCV 91   *     BMP:  Recent Labs   Lab  08/07/19  0410         K 3.9      CO2 28   BUN 15   CREATININE 0.7   CALCIUM 8.7   MG 1.8     LFT:  Lab Results   Component Value Date    AST 9 (L) 08/07/2019    ALKPHOS 80 08/07/2019    BILITOT 0.4 08/07/2019     Albumin:   Albumin   Date Value Ref Range Status   08/07/2019 1.9 (L) 3.5 - 5.2 g/dL Final     Protein:   Total Protein   Date Value Ref Range Status   08/07/2019 6.1 6.0 - 8.4 g/dL Final     Lactic acid:   Lab Results   Component Value Date    LACTATE 1.3 08/05/2019    LACTATE 2.0 08/05/2019       Significant Imaging: I have reviewed all pertinent imaging results/findings within the past 24 hours.    Advance Care Planning   Advanced Directives::  [place]POST: No  Do Not Resuscitate Status: Yes  Medical Power of : Pt has four children. Pt has one daughter, Joanie and three sons.     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 Pt has one daughter and three sons. Pt lives with daughter, Joanie, son-in-law, adult grandchildren.            > 50% of 25 min visit spent in chart review, face to face discussion of goals of care,  symptom assessment, coordination of care and emotional support.    Mary Kate Wild, CNS  Palliative Medicine  [facility]

## 2019-08-07 NOTE — ASSESSMENT & PLAN NOTE
Patient with history of esophageal obstruction likely 2/2 malignancy.  She is s/p stent placement on 6/25/2019-placed on Liquid diet and placed on PPI and 10x sessions of palliative XRT. Patient now with symptoms of chest pain worse with eating and drinking, decreased PO intake, and nausea/vomiting.  Concern for esophagitis 2/2 XRT(last session 7/24/2019) vs esophageal obstruction 2/2 malignancy vs stent issues. AES consulted and suspect esophagitis.   Plan:  -EGD today with AES  -Per recommendations:   -pantoprazole 40mg IV BID   -carafate   -GI cocktail as needed  -Zofran scheduled 8mg q8h

## 2019-08-07 NOTE — ASSESSMENT & PLAN NOTE
Patient presented with no SOB, but satting 88% on RA. Now satting >90% off NC   Likely 2/2 pleural masses.   Plan:  -O2 as needed for comfort

## 2019-08-07 NOTE — SUBJECTIVE & OBJECTIVE
Interval History: No events overnight.  Sinus tachycardia in 110s overnight. She is overall feeling better with improvement in pain.  She was able to tolerate clear liquid diet yesterday.  She is still having nausea despite scheduled zofran but no episodes of vomiting.  She denies shortness of breath, diarrhea, constipation, fever, or chills.     Oncology Treatment Plan:   OP ATEZOLIZUMAB Q3W    Medications:  Continuous Infusions:  Scheduled Meds:   enoxaparin  40 mg Subcutaneous Daily    ondansetron  8 mg Intravenous Q8H    pantoprozole (PROTONIX) IV  40 mg Intravenous BID    polyethylene glycol  17 g Oral BID    sodium chloride 0.9%  250 mL Intravenous Once    sucralfate  1 g Oral TID WM     PRN Meds:albuterol-ipratropium, Dextrose 10% Bolus, Dextrose 10% Bolus, GI cocktail (mylanta 30 mL, lidocaine 2 % viscous 10 mL, dicyclomine 10 mL) 50 mL, glucagon (human recombinant), glucose, glucose, HYDROmorphone, LORazepam, promethazine (PHENERGAN) IVPB, ramelteon, sodium chloride 0.9%, sodium chloride 0.9%, sodium chloride 0.9%     Review of Systems  Objective:     Vital Signs (Most Recent):  Temp: 98 °F (36.7 °C) (08/07/19 0741)  Pulse: 102 (08/07/19 0741)  Resp: 18 (08/07/19 0741)  BP: 113/74 (08/07/19 0741)  SpO2: 95 % (08/07/19 0741) Vital Signs (24h Range):  Temp:  [97.9 °F (36.6 °C)-99.2 °F (37.3 °C)] 98 °F (36.7 °C)  Pulse:  [] 102  Resp:  [16-18] 18  SpO2:  [90 %-100 %] 95 %  BP: ()/(55-80) 113/74     Weight: 82 kg (180 lb 12.4 oz)  Body mass index is 27.9 kg/m².  Body surface area is 1.98 meters squared.      Intake/Output Summary (Last 24 hours) at 8/7/2019 0752  Last data filed at 8/7/2019 0300  Gross per 24 hour   Intake 236 ml   Output 400 ml   Net -164 ml       Physical Exam    Significant Labs:   CBC:   Recent Labs   Lab 08/05/19  1150 08/06/19  0421 08/06/19  0609 08/07/19  0410   WBC 14.13* 12.13  --  9.20   HGB 11.9* 9.4*  --  8.6*   HCT 38.3 30.3*  --  28.6*    SEE COMMENT 138*  129*    and CMP:   Recent Labs   Lab 19  1241 19  0421 19  0410    136 138   K 3.4* 4.4 3.9   CL 99 100 101   CO2 26 29 28   * 111* 104   BUN 14 17 15   CREATININE 0.8 0.7 0.7   CALCIUM 8.4* 8.8 8.7   PROT 6.4 6.1 6.1   ALBUMIN 2.2* 2.0* 1.9*   BILITOT 0.5 0.4 0.4   ALKPHOS 76 78 80   AST 15 11 9*   ALT 11 9* 7*   ANIONGAP 12 7* 9   EGFRNONAA >60.0 >60.0 >60.0     Blood culture: no growth x 2 days    Phosphorus: 2.6  M.8    Diagnostic Results:  None

## 2019-08-07 NOTE — ASSESSMENT & PLAN NOTE
Patient of Dr. Wong. SCC of unknown origin with masses in L pleura, lung, mediastinum and causing esopageal obstruction. S/p Carboplatin and Paclitaxel x 6 cycles completed in 6/2018 and 10 sessions of palliative RT completed 7/24/2019.  Patient elected to be made DNR after explanation of inability to start chemotherapy and poor prognosis  -Palliative care consulted and patient wanting home hospice

## 2019-08-07 NOTE — PROGRESS NOTES
Spoke to the patient and her daughterJoanie and provided them with a list of hospices. They indicated that they would like to have a referral to [facility], but that they would notify tomorrow where the patient would go - to her own home or her daughter's home. Will follow for final discharge arrangements.

## 2019-08-07 NOTE — PLAN OF CARE
Problem: Adult Inpatient Plan of Care  Goal: Plan of Care Review  Outcome: Ongoing (interventions implemented as appropriate)  POC reviewed w Pt at beginning of shift and PRN. AAOX4. Up with assist to bedside commode. Telemetry monitoring; sinus tachycardia. Complaints of pain; PRN Hydromorphone administered as ordered. Clear liquid diet till midnight then NPO for EDG today. Right chest port flushed and capped. Reports intermittant N/V managed w scheduled zofran and PRN Phenergan. VSS. Will continue to monitor.

## 2019-08-07 NOTE — PROGRESS NOTES
Spoke to the patient's daughter, Joanie Belle ([phone]) about discharge arrangements: She informed that it would be much easier for the family to have the patient at her daughter's home (Joanie). She would discuss this with the patient later this afternoon.  Explained hospice services to the patient's daughter and she expressed understanding. They would like to have outpatient hospice services. Agreed to leave a list of hospices(Sen[facility] Resource Guide) in the patient's room since they would like to choose a hospice agency. The patient's daughter agreed to call with the agency of their choice. Will follow for final discharge arrangements.

## 2019-08-07 NOTE — ASSESSMENT & PLAN NOTE
Patient with history of reccurent L sided effusions. Thoracocentesis 2/2018 path with atypical cells highly suspicious for malignancy. CTA on 7/22 with stable left pleural masses measuring 8 cm.  Possible associated effusion.  Pulmonary ultrasounded left side-there is nothing to drain and this is likely all pleural mass

## 2019-08-07 NOTE — ASSESSMENT & PLAN NOTE
"Impression: Pt is a 72 y/o female with history of HTN, RA, and SCC (unknown primary) witOf note the patient was recently admitted and discharged from our service(-) with complaints of shortness of breath. CT at the time without evidence of PE per chart review.  7.4cm subcarinal mass and multiple L sided pleural masses with  likely mediastinal and lung mets s/p six cycles of palliative Carboplatin and Paclitaxel(18 - 18) who presented with chest pain. She is s/p stent placement on 2019-placed on Liquid diet and placed on PPI and 10x sessions of palliative XRT. Patient now with nausea with PO intake. Per chart review, concern for esophagitis 2/2 XRT(last session 2019) vs esophageal obstruction 2/2 malignancy vs stent issues. Pt to have EGD scope today. Pt made DNR today per primary team. Pt is alert and oriented to person, place, and situation. Pt is cooperative. Pt reports 50 plus pound weight lose in past few months.     Reason for consult: C- EOL/hospcie     Goals of care:    Today: Pt awaiting EGD. Pt NPO. Per pt, daughter visited last night. Per pt,  She had other company also in room and did not discuss home hospice with daughter. Per pt, she will be speaking to her daughter around 100 today on phone. Per pt, daughter may be visiting today or tomorrow.       19  Met with pt who has good insight into her disease process. Per pt, her goals at this time are to be at home with family and have her symptoms managed. Per pt, she verbalized that she is "not afraid to die." Pt open to home hospice care once discharged form hospital.    Hospice education done with pt. Per pt, her mother had hospice care before she .      Per pt, she is living with her daughter, son-in-law, adult grandchild, and sometimes great-grandchild. Per pt, son-in-law is home during the day. Per pt, her brother visits her often.       Per pt, she is not sure if daughter will be coming to visit today. Let pt know, " [facility] care can meet with her  and her daughter to help with plan of care.     Nausea/Vomitting:    Pt on Zofran IVP q 8 hrs.   Pt reports nausea better than yesterday. Pt is NPO at this time due to EGD.     Pt on Phenergan 12.5 mg IVP q hrs prn nausea/vomiting.     Pain:   Pt on Dilaudid 1 mg IVP q 6hrs prn pain.   Pt reports she is comfortable at this time.     Plan:  Pt DNR  Pt open to home hospice care.   Per pt, she is going to discuss home hospice care with her today. Per pt, daughter may visit today or tomorrow.   Pt to have EGD today.   Will follow.

## 2019-08-07 NOTE — SUBJECTIVE & OBJECTIVE
Interval History: Pt made DNR.    Past Medical History:   Diagnosis Date    Chronic obstructive pulmonary disease 5/14/2019    Encounter for blood transfusion     GERD (gastroesophageal reflux disease)     HTN (hypertension)     Rheumatoid arthritis     Rheumatoid arthritis(714.0)     Squamous cell lung cancer, left 2/15/2018       Past Surgical History:   Procedure Laterality Date    APPENDECTOMY      Rnfals-gexsgh-xn--lung N/A 1/4/2019    Performed by [facility] at [facility] OR 2ND FLR    BRONCHOSCOPY N/A 8/28/2017    Performed by [facility] at [facility] OR 2ND FLR    CATARACT EXTRACTION Bilateral 2018    Dr. Keith     COLONOSCOPY      COLONOSCOPY N/A 10/30/2017    Performed by Quentin Coppola MD at [facility] ENDO (4TH FLR)    COLONOSCOPY N/A 1/17/2014    Performed by Feliciano Duran MD at [facility] ENDO    EGD (ESOPHAGOGASTRODUODENOSCOPY) WITH FLUOROSCOPY N/A 6/25/2019    Performed by Aime Huff MD at [facility] ENDO (2ND FLR)    ESOPHAGOGASTRODUODENOSCOPY (EGD) N/A 10/30/2017    Performed by Quentin Coppola MD at [facility] ENDO (4TH FLR)    FOOT SURGERY Left     HYSTERECTOMY      INSERTION, IOL PROSTHESIS Left 11/8/2018    Performed by Susan Keith MD at [facility] OR 1ST FLR    INSERTION, IOL PROSTHESIS Right 10/18/2018    Performed by Susan Keith MD at [facility] OR 1ST FLR    INSERTION-PORT N/A 11/14/2017    Performed by [facility] at [facility] OR 2ND FLR    PHACOEMULSIFICATION, CATARACT Left 11/8/2018    Performed by Susan Keith MD at [facility] OR 1ST FLR    PHACOEMULSIFICATION, CATARACT Right 10/18/2018    Performed by Susan Keith MD at [facility] OR 1ST FLR    SKIN BIOPSY      TOTAL KNEE ARTHROPLASTY      right       Review of patient's allergies indicates:   Allergen Reactions    Latex, natural rubber Rash    Codeine Hallucinations    Cortisporin [neomycin-bacitracin-poly-hc] Rash    Latex, natural rubber Rash       Medications:  Continuous  Infusions:  Scheduled Meds:   ondansetron  8 mg Intravenous Q8H    pantoprazole  40 mg Oral BID AC    polyethylene glycol  17 g Oral BID    sodium phosphate IVPB  15 mmol Intravenous Once    sucralfate  1 g Oral TID WM     PRN Meds:albuterol-ipratropium, Dextrose 10% Bolus, Dextrose 10% Bolus, GI cocktail (mylanta 30 mL, lidocaine 2 % viscous 10 mL, dicyclomine 10 mL) 50 mL, glucagon (human recombinant), glucose, glucose, HYDROmorphone, LORazepam, morphine, promethazine (PHENERGAN) IVPB, ramelteon, sodium chloride 0.9%, sodium chloride 0.9%, sodium chloride 0.9%    Family History     Problem Relation (Age of Onset)    Glaucoma Mother, Sister, Brother    Hypertension     Kidney disease     No Known Problems Father, Maternal Aunt, Maternal Uncle, Paternal Aunt, Paternal Uncle, Maternal Grandmother, Maternal Grandfather, Paternal Grandmother, Paternal Grandfather        Tobacco Use    Smoking status: Former Smoker    Smokeless tobacco: Never Used   Substance and Sexual Activity    Alcohol use: Yes     Comment: Occasionally    Drug use: No    Sexual activity: Not Currently       Review of Systems   Constitutional: Positive for activity change, fatigue and unexpected weight change.   HENT: Positive for trouble swallowing.    Respiratory: Negative for chest tightness, shortness of breath and stridor.    Gastrointestinal: Positive for nausea. Negative for abdominal pain.   Neurological: Positive for weakness.     Objective:     Vital Signs (Most Recent):  Temp: 98 °F (36.7 °C) (08/07/19 0741)  Pulse: (!) 112 (08/07/19 0909)  Resp: 20 (08/07/19 0909)  BP: 113/74 (08/07/19 0741)  SpO2: 99 % (08/07/19 0909) Vital Signs (24h Range):  Temp:  [97.9 °F (36.6 °C)-99.2 °F (37.3 °C)] 98 °F (36.7 °C)  Pulse:  [] 112  Resp:  [16-20] 20  SpO2:  [90 %-100 %] 99 %  BP: ()/(55-80) 113/74     Weight: 82 kg (180 lb 12.4 oz)  Body mass index is 27.9 kg/m².    Review of Symptoms  Symptom Assessment (ESAS 0-10 scale)    ESAS 0 1 2 3 4 5 6 7 8 9 10   Pain X             Dyspnea              Anxiety              Nausea   x           Depression               Anorexia      X        Fatigue       X       Insomnia              Restlessness               Agitation X             CAM / Delirium __ --  ___+   Constipation     __ --  ___+   Diarrhea           __ --  ___+  Bowel Management Plan (BMP): Yes    Comments:     Pain Assessment: Pt reports no pain at this time. Pt is nauseated. Pt able to have liquid diet at this time. Pt was NPO prior.     Performance Status: 40    ECOG Performance Status Grade: 3 - Confined to bed or chair 50% of waking hours    Physical Exam    Significant Labs: All pertinent labs within the past 24 hours have been reviewed.  CBC:   Recent Labs   Lab 08/07/19 0410   WBC 9.20   HGB 8.6*   HCT 28.6*   MCV 91   *     BMP:  Recent Labs   Lab 08/07/19 0410         K 3.9      CO2 28   BUN 15   CREATININE 0.7   CALCIUM 8.7   MG 1.8     LFT:  Lab Results   Component Value Date    AST 9 (L) 08/07/2019    ALKPHOS 80 08/07/2019    BILITOT 0.4 08/07/2019     Albumin:   Albumin   Date Value Ref Range Status   08/07/2019 1.9 (L) 3.5 - 5.2 g/dL Final     Protein:   Total Protein   Date Value Ref Range Status   08/07/2019 6.1 6.0 - 8.4 g/dL Final     Lactic acid:   Lab Results   Component Value Date    LACTATE 1.3 08/05/2019    LACTATE 2.0 08/05/2019       Significant Imaging: I have reviewed all pertinent imaging results/findings within the past 24 hours.    Advance Care Planning   Advanced Directives::  [place]POST: No  Do Not Resuscitate Status: Yes  Medical Power of : Pt has four children. Pt has one daughter, Joanie and three sons.     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 Pt has one daughter and three sons. Pt lives with daughter, Joanie, son-in-law, adult  grandchildren.         Abdominal pain

## 2019-08-07 NOTE — PLAN OF CARE
Problem: Adult Inpatient Plan of Care  Goal: Plan of Care Review  Outcome: Ongoing (interventions implemented as appropriate)  Patient is AAOX4. Up with assist to bedside commode. On Oxygen @ 2 LPM via nasal cannula. Remains NPO; EDG scheduled this evening. Complaints of pain; PRN Ranaxol administered as ordered. Continued complaints of pain; PRN Dilaudid administered as ordered. IV Protonix change to PO Protonix.  Received a 250 ml bolus and a 500 ml bolus of normal saline. Magnesium and Sodium Phos replaced. Lovenox discontinued. Family is at bed side. Patient is stable. Will continue to monitor.

## 2019-08-07 NOTE — HOSPITAL COURSE
She was admitted to our service to have AES evaluate patient for esophageal obstruction 2/2 malignancy vs radiation esophagitis.  She was started on IV PPI, carafate, and PRN GI cocktail for symptoms relief.  Due to decreased PO intake we continue to give her fluid boluses. Due to current situation and inability to get chemo, the patient elected for palliative care, home hospice, and wanted to be made DNR/DNI.   8/7/2019: She was able to tolerate clear liquid diet yesterday.  She is still having nausea despite scheduled zofran but no episodes of vomiting.  She denies shortness of breath, diarrhea, constipation, fever, or chills. Plan was to get EGD for stent repositioning or checkup. Gastroenterology met with patient, daughter (in person), and son (via phone) at length. Discussed with Mrs. Capellan current condition, yield of procedure, and risk associated with procedure. Consensus from patient and family was to avoid procedure and focus on comfort.   8/8/2019: Patient to be discharged home today with home hospice.  to assist the patient and family with hospice arrangements. Patient stable to discharge home with Home hospice

## 2019-08-07 NOTE — ASSESSMENT & PLAN NOTE
Hypotensive and tachycardic likely 2/2 volume depletion with decreased PO intake and vomiting. Also responsive to 1L bolus. Normal lactate, however sepsis not ruled out.  Blood cultures no growth x 2 day.  VSS overnight.  -Continue to monitor  -Follow up blood cultures obtained in ED

## 2019-08-07 NOTE — PROGRESS NOTES
[facility]  Hematology/Oncology  Progress Note    Patient Name: Silvia Capellan  Admission Date: 8/5/2019  Hospital Length of Stay: 2 days  Code Status: DNR     Subjective:     HPI:  Ms. Capellan is a 74 yo female with history of HTN, RA, and SCC (unknown primary) with likely mediastinal and lung mets s/p six cycles of palliative Carboplatin and Paclitaxel(2/16/18 - 6/8/18) who presents today with chest pain.  It is located on the right side beneath right breast, it is non-radiating, and is sharp in nature.  It is intermittent and is made worse by eating and drinking.  She says she has had decreased PO intake over last few days because of this.  She is also having increased malaise, fatigue, nausea, 1 x episode of nonbloody vomiting.  She is also have what sounds like general URI symptoms with increased runny nose, sore throat, and ear pain.  She denies shortness of breath, diarrhea, constipation, fever, chills. The patient was found to be afebrile, tachypneic satting 88% on RA, hypotensive with SBP 82/59 and tachycardic with .  She was given 1L NS with improvement in pressure and heart rate and placed on 3L NC with improvement in saturations.  Labs noteable for lactate 2, K 3.4, Mg 1.4.  EKG without abnormality.  CXR with opacification of L lung, nearly unchanged from last imaging.     Of note the patient was recently admitted and discharged from our service(7/22-7/30) with complaints of shortness of breath. CT at the time without evidence of PE but with stable 7.4cm subcarinal mass and multiple L sided pleural masses. She was treated with a combination of diuresis and IV to oral steroids with improvement in shortness of breath.  She was able to be weaned off oxygen and was sent home with low dose furosemide 20mg daily and prednisone(60mgx3 days and then 40mg daily) with follow up appointment set up 8/5 with oncologist Dr. Wong.      Oncology History:  This is a patient of   Marvin's last seen by her on 7/1/2019.   Diagnosis: Squamous cell carcinoma , primary site unknown    Molecular/genetic testing: p16 negative; PD L1 could not be run due to inadequate tissue   Stage:        Stage 4   Treatment: Carboplatin/paclitaxel x  6 cycles (2/16/18 - 6/8/18)  Bronchoscopy/EBUS evaluation on 8/31/17 for abnormal mediastinal adenopathy  and a 1.2 cm MORRO mass.  - started Caroplatin/paclitaxel palliatively. She had left thoracentesis on 2/9/18. There were atypical cells in the pleural fluid, highly suspicious for malignancy.  - She completed cycles of Carboplatin/Paclitaxel, last treatment on 6/8/18.  - She had repeat left pleural mass FNA in [facility] 2019. It was negative for malignancy, but showed lympho plasmacytic infiltration.  -PET CT on 3/13/19 showed increased  hypermetabolism of the left pleural effusion.Thoracic vertebral body hypermetabolism was noted, new from the prior exam (compared with PET from June 2018) without underlying CT changes which may represent red marrow hyperplasia or new site of neoplastic disease.There was new hypermetabolism in the right L5-S1 facet joints which appeared to be centered at the facet joint not within the bone and is favored to represent degenerative change or neoplasm.  -Repeat CT on 5/31/19 demonstrated an increase in the mediastinal and left pleural masses.  There was also increase in the volume of loculated left pleural fluid.  -6/25/19 patient underwent esophageal stenting for obstruction likely 2/2 malinancy  -Received 10 sessions of palliative XRT to esophagus   -Dr. Wong planned to start Atezolizumab on 7/18/19 once she completed palliative RT for mediastinal mass.         Interval History: No events overnight.  Sinus tachycardia in 110s overnight. She is overall feeling better with improvement in pain.  She was able to tolerate clear liquid diet yesterday.  She is still having nausea despite scheduled zofran but no episodes of vomiting.  She  denies shortness of breath, diarrhea, constipation, fever, or chills.     Oncology Treatment Plan:   OP ATEZOLIZUMAB Q3W    Medications:  Continuous Infusions:  Scheduled Meds:   enoxaparin  40 mg Subcutaneous Daily    ondansetron  8 mg Intravenous Q8H    pantoprozole (PROTONIX) IV  40 mg Intravenous BID    polyethylene glycol  17 g Oral BID    sodium chloride 0.9%  250 mL Intravenous Once    sucralfate  1 g Oral TID WM     PRN Meds:albuterol-ipratropium, Dextrose 10% Bolus, Dextrose 10% Bolus, GI cocktail (mylanta 30 mL, lidocaine 2 % viscous 10 mL, dicyclomine 10 mL) 50 mL, glucagon (human recombinant), glucose, glucose, HYDROmorphone, LORazepam, promethazine (PHENERGAN) IVPB, ramelteon, sodium chloride 0.9%, sodium chloride 0.9%, sodium chloride 0.9%     Review of Systems  All other systems negative  Objective:     Vital Signs (Most Recent):  Temp: 98 °F (36.7 °C) (08/07/19 0741)  Pulse: 102 (08/07/19 0741)  Resp: 18 (08/07/19 0741)  BP: 113/74 (08/07/19 0741)  SpO2: 95 % (08/07/19 0741) Vital Signs (24h Range):  Temp:  [97.9 °F (36.6 °C)-99.2 °F (37.3 °C)] 98 °F (36.7 °C)  Pulse:  [] 102  Resp:  [16-18] 18  SpO2:  [90 %-100 %] 95 %  BP: ()/(55-80) 113/74     Weight: 82 kg (180 lb 12.4 oz)  Body mass index is 27.9 kg/m².  Body surface area is 1.98 meters squared.      Intake/Output Summary (Last 24 hours) at 8/7/2019 0752  Last data filed at 8/7/2019 0300  Gross per 24 hour   Intake 236 ml   Output 400 ml   Net -164 ml       Physical Exam  General: no distress, appears uncomfortable  CV: tachycardic, normal S1, S2  Pulm: decreased breath sounds bilaterally, L>R, no increased WOB on 3L NC.  Abd: soft, nontender, nondistended, +normal BS  Ext: no lower extremity edema present     Significant Labs:   CBC:   Recent Labs   Lab 08/05/19  1150 08/06/19  0421 08/06/19  0609 08/07/19  0410   WBC 14.13* 12.13  --  9.20   HGB 11.9* 9.4*  --  8.6*   HCT 38.3 30.3*  --  28.6*    SEE COMMENT 138* 129*     and CMP:   Recent Labs   Lab 19  1241 19  0421 19  0410    136 138   K 3.4* 4.4 3.9   CL 99 100 101   CO2 26 29 28   * 111* 104   BUN 14 17 15   CREATININE 0.8 0.7 0.7   CALCIUM 8.4* 8.8 8.7   PROT 6.4 6.1 6.1   ALBUMIN 2.2* 2.0* 1.9*   BILITOT 0.5 0.4 0.4   ALKPHOS 76 78 80   AST 15 11 9*   ALT 11 9* 7*   ANIONGAP 12 7* 9   EGFRNONAA >60.0 >60.0 >60.0     Blood culture: no growth x 2 days    Phosphorus: 2.6  M.8    Diagnostic Results:  None    Assessment/Plan:     Odynophagia  Patient with history of esophageal obstruction likely 2/2 malignancy.  She is s/p stent placement on 2019-placed on Liquid diet and placed on PPI and 10x sessions of palliative XRT. Patient now with symptoms of chest pain worse with eating and drinking, decreased PO intake, and nausea/vomiting.  Concern for esophagitis 2/2 XRT(last session 2019) vs esophageal obstruction 2/2 malignancy vs stent issues. AES consulted and suspect esophagitis.   Plan:  -EGD today with AES  -Per recommendations:   -pantoprazole 40mg IV BID   -carafate   -GI cocktail as needed  -Zofran scheduled 8mg q8h  -Pain: roxanol 10mg q3 for moderate pain and IV Dilaudid 1 mg q6 for sever     Stage 4 Squamous cell carcinoma of unknown origin  Patient of Dr. Wong. SCC of unknown origin with masses in L pleura, lung, mediastinum and causing esopageal obstruction. S/p Carboplatin and Paclitaxel x 6 cycles completed in 2018 and 10 sessions of palliative RT completed 2019.  Patient elected to be made DNR after explanation of inability to start chemotherapy and poor prognosis  -Palliative care consulted and patient wanting home hospice-will work to get set up    Malignant pleural effusion  Patient with history of reccurent L sided effusions. Thoracocentesis 2018 path with atypical cells highly suspicious for malignancy. CTA on  with stable left pleural masses measuring 8 cm.  Possible associated effusion.  Pulmonary  ultrasounded left side-there is nothing to drain and this is likely all pleural mass    Hypotension  Hypotensive and tachycardic likely 2/2 volume depletion with decreased PO intake and vomiting. Also responsive to 1L bolus. Normal lactate, however sepsis not ruled out.  Blood cultures no growth x 2 day.  VSS overnight.  -Continue to monitor  -Follow up blood cultures obtained in ED    Acute hypoxemic respiratory failure  Patient presented with no SOB, but satting 88% on RA. Now satting >90% off NC   Likely 2/2 pleural masses.   Plan:  -O2 as needed for comfort    Upper respiratory symptom  Patient with symptoms of runny nose, ear pain, sore throat, malaise. No findings on physical exam although ear impacted with cerumen  -Viral respiratory panel ordered  -Will treat symptomatically     Nausea   IV Zofran 8mg q8   PRN: IV Phenergan 12.5 mg q6 PRN    Anemia in neoplastic disease  H/H downtrending.  Likely 2/2 malignancy  -Continue to monitor with daily CBC  -Transufse H/H <7    Hypophosphatemia  Repleating as needed with IV sodium phosphate    Hypoalbuminemia  Without signs of anasarca  -Continue to monitor    Hypomagnesemia  Magnesium 1.4 on admission  -Replete as needed    Hypokalemia  On admission K of 3.4  -Replace orally as needed    Dispo: home hospice    Paul Whitaker MD  Hematology/Oncology  [facility]

## 2019-08-08 NOTE — PROGRESS NOTES
Ervin Dexter from [facility] called to inform that the equipment and medication were in place and that the patient could be discharged. Spoke to the patient's daughter and she confirmed that she would be transporting the patient home by car. Notified the patient's nurse on the 8th floor.

## 2019-08-08 NOTE — ASSESSMENT & PLAN NOTE
Patient with symptoms of runny nose, ear pain, sore throat, malaise. No findings on physical exam although ear impacted with cerumen  -Will treat symptomatically

## 2019-08-08 NOTE — NURSING
Pt discharged to home-hospise at this time per MD order.  Med rec completed by MD prior to discharge and copy of current med list given to pt.  All discharge instructions, medications, prescriptions, and follow-up appointments will be transferred to home hospise at this time.  all questions answered to pt's satisfaction.  GUILLERMO portacath flushed with normal saline and 100u/ml heparin flush and de-accessed per protocol. Tolerating full-liquid diet;  All VSS; O2 sats WNL on RA.  Pt left floor via wheelchair; accompanied by pt escort and family; no distress noted.

## 2019-08-08 NOTE — PROGRESS NOTES
Spoke to the patient and her daughter, Joanie: They both indicated that the patient would go to her home address. Referral made through [facility] to [facility] - spoke to Edgar Dexter at [facility]: They were meeting with the patient at 4 pm and would notify when arrangements were in place. The patient would go home with her family by car. Notified the patient's nurse on the 8th floor.

## 2019-08-08 NOTE — ASSESSMENT & PLAN NOTE
Patient with history of esophageal obstruction likely 2/2 malignancy.  She is s/p stent placement on 6/25/2019-placed on Liquid diet and placed on PPI and 10x sessions of palliative XRT. Patient now with symptoms of chest pain worse with eating and drinking, decreased PO intake, and nausea/vomiting.  Concern for esophagitis 2/2 XRT(last session 7/24/2019) vs esophageal obstruction 2/2 malignancy vs stent issues. AES consulted and suspect esophagitis.   Plan:  Patient and family refused EGD   Continue protonix

## 2019-08-08 NOTE — PLAN OF CARE
"Problem: Adult Inpatient Plan of Care  Goal: Plan of Care Review  Outcome: Ongoing (interventions implemented as appropriate)  POC reviewed w patient  at beginning of shift and PRN.  AAOX4. Up with assist to bedside commode. Decreased UO. On Oxygen @ 2 LPM via nasal cannula. Clear liquid diet w little PO intake; Continues to report pain; PRN Dilaudid administered as ordered. PO Protonix swallowed w/o complication after Zofran admin. Telemetry monitoring w episodes of sustained HR in 120"s. Received a  500 ml bolus of LR . Patient is stable. Will continue to monitor.  Poss DC home w hospice      "

## 2019-08-08 NOTE — PLAN OF CARE
MDRs completed with Dr. Huitron and the team. Plans for patient to discharge home today with home hospice. MD discussed the discharge plan with the patient.  to assist the patient and family with hospice arrangements. No follow-up appointment scheduled secondary to discharge disposition.     08/08/19 1138   Final Note   Assessment Type Final Discharge Note   Anticipated Discharge Disposition HospiceHome   What phone number can be called within the next 1-3 days to see how you are doing after discharge?   ([phone])   Hospital Follow Up  Appt(s) scheduled?   (N/A)   Discharge plans and expectations educations in teach back method with documentation complete?   (N/A)

## 2019-08-08 NOTE — NURSING
Decreased Urinary OP after 500 cc bolus. Exertional SOB. Notified Dr Foreman in person; verbalized ack. Will ctm

## 2019-08-08 NOTE — NURSING
/64 HR sustained above 120's, pt nauseated sitting up on SOB . NAD. Notified Hem Onc On call Dr Rasheed ordered 500 cc bolus LR. Will CTM

## 2019-08-08 NOTE — ASSESSMENT & PLAN NOTE
Hypotensive and tachycardic likely 2/2 volume depletion with decreased PO intake and vomiting. Also responsive to 1L bolus. Normal lactate, however sepsis not ruled out.  Blood cultures no growth x 2 day.  VSS overnight.  BP stable

## 2019-08-08 NOTE — PLAN OF CARE
[facility]  Department of Hospital Medicine  [address] [place] [address]  [phone] after hours  [phone] fax    HOSPICE  ORDERS    08/08/2019    Admit to Hospice:  Home ServiceHome = patient's home.)     Diagnoses:   Active Hospital Problems    Diagnosis  POA    Hypoalbuminemia [E88.09]  Yes    Hypophosphatemia [E83.39]  Yes    Odynophagia [R13.10]  Yes    Palliative care encounter [Z51.5]  Not Applicable    Goals of care, counseling/discussion [Z71.89]  Not Applicable    Hypotension [I95.9]  Yes    Upper respiratory symptom [R09.89]  Yes    Nausea [R11.0]  Yes    Hypomagnesemia [E83.42]  Yes    Acute hypoxemic respiratory failure [J96.01]  Yes    Hypokalemia [E87.6]  Yes    Anemia in neoplastic disease [D63.0]  Yes    Stage 4 Squamous cell carcinoma of unknown origin [C44.92]  Yes    Malignant pleural effusion [J91.0]  Yes      Resolved Hospital Problems   No resolved problems to display.       Hospice Qualifying Diagnoses:        Patient has a life expectancy < 6 months due to:  1) Primary Hospice Diagnosis:  Stage 4 Squamous cell carcinoma of unknown origin  Comorbid Conditions Contributing to Decline: Malignant pleural effusion,  Hypotension, Nausea, Anemia in neoplastic disease, Odynophagia, Shortness of breath, Reflux esophagitis    Vital Signs: Routine per Hospice Protocol.    Code Status: DNR    Allergies:   Review of patient's allergies indicates:   Allergen Reactions    Latex, natural rubber Rash    Codeine Hallucinations    Cortisporin [neomycin-bacitracin-poly-hc] Rash    Latex, natural rubber Rash       Diet: Clear liquid diet, pleasure feeds   Activities: As tolerated    Nursing: Per Hospice Routine.      Routine Skin for Bedridden Patients: Apply moisture barrier cream to all skin folds and   wet areas in perineal area daily and after baths and all bowel movements.    Oxygen: On 2 L of Oxygen saturating >94%    Other  Miscellaneous Care:       Medications:      TimiCarmen lovellareanastasia OCASIO   Home Medication Instructions MARIA E:[id number]    Printed on:08/08/19 1103   Medication Information                      food supplemt, lactose-reduced (ENSURE ORIGINAL) Liqd  Take 118 mLs by mouth 3 (three) times daily with meals.             ipratropium (ATROVENT) 0.02 % nebulizer solution  Take 2.5 mLs (500 mcg total) by nebulization 4 (four) times daily as needed for Wheezing. Rescue             LORazepam (ATIVAN) 1 MG tablet  TAKE 3 TABLETS BY MOUTH 1&1/2 HOURS BEFORE APPT. ON A EMPTY STOMACH             morphine 100 mg/5 mL (20 mg/mL) concentrated solution  Take 0.5 mLs (10 mg total) by mouth every 3 (three) hours as needed.             pantoprazole (PROTONIX) 40 MG tablet  Take 1 tablet (40 mg total) by mouth 2 (two) times daily before meals.             promethazine (PHENERGAN) 12.5 MG Tab  Take 1 tablet (12.5 mg total) by mouth every 6 (six) hours as needed (nausea or vomiting).             traZODone (DESYREL) 50 MG tablet  Take 1 tablet (50 mg total) by mouth every evening.                     Future Orders:  Hospice Medical Director may dictate new orders for comfortable care measures & sign death certificate.        _________________________________  Giovanni Hale MD  08/08/2019

## 2019-08-08 NOTE — ASSESSMENT & PLAN NOTE
Patient of Dr. Wong. SCC of unknown origin with masses in L pleura, lung, mediastinum and causing esopageal obstruction. S/p Carboplatin and Paclitaxel x 6 cycles completed in 6/2018 and 10 sessions of palliative RT completed 7/24/2019.  Patient elected to be made DNR after explanation of inability to start chemotherapy and poor prognosis  -Palliative care consulted and patient wanting home hospice  - Patient will be discharged home with home hospice

## 2019-08-08 NOTE — DISCHARGE SUMMARY
[facility]  Hematology/Oncology  Discharge Summary      Patient Name: Silvia Capellan  MRN: [id number]  Admission Date: 8/5/2019  Hospital Length of Stay: 3 days  Discharge Date and Time:  08/08/2019 2:48 PM  Attending Physician: Estefanía Huitron MD   Discharging Provider: Giovanni Hale MD  Primary Care Provider: Ganga Krause MD    HPI: Ms. Capellan is a 74 yo female with history of HTN, RA, and SCC (unknown primary) with likely mediastinal and lung mets s/p six cycles of palliative Carboplatin and Paclitaxel(2/16/18 - 6/8/18) who presents today with chest pain.  It is located on the right side beneath right breast, it is non-radiating, and is sharp in nature.  It is intermittent and is made worse by eating and drinking.  She says she has had decreased PO intake over last few days because of this.  She is also having increased malaise, fatigue, nausea, 1 x episode of nonbloody vomiting.  She is also have what sounds like general URI symptoms with increased runny nose, sore throat, and ear pain.  She denies shortness of breath, diarrhea, constipation, fever, chills. The patient was found to be afebrile, tachypneic satting 88% on RA, hypotensive with SBP 82/59 and tachycardic with .  She was given 1L NS with improvement in pressure and heart rate and placed on 3L NC with improvement in saturations.  Labs noteable for lactate 2, K 3.4, Mg 1.4.  EKG without abnormality.  CXR with opacification of L lung, nearly unchanged from last imaging.     Of note the patient was recently admitted and discharged from our service(7/22-7/30) with complaints of shortness of breath. CT at the time without evidence of PE but with stable 7.4cm subcarinal mass and multiple L sided pleural masses. She was treated with a combination of diuresis and IV to oral steroids with improvement in shortness of breath.  She was able to be weaned off oxygen and was sent home with low dose furosemide 20mg daily and  prednisone(60mgx3 days and then 40mg daily) with follow up appointment set up 8/5 with oncologist Dr. Wong.      Oncology History:  This is a patient of Dr. Wong's last seen by her on 7/1/2019.   Diagnosis: Squamous cell carcinoma , primary site unknown    Molecular/genetic testing: p16 negative; PD L1 could not be run due to inadequate tissue   Stage:        Stage 4   Treatment: Carboplatin/paclitaxel x  6 cycles (2/16/18 - 6/8/18)  Bronchoscopy/EBUS evaluation on 8/31/17 for abnormal mediastinal adenopathy  and a 1.2 cm MORRO mass.  - started Caroplatin/paclitaxel palliatively. She had left thoracentesis on 2/9/18. There were atypical cells in the pleural fluid, highly suspicious for malignancy.  - She completed cycles of Carboplatin/Paclitaxel, last treatment on 6/8/18.  - She had [facility]. It was negative for malignancy, but showed lympho plasmacytic infiltration.  -PET CT on 3/13/19 showed increased  hypermetabolism of the left pleural effusion.Thoracic vertebral body hypermetabolism was noted, new from the prior exam (compared with PET from June 2018) without underlying CT changes which may represent red marrow hyperplasia or new site of neoplastic disease.There was new hypermetabolism in the right L5-S1 facet joints which appeared to be centered at the facet joint not within the bone and is favored to represent degenerative change or neoplasm.  -Repeat CT on 5/31/19 demonstrated an increase in the mediastinal and left pleural masses.  There was also increase in the volume of loculated left pleural fluid.  -6/25/19 patient underwent esophageal stenting for obstruction likely 2/2 malinancy  -Received 10 sessions of palliative XRT to esophagus   -Dr. Wong planned to start Atezolizumab on 7/18/19 once she completed palliative RT for mediastinal mass.         Procedure(s) (LRB):  EGD (ESOPHAGOGASTRODUODENOSCOPY) (N/A)     Hospital Course: She was admitted to our service to  have [facility] evaluate patient for esophageal obstruction 2/2 malignancy vs radiation esophagitis.  She was started on IV PPI, carafate, and PRN GI cocktail for symptoms relief.  Due to decreased PO intake we continue to give her fluid boluses. Due to current situation and inability to get chemo, the patient elected for palliative care, home hospice, and wanted to be made DNR/DNI.   8/7/2019: She was able to tolerate clear liquid diet yesterday.  She is still having nausea despite scheduled zofran but no episodes of vomiting.  She denies shortness of breath, diarrhea, constipation, fever, or chills. Plan was to get EGD for stent repositioning or checkup. Gastroenterology met with patient, daughter (in person), and son (via phone) at length. Discussed with Mrs. Capellan current condition, yield of procedure, and risk associated with procedure. Consensus from patient and family was to avoid procedure and focus on comfort.   8/8/2019: Patient to be discharged home today with home hospice.  to assist the patient and family with hospice arrangements. Patient stable to discharge home with Home hospice    Physical Exam   Constitutional: She is oriented to person, place, and time.   Elderly female lying in bed. On 2 L of oxygen   HENT:   Head: Normocephalic and atraumatic.   Eyes: Pupils are equal, round, and reactive to light. Conjunctivae and EOM are normal.   Neck: Normal range of motion. Neck supple. No JVD present.   Cardiovascular: Normal rate, regular rhythm and normal heart sounds.   Pulmonary/Chest: Effort normal and breath sounds normal. No respiratory distress. She has no wheezes.   Abdominal: Soft. Bowel sounds are normal. She exhibits no distension. There is no tenderness.   Musculoskeletal: Normal range of motion. She exhibits no edema, tenderness or deformity.   Neurological: She is alert and oriented to person, place, and time. No cranial nerve deficit.   Skin: Skin is warm and dry. No erythema.    Psychiatric: Affect normal.     Consults:   Consults (From admission, onward)        Status Ordering Provider     Inpatient consult to [facility]  Once     Provider:  (Not yet assigned)    Completed CHELSEA CARY     Inpatient consult to Palliative Care  Once     Provider:  (Not yet assigned)    Completed CHELSEA CARY          Significant Diagnostic Studies: Labs:   CMP   Recent Labs   Lab 08/07/19  0410      K 3.9      CO2 28      BUN 15   CREATININE 0.7   CALCIUM 8.7   PROT 6.1   ALBUMIN 1.9*   BILITOT 0.4   ALKPHOS 80   AST 9*   ALT 7*   ANIONGAP 9   ESTGFRAFRICA >60.0   EGFRNONAA >60.0   , CBC   Recent Labs   Lab 08/07/19  0410   WBC 9.20   HGB 8.6*   HCT 28.6*   *    and INR   Lab Results   Component Value Date    INR 1.3 (H) 06/26/2019    INR 1.4 (H) 06/24/2019    INR 1.2 06/03/2019       Pending Diagnostic Studies:     None        Final Active Diagnoses:    Diagnosis Date Noted POA    PRINCIPAL PROBLEM:  Acute hypoxemic respiratory failure [J96.01] 06/25/2019 Yes    Hypoalbuminemia [E88.09] 08/06/2019 Yes    Hypophosphatemia [E83.39] 08/06/2019 Yes    Odynophagia [R13.10] 08/06/2019 Yes    Palliative care encounter [Z51.5] 08/06/2019 Not Applicable    Goals of care, counseling/discussion [Z71.89]  Not Applicable    Hypotension [I95.9] 08/05/2019 Yes    Upper respiratory symptom [R09.89] 08/05/2019 Yes    Nausea [R11.0] 07/22/2019 Yes    Hypomagnesemia [E83.42] 07/22/2019 Yes    Hypokalemia [E87.6] 06/24/2019 Yes    Anemia in neoplastic disease [D63.0] 03/29/2018 Yes    Stage 4 Squamous cell carcinoma of unknown origin [C44.92] 09/11/2017 Yes    Malignant pleural effusion [J91.0] 06/08/2017 Yes      Problems Resolved During this Admission:      Discharged Condition: stable    Disposition: Hospice/Home    Follow Up:  Follow-up Information     Shayne Wong MD.    Specialty:  Hematology and Oncology  Why:  Follow-Up Appointment as scheduled by  the clinic  Contact information:  [address]  [facility] [address]  [phone]                 Patient Instructions:      Diet Adult Regular   Scheduling Instructions: Pleasure feeds     Diet clear liquid     Diet full liquid     Activity as tolerated     Medications:  Reconciled Home Medications:      Medication List      START taking these medications    morphine 100 mg/5 mL (20 mg/mL) concentrated solution  Take 0.5 mLs (10 mg total) by mouth every 3 (three) hours as needed.        CHANGE how you take these medications    pantoprazole 40 MG tablet  Commonly known as:  PROTONIX  Take 1 tablet (40 mg total) by mouth 2 (two) times daily before meals.  What changed:  when to take this     traZODone 50 MG tablet  Commonly known as:  DESYREL  Take 1 tablet (50 mg total) by mouth every evening.  What changed:  Another medication with the same name was removed. Continue taking this medication, and follow the directions you see here.        CONTINUE taking these medications    food supplemt, lactose-reduced Liqd  Commonly known as:  ENSURE ORIGINAL  Take 118 mLs by mouth 3 (three) times daily with meals.     ipratropium 0.02 % nebulizer solution  Commonly known as:  ATROVENT  Take 2.5 mLs (500 mcg total) by nebulization 4 (four) times daily as needed for Wheezing. Rescue     LORazepam 1 MG tablet  Commonly known as:  ATIVAN  TAKE 3 TABLETS BY MOUTH 1&1/2 HOURS BEFORE APPT. ON A EMPTY STOMACH     promethazine 12.5 MG Tab  Commonly known as:  PHENERGAN  Take 1 tablet (12.5 mg total) by mouth every 6 (six) hours as needed (nausea or vomiting).        STOP taking these medications    cyproheptadine 4 mg tablet  Commonly known as:  PERIACTIN     furosemide 20 MG tablet  Commonly known as:  LASIX     gabapentin 300 MG capsule  Commonly known as:  NEURONTIN     hydroCHLOROthiazide 25 MG tablet  Commonly known as:  HYDRODIURIL     lactulose 10 gram/15 mL solution  Commonly known as:  CHRONULAC     levocetirizine 5 MG  tablet  Commonly known as:  XYZAL     losartan 100 MG tablet  Commonly known as:  COZAAR     metoclopramide HCl 10 MG tablet  Commonly known as:  REGLAN     metoprolol succinate 50 MG 24 hr tablet  Commonly known as:  TOPROL-XL     oxyCODONE 10 mg Tab immediate release tablet  Commonly known as:  ROXICODONE     potassium chloride 10% 20 mEq/15 mL oral solution  Commonly known as:  KAYCIEL     potassium chloride 5mEq / 5 ml Liqd     predniSONE 20 MG tablet  Commonly known as:  DELTASONE     senna-docusate 8.6-50 mg 8.6-50 mg per tablet  Commonly known as:  PERICOLACE            Giovanni Hale MD  Hematology/Oncology  [facility]

## 2019-08-16 NOTE — TELEPHONE ENCOUNTER
Called pt and informed her that Dr. Krause won't be in clinic . Rescheduled from 8/20/19 to 8/28/19 at 9 am confirmed . Please override and schedule , will mail apt reminder .

## 2019-08-28 NOTE — TELEPHONE ENCOUNTER
Patient daughter confirmed that her mother was discharged to Hospice care so she will not be coming in for her PCP appointment today.

## 2019-09-26 NOTE — ASSESSMENT & PLAN NOTE
Per Dr Wong's last clinic note, pt appointed Joanie Belle has her HCOPOA. Will have further discussions with patient regarding wishes and advanced care planning pending her clinical course while inpatient.   Intermediate / Complex Repair - Final Wound Length In Cm: 2.9

## 2020-01-01 NOTE — ASSESSMENT & PLAN NOTE
Repleating with IV sodium phosphate   TIME & DATE: 12/31/19 1900-0730  Cognitive Concerns/ Orientation : A&Ox4    BEHAVIOR & AGGRESSION TOOL COLOR: Green   CIWA SCORE: NA       ABNL VS/O2: VSS on RA   MOBILITY: SBA   PAIN MANAGMENT: Patient continues with abdominal pain but reports improvement, taking 0.5 mg dilaudid for 3-4/10 pain for shift.  DIET: NPO except for meds - denies nausea   BOWEL/BLADDER:  BS/flatus (+) but still has not had a BM; Voiding appropriately.   ABNL LAB/BG: WDL   DRAIN/DEVICES: PIV infusing  TELEMETRY RHYTHM: NA   SKIN: WDL   TESTS/PROCEDURES: Abdominal xray yesterday - results are pending  D/C DAY/GOALS/PLACE: 2 - 3 days, recommended to home pending clinical course of SBO.  OTHER IMPORTANT INFO:  Gi following - Will need NG placement if develops N/V; Surgical consultation if symptoms fail to improve; Will plan on endoscopic evaluation once SBO improves

## 2020-06-05 NOTE — ASSESSMENT & PLAN NOTE
-  on 4L of O2 by NC  - Decrease to solumedrol 50 BID and do lasix PRN  -  Discussed with throacic surgery about stent placement endobronchially. Unable to perform surgery  - Consult to PT/OT to work to get patient back to baseline status      
-  on 5L of O2 by NC  - Decrease to solumedrol 50 BID and do lasix PRN  -  Discussed with throacic surgery about stent placement endobronchially. Unable to perform surgery  - Consult to PT/OT to work to get patient back to baseline status      
-  on 8 L of O2 by NC  - continue solumedrol 50 TID and do lasix PRN  -  Discuss with throacic surgery about stent placement endobronchially     
- Magnesium 2.1 normal      
- start senna, miralax once daily and assess  
-Ambien 5mg PRN   
-Ambien 5mg PRN   
-Cetirizine 5mg  
-Cetirizine 5mg  
-Continue Ramelteon 8mg qhs PRN  
-Metoprolol (torprol-xl) 50mg oral daily   
-Metoprolol (torprol-xl) 50mg oral daily   
-Metoprolol (torprol-xl) 50mg oral daily   -discontinue telemetry  
-UA and Culture ordered 7/27/19  
-Zofran 4 mg q6H first choice PRN  -Metclopramide 10 q6H also available for PRN use  
-Zofran 4 mg q6H first choice PRN  -Metclopramide 10 q6H also available for PRN use  
-Zofran first choice PRN  -Promethazine second choice PRN  -Metclopramide also available   
Diagnosis: Squamous cell carcinoma , primary site unknown    Molecular/genetic testing: p16 negative; PD L1 could not be run due to inadequate tissue   Stage:        Stage 4   Treatment: Carboplatin/paclitaxel x  6 cycles (2/16/18 - 6/8/18)  Bronchoscopy/EBUS evaluation on 8/31/17 for abnormal mediastinal adenopathy  and a 1.2 cm MORRO mass.  - started Caroplatin/paclitaxel palliatively. She had left thoracentesis on 2/9/18. There were atypical cells in the pleural fluid, highly suspicious for malignancy.  - She completed cycles of Carboplatin/Paclitaxel, last treatment on 6/8/18.  - had prior XRT to esophagus  - She had repeat left pleural mass FNA in [facility] 2019. It was negative for malignancy, but showed lympho plasmacytic infiltration.   - PET CT on 3/13/19 showed increased  hypermetabolism of the left pleural effusion.Thoracic vertebral body, L5-S1 hypermetabolism which was possible metastases  
Diagnosis: Squamous cell carcinoma , primary site unknown    Molecular/genetic testing: p16 negative; PD L1 could not be run due to inadequate tissue   Stage:        Stage 4   Treatment: Carboplatin/paclitaxel x  6 cycles (2/16/18 - 6/8/18)  Bronchoscopy/EBUS evaluation on 8/31/17 for abnormal mediastinal adenopathy  and a 1.2 cm MORRO mass.  - started Caroplatin/paclitaxel palliatively. She had left thoracentesis on 2/9/18. There were atypical cells in the pleural fluid, highly suspicious for malignancy.  - She completed cycles of Carboplatin/Paclitaxel, last treatment on 6/8/18.  - had prior XRT to esophagus  - She had repeat left pleural mass FNA in [facility] 2019. It was negative for malignancy, but showed lympho plasmacytic infiltration.   - PET CT on 3/13/19 showed increased  hypermetabolism of the left pleural effusion.Thoracic vertebral body, L5-S1 hypermetabolism which was possible metastases  
K+ 2.8 on admission. Received 60meq in the ED    -Replete K as necessary   
Magnesium 1.1 on admission. Received 2g in the ER    -Replete as necessary   
Ms Capellan is a 74 yo F w PMHx significant for rheumatoid arthritis, HTN, and squamous cell carcinoma (unknown primary) resulting in lung and mediastinal masses s/p six cycles of palliative carboplatin/paclitaxel who presents with a chief complaint of shortness of breath. CT scan showed opacification of the left lung that is thought to be cancer  There maybe a small pleural effusion but not thought to be tapable at this time. . Patient is sating well on 2L of oxygen. After more discussion with the patient seems like the problem is with her pain regimen. She just feels uncomfortable taking breaths.     - Continue Nasal canula oxygen as needed  - Q4 PRN Percocet     
On 7/23 at night patient started to desat. Her heart rate increased to 130s. 02 sats were in the 70s and she was altered. Sats increased to the 90s with 6l o2 nasal canula but still altered. ABG revealed a CO2 of 59. Bipap was put on the patient. Chest x-ray showed worse opacification of the left lung and increased pulmonary vasculature in the right lung. This could be due to fluid overload, infection, or worsening of her cancer. Lactate is within normal limits and she has been afebrile during hospital stay.       -Continue bipap  -Check VBGs  -D/C fluids  -80mg lasix  -80mg solumderol    
On 7/23 at night patient started to desat. Her heart rate increased to 130s. 02 sats were in the 70s and she was altered. Sats increased to the 90s with 6l o2 nasal canula but still altered. ABG revealed a CO2 of 59. Bipap was put on the patient. Chest x-ray showed worse opacification of the left lung and increased pulmonary vasculature in the right lung. This could be due to fluid overload, infection, or worsening of her cancer. Lactate is within normal limits and she has been afebrile during hospital stay. On 7/24 patient received lasix and steroids. Breathing improved significantly. She no longer requires bipap. Her vitals were also stable throughout the day.       -lasix as needed   -Continue 40 mg solumderol TID     
Patient has not had a bowel movement this admission despite addition of Miralax and senna  -Will try lactulose and GI cocktail  -continue Miralax BID and senna-docusate BID  
Patient is complaining of some chest pain which is most likely due to her reflux.    -Protonix 40 mg daily  
Patient is complaining of some chest pain which is most likely due to her reflux.  - esophageal stent placed on 6/25/19  -Protonix 40 mg daily  
Patient is complaining of some chest pain which is most likely due to her reflux.  - esophageal stent placed on 6/25/19  -famotidine 20mg bid  -various PRNs available for more discomfort    
Patient is complaining of some chest pain which is most likely due to her reflux.  - esophageal stent placed on 6/25/19  -famotidine 20mg bid  -various PRNs available for more discomfort    
Patient's baseline O2 requirements are 2L NC and has been requiring 5L while inpatient, able to be weaned down to 3L and satting well.  Ddx includes fluid overload vs malignant effusion or metastasis vs other inflammation. Patient continues to improve with a combination of steroids and diuresis.  -Switched to oral prednisone 60mg daily-continue to taper  -continue pepcid 20 BID for GI ppx  -Started lasix 20 mg daily -monitor urine output with this new daily dose  -Patient OOB today, PT/OT consulted over the weekend. Follow up their recommendations but she will likely benefit from home health.       
Pt of Dr Wong with history of squamous cell carcinoma with unknown primary. As such, she is to be treated as metastatic SCC. She completed cycles of Carboplatin/Paclitaxel, last treatment on 6/8/18. Currently receiving radiation therapy   
UA only with trace leukocytes and UCx No growth x 2d  
none

## 2020-07-30 NOTE — NURSING
Pt arrived for neulasta.  Pt states she did not want the OBI.  Pt tolerated injection SQ to left side of abd.  Discharged to home in wheelchair with daughter.   previous_biopsy_has_been_previously_biopsied Body Location Override (Optional): left parietal scalp When Was Your Biopsy?: 06/15/38481 Accession (Optional): [id number] Who Is Your Referring Provider?: Dr. Lary Flores Year Removed: 1900

## 2022-06-18 NOTE — PLAN OF CARE
"Problem: Adult Inpatient Plan of Care  Goal: Plan of Care Review  Outcome: Ongoing (interventions implemented as appropriate)  Pt tolerated 1 L IVF with potassium well, with no complications. She also got Zofran 8 mg IVPB for intractable N/V, after which she reported feeling no relief. Notified Dr. Hunter (MD of the day) and she ordered Phenergan 25 mg IV, after which pt expressing feeling "a little" relief from N/V. VS stable throughout treatment. Left chest port positive for blood return, flushed with normal saline and heparin and de-accessed prior to discharge. Site dressed with band-aid. RTC 6/10/19, pt verbalized understanding. Pt discharged with NAD, per wheelchair, accompanied by daughter. AVS printed and given to pt.          " abdomen

## 2024-11-05 NOTE — PATIENT INSTRUCTIONS
Possible Causes of Low Back or Leg Pain    The symptoms in your back or leg may be due to pressure on a nerve. This pressure may be caused by a damaged disk or by abnormal bone growth. Either way, you may feel pain, burning, tingling, or numbness. If you have pressure on a nerve that connects to the sciatic nerve, pain may shoot down your leg.    Pressure from the disk  Constant wear and tear can weaken a disk over time and cause back pain. The disk can then be damaged by a sudden movement or injury. If its soft center begins to bulge, the disk may press on a nerve. Or the outside of the disk may tear, and the soft center may squeeze through and pinch a nerve.    Pressure from bone  As a disk wears out, the vertebrae right above and below the disk begin to touch. This can put pressure on a nerve. Often, abnormal bone (called bone spurs) grows where the vertebrae rub against each other. This can cause the foramen or the spinal canal to narrow (called stenosis) and press against a nerve.  Date Last Reviewed: 10/4/2015  © 9005-6796 [employer]. [address] [place] [id number]. All rights reserved. This information is not intended as a substitute for professional medical care. Always follow your healthcare professional's instructions.         Chart review completed.  Email sent to facility requesting update on patient.   This care manager assistant will continue to monitor via chart review throughout SN[facility] Surveillance episode.

## (undated) DEVICE — DRAPE STERI INCISE MED 130X130

## (undated) DEVICE — SHIELD COLLAGEN 12HR CORNEAL

## (undated) DEVICE — SOL WATER STRL IRR 1000ML

## (undated) DEVICE — DRAPE OPHTHALMIC 48X62 FEN

## (undated) DEVICE — SHIELD EYE PLASTIC 3100G

## (undated) DEVICE — GOWN SURGICAL X-LARGE

## (undated) DEVICE — SOL BETADINE 5%

## (undated) DEVICE — DRESSING EYE OVAL LF

## (undated) DEVICE — NDL FLTR 5MCRN BLNT TIP 18GX1

## (undated) DEVICE — KIT GREY EYE

## (undated) DEVICE — SEE MEDLINE ITEM 157131